# Patient Record
Sex: FEMALE | Race: WHITE | Employment: OTHER | URBAN - METROPOLITAN AREA
[De-identification: names, ages, dates, MRNs, and addresses within clinical notes are randomized per-mention and may not be internally consistent; named-entity substitution may affect disease eponyms.]

---

## 2017-03-22 ENCOUNTER — OFFICE VISIT (OUTPATIENT)
Dept: PULMONOLOGY | Age: 77
End: 2017-03-22

## 2017-03-22 VITALS
HEIGHT: 65 IN | SYSTOLIC BLOOD PRESSURE: 150 MMHG | OXYGEN SATURATION: 96 % | DIASTOLIC BLOOD PRESSURE: 80 MMHG | RESPIRATION RATE: 16 BRPM | HEART RATE: 69 BPM | WEIGHT: 193 LBS | TEMPERATURE: 98.2 F | BODY MASS INDEX: 32.15 KG/M2

## 2017-03-22 DIAGNOSIS — J31.0 CHRONIC RHINITIS: ICD-10-CM

## 2017-03-22 DIAGNOSIS — J45.909 UNCOMPLICATED ASTHMA, UNSPECIFIED ASTHMA SEVERITY: Primary | ICD-10-CM

## 2017-03-22 DIAGNOSIS — R05.9 COUGH: ICD-10-CM

## 2017-03-22 PROCEDURE — 1036F TOBACCO NON-USER: CPT | Performed by: INTERNAL MEDICINE

## 2017-03-22 PROCEDURE — G8484 FLU IMMUNIZE NO ADMIN: HCPCS | Performed by: INTERNAL MEDICINE

## 2017-03-22 PROCEDURE — 3017F COLORECTAL CA SCREEN DOC REV: CPT | Performed by: INTERNAL MEDICINE

## 2017-03-22 PROCEDURE — G8400 PT W/DXA NO RESULTS DOC: HCPCS | Performed by: INTERNAL MEDICINE

## 2017-03-22 PROCEDURE — G8417 CALC BMI ABV UP PARAM F/U: HCPCS | Performed by: INTERNAL MEDICINE

## 2017-03-22 PROCEDURE — 1090F PRES/ABSN URINE INCON ASSESS: CPT | Performed by: INTERNAL MEDICINE

## 2017-03-22 PROCEDURE — 4040F PNEUMOC VAC/ADMIN/RCVD: CPT | Performed by: INTERNAL MEDICINE

## 2017-03-22 PROCEDURE — 3014F SCREEN MAMMO DOC REV: CPT | Performed by: INTERNAL MEDICINE

## 2017-03-22 PROCEDURE — 1123F ACP DISCUSS/DSCN MKR DOCD: CPT | Performed by: INTERNAL MEDICINE

## 2017-03-22 PROCEDURE — 99213 OFFICE O/P EST LOW 20 MIN: CPT | Performed by: INTERNAL MEDICINE

## 2017-03-22 PROCEDURE — G8427 DOCREV CUR MEDS BY ELIG CLIN: HCPCS | Performed by: INTERNAL MEDICINE

## 2017-03-22 RX ORDER — MULTIVIT-MIN/IRON/FOLIC ACID/K 18-600-40
1 CAPSULE ORAL NIGHTLY
COMMUNITY

## 2017-03-22 RX ORDER — ALBUTEROL SULFATE 90 UG/1
2 AEROSOL, METERED RESPIRATORY (INHALATION) EVERY 6 HOURS PRN
Qty: 3 INHALER | Refills: 2 | Status: SHIPPED | OUTPATIENT
Start: 2017-03-22 | End: 2019-09-04 | Stop reason: SDUPTHER

## 2017-05-17 ENCOUNTER — HOSPITAL ENCOUNTER (OUTPATIENT)
Dept: OTHER | Age: 77
Discharge: HOME | End: 2017-05-17
Attending: INTERNAL MEDICINE | Admitting: INTERNAL MEDICINE

## 2017-05-17 DIAGNOSIS — M54.5 LOW BACK PAIN, UNSPECIFIED BACK PAIN LATERALITY, UNSPECIFIED CHRONICITY, WITH SCIATICA PRESENCE UNSPECIFIED: ICD-10-CM

## 2017-09-22 ENCOUNTER — OFFICE VISIT (OUTPATIENT)
Dept: PULMONOLOGY | Age: 77
End: 2017-09-22

## 2017-09-22 ENCOUNTER — HOSPITAL ENCOUNTER (OUTPATIENT)
Dept: PULMONOLOGY | Age: 77
Discharge: HOME | End: 2017-09-22
Attending: INTERNAL MEDICINE | Admitting: INTERNAL MEDICINE

## 2017-09-22 VITALS
SYSTOLIC BLOOD PRESSURE: 120 MMHG | WEIGHT: 186 LBS | DIASTOLIC BLOOD PRESSURE: 78 MMHG | HEIGHT: 65 IN | OXYGEN SATURATION: 94 % | HEART RATE: 78 BPM | BODY MASS INDEX: 30.99 KG/M2

## 2017-09-22 VITALS — OXYGEN SATURATION: 92 %

## 2017-09-22 DIAGNOSIS — J45.909 UNCOMPLICATED ASTHMA, UNSPECIFIED ASTHMA SEVERITY: Primary | ICD-10-CM

## 2017-09-22 DIAGNOSIS — R05.9 COUGH: ICD-10-CM

## 2017-09-22 DIAGNOSIS — J44.9 ASTHMA-COPD OVERLAP SYNDROME (HCC): ICD-10-CM

## 2017-09-22 DIAGNOSIS — J31.0 CHRONIC RHINITIS: ICD-10-CM

## 2017-09-22 DIAGNOSIS — J45.909 UNCOMPLICATED ASTHMA: ICD-10-CM

## 2017-09-22 PROBLEM — J44.89 ASTHMA-COPD OVERLAP SYNDROME: Status: ACTIVE | Noted: 2017-09-22

## 2017-09-22 PROCEDURE — G8400 PT W/DXA NO RESULTS DOC: HCPCS | Performed by: INTERNAL MEDICINE

## 2017-09-22 PROCEDURE — 3017F COLORECTAL CA SCREEN DOC REV: CPT | Performed by: INTERNAL MEDICINE

## 2017-09-22 PROCEDURE — 1036F TOBACCO NON-USER: CPT | Performed by: INTERNAL MEDICINE

## 2017-09-22 PROCEDURE — G8417 CALC BMI ABV UP PARAM F/U: HCPCS | Performed by: INTERNAL MEDICINE

## 2017-09-22 PROCEDURE — 1090F PRES/ABSN URINE INCON ASSESS: CPT | Performed by: INTERNAL MEDICINE

## 2017-09-22 PROCEDURE — 4040F PNEUMOC VAC/ADMIN/RCVD: CPT | Performed by: INTERNAL MEDICINE

## 2017-09-22 PROCEDURE — G8926 SPIRO NO PERF OR DOC: HCPCS | Performed by: INTERNAL MEDICINE

## 2017-09-22 PROCEDURE — 99214 OFFICE O/P EST MOD 30 MIN: CPT | Performed by: INTERNAL MEDICINE

## 2017-09-22 PROCEDURE — 3023F SPIROM DOC REV: CPT | Performed by: INTERNAL MEDICINE

## 2017-09-22 PROCEDURE — G8427 DOCREV CUR MEDS BY ELIG CLIN: HCPCS | Performed by: INTERNAL MEDICINE

## 2017-09-22 PROCEDURE — 1123F ACP DISCUSS/DSCN MKR DOCD: CPT | Performed by: INTERNAL MEDICINE

## 2017-09-22 RX ORDER — ALBUTEROL SULFATE 2.5 MG/3ML
2.5 SOLUTION RESPIRATORY (INHALATION) ONCE
Status: DISCONTINUED | OUTPATIENT
Start: 2017-09-22 | End: 2017-09-23 | Stop reason: HOSPADM

## 2017-09-22 RX ORDER — GABAPENTIN 300 MG/1
CAPSULE ORAL
Status: ON HOLD | COMMUNITY
Start: 2017-09-13 | End: 2018-09-29 | Stop reason: HOSPADM

## 2017-09-22 RX ORDER — ETODOLAC 400 MG/1
TABLET, FILM COATED ORAL
Status: ON HOLD | COMMUNITY
Start: 2017-09-06 | End: 2018-09-29 | Stop reason: HOSPADM

## 2018-03-22 ENCOUNTER — OFFICE VISIT (OUTPATIENT)
Dept: PULMONOLOGY | Age: 78
End: 2018-03-22

## 2018-03-22 VITALS
OXYGEN SATURATION: 93 % | RESPIRATION RATE: 18 BRPM | HEART RATE: 63 BPM | WEIGHT: 186 LBS | BODY MASS INDEX: 30.99 KG/M2 | HEIGHT: 65 IN | DIASTOLIC BLOOD PRESSURE: 74 MMHG | SYSTOLIC BLOOD PRESSURE: 124 MMHG | TEMPERATURE: 98.2 F

## 2018-03-22 DIAGNOSIS — J45.909 UNCOMPLICATED ASTHMA, UNSPECIFIED ASTHMA SEVERITY, UNSPECIFIED WHETHER PERSISTENT: Primary | ICD-10-CM

## 2018-03-22 DIAGNOSIS — Z23 NEED FOR PNEUMOCOCCAL VACCINE: ICD-10-CM

## 2018-03-22 DIAGNOSIS — J44.9 ASTHMA-COPD OVERLAP SYNDROME (HCC): ICD-10-CM

## 2018-03-22 DIAGNOSIS — J31.0 CHRONIC RHINITIS, UNSPECIFIED TYPE: ICD-10-CM

## 2018-03-22 PROCEDURE — 99213 OFFICE O/P EST LOW 20 MIN: CPT | Performed by: INTERNAL MEDICINE

## 2018-03-22 PROCEDURE — G0009 ADMIN PNEUMOCOCCAL VACCINE: HCPCS | Performed by: INTERNAL MEDICINE

## 2018-03-22 PROCEDURE — 4040F PNEUMOC VAC/ADMIN/RCVD: CPT | Performed by: INTERNAL MEDICINE

## 2018-03-22 PROCEDURE — 90670 PCV13 VACCINE IM: CPT | Performed by: INTERNAL MEDICINE

## 2018-03-22 PROCEDURE — 3017F COLORECTAL CA SCREEN DOC REV: CPT | Performed by: INTERNAL MEDICINE

## 2018-03-22 PROCEDURE — 1036F TOBACCO NON-USER: CPT | Performed by: INTERNAL MEDICINE

## 2018-03-22 PROCEDURE — G8482 FLU IMMUNIZE ORDER/ADMIN: HCPCS | Performed by: INTERNAL MEDICINE

## 2018-03-22 PROCEDURE — G8417 CALC BMI ABV UP PARAM F/U: HCPCS | Performed by: INTERNAL MEDICINE

## 2018-03-22 PROCEDURE — 1090F PRES/ABSN URINE INCON ASSESS: CPT | Performed by: INTERNAL MEDICINE

## 2018-03-22 PROCEDURE — 1123F ACP DISCUSS/DSCN MKR DOCD: CPT | Performed by: INTERNAL MEDICINE

## 2018-03-22 PROCEDURE — G8427 DOCREV CUR MEDS BY ELIG CLIN: HCPCS | Performed by: INTERNAL MEDICINE

## 2018-03-22 PROCEDURE — 3023F SPIROM DOC REV: CPT | Performed by: INTERNAL MEDICINE

## 2018-03-22 PROCEDURE — G8400 PT W/DXA NO RESULTS DOC: HCPCS | Performed by: INTERNAL MEDICINE

## 2018-03-22 PROCEDURE — G8926 SPIRO NO PERF OR DOC: HCPCS | Performed by: INTERNAL MEDICINE

## 2018-03-22 RX ORDER — ICOSAPENT ETHYL 1000 MG/1
CAPSULE ORAL
COMMUNITY
Start: 2018-02-05 | End: 2019-03-21 | Stop reason: CLARIF

## 2018-03-22 NOTE — PROGRESS NOTES
[facility] Pulmonary, Critical Care, and Sleep    Outpatient Follow Up Note    CC: Asthma  Consulting provider: Linda Hopkins MD    Interval History: 78 y.o. female She had acute bronchitiis with an exacerbation this December. Not using her rescue inhaler. Nighttime symptoms: 0/7  Daytime symptoms: 1/7    Initial HPI: Previously smoked x 30 years, 1/3 pack of cigarettes daily. Quit about 30 years ago. Told she had asthma; was in ICU x 3 days with asthma exacerbation in 1970's. Has an albuterol- uses it twice very 3-4 weeks. Has rheumatoid arthritis- sees Dr Masood Russell. Uses alleve for RA flares too. Uses leflunamide and sulfasalazine too. Had been on methotrexate previously- had side effects. On this current regimen x 3 years. Seems to keep symptoms under good control. May occasionally have ankle or wrist swelling. Current Medications:    Current Outpatient Prescriptions:     VASCEPA 1 g CAPS capsule, , Disp: , Rfl:     gabapentin (NEURONTIN) 300 MG capsule, , Disp: , Rfl:     etodolac (LODINE) 400 MG tablet, , Disp: , Rfl:     fluticasone-salmeterol (ADVAIR DISKUS) 100-50 MCG/DOSE diskus inhaler, Inhale 1 puff into the lungs every 12 hours, Disp: 3 Inhaler, Rfl: 1    Cholecalciferol (VITAMIN D) 2000 UNITS CAPS capsule, Take by mouth daily, Disp: , Rfl:     Lactobacillus Rhamnosus, GG, (PROBIOTIC COLIC PO), Take by mouth daily, Disp: , Rfl:     albuterol sulfate  (90 BASE) MCG/ACT inhaler, Inhale 2 puffs into the lungs every 6 hours as needed for Wheezing or Shortness of Breath, Disp: 3 Inhaler, Rfl: 2    atorvastatin (LIPITOR) 20 MG tablet, Take 20 mg by mouth daily. , Disp: , Rfl:     escitalopram (LEXAPRO) 10 MG tablet, Take 20 mg by mouth nightly., Disp: , Rfl:     leflunomide (ARAVA) 20 MG tablet, Take 20 mg by mouth daily. , Disp: , Rfl:     sulfaSALAzine (AZULFIDINE) 500 MG tablet, Take 500 mg by mouth 4 times daily. , Disp: , Rfl:     levothyroxine (SYNTHROID) 25 MCG tablet, Take 25 mcg by

## 2018-04-18 ENCOUNTER — OFFICE VISIT (OUTPATIENT)
Dept: PULMONOLOGY | Age: 78
End: 2018-04-18

## 2018-04-18 VITALS
WEIGHT: 185 LBS | OXYGEN SATURATION: 94 % | HEIGHT: 65 IN | TEMPERATURE: 98.1 F | RESPIRATION RATE: 20 BRPM | HEART RATE: 84 BPM | SYSTOLIC BLOOD PRESSURE: 150 MMHG | DIASTOLIC BLOOD PRESSURE: 82 MMHG | BODY MASS INDEX: 30.82 KG/M2

## 2018-04-18 DIAGNOSIS — R07.81 PLEURITIC PAIN: Primary | ICD-10-CM

## 2018-04-18 DIAGNOSIS — J44.9 ASTHMA-COPD OVERLAP SYNDROME (HCC): ICD-10-CM

## 2018-04-18 PROCEDURE — 1123F ACP DISCUSS/DSCN MKR DOCD: CPT | Performed by: INTERNAL MEDICINE

## 2018-04-18 PROCEDURE — G8400 PT W/DXA NO RESULTS DOC: HCPCS | Performed by: INTERNAL MEDICINE

## 2018-04-18 PROCEDURE — 3023F SPIROM DOC REV: CPT | Performed by: INTERNAL MEDICINE

## 2018-04-18 PROCEDURE — 4040F PNEUMOC VAC/ADMIN/RCVD: CPT | Performed by: INTERNAL MEDICINE

## 2018-04-18 PROCEDURE — G8926 SPIRO NO PERF OR DOC: HCPCS | Performed by: INTERNAL MEDICINE

## 2018-04-18 PROCEDURE — 1036F TOBACCO NON-USER: CPT | Performed by: INTERNAL MEDICINE

## 2018-04-18 PROCEDURE — 99213 OFFICE O/P EST LOW 20 MIN: CPT | Performed by: INTERNAL MEDICINE

## 2018-04-18 PROCEDURE — 1090F PRES/ABSN URINE INCON ASSESS: CPT | Performed by: INTERNAL MEDICINE

## 2018-04-18 PROCEDURE — G8427 DOCREV CUR MEDS BY ELIG CLIN: HCPCS | Performed by: INTERNAL MEDICINE

## 2018-04-18 PROCEDURE — G8417 CALC BMI ABV UP PARAM F/U: HCPCS | Performed by: INTERNAL MEDICINE

## 2018-07-19 ENCOUNTER — HOSPITAL ENCOUNTER (OUTPATIENT)
Dept: GENERAL RADIOLOGY | Age: 78
Discharge: HOME | End: 2018-07-19
Payer: MEDICARE

## 2018-07-19 ENCOUNTER — HOSPITAL ENCOUNTER (OUTPATIENT)
Age: 78
Discharge: HOME | End: 2018-07-19
Payer: MEDICARE

## 2018-07-19 DIAGNOSIS — R05.9 COUGH: ICD-10-CM

## 2018-07-19 PROCEDURE — 71046 X-RAY EXAM CHEST 2 VIEWS: CPT

## 2018-09-24 ENCOUNTER — TELEPHONE (OUTPATIENT)
Dept: PULMONOLOGY | Age: 78
End: 2018-09-24

## 2018-09-24 NOTE — TELEPHONE ENCOUNTER
Do you have the following symptoms? Patient scheduled for next available 10/4/18. Shortness of Breath  yes  Wheezing  yes  Cough  yes                  Cough Characteristics:                           Productive    yes                           Sputum Color   yellow                           Hemoptysis   no                           Consistency of sputum   Thick stringy     Fever:    no  Temp:no  Chills/Sweats:  no  What other symptoms are you having?:  no    How long have you had these symptoms? 3 months been to primary physician 3x and not getting any better was given antibiotic unsure of the medication given. [facility]          Review medications and allergies: Allergies? Allergies   Allergen Reactions    Effexor [Venlafaxine Hcl] Itching and Nausea Only    Fenofibrate Diarrhea    Prednisone Other (See Comments)     Causes body to feel on fire    Codeine Nausea And Vomiting                        Currently on Antibiotics? (Drug/Dose/Frequency and how long on?) no                   Systemic Steroids? (Drug/Dose/Frequency and how long on?) no   Taking cough medication-tussin    Assessment: 4/18/18   · Left rib pain and /or pleuritic pain worse with coughing -50% better today  · Asthma with COPD overlap  · Rheumatoid arthritis- on arava and sulfasalazine  · Chronic Rhinitis  · Cough - suspect post-nasal gtt      Plan:    · We'll monitor for continued improvement. Pt declined a prednisone rx for the pleuritic pain because it causes her whole bidy to feel like it is burning and she sweats. · She is already taking etodolac  · Advair to 125/50  · Continue proair prn  · She declined to try tylenol PRN  · She is taking PRN benadryl at night  · Prevnar 13 today.   · F/U in 12 mo

## 2018-09-28 ENCOUNTER — APPOINTMENT (OUTPATIENT)
Dept: GENERAL RADIOLOGY | Age: 78
DRG: 189 | End: 2018-09-28
Payer: MEDICARE

## 2018-09-28 ENCOUNTER — HOSPITAL ENCOUNTER (INPATIENT)
Age: 78
LOS: 1 days | Discharge: HOME | DRG: 189 | End: 2018-09-29
Attending: EMERGENCY MEDICINE | Admitting: INTERNAL MEDICINE
Payer: MEDICARE

## 2018-09-28 DIAGNOSIS — J40 BRONCHITIS: ICD-10-CM

## 2018-09-28 DIAGNOSIS — J44.1 COPD EXACERBATION (HCC): Primary | ICD-10-CM

## 2018-09-28 LAB
A/G RATIO: 1.7 (ref 1.1–2.2)
ALBUMIN SERPL-MCNC: 4.4 G/DL (ref 3.4–5)
ALP BLD-CCNC: 79 U/L (ref 40–129)
ALT SERPL-CCNC: 25 U/L (ref 10–40)
ANION GAP SERPL CALCULATED.3IONS-SCNC: 11 MMOL/L (ref 3–16)
AST SERPL-CCNC: 28 U/L (ref 15–37)
BASOPHILS ABSOLUTE: 0.1 K/UL (ref 0–0.2)
BASOPHILS RELATIVE PERCENT: 1 %
BILIRUB SERPL-MCNC: 0.6 MG/DL (ref 0–1)
BUN BLDV-MCNC: 10 MG/DL (ref 7–20)
CALCIUM SERPL-MCNC: 9.6 MG/DL (ref 8.3–10.6)
CHLORIDE BLD-SCNC: 102 MMOL/L (ref 99–110)
CO2: 29 MMOL/L (ref 21–32)
CREAT SERPL-MCNC: 0.8 MG/DL (ref 0.6–1.2)
EOSINOPHILS ABSOLUTE: 0.8 K/UL (ref 0–0.6)
EOSINOPHILS RELATIVE PERCENT: 13.7 %
GFR AFRICAN AMERICAN: >60
GFR NON-AFRICAN AMERICAN: >60
GLOBULIN: 2.6 G/DL
GLUCOSE BLD-MCNC: 97 MG/DL (ref 70–99)
HCT VFR BLD CALC: 41.2 % (ref 36–48)
HEMOGLOBIN: 13.8 G/DL (ref 12–16)
LYMPHOCYTES ABSOLUTE: 1.6 K/UL (ref 1–5.1)
LYMPHOCYTES RELATIVE PERCENT: 27 %
MCH RBC QN AUTO: 32.3 PG (ref 26–34)
MCHC RBC AUTO-ENTMCNC: 33.5 G/DL (ref 31–36)
MCV RBC AUTO: 96.2 FL (ref 80–100)
MONOCYTES ABSOLUTE: 0.7 K/UL (ref 0–1.3)
MONOCYTES RELATIVE PERCENT: 12.6 %
NEUTROPHILS ABSOLUTE: 2.7 K/UL (ref 1.7–7.7)
NEUTROPHILS RELATIVE PERCENT: 45.7 %
PDW BLD-RTO: 14.3 % (ref 12.4–15.4)
PLATELET # BLD: 157 K/UL (ref 135–450)
PMV BLD AUTO: 10.6 FL (ref 5–10.5)
POTASSIUM SERPL-SCNC: 4.2 MMOL/L (ref 3.5–5.1)
PRO-BNP: 131 PG/ML (ref 0–449)
PROCALCITONIN: 0.08 NG/ML (ref 0–0.15)
RBC # BLD: 4.29 M/UL (ref 4–5.2)
SODIUM BLD-SCNC: 142 MMOL/L (ref 136–145)
TOTAL PROTEIN: 7 G/DL (ref 6.4–8.2)
TROPONIN: <0.01 NG/ML
WBC # BLD: 5.9 K/UL (ref 4–11)

## 2018-09-28 PROCEDURE — 80053 COMPREHEN METABOLIC PANEL: CPT

## 2018-09-28 PROCEDURE — 84484 ASSAY OF TROPONIN QUANT: CPT

## 2018-09-28 PROCEDURE — 6370000000 HC RX 637 (ALT 250 FOR IP): Performed by: PHYSICIAN ASSISTANT

## 2018-09-28 PROCEDURE — 85025 COMPLETE CBC W/AUTO DIFF WBC: CPT

## 2018-09-28 PROCEDURE — 6360000002 HC RX W HCPCS: Performed by: PHYSICIAN ASSISTANT

## 2018-09-28 PROCEDURE — 83880 ASSAY OF NATRIURETIC PEPTIDE: CPT

## 2018-09-28 PROCEDURE — 93010 ELECTROCARDIOGRAM REPORT: CPT | Performed by: INTERNAL MEDICINE

## 2018-09-28 PROCEDURE — 2700000000 HC OXYGEN THERAPY PER DAY

## 2018-09-28 PROCEDURE — 94640 AIRWAY INHALATION TREATMENT: CPT

## 2018-09-28 PROCEDURE — 93005 ELECTROCARDIOGRAM TRACING: CPT | Performed by: EMERGENCY MEDICINE

## 2018-09-28 PROCEDURE — 71046 X-RAY EXAM CHEST 2 VIEWS: CPT

## 2018-09-28 PROCEDURE — 6370000000 HC RX 637 (ALT 250 FOR IP): Performed by: INTERNAL MEDICINE

## 2018-09-28 PROCEDURE — 96374 THER/PROPH/DIAG INJ IV PUSH: CPT

## 2018-09-28 PROCEDURE — 84145 PROCALCITONIN (PCT): CPT

## 2018-09-28 PROCEDURE — 2580000003 HC RX 258: Performed by: PHYSICIAN ASSISTANT

## 2018-09-28 PROCEDURE — 36415 COLL VENOUS BLD VENIPUNCTURE: CPT

## 2018-09-28 PROCEDURE — 99285 EMERGENCY DEPT VISIT HI MDM: CPT

## 2018-09-28 PROCEDURE — 99223 1ST HOSP IP/OBS HIGH 75: CPT | Performed by: INTERNAL MEDICINE

## 2018-09-28 PROCEDURE — 94761 N-INVAS EAR/PLS OXIMETRY MLT: CPT

## 2018-09-28 PROCEDURE — 94150 VITAL CAPACITY TEST: CPT

## 2018-09-28 PROCEDURE — 1200000000 HC SEMI PRIVATE

## 2018-09-28 RX ORDER — IPRATROPIUM BROMIDE AND ALBUTEROL SULFATE 2.5; .5 MG/3ML; MG/3ML
1 SOLUTION RESPIRATORY (INHALATION)
Status: DISCONTINUED | OUTPATIENT
Start: 2018-09-28 | End: 2018-09-28

## 2018-09-28 RX ORDER — IPRATROPIUM BROMIDE AND ALBUTEROL SULFATE 2.5; .5 MG/3ML; MG/3ML
1 SOLUTION RESPIRATORY (INHALATION) ONCE
Status: COMPLETED | OUTPATIENT
Start: 2018-09-28 | End: 2018-09-28

## 2018-09-28 RX ORDER — DEXAMETHASONE SODIUM PHOSPHATE 10 MG/ML
10 INJECTION INTRAMUSCULAR; INTRAVENOUS ONCE
Status: COMPLETED | OUTPATIENT
Start: 2018-09-28 | End: 2018-09-28

## 2018-09-28 RX ORDER — ALBUTEROL SULFATE 2.5 MG/3ML
2.5 SOLUTION RESPIRATORY (INHALATION) ONCE
Status: DISCONTINUED | OUTPATIENT
Start: 2018-09-28 | End: 2018-09-29 | Stop reason: HOSPADM

## 2018-09-28 RX ORDER — DOXYCYCLINE HYCLATE 100 MG
100 TABLET ORAL 2 TIMES DAILY
Status: DISCONTINUED | OUTPATIENT
Start: 2018-09-28 | End: 2018-09-29 | Stop reason: HOSPADM

## 2018-09-28 RX ORDER — LEVOTHYROXINE SODIUM 0.03 MG/1
25 TABLET ORAL DAILY
Status: DISCONTINUED | OUTPATIENT
Start: 2018-09-29 | End: 2018-09-29 | Stop reason: HOSPADM

## 2018-09-28 RX ORDER — METHYLPREDNISOLONE SODIUM SUCCINATE 40 MG/ML
40 INJECTION, POWDER, LYOPHILIZED, FOR SOLUTION INTRAMUSCULAR; INTRAVENOUS EVERY 12 HOURS
Status: DISCONTINUED | OUTPATIENT
Start: 2018-09-28 | End: 2018-09-29 | Stop reason: HOSPADM

## 2018-09-28 RX ORDER — SULFASALAZINE 500 MG/1
500 TABLET, DELAYED RELEASE ORAL 4 TIMES DAILY
Status: DISCONTINUED | OUTPATIENT
Start: 2018-09-28 | End: 2018-09-29 | Stop reason: HOSPADM

## 2018-09-28 RX ORDER — SODIUM CHLORIDE 0.9 % (FLUSH) 0.9 %
10 SYRINGE (ML) INJECTION PRN
Status: DISCONTINUED | OUTPATIENT
Start: 2018-09-28 | End: 2018-09-29 | Stop reason: HOSPADM

## 2018-09-28 RX ORDER — SODIUM CHLORIDE 0.9 % (FLUSH) 0.9 %
10 SYRINGE (ML) INJECTION EVERY 12 HOURS SCHEDULED
Status: DISCONTINUED | OUTPATIENT
Start: 2018-09-28 | End: 2018-09-29 | Stop reason: HOSPADM

## 2018-09-28 RX ORDER — IPRATROPIUM BROMIDE AND ALBUTEROL SULFATE 2.5; .5 MG/3ML; MG/3ML
1 SOLUTION RESPIRATORY (INHALATION) EVERY 4 HOURS
Status: DISCONTINUED | OUTPATIENT
Start: 2018-09-28 | End: 2018-09-29 | Stop reason: HOSPADM

## 2018-09-28 RX ORDER — OXYMETAZOLINE HYDROCHLORIDE 0.05 G/100ML
2 SPRAY NASAL 2 TIMES DAILY
Status: DISCONTINUED | OUTPATIENT
Start: 2018-09-29 | End: 2018-09-29 | Stop reason: HOSPADM

## 2018-09-28 RX ORDER — ATORVASTATIN CALCIUM 10 MG/1
20 TABLET, FILM COATED ORAL DAILY
Status: DISCONTINUED | OUTPATIENT
Start: 2018-09-28 | End: 2018-09-29 | Stop reason: HOSPADM

## 2018-09-28 RX ORDER — LEFLUNOMIDE 10 MG/1
20 TABLET ORAL DAILY
Status: DISCONTINUED | OUTPATIENT
Start: 2018-09-29 | End: 2018-09-29 | Stop reason: HOSPADM

## 2018-09-28 RX ORDER — ESCITALOPRAM OXALATE 10 MG/1
20 TABLET ORAL NIGHTLY
Status: DISCONTINUED | OUTPATIENT
Start: 2018-09-28 | End: 2018-09-29 | Stop reason: HOSPADM

## 2018-09-28 RX ORDER — ATENOLOL 25 MG/1
25 TABLET ORAL DAILY
Status: DISCONTINUED | OUTPATIENT
Start: 2018-09-29 | End: 2018-09-29 | Stop reason: HOSPADM

## 2018-09-28 RX ORDER — ONDANSETRON 2 MG/ML
4 INJECTION INTRAMUSCULAR; INTRAVENOUS EVERY 6 HOURS PRN
Status: DISCONTINUED | OUTPATIENT
Start: 2018-09-28 | End: 2018-09-29 | Stop reason: HOSPADM

## 2018-09-28 RX ADMIN — Medication 10 ML: at 21:34

## 2018-09-28 RX ADMIN — DOXYCYCLINE HYCLATE 100 MG: 100 TABLET, COATED ORAL at 18:30

## 2018-09-28 RX ADMIN — METHYLPREDNISOLONE SODIUM SUCCINATE 40 MG: 40 INJECTION, POWDER, FOR SOLUTION INTRAMUSCULAR; INTRAVENOUS at 18:46

## 2018-09-28 RX ADMIN — ESCITALOPRAM OXALATE 20 MG: 10 TABLET ORAL at 22:56

## 2018-09-28 RX ADMIN — IPRATROPIUM BROMIDE AND ALBUTEROL SULFATE 1 AMPULE: .5; 3 SOLUTION RESPIRATORY (INHALATION) at 23:53

## 2018-09-28 RX ADMIN — DEXAMETHASONE SODIUM PHOSPHATE 10 MG: 10 INJECTION, SOLUTION INTRAMUSCULAR; INTRAVENOUS at 15:19

## 2018-09-28 RX ADMIN — IPRATROPIUM BROMIDE AND ALBUTEROL SULFATE 1 AMPULE: .5; 3 SOLUTION RESPIRATORY (INHALATION) at 15:20

## 2018-09-28 RX ADMIN — IPRATROPIUM BROMIDE AND ALBUTEROL SULFATE 1 AMPULE: .5; 3 SOLUTION RESPIRATORY (INHALATION) at 19:50

## 2018-09-28 RX ADMIN — IPRATROPIUM BROMIDE AND ALBUTEROL SULFATE 1 AMPULE: .5; 3 SOLUTION RESPIRATORY (INHALATION) at 16:24

## 2018-09-28 RX ADMIN — ATORVASTATIN CALCIUM 20 MG: 10 TABLET, FILM COATED ORAL at 22:56

## 2018-09-28 ASSESSMENT — ENCOUNTER SYMPTOMS
COUGH: 1
VOMITING: 0
WHEEZING: 1
ABDOMINAL PAIN: 0
SHORTNESS OF BREATH: 1

## 2018-09-28 NOTE — CONSULTS
Greg Hughs, MUCUS, TRICHOMONAS, YEAST, BACTERIA, CLARITYU, SPECGRAV, LEUKOCYTESUR, UROBILINOGEN, BILIRUBINUR, BLOODU, GLUCOSEU, AMORPHOUS in the last 72 hours. Invalid input(s): KETONESU  No results for input(s): PHART, KTJ3IPY, PO2ART in the last 72 hours. Chest imaging was reviewed by me and showed CR clear lungs    ASSESSMENT:  · Asthma exacerbation  · Acute hypoxic respiratory failure  · RA on Arava and sulfasalazine  · Chronic rhinitis  · Chronic cough    PLAN:  Supplemental oxygen to maintain SaO2 >92%; wean as tolerated  Intensive inhaled bronchodilator therapy. IV solumedrol 40 mg IV Q12 hrs. Plan to switch to oral prednisone taper when improved. Check PCT  Doxycycline   Sputum GS&C.   Acapella QID to mobilize respiratory secretions      Thank you Ainsley Escudero MD for this consult

## 2018-09-28 NOTE — ED NOTES
1626- Consult placed to hospitalist for [place], [place].   [address] returned page and spoke to Dr. Carolin Montez  09/28/18 1538 Shanna Tran  09/28/18 3343

## 2018-09-29 VITALS
SYSTOLIC BLOOD PRESSURE: 112 MMHG | OXYGEN SATURATION: 91 % | RESPIRATION RATE: 16 BRPM | HEART RATE: 86 BPM | TEMPERATURE: 97.9 F | BODY MASS INDEX: 28.93 KG/M2 | HEIGHT: 66 IN | DIASTOLIC BLOOD PRESSURE: 68 MMHG | WEIGHT: 180 LBS

## 2018-09-29 PROBLEM — J44.1 COPD EXACERBATION (HCC): Status: RESOLVED | Noted: 2018-09-28 | Resolved: 2018-09-29

## 2018-09-29 LAB
ANION GAP SERPL CALCULATED.3IONS-SCNC: 12 MMOL/L (ref 3–16)
BASOPHILS ABSOLUTE: 0 K/UL (ref 0–0.2)
BASOPHILS RELATIVE PERCENT: 0.5 %
BUN BLDV-MCNC: 15 MG/DL (ref 7–20)
CALCIUM SERPL-MCNC: 9.5 MG/DL (ref 8.3–10.6)
CHLORIDE BLD-SCNC: 103 MMOL/L (ref 99–110)
CO2: 27 MMOL/L (ref 21–32)
CREAT SERPL-MCNC: 0.8 MG/DL (ref 0.6–1.2)
EOSINOPHILS ABSOLUTE: 0 K/UL (ref 0–0.6)
EOSINOPHILS RELATIVE PERCENT: 0 %
GFR AFRICAN AMERICAN: >60
GFR NON-AFRICAN AMERICAN: >60
GLUCOSE BLD-MCNC: 131 MG/DL (ref 70–99)
HCT VFR BLD CALC: 37.2 % (ref 36–48)
HEMOGLOBIN: 12.5 G/DL (ref 12–16)
LYMPHOCYTES ABSOLUTE: 0.7 K/UL (ref 1–5.1)
LYMPHOCYTES RELATIVE PERCENT: 13.6 %
MCH RBC QN AUTO: 32.6 PG (ref 26–34)
MCHC RBC AUTO-ENTMCNC: 33.6 G/DL (ref 31–36)
MCV RBC AUTO: 97 FL (ref 80–100)
MONOCYTES ABSOLUTE: 0.4 K/UL (ref 0–1.3)
MONOCYTES RELATIVE PERCENT: 8.7 %
NEUTROPHILS ABSOLUTE: 3.9 K/UL (ref 1.7–7.7)
NEUTROPHILS RELATIVE PERCENT: 77.2 %
PDW BLD-RTO: 14.5 % (ref 12.4–15.4)
PLATELET # BLD: 131 K/UL (ref 135–450)
PMV BLD AUTO: 10.6 FL (ref 5–10.5)
POTASSIUM REFLEX MAGNESIUM: 4 MMOL/L (ref 3.5–5.1)
RBC # BLD: 3.84 M/UL (ref 4–5.2)
SODIUM BLD-SCNC: 142 MMOL/L (ref 136–145)
WBC # BLD: 5.1 K/UL (ref 4–11)

## 2018-09-29 PROCEDURE — 80048 BASIC METABOLIC PNL TOTAL CA: CPT

## 2018-09-29 PROCEDURE — 85025 COMPLETE CBC W/AUTO DIFF WBC: CPT

## 2018-09-29 PROCEDURE — 94640 AIRWAY INHALATION TREATMENT: CPT

## 2018-09-29 PROCEDURE — 90686 IIV4 VACC NO PRSV 0.5 ML IM: CPT | Performed by: INTERNAL MEDICINE

## 2018-09-29 PROCEDURE — 36415 COLL VENOUS BLD VENIPUNCTURE: CPT

## 2018-09-29 PROCEDURE — 6370000000 HC RX 637 (ALT 250 FOR IP): Performed by: INTERNAL MEDICINE

## 2018-09-29 PROCEDURE — 6360000002 HC RX W HCPCS: Performed by: PHYSICIAN ASSISTANT

## 2018-09-29 PROCEDURE — 6360000002 HC RX W HCPCS: Performed by: INTERNAL MEDICINE

## 2018-09-29 PROCEDURE — 99238 HOSP IP/OBS DSCHRG MGMT 30/<: CPT | Performed by: INTERNAL MEDICINE

## 2018-09-29 PROCEDURE — 6370000000 HC RX 637 (ALT 250 FOR IP): Performed by: PHYSICIAN ASSISTANT

## 2018-09-29 PROCEDURE — 99232 SBSQ HOSP IP/OBS MODERATE 35: CPT | Performed by: INTERNAL MEDICINE

## 2018-09-29 PROCEDURE — G0008 ADMIN INFLUENZA VIRUS VAC: HCPCS | Performed by: INTERNAL MEDICINE

## 2018-09-29 RX ORDER — PREDNISONE 10 MG/1
TABLET ORAL
Qty: 12 TABLET | Refills: 0 | Status: SHIPPED | OUTPATIENT
Start: 2018-09-29 | End: 2018-09-29 | Stop reason: HOSPADM

## 2018-09-29 RX ORDER — METHYLPREDNISOLONE 4 MG/1
TABLET ORAL
Qty: 1 KIT | Refills: 0 | Status: SHIPPED | OUTPATIENT
Start: 2018-09-29 | End: 2018-10-04 | Stop reason: ALTCHOICE

## 2018-09-29 RX ORDER — DOXYCYCLINE HYCLATE 100 MG
100 TABLET ORAL 2 TIMES DAILY
Qty: 14 TABLET | Refills: 0 | Status: SHIPPED | OUTPATIENT
Start: 2018-09-29 | End: 2018-10-06

## 2018-09-29 RX ADMIN — IPRATROPIUM BROMIDE AND ALBUTEROL SULFATE 1 AMPULE: .5; 3 SOLUTION RESPIRATORY (INHALATION) at 07:09

## 2018-09-29 RX ADMIN — METHYLPREDNISOLONE SODIUM SUCCINATE 40 MG: 40 INJECTION, POWDER, FOR SOLUTION INTRAMUSCULAR; INTRAVENOUS at 06:44

## 2018-09-29 RX ADMIN — ATENOLOL 25 MG: 25 TABLET ORAL at 09:09

## 2018-09-29 RX ADMIN — LEFLUNOMIDE 20 MG: 10 TABLET, FILM COATED ORAL at 09:15

## 2018-09-29 RX ADMIN — DOXYCYCLINE HYCLATE 100 MG: 100 TABLET, COATED ORAL at 09:09

## 2018-09-29 RX ADMIN — SULFASALAZINE 500 MG: 500 TABLET, DELAYED RELEASE ORAL at 09:09

## 2018-09-29 RX ADMIN — LEVOTHYROXINE SODIUM 25 MCG: 25 TABLET ORAL at 06:45

## 2018-09-29 RX ADMIN — ENOXAPARIN SODIUM 40 MG: 40 INJECTION SUBCUTANEOUS at 09:10

## 2018-09-29 RX ADMIN — INFLUENZA A VIRUS A/MICHIGAN/45/2015 X-275 (H1N1) ANTIGEN (FORMALDEHYDE INACTIVATED), INFLUENZA A VIRUS A/SINGAPORE/INFIMH-16-0019/2016 IVR-186 (H3N2) ANTIGEN (FORMALDEHYDE INACTIVATED), INFLUENZA B VIRUS B/PHUKET/3073/2013 ANTIGEN (FORMALDEHYDE INACTIVATED), AND INFLUENZA B VIRUS B/MARYLAND/15/2016 BX-69A ANTIGEN (FORMALDEHYDE INACTIVATED) 0.5 ML: 15; 15; 15; 15 INJECTION, SUSPENSION INTRAMUSCULAR at 09:09

## 2018-09-29 RX ADMIN — IPRATROPIUM BROMIDE AND ALBUTEROL SULFATE 1 AMPULE: .5; 3 SOLUTION RESPIRATORY (INHALATION) at 10:58

## 2018-09-29 ASSESSMENT — PAIN SCALES - GENERAL: PAINLEVEL_OUTOF10: 6

## 2018-09-29 NOTE — H&P
180/95, saturating 91%. CNS:  Alert, awake, and oriented to time, place, and person. PSYCH:  Cooperative and pleasant, answering questions appropriately. EYES:  Pupils are reactive to light. ENT:  Extraocular muscle movements are intact. RESPIRATORY SYSTEM:  Expiratory wheezes which are scattered. CVS:  S1, S2 heard. No murmurs, gallops, or rubs. ABDOMEN:  Soft. MUSCULOSKELETAL:  No acute obvious deformities. SKIN:  Without rashes or lesions. DIAGNOSTIC DATA:  Procalcitonin 0.08, , troponin less than 0.01. EKG independently viewed by me, which shows a ventricular rate of 65 beats  per minute with PVCs. Otherwise, normal sinus rhythm. Comprehensive metabolic panel showed a BUN 10, creatinine 0.8. Chest x-ray  shows no significant findings. CBC shows a white count of 5.9, hemoglobin  13.8, hematocrit is 41.2. Review of previous records shows PFT from 01/29/2015 from [address] [place] [address] that  shows moderately severe obstructive lung defect with air trapping and mild  reduction in diffusion capacity, this has been interpreted by Dr. Jocelyn Levi as  consistent with asthma. ASSESSMENT:  1. Acute exacerbation of COPD secondary to chronic underlying asthma. 2.  Rheumatoid arthritis. 3.  Dyslipidemia. 4.  Hypertension. 5.  Hypothyroidism. PLAN OF CARE:  The patient admitted to Internal Medicine Service. Steroids, antibiotics, breathing treatments will be continued. Pulmonology  consultation requested. Supplemental oxygen will be continued. Home medications namely the leflunomide, Lipitor, Synthroid, and atenolol  will be continued. CODE STATUS:  Full. DVT prophylaxis with Lovenox. EXPECTED LENGTH OF STAY:  More than two midnights based on plan of care  above. DISPOSITION:  Admitted to Internal Medicine Service.         Jerrell Spurling, MD    D: 09/29/2018 5:16:48       T: 09/29/2018 6:22:53     [email or web address]  Job#: [id number]     Doc#: [id number]    CC:

## 2018-09-30 NOTE — DISCHARGE SUMMARY
Name:  Josephine Ravi  Room:  0209/0209-02  MRN:    [id number]    Discharge Summary      This discharge summary is in conjunction with a complete physical exam done on the day of discharge. Discharging Physician: Dr. Tori Amoshal: 9/28/2018  Discharge:  9/29/2018    HPI taken from admission H&P:    The patient is a 79-year-old  female,  presenting to the hospital with chief complaint of a three-month history of  subacute onset of gradually progressive, increasing on and off cough with  wheezing and chest congestion, which got especially worse in the past two  to three days with wheezing, chest tightness, and progressive lack of  relief with her inhalers without any nausea or vomiting, because of which,  the patient finally presented to the hospital.    Diagnoses this Admission and Hospital Course     COPD exacerbation. Acute bronchitis   - improved with IV steroids, inhaled bronchodilators, and doxy  - dx on PO doxy and medrol   -sats stable on RA    RA- cont home meds   HTN- cont home meds   Hypothyroidism- cont synthroid   Dyslipidemia- on statin       Procedures (Please Review Full Report for Details)  None     Consults    Pulmonology       Physical Exam at Discharge:    /68   Pulse 86   Temp 97.9 °F (36.6 °C) (Oral)   Resp 16   Ht 5' 6\" (1.676 m)   Wt 180 lb (81.6 kg)   SpO2 91%   Breastfeeding? No   BMI 29.05 kg/m²   Gen: No distress. Alert. Eyes: PERRL. No sclera icterus. No conjunctival injection. ENT: No discharge. Pharynx clear. Neck: No JVD. Trachea midline. Resp: No accessory muscle use. No crackles. No wheezes. No rhonchi. CV: Regular rate. Regular rhythm. No murmur. No rub. No edema. Capillary Refill: Brisk,< 3 seconds   Peripheral Pulses: +2 palpable, equal bilaterally   GI: Non-tender. Non-distended. Normal bowel sounds. Skin: Warm and dry. No nodule on exposed extremities. No rash on exposed extremities. M/S: No cyanosis. No joint deformity.  No

## 2018-10-01 ENCOUNTER — TELEPHONE (OUTPATIENT)
Dept: PULMONOLOGY | Age: 78
End: 2018-10-01

## 2018-10-03 LAB
EKG ATRIAL RATE: 65 BPM
EKG DIAGNOSIS: NORMAL
EKG P AXIS: 44 DEGREES
EKG P-R INTERVAL: 132 MS
EKG Q-T INTERVAL: 428 MS
EKG QRS DURATION: 60 MS
EKG QTC CALCULATION (BAZETT): 445 MS
EKG R AXIS: -6 DEGREES
EKG T AXIS: 48 DEGREES
EKG VENTRICULAR RATE: 65 BPM

## 2018-10-04 ENCOUNTER — OFFICE VISIT (OUTPATIENT)
Dept: PULMONOLOGY | Age: 78
End: 2018-10-04
Payer: MEDICARE

## 2018-10-04 VITALS
HEIGHT: 66 IN | WEIGHT: 175.8 LBS | SYSTOLIC BLOOD PRESSURE: 124 MMHG | OXYGEN SATURATION: 93 % | TEMPERATURE: 97.9 F | DIASTOLIC BLOOD PRESSURE: 86 MMHG | BODY MASS INDEX: 28.25 KG/M2 | RESPIRATION RATE: 20 BRPM | HEART RATE: 102 BPM

## 2018-10-04 DIAGNOSIS — M05.719 RHEUMATOID ARTHRITIS INVOLVING SHOULDER WITH POSITIVE RHEUMATOID FACTOR, UNSPECIFIED LATERALITY (HCC): ICD-10-CM

## 2018-10-04 DIAGNOSIS — J45.21 MILD INTERMITTENT ASTHMA WITH EXACERBATION: Primary | ICD-10-CM

## 2018-10-04 PROCEDURE — 1101F PT FALLS ASSESS-DOCD LE1/YR: CPT | Performed by: INTERNAL MEDICINE

## 2018-10-04 PROCEDURE — G8417 CALC BMI ABV UP PARAM F/U: HCPCS | Performed by: INTERNAL MEDICINE

## 2018-10-04 PROCEDURE — G8427 DOCREV CUR MEDS BY ELIG CLIN: HCPCS | Performed by: INTERNAL MEDICINE

## 2018-10-04 PROCEDURE — G8400 PT W/DXA NO RESULTS DOC: HCPCS | Performed by: INTERNAL MEDICINE

## 2018-10-04 PROCEDURE — 1090F PRES/ABSN URINE INCON ASSESS: CPT | Performed by: INTERNAL MEDICINE

## 2018-10-04 PROCEDURE — 1123F ACP DISCUSS/DSCN MKR DOCD: CPT | Performed by: INTERNAL MEDICINE

## 2018-10-04 PROCEDURE — 1036F TOBACCO NON-USER: CPT | Performed by: INTERNAL MEDICINE

## 2018-10-04 PROCEDURE — 1111F DSCHRG MED/CURRENT MED MERGE: CPT | Performed by: INTERNAL MEDICINE

## 2018-10-04 PROCEDURE — 99214 OFFICE O/P EST MOD 30 MIN: CPT | Performed by: INTERNAL MEDICINE

## 2018-10-04 PROCEDURE — G8482 FLU IMMUNIZE ORDER/ADMIN: HCPCS | Performed by: INTERNAL MEDICINE

## 2018-10-04 PROCEDURE — 4040F PNEUMOC VAC/ADMIN/RCVD: CPT | Performed by: INTERNAL MEDICINE

## 2018-10-04 RX ORDER — PREDNISONE 10 MG/1
10 TABLET ORAL DAILY
COMMUNITY
End: 2019-03-21 | Stop reason: CLARIF

## 2018-10-04 RX ORDER — BENZONATATE 200 MG/1
200 CAPSULE ORAL 3 TIMES DAILY PRN
Qty: 90 CAPSULE | Refills: 1 | Status: SHIPPED | OUTPATIENT
Start: 2018-10-04 | End: 2018-10-11

## 2018-11-27 ENCOUNTER — TELEPHONE (OUTPATIENT)
Dept: PULMONOLOGY | Age: 78
End: 2018-11-27

## 2018-12-04 ENCOUNTER — TELEPHONE (OUTPATIENT)
Dept: PULMONOLOGY | Age: 78
End: 2018-12-04

## 2019-03-21 ENCOUNTER — OFFICE VISIT (OUTPATIENT)
Dept: PULMONOLOGY | Age: 79
End: 2019-03-21
Payer: MEDICARE

## 2019-03-21 VITALS
HEART RATE: 72 BPM | OXYGEN SATURATION: 95 % | RESPIRATION RATE: 22 BRPM | BODY MASS INDEX: 29.51 KG/M2 | DIASTOLIC BLOOD PRESSURE: 68 MMHG | SYSTOLIC BLOOD PRESSURE: 120 MMHG | WEIGHT: 183.6 LBS | HEIGHT: 66 IN

## 2019-03-21 DIAGNOSIS — Z23 NEED FOR PNEUMOCOCCAL VACCINE: ICD-10-CM

## 2019-03-21 DIAGNOSIS — J44.9 ASTHMA-COPD OVERLAP SYNDROME (HCC): ICD-10-CM

## 2019-03-21 DIAGNOSIS — J45.909 UNCOMPLICATED ASTHMA, UNSPECIFIED ASTHMA SEVERITY, UNSPECIFIED WHETHER PERSISTENT: Primary | ICD-10-CM

## 2019-03-21 DIAGNOSIS — J44.9 CHRONIC OBSTRUCTIVE PULMONARY DISEASE, UNSPECIFIED COPD TYPE (HCC): ICD-10-CM

## 2019-03-21 DIAGNOSIS — J31.0 CHRONIC RHINITIS: ICD-10-CM

## 2019-03-21 DIAGNOSIS — M05.719 RHEUMATOID ARTHRITIS INVOLVING SHOULDER WITH POSITIVE RHEUMATOID FACTOR, UNSPECIFIED LATERALITY (HCC): ICD-10-CM

## 2019-03-21 PROCEDURE — 3023F SPIROM DOC REV: CPT | Performed by: INTERNAL MEDICINE

## 2019-03-21 PROCEDURE — G8427 DOCREV CUR MEDS BY ELIG CLIN: HCPCS | Performed by: INTERNAL MEDICINE

## 2019-03-21 PROCEDURE — 1090F PRES/ABSN URINE INCON ASSESS: CPT | Performed by: INTERNAL MEDICINE

## 2019-03-21 PROCEDURE — G8400 PT W/DXA NO RESULTS DOC: HCPCS | Performed by: INTERNAL MEDICINE

## 2019-03-21 PROCEDURE — G8926 SPIRO NO PERF OR DOC: HCPCS | Performed by: INTERNAL MEDICINE

## 2019-03-21 PROCEDURE — 1101F PT FALLS ASSESS-DOCD LE1/YR: CPT | Performed by: INTERNAL MEDICINE

## 2019-03-21 PROCEDURE — 99214 OFFICE O/P EST MOD 30 MIN: CPT | Performed by: INTERNAL MEDICINE

## 2019-03-21 PROCEDURE — G8482 FLU IMMUNIZE ORDER/ADMIN: HCPCS | Performed by: INTERNAL MEDICINE

## 2019-03-21 PROCEDURE — 1036F TOBACCO NON-USER: CPT | Performed by: INTERNAL MEDICINE

## 2019-03-21 PROCEDURE — G0009 ADMIN PNEUMOCOCCAL VACCINE: HCPCS | Performed by: INTERNAL MEDICINE

## 2019-03-21 PROCEDURE — 1123F ACP DISCUSS/DSCN MKR DOCD: CPT | Performed by: INTERNAL MEDICINE

## 2019-03-21 PROCEDURE — G8417 CALC BMI ABV UP PARAM F/U: HCPCS | Performed by: INTERNAL MEDICINE

## 2019-03-21 PROCEDURE — 4040F PNEUMOC VAC/ADMIN/RCVD: CPT | Performed by: INTERNAL MEDICINE

## 2019-03-21 PROCEDURE — 90732 PPSV23 VACC 2 YRS+ SUBQ/IM: CPT | Performed by: INTERNAL MEDICINE

## 2019-03-21 RX ORDER — TRIAMTERENE AND HYDROCHLOROTHIAZIDE 37.5; 25 MG/1; MG/1
1 TABLET ORAL DAILY
COMMUNITY
Start: 2018-10-19 | End: 2022-05-03 | Stop reason: SDUPTHER

## 2019-06-18 ENCOUNTER — APPOINTMENT (OUTPATIENT)
Dept: CT IMAGING | Age: 79
End: 2019-06-18
Payer: MEDICARE

## 2019-06-18 ENCOUNTER — APPOINTMENT (OUTPATIENT)
Dept: GENERAL RADIOLOGY | Age: 79
End: 2019-06-18
Payer: MEDICARE

## 2019-06-18 ENCOUNTER — HOSPITAL ENCOUNTER (OUTPATIENT)
Age: 79
Setting detail: OBSERVATION
Discharge: HOME | End: 2019-06-19
Attending: EMERGENCY MEDICINE | Admitting: INTERNAL MEDICINE
Payer: MEDICARE

## 2019-06-18 DIAGNOSIS — J44.1 COPD EXACERBATION (HCC): Primary | ICD-10-CM

## 2019-06-18 PROBLEM — J96.01 ACUTE RESPIRATORY FAILURE WITH HYPOXEMIA (HCC): Status: ACTIVE | Noted: 2019-06-18

## 2019-06-18 LAB
A/G RATIO: 1.3 (ref 1.1–2.2)
ALBUMIN SERPL-MCNC: 4.3 G/DL (ref 3.4–5)
ALP BLD-CCNC: 75 U/L (ref 40–129)
ALT SERPL-CCNC: 19 U/L (ref 10–40)
ANION GAP SERPL CALCULATED.3IONS-SCNC: 11 MMOL/L (ref 3–16)
APTT: 30.4 SEC (ref 26–36)
AST SERPL-CCNC: 24 U/L (ref 15–37)
BACTERIA: ABNORMAL /HPF
BASOPHILS ABSOLUTE: 0.1 K/UL (ref 0–0.2)
BASOPHILS RELATIVE PERCENT: 0.6 %
BILIRUB SERPL-MCNC: 0.3 MG/DL (ref 0–1)
BILIRUBIN URINE: NEGATIVE
BLOOD, URINE: NEGATIVE
BUN BLDV-MCNC: 15 MG/DL (ref 7–20)
CALCIUM SERPL-MCNC: 9.8 MG/DL (ref 8.3–10.6)
CASTS: ABNORMAL /LPF
CHLORIDE BLD-SCNC: 98 MMOL/L (ref 99–110)
CLARITY: CLEAR
CO2: 28 MMOL/L (ref 21–32)
COLOR: YELLOW
CREAT SERPL-MCNC: 1 MG/DL (ref 0.6–1.2)
D DIMER: 1445 NG/ML DDU (ref 0–229)
EKG ATRIAL RATE: 102 BPM
EKG DIAGNOSIS: NORMAL
EKG P AXIS: 48 DEGREES
EKG P-R INTERVAL: 148 MS
EKG Q-T INTERVAL: 346 MS
EKG QRS DURATION: 68 MS
EKG QTC CALCULATION (BAZETT): 450 MS
EKG R AXIS: 17 DEGREES
EKG T AXIS: 50 DEGREES
EKG VENTRICULAR RATE: 102 BPM
EOSINOPHILS ABSOLUTE: 0.2 K/UL (ref 0–0.6)
EOSINOPHILS RELATIVE PERCENT: 1.8 %
EPITHELIAL CELLS, UA: ABNORMAL /HPF
GFR AFRICAN AMERICAN: >60
GFR NON-AFRICAN AMERICAN: 54
GLOBULIN: 3.2 G/DL
GLUCOSE BLD-MCNC: 127 MG/DL (ref 70–99)
GLUCOSE URINE: NEGATIVE MG/DL
HCT VFR BLD CALC: 40.2 % (ref 36–48)
HEMOGLOBIN: 13.4 G/DL (ref 12–16)
INR BLD: 0.99 (ref 0.86–1.14)
KETONES, URINE: NEGATIVE MG/DL
LEUKOCYTE ESTERASE, URINE: NEGATIVE
LYMPHOCYTES ABSOLUTE: 0.8 K/UL (ref 1–5.1)
LYMPHOCYTES RELATIVE PERCENT: 7.5 %
MCH RBC QN AUTO: 32.9 PG (ref 26–34)
MCHC RBC AUTO-ENTMCNC: 33.4 G/DL (ref 31–36)
MCV RBC AUTO: 98.5 FL (ref 80–100)
MICROSCOPIC EXAMINATION: YES
MONOCYTES ABSOLUTE: 0.9 K/UL (ref 0–1.3)
MONOCYTES RELATIVE PERCENT: 7.8 %
MUCUS: ABNORMAL /LPF
NEUTROPHILS ABSOLUTE: 9 K/UL (ref 1.7–7.7)
NEUTROPHILS RELATIVE PERCENT: 82.3 %
NITRITE, URINE: NEGATIVE
PDW BLD-RTO: 14 % (ref 12.4–15.4)
PH UA: 5.5 (ref 5–8)
PLATELET # BLD: 161 K/UL (ref 135–450)
PMV BLD AUTO: 9.8 FL (ref 5–10.5)
POTASSIUM REFLEX MAGNESIUM: 3.7 MMOL/L (ref 3.5–5.1)
PRO-BNP: 218 PG/ML (ref 0–449)
PROCALCITONIN: 0.1 NG/ML (ref 0–0.15)
PROTEIN UA: ABNORMAL MG/DL
PROTHROMBIN TIME: 11.3 SEC (ref 9.8–13)
RBC # BLD: 4.08 M/UL (ref 4–5.2)
RBC UA: ABNORMAL /HPF (ref 0–2)
SODIUM BLD-SCNC: 137 MMOL/L (ref 136–145)
SPECIFIC GRAVITY UA: >=1.03 (ref 1–1.03)
TOTAL PROTEIN: 7.5 G/DL (ref 6.4–8.2)
TROPONIN: <0.01 NG/ML
URINE REFLEX TO CULTURE: YES
URINE TYPE: ABNORMAL
UROBILINOGEN, URINE: 0.2 E.U./DL
WBC # BLD: 10.9 K/UL (ref 4–11)
WBC UA: ABNORMAL /HPF (ref 0–5)

## 2019-06-18 PROCEDURE — 96374 THER/PROPH/DIAG INJ IV PUSH: CPT

## 2019-06-18 PROCEDURE — 96376 TX/PRO/DX INJ SAME DRUG ADON: CPT

## 2019-06-18 PROCEDURE — 85379 FIBRIN DEGRADATION QUANT: CPT

## 2019-06-18 PROCEDURE — 2700000000 HC OXYGEN THERAPY PER DAY

## 2019-06-18 PROCEDURE — 83880 ASSAY OF NATRIURETIC PEPTIDE: CPT

## 2019-06-18 PROCEDURE — G0378 HOSPITAL OBSERVATION PER HR: HCPCS

## 2019-06-18 PROCEDURE — 6370000000 HC RX 637 (ALT 250 FOR IP): Performed by: EMERGENCY MEDICINE

## 2019-06-18 PROCEDURE — 6370000000 HC RX 637 (ALT 250 FOR IP): Performed by: INTERNAL MEDICINE

## 2019-06-18 PROCEDURE — 80053 COMPREHEN METABOLIC PANEL: CPT

## 2019-06-18 PROCEDURE — 85610 PROTHROMBIN TIME: CPT

## 2019-06-18 PROCEDURE — 6360000004 HC RX CONTRAST MEDICATION: Performed by: INTERNAL MEDICINE

## 2019-06-18 PROCEDURE — 99223 1ST HOSP IP/OBS HIGH 75: CPT | Performed by: INTERNAL MEDICINE

## 2019-06-18 PROCEDURE — 87086 URINE CULTURE/COLONY COUNT: CPT

## 2019-06-18 PROCEDURE — 96372 THER/PROPH/DIAG INJ SC/IM: CPT

## 2019-06-18 PROCEDURE — 99285 EMERGENCY DEPT VISIT HI MDM: CPT

## 2019-06-18 PROCEDURE — 93005 ELECTROCARDIOGRAM TRACING: CPT | Performed by: EMERGENCY MEDICINE

## 2019-06-18 PROCEDURE — 6360000002 HC RX W HCPCS: Performed by: EMERGENCY MEDICINE

## 2019-06-18 PROCEDURE — 84484 ASSAY OF TROPONIN QUANT: CPT

## 2019-06-18 PROCEDURE — 71045 X-RAY EXAM CHEST 1 VIEW: CPT

## 2019-06-18 PROCEDURE — 84145 PROCALCITONIN (PCT): CPT

## 2019-06-18 PROCEDURE — 94150 VITAL CAPACITY TEST: CPT

## 2019-06-18 PROCEDURE — 94640 AIRWAY INHALATION TREATMENT: CPT

## 2019-06-18 PROCEDURE — 71260 CT THORAX DX C+: CPT

## 2019-06-18 PROCEDURE — 6360000002 HC RX W HCPCS: Performed by: INTERNAL MEDICINE

## 2019-06-18 PROCEDURE — 2580000003 HC RX 258: Performed by: INTERNAL MEDICINE

## 2019-06-18 PROCEDURE — 96375 TX/PRO/DX INJ NEW DRUG ADDON: CPT

## 2019-06-18 PROCEDURE — 36415 COLL VENOUS BLD VENIPUNCTURE: CPT

## 2019-06-18 PROCEDURE — 93010 ELECTROCARDIOGRAM REPORT: CPT | Performed by: INTERNAL MEDICINE

## 2019-06-18 PROCEDURE — 94761 N-INVAS EAR/PLS OXIMETRY MLT: CPT

## 2019-06-18 PROCEDURE — 81001 URINALYSIS AUTO W/SCOPE: CPT

## 2019-06-18 PROCEDURE — 85025 COMPLETE CBC W/AUTO DIFF WBC: CPT

## 2019-06-18 PROCEDURE — 85730 THROMBOPLASTIN TIME PARTIAL: CPT

## 2019-06-18 RX ORDER — SODIUM CHLORIDE 0.9 % (FLUSH) 0.9 %
10 SYRINGE (ML) INJECTION EVERY 12 HOURS SCHEDULED
Status: DISCONTINUED | OUTPATIENT
Start: 2019-06-18 | End: 2019-06-19 | Stop reason: HOSPADM

## 2019-06-18 RX ORDER — ATENOLOL 25 MG/1
25 TABLET ORAL DAILY
Status: DISCONTINUED | OUTPATIENT
Start: 2019-06-18 | End: 2019-06-19 | Stop reason: HOSPADM

## 2019-06-18 RX ORDER — SULFASALAZINE 500 MG/1
500 TABLET, DELAYED RELEASE ORAL
Status: DISCONTINUED | OUTPATIENT
Start: 2019-06-18 | End: 2019-06-19 | Stop reason: HOSPADM

## 2019-06-18 RX ORDER — DOXYCYCLINE HYCLATE 100 MG
100 TABLET ORAL EVERY 12 HOURS
Status: DISCONTINUED | OUTPATIENT
Start: 2019-06-18 | End: 2019-06-19 | Stop reason: HOSPADM

## 2019-06-18 RX ORDER — KETOROLAC TROMETHAMINE 30 MG/ML
15 INJECTION, SOLUTION INTRAMUSCULAR; INTRAVENOUS ONCE
Status: DISCONTINUED | OUTPATIENT
Start: 2019-06-18 | End: 2019-06-19 | Stop reason: HOSPADM

## 2019-06-18 RX ORDER — LEFLUNOMIDE 10 MG/1
20 TABLET ORAL DAILY
Status: DISCONTINUED | OUTPATIENT
Start: 2019-06-18 | End: 2019-06-19 | Stop reason: HOSPADM

## 2019-06-18 RX ORDER — TRIAMTERENE AND HYDROCHLOROTHIAZIDE 37.5; 25 MG/1; MG/1
1 TABLET ORAL DAILY
Status: DISCONTINUED | OUTPATIENT
Start: 2019-06-18 | End: 2019-06-19 | Stop reason: HOSPADM

## 2019-06-18 RX ORDER — METHYLPREDNISOLONE SODIUM SUCCINATE 40 MG/ML
40 INJECTION, POWDER, LYOPHILIZED, FOR SOLUTION INTRAMUSCULAR; INTRAVENOUS EVERY 6 HOURS SCHEDULED
Status: DISCONTINUED | OUTPATIENT
Start: 2019-06-18 | End: 2019-06-18

## 2019-06-18 RX ORDER — KETOROLAC TROMETHAMINE 30 MG/ML
30 INJECTION, SOLUTION INTRAMUSCULAR; INTRAVENOUS ONCE
Status: COMPLETED | OUTPATIENT
Start: 2019-06-18 | End: 2019-06-18

## 2019-06-18 RX ORDER — ACETAMINOPHEN 325 MG/1
650 TABLET ORAL EVERY 4 HOURS PRN
Status: DISCONTINUED | OUTPATIENT
Start: 2019-06-18 | End: 2019-06-19 | Stop reason: HOSPADM

## 2019-06-18 RX ORDER — IPRATROPIUM BROMIDE AND ALBUTEROL SULFATE 2.5; .5 MG/3ML; MG/3ML
1 SOLUTION RESPIRATORY (INHALATION)
Status: DISCONTINUED | OUTPATIENT
Start: 2019-06-18 | End: 2019-06-18

## 2019-06-18 RX ORDER — ESCITALOPRAM OXALATE 10 MG/1
20 TABLET ORAL NIGHTLY
Status: DISCONTINUED | OUTPATIENT
Start: 2019-06-18 | End: 2019-06-19 | Stop reason: HOSPADM

## 2019-06-18 RX ORDER — ASPIRIN 81 MG/1
324 TABLET, CHEWABLE ORAL ONCE
Status: COMPLETED | OUTPATIENT
Start: 2019-06-18 | End: 2019-06-18

## 2019-06-18 RX ORDER — PREDNISONE 10 MG/1
40 TABLET ORAL
Status: DISCONTINUED | OUTPATIENT
Start: 2019-06-20 | End: 2019-06-18

## 2019-06-18 RX ORDER — ALBUTEROL SULFATE 2.5 MG/3ML
2.5 SOLUTION RESPIRATORY (INHALATION)
Status: DISCONTINUED | OUTPATIENT
Start: 2019-06-18 | End: 2019-06-19 | Stop reason: HOSPADM

## 2019-06-18 RX ORDER — IPRATROPIUM BROMIDE AND ALBUTEROL SULFATE 2.5; .5 MG/3ML; MG/3ML
1 SOLUTION RESPIRATORY (INHALATION) EVERY 4 HOURS
Status: DISCONTINUED | OUTPATIENT
Start: 2019-06-18 | End: 2019-06-19 | Stop reason: HOSPADM

## 2019-06-18 RX ORDER — IPRATROPIUM BROMIDE AND ALBUTEROL SULFATE 2.5; .5 MG/3ML; MG/3ML
1 SOLUTION RESPIRATORY (INHALATION) ONCE
Status: COMPLETED | OUTPATIENT
Start: 2019-06-18 | End: 2019-06-18

## 2019-06-18 RX ORDER — LEVOTHYROXINE SODIUM 0.03 MG/1
25 TABLET ORAL DAILY
Status: DISCONTINUED | OUTPATIENT
Start: 2019-06-18 | End: 2019-06-19 | Stop reason: HOSPADM

## 2019-06-18 RX ORDER — METHYLPREDNISOLONE SODIUM SUCCINATE 40 MG/ML
40 INJECTION, POWDER, LYOPHILIZED, FOR SOLUTION INTRAMUSCULAR; INTRAVENOUS DAILY
Status: DISCONTINUED | OUTPATIENT
Start: 2019-06-18 | End: 2019-06-19 | Stop reason: HOSPADM

## 2019-06-18 RX ORDER — LANOLIN ALCOHOL/MO/W.PET/CERES
6 CREAM (GRAM) TOPICAL NIGHTLY PRN
Status: DISCONTINUED | OUTPATIENT
Start: 2019-06-18 | End: 2019-06-19 | Stop reason: HOSPADM

## 2019-06-18 RX ORDER — ONDANSETRON 2 MG/ML
4 INJECTION INTRAMUSCULAR; INTRAVENOUS EVERY 6 HOURS PRN
Status: DISCONTINUED | OUTPATIENT
Start: 2019-06-18 | End: 2019-06-19 | Stop reason: HOSPADM

## 2019-06-18 RX ORDER — SODIUM CHLORIDE 0.9 % (FLUSH) 0.9 %
10 SYRINGE (ML) INJECTION PRN
Status: DISCONTINUED | OUTPATIENT
Start: 2019-06-18 | End: 2019-06-19 | Stop reason: HOSPADM

## 2019-06-18 RX ADMIN — ATENOLOL 25 MG: 25 TABLET ORAL at 09:58

## 2019-06-18 RX ADMIN — ESCITALOPRAM OXALATE 20 MG: 10 TABLET ORAL at 21:03

## 2019-06-18 RX ADMIN — MELATONIN 3 MG ORAL TABLET 6 MG: 3 TABLET ORAL at 21:03

## 2019-06-18 RX ADMIN — IPRATROPIUM BROMIDE AND ALBUTEROL SULFATE 1 AMPULE: .5; 3 SOLUTION RESPIRATORY (INHALATION) at 04:50

## 2019-06-18 RX ADMIN — Medication 10 ML: at 10:00

## 2019-06-18 RX ADMIN — SULFASALAZINE 500 MG: 500 TABLET, DELAYED RELEASE ORAL at 09:58

## 2019-06-18 RX ADMIN — Medication 10 ML: at 13:59

## 2019-06-18 RX ADMIN — SULFASALAZINE 500 MG: 500 TABLET, DELAYED RELEASE ORAL at 21:03

## 2019-06-18 RX ADMIN — DOXYCYCLINE HYCLATE 100 MG: 100 TABLET, COATED ORAL at 09:58

## 2019-06-18 RX ADMIN — ACETAMINOPHEN 650 MG: 325 TABLET ORAL at 21:41

## 2019-06-18 RX ADMIN — ASPIRIN 81 MG 324 MG: 81 TABLET ORAL at 04:50

## 2019-06-18 RX ADMIN — SULFASALAZINE 500 MG: 500 TABLET, DELAYED RELEASE ORAL at 14:50

## 2019-06-18 RX ADMIN — METHYLPREDNISOLONE SODIUM SUCCINATE 40 MG: 40 INJECTION, POWDER, FOR SOLUTION INTRAMUSCULAR; INTRAVENOUS at 09:58

## 2019-06-18 RX ADMIN — Medication 10 ML: at 21:03

## 2019-06-18 RX ADMIN — LEVOTHYROXINE SODIUM 25 MCG: 25 TABLET ORAL at 09:59

## 2019-06-18 RX ADMIN — ENOXAPARIN SODIUM 40 MG: 40 INJECTION SUBCUTANEOUS at 09:59

## 2019-06-18 RX ADMIN — IPRATROPIUM BROMIDE AND ALBUTEROL SULFATE 1 AMPULE: .5; 3 SOLUTION RESPIRATORY (INHALATION) at 07:45

## 2019-06-18 RX ADMIN — IPRATROPIUM BROMIDE AND ALBUTEROL SULFATE 1 AMPULE: .5; 3 SOLUTION RESPIRATORY (INHALATION) at 15:45

## 2019-06-18 RX ADMIN — SULFASALAZINE 500 MG: 500 TABLET, DELAYED RELEASE ORAL at 17:17

## 2019-06-18 RX ADMIN — IPRATROPIUM BROMIDE AND ALBUTEROL SULFATE 1 AMPULE: .5; 3 SOLUTION RESPIRATORY (INHALATION) at 19:23

## 2019-06-18 RX ADMIN — LEFLUNOMIDE 20 MG: 10 TABLET, FILM COATED ORAL at 09:58

## 2019-06-18 RX ADMIN — IPRATROPIUM BROMIDE AND ALBUTEROL SULFATE 1 AMPULE: .5; 3 SOLUTION RESPIRATORY (INHALATION) at 11:40

## 2019-06-18 RX ADMIN — IOPAMIDOL 85 ML: 755 INJECTION, SOLUTION INTRAVENOUS at 10:52

## 2019-06-18 RX ADMIN — TRIAMTERENE AND HYDROCHLOROTHIAZIDE 1 TABLET: 37.5; 25 TABLET ORAL at 09:58

## 2019-06-18 RX ADMIN — KETOROLAC TROMETHAMINE 30 MG: 30 INJECTION, SOLUTION INTRAMUSCULAR at 05:13

## 2019-06-18 RX ADMIN — METHYLPREDNISOLONE SODIUM SUCCINATE 40 MG: 40 INJECTION, POWDER, FOR SOLUTION INTRAMUSCULAR; INTRAVENOUS at 13:59

## 2019-06-18 RX ADMIN — DOXYCYCLINE HYCLATE 100 MG: 100 TABLET, COATED ORAL at 21:03

## 2019-06-18 ASSESSMENT — PAIN DESCRIPTION - LOCATION
LOCATION: CHEST
LOCATION: CHEST

## 2019-06-18 ASSESSMENT — PAIN DESCRIPTION - DESCRIPTORS
DESCRIPTORS: DISCOMFORT
DESCRIPTORS: DISCOMFORT

## 2019-06-18 ASSESSMENT — PAIN SCALES - GENERAL
PAINLEVEL_OUTOF10: 9
PAINLEVEL_OUTOF10: 9
PAINLEVEL_OUTOF10: 5
PAINLEVEL_OUTOF10: 3

## 2019-06-18 ASSESSMENT — PAIN DESCRIPTION - FREQUENCY
FREQUENCY: CONTINUOUS
FREQUENCY: CONTINUOUS

## 2019-06-18 ASSESSMENT — PAIN DESCRIPTION - PAIN TYPE
TYPE: ACUTE PAIN
TYPE: ACUTE PAIN

## 2019-06-18 ASSESSMENT — PAIN DESCRIPTION - ORIENTATION: ORIENTATION: LEFT

## 2019-06-18 NOTE — PLAN OF CARE
Problem: Falls - Risk of:  Goal: Will remain free from falls  Description  Will remain free from falls  Outcome: Ongoing  Goal: Absence of physical injury  Description  Absence of physical injury  Outcome: Ongoing     Problem: Discharge Planning:  Goal: Discharged to appropriate level of care  Description  Discharged to appropriate level of care  Outcome: Ongoing     Problem:  Activity Intolerance:  Goal: Ability to tolerate increased activity will improve  Description  Ability to tolerate increased activity will improve  Outcome: Ongoing     Problem: Airway Clearance - Ineffective:  Goal: Ability to maintain a clear airway will improve  Description  Ability to maintain a clear airway will improve  Outcome: Ongoing     Problem: Breathing Pattern - Ineffective:  Goal: Ability to achieve and maintain a regular respiratory rate will improve  Description  Ability to achieve and maintain a regular respiratory rate will improve  Outcome: Ongoing     Problem: Gas Exchange - Impaired:  Goal: Levels of oxygenation will improve  Description  Levels of oxygenation will improve  Outcome: Ongoing

## 2019-06-18 NOTE — ED NOTES
Attempt x2 for IV access, unsuccessful.  Will ask another nurse to attempt IV     Cody Moraes RN  06/18/19 [id number]

## 2019-06-18 NOTE — H&P
Hospital Medicine History & Physical      PCP: Misbah Brower MD    Date of Admission: 6/18/2019    Date of Service: Pt seen/examined on 6/18/2019    Chief Complaint:    Chief Complaint   Patient presents with    Chest Pain     patient brought by ems for chest pain. history of pleurisy, patient feels this is similar in feeling. left chest pain since 2300. History Of Present Illness: The patient is a 70 y.o. female with asthma, HLD, HTN, RA, and hypothyroidism who presented to [facility] ED with complaint of CP. Patient states that it all started around 11 pm yesterday. She had a sharp pain on the left side of her chest with taking a deep breath. She states that she has had pleurisy and bronchitis before that felt very similar. She has a chronic cough and has not noticed any changes in this, It is not productive. She denies any fevers or chills at home. She was feeling a little SOB at home and in the ED. Past Medical History:        Diagnosis Date    Asthma     Hyperlipidemia     Hypertension     RA (rheumatoid arthritis) ([address]     Thyroid disease     has half of her thyroid       Past Surgical History:        Procedure Laterality Date    ABDOMINAL EXPLORATION SURGERY      APPENDECTOMY      CHOLECYSTECTOMY      COLONOSCOPY  4/18/2013    diverticulosis, polyps    COLONOSCOPY  11/10/2016    normal colon    EYE SURGERY Right 12/11/15    cataract removal    EYE SURGERY Left 1/8/16    cataract removed    HYSTERECTOMY      THYROIDECTOMY, PARTIAL Left     TUMOR REMOVAL      off of thyroid, was benign       Medications Prior to Admission:    Prior to Admission medications    Medication Sig Start Date End Date Taking?  Authorizing Provider   triamterene-hydrochlorothiazide (MAXZIDE-25) 37.5-25 MG per tablet Take 1 tablet by mouth daily 10/19/18  Yes Historical Provider, MD   ADVAIR DISKUS 250-50 MCG/DOSE AEPB USE 1 PUFF EVERY 12 HOURS 1/30/19  Yes Anirudh Leigh MD   Cholecalciferol (VITAMIN D) 2000 UNITS CAPS capsule Take by mouth daily   Yes Historical Provider, MD   Lactobacillus Rhamnosus, GG, (PROBIOTIC COLIC PO) Take by mouth daily   Yes Historical Provider, MD   albuterol sulfate  (90 BASE) MCG/ACT inhaler Inhale 2 puffs into the lungs every 6 hours as needed for Wheezing or Shortness of Breath 3/22/17  Yes Yuliya Griggs MD   escitalopram (LEXAPRO) 10 MG tablet Take 20 mg by mouth nightly. Yes Historical Provider, MD   leflunomide (ARAVA) 20 MG tablet Take 20 mg by mouth daily. Yes Historical Provider, MD   sulfaSALAzine (AZULFIDINE) 500 MG tablet Take 500 mg by mouth 4 times daily. Yes Historical Provider, MD   levothyroxine (SYNTHROID) 25 MCG tablet Take 25 mcg by mouth Daily. Yes Historical Provider, MD   ATENOLOL PO Take 25 mg by mouth daily. Yes Historical Provider, MD       Allergies:  Effexor [venlafaxine hcl]; Fenofibrate; Prednisone; Codeine; and Docosahexaenoic acid-epa    Social History:  The patient currently lives at home    TOBACCO:   reports that she quit smoking about 32 years ago. Her smoking use included cigarettes. She started smoking about 60 years ago. She has a 10.00 pack-year smoking history. She has never used smokeless tobacco.  ETOH:   reports that she does not drink alcohol.       Family History:   Positive as follows:        Problem Relation Age of Onset    High Blood Pressure Mother        REVIEW OF SYSTEMS:     Constitutional: Negative for fever   HENT: Negative for sore throat   Eyes: Negative for redness   Respiratory: + SOB, + cough  Cardiovascular: + pleuritic chest pain   Gastrointestinal: Negative for vomiting, diarrhea   Genitourinary: Negative for hematuria   Musculoskeletal: Negative for arthralgias   Skin: Negative for rash   Neurological: Negative for syncope   Hematological: Negative for adenopathy   Psychiatric/Behavorial: Negative for anxiety    PHYSICAL EXAM:    /78   Pulse 92   Temp 98.1 °F (36.7 °C) (Oral)   Resp 20   Ht 5' 6\" (1.676 m)   Wt 182 lb 14.4 oz (83 kg)   SpO2 99%   Breastfeeding? No   BMI 29.52 kg/m²   Gen: No distress. Alert. Eyes: No conjunctival injection. ENT: No discharge. Pharynx clear. Neck: Trachea midline. Resp: No accessory muscle use. + crackles in LLL. No wheezes. mildly decreased air movement throughout the bilateral bases   CV: Regular rate. Regular rhythm. No murmur. No rub. No edema. Capillary Refill: Brisk,< 3 seconds   Peripheral Pulses: +2 palpable, equal bilaterally   GI: Non-tender. Non-distended. No masses. No organomegaly. Normal bowel sounds. No hernia. Skin: Warm and dry. No nodule on exposed extremities. No rash on exposed extremities. M/S: No cyanosis. No joint deformity. No clubbing. Neuro: Awake. Grossly nonfocal    Psych: Oriented x 3. No anxiety or agitation.      CBC:   Recent Labs     06/18/19  0509   WBC 10.9   HGB 13.4   HCT 40.2   MCV 98.5        BMP:   Recent Labs     06/18/19  0509      K 3.7   CL 98*   CO2 28   BUN 15   CREATININE 1.0     LIVER PROFILE:   Recent Labs     06/18/19  0509   AST 24   ALT 19   BILITOT 0.3   ALKPHOS 75     PT/INR:   Recent Labs     06/18/19  0509   PROTIME 11.3   INR 0.99     APTT:   Recent Labs     06/18/19  0509   APTT 30.4     UA:  Recent Labs     06/18/19  0523   COLORU Yellow   PHUR 5.5   LABCAST 5-10 Hyaline*   WBCUA 6-10*   RBCUA 0-2   MUCUS 2+*   BACTERIA 1+*   CLARITYU Clear   SPECGRAV >=1.030   LEUKOCYTESUR Negative   UROBILINOGEN 0.2   BILIRUBINUR Negative   BLOODU Negative   GLUCOSEU Negative     Pro-      D-Dimer, Quant 1445High        CARDIAC ENZYMES  Recent Labs     06/18/19  0509 06/18/19  0723   TROPONINI <0.01 <0.01       U/A:    Lab Results   Component Value Date    COLORU Yellow 06/18/2019    WBCUA 6-10 06/18/2019    RBCUA 0-2 06/18/2019    MUCUS 2+ 06/18/2019    BACTERIA 1+ 06/18/2019    CLARITYU Clear 06/18/2019    SPECGRAV >=1.030 06/18/2019    LEUKOCYTESUR Negative 06/18/2019    BLOODU Negative 06/18/2019    GLUCOSEU Negative 06/18/2019       ABG    Lab Results   Component Value Date    QUU6TWR 25.8 02/21/2012    BEART -0.5 02/21/2012    P5DQDSAH 93.9 02/21/2012    PHART 7.341 02/21/2012    THGBART 14.5 02/21/2012    ZRU3EPN 48.9 02/21/2012    PO2ART 73.9 02/21/2012    EWT0LZH 27.4 02/21/2012       CULTURES  Urine Cx: ordered     EKG:  I have reviewed the EKG with the following interpretation:   Sinus tachycardia (102), Baseline artifact, Q waves in lead III, no other acute ST segment changes     RADIOLOGY  CT CHEST PULMONARY EMBOLISM W CONTRAST   Final Result   No pulmonary embolus. Small left pleural effusion. 4 mm nodule left upper lobe. 6 mm nodule right lower lobe inferiorly. Recommend follow-up CT in 3-6 months. Right lower lobe atelectasis. Patchy consolidation the left lower lobe   posteriorly, atelectasis versus infection. XR CHEST PORTABLE   Final Result   Low lung volumes with probable bibasilar atelectasis. Haziness in the left   costophrenic angle with small left effusion not excluded. Stable cardiac   size. Pertinent previous results reviewed      I Beny Winters have reviewed the chart on [facility] and personally interviewed and examined patient, reviewed the data (labs and imaging) and after discussion with my PA formulated the plan. Agree with note with the following edits. HPI:     70year old white male who came to the ER as she was having pain with taking a deep breath on the left side of her chest. Symptoms started last night. She has COPD. Not on home oxygen. She denies any fever. She has a chronic cough. Used to smoke but quit thirty years ago. No hemoptysis. No history of PE. I reviewed the patient's Past Medical History, Past Surgical History, Medications, and Allergies.      Physical exam:    /78   Pulse 92   Temp 98.1 °F (36.7 °C) (Oral)   Resp 20   Ht 5' 6\" (1.676 m)   Wt 182 lb 14.4 oz (83 kg)   SpO2 99% Breastfeeding? No   BMI 29.52 kg/m²     Gen: No distress. Alert. Eyes: PERRL. No sclera icterus. No conjunctival injection. ENT: No discharge. Pharynx clear. Neck: Trachea midline. Normal thyroid. Resp: No accessory muscle use. No crackles. No wheezes. No rhonchi. No dullness on percussion. CV: Regular rate. Regular rhythm. No murmur or rub. No edema. ASSESSMENT/PLAN:  Acute hypoxic respiratory failure   - was requiring 2L NC O2   - Does not use supplemental O2 at home   - Is now weaned off of supplemental O2     Pleuritic Chest Pain no evidence of PE. Will treat with IV Toradol to see if it helps x 1.  - started acutely last night  - No prior VTE with no recent surgery, fall, long travel, non-smoker   - D-dimer elevated   - CTPA ordered - no PE. Lung nodule noted.      COPD  - Does not appear to be AE   - No wheezing, mildly decreased breath sounds with crackles in LLL   - Can continue breathing treatments   - is currently on (patient reports adverse reaction/allergy so steroids d/c'd) and doxycycline, will check PCT and CTPA    HTN  - Continue BB, maxzide   - BP is well controlled     RA  - Continue Arava and sulfasalazine     Hypothyroidism   - Continue synthroid     DVT Prophylaxis: Loevnox  Diet: DIET GENERAL;  Code Status: Full Code        Augustus House 6/18/2019 2:31 PM

## 2019-06-18 NOTE — FLOWSHEET NOTE
06/18/19 0645   Vital Signs   Temp 98.1 °F (36.7 °C)   Temp Source Oral   Pulse 92   Heart Rate Source Monitor   Resp 22   /78   BP Location Left upper arm   Patient Position Semi fowlers   Level of Consciousness 0   MEWS Score 2   Height and Weight   Height 5' 6\" (1.676 m)   Weight 182 lb 14.4 oz (83 kg)   Weight Method Actual;Bed scale   BSA (Calculated - sq m) 1.97 sq meters   BMI (Calculated) 29.6   Oxygen Therapy   SpO2 92 %   O2 Device Nasal cannula   O2 Flow Rate (L/min) 2 L/min   Admission complete. Assessment complete, see flowsheets. Pt resting in bed at this time, bed alarm on, call light within reach. Pt denies needs at this time. Will continue to monitor.   Leroy Brewer RN

## 2019-06-18 NOTE — FLOWSHEET NOTE
06/18/19 1501   Vitals   Temp 97.8 °F (36.6 °C)   Temp Source Oral   Pulse 80   Resp 18   /75   BP Location Left upper arm   BP Method Automatic   Patient Position Semi fowlers   Level of Consciousness 0   MEWS Score 1   Patient Currently in Pain Denies   Oxygen Therapy   SpO2 90 %   O2 Device None (Room air)

## 2019-06-18 NOTE — PROGRESS NOTES
Pt to 2W in stable condition, PCU tele 10 connected to pt. Will continue to monitor.   Jayna Barrientos RN

## 2019-06-18 NOTE — PROGRESS NOTES
4 Eyes Skin Assessment     The patient is being assess for   Admission    I agree that 2 RN's have performed a thorough Head to Toe Skin Assessment on the patient. ALL assessment sites listed below have been assessed. Areas assessed by both nurses:   [x]   Head, Face, and Ears   [x]   Shoulders, Back, and Chest, Abdomen  [x]   Arms, Elbows, and Hands   [x]   Coccyx, Sacrum, and Ischium  [x]   Legs, Feet, and Heels        Pt has scattered bruises and scabs; no wounds noted. **SHARE this note so that the co-signing nurse is able to place an eSignature**    Co-signer eSignature: Electronically signed by Aaliyah Haley RN on 6/18/19 at 7:31 AM    Does the Patient have Skin Breakdown?   No          Milad Prevention initiated:  No   Wound Care Orders initiated:  No      [facility] nurse consulted for Pressure Injury (Stage 3,4, Unstageable, DTI, NWPT, Complex wounds)and New or Established Ostomies:  No      Primary Nurse eSignature: Electronically signed by Antonio Haddad RN on 6/18/19 at 7:04 AM

## 2019-06-18 NOTE — FLOWSHEET NOTE
06/18/19 1859   Vital Signs   Temp 98.7 °F (37.1 °C)   Temp Source Oral   Pulse 102   Heart Rate Source Monitor   Resp 16   /78   BP Location Left upper arm   BP Upper/Lower Upper   Patient Position Semi fowlers   Level of Consciousness 0   MEWS Score 2   Patient Currently in Pain No   Oxygen Therapy   SpO2 91 %   O2 Device None (Room air)   Assessment complete, see flowsheets. Pt resting in bed at this time, requesting something for headache and to help her sleep, MD notified. Pt denies further needs at this time, call light within reach, bed alarm on. Will continue to monitor.   Mis Swan RN

## 2019-06-18 NOTE — CARE COORDINATION
Case Management Assessment  Initial Evaluation    Date/Time of Evaluation: 6/18/2019 2:10 PM  Assessment Completed by: Chandler Bautista    Patient Name: Bhaskar Seals  YOB: 1940  Diagnosis: COPD exacerbation ([address] [J44.1]  Date / Time: 6/18/2019  4:20 AM  Admission status/Date:6/18/2019 obs  Chart Reviewed: Yes      Patient Interviewed: Yes   Family Interviewed:  No      Hospitalization in the last 30 days:  No    Contacts  :     Relationship to Patient:   Phone Number:    Alternate Contact:     Relationship to Patient:     Phone Number:    Met with:    Current PCP  [address]    Financial  Medicare  Precert required for SNF : N        3 night stay required: Y    ADLS  Support Systems: Children, Friends/Neighbors  Transportation: self    Meal Preparation: self    Housing  Home Environment: lives alone in senior apartment  Steps: 0  Plans to Return to Present Housing: Yes  Other Identified Issues:     Amanda Mackey 80  Currently active with [address] : No  Type of Home Care Services: None  Passport/Waiver : No  :                      Phone Number:    Passport/Waiver Services:           Durable Medical Equipment   DME Provider: none  Equipment: Walker__Cane__RTS__ BSC__Shower Chair__  02__ HHN__ CPAP__  BiPap__  Hospital Bed__ W/C___ Other__________      Has Home O2 in place on admit:  No  Informed of need to bring portable home O2 tank on day of discharge for nursing to connect prior to leaving:   Not Indicated  Verbalized agreement/Understanding:   Not Indicated    Community Service Affiliation  Dialysis:  No    · Name:  · Location  · Dialysis Schedule:  · Phone:   · Fax: Outpatient PT/OT: No    Cancer Center: No     CHF Clinic: No     Pulmonary Rehab: No  Pain Clinic: No  Community Mental Health: No    Wound Clinic: No     Other:     DISCHARGE PLAN: Chart reviewed and role of dcp explained. Pt is from senior apartment alone and plans to return. Pt is IPTA +drives. Following for possible HHN. Explained Case Management role/services.

## 2019-06-18 NOTE — PROGRESS NOTES
APPENDECTOMY      CHOLECYSTECTOMY      COLONOSCOPY  4/18/2013    diverticulosis, polyps    COLONOSCOPY  11/10/2016    normal colon    EYE SURGERY Right 12/11/15    cataract removal    EYE SURGERY Left 1/8/16    cataract removed    HYSTERECTOMY      THYROIDECTOMY, PARTIAL Left     TUMOR REMOVAL      off of thyroid, was benign       Level of Consciousness: Alert, Oriented, and Cooperative = 0    Level of Activity: Walking with assistance = 1    Respiratory Pattern: Dyspnea with exertion;Irregular pattern;or RR less than 6 = 2    Breath Sounds: Diminshed bilaterally and/or crackles = 2    Sputum   ,  , Sputum How Obtained: Spontaneous cough  Cough: Strong, spontaneous, non-productive = 0    Vital Signs   /67   Pulse 90   Temp 97.8 °F (36.6 °C) (Oral)   Resp 18   Ht 5' 6\" (1.676 m)   Wt 182 lb 14.4 oz (83 kg)   SpO2 92%   Breastfeeding? No   BMI 29.52 kg/m²   SPO2 (COPD values may differ): Greater than or equal to 92% on room air = 0    Peak Flow (asthma only): not applicable = 0    RSI: 7-8 = BID and Q4HPRN (every four hours as needed) for dyspnea        Plan       Goals: medication delivery, mobilize retained secretions, volume expansion and improve oxygenation    Patient/caregiver was educated on the proper method of use for Respiratory Care Devices:  Yes      Level of patient/caregiver understanding able to:   ? Verbalize understanding   ? Demonstrate understanding       ? Teach back        ? Needs reinforcement       ? No available caregiver               ? Other:     Response to education:  Very Good     Is patient being placed on Home Treatment Regimen? No     Does the patient have everything they need prior to discharge? Yes     Comments: patient assessed/interviewed. Chart reviewed    Plan of Care: change to q4 x 24 hrs    Electronically signed by Anne Barnhart RCP on 6/18/2019 at 8:36 AM    Respiratory Protocol Guidelines     1.  Assessment and treatment by Respiratory Therapy will be initiated for medication and therapeutic interventions upon initiation of aerosolized medication. 2. Physician will be contacted for respiratory rate (RR) greater than 35 breaths per minute. Therapy will be held for heart rate (HR) greater than 140 beats per minute, pending direction from physician. 3. Bronchodilators will be administered via Metered Dose Inhaler (MDI) with spacer when the following criteria are met:  a. Alert and cooperative     b. HR < 140 bpm  c. RR < 30 bpm                d. Can demonstrate a 2-3 second inspiratory hold  4. Bronchodilators will be administered via Hand Held Nebulizer [facility]) to patients when ANY of the following criteria are met  a. Incognizant or uncooperative          b. Patients treated with HHN at Home        c. Unable to demonstrate proper use of MDI with spacer     d. RR > 30 bpm   5. Bronchodilators will be delivered via Metered Dose Inhaler (MDI), HHN, Aerogen to intubated patients on mechanical ventilation. 6. Inhalation medication orders will be delivered and/or substituted as outlined below. Aerosolized Medications Ordering and Administration Guidelines:    1. All Medications will be ordered by a physician, and their frequency and/or modality will be adjusted as defined by the patients Respiratory Severity Index (RSI) score. 2. If the patient does not have documented COPD, consider discontinuing anticholinergics when RSI is less than 9.  3. If the bronchospasm worsens (increased RSI), then the bronchodilator frequency can be increased to a maximum of every 4 hours. If greater than every 4 hours is required, the physician will be contacted. 4. If the bronchospasm improves, the frequency of the bronchodilator can be decreased, based on the patient's RSI, but not less than home treatment regimen frequency. 5. Bronchodilator(s) will be discontinued if patient has a RSI less than 9 and has received no scheduled or as needed treatment for 72  Hrs.     Patients

## 2019-06-18 NOTE — ED PROVIDER NOTES
[address] ED  eMERGENCY dEPARTMENT eNCOUnter      Pt Name: Marina Kay  MRN: [id number]  [place] 1940  Date of evaluation: 6/18/2019  Provider: Erika Ashby MD  PCP: Estrella Purcell MD      [address] [place] [address]       Chief Complaint   Patient presents with    Chest Pain     patient brought by ems for chest pain. history of pleurisy, patient feels this is similar in feeling. left chest pain since 2300. HISTORY OFPRESENT ILLNESS   (Location/Symptom, Timing/Onset, Context/Setting, Quality, Duration, Modifying Factors,Severity)  Note limiting factors. Marina Kay is a 70 y.o. female presents with chest pain and shortness of breath she says she has a history of pleurisy and COPD this is similar to COPD attacks that she has had in the past she rates her pain at a 9 out of 10 she denies any fevers or chills nausea or vomiting she denies any history of coronary artery disease    Nursing Notes were all reviewed and agreed with or any disagreements were addressed  in the HPI. REVIEW OF SYSTEMS    (2-9 systems for level 4, 10 or more for level 5)     Review of Systems    Positives and Pertinent negatives as per HPI. Except as noted above in the ROS, all other systems were reviewed andnegative.        PASTMEDICAL HISTORY     Past Medical History:   Diagnosis Date    Asthma     Hyperlipidemia     Hypertension     RA [facility]rheumatoid arthritis) ([facility] [address]     Thyroid disease     has half of her thyroid         SURGICAL HISTORY       Past Surgical History:   Procedure Laterality Date    ABDOMINAL EXPLORATION SURGERY      APPENDECTOMY      CHOLECYSTECTOMY      COLONOSCOPY  4/18/2013    diverticulosis, polyps    COLONOSCOPY  11/10/2016    normal colon    EYE SURGERY Right 12/11/15    cataract removal    EYE SURGERY Left 1/8/16    cataract removed    HYSTERECTOMY      THYROIDECTOMY, PARTIAL Left     TUMOR REMOVAL      off of thyroid, was benign         CURRENT MEDICATIONS Stress: None   Relationships    Social connections:     Talks on phone: None     Gets together: None     Attends Yazdanism service: None     Active member of club or organization: None     Attends meetings of clubs or organizations: None     Relationship status: None    Intimate partner violence:     Fear of current or ex partner: None     Emotionally abused: None     Physically abused: None     Forced sexual activity: None   Other Topics Concern    None   Social History Narrative    None       SCREENINGS      @FLOW([id number])@      PHYSICAL EXAM    (up to 7 for level 4, 8 or more for level 5)     ED Triage Vitals [06/18/19 0426]   BP Temp Temp Source Pulse Resp SpO2 Height Weight   (!) 142/74 98.6 °F (37 °C) Oral 100 16 97 % 5' 6\" (1.676 m) 184 lb (83.5 kg)       Physical Exam      General Appearance:  Alert, cooperative, no distress, appears stated age. Head:  Normocephalic, without obviousabnormality, atraumatic. Eyes:  conjunctiva/corneas clear, EOM's intact. Sclera anicteric. ENT: Mucous membranes moist.   Neck: Supple, symmetrical, trachea midline, no adenopathy. No jugular venous distention. Lungs:    Decreased breath sounds with end expiratory wheezing   Chest Wall:  No tenderness. Heart:  Regular rate and rhythm, S1 and S2 normal, no murmur, rub or gallop. Abdomen:   Soft, non-tender, bowel sounds active,   no masses, no organomegaly. Extremities: No edema, cords or calf tenderness. Full range of motion. Pulses: 2+ and symmetric   Skin: Turgor is normal, no rashes or lesions. Neurologic: Alert and oriented X 3. No focal findings.   Motor grossly normal.  Speech clear, no drift, CN III-XII grossly intact,        DIAGNOSTIC RESULTS   LABS:    Labs Reviewed   CBC WITH AUTO DIFFERENTIAL - Abnormal; Notable for the following components:       Result Value    Neutrophils # 9.0 (*)     Lymphocytes # 0.8 (*)     All other components within normal limits    Narrative:     Performed at:  [facility],  [place], [place]   Phone [phone]   COMPREHENSIVE METABOLIC PANEL W/ REFLEX TO MG FOR LOW K - Abnormal; Notable for the following components:    Chloride 98 (*)     Glucose 127 (*)     GFR Non- 54 (*)     All other components within normal limits    Narrative:     Performed at:  [facility],  [place], [place]   Phone [phone]   URINE RT REFLEX TO CULTURE - Abnormal; Notable for the following components:    Protein, UA TRACE (*)     All other components within normal limits    Narrative:     Performed at:  [facility] [address],  [place], [place]   Phone [phone]   MICROSCOPIC URINALYSIS - Abnormal; Notable for the following components:    Casts 5-10 Hyaline (*)     Mucus, UA 2+ (*)     WBC, UA 6-10 (*)     Bacteria, UA 1+ (*)     All other components within normal limits    Narrative:     Performed at:  [place] [address],  [place], [place]   Phone [phone]   URINE CULTURE   TROPONIN    Narrative:     Performed at:  [place] [address],  [place], [place]   Phone [phone]   BRAIN NATRIURETIC PEPTIDE    Narrative:     Performed at:  [facility],  [place], [place]   Phone [phone]   PROTIME-INR    Narrative:     Performed at:  [place] [address],  [place], [place]   Phone [phone]   APTT    Narrative:     Performed at:  [facility],  [place], [place]   Phone [phone]       All other labs were within normal range or not returned as of this dictation. EKG:  All EKG's are interpreted by the Emergency Department Physician who eithersigns or Co-signs this chart in the absence of a cardiologist.    The Ekg interpreted by me in the absence of a cardiologist shows. Normal Sinus rhythm   Rate of   102  Axis is   Normal  QTc is  normal  Intervals and Durations are unremarkable. Nonspecific ST-T wave changes appreciated. No evidence of acute ischemia. RADIOLOGY:   Non-plain film images such as CT, Ultrasound and MRI are read by the radiologist. Plain radiographic images are visualized by myself. *    Interpretation per the Radiologist below, if available at the time of this note:    XR CHEST PORTABLE   Final Result   Low lung volumes with probable bibasilar atelectasis. Haziness in the left   costophrenic angle with small left effusion not excluded. Stable cardiac   size. PROCEDURES   Unless otherwise noted below, none     Procedures    *    CRITICAL CARE TIME   N/A      EMERGENCY DEPARTMENT COURSE and DIFFERENTIALDIAGNOSIS/MDM:   Vitals:    Vitals:    06/18/19 0426   BP: (!) 142/74   Pulse: 100   Resp: 16   Temp: 98.6 °F (37 °C)   TempSrc: Oral   SpO2: 97%   Weight: 184 lb (83.5 kg)   Height: 5' 6\" (1.676 m)       Patient was given thefollowing medications:  Medications   aspirin chewable tablet 324 mg (324 mg Oral Given 6/18/19 0450)   ipratropium-albuterol (DUONEB) nebulizer solution 1 ampule (1 ampule Inhalation Given 6/18/19 0450)   ketorolac (TORADOL) injection 30 mg (30 mg Intravenous Given 6/18/19 0513)           The patient tolerated their visit well. The patient and / or the familywere informed of the results of any tests, a time was given to answer questions.     FINAL IMPRESSION      1. COPD exacerbation ([address]          DISPOSITION/PLAN   DISPOSITION Admitted 06/18/2019 06:06:44 AM      PATIENT REFERRED TO:  Bette Iraheta MD  [facility] [address]  [phone]            DISCHARGE MEDICATIONS:  New Prescriptions    No medications on file       DISCONTINUED MEDICATIONS:  Discontinued Medications    No medications on file              (Please note that portions of this note were completed with a voice recognition program.  Efforts were made to edit the dictations but occasionally words are mis-transcribed.)    Toy Lan MD (electronically signed)      Toy Lan MD  06/18/19 12311 Romero Loyola MD  06/18/19 Hayley Wolf 100 Roni Gibson MD  06/18/19 8864

## 2019-06-18 NOTE — CONSULTS
Patient is being seen at the request of Dr. Beltran Peterson for a consultation for     HISTORY OF PRESENT ILLNESS: The patient is a 72-year-old woman with a past medical history of asthma/COPD overlap, former tobacco abuse (30 pack years quit 30 years ago), rheumatoid arthritis on [address] who presented to [facility] with left-sided chest discomfort with inspiration and associated shortness of breath over the last several days. The patient states she was in her usual state of health until approximately 3 to 4 days ago when she began to have inspiratory pain. She describes the pain as intermittent in nature and not associated with any palpitations or diaphoresis. She is short of breath with any activity. She notes some associated wheezing. She has been taking her Advair twice daily. She denies any hemoptysis or productive cough. PAST MEDICAL HISTORY:  Past Medical History:   Diagnosis Date    Asthma     Hyperlipidemia     Hypertension     RA (rheumatoid arthritis) ([address]     Thyroid disease     has half of her thyroid     PAST SURGICAL HISTORY:  Past Surgical History:   Procedure Laterality Date    ABDOMINAL EXPLORATION SURGERY      APPENDECTOMY      CHOLECYSTECTOMY      COLONOSCOPY  4/18/2013    diverticulosis, polyps    COLONOSCOPY  11/10/2016    normal colon    EYE SURGERY Right 12/11/15    cataract removal    EYE SURGERY Left 1/8/16    cataract removed    HYSTERECTOMY      THYROIDECTOMY, PARTIAL Left     TUMOR REMOVAL      off of thyroid, was benign       FAMILY HISTORY:  family history includes High Blood Pressure in her mother. SOCIAL HISTORY:   reports that she quit smoking about 32 years ago. Her smoking use included cigarettes. She started smoking about 60 years ago. She has a 10.00 pack-year smoking history.  She has never used smokeless tobacco.    Scheduled Meds:   atenolol  25 mg Oral Daily    escitalopram  20 mg Oral Nightly    leflunomide  20 mg Oral Daily    levothyroxine 25 mcg Oral Daily    sulfaSALAzine  500 mg Oral 4x Daily PC & HS    triamterene-hydrochlorothiazide  1 tablet Oral Daily    sodium chloride flush  10 mL Intravenous 2 times per day    enoxaparin  40 mg Subcutaneous Daily    ipratropium-albuterol  1 ampule Inhalation Q4H WA    methylPREDNISolone  40 mg Intravenous 4 times per day    Followed by   Sera Silverman ON 6/20/2019] predniSONE  40 mg Oral Daily with breakfast    doxycycline hyclate  100 mg Oral Q12H     Continuous Infusions:    PRN Meds:  sodium chloride flush, magnesium hydroxide, ondansetron, albuterol, acetaminophen    ALLERGIES:  Patient is allergic to effexor [venlafaxine hcl]; fenofibrate; prednisone; codeine; and docosahexaenoic acid-epa. REVIEW OF SYSTEMS:  Constitutional: Negative for fever  HENT: Negative for sore throat  Eyes: Negative for redness   Respiratory: + for dyspnea, cough  Cardiovascular: + for chest pain  Gastrointestinal: Negative for vomiting, diarrhea   Genitourinary: Negative for hematuria   Musculoskeletal: Negative for arthralgias   Skin: Negative for rash  Neurological: Negative for syncope  Hematological: Negative for adenopathy  Psychiatric/Behavorial: Negative for anxiety    PHYSICAL EXAM:  Blood pressure 131/78, pulse 92, temperature 98.1 °F (36.7 °C), temperature source Oral, resp. rate 22, height 5' 6\" (1.676 m), weight 182 lb 14.4 oz (83 kg), SpO2 92 %, not currently breastfeeding.' on 2l NC  Gen: No distress. Obese. Eyes: PERRL. No sclera icterus. No conjunctival injection. ENT: No discharge. Pharynx clear. Neck: Trachea midline. No obvious mass. Resp: No accessory muscle use. No crackles. few wheezes. No rhonchi. No dullness on percussion. CV: Regular rate. Regular rhythm. No murmur or rub. No edema. Peripheral pulses are 2+. Capillary refill is less than 3 seconds. GI: Non-tender. Non-distended. No hernia. Skin: Warm and dry. No nodule on exposed extremities. Lymph: No cervical LAD.  No supraclavicular LAD. M/S: No cyanosis. No joint deformity. No clubbing. Neuro: Awake. Alert. Moves all four extremities. Psych: Oriented x 3. No anxiety. LABS:  CBC:   Recent Labs     06/18/19  0509   WBC 10.9   HGB 13.4   HCT 40.2   MCV 98.5        BMP:   Recent Labs     06/18/19  0509      K 3.7   CL 98*   CO2 28   BUN 15   CREATININE 1.0     LIVER PROFILE:   Recent Labs     06/18/19  0509   AST 24   ALT 19   BILITOT 0.3   ALKPHOS 75     PT/INR:   Recent Labs     06/18/19  0509   PROTIME 11.3   INR 0.99     APTT:   Recent Labs     06/18/19  0509   APTT 30.4     UA:  Recent Labs     06/18/19  0523   COLORU Yellow   PHUR 5.5   LABCAST 5-10 Hyaline*   WBCUA 6-10*   RBCUA 0-2   MUCUS 2+*   BACTERIA 1+*   CLARITYU Clear   SPECGRAV >=1.030   LEUKOCYTESUR Negative   UROBILINOGEN 0.2   BILIRUBINUR Negative   BLOODU Negative   GLUCOSEU Negative     No results for input(s): PHART, MEQ1XYK, PO2ART in the last 72 hours. Chest imaging was reviewed by me and showed :  CXR:Low lung volumes with probable bibasilar atelectasis.  Haziness in the left  costophrenic angle with small left effusion         ASSESSMENT:  · Acute respiratory failure with hypoxemia   · COPD with acute exacerbation  · Hypoxemia  · L pleuritic chest pain- pleurisy vs PE      PLAN:   IV steroids today, with plan to switch to oral prednisone 40 mg daily tomorrow, then taper   Inhaled bronchodilators   Antibiotics (doxycycline)  Supplemental oxygen to maintain SaO2 >92%; wean as tolerated  Agree with CTPA  · D/w Dr. Fadi Shen    Thank you for the consult.

## 2019-06-19 VITALS
WEIGHT: 182.9 LBS | HEIGHT: 66 IN | BODY MASS INDEX: 29.39 KG/M2 | DIASTOLIC BLOOD PRESSURE: 71 MMHG | SYSTOLIC BLOOD PRESSURE: 139 MMHG | RESPIRATION RATE: 16 BRPM | TEMPERATURE: 97.6 F | OXYGEN SATURATION: 92 % | HEART RATE: 83 BPM

## 2019-06-19 LAB — URINE CULTURE, ROUTINE: NORMAL

## 2019-06-19 PROCEDURE — G0378 HOSPITAL OBSERVATION PER HR: HCPCS

## 2019-06-19 PROCEDURE — 6360000002 HC RX W HCPCS: Performed by: INTERNAL MEDICINE

## 2019-06-19 PROCEDURE — 6370000000 HC RX 637 (ALT 250 FOR IP): Performed by: INTERNAL MEDICINE

## 2019-06-19 PROCEDURE — 99238 HOSP IP/OBS DSCHRG MGMT 30/<: CPT | Performed by: INTERNAL MEDICINE

## 2019-06-19 PROCEDURE — 96376 TX/PRO/DX INJ SAME DRUG ADON: CPT

## 2019-06-19 PROCEDURE — 94640 AIRWAY INHALATION TREATMENT: CPT

## 2019-06-19 PROCEDURE — 96372 THER/PROPH/DIAG INJ SC/IM: CPT

## 2019-06-19 PROCEDURE — 94761 N-INVAS EAR/PLS OXIMETRY MLT: CPT

## 2019-06-19 PROCEDURE — 99232 SBSQ HOSP IP/OBS MODERATE 35: CPT | Performed by: INTERNAL MEDICINE

## 2019-06-19 PROCEDURE — 2700000000 HC OXYGEN THERAPY PER DAY

## 2019-06-19 PROCEDURE — 2580000003 HC RX 258: Performed by: INTERNAL MEDICINE

## 2019-06-19 RX ORDER — DOXYCYCLINE HYCLATE 100 MG
100 TABLET ORAL EVERY 12 HOURS
Qty: 10 TABLET | Refills: 0 | Status: SHIPPED | OUTPATIENT
Start: 2019-06-19 | End: 2019-06-24

## 2019-06-19 RX ORDER — METHYLPREDNISOLONE 4 MG/1
TABLET ORAL
Qty: 1 KIT | Refills: 0 | Status: SHIPPED | OUTPATIENT
Start: 2019-06-19 | End: 2019-06-25

## 2019-06-19 RX ADMIN — IPRATROPIUM BROMIDE AND ALBUTEROL SULFATE 1 AMPULE: .5; 3 SOLUTION RESPIRATORY (INHALATION) at 10:30

## 2019-06-19 RX ADMIN — SULFASALAZINE 500 MG: 500 TABLET, DELAYED RELEASE ORAL at 12:45

## 2019-06-19 RX ADMIN — LEFLUNOMIDE 20 MG: 10 TABLET, FILM COATED ORAL at 08:16

## 2019-06-19 RX ADMIN — SULFASALAZINE 500 MG: 500 TABLET, DELAYED RELEASE ORAL at 08:16

## 2019-06-19 RX ADMIN — TRIAMTERENE AND HYDROCHLOROTHIAZIDE 1 TABLET: 37.5; 25 TABLET ORAL at 08:15

## 2019-06-19 RX ADMIN — LEVOTHYROXINE SODIUM 25 MCG: 25 TABLET ORAL at 06:24

## 2019-06-19 RX ADMIN — ATENOLOL 25 MG: 25 TABLET ORAL at 08:17

## 2019-06-19 RX ADMIN — ENOXAPARIN SODIUM 40 MG: 40 INJECTION SUBCUTANEOUS at 08:15

## 2019-06-19 RX ADMIN — IPRATROPIUM BROMIDE AND ALBUTEROL SULFATE 1 AMPULE: .5; 3 SOLUTION RESPIRATORY (INHALATION) at 07:25

## 2019-06-19 RX ADMIN — DOXYCYCLINE HYCLATE 100 MG: 100 TABLET, COATED ORAL at 10:00

## 2019-06-19 RX ADMIN — METHYLPREDNISOLONE SODIUM SUCCINATE 40 MG: 40 INJECTION, POWDER, FOR SOLUTION INTRAMUSCULAR; INTRAVENOUS at 08:15

## 2019-06-19 RX ADMIN — Medication 10 ML: at 08:15

## 2019-06-19 ASSESSMENT — PAIN SCALES - GENERAL: PAINLEVEL_OUTOF10: 2

## 2019-06-19 NOTE — PROGRESS NOTES
Patient eating lunch at this time. No distress noted. Patient has no needs at this time. Will continue to monitor. Call light within reach.

## 2019-06-19 NOTE — PROGRESS NOTES
Shift assessment completed:    Alert and Oriented x4. Vitals are WNL. Respiratory status is regular, diminished, unlabored, and SpO2 is 91% on RA. Pain is 2/10, located in joints. Pt denies any other complications at this time. SR up 2/4. Bed in lowest position. Call light within reach. Bed alarm refused. Will monitor.

## 2019-06-19 NOTE — PROGRESS NOTES
Pulmonary Progress Note    CC: shortness of breath     Subjective: Patient feels a bit better. Intake/Output Summary (Last 24 hours) at 6/19/2019 1152  Last data filed at 6/19/2019 1008  Gross per 24 hour   Intake 970 ml   Output --   Net 970 ml       Exam:   /71   Pulse 83   Temp 97.6 °F (36.4 °C) (Oral)   Resp 16   Ht 5' 6\" (1.676 m)   Wt 182 lb 14.4 oz (83 kg)   SpO2 92%   Breastfeeding? No   BMI 29.52 kg/m²  on RA  Gen: No distress. Alert. Comfortable. Eyes: PERRL. No sclera icterus. No conjunctival injection. ENT: No discharge. Pharynx clear. Neck: Trachea midline. Normal thyroid. Resp: No accessory muscle use. No crackles. No wheezes. No rhonchi. No dullness on percussion. CV: Regular rate. Regular rhythm. No murmur or rub. No edema. GI: Non-tender. Non-distended. No masses. Skin: Warm and dry. No nodule on exposed extremities. No rash on exposed extremities. Lymph: No cervical LAD. No supraclavicular LAD. M/S: No cyanosis. No joint deformity. No clubbing. Neuro: Awake. Moves all extremities. CN grossly intact. Psych: Oriented x 3. No anxiety or agitation.      Scheduled Meds:   atenolol  25 mg Oral Daily    escitalopram  20 mg Oral Nightly    leflunomide  20 mg Oral Daily    levothyroxine  25 mcg Oral Daily    sulfaSALAzine  500 mg Oral 4x Daily PC & HS    triamterene-hydrochlorothiazide  1 tablet Oral Daily    sodium chloride flush  10 mL Intravenous 2 times per day    enoxaparin  40 mg Subcutaneous Daily    doxycycline hyclate  100 mg Oral Q12H    ipratropium-albuterol  1 ampule Inhalation Q4H    methylPREDNISolone  40 mg Intravenous Daily    ketorolac  15 mg Intravenous Once     Continuous Infusions:    PRN Meds:  sodium chloride flush, magnesium hydroxide, ondansetron, albuterol, acetaminophen, melatonin    Labs:  CBC:   Recent Labs     06/18/19  0509   WBC 10.9   HGB 13.4   HCT 40.2   MCV 98.5        BMP:   Recent Labs     06/18/19  0509      K

## 2019-06-19 NOTE — PROGRESS NOTES
Prescriptions and discharge instructions given. Verbal and written education provided on: new medications (Vibra tabs, Medrol dose pack), follow-up appointments, s/s to report to physician, diet, and activity. Written & verbal education provided on medications and COPD exacerbation. Pt verbalized understanding denies any questions/ needs at this time. PIV removed from right FA, no complications, catheter intact, pressure held for 2 mins, and dressing applied that is CDI. Immunizations are UTD. Pt is stable for discharge at this time. All belongings are with patient at discharge. Patient walked down with son to [facility], for discharge home.

## 2019-06-19 NOTE — DISCHARGE SUMMARY
Name:  Pat Waters  Room:  5627/6906-76  MRN:    [id number]    Discharge Summary      This discharge summary is in conjunction with a complete physical exam done on the day of discharge. Discharging Physician: Augustus House       Admit: 6/18/2019  Discharge:   6/19/2019     Diagnoses this Admission    Active Problems:    Pleuritic chest pain    Asthma-COPD overlap syndrome (HCC)    COPD exacerbation (HCC)    Acute respiratory failure with hypoxemia (HCC)    Elevated d-dimer  Resolved Problems:    * No resolved hospital problems. *      Procedures (Please Review Full Report for Details)  none    Consults    Pulmonary    HPI:     The patient is a 70 y.o. female with asthma, HLD, HTN, RA, and hypothyroidism who presented to [facility] ED with complaint of CP. Patient states that it all started around 11 pm yesterday. She had a sharp pain on the left side of her chest with taking a deep breath. She states that she has had pleurisy and bronchitis before that felt very similar. She has a chronic cough and has not noticed any changes in this, It is not productive. She denies any fevers or chills at home. She was feeling a little SOB at home and in the ED.            Physical Exam at Discharge:  /71   Pulse 83   Temp 97.6 °F (36.4 °C) (Oral)   Resp 16   Ht 5' 6\" (1.676 m)   Wt 182 lb 14.4 oz (83 kg)   SpO2 91%   Breastfeeding? No   BMI 29.52 kg/m²     Gen: No distress. Alert. Eyes: PERRL. No sclera icterus. No conjunctival injection. ENT: No discharge. Pharynx clear. Neck: Trachea midline. Normal thyroid. Resp: No accessory muscle use. No crackles. No wheezes. No rhonchi. No dullness on percussion. CV: Regular rate. Regular rhythm. No murmur or rub. No edema. Hospital Course    Acute hypoxic respiratory failure resolved.   - was requiring 2L NC O2   - Does not use supplemental O2 at home   - Is now weaned off of supplemental O2      Pleuritic Chest Pain no evidence of PE.   - started acutely last night  - No prior VTE with no recent surgery, fall, long travel, non-smoker   - D-dimer elevated   - CTPA ordered - no PE. Lung nodule noted. Will need OP follow up. -  She likely had pleurisy.      COPD with mild exacerbation. - send home on Medrol dose pack and Doxy.     HTN  - Continue BB, maxzide   - BP is well controlled      RA  - Continue Arava and sulfasalazine      Hypothyroidism   - Continue synthroid         CBC:   Recent Labs     06/18/19  0509   WBC 10.9   HGB 13.4   HCT 40.2   MCV 98.5        BMP:   Recent Labs     06/18/19  0509      K 3.7   CL 98*   CO2 28   BUN 15   CREATININE 1.0     LIVER PROFILE:   Recent Labs     06/18/19  0509   AST 24   ALT 19   BILITOT 0.3   ALKPHOS 75     PT/INR:   Recent Labs     06/18/19  0509   PROTIME 11.3   INR 0.99     APTT:   Recent Labs     06/18/19  0509   APTT 30.4     UA:  Recent Labs     06/18/19  0523   COLORU Yellow   PHUR 5.5   LABCAST 5-10 Hyaline*   WBCUA 6-10*   RBCUA 0-2   MUCUS 2+*   BACTERIA 1+*   CLARITYU Clear   SPECGRAV >=1.030   LEUKOCYTESUR Negative   UROBILINOGEN 0.2   BILIRUBINUR Negative   BLOODU Negative   GLUCOSEU Negative      CT CHEST PULMONARY EMBOLISM W CONTRAST   Final Result   No pulmonary embolus. Small left pleural effusion. 4 mm nodule left upper lobe. 6 mm nodule right lower lobe inferiorly. Recommend follow-up CT in 3-6 months. Right lower lobe atelectasis. Patchy consolidation the left lower lobe   posteriorly, atelectasis versus infection. XR CHEST PORTABLE   Final Result   Low lung volumes with probable bibasilar atelectasis. Haziness in the left   costophrenic angle with small left effusion not excluded. Stable cardiac   size.                 Discharge Medications     Medication List      START taking these medications    doxycycline hyclate 100 MG tablet  Commonly known as:  VIBRA-TABS  Take 1 tablet by mouth every 12 hours for 5 days     methylPREDNISolone 4 MG tablet  Commonly known as:  MEDROL DOSEPACK  By mouth. CONTINUE taking these medications    ADVAIR DISKUS 250-50 MCG/DOSE Aepb  Generic drug:  fluticasone-salmeterol  USE 1 PUFF EVERY 12 HOURS     albuterol sulfate  (90 Base) MCG/ACT inhaler  Inhale 2 puffs into the lungs every 6 hours as needed for Wheezing or Shortness of Breath     ATENOLOL PO     leflunomide 20 MG tablet  Commonly known as:  ARAVA     levothyroxine 25 MCG tablet  Commonly known as:  SYNTHROID     LEXAPRO 10 MG tablet  Generic drug:  escitalopram     PROBIOTIC COLIC PO     sulfaSALAzine 500 MG tablet  Commonly known as:  AZULFIDINE     triamterene-hydrochlorothiazide 37.5-25 MG per tablet  Commonly known as:  MAXZIDE-25     vitamin D 2000 units Caps capsule           Where to Get Your Medications      These medications were sent to [address] [place] [address],[facility], [address] [facility] [phone]  [address] [place] [address], [address] [address]    Phone:  [phone]   · doxycycline hyclate 100 MG tablet  · methylPREDNISolone 4 MG tablet           Discharge Condition/Location: Stable    Follow Up: Follow up with PCP.         Yolanda Yu 6/19/2019 9:19 AM

## 2019-06-26 ENCOUNTER — HOSPITAL ENCOUNTER (OUTPATIENT)
Dept: GENERAL RADIOLOGY | Age: 79
Discharge: HOME | End: 2019-06-26
Payer: MEDICARE

## 2019-06-26 ENCOUNTER — HOSPITAL ENCOUNTER (OUTPATIENT)
Age: 79
Discharge: HOME | End: 2019-06-26
Payer: MEDICARE

## 2019-06-26 DIAGNOSIS — J18.1 UNRESOLVED LOBAR PNEUMONIA (HCC): ICD-10-CM

## 2019-06-26 PROCEDURE — 71046 X-RAY EXAM CHEST 2 VIEWS: CPT

## 2019-09-04 ENCOUNTER — OFFICE VISIT (OUTPATIENT)
Dept: PULMONOLOGY | Age: 79
End: 2019-09-04
Payer: MEDICARE

## 2019-09-04 VITALS
TEMPERATURE: 98 F | BODY MASS INDEX: 29.82 KG/M2 | HEIGHT: 65 IN | HEART RATE: 70 BPM | OXYGEN SATURATION: 93 % | DIASTOLIC BLOOD PRESSURE: 64 MMHG | SYSTOLIC BLOOD PRESSURE: 104 MMHG | RESPIRATION RATE: 18 BRPM | WEIGHT: 179 LBS

## 2019-09-04 DIAGNOSIS — J31.0 CHRONIC RHINITIS: ICD-10-CM

## 2019-09-04 DIAGNOSIS — M05.719 RHEUMATOID ARTHRITIS INVOLVING SHOULDER WITH POSITIVE RHEUMATOID FACTOR, UNSPECIFIED LATERALITY (HCC): ICD-10-CM

## 2019-09-04 DIAGNOSIS — J44.9 ASTHMA-COPD OVERLAP SYNDROME (HCC): Primary | ICD-10-CM

## 2019-09-04 PROCEDURE — G8427 DOCREV CUR MEDS BY ELIG CLIN: HCPCS | Performed by: INTERNAL MEDICINE

## 2019-09-04 PROCEDURE — G8926 SPIRO NO PERF OR DOC: HCPCS | Performed by: INTERNAL MEDICINE

## 2019-09-04 PROCEDURE — 3023F SPIROM DOC REV: CPT | Performed by: INTERNAL MEDICINE

## 2019-09-04 PROCEDURE — 99214 OFFICE O/P EST MOD 30 MIN: CPT | Performed by: INTERNAL MEDICINE

## 2019-09-04 PROCEDURE — 1036F TOBACCO NON-USER: CPT | Performed by: INTERNAL MEDICINE

## 2019-09-04 PROCEDURE — 4040F PNEUMOC VAC/ADMIN/RCVD: CPT | Performed by: INTERNAL MEDICINE

## 2019-09-04 PROCEDURE — 1123F ACP DISCUSS/DSCN MKR DOCD: CPT | Performed by: INTERNAL MEDICINE

## 2019-09-04 PROCEDURE — G8400 PT W/DXA NO RESULTS DOC: HCPCS | Performed by: INTERNAL MEDICINE

## 2019-09-04 PROCEDURE — 1090F PRES/ABSN URINE INCON ASSESS: CPT | Performed by: INTERNAL MEDICINE

## 2019-09-04 PROCEDURE — G8417 CALC BMI ABV UP PARAM F/U: HCPCS | Performed by: INTERNAL MEDICINE

## 2019-09-04 RX ORDER — ATORVASTATIN CALCIUM 80 MG/1
80 TABLET, FILM COATED ORAL EVERY EVENING
COMMUNITY
Start: 2019-08-18

## 2019-09-04 RX ORDER — ALBUTEROL SULFATE 90 UG/1
2 AEROSOL, METERED RESPIRATORY (INHALATION) EVERY 6 HOURS PRN
Qty: 3 INHALER | Refills: 3 | Status: SHIPPED | OUTPATIENT
Start: 2019-09-04 | End: 2021-08-16

## 2019-09-04 RX ORDER — TRAMADOL HYDROCHLORIDE 50 MG/1
50 TABLET ORAL EVERY 6 HOURS PRN
Status: ON HOLD | COMMUNITY
End: 2021-03-01 | Stop reason: HOSPADM

## 2020-03-04 ENCOUNTER — OFFICE VISIT (OUTPATIENT)
Dept: PULMONOLOGY | Age: 80
End: 2020-03-04
Payer: MEDICARE

## 2020-03-04 VITALS
DIASTOLIC BLOOD PRESSURE: 64 MMHG | OXYGEN SATURATION: 93 % | BODY MASS INDEX: 30.87 KG/M2 | WEIGHT: 180.8 LBS | SYSTOLIC BLOOD PRESSURE: 118 MMHG | HEIGHT: 64 IN | RESPIRATION RATE: 18 BRPM | HEART RATE: 68 BPM

## 2020-03-04 PROBLEM — Z92.25 PERSONAL HISTORY OF IMMUNOSUPRESSION THERAPY: Status: ACTIVE | Noted: 2020-03-04

## 2020-03-04 PROCEDURE — 1123F ACP DISCUSS/DSCN MKR DOCD: CPT | Performed by: INTERNAL MEDICINE

## 2020-03-04 PROCEDURE — G8400 PT W/DXA NO RESULTS DOC: HCPCS | Performed by: INTERNAL MEDICINE

## 2020-03-04 PROCEDURE — G8417 CALC BMI ABV UP PARAM F/U: HCPCS | Performed by: INTERNAL MEDICINE

## 2020-03-04 PROCEDURE — G8484 FLU IMMUNIZE NO ADMIN: HCPCS | Performed by: INTERNAL MEDICINE

## 2020-03-04 PROCEDURE — 99214 OFFICE O/P EST MOD 30 MIN: CPT | Performed by: INTERNAL MEDICINE

## 2020-03-04 PROCEDURE — 1036F TOBACCO NON-USER: CPT | Performed by: INTERNAL MEDICINE

## 2020-03-04 PROCEDURE — G8926 SPIRO NO PERF OR DOC: HCPCS | Performed by: INTERNAL MEDICINE

## 2020-03-04 PROCEDURE — 4040F PNEUMOC VAC/ADMIN/RCVD: CPT | Performed by: INTERNAL MEDICINE

## 2020-03-04 PROCEDURE — G8427 DOCREV CUR MEDS BY ELIG CLIN: HCPCS | Performed by: INTERNAL MEDICINE

## 2020-03-04 PROCEDURE — 1090F PRES/ABSN URINE INCON ASSESS: CPT | Performed by: INTERNAL MEDICINE

## 2020-03-04 PROCEDURE — 3023F SPIROM DOC REV: CPT | Performed by: INTERNAL MEDICINE

## 2020-03-04 RX ORDER — CYCLOSPORINE 0.5 MG/ML
EMULSION OPHTHALMIC
COMMUNITY
Start: 2020-02-27

## 2020-03-04 NOTE — PROGRESS NOTES
daily., Disp: , Rfl:     levothyroxine (SYNTHROID) 25 MCG tablet, Take 25 mcg by mouth Daily. , Disp: , Rfl:     ATENOLOL PO, Take 25 mg by mouth daily. , Disp: , Rfl:     RESTASIS 0.05 % ophthalmic emulsion, , Disp: , Rfl:     Objective:   PHYSICAL EXAM:    /64   Pulse 68   Resp 18   Ht 5' 4\" (1.626 m)   Wt 180 lb 12.8 oz (82 kg)   SpO2 93% Comment: RA  BMI 31.03 kg/m²   Constitutional: In no acute distress. Appears stated age. Well developed and nourished  Eyes: PERRL. No sclera icterus. No conjunctival injection. ENT: Oropharynx clear  Neck: Trachea midline. No thyroid tenderness. Lymph: No cervical LAD. No supraclavicular LAD. Resp: No accessory muscle use. No crackles. No wheezes. No rhonchi. CV: Regular rate. Regular rhythm. No murmur or rub. No lower extremity edema. Skin: Warm and dry. No nodules on exposed extremities. No rash on exposed extremities. Psych: Oriented x 3. Mood and affect normal. Intact judgment and insight. LABS:  Reviewed any pertinent new labs that are available.     PFT    1/29/15             9/22/17       FEV/FVC: 63%          69%  FEV1: 1.27L 55%      1.53, 69%     FVC: 2.01L 66%           No response to bd     no    T.33L 103%  RV: 3.36L 157%         143%  DLCO: 16.40 72%      67%                                6MWT: 900ft, 93%    Assessment:    · Asthma with COPD overlap   · Rheumatoid arthritis- on arava and sulfasalazine  · Immunosupressed  · Chronic Rhinitis  · Cough - suspect post-nasal gtt      Plan:    · PRN Tessalon pearls  · Advair 250/50 BID   · Continue proair prn  · F/u in 6 mo

## 2020-03-10 ENCOUNTER — HOSPITAL ENCOUNTER (OUTPATIENT)
Age: 80
Discharge: HOME | End: 2020-03-10
Payer: MEDICARE

## 2020-03-10 ENCOUNTER — HOSPITAL ENCOUNTER (OUTPATIENT)
Dept: GENERAL RADIOLOGY | Age: 80
Discharge: HOME | End: 2020-03-10
Payer: MEDICARE

## 2020-03-10 PROCEDURE — 72100 X-RAY EXAM L-S SPINE 2/3 VWS: CPT

## 2020-08-27 ENCOUNTER — TELEPHONE (OUTPATIENT)
Dept: PULMONOLOGY | Age: 80
End: 2020-08-27

## 2020-08-27 NOTE — TELEPHONE ENCOUNTER
Within this Telehealth Consent, the terms you and yours refer to the person using the Telehealth Service (Service), or in the case of a use of the Service by or on behalf of a minor, you and yours refer to and include (i) the parent or legal guardian who provides consent to the use of the Service by such minor or uses the Service on behalf of such minor, and (ii) the minor for whom consent is being provided or on whose behalf the Service is being utilized. When using Service, you will be consulting with your health care providers via the use of Telehealth.   Telehealth involves the delivery of healthcare services using electronic communications, information technology or other means between a healthcare provider and a patient who are not in the same physical location. Telehealth may be used for diagnosis, treatment, follow-up and/or patient education, and may include, but is not limited to, one or more of the following:    Electronic transmission of medical records, photo images, personal health information or other data between a patient and a healthcare provider    Interactions between a patient and healthcare provider via audio, video and/or data communications    Use of output data from medical devices, sound and video files    Anticipated Benefits   The use of Telehealth by your Provider(s) through the Service may have the following possible benefits:    Making it easier and more efficient for you to access medical care and treatment for the conditions treated by such Provider(s) utilizing the Service    Allowing you to obtain medical care and treatment by Provider(s) at times that are convenient for you    Enabling you to interact with Provider(s) without the necessity of an in-office appointment     Possible Risks   While the use of Telehealth can provide potential benefits for you, there are also potential risks associated with the use of Telehealth.  These risks include, but may not be limited to the following:    Your Provider(s) may not able to provide medical treatment for your particular condition and you may be required to seek alternative healthcare or emergency care services.  The electronic systems or other security protocols or safeguards used in the Service could fail, causing a breach of privacy of your medical or other information.  Given regulatory requirements in certain jurisdictions, your Provider(s) diagnosis and/or treatment options, especially pertaining to certain prescriptions, may be limited. Acceptance   1. You understand that Services will be provided via Telehealth. This process involves the use of HIPAA compliant and secure, real-time audio-visual interfacing with a qualified and appropriately trained provider located at [facility]. 2. You understand that, under no circumstances, will this session be recorded. 3. You understand that the Provider(s) at [facility] and other clinical participants will be party to the information obtained during the Telehealth session in accordance with best medical practices. 4. You understand that the information obtained during the Telehealth session will be used to help determine the most appropriate treatment options. 5. You understand that You have the right to revoke this consent at any point in time. 6. You understand that Telehealth is voluntary, and that continued treatment is not dependent upon consent. 7. You understand that, in the event of non-consent to Telehealth services and/or technical difficulties, you will obtain services as typically provided in the absence of Telehealth technology. 8. You understand that this consent will be kept in Your medical record. 9. No potential benefits from the use of Telehealth or specific results can be guaranteed. Your condition may not be cured or improved and, in some cases, may get worse.    10. There are limitations in the provision of medical care and treatment via Telehealth and the Service and you may not be able to receive diagnosis and/or treatment through the Service for every condition for which you seek diagnosis and/or treatment. 11. There are potential risks to the use of Telehealth, including but not limited to the risks described in this Telehealth Consent. 12. Your Provider(s) have discussed the use of Telehealth and the Service with you, including the benefits and risks of such and you have provided oral consent to your Provider(s) for the use of Telehealth and the Service. 15. You understand that it is your duty to provide your Provider(s) truthful, accurate and complete information, including all relevant information regarding care that you may have received or may be receiving from other healthcare providers outside of the Service. 14. You understand that each of your Provider(s) may determine in his or sole discretion that your condition is not suitable for diagnosis and/or treatment using the Service, and that you may need to seek medical care and treatment a specialist or other healthcare provider, outside of the Service. 15. You understand that you are fully responsible for payment for all services provided by Provider(s) or through use of the Service and that you may not be able to use third-party insurance. 16. You represent that (a) you have read this Telehealth Consent carefully, (b) you understand the risks and benefits of the Service and the use of Telehealth in the medical care and treatment provided to you by Provider(s) using the Service, and (c) you have the legal capacity and authority to provide this consent for yourself and/or the minor for which you are consenting under applicable federal and state laws, including laws relating to the age of [de-identified] and/or parental/guardian consent.    17. You give your informed consent to the use of Telehealth by Provider(s) using the Service under

## 2020-09-03 ENCOUNTER — TELEPHONE (OUTPATIENT)
Dept: PULMONOLOGY | Age: 80
End: 2020-09-03

## 2020-09-03 NOTE — TELEPHONE ENCOUNTER
Patient cancelled appointment on 9/4/20 with  for 6 month follow-up. Reason: Patient cancelled appointment via [employer]    Called patient to reschedule and she declined,stating that she saw her PCP today and she is doing well. Patient stated that she would call the office if anything changes.     Last OV   Assessment: 3/4/20   · Asthma with COPD overlap   · Rheumatoid arthritis- on arava and sulfasalazine  · Immunosupressed  · Chronic Rhinitis  · Cough - suspect post-nasal gtt      Plan:    · PRN Tessalon pearls  · Advair 250/50 BID   · Continue proair prn  · F/u in 6 mo

## 2020-09-18 ENCOUNTER — HOSPITAL ENCOUNTER (INPATIENT)
Age: 80
LOS: 3 days | Discharge: HOME | DRG: 194 | End: 2020-09-21
Attending: EMERGENCY MEDICINE | Admitting: INTERNAL MEDICINE
Payer: MEDICARE

## 2020-09-18 ENCOUNTER — APPOINTMENT (OUTPATIENT)
Dept: GENERAL RADIOLOGY | Age: 80
DRG: 194 | End: 2020-09-18
Payer: MEDICARE

## 2020-09-18 ENCOUNTER — APPOINTMENT (OUTPATIENT)
Dept: CT IMAGING | Age: 80
DRG: 194 | End: 2020-09-18
Payer: MEDICARE

## 2020-09-18 LAB
ALBUMIN SERPL-MCNC: 3.8 G/DL (ref 3.4–5)
ALP BLD-CCNC: 76 U/L (ref 40–129)
ALT SERPL-CCNC: 21 U/L (ref 10–40)
ANION GAP SERPL CALCULATED.3IONS-SCNC: 12 MMOL/L (ref 3–16)
AST SERPL-CCNC: 23 U/L (ref 15–37)
BASOPHILS ABSOLUTE: 0.1 K/UL (ref 0–0.2)
BASOPHILS RELATIVE PERCENT: 0.7 %
BILIRUB SERPL-MCNC: 0.5 MG/DL (ref 0–1)
BILIRUBIN DIRECT: <0.2 MG/DL (ref 0–0.3)
BILIRUBIN, INDIRECT: NORMAL MG/DL (ref 0–1)
BUN BLDV-MCNC: 13 MG/DL (ref 7–20)
CALCIUM SERPL-MCNC: 9.3 MG/DL (ref 8.3–10.6)
CHLORIDE BLD-SCNC: 104 MMOL/L (ref 99–110)
CO2: 24 MMOL/L (ref 21–32)
CREAT SERPL-MCNC: 0.8 MG/DL (ref 0.6–1.2)
D DIMER: 878 NG/ML DDU (ref 0–229)
EOSINOPHILS ABSOLUTE: 0.8 K/UL (ref 0–0.6)
EOSINOPHILS RELATIVE PERCENT: 7.9 %
GFR AFRICAN AMERICAN: >60
GFR NON-AFRICAN AMERICAN: >60
GLUCOSE BLD-MCNC: 127 MG/DL (ref 70–99)
HCT VFR BLD CALC: 39.1 % (ref 36–48)
HEMOGLOBIN: 13 G/DL (ref 12–16)
LIPASE: 23 U/L (ref 13–60)
LYMPHOCYTES ABSOLUTE: 1.5 K/UL (ref 1–5.1)
LYMPHOCYTES RELATIVE PERCENT: 14.7 %
MCH RBC QN AUTO: 32.4 PG (ref 26–34)
MCHC RBC AUTO-ENTMCNC: 33.3 G/DL (ref 31–36)
MCV RBC AUTO: 97.5 FL (ref 80–100)
MONOCYTES ABSOLUTE: 1 K/UL (ref 0–1.3)
MONOCYTES RELATIVE PERCENT: 9.8 %
NEUTROPHILS ABSOLUTE: 7 K/UL (ref 1.7–7.7)
NEUTROPHILS RELATIVE PERCENT: 66.9 %
PDW BLD-RTO: 14.2 % (ref 12.4–15.4)
PLATELET # BLD: 154 K/UL (ref 135–450)
PMV BLD AUTO: 10.4 FL (ref 5–10.5)
POTASSIUM SERPL-SCNC: 4.1 MMOL/L (ref 3.5–5.1)
PRO-BNP: 198 PG/ML (ref 0–449)
RBC # BLD: 4.01 M/UL (ref 4–5.2)
REASON FOR REJECTION: NORMAL
REJECTED TEST: NORMAL
SODIUM BLD-SCNC: 140 MMOL/L (ref 136–145)
TOTAL PROTEIN: 6.8 G/DL (ref 6.4–8.2)
TROPONIN: <0.01 NG/ML
TROPONIN: <0.01 NG/ML
WBC # BLD: 10.5 K/UL (ref 4–11)

## 2020-09-18 PROCEDURE — 94761 N-INVAS EAR/PLS OXIMETRY MLT: CPT

## 2020-09-18 PROCEDURE — 99285 EMERGENCY DEPT VISIT HI MDM: CPT

## 2020-09-18 PROCEDURE — 2580000003 HC RX 258: Performed by: INTERNAL MEDICINE

## 2020-09-18 PROCEDURE — 94640 AIRWAY INHALATION TREATMENT: CPT

## 2020-09-18 PROCEDURE — 83880 ASSAY OF NATRIURETIC PEPTIDE: CPT

## 2020-09-18 PROCEDURE — 71046 X-RAY EXAM CHEST 2 VIEWS: CPT

## 2020-09-18 PROCEDURE — 93005 ELECTROCARDIOGRAM TRACING: CPT | Performed by: EMERGENCY MEDICINE

## 2020-09-18 PROCEDURE — 85025 COMPLETE CBC W/AUTO DIFF WBC: CPT

## 2020-09-18 PROCEDURE — 85379 FIBRIN DEGRADATION QUANT: CPT

## 2020-09-18 PROCEDURE — 6370000000 HC RX 637 (ALT 250 FOR IP): Performed by: EMERGENCY MEDICINE

## 2020-09-18 PROCEDURE — 80076 HEPATIC FUNCTION PANEL: CPT

## 2020-09-18 PROCEDURE — 6360000004 HC RX CONTRAST MEDICATION: Performed by: EMERGENCY MEDICINE

## 2020-09-18 PROCEDURE — 84484 ASSAY OF TROPONIN QUANT: CPT

## 2020-09-18 PROCEDURE — 83690 ASSAY OF LIPASE: CPT

## 2020-09-18 PROCEDURE — 6370000000 HC RX 637 (ALT 250 FOR IP): Performed by: INTERNAL MEDICINE

## 2020-09-18 PROCEDURE — 71260 CT THORAX DX C+: CPT

## 2020-09-18 PROCEDURE — 80048 BASIC METABOLIC PNL TOTAL CA: CPT

## 2020-09-18 PROCEDURE — 2700000000 HC OXYGEN THERAPY PER DAY

## 2020-09-18 PROCEDURE — 96374 THER/PROPH/DIAG INJ IV PUSH: CPT

## 2020-09-18 PROCEDURE — 6360000002 HC RX W HCPCS: Performed by: EMERGENCY MEDICINE

## 2020-09-18 PROCEDURE — 6370000000 HC RX 637 (ALT 250 FOR IP): Performed by: NURSE PRACTITIONER

## 2020-09-18 PROCEDURE — 1200000000 HC SEMI PRIVATE

## 2020-09-18 RX ORDER — ACETAMINOPHEN 650 MG/1
650 SUPPOSITORY RECTAL EVERY 6 HOURS PRN
Status: DISCONTINUED | OUTPATIENT
Start: 2020-09-18 | End: 2020-09-21 | Stop reason: HOSPADM

## 2020-09-18 RX ORDER — PROMETHAZINE HYDROCHLORIDE 25 MG/1
12.5 TABLET ORAL EVERY 6 HOURS PRN
Status: DISCONTINUED | OUTPATIENT
Start: 2020-09-18 | End: 2020-09-21 | Stop reason: HOSPADM

## 2020-09-18 RX ORDER — DULOXETIN HYDROCHLORIDE 60 MG/1
60 CAPSULE, DELAYED RELEASE ORAL DAILY
Status: DISCONTINUED | OUTPATIENT
Start: 2020-09-19 | End: 2020-09-18

## 2020-09-18 RX ORDER — ASPIRIN 81 MG/1
324 TABLET, CHEWABLE ORAL ONCE
Status: COMPLETED | OUTPATIENT
Start: 2020-09-18 | End: 2020-09-18

## 2020-09-18 RX ORDER — NITROGLYCERIN 0.4 MG/1
0.4 TABLET SUBLINGUAL EVERY 5 MIN PRN
Status: DISCONTINUED | OUTPATIENT
Start: 2020-09-18 | End: 2020-09-21 | Stop reason: HOSPADM

## 2020-09-18 RX ORDER — TRIAMTERENE AND HYDROCHLOROTHIAZIDE 37.5; 25 MG/1; MG/1
1 TABLET ORAL DAILY
Status: DISCONTINUED | OUTPATIENT
Start: 2020-09-18 | End: 2020-09-21 | Stop reason: HOSPADM

## 2020-09-18 RX ORDER — MORPHINE SULFATE 4 MG/ML
4 INJECTION, SOLUTION INTRAMUSCULAR; INTRAVENOUS ONCE
Status: COMPLETED | OUTPATIENT
Start: 2020-09-18 | End: 2020-09-18

## 2020-09-18 RX ORDER — LEVOTHYROXINE SODIUM 0.03 MG/1
25 TABLET ORAL DAILY
Status: DISCONTINUED | OUTPATIENT
Start: 2020-09-19 | End: 2020-09-21 | Stop reason: HOSPADM

## 2020-09-18 RX ORDER — SODIUM CHLORIDE 0.9 % (FLUSH) 0.9 %
10 SYRINGE (ML) INJECTION EVERY 12 HOURS SCHEDULED
Status: DISCONTINUED | OUTPATIENT
Start: 2020-09-18 | End: 2020-09-21 | Stop reason: HOSPADM

## 2020-09-18 RX ORDER — OXYCODONE HYDROCHLORIDE AND ACETAMINOPHEN 5; 325 MG/1; MG/1
1 TABLET ORAL EVERY 4 HOURS PRN
Status: DISCONTINUED | OUTPATIENT
Start: 2020-09-18 | End: 2020-09-21 | Stop reason: HOSPADM

## 2020-09-18 RX ORDER — FAMOTIDINE 20 MG/1
20 TABLET, FILM COATED ORAL 2 TIMES DAILY
Status: DISCONTINUED | OUTPATIENT
Start: 2020-09-18 | End: 2020-09-21 | Stop reason: HOSPADM

## 2020-09-18 RX ORDER — LEFLUNOMIDE 10 MG/1
20 TABLET ORAL DAILY
Status: DISCONTINUED | OUTPATIENT
Start: 2020-09-18 | End: 2020-09-21 | Stop reason: HOSPADM

## 2020-09-18 RX ORDER — ATORVASTATIN CALCIUM 80 MG/1
80 TABLET, FILM COATED ORAL NIGHTLY
Status: DISCONTINUED | OUTPATIENT
Start: 2020-09-18 | End: 2020-09-21 | Stop reason: HOSPADM

## 2020-09-18 RX ORDER — POLYETHYLENE GLYCOL 3350 17 G/17G
17 POWDER, FOR SOLUTION ORAL DAILY PRN
Status: DISCONTINUED | OUTPATIENT
Start: 2020-09-18 | End: 2020-09-21 | Stop reason: HOSPADM

## 2020-09-18 RX ORDER — BUDESONIDE AND FORMOTEROL FUMARATE DIHYDRATE 160; 4.5 UG/1; UG/1
2 AEROSOL RESPIRATORY (INHALATION) 2 TIMES DAILY
Status: DISCONTINUED | OUTPATIENT
Start: 2020-09-18 | End: 2020-09-21 | Stop reason: HOSPADM

## 2020-09-18 RX ORDER — ASPIRIN 325 MG
325 TABLET ORAL ONCE
Status: DISCONTINUED | OUTPATIENT
Start: 2020-09-18 | End: 2020-09-21 | Stop reason: HOSPADM

## 2020-09-18 RX ORDER — ESCITALOPRAM OXALATE 10 MG/1
20 TABLET ORAL NIGHTLY
Status: DISCONTINUED | OUTPATIENT
Start: 2020-09-18 | End: 2020-09-18

## 2020-09-18 RX ORDER — ATENOLOL 25 MG/1
25 TABLET ORAL DAILY
Status: DISCONTINUED | OUTPATIENT
Start: 2020-09-19 | End: 2020-09-21 | Stop reason: HOSPADM

## 2020-09-18 RX ORDER — ACETAMINOPHEN 325 MG/1
650 TABLET ORAL EVERY 6 HOURS PRN
Status: DISCONTINUED | OUTPATIENT
Start: 2020-09-18 | End: 2020-09-21 | Stop reason: HOSPADM

## 2020-09-18 RX ORDER — ONDANSETRON 2 MG/ML
4 INJECTION INTRAMUSCULAR; INTRAVENOUS EVERY 6 HOURS PRN
Status: DISCONTINUED | OUTPATIENT
Start: 2020-09-18 | End: 2020-09-21 | Stop reason: HOSPADM

## 2020-09-18 RX ORDER — POLYVINYL ALCOHOL 14 MG/ML
1 SOLUTION/ DROPS OPHTHALMIC EVERY 6 HOURS PRN
Status: DISCONTINUED | OUTPATIENT
Start: 2020-09-18 | End: 2020-09-21 | Stop reason: HOSPADM

## 2020-09-18 RX ORDER — TRAMADOL HYDROCHLORIDE 50 MG/1
50 TABLET ORAL EVERY 6 HOURS PRN
Status: DISCONTINUED | OUTPATIENT
Start: 2020-09-18 | End: 2020-09-21 | Stop reason: HOSPADM

## 2020-09-18 RX ORDER — ASPIRIN 81 MG/1
81 TABLET, CHEWABLE ORAL DAILY
Status: DISCONTINUED | OUTPATIENT
Start: 2020-09-19 | End: 2020-09-21 | Stop reason: HOSPADM

## 2020-09-18 RX ORDER — SULFASALAZINE 500 MG/1
500 TABLET ORAL 4 TIMES DAILY
Status: DISCONTINUED | OUTPATIENT
Start: 2020-09-18 | End: 2020-09-20

## 2020-09-18 RX ORDER — DULOXETIN HYDROCHLORIDE 60 MG/1
60 CAPSULE, DELAYED RELEASE ORAL NIGHTLY
Status: DISCONTINUED | OUTPATIENT
Start: 2020-09-18 | End: 2020-09-21 | Stop reason: HOSPADM

## 2020-09-18 RX ORDER — DULOXETIN HYDROCHLORIDE 60 MG/1
120 CAPSULE, DELAYED RELEASE ORAL NIGHTLY
COMMUNITY

## 2020-09-18 RX ORDER — SODIUM CHLORIDE 0.9 % (FLUSH) 0.9 %
10 SYRINGE (ML) INJECTION PRN
Status: DISCONTINUED | OUTPATIENT
Start: 2020-09-18 | End: 2020-09-21 | Stop reason: HOSPADM

## 2020-09-18 RX ADMIN — ASPIRIN 324 MG: 81 TABLET, CHEWABLE ORAL at 06:18

## 2020-09-18 RX ADMIN — TRAMADOL HYDROCHLORIDE 50 MG: 50 TABLET, FILM COATED ORAL at 21:16

## 2020-09-18 RX ADMIN — DULOXETINE HYDROCHLORIDE 60 MG: 60 CAPSULE, DELAYED RELEASE ORAL at 22:06

## 2020-09-18 RX ADMIN — SULFASALAZINE 500 MG: 500 TABLET ORAL at 23:07

## 2020-09-18 RX ADMIN — Medication 2 PUFF: at 19:40

## 2020-09-18 RX ADMIN — FAMOTIDINE 20 MG: 20 TABLET, FILM COATED ORAL at 21:16

## 2020-09-18 RX ADMIN — Medication 10 ML: at 21:17

## 2020-09-18 RX ADMIN — OXYCODONE HYDROCHLORIDE AND ACETAMINOPHEN 1 TABLET: 5; 325 TABLET ORAL at 18:07

## 2020-09-18 RX ADMIN — ATORVASTATIN CALCIUM 80 MG: 80 TABLET, FILM COATED ORAL at 21:16

## 2020-09-18 RX ADMIN — OXYCODONE HYDROCHLORIDE AND ACETAMINOPHEN 1 TABLET: 5; 325 TABLET ORAL at 22:06

## 2020-09-18 RX ADMIN — MORPHINE SULFATE 4 MG: 4 INJECTION, SOLUTION INTRAMUSCULAR; INTRAVENOUS at 07:24

## 2020-09-18 RX ADMIN — IOPAMIDOL 75 ML: 755 INJECTION, SOLUTION INTRAVENOUS at 08:50

## 2020-09-18 ASSESSMENT — PAIN SCALES - GENERAL
PAINLEVEL_OUTOF10: 8
PAINLEVEL_OUTOF10: 4
PAINLEVEL_OUTOF10: 8
PAINLEVEL_OUTOF10: 10
PAINLEVEL_OUTOF10: 8
PAINLEVEL_OUTOF10: 10
PAINLEVEL_OUTOF10: 5
PAINLEVEL_OUTOF10: 8

## 2020-09-18 ASSESSMENT — PAIN DESCRIPTION - PROGRESSION: CLINICAL_PROGRESSION: GRADUALLY WORSENING

## 2020-09-18 ASSESSMENT — PAIN DESCRIPTION - DESCRIPTORS: DESCRIPTORS: CONSTANT

## 2020-09-18 ASSESSMENT — PAIN DESCRIPTION - LOCATION
LOCATION: GENERALIZED
LOCATION: CHEST

## 2020-09-18 ASSESSMENT — PAIN DESCRIPTION - ORIENTATION: ORIENTATION: MID

## 2020-09-18 ASSESSMENT — PAIN DESCRIPTION - ONSET: ONSET: ON-GOING

## 2020-09-18 ASSESSMENT — PAIN DESCRIPTION - PAIN TYPE
TYPE: ACUTE PAIN
TYPE: ACUTE PAIN

## 2020-09-18 ASSESSMENT — PAIN DESCRIPTION - FREQUENCY: FREQUENCY: CONTINUOUS

## 2020-09-18 NOTE — ED NOTES
4054 - PS to hospitalists  Re:  chest pain  1005 - Dr Noman Sr called back to speak with Dr Edgardo Morris  09/18/20 1007

## 2020-09-18 NOTE — PLAN OF CARE
Problem: Falls - Risk of:  Goal: Will remain free from falls  Description: Will remain free from falls  Outcome: Ongoing  Note: Pt high fall risk. Instructed to use call light before getting out of bed and when needing assistance. Call light within reach. Bed in low position. Bed alarm on. 2/4 rails up. Will continue to monitor. Problem: Pain:  Goal: Pain level will decrease  Description: Pain level will decrease  Outcome: Ongoing  Note: Pt will be satisfied with pain control. Pt uses numeric pain rating scale with reassessments after pain med administration. Will continue to monitor progression throughout shift.

## 2020-09-18 NOTE — ED PROVIDER NOTES
Physician-In-Triage Note    I performed a medical screening examination and evaluated this patient briefly with the purpose of initiating their ED workup in an expeditious manner. Please see notes from other ED providers regarding comprehensive evaluation including full history, physical exam, interpretation of results, and medical decision making/disposition. In brief, Aminah Haque is a 68 y.o. female who presents to the ED complaining of SOB and chest pains and pleurisy. Started fairly acutely at 8pm or so while watching TV. She has COPD but no new or changed cough from baseline and she does not have a baseline oxygen requirement. No fevers. No abd pain vomiting or diarrhea. It is diffuse across the chest like a band that is causing her tightness. No history of stenting. Unsure when her last stress test was. The 12 lead EKG was interpreted by me as follows:  Rate: normal with a rate of 100  Rhythm: sinus  Axis: normal  Intervals: normal MS, narrow QRS, normal QTc  ST segments: no ST elevations or depressions  T waves: no abnormal inversions  Non-specific T wave changes: not present  Prior EKG comparison: EKG dated 6/18/19 is not significantly different    Focused physical examination notable for no acute distress, well-appearing, well-nourished, normal speech and mentation without obvious facial droop, no obvious rash. No obvious cranial nerve deficits on my initial exam.  RRR CTAB, chest wall ttp. Vitals reviewed per nursing documentation. Triage orders placed, including cardiac labs, ddimer. I did not personally review any of the results of these tests, which will be reviewed and interpreted later by ED providers at the time of the patient's more comprehensive evaluation.        Rosalina Ibarra MD  09/18/20 2199

## 2020-09-18 NOTE — H&P
Hospital Medicine History & Physical      PCP: Evelyn Gillette MD    Date of Admission: 9/18/2020    Date of Service: Pt seen/examined on 18 Sept and Admitted to Inpatient with expected LOS greater than two midnights due to medical therapy. Chief Complaint:  Chest Pain       History Of Present Illness:        68 y.o. female w/ hx of known CAD who presented to [facility] with acute onset moderately severe non-exertional substernal/L sided Chest Pain w/ associated typical cardiac features including SOB but no N/V/Diaphoresis. Pain was relieved w/ SL NTG. The patient denies any fever/chills or other signs/sxs of systemic illness or identifiable aggravating/alleviating factors. Past Medical History:          Diagnosis Date    Asthma     Hyperlipidemia     Hypertension     RA (rheumatoid arthritis) ([address]     Thyroid disease     has half of her thyroid       Past Surgical History:          Procedure Laterality Date    ABDOMINAL EXPLORATION SURGERY      APPENDECTOMY      CHOLECYSTECTOMY      COLONOSCOPY  4/18/2013    diverticulosis, polyps    COLONOSCOPY  11/10/2016    normal colon    EYE SURGERY Right 12/11/15    cataract removal    EYE SURGERY Left 1/8/16    cataract removed    HYSTERECTOMY      THYROIDECTOMY, PARTIAL Left     TUMOR REMOVAL      off of thyroid, was benign       Medications Prior to Admission:      Prior to Admission medications    Medication Sig Start Date End Date Taking? Authorizing Provider   Caprice Height 250-50 MCG/DOSE AEPB USE 1 INHALATION BY MOUTH  EVERY 12 HOURS 4/13/20  Yes William Nesbitt MD   RESTASIS 0.05 % ophthalmic emulsion  2/27/20  Yes Historical Provider, MD   Coenzyme Q10 (CO Q 10 PO) Take by mouth   Yes Historical Provider, MD   atorvastatin (LIPITOR) 80 MG tablet Take 80 mg by mouth 8/18/19  Yes Historical Provider, MD   traMADol (ULTRAM) 50 MG tablet Take 50 mg by mouth every 6 hours as needed for Pain.    Yes Historical Provider, MD albuterol sulfate  (90 Base) MCG/ACT inhaler Inhale 2 puffs into the lungs every 6 hours as needed for Wheezing or Shortness of Breath 9/4/19  Yes Kevin Haque MD   triamterene-hydrochlorothiazide (MAXZIDE-25) 37.5-25 MG per tablet Take 1 tablet by mouth daily 10/19/18  Yes Historical Provider, MD   Cholecalciferol (VITAMIN D) 2000 UNITS CAPS capsule Take by mouth daily   Yes Historical Provider, MD   Lactobacillus Rhamnosus, GG, (PROBIOTIC COLIC PO) Take by mouth daily   Yes Historical Provider, MD   leflunomide (ARAVA) 20 MG tablet Take 20 mg by mouth daily. Yes Historical Provider, MD   sulfaSALAzine (AZULFIDINE) 500 MG tablet Take 500 mg by mouth 4 times daily. Yes Historical Provider, MD   levothyroxine (SYNTHROID) 25 MCG tablet Take 25 mcg by mouth Daily. Yes Historical Provider, MD   ATENOLOL PO Take 25 mg by mouth daily. Yes Historical Provider, MD       Allergies:  Effexor [venlafaxine hcl]; Fenofibrate; Prednisone; Codeine; and Docosahexaenoic acid-epa    Social History:      The patient currently lives independently    TOBACCO:   reports that she quit smoking about 33 years ago. Her smoking use included cigarettes. She started smoking about 61 years ago. She has a 10.00 pack-year smoking history. She has never used smokeless tobacco.  ETOH:   reports no history of alcohol use. Family History:      Reviewed in detail and negative for DM, CAD, Cancer, CVA. Positive as follows:        Problem Relation Age of Onset    High Blood Pressure Mother        REVIEW OF SYSTEMS:   Pertinent positives as noted in the HPI. All other systems reviewed and negative. PHYSICAL EXAM:    /81   Pulse 96   Temp 98.3 °F (36.8 °C) (Oral)   Resp 18   Ht 5' 6\" (1.676 m)   Wt 178 lb (80.7 kg)   SpO2 97%   BMI 28.73 kg/m²     General appearance:  No apparent distress, appears stated age and cooperative. HEENT:  Normal cephalic, atraumatic without obvious deformity.  Pupils equal, round, and reactive to light. Extra ocular muscles intact. Conjunctivae/corneas clear. Neck: Supple, with full range of motion. No jugular venous distention. Trachea midline. Respiratory:  Normal respiratory effort. Clear to auscultation, bilaterally without Rales/Wheezes/Rhonchi. Cardiovascular:  Regular rate and rhythm with normal S1/S2 without murmurs, rubs or gallops. Abdomen: Soft, non-tender, non-distended with normal bowel sounds. Musculoskeletal:  No clubbing, cyanosis or edema bilaterally. Full range of motion without deformity. Skin: Skin color, texture, turgor normal.  No rashes or lesions. Neurologic:  Neurovascularly intact without any focal sensory/motor deficits. Cranial nerves: II-XII intact, grossly non-focal.  Psychiatric:  Alert and oriented, thought content appropriate, normal insight  Capillary Refill: Brisk,< 3 seconds   Peripheral Pulses: +2 palpable, equal bilaterally       CXR:  I have reviewed the CXR with the following interpretation: no acute ASD   EKG:  I have reviewed the EKG with the following interpretation: no acute ischemic changes. Labs:     Recent Labs     09/18/20  0506   WBC 10.5   HGB 13.0   HCT 39.1        Recent Labs     09/18/20  0506      K 4.1      CO2 24   BUN 13   CREATININE 0.8   CALCIUM 9.3     Recent Labs     09/18/20  0506   AST 23   ALT 21   BILIDIR <0.2   BILITOT 0.5   ALKPHOS 76     No results for input(s): INR in the last 72 hours.   Recent Labs     09/18/20  0506 09/18/20  1225   TROPONINI <0.01 <0.01       Urinalysis:      Lab Results   Component Value Date    NITRU Negative 06/18/2019    WBCUA 6-10 06/18/2019    BACTERIA 1+ 06/18/2019    RBCUA 0-2 06/18/2019    BLOODU Negative 06/18/2019    SPECGRAV >=1.030 06/18/2019    GLUCOSEU Negative 06/18/2019         ASSESSMENT:    Active Hospital Problems    Diagnosis Date Noted    Hypothyroidism [E03.9]     COPD (chronic obstructive pulmonary disease) ([facility] [address] [J44.9] 09/22/2017    Chest pain [R07.9] 12/15/2014    Hypertension [I10]     Hyperlipidemia [E78.5]        PLAN:      Chest pain - Concerning to ED for ACS, etiology clinically unable to determine. Initial enzymes/EKG negative. Follow serial enzymes, reviewed and documented as above and monitor on tele, w/out evidence of ischemia/arrythmia. Stress test ordered and pending. HTN - w/out known CAD and no evidence of active signs/sxs of ischemia/failure. Currently controlled on home meds w/ vitals reviewed and documented as above. HyperLipidemia - controlled on home Statin. Continue, w/ f/u and med adjustment w/ PCP    HypoThyroid - clinically euthyroid on oral replacement therapy. Continue, w/ outpt monitoring as previously arranged. COPD - w/out chronic respiratory failure on no baseline home O2. Controlled on home medication regimen - continued. DVT Prophylaxis: LMWH  Diet: Diet NPO, After Midnight  DIET GENERAL; No Caffeine  Code Status: Full Code      PT/OT Eval Status: Not yet ordered. Dispo - Anticipate discharge in 2 days pending clinical course/further w/up and stress results. Danilo Chacon MD    Thank you Yohana Redd MD for the opportunity to be involved in this patient's care. If you have any questions or concerns please feel free to contact me at [phone].

## 2020-09-18 NOTE — ED NOTES

## 2020-09-18 NOTE — ED PROVIDER NOTES
[facility]  ED  EMERGENCY DEPARTMENT ENCOUNTER      Pt Name: Julianne Rosales  MRN: [id number]  [place] 1940  Date of evaluation: 9/18/2020  Provider: Nirav Osuna MD    CHIEF COMPLAINT       Chief Complaint   Patient presents with    Chest Pain     patient reports that chest pain started at 2000 when watching TV, patient reports had chest pain before been admitted 2 times with pain          HISTORY OF PRESENT ILLNESS   (Location/Symptom, Timing/Onset, Context/Setting, Quality, Duration, Modifying Factors, Severity)  Note limiting factors. Julianne Rosales is a 68 y.o. female with past medical history of hypertension, hyperlipidemia, rheumatoid arthritis, COPD here today with chest pain    Patient states that 8:00 last night she began to have chest pain. Describes a tight vice-like pain throughout her chest worse on the left side associated with shortness of breath. States she has a chronic cough but denies wheezing. No fevers or chills. No lower extremity swelling, redness or pain. Denies any abdominal pain, nausea or vomiting. Pain is moderate without alleviating factors. Worse with taking a deep breath. Denies hemoptysis. No recent prolonged immobility. [facility]    Nursing Notes were reviewed. REVIEW OF SYSTEMS    (2-9 systems for level 4, 10 or more for level 5)     Review of Systems    Please see HPI for pertinent positive and negative review of system findings. A full 10 system ROS was performed and otherwise negative.         PAST MEDICAL HISTORY     Past Medical History:   Diagnosis Date    Asthma     Hyperlipidemia     Hypertension     RA (rheumatoid arthritis) ([facility] [place] [address]     Thyroid disease     has half of her thyroid         SURGICAL HISTORY       Past Surgical History:   Procedure Laterality Date    ABDOMINAL EXPLORATION SURGERY      APPENDECTOMY      CHOLECYSTECTOMY      COLONOSCOPY  4/18/2013    diverticulosis, polyps    COLONOSCOPY  11/10/2016    normal colon    EYE SURGERY Right 12/11/15    cataract removal    EYE SURGERY Left 1/8/16    cataract removed    HYSTERECTOMY      THYROIDECTOMY, PARTIAL Left     TUMOR REMOVAL      off of thyroid, was benign         CURRENT MEDICATIONS       Previous Medications    ALBUTEROL SULFATE  (90 BASE) MCG/ACT INHALER    Inhale 2 puffs into the lungs every 6 hours as needed for Wheezing or Shortness of Breath    ATENOLOL PO    Take 25 mg by mouth daily. ATORVASTATIN (LIPITOR) 80 MG TABLET    Take 80 mg by mouth    CHOLECALCIFEROL (VITAMIN D) 2000 UNITS CAPS CAPSULE    Take by mouth daily    COENZYME Q10 (CO Q 10 PO)    Take by mouth    ESCITALOPRAM (LEXAPRO) 10 MG TABLET    Take 20 mg by mouth nightly. LACTOBACILLUS RHAMNOSUS, GG, (PROBIOTIC COLIC PO)    Take by mouth daily    LEFLUNOMIDE (ARAVA) 20 MG TABLET    Take 20 mg by mouth daily. LEVOTHYROXINE (SYNTHROID) 25 MCG TABLET    Take 25 mcg by mouth Daily. RESTASIS 0.05 % OPHTHALMIC EMULSION        SULFASALAZINE (AZULFIDINE) 500 MG TABLET    Take 500 mg by mouth 4 times daily. TRAMADOL (ULTRAM) 50 MG TABLET    Take 50 mg by mouth every 6 hours as needed for Pain. TRIAMTERENE-HYDROCHLOROTHIAZIDE (MAXZIDE-25) 37.5-25 MG PER TABLET    Take 1 tablet by mouth daily    WIXELA INHUB 250-50 MCG/DOSE AEPB    USE 1 INHALATION BY MOUTH  EVERY 12 HOURS       ALLERGIES     Effexor [venlafaxine hcl]; Fenofibrate; Prednisone; Codeine; and Docosahexaenoic acid-epa    FAMILY HISTORY       Family History   Problem Relation Age of Onset    High Blood Pressure Mother           SOCIAL HISTORY       Social History     Socioeconomic History    Marital status:       Spouse name: Not on file    Number of children: Not on file    Years of education: Not on file    Highest education level: Not on file   Occupational History    Not on file   Social Needs    Financial resource strain: Not on file    Food insecurity     Worry: Not on file     Inability: Not on file   Mamadou Prabhakar Transportation needs     Medical: Not on file     Non-medical: Not on file   Tobacco Use    Smoking status: Former Smoker     Packs/day: 0.50     Years: 20.00     Pack years: 10.00     Types: Cigarettes     Start date: 1959     Last attempt to quit: 1987     Years since quittin.7    Smokeless tobacco: Never Used   Substance and Sexual Activity    Alcohol use: No     Alcohol/week: 0.0 standard drinks    Drug use: No    Sexual activity: Not Currently     Partners: Male   Lifestyle    Physical activity     Days per week: Not on file     Minutes per session: Not on file    Stress: Not on file   Relationships    Social connections     Talks on phone: Not on file     Gets together: Not on file     Attends Tenriism service: Not on file     Active member of club or organization: Not on file     Attends meetings of clubs or organizations: Not on file     Relationship status: Not on file    Intimate partner violence     Fear of current or ex partner: Not on file     Emotionally abused: Not on file     Physically abused: Not on file     Forced sexual activity: Not on file   Other Topics Concern    Not on file   Social History Narrative    Not on file       SCREENINGS    Hillary Coma Scale  Eye Opening: Spontaneous  Best Verbal Response: Oriented  Best Motor Response: Obeys commands  Hillary Coma Scale Score: 15          PHYSICAL EXAM    (up to 7 for level 4, 8 or more for level 5)     ED Triage Vitals   BP Temp Temp Source Pulse Resp SpO2 Height Weight   20 0509 20 0509 20 0509 20 0509 20 0502 20 0509 20 0502 20 0502   (!) 160/89 97.9 °F (36.6 °C) Oral 92 16 93 % 5' 6\" (1.676 m) 178 lb (80.7 kg)       Physical Exam    General appearance:  Cooperative. Uncomfortable appearing. Lying in bed with her eyes closed. Skin:  Warm. Dry. Eye:  Extraocular movements intact. Ears, nose, mouth and throat:  Oral mucosa moist,  Neck:  Trachea midline. Heart:  Regular rate and rhythm  Perfusion:  intact  Respiratory:  Lungs clear to auscultation bilaterally. Respirations nonlabored. Abdominal:   Non distended. Nontender  Neurological:  Alert and oriented x 3.   Moves all extremities spontaneously  Musculoskeletal:   Normal ROM, no deformities          Psychiatric:  Normal mood      DIAGNOSTIC RESULTS       Labs Reviewed   BASIC METABOLIC PANEL - Abnormal; Notable for the following components:       Result Value    Glucose 127 (*)     All other components within normal limits    Narrative:     Performed at:  [address], [facility] [place]   Phone [phone]   CBC WITH AUTO DIFFERENTIAL - Abnormal; Notable for the following components:    Eosinophils Absolute 0.8 (*)     All other components within normal limits    Narrative:     Performed at:  [address], [facility] [place]   Phone [phone]   D-DIMER, QUANTITATIVE - Abnormal; Notable for the following components:    D-Dimer, Quant 878 (*)     All other components within normal limits    Narrative:     Performed at:  [address], [facility] [place]   Phone [phone]   TROPONIN    Narrative:     Performed at:  [address], [facility] [place]   Phone [phone] PEPTIDE    Narrative:     Performed at:  [address], [facility] [place]   Phone [phone]   LIPASE    Narrative:     Performed at:  [address], [facility] [place]   Phone [phone]   HEPATIC FUNCTION PANEL    Narrative:     Performed at:  [address], [facility] [place]   Phone [phone]    Narrative:     Basilio Ching Tawanda Glory [phone],  Rejected Test Name/Called to:KINZA Taylor RN, 09/18/2020 06:00,  by [facility]  Performed at:  [address],  [place], [address] [place] [address]   Phone [phone]       Interpretation per the Radiologist below, if obtained/available at the time of this note:    CT CHEST PULMONARY EMBOLISM W CONTRAST   Final Result   No evidence of pulmonary embolism or acute pulmonary abnormality. Mild   chronic bibasilar atelectasis. Very small chronic left pleural effusion. Trace right pleural effusion. Several small or very small stable pulmonary   nodules. Small nodule in the remaining right lobe of the thyroid. RECOMMENDATIONS:   Follow-up CT chest in 1 year for further evaluation of the stability of the   pulmonary nodules. XR CHEST (2 VW)   Final Result   Low lung volumes. No gross evidence of acute process. All other labs/imaging were within normal range or not returned as of this dictation. EMERGENCY DEPARTMENT COURSE and DIFFERENTIAL DIAGNOSIS/MDM:   Vitals:    Vitals:    09/18/20 0622 09/18/20 0700 09/18/20 0711 09/18/20 0851   BP: (!) 157/89 (!) 159/84 (!) 166/80 (!) 144/85   Pulse: 94 88 88 92   Resp: 16 19 17 20   Temp:       TempSrc:       SpO2: 96% 98% 96% 97%   Weight:       Height:           EKG: Sinus rhythm at a rate of 100 bpm.  No ST elevation or arrhythmia    Patient presents the emergency department today complaining of chest pain. Somewhat pleuritic in nature. No oxygen requirement. Reported history of COPD and asthma but no significant wheezing little to no cough. No significant oxygen requirement. Quite uncomfortable. EKG nondiagnostic initial troponin negative. Elevated d-dimer prompted CT angiogram of the chest to rule out PE there is no evidence of this. On reevaluation the patient still is having left-sided chest pain.   No recent cardiovascular evaluation or work-up and she will be admitted to the hospital for further re-stratification. MDM    CONSULTS     IP CONSULT TO HOSPITALIST    Critical Care:   None    REASSESSMENT          PROCEDURE     Unless otherwise noted below, none     Procedures      FINAL IMPRESSION      1. Chest pain, unspecified type            DISPOSITION/PLAN   DISPOSITION Decision To Admit 09/18/2020 09:51:08 AM        PATIENT REFERRED TO:  No follow-up provider specified. DISCHARGE MEDICATIONS:  New Prescriptions    No medications on file     Controlled Substances Monitoring:     No flowsheet data found.     (Please note that portions of this note were completed with a voice recognition program.  Efforts were made to edit the dictations but occasionally words are mis-transcribed.)    Patsy Adorno MD (electronically signed)  Attending Emergency Physician            Leana Messina MD  09/18/20 [id number]

## 2020-09-18 NOTE — ED NOTES
Bed: 16  Expected date: 9/18/20  Expected time:   Means of arrival:   Comments:  enma Ledezma, ROZINA  09/18/20 8148

## 2020-09-18 NOTE — ED NOTES

## 2020-09-19 ENCOUNTER — APPOINTMENT (OUTPATIENT)
Dept: NUCLEAR MEDICINE | Age: 80
DRG: 194 | End: 2020-09-19
Payer: MEDICARE

## 2020-09-19 PROBLEM — J98.11 ATELECTASIS: Status: ACTIVE | Noted: 2020-09-19

## 2020-09-19 PROBLEM — T17.500A MUCUS PLUGGING OF BRONCHI: Status: ACTIVE | Noted: 2020-09-19

## 2020-09-19 PROBLEM — R09.89 CHEST CONGESTION: Status: ACTIVE | Noted: 2020-09-19

## 2020-09-19 PROBLEM — Z87.39 HISTORY OF RHEUMATOID ARTHRITIS: Status: ACTIVE | Noted: 2020-09-19

## 2020-09-19 PROBLEM — Z87.891 FORMER SMOKER: Status: ACTIVE | Noted: 2020-09-19

## 2020-09-19 LAB
ALBUMIN SERPL-MCNC: 3.4 G/DL (ref 3.4–5)
ANION GAP SERPL CALCULATED.3IONS-SCNC: 11 MMOL/L (ref 3–16)
BUN BLDV-MCNC: 13 MG/DL (ref 7–20)
CALCIUM SERPL-MCNC: 9.3 MG/DL (ref 8.3–10.6)
CHLORIDE BLD-SCNC: 99 MMOL/L (ref 99–110)
CHOLESTEROL, TOTAL: 139 MG/DL (ref 0–199)
CO2: 27 MMOL/L (ref 21–32)
CREAT SERPL-MCNC: 0.8 MG/DL (ref 0.6–1.2)
EKG ATRIAL RATE: 100 BPM
EKG ATRIAL RATE: 118 BPM
EKG DIAGNOSIS: NORMAL
EKG DIAGNOSIS: NORMAL
EKG P AXIS: 45 DEGREES
EKG P AXIS: 52 DEGREES
EKG P-R INTERVAL: 134 MS
EKG P-R INTERVAL: 164 MS
EKG Q-T INTERVAL: 324 MS
EKG Q-T INTERVAL: 366 MS
EKG QRS DURATION: 64 MS
EKG QRS DURATION: 68 MS
EKG QTC CALCULATION (BAZETT): 454 MS
EKG QTC CALCULATION (BAZETT): 472 MS
EKG R AXIS: 8 DEGREES
EKG R AXIS: 8 DEGREES
EKG T AXIS: 51 DEGREES
EKG T AXIS: 75 DEGREES
EKG VENTRICULAR RATE: 100 BPM
EKG VENTRICULAR RATE: 118 BPM
GFR AFRICAN AMERICAN: >60
GFR NON-AFRICAN AMERICAN: >60
GLUCOSE BLD-MCNC: 146 MG/DL (ref 70–99)
HCT VFR BLD CALC: 40.1 % (ref 36–48)
HDLC SERPL-MCNC: 46 MG/DL (ref 40–60)
HEMOGLOBIN: 12.9 G/DL (ref 12–16)
LDL CHOLESTEROL CALCULATED: 79 MG/DL
LV EF: 75 %
LVEF MODALITY: NORMAL
MCH RBC QN AUTO: 31.8 PG (ref 26–34)
MCHC RBC AUTO-ENTMCNC: 32.3 G/DL (ref 31–36)
MCV RBC AUTO: 98.4 FL (ref 80–100)
PDW BLD-RTO: 14.7 % (ref 12.4–15.4)
PHOSPHORUS: 3 MG/DL (ref 2.5–4.9)
PLATELET # BLD: 174 K/UL (ref 135–450)
PMV BLD AUTO: 10.6 FL (ref 5–10.5)
POTASSIUM SERPL-SCNC: 3.5 MMOL/L (ref 3.5–5.1)
RBC # BLD: 4.07 M/UL (ref 4–5.2)
SODIUM BLD-SCNC: 137 MMOL/L (ref 136–145)
TRIGL SERPL-MCNC: 70 MG/DL (ref 0–150)
VLDLC SERPL CALC-MCNC: 14 MG/DL
WBC # BLD: 12 K/UL (ref 4–11)

## 2020-09-19 PROCEDURE — 93017 CV STRESS TEST TRACING ONLY: CPT

## 2020-09-19 PROCEDURE — 80061 LIPID PANEL: CPT

## 2020-09-19 PROCEDURE — 85027 COMPLETE CBC AUTOMATED: CPT

## 2020-09-19 PROCEDURE — 93005 ELECTROCARDIOGRAM TRACING: CPT | Performed by: INTERNAL MEDICINE

## 2020-09-19 PROCEDURE — 94150 VITAL CAPACITY TEST: CPT

## 2020-09-19 PROCEDURE — 94761 N-INVAS EAR/PLS OXIMETRY MLT: CPT

## 2020-09-19 PROCEDURE — 6360000002 HC RX W HCPCS: Performed by: INTERNAL MEDICINE

## 2020-09-19 PROCEDURE — 93010 ELECTROCARDIOGRAM REPORT: CPT | Performed by: INTERNAL MEDICINE

## 2020-09-19 PROCEDURE — A9502 TC99M TETROFOSMIN: HCPCS | Performed by: INTERNAL MEDICINE

## 2020-09-19 PROCEDURE — 3430000000 HC RX DIAGNOSTIC RADIOPHARMACEUTICAL: Performed by: INTERNAL MEDICINE

## 2020-09-19 PROCEDURE — 6370000000 HC RX 637 (ALT 250 FOR IP): Performed by: INTERNAL MEDICINE

## 2020-09-19 PROCEDURE — 80069 RENAL FUNCTION PANEL: CPT

## 2020-09-19 PROCEDURE — 36415 COLL VENOUS BLD VENIPUNCTURE: CPT

## 2020-09-19 PROCEDURE — 78452 HT MUSCLE IMAGE SPECT MULT: CPT

## 2020-09-19 PROCEDURE — 2580000003 HC RX 258: Performed by: INTERNAL MEDICINE

## 2020-09-19 PROCEDURE — 1200000000 HC SEMI PRIVATE

## 2020-09-19 PROCEDURE — 94640 AIRWAY INHALATION TREATMENT: CPT

## 2020-09-19 PROCEDURE — 99223 1ST HOSP IP/OBS HIGH 75: CPT | Performed by: INTERNAL MEDICINE

## 2020-09-19 PROCEDURE — 6370000000 HC RX 637 (ALT 250 FOR IP): Performed by: NURSE PRACTITIONER

## 2020-09-19 PROCEDURE — 2700000000 HC OXYGEN THERAPY PER DAY

## 2020-09-19 RX ORDER — IPRATROPIUM BROMIDE AND ALBUTEROL SULFATE 2.5; .5 MG/3ML; MG/3ML
1 SOLUTION RESPIRATORY (INHALATION)
Status: DISCONTINUED | OUTPATIENT
Start: 2020-09-19 | End: 2020-09-20

## 2020-09-19 RX ORDER — DEXAMETHASONE SODIUM PHOSPHATE 4 MG/ML
4 INJECTION, SOLUTION INTRA-ARTICULAR; INTRALESIONAL; INTRAMUSCULAR; INTRAVENOUS; SOFT TISSUE EVERY 6 HOURS
Status: DISCONTINUED | OUTPATIENT
Start: 2020-09-19 | End: 2020-09-20

## 2020-09-19 RX ORDER — BENZONATATE 100 MG/1
100 CAPSULE ORAL 3 TIMES DAILY PRN
Status: DISCONTINUED | OUTPATIENT
Start: 2020-09-19 | End: 2020-09-21 | Stop reason: HOSPADM

## 2020-09-19 RX ADMIN — Medication 2 PUFF: at 11:33

## 2020-09-19 RX ADMIN — SULFASALAZINE 500 MG: 500 TABLET ORAL at 20:34

## 2020-09-19 RX ADMIN — BENZONATATE 100 MG: 100 CAPSULE ORAL at 20:34

## 2020-09-19 RX ADMIN — OXYCODONE HYDROCHLORIDE AND ACETAMINOPHEN 1 TABLET: 5; 325 TABLET ORAL at 20:34

## 2020-09-19 RX ADMIN — TETROFOSMIN 10.7 MILLICURIE: 1.38 INJECTION, POWDER, LYOPHILIZED, FOR SOLUTION INTRAVENOUS at 06:55

## 2020-09-19 RX ADMIN — DULOXETINE HYDROCHLORIDE 60 MG: 60 CAPSULE, DELAYED RELEASE ORAL at 20:34

## 2020-09-19 RX ADMIN — TETROFOSMIN 32 MILLICURIE: 1.38 INJECTION, POWDER, LYOPHILIZED, FOR SOLUTION INTRAVENOUS at 08:00

## 2020-09-19 RX ADMIN — ATORVASTATIN CALCIUM 80 MG: 80 TABLET, FILM COATED ORAL at 20:34

## 2020-09-19 RX ADMIN — DEXAMETHASONE SODIUM PHOSPHATE 4 MG: 4 INJECTION, SOLUTION INTRAMUSCULAR; INTRAVENOUS at 20:35

## 2020-09-19 RX ADMIN — FAMOTIDINE 20 MG: 20 TABLET, FILM COATED ORAL at 20:34

## 2020-09-19 RX ADMIN — Medication 2 PUFF: at 20:19

## 2020-09-19 RX ADMIN — OXYCODONE HYDROCHLORIDE AND ACETAMINOPHEN 1 TABLET: 5; 325 TABLET ORAL at 12:17

## 2020-09-19 RX ADMIN — REGADENOSON 0.4 MG: 0.08 INJECTION, SOLUTION INTRAVENOUS at 08:00

## 2020-09-19 RX ADMIN — Medication 10 ML: at 20:35

## 2020-09-19 ASSESSMENT — PAIN SCALES - GENERAL
PAINLEVEL_OUTOF10: 9
PAINLEVEL_OUTOF10: 8

## 2020-09-19 NOTE — PROGRESS NOTES
Physician Progress Note      Silviano GLORIA #:                  [id number]  :                       1940  ADMIT DATE:       2020 5:07 AM  [address] [place] DATE:  RESPONDING  PROVIDER #:        Masood Sarah MD          QUERY TEXT:    Dear Attending Provider,  Pt admitted with chest pain. Pt noted to have negative workup with ekg,   enzymes, and stress test with no ischemia and low risk . If possible, please   document in progress notes and discharge summary if you are evaluating and/or   treating any of the following: The medical record reflects the following:  Risk Factors: advanced age, copd  Clinical Indicators: cough, sob, Worse with taking a deep breath  Treatment: ekg, troponins, stress test, asa, morphine, pepcid, Percocet    Thank you for your assistance,  Cliff Adorno RN,BSN,CCDS,CRCR  Options provided:  -- Chest pain due to pleurisy  -- Chest pain due to GERD  -- Chest pain due to, please specify. -- Other - I will add my own diagnosis  -- Disagree - Not applicable / Not valid  -- Disagree - Clinically unable to determine / Unknown  -- Refer to Clinical Documentation Reviewer    PROVIDER RESPONSE TEXT:    This patient had chest pain due to pleurisy.     Query created by: Viji Nelson on 2020 11:42 AM      Electronically signed by:  Masood Sarah MD 2020 12:46 PM

## 2020-09-19 NOTE — PROGRESS NOTES
09/19/20 1136   Treatment   Treatment Type MDI   $Treatment Type $Inhaled Therapy/Meds   Medications Budesonide/Formoterol   Pre-Tx Pulse 112   Pre-Tx Resps 18   Breath Sounds Pre-Tx WILMAR Diminished   Breath Sounds Pre-Tx LLL Diminished   Breath Sounds Pre-Tx RUL Diminished   Breath Sounds Pre-Tx RML Diminished   Breath Sounds Pre-Tx RLL Diminished   Breath Sounds Post-Tx WILMAR Diminished   Breath Sounds Post-Tx LLL Diminished   Breath Sounds Post-Tx RUL Diminished   Breath Sounds Post-Tx RML Diminished   Breath Sounds Post-Tx RLL Diminished   Post-Tx Pulse 119   Post-Tx Resps 20   Delivery Source   (CHAMBER MOUTH RINSED POST TX)   Position Semi-Osoiro's   Tx Tolerance Well   Is patient on O2?  N   Oxygen Therapy/Pulse Ox   O2 Therapy Room air   SpO2 90 %   Cough/Sputum   Sputum How Obtained Cough on request   Cough Non-productive

## 2020-09-19 NOTE — CONSULTS
INPATIENT PULMONARY CRITICAL CARE CONSULT NOTE      Chief Complaint/Referring Provider:  Patient is being seen at the request of Dr. Kaley Dillon for a consultation for COPD exacerbation     Presenting HPI: Patient came to the hospital because of increasing chest pain    As per the ER provider-Rosalie Sellers is a 68 y.o. female with past medical history of hypertension, hyperlipidemia, rheumatoid arthritis, COPD here today with chest pain     Patient states that 8:00 last night she began to have chest pain. Describes a tight vice-like pain throughout her chest worse on the left side associated with shortness of breath. States she has a chronic cough but denies wheezing. No fevers or chills. No lower extremity swelling, redness or pain. Denies any abdominal pain, nausea or vomiting. Pain is moderate without alleviating factors. Worse with taking a deep breath. Denies hemoptysis.   No recent prolonged immobility.     Patient when seen states that she has a history of asthma/COPD and sees Dr. Yeny Mondragon for the same, patient states that she started having chest pain which was in the center of the chest along with that patient had increased chest tightness as if the chest is in the vice, patient states that she has sinus congestion, patient does not have any significant sore throat, patient did not have any significant difficulty in swallowing, no coughing or choking while eating, no odynophagia or dysphagia, patient does not have any epistaxis or hemoptysis, patient has been having some increased chest congestion, patient states that she did not have any fever or chills, patient was not having any significant blurring of vision or any sensation as if there is a curtain in front of the eyes, patient did not have any history of chest wall trauma or anything different in terms of activities, patient did not have any increasing heartburns, patient does not have any altered bowel habits, patient was not having any significant THYROIDECTOMY, PARTIAL Left     TUMOR REMOVAL      off of thyroid, was benign        Family History   Problem Relation Age of Onset    High Blood Pressure Mother         Social History     Tobacco Use    Smoking status: Former Smoker     Packs/day: 0.50     Years: 20.00     Pack years: 10.00     Types: Cigarettes     Start date: 1959     Last attempt to quit: 1987     Years since quittin.7    Smokeless tobacco: Never Used   Substance Use Topics    Alcohol use: No     Alcohol/week: 0.0 standard drinks        Allergies   Allergen Reactions    Effexor [Venlafaxine Hcl] Itching and Nausea Only    Fenofibrate Diarrhea    Prednisone Other (See Comments)     Causes body to feel on fire    Codeine Nausea And Vomiting    Docosahexaenoic Acid-Epa Nausea And Vomiting     Cannot take Vascepa               Physical Exam:  Blood pressure (!) 142/84, pulse 94, temperature 98.1 °F (36.7 °C), temperature source Oral, resp. rate 15, height 5' 6\" (1.676 m), weight 177 lb 12.8 oz (80.6 kg), SpO2 94 %, not currently breastfeeding.'   Constitutional:  No acute distress. Mild proximal coughing with audible chest congestion when seen  HENT:  Oropharynx is clear and moist. No thyromegaly. Eyes:  Conjunctivae are normal. Pupils equal, round, and reactive to light. No scleral icterus. Neck: . No tracheal deviation present. No obvious thyroid mass. Cardiovascular: Normal rate, regular rhythm, normal heart sounds. No right ventricular heave. No lower extremity edema. Pulmonary/Chest: Bilateral basilar wheezes. Minimal scattered rales. Increased chest congestion chest wall is not dull to percussion. No accessory muscle usage or stridor. Prolonged expiration with decreased breath sound density  Abdominal: Soft. Bowel sounds present. No distension or hernia. No tenderness. Musculoskeletal: No cyanosis. No clubbing. No obvious joint deformity. Lymphadenopathy: No cervical or supraclavicular adenopathy.    Skin: Skin is warm and dry. No rash or nodules on the exposed extremities. Psychiatric: Normal mood and affect. Behavior is normal.  No anxiety. Neurologic: Alert, awake and oriented. PERRL. Speech fluent        Results:  CBC:   Recent Labs     09/18/20  0506 09/19/20  1023   WBC 10.5 12.0*   HGB 13.0 12.9   HCT 39.1 40.1   MCV 97.5 98.4    174     BMP:   Recent Labs     09/18/20  0506 09/19/20  1023    137   K 4.1 3.5    99   CO2 24 27   PHOS  --  3.0   BUN 13 13   CREATININE 0.8 0.8     LIVER PROFILE:   Recent Labs     09/18/20  0506   AST 23   ALT 21   LIPASE 23.0   BILIDIR <0.2   BILITOT 0.5   ALKPHOS 76       Imaging:  I have reviewed radiology images personally. NM Cardiac Stress Test Nuclear Imaging   Final Result      CT CHEST PULMONARY EMBOLISM W CONTRAST   Final Result   No evidence of pulmonary embolism or acute pulmonary abnormality. Mild   chronic bibasilar atelectasis. Very small chronic left pleural effusion. Trace right pleural effusion. Several small or very small stable pulmonary   nodules. Small nodule in the remaining right lobe of the thyroid. RECOMMENDATIONS:   Follow-up CT chest in 1 year for further evaluation of the stability of the   pulmonary nodules. XR CHEST (2 VW)   Final Result   Low lung volumes. No gross evidence of acute process. Xr Chest (2 Vw)    Result Date: 9/18/2020  EXAMINATION: TWO XRAY VIEWS OF THE CHEST 9/18/2020 5:12 am COMPARISON: 06/26/2019 HISTORY: ORDERING SYSTEM PROVIDED HISTORY: chest pain TECHNOLOGIST PROVIDED HISTORY: Reason for exam:->chest pain FINDINGS: Poor inspiration. Mild cardiomegaly. Normal pulmonary vasculature. No focal consolidation, pleural effusion, or pneumothorax. Low lung volumes. No gross evidence of acute process.      Ct Chest Pulmonary Embolism W Contrast    Result Date: 9/18/2020  EXAMINATION: CTA OF THE CHEST 9/18/2020 8:51 am TECHNIQUE: CTA of the chest was performed after the lobe. Upper Abdomen: Limited images of the upper abdomen are unremarkable. Soft Tissues/Bones: No acute bone or soft tissue abnormality. No evidence of pulmonary embolism or acute pulmonary abnormality. Mild chronic bibasilar atelectasis. Very small chronic left pleural effusion. Trace right pleural effusion. Several small or very small stable pulmonary nodules. Small nodule in the remaining right lobe of the thyroid. RECOMMENDATIONS: Follow-up CT chest in 1 year for further evaluation of the stability of the pulmonary nodules. Nm Cardiac Stress Test Nuclear Imaging    Result Date: 9/19/2020  Cardiac Perfusion Imaging  Demographics   Patient Name      [facility]   Date of Study     09/19/2020         Gender             Female   Patient Number    [id number]         Date of Birth      1940   Visit Number      [id number]          Age                68 year(s)   Accession Number  [id number]         Room Number        6975   Corporate ID      [id number]           NM Technician      Teri Fuel, [address] [facility]   Nurse             Kasie Aguilera, [address] [place] [address].                    RN                 Physician          Esperanza Lora MD   The procedure was explained in detail to the patient. Risks,  complications and alternative treatments were reviewed. Written consent  was obtained. Procedure Procedure Type:   Nuclear Stress Test:Pharmacological, NM MYOCARDIAL SPECT REST EXERCISE OR  RX   Study location: [facility] - Nuclear Medicine   Indications: Chest pain. Hospital Status: Inpatient.   Height: 66 inches Weight: 178 pounds  Risk Factors   The patient risk factors include:former tobacco use, treated  hypercholesterolemia, treated hypertension, family history of premature CAD,  chronic lung disease and (pack years: 10 years not smoking: 1987). Conclusions   Summary  There is uniform isotope uptake at stress and rest. There is no evidence of  myocardial ischemia or scar. Normal LVEF of >75%. EKG changes are of  uncertain significance in setting of normal perfusion. Overall findings  represent a low risk scan. Stress Protocols   Resting ECG  Sinus tachycardia, normal axis. Non-specific ST/T wave abnormality high  lateral leads. Isolate PVC. Resting HR:114 bpm  Resting BP:146/83 mmHg  Stress Protocol:Pharmacologic - Lexiscan's  Peak HR:136 bpm                  HR/BP product:61355  Peak BP:160/75 mmHg  Predicted HR: 142 bpm  % of predicted HR: 96  Test duration: 4 min  Reason for termination:Completed   ECG Findings  Sinus tachycardia with no ischemic changes. Arrhythmias  Frequent PVC's and bigeminy with infusion. Symptoms  Patient developed shortness of breath and  diaphoresis, likely related to lexiscan. Symptoms resolved with rest.   Complications  Procedure complication was none. Stress Interpretation  Uniform isotope uptake with no inducible ischemia  or scar. Normal myocardial thickening and wall  motion. Normal post-stress LVEF of >75%.   Procedure Medications   - Lexiscan 0.4 mg.  Imaging Protocols   - One Day   Rest                          Stress   Isotope:Myoview/Tetrofosmin   Isotope: Myoview/Tetrofosmin  Isotope dose:10.7 mCi         Isotope dose:32 mCi  Date:09/19/2020 06:55         Date:09/19/2020 08:00                                 Technique:      Gated  Imaging Results    Stress ejection    Ejection fraction:79 %    EDV :34 ml    ESV :7 ml    Stroke volume :27 ml    LV mass :73 gr  Medical History  Signatures   ------------------------------------------------------------------  Electronically signed by Esperanza FeldmanInterpreting physician)  on 09/19/2020 at 10:55  ------------------------------------------------------------------            PFT-2017---Mild OAD with slight decrease in diffusion capacity when adjusted for volume     Stress test -  Stress Interpretation     Uniform isotope uptake with no inducible ischemia     or scar. Normal myocardial thickening and wall     motion. Normal post-stress LVEF of >75%. Assessment:  Principal Problem:    Chest pain on breathing  Active Problems:    Hypertension    Hyperlipidemia    Pulmonary nodules    Chronic rhinitis    COPD (chronic obstructive pulmonary disease) (HCC)    Hypothyroidism    COPD exacerbation (HCC)    Atelectasis    Chest congestion    Mucus plugging of bronchi    History of rheumatoid arthritis    Former smoker  Resolved Problems:    * No resolved hospital problems.  *          Plan:   · Oxygen supplementation to keep saturation being 90 to 94% only  · Pulmonary toilet  · Patient was offered her to show her the CT of the chest but patient was not interested  · Patient appears to have COPD exacerbation along with that patient appears to have some pleuritic chest pain patient also has some atelectasis/mucous plugging and chest congestion along with that patient also has history of rheumatoid arthritis  · Bronchodilators-patient is on Symbicort at this time and may not require DuoNeb on regular basis will reassess  · Patient continues to have some bronchospasm along with chest congestion  · Patient was started on IV Decadron by internal medicine team which can be continued  · Patient does have history of rheumatoid arthritis and has been on sulfasalazine and Arava along with that patient has been infiltrates which are more in favor of atelectasis  · Patient is symptomatic and was offered a bronchoscopy for diagnostic and therapeutic purposes but patient does not want that  · Avoid narcotics and sedatives  · Antihypertensives as per IM  · PUD and DVT prophylaxis as per IM    Case discussed with patient, nursing and internal medicine team        Electronically signed by:  Tito Cruz MD    9/19/2020    6:43 PM.

## 2020-09-19 NOTE — CARE COORDINATION
CASE MANAGEMENT INITIAL ASSESSMENT      Reviewed chart and completed assessment via telephone with: patient   Explained Case Management role/services. yes    Primary contact information: [address] [facility] Decision Maker:   Who do you trust or have selected to make healthcare decisions for you? Name:   ismael Pruitt  Phone  Number: [phone]  Can this person be reached and be able to respond quickly, such as within a few minutes or hours? yes  Who would be your back-up decision maker? No back up  Name   Phone Number:    [address] [facility] date/status: 9/18/2020  Diagnosis: chest pain     Insurance: medicare/ medicaid  Precert required for SNF - N        3 night stay required - Y    Living arrangements, Adls, care needs, prior to admission: lives alone in senior apartments with no steps. IPTA. Still drives. Transportation: son    [address] [facility] at home: Walker__Cane__RTS__ BSC__Shower Chair__  02__ HHN__ CPAP__  BiPap__  Hospital Bed__ W/C___ Other__________    Services in the home and/or outpatient, prior to admission: none      PT/OT recs: not ordered     Hospital Exemption Notification (HEN): needed for snf    Barriers to discharge: none    Plan/comments:  Plans to return to home and denies needs. Watch for o2 needs.      ECOC on chart for MD signature

## 2020-09-19 NOTE — PROGRESS NOTES
Anne stress test was completed on this patient as ordered. The patient tolerated the procedure well. Awaiting stress imaging at this time.

## 2020-09-19 NOTE — FLOWSHEET NOTE
Cross cover paged:  Pt takes Cymbalta 60mg nightly instead of Lexapro that was confirmed through care everywhere. Can this by ordered for her tonight? Thanks    Addendum:  60mg of Cymbalta ordered and administered.

## 2020-09-19 NOTE — PROGRESS NOTES
2nd attempt: Patient's chart was reviewed. Unable to reach patient by phone.  Left a message for patient to call back. (2/6/19) Final Result   No evidence of pulmonary embolism or acute pulmonary abnormality. Mild   chronic bibasilar atelectasis. Very small chronic left pleural effusion. Trace right pleural effusion. Several small or very small stable pulmonary   nodules. Small nodule in the remaining right lobe of the thyroid. RECOMMENDATIONS:   Follow-up CT chest in 1 year for further evaluation of the stability of the   pulmonary nodules. XR CHEST (2 VW)   Final Result   Low lung volumes. No gross evidence of acute process. Assessment/Plan:    Active Hospital Problems    Diagnosis    Hypothyroidism [E03.9]    COPD (chronic obstructive pulmonary disease) ([facility] [address] [J44.9]    Chest pain [R07.9]    Hypertension [I10]    Hyperlipidemia [E78.5]         Chest pain - Concerning to ED for ACS. Cardiac enzymes/EKG negative for ischemia/arrythmia. Stress test ordered and results as noted with low risk for ischemia. Still having chest pain with deep breaths. Suspect possible component of pleurisy. Continue analgesics, supportive care will add steroids. She does give history of intolerance to prednisone will use Decadron at this time and follow clinical response. We will also ask pulmonary to see in consultation.     HTN - w/out known CAD and no evidence of active signs/sxs of ischemia/failure. Currently controlled on home meds w/ vitals reviewed and documented as above.     HyperLipidemia - controlled on home Statin. Continue, w/ f/u and med adjustment w/ PCP     HypoThyroid - clinically euthyroid on oral replacement therapy. Continue, w/ outpt monitoring as previously arranged.      COPD - w/out chronic respiratory failure on no baseline home O2. She was hypoxic earlier today and may be placed on supplemental oxygen at this time. Discussed with patient and her son at the bedside today.   They would like pulmonary to see her therefore we will ask pulmonary to see in consultation during this admission.       DVT Prophylaxis: Lovenox  Diet: DIET CARDIAC; No Caffeine  Code Status: Full Code    PT/OT Eval Status: Not ordered    Dispo -1 to 2 days    Dominick Mcdonald MD

## 2020-09-20 PROBLEM — R82.81 PYURIA: Status: ACTIVE | Noted: 2020-09-20

## 2020-09-20 LAB
AMORPHOUS: ABNORMAL /HPF
BACTERIA: ABNORMAL /HPF
BILIRUBIN URINE: NEGATIVE
BLOOD, URINE: NEGATIVE
CLARITY: CLEAR
COLOR: YELLOW
EPITHELIAL CELLS, UA: ABNORMAL /HPF (ref 0–5)
GLUCOSE URINE: NEGATIVE MG/DL
HYALINE CASTS: ABNORMAL /LPF (ref 0–2)
KETONES, URINE: NEGATIVE MG/DL
LEUKOCYTE ESTERASE, URINE: NEGATIVE
MICROSCOPIC EXAMINATION: YES
NITRITE, URINE: NEGATIVE
PH UA: 7 (ref 5–8)
PROTEIN UA: 100 MG/DL
RBC UA: ABNORMAL /HPF (ref 0–4)
SPECIFIC GRAVITY UA: 1.02 (ref 1–1.03)
URINE TYPE: ABNORMAL
UROBILINOGEN, URINE: 0.2 E.U./DL
WBC UA: ABNORMAL /HPF (ref 0–5)

## 2020-09-20 PROCEDURE — 81001 URINALYSIS AUTO W/SCOPE: CPT

## 2020-09-20 PROCEDURE — 94640 AIRWAY INHALATION TREATMENT: CPT

## 2020-09-20 PROCEDURE — 6370000000 HC RX 637 (ALT 250 FOR IP): Performed by: INTERNAL MEDICINE

## 2020-09-20 PROCEDURE — 6370000000 HC RX 637 (ALT 250 FOR IP): Performed by: NURSE PRACTITIONER

## 2020-09-20 PROCEDURE — 1200000000 HC SEMI PRIVATE

## 2020-09-20 PROCEDURE — 6360000002 HC RX W HCPCS: Performed by: INTERNAL MEDICINE

## 2020-09-20 PROCEDURE — 99233 SBSQ HOSP IP/OBS HIGH 50: CPT | Performed by: INTERNAL MEDICINE

## 2020-09-20 PROCEDURE — 87086 URINE CULTURE/COLONY COUNT: CPT

## 2020-09-20 PROCEDURE — 2580000003 HC RX 258: Performed by: INTERNAL MEDICINE

## 2020-09-20 RX ORDER — IPRATROPIUM BROMIDE AND ALBUTEROL SULFATE 2.5; .5 MG/3ML; MG/3ML
1 SOLUTION RESPIRATORY (INHALATION) EVERY 4 HOURS PRN
Status: DISCONTINUED | OUTPATIENT
Start: 2020-09-20 | End: 2020-09-21 | Stop reason: HOSPADM

## 2020-09-20 RX ORDER — DEXAMETHASONE SODIUM PHOSPHATE 4 MG/ML
4 INJECTION, SOLUTION INTRA-ARTICULAR; INTRALESIONAL; INTRAMUSCULAR; INTRAVENOUS; SOFT TISSUE EVERY 8 HOURS
Status: DISCONTINUED | OUTPATIENT
Start: 2020-09-20 | End: 2020-09-21

## 2020-09-20 RX ORDER — SULFASALAZINE 500 MG/1
500 TABLET ORAL 4 TIMES DAILY
Status: DISCONTINUED | OUTPATIENT
Start: 2020-09-21 | End: 2020-09-21 | Stop reason: HOSPADM

## 2020-09-20 RX ADMIN — LEFLUNOMIDE 20 MG: 10 TABLET ORAL at 09:32

## 2020-09-20 RX ADMIN — SULFASALAZINE 500 MG: 500 TABLET ORAL at 20:40

## 2020-09-20 RX ADMIN — ASPIRIN 81 MG: 81 TABLET, CHEWABLE ORAL at 09:32

## 2020-09-20 RX ADMIN — BENZONATATE 100 MG: 100 CAPSULE ORAL at 20:41

## 2020-09-20 RX ADMIN — FAMOTIDINE 20 MG: 20 TABLET, FILM COATED ORAL at 09:32

## 2020-09-20 RX ADMIN — DULOXETINE HYDROCHLORIDE 60 MG: 60 CAPSULE, DELAYED RELEASE ORAL at 20:41

## 2020-09-20 RX ADMIN — DEXAMETHASONE SODIUM PHOSPHATE 4 MG: 4 INJECTION, SOLUTION INTRAMUSCULAR; INTRAVENOUS at 06:24

## 2020-09-20 RX ADMIN — SULFASALAZINE 500 MG: 500 TABLET ORAL at 15:17

## 2020-09-20 RX ADMIN — Medication 2 PUFF: at 19:45

## 2020-09-20 RX ADMIN — Medication 10 ML: at 06:24

## 2020-09-20 RX ADMIN — DEXAMETHASONE SODIUM PHOSPHATE 4 MG: 4 INJECTION, SOLUTION INTRAMUSCULAR; INTRAVENOUS at 20:41

## 2020-09-20 RX ADMIN — Medication 10 ML: at 02:56

## 2020-09-20 RX ADMIN — LEVOTHYROXINE SODIUM 25 MCG: 25 TABLET ORAL at 06:24

## 2020-09-20 RX ADMIN — FAMOTIDINE 20 MG: 20 TABLET, FILM COATED ORAL at 20:40

## 2020-09-20 RX ADMIN — Medication 2 PUFF: at 08:53

## 2020-09-20 RX ADMIN — TRAMADOL HYDROCHLORIDE 50 MG: 50 TABLET, FILM COATED ORAL at 20:41

## 2020-09-20 RX ADMIN — TRIAMTERENE AND HYDROCHLOROTHIAZIDE 1 TABLET: 37.5; 25 TABLET ORAL at 09:32

## 2020-09-20 RX ADMIN — ATORVASTATIN CALCIUM 80 MG: 80 TABLET, FILM COATED ORAL at 20:40

## 2020-09-20 RX ADMIN — DEXAMETHASONE SODIUM PHOSPHATE 4 MG: 4 INJECTION, SOLUTION INTRAMUSCULAR; INTRAVENOUS at 02:56

## 2020-09-20 RX ADMIN — ATENOLOL 25 MG: 25 TABLET ORAL at 09:32

## 2020-09-20 RX ADMIN — ENOXAPARIN SODIUM 40 MG: 40 INJECTION SUBCUTANEOUS at 09:32

## 2020-09-20 RX ADMIN — Medication 10 ML: at 20:42

## 2020-09-20 ASSESSMENT — PAIN SCALES - GENERAL
PAINLEVEL_OUTOF10: 6
PAINLEVEL_OUTOF10: 6

## 2020-09-20 NOTE — PROGRESS NOTES
INPATIENT PULMONARY CRITICAL CARE PROGRESS NOTE      Reason for visit    COPD exacerbation    SUBJECTIVE: Patient when seen this morning was feeling better as compared to yesterday, patient had less coughing and no significant chest congestion now, patient states that she does not have any chest pain now but she still has some soreness on the left side of the chest which is not tender to touch but feels that when she takes a deep breath it still is sore, patient was not having any fever or chills, patient was afebrile and hemodynamically maintained, patient when seen was on 2 L of nasal can oxygen with saturation of 95%, patient urine output is not documented in the [facility] for review, patient's blood sugar was slightly on the higher side yesterday but was acceptable, patient was alert and oriented, no other pertinent review of system of concern        Physical Exam:  Blood pressure 131/72, pulse 98, temperature 97.6 °F (36.4 °C), temperature source Oral, resp. rate 16, height 5' 6\" (1.676 m), weight 176 lb 9.6 oz (80.1 kg), SpO2 92 %, not currently breastfeeding.'     Constitutional:  No acute distress. Mild proximal coughing with audible chest congestion when seen  HENT:  Oropharynx is clear and moist. No thyromegaly. Eyes:  Conjunctivae are normal. Pupils equal, round, and reactive to light. No scleral icterus. Neck: . No tracheal deviation present. No obvious thyroid mass. Cardiovascular: Normal rate, regular rhythm, normal heart sounds. No right ventricular heave. No lower extremity edema. Pulmonary/Chest: Bilateral minimal wheezes. Minimal scattered rales. I decreased chest congestion chest wall is not dull to percussion. No accessory muscle usage or stridor. Prolonged expiration with decreased breath sound density  Abdominal: Soft. Bowel sounds present. No distension or hernia. No tenderness. Musculoskeletal: No cyanosis. No clubbing. No obvious joint deformity.    Lymphadenopathy: No cervical or supraclavicular adenopathy. Skin: Skin is warm and dry. No rash or nodules on the exposed extremities. Psychiatric: Normal mood and affect. Behavior is normal.  No anxiety. Neurologic: Alert, awake and oriented. PERRL. Speech fluent        Results:  CBC:   Recent Labs     09/18/20  0506 09/19/20  1023   WBC 10.5 12.0*   HGB 13.0 12.9   HCT 39.1 40.1   MCV 97.5 98.4    174     BMP:   Recent Labs     09/18/20  0506 09/19/20  1023    137   K 4.1 3.5    99   CO2 24 27   PHOS  --  3.0   BUN 13 13   CREATININE 0.8 0.8     LIVER PROFILE:   Recent Labs     09/18/20  0506   AST 23   ALT 21   LIPASE 23.0   BILIDIR <0.2   BILITOT 0.5   ALKPHOS 76       Imaging:  I have reviewed radiology images personally. NM Cardiac Stress Test Nuclear Imaging   Final Result      CT CHEST PULMONARY EMBOLISM W CONTRAST   Final Result   No evidence of pulmonary embolism or acute pulmonary abnormality. Mild   chronic bibasilar atelectasis. Very small chronic left pleural effusion. Trace right pleural effusion. Several small or very small stable pulmonary   nodules. Small nodule in the remaining right lobe of the thyroid. RECOMMENDATIONS:   Follow-up CT chest in 1 year for further evaluation of the stability of the   pulmonary nodules. XR CHEST (2 VW)   Final Result   Low lung volumes. No gross evidence of acute process. Xr Chest (2 Vw)    Result Date: 9/18/2020  EXAMINATION: TWO XRAY VIEWS OF THE CHEST 9/18/2020 5:12 am COMPARISON: 06/26/2019 HISTORY: ORDERING SYSTEM PROVIDED HISTORY: chest pain TECHNOLOGIST PROVIDED HISTORY: Reason for exam:->chest pain FINDINGS: Poor inspiration. Mild cardiomegaly. Normal pulmonary vasculature. No focal consolidation, pleural effusion, or pneumothorax. Low lung volumes. No gross evidence of acute process.      Ct Chest Pulmonary Embolism W Contrast    Result Date: 9/18/2020  EXAMINATION: CTA OF THE CHEST 9/18/2020 8:51 am TECHNIQUE: CTA of the chest was performed after the administration of intravenous contrast.  Multiplanar reformatted images are provided for review. MIP images are provided for review. Dose modulation, iterative reconstruction, and/or weight based adjustment of the mA/kV was utilized to reduce the radiation dose to as low as reasonably achievable. COMPARISON: PA and lateral chest 09/18/2020. Prior CTA chest 06/18/2019. HISTORY: ORDERING SYSTEM PROVIDED HISTORY: pleuritic chest pain, +ddimer TECHNOLOGIST PROVIDED HISTORY: Reason for exam:->pleuritic chest pain, +ddimer Reason for Exam: pleuritic chest pain, +ddimer Acuity: Acute Type of Exam: Initial Former smoker with 10 pack-year history. FINDINGS: Pulmonary Arteries: Pulmonary arteries are adequately opacified for evaluation. No evidence of intraluminal filling defect to suggest pulmonary embolism. Main pulmonary artery is normal in caliber. Mediastinum: No evidence of mediastinal lymphadenopathy. The heart and pericardium demonstrate no acute abnormality. There is no acute abnormality of the thoracic aorta. Prior left lobectomy of the thyroid. Mild enlargement of the remaining right lobe with small 5 mm nodule superiorly and posteriorly. Trace pericardial effusion. Lungs/pleura: Very small chronic left pleural effusion with mild atelectasis. Trace amount of right pleural fluid with slight right basilar atelectasis. Small stable subpleural nodule posteriorly in the lower right lower lobe on axial image 67. Nodule is measured at about 7.4 mm AP x 6.8 mm across. Stable 4 mm nodule anteriorly and laterally in the upper right lower lobe on axial image 60. Interval development of 9 mm partially calcified left apical nodule. Additional stable 5 mm subpleural right lower lobe nodule in the posterior costophrenic angle on axial image 77. 3.5 mm and 4.0 mm stable right middle lobe pulmonary nodules on axial image 63 and 64. No suspicious pulmonary nodule identified.   No nodule appreciated in the left lower lobe. Upper Abdomen: Limited images of the upper abdomen are unremarkable. Soft Tissues/Bones: No acute bone or soft tissue abnormality. No evidence of pulmonary embolism or acute pulmonary abnormality. Mild chronic bibasilar atelectasis. Very small chronic left pleural effusion. Trace right pleural effusion. Several small or very small stable pulmonary nodules. Small nodule in the remaining right lobe of the thyroid. RECOMMENDATIONS: Follow-up CT chest in 1 year for further evaluation of the stability of the pulmonary nodules. Nm Cardiac Stress Test Nuclear Imaging    Result Date: 9/19/2020  Cardiac Perfusion Imaging  Demographics   Patient Name      Mango Kenna   Date of Study     09/19/2020         Gender             Female   Patient Number    [id number]         Date of Birth      1940   Visit Number      [id number]          Age                68 year(s)   Accession Number  [id number]         Room Number        9828   Corporate ID      [id number]           NM Technician      [place] [facility]   Nurse             Kasie Aguilera, [address] [place] [address].                    RN                 Physician          Esperanza Lora MD   The procedure was explained in detail to the patient. Risks,  complications and alternative treatments were reviewed. Written consent  was obtained. Procedure Procedure Type:   Nuclear Stress Test:Pharmacological, NM MYOCARDIAL SPECT REST EXERCISE OR  RX   Study location: [facility] - Nuclear Medicine   Indications: Chest pain. Hospital Status: Inpatient.   Height: 66 inches Weight: 178 pounds  Risk Factors   The patient risk factors include:former tobacco use, treated  hypercholesterolemia, treated hypertension, family history of premature CAD,  chronic lung disease and (pack years: 10 years not smokin). Conclusions   Summary  There is uniform isotope uptake at stress and rest. There is no evidence of  myocardial ischemia or scar. Normal LVEF of >75%. EKG changes are of  uncertain significance in setting of normal perfusion. Overall findings  represent a low risk scan. Stress Protocols   Resting ECG  Sinus tachycardia, normal axis. Non-specific ST/T wave abnormality high  lateral leads. Isolate PVC. Resting HR:114 bpm  Resting BP:146/83 mmHg  Stress Protocol:Pharmacologic - Lexiscan's  Peak HR:136 bpm                  HR/BP product:32867  Peak BP:160/75 mmHg  Predicted HR: 142 bpm  % of predicted HR: 96  Test duration: 4 min  Reason for termination:Completed   ECG Findings  Sinus tachycardia with no ischemic changes. Arrhythmias  Frequent PVC's and bigeminy with infusion. Symptoms  Patient developed shortness of breath and  diaphoresis, likely related to lexiscan. Symptoms resolved with rest.   Complications  Procedure complication was none. Stress Interpretation  Uniform isotope uptake with no inducible ischemia  or scar. Normal myocardial thickening and wall  motion. Normal post-stress LVEF of >75%.   Procedure Medications   - Lexiscan 0.4 mg.  Imaging Protocols   - One Day   Rest                          Stress   Isotope:Myoview/Tetrofosmin   Isotope: Myoview/Tetrofosmin  Isotope dose:10.7 mCi         Isotope dose:32 mCi  Date:2020 06:55         Date:2020 08:00                                 Technique:      Gated  Imaging Results    Stress ejection    Ejection fraction:79 %    EDV :34 ml    ESV :7 ml    Stroke volume :27 ml    LV mass :73 gr  Medical History  Signatures   ------------------------------------------------------------------  Electronically signed by Keshia Almanza (Interpreting physician)  on 2020 at 10:55  ------------------------------------------------------------------        Results for Yvrose Hawley (MRN [id number]) as of 9/20/2020 11:11   Ref. Range 6/18/2019 05:23   Color, UA Latest Ref Range: Straw/Yellow  Yellow   Clarity, UA Latest Ref Range: Clear  Clear   Glucose, UA Latest Ref Range: Negative mg/dL Negative   Bilirubin, Urine Latest Ref Range: Negative  Negative   Ketones, Urine Latest Ref Range: Negative mg/dL Negative   Specific Gravity, UA Latest Ref Range: 1.005 - 1.030  >=1.030   Blood, Urine Latest Ref Range: Negative  Negative   pH, UA Latest Ref Range: 5.0 - 8.0  5.5   Protein, UA Latest Ref Range: Negative mg/dL TRACE (A)   Urobilinogen, Urine Latest Ref Range: <2.0 E.U./dL 0.2   Nitrite, Urine Latest Ref Range: Negative  Negative   Leukocyte Esterase, Urine Latest Ref Range: Negative  Negative   Urine Type Unknown Not Specified   Urine Reflex to Culture Unknown Yes   Casts Latest Units: /LPF 5-10 Hyaline (A)   Mucus, UA Latest Units: /LPF 2+ (A)   WBC, UA Latest Ref Range: 0 - 5 /HPF 6-10 (A)   RBC, UA Latest Ref Range: 0 - 2 /HPF 0-2   Epithelial Cells, UA Latest Units: /HPF 5-10   Bacteria, UA Latest Units: /HPF 1+ (A)   Microscopic Examination Unknown YES   Urine Culture, Routine Unknown No growth at 18-36 hours   URINE RT REFLEX TO CULTURE Unknown Rpt (A)         Assessment:  Principal Problem:    Chest pain on breathing  Active Problems:    Hypertension    Hyperlipidemia    Pulmonary nodules    Chronic rhinitis    COPD (chronic obstructive pulmonary disease) (HCC)    Hypothyroidism    COPD exacerbation (HCC)    Atelectasis    Chest congestion    Mucus plugging of bronchi    History of rheumatoid arthritis    Former smoker    Pyuria  Resolved Problems:    * No resolved hospital problems.  *          Plan:   · Oxygen supplementation to keep saturation being 90 to 94% only-please titrate down oxygen as per these parameters   · Pulmonary toilet  · Patient appears to have COPD exacerbation along with that patient appears to have some pleuritic chest pain patient also has some atelectasis/mucous plugging and chest

## 2020-09-20 NOTE — PROGRESS NOTES
Hospitalist Progress Note      PCP: Higinio Vernon MD    Date of Admission: 9/18/2020    Chief Complaint: chest pain    Hospital Course:     68 y. o. female w/ hx of CAD, COPD/follow-up with pulmonary/Dr. Gaytan who presented to [facility] with acute onset moderately severe non-exertional substernal/L sided Chest Pain w/ associated typical cardiac features including SOB but no N/V/Diaphoresis. She was admitted for further evaluation, she did undergo a nuclear stress test on 9/19/2020 which was low risk for any ischemia. Troponins have  been negative.   She did have a CT scan chest done this admission which was negative for pulmonary embolism. She was still complaining of chest discomfort however she was started on Decadron (she endorses allergy to prednisone) and this seems to be helping her chest discomfort. She has been seen by pulmonary in consultation    Subjective: She is sitting up in bed, eating and drinking better today. She informs me that she is feeling better today, has less chest discomfort when taking deep breaths.         Medications:  Reviewed    Infusion Medications   Scheduled Medications    dexamethasone  4 mg Intravenous Q8H    aspirin  325 mg Oral Once    atenolol  25 mg Oral Daily    atorvastatin  80 mg Oral Nightly    leflunomide  20 mg Oral Daily    levothyroxine  25 mcg Oral Daily    sulfaSALAzine  500 mg Oral 4x Daily    triamterene-hydroCHLOROthiazide  1 tablet Oral Daily    budesonide-formoterol  2 puff Inhalation BID    sodium chloride flush  10 mL Intravenous 2 times per day    famotidine  20 mg Oral BID    aspirin  81 mg Oral Daily    enoxaparin  40 mg Subcutaneous Daily    DULoxetine  60 mg Oral Nightly     PRN Meds: ipratropium-albuterol, benzonatate, polyvinyl alcohol, traMADol, sodium chloride flush, acetaminophen **OR** acetaminophen, polyethylene glycol, promethazine **OR** ondansetron, nitroGLYCERIN, oxyCODONE-acetaminophen      Intake/Output Summary (Last 24 hours) at 9/20/2020 1500  Last data filed at 9/19/2020 2019  Gross per 24 hour   Intake 480 ml   Output --   Net 480 ml       Physical Exam Performed:    BP (!) 145/96   Pulse 88   Temp 97.6 °F (36.4 °C) (Oral)   Resp 16   Ht 5' 6\" (1.676 m)   Wt 176 lb 9.6 oz (80.1 kg)   SpO2 91%   BMI 28.50 kg/m²       General appearance: She is sitting up in bed, she does have some discomfort  when she takes deep breaths due to pain, however this is significantly improved from yesterday. she is alert and cooperative. HEENT: Pupils equal, round, and reactive to light. Conjunctivae/corneas clear. Neck: Supple, with full range of motion. No jugular venous distention. Trachea midline. Respiratory:  Normal respiratory effort. Bilateral breath sounds, decreased both bases, occasional rhonchi are noted in both lung fields, no rales noted. There is no use of accessory muscles at this time. Cardiovascular: Regular rate and rhythm with normal S1/S2  Abdomen: Soft, non-tender, non-distended with normal bowel sounds. Musculoskeletal: No clubbing, cyanosis or edema bilaterally. Full range of motion without deformity. .  Neurologic:  Neurovascularly intact without any focal sensory/motor deficits. Cranial nerves: II-XII intact, grossly non-focal.  Psychiatric: Alert and oriented, thought content appropriate, normal insight  Capillary Refill: Brisk,< 3 seconds   Peripheral Pulses: +2 palpable, equal bilaterally     Labs:   Recent Labs     09/18/20  0506 09/19/20  1023   WBC 10.5 12.0*   HGB 13.0 12.9   HCT 39.1 40.1    174     Recent Labs     09/18/20  0506 09/19/20  1023    137   K 4.1 3.5    99   CO2 24 27   BUN 13 13   CREATININE 0.8 0.8   CALCIUM 9.3 9.3   PHOS  --  3.0     Recent Labs     09/18/20  0506   AST 23   ALT 21   BILIDIR <0.2   BILITOT 0.5   ALKPHOS 76     No results for input(s): INR in the last 72 hours.   Recent Labs     09/18/20  0506 09/18/20  1225   TROPONINI <0.01 <0.01 Urinalysis:      Lab Results   Component Value Date    NITRU Negative 06/18/2019    WBCUA 6-10 06/18/2019    BACTERIA 1+ 06/18/2019    RBCUA 0-2 06/18/2019    BLOODU Negative 06/18/2019    SPECGRAV >=1.030 06/18/2019    GLUCOSEU Negative 06/18/2019       Radiology:  NM Cardiac Stress Test Nuclear Imaging   Final Result      CT CHEST PULMONARY EMBOLISM W CONTRAST   Final Result   No evidence of pulmonary embolism or acute pulmonary abnormality. Mild   chronic bibasilar atelectasis. Very small chronic left pleural effusion. Trace right pleural effusion. Several small or very small stable pulmonary   nodules. Small nodule in the remaining right lobe of the thyroid. RECOMMENDATIONS:   Follow-up CT chest in 1 year for further evaluation of the stability of the   pulmonary nodules. XR CHEST (2 VW)   Final Result   Low lung volumes. No gross evidence of acute process. Assessment/Plan:    Active Hospital Problems    Diagnosis    Pyuria [R82.81]    Atelectasis [J98.11]    Chest congestion [R09.89]    Mucus plugging of bronchi [J98.09]    History of rheumatoid arthritis [Z87.39]    Former smoker [Z87.891]    COPD exacerbation ([address] [J44.1]    Hypothyroidism [E03.9]    COPD (chronic obstructive pulmonary disease) ([address] [J44.9]    Chronic rhinitis [J31.0]    Pulmonary nodules [R91.8]    Chest pain on breathing [R07.1]    Hypertension [I10]    Hyperlipidemia [E78.5]     Chest pain -there was concern for ACS. Cardiac enzymes/EKG negative for ischemia/arrythmia.  Stress test ordered and results as noted with low risk for ischemia. Still was still having chest pain with deep breaths. Suspect possible component of pleurisy. Continue analgesics, supportive care did add steroids/Decadron on 9/19/2020  She ported intolerance to prednisone therefore we used Decadron. Her chest pain has significantly improved although still present.   If this continues improved possibly change to oral Decadron tomorrow and possible discharge home     HTN - w/out known CAD and no evidence of active signs/sxs of ischemia/failure. Currently controlled on home meds w/ vitals reviewed and documented as above.     HyperLipidemia - controlled on home Statin. Continue, w/ f/u and med adjustment w/ PCP     HypoThyroid - clinically euthyroid on oral replacement therapy. Continue, w/ outpt monitoring as previously arranged.      COPD - w/out chronic respiratory failure on no baseline home O2. Patient and her son did want pulmonary consulted during this admission and they have been consulted and their input is noted and appreciated. She was offered bronchoscopy by Dr. Mary Prescott but she declined this procedure.   She will follow-up outpatient with her pulmonologist/Dr. Itzel Jeffries       DVT Prophylaxis: Lovenox  Diet: DIET GENERAL; No Caffeine  Code Status: Full Code    PT/OT Eval Status: Not ordered    Dispo -1 to 2 days    Gillie Olszewski, MD

## 2020-09-20 NOTE — PROGRESS NOTES
Mehnaz Cardozo MD: Ash Thapa daughter of the patient is wanting to talk with you about plan of care when you have a minute please. She asked about potentially taking her home if she cannot tolerate therapy. Thanks.  \"

## 2020-09-20 NOTE — PROGRESS NOTES
RESPIRATORY THERAPY ASSESSMENT    Name:  [facility] Record Number:  [id number]  Age: 68 y.o. Gender: female  : 1940  Today's Date:  2020  Room:  0213/0213-02    Assessment     Is the patient being admitted for a COPD or Asthma exacerbation? No   (If yes the patient will be seen every 4 hours for the first 24 hours and then reassessed)    Patient Admission Diagnosis      Allergies  Allergies   Allergen Reactions    Effexor [Venlafaxine Hcl] Itching and Nausea Only    Fenofibrate Diarrhea    Prednisone Other (See Comments)     Causes body to feel on fire    Codeine Nausea And Vomiting    Docosahexaenoic Acid-Epa Nausea And Vomiting     Cannot take Vascepa       Minimum Predicted Vital Capacity:     890        Actual Vital Capacity:      250, pt unable d/t pain              Pulmonary History:COPD  Home Oxygen Therapy:  room air  Home Respiratory Therapy: Advair   Current Respiratory Therapy:  Symbicort, Duoneb Q4hwa  Treatment Type: MDI  Medications: Budesonide/Formoterol    Respiratory Severity Index(RSI)   Patients with orders for inhalation medications, oxygen, or any therapeutic treatment modality will be placed on Respiratory Protocol. They will be assessed with the first treatment and at least every 72 hours thereafter. The following severity scale will be used to determine frequency of treatment intervention.     Smoking History: Pulmonary Disease or Smoking History, Greater than 15 pack year = 2    Social History  Social History     Tobacco Use    Smoking status: Former Smoker     Packs/day: 0.50     Years: 20.00     Pack years: 10.00     Types: Cigarettes     Start date: 1959     Last attempt to quit: 1987     Years since quittin.7    Smokeless tobacco: Never Used   Substance Use Topics    Alcohol use: No     Alcohol/week: 0.0 standard drinks    Drug use: No       Recent Surgical History: None = 0  Past Surgical History  Past Surgical History:   Procedure Laterality Date    ABDOMINAL EXPLORATION SURGERY      APPENDECTOMY      CHOLECYSTECTOMY      COLONOSCOPY  4/18/2013    diverticulosis, polyps    COLONOSCOPY  11/10/2016    normal colon    EYE SURGERY Right 12/11/15    cataract removal    EYE SURGERY Left 1/8/16    cataract removed    HYSTERECTOMY      THYROIDECTOMY, PARTIAL Left     TUMOR REMOVAL      off of thyroid, was benign       Level of Consciousness: Alert, Oriented, and Cooperative = 0    Level of Activity: Walking unassisted = 0    Respiratory Pattern: Dyspnea with exertion;Irregular pattern;or RR less than 6 = 2    Breath Sounds: diminished = 2    Sputum   ,  , Sputum How Obtained: Cough on request  Cough: Strong, spontaneous, non-productive = 0    Vital Signs   BP (!) 142/84   Pulse 94   Temp 98.1 °F (36.7 °C) (Oral)   Resp 15   Ht 5' 6\" (1.676 m)   Wt 177 lb 12.8 oz (80.6 kg)   SpO2 93%   BMI 28.70 kg/m²   SPO2 (COPD values may differ): 90-91% on room air or greater than 92% on FiO2 24- 28% = 1    Peak Flow (asthma only): not applicable = 0    RSI: 7-8 = BID and Q4HPRN (every four hours as needed) for dyspnea        Plan       Goals: medication delivery, mobilize retained secretions, volume expansion and improve oxygenation    Patient/caregiver was educated on the proper method of use for Respiratory Care Devices:  Yes      Level of patient/caregiver understanding able to:   ? Verbalize understanding   ? Demonstrate understanding       ? Teach back        ? Needs reinforcement       ? No available caregiver               ? Other:     Response to education:  Mariocecile Cantu     Is patient being placed on Home Treatment Regimen? No     Does the patient have everything they need prior to discharge? NA     Comments: Pt assessed and meds reviewed. Plan of Care: Continue as ordered. Electronically signed by Thiago Milton RCP on 9/19/2020 at 9:40 PM    Respiratory Protocol Guidelines     1.  Assessment and treatment by Respiratory Therapy will

## 2020-09-21 VITALS
WEIGHT: 179.1 LBS | HEIGHT: 66 IN | RESPIRATION RATE: 18 BRPM | DIASTOLIC BLOOD PRESSURE: 55 MMHG | TEMPERATURE: 97.7 F | SYSTOLIC BLOOD PRESSURE: 112 MMHG | HEART RATE: 68 BPM | OXYGEN SATURATION: 92 % | BODY MASS INDEX: 28.78 KG/M2

## 2020-09-21 LAB — URINE CULTURE, ROUTINE: NORMAL

## 2020-09-21 PROCEDURE — 94640 AIRWAY INHALATION TREATMENT: CPT

## 2020-09-21 PROCEDURE — 2580000003 HC RX 258: Performed by: INTERNAL MEDICINE

## 2020-09-21 PROCEDURE — 6360000002 HC RX W HCPCS: Performed by: INTERNAL MEDICINE

## 2020-09-21 PROCEDURE — 99232 SBSQ HOSP IP/OBS MODERATE 35: CPT | Performed by: INTERNAL MEDICINE

## 2020-09-21 PROCEDURE — 6370000000 HC RX 637 (ALT 250 FOR IP): Performed by: INTERNAL MEDICINE

## 2020-09-21 RX ORDER — PREDNISONE 20 MG/1
40 TABLET ORAL DAILY
Qty: 10 TABLET | Refills: 0 | Status: SHIPPED | OUTPATIENT
Start: 2020-09-21 | End: 2020-09-21 | Stop reason: HOSPADM

## 2020-09-21 RX ORDER — PREDNISONE 20 MG/1
40 TABLET ORAL DAILY
Status: DISCONTINUED | OUTPATIENT
Start: 2020-09-21 | End: 2020-09-21 | Stop reason: HOSPADM

## 2020-09-21 RX ORDER — METHYLPREDNISOLONE 4 MG/1
TABLET ORAL
Qty: 1 KIT | Refills: 0 | Status: SHIPPED | OUTPATIENT
Start: 2020-09-21 | End: 2020-09-27

## 2020-09-21 RX ORDER — BENZONATATE 100 MG/1
100 CAPSULE ORAL 3 TIMES DAILY PRN
Qty: 15 CAPSULE | Refills: 0 | Status: SHIPPED | OUTPATIENT
Start: 2020-09-21 | End: 2020-09-26

## 2020-09-21 RX ADMIN — ASPIRIN 81 MG: 81 TABLET, CHEWABLE ORAL at 09:21

## 2020-09-21 RX ADMIN — Medication 10 ML: at 09:21

## 2020-09-21 RX ADMIN — LEFLUNOMIDE 20 MG: 10 TABLET ORAL at 09:21

## 2020-09-21 RX ADMIN — SULFASALAZINE 500 MG: 500 TABLET ORAL at 13:42

## 2020-09-21 RX ADMIN — TRIAMTERENE AND HYDROCHLOROTHIAZIDE 1 TABLET: 37.5; 25 TABLET ORAL at 09:22

## 2020-09-21 RX ADMIN — Medication 2 PUFF: at 09:23

## 2020-09-21 RX ADMIN — DEXAMETHASONE SODIUM PHOSPHATE 4 MG: 4 INJECTION, SOLUTION INTRAMUSCULAR; INTRAVENOUS at 06:13

## 2020-09-21 RX ADMIN — LEVOTHYROXINE SODIUM 25 MCG: 25 TABLET ORAL at 06:14

## 2020-09-21 RX ADMIN — ATENOLOL 25 MG: 25 TABLET ORAL at 09:22

## 2020-09-21 RX ADMIN — DEXAMETHASONE SODIUM PHOSPHATE 4 MG: 4 INJECTION, SOLUTION INTRAMUSCULAR; INTRAVENOUS at 13:42

## 2020-09-21 RX ADMIN — ENOXAPARIN SODIUM 40 MG: 40 INJECTION SUBCUTANEOUS at 09:21

## 2020-09-21 RX ADMIN — Medication 10 ML: at 06:15

## 2020-09-21 RX ADMIN — FAMOTIDINE 20 MG: 20 TABLET, FILM COATED ORAL at 09:22

## 2020-09-21 ASSESSMENT — PAIN SCALES - GENERAL
PAINLEVEL_OUTOF10: 0

## 2020-09-21 NOTE — PROGRESS NOTES
INPATIENT PULMONARY CRITICAL CARE PROGRESS NOTE      Reason for visit    COPD exacerbation    SUBJECTIVE: Patient when seen this morning was feeling better as compared to yesterday, patient had less coughing and no significant chest congestion now, patient states that she does not have any chest pain now ;no increased chest soreness, patient was not having any fever or chills, patient was afebrile and hemodynamically maintained, patient when seen was on RA oxygen with saturation of 93%, patient urine output is not documented in the epic for review, patient's blood sugar was slightly on the higher side yesterday but was acceptable, patient was alert and oriented, no other pertinent review of system of concern        Physical Exam:  Blood pressure 112/76, pulse 55, temperature 97.8 °F (36.6 °C), temperature source Oral, resp. rate 16, height 5' 6\" (1.676 m), weight 179 lb 1.6 oz (81.2 kg), SpO2 93 %, not currently breastfeeding.'     Constitutional:  No acute distress. Mild proximal coughing with audible chest congestion when seen  HENT:  Oropharynx is clear and moist. No thyromegaly. Eyes:  Conjunctivae are normal. Pupils equal, round, and reactive to light. No scleral icterus. Neck: . No tracheal deviation present. No obvious thyroid mass. Cardiovascular: Normal rate, regular rhythm, normal heart sounds. No right ventricular heave. No lower extremity edema. Pulmonary/Chest: No wheezes. No rales. chest wall is not dull to percussion. No accessory muscle usage or stridor. decreased breath sound density  Abdominal: Soft. Bowel sounds present. No distension or hernia. No tenderness. Musculoskeletal: No cyanosis. No clubbing. No obvious joint deformity. Lymphadenopathy: No cervical or supraclavicular adenopathy. Skin: Skin is warm and dry. No rash or nodules on the exposed extremities. Psychiatric: Normal mood and affect. Behavior is normal.  No anxiety. Neurologic: Alert, awake and oriented. PERRL. Speech fluent        Results:  CBC:   Recent Labs     09/19/20  1023   WBC 12.0*   HGB 12.9   HCT 40.1   MCV 98.4        BMP:   Recent Labs     09/19/20  1023      K 3.5   CL 99   CO2 27   PHOS 3.0   BUN 13   CREATININE 0.8       Imaging:  I have reviewed radiology images personally. NM Cardiac Stress Test Nuclear Imaging   Final Result      CT CHEST PULMONARY EMBOLISM W CONTRAST   Final Result   No evidence of pulmonary embolism or acute pulmonary abnormality. Mild   chronic bibasilar atelectasis. Very small chronic left pleural effusion. Trace right pleural effusion. Several small or very small stable pulmonary   nodules. Small nodule in the remaining right lobe of the thyroid. RECOMMENDATIONS:   Follow-up CT chest in 1 year for further evaluation of the stability of the   pulmonary nodules. XR CHEST (2 VW)   Final Result   Low lung volumes. No gross evidence of acute process. Xr Chest (2 Vw)    Result Date: 9/18/2020  EXAMINATION: TWO XRAY VIEWS OF THE CHEST 9/18/2020 5:12 am COMPARISON: 06/26/2019 HISTORY: ORDERING SYSTEM PROVIDED HISTORY: chest pain TECHNOLOGIST PROVIDED HISTORY: Reason for exam:->chest pain FINDINGS: Poor inspiration. Mild cardiomegaly. Normal pulmonary vasculature. No focal consolidation, pleural effusion, or pneumothorax. Low lung volumes. No gross evidence of acute process. Ct Chest Pulmonary Embolism W Contrast    Result Date: 9/18/2020  EXAMINATION: CTA OF THE CHEST 9/18/2020 8:51 am TECHNIQUE: CTA of the chest was performed after the administration of intravenous contrast.  Multiplanar reformatted images are provided for review. MIP images are provided for review. Dose modulation, iterative reconstruction, and/or weight based adjustment of the mA/kV was utilized to reduce the radiation dose to as low as reasonably achievable. COMPARISON: PA and lateral chest 09/18/2020. Prior CTA chest 06/18/2019.  HISTORY: ORDERING SYSTEM PROVIDED HISTORY: pleuritic chest pain, +ddimer TECHNOLOGIST PROVIDED HISTORY: Reason for exam:->pleuritic chest pain, +ddimer Reason for Exam: pleuritic chest pain, +ddimer Acuity: Acute Type of Exam: Initial Former smoker with 10 pack-year history. FINDINGS: Pulmonary Arteries: Pulmonary arteries are adequately opacified for evaluation. No evidence of intraluminal filling defect to suggest pulmonary embolism. Main pulmonary artery is normal in caliber. Mediastinum: No evidence of mediastinal lymphadenopathy. The heart and pericardium demonstrate no acute abnormality. There is no acute abnormality of the thoracic aorta. Prior left lobectomy of the thyroid. Mild enlargement of the remaining right lobe with small 5 mm nodule superiorly and posteriorly. Trace pericardial effusion. Lungs/pleura: Very small chronic left pleural effusion with mild atelectasis. Trace amount of right pleural fluid with slight right basilar atelectasis. Small stable subpleural nodule posteriorly in the lower right lower lobe on axial image 67. Nodule is measured at about 7.4 mm AP x 6.8 mm across. Stable 4 mm nodule anteriorly and laterally in the upper right lower lobe on axial image 60. Interval development of 9 mm partially calcified left apical nodule. Additional stable 5 mm subpleural right lower lobe nodule in the posterior costophrenic angle on axial image 77. 3.5 mm and 4.0 mm stable right middle lobe pulmonary nodules on axial image 63 and 64. No suspicious pulmonary nodule identified. No nodule appreciated in the left lower lobe. Upper Abdomen: Limited images of the upper abdomen are unremarkable. Soft Tissues/Bones: No acute bone or soft tissue abnormality. No evidence of pulmonary embolism or acute pulmonary abnormality. Mild chronic bibasilar atelectasis. Very small chronic left pleural effusion. Trace right pleural effusion. Several small or very small stable pulmonary nodules.   Small nodule in the leads. Isolate PVC. Resting HR:114 bpm  Resting BP:146/83 mmHg  Stress Protocol:Pharmacologic - Lexiscan's  Peak HR:136 bpm                  HR/BP product:[id number]  Peak BP:160/75 mmHg  Predicted HR: 142 bpm  % of predicted HR: 96  Test duration: 4 min  Reason for termination:Completed   ECG Findings  Sinus tachycardia with no ischemic changes. Arrhythmias  Frequent PVC's and bigeminy with infusion. Symptoms  Patient developed shortness of breath and  diaphoresis, likely related to lexiscan. Symptoms resolved with rest.   Complications  Procedure complication was none. Stress Interpretation  Uniform isotope uptake with no inducible ischemia  or scar. Normal myocardial thickening and wall  motion. Normal post-stress LVEF of >75%. Procedure Medications   - Lexiscan 0.4 mg.  Imaging Protocols   - One Day   Rest                          Stress   Isotope:Myoview/Tetrofosmin   Isotope: Myoview/Tetrofosmin  Isotope dose:10.7 mCi         Isotope dose:32 mCi  Date:09/19/2020 06:55         Date:09/19/2020 08:00                                 Technique:      Gated  Imaging Results    Stress ejection    Ejection fraction:79 %    EDV :34 ml    ESV :7 ml    Stroke volume :27 ml    LV mass :73 gr  Medical History  Signatures   ------------------------------------------------------------------  Electronically signed by Ping Holt (Interpreting physician)  on 09/19/2020 at 10:55  ------------------------------------------------------------------                Assessment:  Principal Problem:    Chest pain on breathing  Active Problems:    Hypertension    Hyperlipidemia    Pulmonary nodules    COPD (chronic obstructive pulmonary disease) (HCC)    Hypothyroidism    Atelectasis    Chest congestion    Former smoker  Resolved Problems:    * No resolved hospital problems.  *          Plan:   · Oxygen supplementation to keep saturation being 90 to 94% only-patient is on RA when seen   · Pulmonary toilet  · Bronchodilators  · Patient was started on IV Decadron by internal medicine team which can be changed to PO   · Patient does have history of rheumatoid arthritis and has been on sulfasalazine and Arava along with that patient has been infiltrates which are more in favor of atelectasis  · Patient has pyuria and patient's urine culture needs to be followed and patient may need antibiotics-to be evaluated in managed by internal medicine team  · Avoid narcotics and sedatives  · Antihypertensives as per IM  · PUD and DVT prophylaxis as per IM     Case discussed with patient, nursing    Can be discharged from Pulm/CCM stand point -patient to follow up with Dr Lucas Encarnacion at [facility] in 2-3 weeks            Electronically signed by:  Shakir Francisco MD    9/21/2020    10:52 AM.

## 2020-09-21 NOTE — PROGRESS NOTES
INPATIENT PULMONARY CRITICAL CARE PROGRESS NOTE      Reason for visit    COPD exacerbation    SUBJECTIVE: Patient when seen this morning was feeling better as compared to yesterday, patient had less coughing and no significant chest congestion now, patient states that she does not have any chest pain now ;no increased chest soreness, patient was not having any fever or chills, patient was afebrile and hemodynamically maintained, patient when seen was on RA oxygen with saturation of 93%, patient urine output is not documented in the epic for review, patient's blood sugar was slightly on the higher side yesterday but was acceptable, patient was alert and oriented, no other pertinent review of system of concern        Physical Exam:  Blood pressure 112/76, pulse 55, temperature 97.8 °F (36.6 °C), temperature source Oral, resp. rate 16, height 5' 6\" (1.676 m), weight 179 lb 1.6 oz (81.2 kg), SpO2 93 %, not currently breastfeeding.'     Constitutional:  No acute distress. Mild proximal coughing with audible chest congestion when seen  HENT:  Oropharynx is clear and moist. No thyromegaly. Eyes:  Conjunctivae are normal. Pupils equal, round, and reactive to light. No scleral icterus. Neck: . No tracheal deviation present. No obvious thyroid mass. Cardiovascular: Normal rate, regular rhythm, normal heart sounds. No right ventricular heave. No lower extremity edema. Pulmonary/Chest: Nol wheezes. No rales. I decreased chest congestion chest wall is not dull to percussion. No accessory muscle usage or stridor. Prolonged expiration with decreased breath sound density  Abdominal: Soft. Bowel sounds present. No distension or hernia. No tenderness. Musculoskeletal: No cyanosis. No clubbing. No obvious joint deformity. Lymphadenopathy: No cervical or supraclavicular adenopathy. Skin: Skin is warm and dry. No rash or nodules on the exposed extremities. Psychiatric: Normal mood and affect.  Behavior is normal.  No anxiety. Neurologic: Alert, awake and oriented. PERRL. Speech fluent        Results:  CBC:   Recent Labs     09/19/20  1023   WBC 12.0*   HGB 12.9   HCT 40.1   MCV 98.4        BMP:   Recent Labs     09/19/20  1023      K 3.5   CL 99   CO2 27   PHOS 3.0   BUN 13   CREATININE 0.8       Imaging:  I have reviewed radiology images personally. NM Cardiac Stress Test Nuclear Imaging   Final Result      CT CHEST PULMONARY EMBOLISM W CONTRAST   Final Result   No evidence of pulmonary embolism or acute pulmonary abnormality. Mild   chronic bibasilar atelectasis. Very small chronic left pleural effusion. Trace right pleural effusion. Several small or very small stable pulmonary   nodules. Small nodule in the remaining right lobe of the thyroid. RECOMMENDATIONS:   Follow-up CT chest in 1 year for further evaluation of the stability of the   pulmonary nodules. XR CHEST (2 VW)   Final Result   Low lung volumes. No gross evidence of acute process. Xr Chest (2 Vw)    Result Date: 9/18/2020  EXAMINATION: TWO XRAY VIEWS OF THE CHEST 9/18/2020 5:12 am COMPARISON: 06/26/2019 HISTORY: ORDERING SYSTEM PROVIDED HISTORY: chest pain TECHNOLOGIST PROVIDED HISTORY: Reason for exam:->chest pain FINDINGS: Poor inspiration. Mild cardiomegaly. Normal pulmonary vasculature. No focal consolidation, pleural effusion, or pneumothorax. Low lung volumes. No gross evidence of acute process. Ct Chest Pulmonary Embolism W Contrast    Result Date: 9/18/2020  EXAMINATION: CTA OF THE CHEST 9/18/2020 8:51 am TECHNIQUE: CTA of the chest was performed after the administration of intravenous contrast.  Multiplanar reformatted images are provided for review. MIP images are provided for review. Dose modulation, iterative reconstruction, and/or weight based adjustment of the mA/kV was utilized to reduce the radiation dose to as low as reasonably achievable. COMPARISON: PA and lateral chest 09/18/2020. Prior CTA chest 06/18/2019. HISTORY: ORDERING SYSTEM PROVIDED HISTORY: pleuritic chest pain, +ddimer TECHNOLOGIST PROVIDED HISTORY: Reason for exam:->pleuritic chest pain, +ddimer Reason for Exam: pleuritic chest pain, +ddimer Acuity: Acute Type of Exam: Initial Former smoker with 10 pack-year history. FINDINGS: Pulmonary Arteries: Pulmonary arteries are adequately opacified for evaluation. No evidence of intraluminal filling defect to suggest pulmonary embolism. Main pulmonary artery is normal in caliber. Mediastinum: No evidence of mediastinal lymphadenopathy. The heart and pericardium demonstrate no acute abnormality. There is no acute abnormality of the thoracic aorta. Prior left lobectomy of the thyroid. Mild enlargement of the remaining right lobe with small 5 mm nodule superiorly and posteriorly. Trace pericardial effusion. Lungs/pleura: Very small chronic left pleural effusion with mild atelectasis. Trace amount of right pleural fluid with slight right basilar atelectasis. Small stable subpleural nodule posteriorly in the lower right lower lobe on axial image 67. Nodule is measured at about 7.4 mm AP x 6.8 mm across. Stable 4 mm nodule anteriorly and laterally in the upper right lower lobe on axial image 60. Interval development of 9 mm partially calcified left apical nodule. Additional stable 5 mm subpleural right lower lobe nodule in the posterior costophrenic angle on axial image 77. 3.5 mm and 4.0 mm stable right middle lobe pulmonary nodules on axial image 63 and 64. No suspicious pulmonary nodule identified. No nodule appreciated in the left lower lobe. Upper Abdomen: Limited images of the upper abdomen are unremarkable. Soft Tissues/Bones: No acute bone or soft tissue abnormality. No evidence of pulmonary embolism or acute pulmonary abnormality. Mild chronic bibasilar atelectasis. Very small chronic left pleural effusion. Trace right pleural effusion.   Several small or very small stable pulmonary nodules. Small nodule in the remaining right lobe of the thyroid. RECOMMENDATIONS: Follow-up CT chest in 1 year for further evaluation of the stability of the pulmonary nodules. Nm Cardiac Stress Test Nuclear Imaging    Result Date: 2020  Cardiac Perfusion Imaging  Demographics   Patient Name      Mango Keith   Date of Study     2020         Gender             Female   Patient Number    [id number]         Date of Birth      1940   Visit Number      [id number]          Age                68 year(s)   Accession Number  [id number]         Room Number        4183   Corporate ID      [id number]           NM Technician      Alen Ortega, [address] [facility]   Nurse             Bernice Pink, [address] [place] [address].                    RN                 Physician          Smitha Osorio   Ordering          Marija Villanueva MD   The procedure was explained in detail to the patient. Risks,  complications and alternative treatments were reviewed. Written consent  was obtained. Procedure Procedure Type:   Nuclear Stress Test:Pharmacological, NM MYOCARDIAL SPECT REST EXERCISE OR  RX   Study location: [facility] - Nuclear Medicine   Indications: Chest pain. Hospital Status: Inpatient. Height: 66 inches Weight: 178 pounds  Risk Factors   The patient risk factors include:former tobacco use, treated  hypercholesterolemia, treated hypertension, family history of premature CAD,  chronic lung disease and (pack years: 10 years not smokin). Conclusions   Summary  There is uniform isotope uptake at stress and rest. There is no evidence of  myocardial ischemia or scar. Normal LVEF of >75%. EKG changes are of  uncertain significance in setting of normal perfusion. Overall findings  represent a low risk scan. Stress Protocols   Resting ECG  Sinus tachycardia, normal axis. Non-specific ST/T wave abnormality high  lateral leads. Isolate PVC. Resting HR:114 bpm  Resting BP:146/83 mmHg  Stress Protocol:Pharmacologic - Lexiscan's  Peak HR:136 bpm                  HR/BP product:[id number]  Peak BP:160/75 mmHg  Predicted HR: 142 bpm  % of predicted HR: 96  Test duration: 4 min  Reason for termination:Completed   ECG Findings  Sinus tachycardia with no ischemic changes. Arrhythmias  Frequent PVC's and bigeminy with infusion. Symptoms  Patient developed shortness of breath and  diaphoresis, likely related to lexiscan. Symptoms resolved with rest.   Complications  Procedure complication was none. Stress Interpretation  Uniform isotope uptake with no inducible ischemia  or scar. Normal myocardial thickening and wall  motion. Normal post-stress LVEF of >75%. Procedure Medications   - Lexiscan 0.4 mg.  Imaging Protocols   - One Day   Rest                          Stress   Isotope:Myoview/Tetrofosmin   Isotope: Myoview/Tetrofosmin  Isotope dose:10.7 mCi         Isotope dose:32 mCi  Date:09/19/2020 06:55         Date:09/19/2020 08:00                                 Technique:      Gated  Imaging Results    Stress ejection    Ejection fraction:79 %    EDV :34 ml    ESV :7 ml    Stroke volume :27 ml    LV mass :73 gr  Medical History  Signatures   ------------------------------------------------------------------  Electronically signed by Jose FeldmanInterpreting physician)  on 09/19/2020 at 10:55  ------------------------------------------------------------------        Results for Tien Odom (MRN [id number]) as of 9/20/2020 11:11   Ref.  Range 6/18/2019 05:23   Color, UA Latest Ref Range: Straw/Yellow  Yellow   Clarity, UA Latest Ref Range: Clear  Clear   Glucose, UA Latest Ref Range: Negative mg/dL Negative   Bilirubin, Urine Latest Ref Range: Negative  Negative   Ketones, Urine Latest Ref Range: Negative mg/dL Negative   Specific Gravity, UA Latest Ref Range: 1.005 - 1.030  >=1.030 Pulmonology            Electronically signed by:  Allison Nichols MD    9/21/2020    10:46 AM.
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
## 2020-09-21 NOTE — PROGRESS NOTES
Hospitalist Progress Note      PCP: Lucina Sinclair MD    Date of Admission: 9/18/2020    Chief Complaint: Chest Pain    Subjective: no new c/o. Medications:  Reviewed    Infusion Medications   Scheduled Medications    dexamethasone  4 mg Intravenous Q8H    sulfaSALAzine  500 mg Oral 4x Daily    aspirin  325 mg Oral Once    atenolol  25 mg Oral Daily    atorvastatin  80 mg Oral Nightly    leflunomide  20 mg Oral Daily    levothyroxine  25 mcg Oral Daily    triamterene-hydroCHLOROthiazide  1 tablet Oral Daily    budesonide-formoterol  2 puff Inhalation BID    sodium chloride flush  10 mL Intravenous 2 times per day    famotidine  20 mg Oral BID    aspirin  81 mg Oral Daily    enoxaparin  40 mg Subcutaneous Daily    DULoxetine  60 mg Oral Nightly     PRN Meds: ipratropium-albuterol, benzonatate, polyvinyl alcohol, traMADol, sodium chloride flush, acetaminophen **OR** acetaminophen, polyethylene glycol, promethazine **OR** ondansetron, nitroGLYCERIN, oxyCODONE-acetaminophen      Intake/Output Summary (Last 24 hours) at 9/21/2020 1006  Last data filed at 9/21/2020 0859  Gross per 24 hour   Intake 120 ml   Output --   Net 120 ml       Physical Exam Performed:    /76   Pulse 55   Temp 97.8 °F (36.6 °C) (Oral)   Resp 16   Ht 5' 6\" (1.676 m)   Wt 179 lb 1.6 oz (81.2 kg)   SpO2 93%   BMI 28.91 kg/m²     General appearance: No apparent distress, appears stated age and cooperative. HEENT: Pupils equal, round, and reactive to light. Conjunctivae/corneas clear. Neck: Supple, with full range of motion. No jugular venous distention. Trachea midline. Respiratory:  Normal respiratory effort. Clear to auscultation, bilaterally without Rales/Wheezes/Rhonchi. Cardiovascular: Regular rate and rhythm with normal S1/S2 without murmurs, rubs or gallops. Abdomen: Soft, non-tender, non-distended with normal bowel sounds. Musculoskeletal: No clubbing, cyanosis or edema bilaterally.   Full range of motion without deformity. Skin: Skin color, texture, turgor normal.  No rashes or lesions. Neurologic:  Neurovascularly intact without any focal sensory/motor deficits. Cranial nerves: II-XII intact, grossly non-focal.  Psychiatric: Alert and oriented, thought content appropriate, normal insight  Capillary Refill: Brisk,< 3 seconds   Peripheral Pulses: +2 palpable, equal bilaterally       Labs:   Recent Labs     09/19/20  1023   WBC 12.0*   HGB 12.9   HCT 40.1        Recent Labs     09/19/20  1023      K 3.5   CL 99   CO2 27   BUN 13   CREATININE 0.8   CALCIUM 9.3   PHOS 3.0     No results for input(s): AST, ALT, BILIDIR, BILITOT, ALKPHOS in the last 72 hours. No results for input(s): INR in the last 72 hours. Recent Labs     09/18/20  1225   TROPONINI <0.01       Urinalysis:      Lab Results   Component Value Date    NITRU Negative 09/20/2020    WBCUA 0-2 09/20/2020    BACTERIA 3+ 09/20/2020    RBCUA 0-2 09/20/2020    BLOODU Negative 09/20/2020    SPECGRAV 1.020 09/20/2020    GLUCOSEU Negative 09/20/2020       Consults:    IP CONSULT TO HOSPITALIST  IP CONSULT TO PULMONOLOGY      Assessment/Plan:    Active Hospital Problems    Diagnosis    Atelectasis [J98.11]    Chest congestion [R09.89]    Former smoker [Z87.891]    Hypothyroidism [E03.9]    COPD (chronic obstructive pulmonary disease) ([facility] [address] [J44.9]    Pulmonary nodules [R91.8]    Chest pain on breathing [R07.1]    Hypertension [I10]    Hyperlipidemia [E78.5]       Chest pain - Concerning to ED for ACS, etiology clinically unable to determine but pleuritic in nature possibly 2nd to COPD. Initial enzymes/EKG negative. Follow serial enzymes, reviewed and documented as above and monitor on tele, w/out evidence of ischemia/arrythmia. Stress test negative for reversible ischemia.     HTN - w/out known CAD and no evidence of active signs/sxs of ischemia/failure.  Currently controlled on home meds w/ vitals reviewed and documented as above.     HyperLipidemia - controlled on home Statin. Continue, w/ f/u and med adjustment w/ PCP     HypoThyroid - clinically euthyroid on oral replacement therapy. Continue, w/ outpt monitoring as previously arranged.      COPD - w/out chronic respiratory failure on no baseline home O2. Controlled on home medication regimen - continued. In mild exacerbation. Pulmonology consulted and appreciated. Started on HHN/IV steroids.  Followed outpt by Dr. Dylan Johnson.          DVT Prophylaxis: LMWH  Diet: DIET GENERAL;  Code Status: Full Code      PT/OT Eval Status: Not yet ordered.      Dispo - Anticipate discharge Monday/Tues 21/22 Sept pending clinical course.    Giuliana Victoria MD

## 2020-09-21 NOTE — PLAN OF CARE
Problem: Pain:  Goal: Control of acute pain  Description: Control of acute pain  Outcome: Ongoing   Pt to maintain control of acute pain. Pain scale used for assessments.   Pt currently resting comfortably

## 2020-09-22 NOTE — DISCHARGE SUMMARY
Hospital Medicine Discharge Summary    Patient ID: Min Rader      Patient's PCP: Leroy Estrada MD    Admit Date: 9/18/2020     Discharge Date: 9/21/2020      Admitting Physician: Debi Mcnulty MD     Discharge Physician: Debi Mcunlty MD     Discharge Diagnoses: Active Hospital Problems    Diagnosis    Atelectasis [J98.11]    Chest congestion [R09.89]    Former smoker [H19.800]    Hypothyroidism [E03.9]    COPD (chronic obstructive pulmonary disease) ([address] [J44.9]    Pulmonary nodules [R91.8]    Chest pain on breathing [R07.1]    Hypertension [I10]    Hyperlipidemia [E78.5]       The patient was seen and examined on day of discharge and this discharge summary is in conjunction with any daily progress note from day of discharge. Hospital Course:       Chest pain - Concerning to ED for ACS, etiology clinically unable to determine but pleuritic in nature possibly 2nd to COPD.  Initial enzymes/EKG negative.  Followed serial enzymes and monitored on tele, w/out evidence of ischemia/arrythmia.  Stress test negative for reversible ischemia.     HTN - w/out known CAD and no evidence of active signs/sxs of ischemia/failure. Currently controlled on home meds      HyperLipidemia - controlled on home Statin. Continue, w/ f/u and med adjustment w/ PCP     HypoThyroid - clinically euthyroid on oral replacement therapy. Continue, w/ outpt monitoring as previously arranged.      COPD - w/out chronic respiratory failure on no baseline home O2.  Controlled on home medication regimen - continued. In mild exacerbation. Pulmonology consulted and appreciated. Started on HHN/IV steroids - transitioned to PO at discharge. Followed outpt by Dr. Escoto Adrian:  For convenience and continuity at follow-up the following most recent labs are provided:      CBC:    Lab Results   Component Value Date    WBC 12.0 09/19/2020    HGB 12.9 09/19/2020    HCT 40.1 09/19/2020     09/19/2020       Renal: Lab Results   Component Value Date     09/19/2020    K 3.5 09/19/2020    K 3.7 06/18/2019    CL 99 09/19/2020    CO2 27 09/19/2020    BUN 13 09/19/2020    CREATININE 0.8 09/19/2020    CALCIUM 9.3 09/19/2020    PHOS 3.0 09/19/2020         Significant Diagnostic Studies    Radiology:   NM Cardiac Stress Test Nuclear Imaging   Final Result      CT CHEST PULMONARY EMBOLISM W CONTRAST   Final Result   No evidence of pulmonary embolism or acute pulmonary abnormality. Mild   chronic bibasilar atelectasis. Very small chronic left pleural effusion. Trace right pleural effusion. Several small or very small stable pulmonary   nodules. Small nodule in the remaining right lobe of the thyroid. RECOMMENDATIONS:   Follow-up CT chest in 1 year for further evaluation of the stability of the   pulmonary nodules. XR CHEST (2 VW)   Final Result   Low lung volumes. No gross evidence of acute process. Consults:     IP CONSULT TO HOSPITALIST  IP CONSULT TO PULMONOLOGY    Disposition: home     Condition at Discharge: Stable    Discharge Instructions/Follow-up:  w/ PCP 1-2 weeks and subspecialists as arranged.      Code Status:  Full Code    Activity: activity as tolerated    Diet: regular diet      Discharge Medications:     Discharge Medication List as of 9/21/2020  3:57 PM           Details   benzonatate (TESSALON) 100 MG capsule Take 1 capsule by mouth 3 times daily as needed for Cough, Disp-15 capsule,R-0Print      methylPREDNISolone (MEDROL DOSEPACK) 4 MG tablet Take by mouth., Disp-1 kit,R-0Print              Details   DULoxetine (CYMBALTA) 60 MG extended release capsule Take 60 mg by mouth daily Before bedHistorical Med      WIXELA INHUB 250-50 MCG/DOSE AEPB USE 1 INHALATION BY MOUTH  EVERY 12 HOURS, Disp-180 each,R-1Normal      RESTASIS 0.05 % ophthalmic emulsion DAWHistorical Med      Coenzyme Q10 (CO Q 10 PO) Take by mouthHistorical Med      atorvastatin (LIPITOR) 80 MG tablet Take 80 mg by mouthHistorical Med      traMADol (ULTRAM) 50 MG tablet Take 50 mg by mouth every 6 hours as needed for Pain. Historical Med      albuterol sulfate  (90 Base) MCG/ACT inhaler Inhale 2 puffs into the lungs every 6 hours as needed for Wheezing or Shortness of Breath, Disp-3 Inhaler, R-3Normal      triamterene-hydrochlorothiazide (MAXZIDE-25) 37.5-25 MG per tablet Take 1 tablet by mouth dailyHistorical Med      Cholecalciferol (VITAMIN D) 2000 UNITS CAPS capsule Take by mouth dailyHistorical Med      Lactobacillus Rhamnosus, GG, (PROBIOTIC COLIC PO) Take by mouth dailyHistorical Med      leflunomide (ARAVA) 20 MG tablet Take 20 mg by mouth daily. sulfaSALAzine (AZULFIDINE) 500 MG tablet Take 500 mg by mouth 4 times daily. levothyroxine (SYNTHROID) 25 MCG tablet Take 25 mcg by mouth Daily. ATENOLOL PO Take 25 mg by mouth daily. Time Spent on discharge is more than 30 minutes in the examination, evaluation, counseling and review of medications and discharge plan. Signed:    Mercy Lang MD   9/22/2020      Thank you Mally Varghese MD for the opportunity to be involved in this patient's care. If you have any questions or concerns please feel free to contact me at [phone].

## 2020-10-06 ENCOUNTER — HOSPITAL ENCOUNTER (OUTPATIENT)
Dept: GENERAL RADIOLOGY | Age: 80
Discharge: HOME | End: 2020-10-06
Payer: MEDICARE

## 2020-10-06 ENCOUNTER — HOSPITAL ENCOUNTER (OUTPATIENT)
Age: 80
Discharge: HOME | End: 2020-10-06
Payer: MEDICARE

## 2020-10-06 PROCEDURE — 71046 X-RAY EXAM CHEST 2 VIEWS: CPT

## 2021-02-26 ENCOUNTER — APPOINTMENT (OUTPATIENT)
Dept: GENERAL RADIOLOGY | Age: 81
End: 2021-02-26
Payer: MEDICARE

## 2021-02-26 ENCOUNTER — APPOINTMENT (OUTPATIENT)
Dept: CT IMAGING | Age: 81
End: 2021-02-26
Payer: MEDICARE

## 2021-02-26 ENCOUNTER — HOSPITAL ENCOUNTER (EMERGENCY)
Age: 81
Discharge: HOME | End: 2021-02-26
Payer: MEDICARE

## 2021-02-26 VITALS
OXYGEN SATURATION: 95 % | BODY MASS INDEX: 29.73 KG/M2 | RESPIRATION RATE: 16 BRPM | HEIGHT: 66 IN | WEIGHT: 185 LBS | SYSTOLIC BLOOD PRESSURE: 107 MMHG | DIASTOLIC BLOOD PRESSURE: 53 MMHG | HEART RATE: 71 BPM | TEMPERATURE: 97 F

## 2021-02-26 DIAGNOSIS — S09.90XA CLOSED HEAD INJURY, INITIAL ENCOUNTER: ICD-10-CM
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
## 2021-02-26 DIAGNOSIS — S42.401A CLOSED FRACTURE OF DISTAL END OF RIGHT HUMERUS, UNSPECIFIED FRACTURE MORPHOLOGY, INITIAL ENCOUNTER: Primary | ICD-10-CM

## 2021-02-26 PROCEDURE — 73060 X-RAY EXAM OF HUMERUS: CPT

## 2021-02-26 PROCEDURE — 96375 TX/PRO/DX INJ NEW DRUG ADDON: CPT

## 2021-02-26 PROCEDURE — 99284 EMERGENCY DEPT VISIT MOD MDM: CPT

## 2021-02-26 PROCEDURE — 70450 CT HEAD/BRAIN W/O DYE: CPT

## 2021-02-26 PROCEDURE — 73090 X-RAY EXAM OF FOREARM: CPT

## 2021-02-26 PROCEDURE — 6370000000 HC RX 637 (ALT 250 FOR IP): Performed by: NURSE PRACTITIONER

## 2021-02-26 PROCEDURE — 96374 THER/PROPH/DIAG INJ IV PUSH: CPT

## 2021-02-26 PROCEDURE — 72125 CT NECK SPINE W/O DYE: CPT

## 2021-02-26 PROCEDURE — 6360000002 HC RX W HCPCS: Performed by: NURSE PRACTITIONER

## 2021-02-26 PROCEDURE — 73070 X-RAY EXAM OF ELBOW: CPT

## 2021-02-26 RX ORDER — MORPHINE SULFATE 2 MG/ML
2 INJECTION, SOLUTION INTRAMUSCULAR; INTRAVENOUS ONCE
Status: COMPLETED | OUTPATIENT
Start: 2021-02-26 | End: 2021-02-26

## 2021-02-26 RX ORDER — OXYCODONE HYDROCHLORIDE AND ACETAMINOPHEN 5; 325 MG/1; MG/1
1 TABLET ORAL ONCE
Status: COMPLETED | OUTPATIENT
Start: 2021-02-26 | End: 2021-02-26

## 2021-02-26 RX ORDER — ONDANSETRON 2 MG/ML
4 INJECTION INTRAMUSCULAR; INTRAVENOUS ONCE
Status: COMPLETED | OUTPATIENT
Start: 2021-02-26 | End: 2021-02-26

## 2021-02-26 RX ORDER — OXYCODONE HYDROCHLORIDE AND ACETAMINOPHEN 5; 325 MG/1; MG/1
1-2 TABLET ORAL EVERY 6 HOURS PRN
Qty: 10 TABLET | Refills: 0 | Status: ON HOLD | OUTPATIENT
Start: 2021-02-26 | End: 2021-03-01 | Stop reason: HOSPADM

## 2021-02-26 RX ORDER — MORPHINE SULFATE 4 MG/ML
4 INJECTION, SOLUTION INTRAMUSCULAR; INTRAVENOUS ONCE
Status: COMPLETED | OUTPATIENT
Start: 2021-02-26 | End: 2021-02-26

## 2021-02-26 RX ADMIN — ONDANSETRON HYDROCHLORIDE 4 MG: 2 INJECTION, SOLUTION INTRAMUSCULAR; INTRAVENOUS at 16:12

## 2021-02-26 RX ADMIN — MORPHINE SULFATE 2 MG: 2 INJECTION, SOLUTION INTRAMUSCULAR; INTRAVENOUS at 17:06

## 2021-02-26 RX ADMIN — OXYCODONE HYDROCHLORIDE AND ACETAMINOPHEN 1 TABLET: 5; 325 TABLET ORAL at 17:39

## 2021-02-26 RX ADMIN — MORPHINE SULFATE 4 MG: 4 INJECTION, SOLUTION INTRAMUSCULAR; INTRAVENOUS at 16:12

## 2021-02-26 ASSESSMENT — PAIN DESCRIPTION - PAIN TYPE: TYPE: ACUTE PAIN

## 2021-02-26 ASSESSMENT — ENCOUNTER SYMPTOMS
SHORTNESS OF BREATH: 0
SORE THROAT: 0
COLOR CHANGE: 0
RHINORRHEA: 0
ABDOMINAL PAIN: 0

## 2021-02-26 ASSESSMENT — PAIN SCALES - GENERAL: PAINLEVEL_OUTOF10: 6

## 2021-02-26 NOTE — ED NOTES
Attempted IV x 2, unsuccessful. Gavin Limon RN to attempt with US.       Kyle Feliz, ROZINA  02/26/21 8001

## 2021-02-26 NOTE — ED PROVIDER NOTES
Evaluated by Advanced Practice Provider          [facility]  [place]        Pt Name: Edgar Polo  MRN: [id number]  [place] 1940  Dateof evaluation: 2/26/2021  Provider: ANDREW Vaughan - CNP  PCP: Brynn Nur MD  ED Attending: No att. providers found    [address] [place] [address]       Chief Complaint   Patient presents with    Fall     Pt fell outside today, states she fell into fence. c/o right arm/ elbow pain and has knot on right forehead. Denies blood thinning medication. HISTORY OF PRESENTILLNESS   (Location/Symptom, Timing/Onset, Context/Setting, Quality, Duration, Modifying Factors, Severity)  Note limiting factors. Edgar Polo is a 68 y.o. female for right arm pain. Onset was today. Context includes patient states that she was feeling her bird feeder when she leaned over and lost her balance. Patient states that she fell into a fence hitting her head and right arm. Patient denies any loss of consciousness or use of blood thinners. She is only complaining of right elbow pain. Alleviating factors include nothing. Aggravating factors include nothing. Pain is 10/10. Nothing has been used for pain today. Nursing Notes were all reviewed and agreed with or any disagreements were addressed  in the HPI. REVIEW OF SYSTEMS    (2-9 systems for level 4, 10 or more for level 5)     Review of Systems   Constitutional: Negative for fever. Mechanical fall   HENT: Negative for congestion, rhinorrhea and sore throat. Respiratory: Negative for shortness of breath. Cardiovascular: Negative for chest pain. Gastrointestinal: Negative for abdominal pain. Genitourinary: Negative for decreased urine volume and difficulty urinating. Musculoskeletal: Negative for arthralgias and myalgias. Right elbow pain   Skin: Negative for color change and rash. Neurological: Negative for dizziness and light-headedness. Psychiatric/Behavioral: Negative for agitation. All other systems reviewed and are negative. Positives and Pertinent negatives as per HPI. Except as noted above in the ROS, all other systems were reviewed and negative. PAST MEDICAL HISTORY     Past Medical History:   Diagnosis Date    Asthma     Hyperlipidemia     Hypertension     RA ([address]heumatoid arthritis) [address] [address]     Thyroid disease     has half of her thyroid         SURGICAL HISTORY       Past Surgical History:   Procedure Laterality Date    ABDOMINAL EXPLORATION SURGERY      APPENDECTOMY      CHOLECYSTECTOMY      COLONOSCOPY  4/18/2013    diverticulosis, polyps    COLONOSCOPY  11/10/2016    normal colon    EYE SURGERY Right 12/11/15    cataract removal    EYE SURGERY Left 1/8/16    cataract removed    HYSTERECTOMY      THYROIDECTOMY, PARTIAL Left     TUMOR REMOVAL      off of thyroid, was benign         CURRENT MEDICATIONS       Discharge Medication List as of 2/26/2021  6:17 PM      CONTINUE these medications which have NOT CHANGED    Details   Estefani Jeans 250-50 MCG/DOSE AEPB USE 1 INHALATION BY MOUTH  EVERY 12 HOURS, Disp-180 each, R-0Requesting 1 year supply/patient needs to merline appt before yr supply can be filledNormal      DULoxetine (CYMBALTA) 60 MG extended release capsule Take 60 mg by mouth daily Before bedHistorical Med      RESTASIS 0.05 % ophthalmic emulsion DAWHistorical Med      Coenzyme Q10 (CO Q 10 PO) Take by mouthHistorical Med      atorvastatin (LIPITOR) 80 MG tablet Take 80 mg by mouthHistorical Med      traMADol (ULTRAM) 50 MG tablet Take 50 mg by mouth every 6 hours as needed for Pain. Historical Med      albuterol sulfate  (90 Base) MCG/ACT inhaler Inhale 2 puffs into the lungs every 6 hours as needed for Wheezing or Shortness of Breath, Disp-3 Inhaler, R-3Normal      triamterene-hydrochlorothiazide (MAXZIDE-25) 37.5-25 MG per tablet Take 1 tablet by mouth dailyHistorical Med      Cholecalciferol (VITAMIN D) 2000 UNITS CAPS capsule Take by mouth dailyHistorical Med      Lactobacillus Rhamnosus, GG, (PROBIOTIC COLIC PO) Take by mouth dailyHistorical Med      leflunomide (ARAVA) 20 MG tablet Take 20 mg by mouth daily. sulfaSALAzine (AZULFIDINE) 500 MG tablet Take 500 mg by mouth 4 times daily. levothyroxine (SYNTHROID) 25 MCG tablet Take 25 mcg by mouth Daily. ATENOLOL PO Take 25 mg by mouth daily. ALLERGIES     Effexor [venlafaxine hcl], Fenofibrate, Prednisone, Codeine, and Docosahexaenoic acid-epa    FAMILY HISTORY       Family History   Problem Relation Age of Onset    High Blood Pressure Mother           SOCIAL HISTORY       Social History     Socioeconomic History    Marital status:       Spouse name: None    Number of children: None    Years of education: None    Highest education level: None   Occupational History    None   Social Needs    Financial resource strain: None    Food insecurity     Worry: None     Inability: None    Transportation needs     Medical: None     Non-medical: None   Tobacco Use    Smoking status: Former Smoker     Packs/day: 0.50     Years: 20.00     Pack years: 10.00     Types: Cigarettes     Start date: 1959     Quit date: 1987     Years since quittin.1    Smokeless tobacco: Never Used   Substance and Sexual Activity    Alcohol use: No     Alcohol/week: 0.0 standard drinks    Drug use: No    Sexual activity: Not Currently     Partners: Male   Lifestyle    Physical activity     Days per week: None     Minutes per session: None    Stress: None   Relationships    Social connections     Talks on phone: None     Gets together: None     Attends Orthodox service: None     Active member of club or organization: None     Attends meetings of clubs or organizations: None     Relationship status: None    Intimate partner violence     Fear of current or ex partner: None     Emotionally abused: None     Physically abused: None     Forced sexual activity: None   Other Topics Concern    None   Social History Narrative    None       SCREENINGS             PHYSICAL EXAM  (up to 7 for level 4, 8 or more for level 5)     ED Triage Vitals [02/26/21 1433]   BP Temp Temp Source Pulse Resp SpO2 Height Weight   (!) 172/80 97 °F (36.1 °C) Temporal 52 16 95 % 5' 6\" (1.676 m) 185 lb (83.9 kg)       Physical Exam  Constitutional:       Appearance: She is well-developed. HENT:      Head: Normocephalic and atraumatic. Neck:      Musculoskeletal: Normal range of motion. Cardiovascular:      Rate and Rhythm: Normal rate. Pulmonary:      Effort: Pulmonary effort is normal. No respiratory distress. Abdominal:      General: There is no distension. Palpations: Abdomen is soft. Tenderness: There is no abdominal tenderness. Musculoskeletal:         General: Swelling, tenderness, deformity and signs of injury present. Comments: Decreased range of motion to right elbow due to pain elicited with movement. Sensation and pulse intact right hand. Edema tenderness noted to the right elbow region   Skin:     General: Skin is warm and dry. Neurological:      Mental Status: She is alert and oriented to person, place, and time. DIAGNOSTIC RESULTS   LABS:    Labs Reviewed - No data to display    All other labs werewithin normal range or not returned as of this dictation. EKG: All EKG's are interpreted by the Emergency Department Physician who either signs or Co-signs this chart in the absence of a cardiologist.  Please see their note for interpretation of EKG. RADIOLOGY:   Right radius ulna x-ray interpreted by radiologist for  Impression:    Osteopenia.  No acute abnormality in the right elbow or right forearm. Acute traumatic closed angulated and mildly displaced distal humeral fracture.          Right humerus x-ray interpreted by radiologist for  FINDINGS:   Acute comminuted posterolaterally angulated fracture of the distal 3rd   humeral diaphysis with up to 2.5 cm overlapping of fracture fragments. Suboptimal evaluation of the proximal humerus on these views.  No dislocation   appreciated given limitation.  Dedicated views of the right shoulder would   better evaluate. Right elbow x-ray interpreted by radiologist for  Impression:    Osteopenia.  No acute abnormality in the right elbow or right forearm. Acute traumatic closed angulated and mildly displaced distal humeral fracture. Cervical spine CT without contrast interpreted by radiologist for  FINDINGS:   BONES/ALIGNMENT: There is no acute fracture or traumatic malalignment. DEGENERATIVE CHANGES: Moderate multilevel degenerative disc disease   throughout the cervical spine with disc space narrowing and endplate   osteophyte formation. SOFT TISSUES: There is no prevertebral soft tissue swelling.  Carotid bulb   atherosclerotic calcifications. Head CT interpreted by radiologist for  FINDINGS:   BRAIN/VENTRICLES: Ventricles are midline in position.  No intracerebral   masses are identified.  No mass effect.  No midline shift.  No acute   intracranial hemorrhage is seen. Margaretann Anthony is intracranial atherosclerosis. There is prominence of the sulci, compatible with atrophy.  There is mild   periventricular hypodensity seen.  Scattered additional areas of hypodensity   are seen throughout the frontal and parietal white matter. ORBITS: The visualized portion of the orbits demonstrate no acute abnormality. SINUSES: Trace mucosal thickening seen in the maxillary sinuses.  No   air-fluid levels noted.  No significant mastoid opacification noted. SOFT TISSUES/SKULL:  Scalp soft tissue swelling is seen on the right           Interpretation per the Radiologist below, if available at the time of this note:    XR RADIUS ULNA RIGHT (2 VIEWS)   Final Result   Osteopenia. No acute abnormality in the right elbow or right forearm.       Acute traumatic closed angulated and mildly displaced distal humeral fracture. XR HUMERUS RIGHT (MIN 2 VIEWS)   Final Result   Acute humeral fracture as described. Dedicated views of the right shoulder   would better evaluate the proximal humerus. XR ELBOW RIGHT (2 VIEWS)   Final Result   Osteopenia. No acute abnormality in the right elbow or right forearm. Acute traumatic closed angulated and mildly displaced distal humeral fracture. CT CERVICAL SPINE WO CONTRAST   Final Result   No acute abnormality of the cervical spine. CT HEAD WO CONTRAST   Final Result   Scalp soft tissue swelling. No acute traumatic intracranial abnormality      Atrophy and small-vessel ischemic change           Ct Head Wo Contrast    Result Date: 2/26/2021  EXAMINATION: CT OF THE HEAD WITHOUT CONTRAST  2/26/2021 3:39 pm TECHNIQUE: CT of the head was performed without the administration of intravenous contrast. Dose modulation, iterative reconstruction, and/or weight based adjustment of the mA/kV was utilized to reduce the radiation dose to as low as reasonably achievable. COMPARISON: None. HISTORY: ORDERING SYSTEM PROVIDED HISTORY: fall hit head TECHNOLOGIST PROVIDED HISTORY: If patient is on cardiac monitor and/or pulse ox, they may be taken off cardiac monitor and pulse ox, left on O2 if currently on. All monitors reattached when patient returns to room. Has a \"code stroke\" or \"stroke alert\" been called? ->No Reason for exam:->fall hit head Decision Support Exception->Emergency Medical Condition (MA) Reason for Exam: Pt fell and hit her head Acuity: Acute Type of Exam: Initial FINDINGS: BRAIN/VENTRICLES: Ventricles are midline in position. No intracerebral masses are identified. No mass effect. No midline shift. No acute intracranial hemorrhage is seen. There is intracranial atherosclerosis. There is prominence of the sulci, compatible with atrophy. There is mild periventricular hypodensity seen. Scattered additional areas of hypodensity are seen throughout the frontal and parietal white matter. ORBITS: The visualized portion of the orbits demonstrate no acute abnormality. SINUSES: Trace mucosal thickening seen in the maxillary sinuses. No air-fluid levels noted. No significant mastoid opacification noted. SOFT TISSUES/SKULL:  Scalp soft tissue swelling is seen on the right     Scalp soft tissue swelling. No acute traumatic intracranial abnormality Atrophy and small-vessel ischemic change     Ct Cervical Spine Wo Contrast    Result Date: 2/26/2021  EXAMINATION: CT OF THE CERVICAL SPINE WITHOUT CONTRAST 2/26/2021 3:42 pm TECHNIQUE: CT of the cervical spine was performed without the administration of intravenous contrast. Multiplanar reformatted images are provided for review. Dose modulation, iterative reconstruction, and/or weight based adjustment of the mA/kV was utilized to reduce the radiation dose to as low as reasonably achievable. COMPARISON: None. HISTORY: ORDERING SYSTEM PROVIDED HISTORY: fall hit head TECHNOLOGIST PROVIDED HISTORY: Reason for exam:->fall hit head Decision Support Exception->Emergency Medical Condition (MA) Reason for Exam: Pt fell and hit head Acuity: Acute Type of Exam: Initial FINDINGS: BONES/ALIGNMENT: There is no acute fracture or traumatic malalignment. DEGENERATIVE CHANGES: Moderate multilevel degenerative disc disease throughout the cervical spine with disc space narrowing and endplate osteophyte formation. SOFT TISSUES: There is no prevertebral soft tissue swelling. Carotid bulb atherosclerotic calcifications. No acute abnormality of the cervical spine.          PROCEDURES   Unless otherwise noted below, none     Procedures     CRITICAL CARE TIME   N/A    CONSULTS:  IP CONSULT TO ORTHOPEDIC SURGERY      EMERGENCYDEPARTMENT COURSE and DIFFERENTIAL DIAGNOSIS/MDM:   Vitals:    Vitals:    02/26/21 1615 02/26/21 1707 02/26/21 1807 02/26/21 1856   BP: 134/89 (!) 152/68 132/71 (!) 107/53   Pulse: 62 68 76 71   Resp: 16 16 16 16   Temp:       TempSrc:       SpO2: 95% 96% 95% 95%   Weight:       Height:           Patient was given the following medications:  Medications   morphine sulfate (PF) injection 4 mg (4 mg Intravenous Given 2/26/21 1612)   ondansetron (ZOFRAN) injection 4 mg (4 mg Intravenous Given 2/26/21 1612)   oxyCODONE-acetaminophen (PERCOCET) 5-325 MG per tablet 1 tablet (1 tablet Oral Given 2/26/21 1739)   morphine (PF) injection 2 mg (2 mg Intravenous Given 2/26/21 1706)     Patient was seen and evaluated by myself. Patient here for concerns for mechanical fall that occurred today. Patient states that she was feeding her birds when she leaned over and lost her balance. She reports that she fell into a fence injuring her right elbow. On exam she is awake and alert hemodynamically stable nontoxic in appearance. Patient has swelling and tenderness to the right elbow. Patient is neurovascular intact her right hand. She is right-hand dominant. Patient states that she did hit her head but did not have any loss of consciousness or any use of blood thinners. Head CT and cervical spine CT were negative for any concerns for fractures. Right arm x-ray was concerning for a acute traumatic closed angulated mildly displaced distal humeral fracture. Patient was provided with multiple doses of pain medications. On reevaluation she states her pain is improved. Patient was provided with a sling and swath. Consult was placed to the orthopedic PA who recommended follow-up next week. Patient will be discharged home with pain occasions and instructions to follow-up with the orthopedist.  She was given orthopedic referral.  She was also encouraged to follow-up with her primary care doctor the next few days return to the ED for any worsening symptoms. Patient was ultimately discharged home with all questions answered. The patient tolerated their visit well.  I have evaluated this patient. My supervising physician was available for consultation. The patient and / or the family were informed of the results of any tests, a time was given to answer questions, a plan was proposed and they agreed with plan. FINAL IMPRESSION      1. Closed fracture of distal end of right humerus, unspecified fracture morphology, initial encounter    2. Fall, initial encounter    3. Closed head injury, initial encounter          DISPOSITION/PLAN   DISPOSITION Decision To Discharge 02/26/2021 06:57:24 PM      PATIENT REFERRED TO:  Andria Mason MD  [address]  [phone]    Schedule an appointment as soon as possible for a visit in 2 days  for re-evaluation    [facility] ED  [address] [facility]  [phone]    If symptoms worsen    [address]  [phone]  Schedule an appointment as soon as possible for a visit in 3 days  for re-evaluation      DISCHARGE MEDICATIONS:  Discharge Medication List as of 2/26/2021  6:17 PM      START taking these medications    Details   oxyCODONE-acetaminophen (PERCOCET) 5-325 MG per tablet Take 1-2 tablets by mouth every 6 hours as needed for Pain for up to 3 days. WARNING:  May cause drowsiness. May impair ability to operate vehicles or machinery.   Do not use in combination with alcohol., Disp-10 tablet, R-0Print             DISCONTINUED MEDICATIONS:  Discharge Medication List as of 2/26/2021  6:17 PM                 (Please note that portions of this note were completed with a voice recognition program.  Efforts were made to edit the dictations but occasionally words are mis-transcribed.)    ANDREW Fierro CNP (electronically signed)         ANDREW Fierro CNP  02/26/21 2004

## 2021-02-27 ENCOUNTER — ANESTHESIA (OUTPATIENT)
Dept: OPERATING ROOM | Age: 81
DRG: 494 | End: 2021-02-27
Payer: MEDICARE

## 2021-02-27 ENCOUNTER — ANESTHESIA EVENT (OUTPATIENT)
Dept: OPERATING ROOM | Age: 81
DRG: 494 | End: 2021-02-27
Payer: MEDICARE

## 2021-02-27 ENCOUNTER — HOSPITAL ENCOUNTER (INPATIENT)
Age: 81
LOS: 2 days | Discharge: HOME | DRG: 494 | End: 2021-03-01
Attending: ORTHOPAEDIC SURGERY | Admitting: ORTHOPAEDIC SURGERY
Payer: MEDICARE

## 2021-02-27 ENCOUNTER — APPOINTMENT (OUTPATIENT)
Dept: GENERAL RADIOLOGY | Age: 81
DRG: 494 | End: 2021-02-27
Attending: ORTHOPAEDIC SURGERY
Payer: MEDICARE

## 2021-02-27 DIAGNOSIS — Z87.81 HISTORY OF HUMERUS FRACTURE: Primary | ICD-10-CM

## 2021-02-27 PROBLEM — E66.9 OBESITY: Status: ACTIVE | Noted: 2021-02-27

## 2021-02-27 PROBLEM — S42.411A RIGHT SUPRACONDYLAR HUMERUS FRACTURE: Status: ACTIVE | Noted: 2021-02-27

## 2021-02-27 PROCEDURE — 71045 X-RAY EXAM CHEST 1 VIEW: CPT

## 2021-02-27 PROCEDURE — 6370000000 HC RX 637 (ALT 250 FOR IP): Performed by: PHYSICIAN ASSISTANT

## 2021-02-27 PROCEDURE — 1200000000 HC SEMI PRIVATE

## 2021-02-27 RX ORDER — ATORVASTATIN CALCIUM 80 MG/1
80 TABLET, FILM COATED ORAL NIGHTLY
Status: DISCONTINUED | OUTPATIENT
Start: 2021-02-27 | End: 2021-03-01 | Stop reason: HOSPADM

## 2021-02-27 RX ORDER — OXYCODONE HYDROCHLORIDE 5 MG/1
10 TABLET ORAL EVERY 4 HOURS PRN
Status: DISCONTINUED | OUTPATIENT
Start: 2021-02-27 | End: 2021-03-01 | Stop reason: HOSPADM

## 2021-02-27 RX ORDER — SODIUM CHLORIDE 0.9 % (FLUSH) 0.9 %
10 SYRINGE (ML) INJECTION EVERY 12 HOURS SCHEDULED
Status: DISCONTINUED | OUTPATIENT
Start: 2021-02-27 | End: 2021-03-01 | Stop reason: HOSPADM

## 2021-02-27 RX ORDER — ATENOLOL 25 MG/1
25 TABLET ORAL DAILY
Status: DISCONTINUED | OUTPATIENT
Start: 2021-02-28 | End: 2021-03-01 | Stop reason: HOSPADM

## 2021-02-27 RX ORDER — FAMOTIDINE 20 MG/1
20 TABLET, FILM COATED ORAL 2 TIMES DAILY
Status: DISCONTINUED | OUTPATIENT
Start: 2021-02-27 | End: 2021-03-01 | Stop reason: HOSPADM

## 2021-02-27 RX ORDER — ONDANSETRON 2 MG/ML
4 INJECTION INTRAMUSCULAR; INTRAVENOUS EVERY 6 HOURS PRN
Status: DISCONTINUED | OUTPATIENT
Start: 2021-02-27 | End: 2021-03-01 | Stop reason: HOSPADM

## 2021-02-27 RX ORDER — SODIUM CHLORIDE 0.9 % (FLUSH) 0.9 %
10 SYRINGE (ML) INJECTION PRN
Status: DISCONTINUED | OUTPATIENT
Start: 2021-02-27 | End: 2021-03-01 | Stop reason: HOSPADM

## 2021-02-27 RX ORDER — OXYCODONE HYDROCHLORIDE 5 MG/1
5 TABLET ORAL EVERY 4 HOURS PRN
Status: DISCONTINUED | OUTPATIENT
Start: 2021-02-27 | End: 2021-03-01 | Stop reason: HOSPADM

## 2021-02-27 RX ORDER — LEFLUNOMIDE 10 MG/1
20 TABLET ORAL DAILY
Status: DISCONTINUED | OUTPATIENT
Start: 2021-02-28 | End: 2021-03-01 | Stop reason: HOSPADM

## 2021-02-27 RX ORDER — SULFASALAZINE 500 MG/1
500 TABLET ORAL 4 TIMES DAILY
Status: DISCONTINUED | OUTPATIENT
Start: 2021-02-27 | End: 2021-03-01 | Stop reason: HOSPADM

## 2021-02-27 RX ORDER — LEVOTHYROXINE SODIUM 0.03 MG/1
25 TABLET ORAL DAILY
Status: DISCONTINUED | OUTPATIENT
Start: 2021-02-28 | End: 2021-03-01 | Stop reason: HOSPADM

## 2021-02-27 RX ORDER — PROMETHAZINE HYDROCHLORIDE 25 MG/1
12.5 TABLET ORAL EVERY 6 HOURS PRN
Status: DISCONTINUED | OUTPATIENT
Start: 2021-02-27 | End: 2021-03-01 | Stop reason: HOSPADM

## 2021-02-27 RX ORDER — ALBUTEROL SULFATE 2.5 MG/3ML
2.5 SOLUTION RESPIRATORY (INHALATION) EVERY 4 HOURS PRN
Status: DISCONTINUED | OUTPATIENT
Start: 2021-02-27 | End: 2021-02-28

## 2021-02-27 RX ORDER — POLYETHYLENE GLYCOL 3350 17 G/17G
17 POWDER, FOR SOLUTION ORAL DAILY PRN
Status: DISCONTINUED | OUTPATIENT
Start: 2021-02-27 | End: 2021-03-01 | Stop reason: HOSPADM

## 2021-02-27 RX ORDER — TRIAMTERENE AND HYDROCHLOROTHIAZIDE 37.5; 25 MG/1; MG/1
1 TABLET ORAL DAILY
Status: DISCONTINUED | OUTPATIENT
Start: 2021-02-28 | End: 2021-03-01 | Stop reason: HOSPADM

## 2021-02-27 RX ORDER — DULOXETIN HYDROCHLORIDE 60 MG/1
60 CAPSULE, DELAYED RELEASE ORAL DAILY
Status: DISCONTINUED | OUTPATIENT
Start: 2021-02-28 | End: 2021-03-01 | Stop reason: HOSPADM

## 2021-02-27 RX ORDER — BUDESONIDE AND FORMOTEROL FUMARATE DIHYDRATE 80; 4.5 UG/1; UG/1
2 AEROSOL RESPIRATORY (INHALATION) 2 TIMES DAILY
Refills: 0 | Status: DISCONTINUED | OUTPATIENT
Start: 2021-02-27 | End: 2021-02-28

## 2021-02-27 ASSESSMENT — PAIN DESCRIPTION - LOCATION
LOCATION: ARM
LOCATION: ARM

## 2021-02-27 ASSESSMENT — PAIN DESCRIPTION - ORIENTATION
ORIENTATION: RIGHT
ORIENTATION: RIGHT

## 2021-02-27 ASSESSMENT — PAIN SCALES - GENERAL: PAINLEVEL_OUTOF10: 0

## 2021-02-28 ENCOUNTER — APPOINTMENT (OUTPATIENT)
Dept: GENERAL RADIOLOGY | Age: 81
DRG: 494 | End: 2021-02-28
Attending: ORTHOPAEDIC SURGERY
Payer: MEDICARE

## 2021-02-28 VITALS
SYSTOLIC BLOOD PRESSURE: 122 MMHG | RESPIRATION RATE: 10 BRPM | DIASTOLIC BLOOD PRESSURE: 60 MMHG | OXYGEN SATURATION: 87 %

## 2021-02-28 LAB
ANION GAP SERPL CALCULATED.3IONS-SCNC: 9 MMOL/L (ref 3–16)
BASOPHILS ABSOLUTE: 0.1 K/UL (ref 0–0.2)
BASOPHILS RELATIVE PERCENT: 1.2 %
BUN BLDV-MCNC: 33 MG/DL (ref 7–20)
CALCIUM SERPL-MCNC: 9.5 MG/DL (ref 8.3–10.6)
CHLORIDE BLD-SCNC: 105 MMOL/L (ref 99–110)
CO2: 27 MMOL/L (ref 21–32)
CREAT SERPL-MCNC: 0.9 MG/DL (ref 0.6–1.2)
EKG ATRIAL RATE: 87 BPM
EKG DIAGNOSIS: NORMAL
EKG P AXIS: 58 DEGREES
EKG P-R INTERVAL: 148 MS
EKG Q-T INTERVAL: 358 MS
EKG QRS DURATION: 60 MS
EKG QTC CALCULATION (BAZETT): 430 MS
EKG R AXIS: 26 DEGREES
EKG T AXIS: 68 DEGREES
EKG VENTRICULAR RATE: 87 BPM
EOSINOPHILS ABSOLUTE: 0.1 K/UL (ref 0–0.6)
EOSINOPHILS RELATIVE PERCENT: 1.7 %
GFR AFRICAN AMERICAN: >60
GFR NON-AFRICAN AMERICAN: >60
GLUCOSE BLD-MCNC: 100 MG/DL (ref 70–99)
HCT VFR BLD CALC: 38.2 % (ref 36–48)
HEMOGLOBIN: 12.5 G/DL (ref 12–16)
INR BLD: 1.02 (ref 0.86–1.14)
LYMPHOCYTES ABSOLUTE: 1.6 K/UL (ref 1–5.1)
LYMPHOCYTES RELATIVE PERCENT: 18.8 %
MCH RBC QN AUTO: 34.8 PG (ref 26–34)
MCHC RBC AUTO-ENTMCNC: 32.6 G/DL (ref 31–36)
MCV RBC AUTO: 107 FL (ref 80–100)
MONOCYTES ABSOLUTE: 1 K/UL (ref 0–1.3)
MONOCYTES RELATIVE PERCENT: 12.1 %
NEUTROPHILS ABSOLUTE: 5.7 K/UL (ref 1.7–7.7)
NEUTROPHILS RELATIVE PERCENT: 66.2 %
PDW BLD-RTO: 15.1 % (ref 12.4–15.4)
PLATELET # BLD: 156 K/UL (ref 135–450)
PMV BLD AUTO: 10.5 FL (ref 5–10.5)
POTASSIUM REFLEX MAGNESIUM: 3.7 MMOL/L (ref 3.5–5.1)
PROTHROMBIN TIME: 11.8 SEC (ref 10–13.2)
RBC # BLD: 3.58 M/UL (ref 4–5.2)
SODIUM BLD-SCNC: 141 MMOL/L (ref 136–145)
WBC # BLD: 8.5 K/UL (ref 4–11)

## 2021-02-28 PROCEDURE — 2580000003 HC RX 258: Performed by: ORTHOPAEDIC SURGERY

## 2021-02-28 PROCEDURE — 94150 VITAL CAPACITY TEST: CPT

## 2021-02-28 PROCEDURE — 6370000000 HC RX 637 (ALT 250 FOR IP): Performed by: PHYSICIAN ASSISTANT

## 2021-02-28 PROCEDURE — 94761 N-INVAS EAR/PLS OXIMETRY MLT: CPT

## 2021-02-28 PROCEDURE — 3700000001 HC ADD 15 MINUTES (ANESTHESIA): Performed by: ORTHOPAEDIC SURGERY

## 2021-02-28 PROCEDURE — 7100000001 HC PACU RECOVERY - ADDTL 15 MIN: Performed by: ORTHOPAEDIC SURGERY

## 2021-02-28 PROCEDURE — 93005 ELECTROCARDIOGRAM TRACING: CPT | Performed by: NURSE PRACTITIONER

## 2021-02-28 PROCEDURE — 2580000003 HC RX 258: Performed by: PHYSICIAN ASSISTANT

## 2021-02-28 PROCEDURE — 0PSF04Z REPOSITION RIGHT HUMERAL SHAFT WITH INTERNAL FIXATION DEVICE, OPEN APPROACH: ICD-10-PCS | Performed by: ORTHOPAEDIC SURGERY

## 2021-02-28 PROCEDURE — 3600000005 HC SURGERY LEVEL 5 BASE: Performed by: ORTHOPAEDIC SURGERY

## 2021-02-28 PROCEDURE — 93010 ELECTROCARDIOGRAM REPORT: CPT | Performed by: INTERNAL MEDICINE

## 2021-02-28 PROCEDURE — C1713 ANCHOR/SCREW BN/BN,TIS/BN: HCPCS | Performed by: ORTHOPAEDIC SURGERY

## 2021-02-28 PROCEDURE — 1200000000 HC SEMI PRIVATE

## 2021-02-28 PROCEDURE — 3600000015 HC SURGERY LEVEL 5 ADDTL 15MIN: Performed by: ORTHOPAEDIC SURGERY

## 2021-02-28 PROCEDURE — 2500000003 HC RX 250 WO HCPCS: Performed by: ANESTHESIOLOGY

## 2021-02-28 PROCEDURE — 99232 SBSQ HOSP IP/OBS MODERATE 35: CPT | Performed by: PHYSICIAN ASSISTANT

## 2021-02-28 PROCEDURE — 80048 BASIC METABOLIC PNL TOTAL CA: CPT

## 2021-02-28 PROCEDURE — 3700000000 HC ANESTHESIA ATTENDED CARE: Performed by: ORTHOPAEDIC SURGERY

## 2021-02-28 PROCEDURE — 7100000000 HC PACU RECOVERY - FIRST 15 MIN: Performed by: ORTHOPAEDIC SURGERY

## 2021-02-28 PROCEDURE — 6370000000 HC RX 637 (ALT 250 FOR IP): Performed by: NURSE PRACTITIONER

## 2021-02-28 PROCEDURE — 6360000002 HC RX W HCPCS: Performed by: ANESTHESIOLOGY

## 2021-02-28 PROCEDURE — 6370000000 HC RX 637 (ALT 250 FOR IP): Performed by: ORTHOPAEDIC SURGERY

## 2021-02-28 PROCEDURE — 72040 X-RAY EXAM NECK SPINE 2-3 VW: CPT

## 2021-02-28 PROCEDURE — 2709999900 HC NON-CHARGEABLE SUPPLY: Performed by: ORTHOPAEDIC SURGERY

## 2021-02-28 PROCEDURE — 85025 COMPLETE CBC W/AUTO DIFF WBC: CPT

## 2021-02-28 PROCEDURE — 2700000000 HC OXYGEN THERAPY PER DAY

## 2021-02-28 PROCEDURE — 85610 PROTHROMBIN TIME: CPT

## 2021-02-28 PROCEDURE — 73060 X-RAY EXAM OF HUMERUS: CPT

## 2021-02-28 PROCEDURE — 6360000002 HC RX W HCPCS: Performed by: ORTHOPAEDIC SURGERY

## 2021-02-28 PROCEDURE — 2720000010 HC SURG SUPPLY STERILE: Performed by: ORTHOPAEDIC SURGERY

## 2021-02-28 DEVICE — SCREW BNE L22MM DIA3.5MM CORT S STL ST LOK FULL THRD: Type: IMPLANTABLE DEVICE | Site: HUMERUS | Status: FUNCTIONAL

## 2021-02-28 DEVICE — SCREW BNE L20MM DIA2.7MM CORT S STL ST FULL THRD FOR SM: Type: IMPLANTABLE DEVICE | Site: HUMERUS | Status: FUNCTIONAL

## 2021-02-28 DEVICE — SCREW BNE L24MM DIA3.5MM CORT S STL ST NONCANNULATED LOK: Type: IMPLANTABLE DEVICE | Site: HUMERUS | Status: FUNCTIONAL

## 2021-02-28 DEVICE — SCREW BNE L20MM DIA2.7MM ANK S STL ST VAR ANG LOK FULL THRD: Type: IMPLANTABLE DEVICE | Site: HUMERUS | Status: FUNCTIONAL

## 2021-02-28 DEVICE — SCREW BNE L24MM DIA2.7MM CORT S STL ST FULL THRD FOR SM: Type: IMPLANTABLE DEVICE | Site: HUMERUS | Status: FUNCTIONAL

## 2021-02-28 DEVICE — IMPLANTABLE DEVICE: Type: IMPLANTABLE DEVICE | Site: HUMERUS | Status: FUNCTIONAL

## 2021-02-28 DEVICE — PLATE BNE L158MM 6 H R DST EXTRAARTICULAR HUM S STL LOK: Type: IMPLANTABLE DEVICE | Site: HUMERUS | Status: FUNCTIONAL

## 2021-02-28 DEVICE — SCREW BNE L22MM DIA2.7MM CORT S STL ST FULL THRD FOR SM: Type: IMPLANTABLE DEVICE | Site: HUMERUS | Status: FUNCTIONAL

## 2021-02-28 DEVICE — SCREW BNE L16MM DIA2.7MM ANK S STL ST VAR ANG LOK FULL THRD: Type: IMPLANTABLE DEVICE | Site: HUMERUS | Status: FUNCTIONAL

## 2021-02-28 DEVICE — SCREW BNE L18MM DIA3.5MM CORT S STL ST LOK FULL THRD: Type: IMPLANTABLE DEVICE | Site: HUMERUS | Status: FUNCTIONAL

## 2021-02-28 DEVICE — SCREW BNE L18MM DIA2.7MM ANK S STL ST VAR ANG LOK FULL THRD: Type: IMPLANTABLE DEVICE | Site: HUMERUS | Status: FUNCTIONAL

## 2021-02-28 DEVICE — SCREW BNE L24MM DIA3.5MM CORT S STL ST LOK FULL THRD: Type: IMPLANTABLE DEVICE | Site: HUMERUS | Status: FUNCTIONAL

## 2021-02-28 RX ORDER — CEFAZOLIN SODIUM 1 G/3ML
INJECTION, POWDER, FOR SOLUTION INTRAMUSCULAR; INTRAVENOUS PRN
Status: DISCONTINUED | OUTPATIENT
Start: 2021-02-28 | End: 2021-02-28 | Stop reason: SDUPTHER

## 2021-02-28 RX ORDER — ASPIRIN 81 MG/1
81 TABLET ORAL DAILY
Status: DISCONTINUED | OUTPATIENT
Start: 2021-03-01 | End: 2021-03-01 | Stop reason: HOSPADM

## 2021-02-28 RX ORDER — PROPOFOL 10 MG/ML
INJECTION, EMULSION INTRAVENOUS PRN
Status: DISCONTINUED | OUTPATIENT
Start: 2021-02-28 | End: 2021-02-28 | Stop reason: SDUPTHER

## 2021-02-28 RX ORDER — ONDANSETRON 2 MG/ML
INJECTION INTRAMUSCULAR; INTRAVENOUS PRN
Status: DISCONTINUED | OUTPATIENT
Start: 2021-02-28 | End: 2021-02-28 | Stop reason: SDUPTHER

## 2021-02-28 RX ORDER — ROCURONIUM BROMIDE 10 MG/ML
INJECTION, SOLUTION INTRAVENOUS PRN
Status: DISCONTINUED | OUTPATIENT
Start: 2021-02-28 | End: 2021-02-28 | Stop reason: SDUPTHER

## 2021-02-28 RX ORDER — OXYCODONE HYDROCHLORIDE AND ACETAMINOPHEN 5; 325 MG/1; MG/1
2 TABLET ORAL PRN
Status: DISCONTINUED | OUTPATIENT
Start: 2021-02-28 | End: 2021-02-28 | Stop reason: HOSPADM

## 2021-02-28 RX ORDER — GLYCOPYRROLATE 1 MG/5 ML
SYRINGE (ML) INTRAVENOUS PRN
Status: DISCONTINUED | OUTPATIENT
Start: 2021-02-28 | End: 2021-02-28 | Stop reason: SDUPTHER

## 2021-02-28 RX ORDER — LIDOCAINE HYDROCHLORIDE 20 MG/ML
INJECTION, SOLUTION EPIDURAL; INFILTRATION; INTRACAUDAL; PERINEURAL PRN
Status: DISCONTINUED | OUTPATIENT
Start: 2021-02-28 | End: 2021-02-28 | Stop reason: SDUPTHER

## 2021-02-28 RX ORDER — HYDRALAZINE HYDROCHLORIDE 20 MG/ML
5 INJECTION INTRAMUSCULAR; INTRAVENOUS EVERY 10 MIN PRN
Status: DISCONTINUED | OUTPATIENT
Start: 2021-02-28 | End: 2021-02-28 | Stop reason: HOSPADM

## 2021-02-28 RX ORDER — BUDESONIDE AND FORMOTEROL FUMARATE DIHYDRATE 80; 4.5 UG/1; UG/1
2 AEROSOL RESPIRATORY (INHALATION) 2 TIMES DAILY PRN
Status: DISCONTINUED | OUTPATIENT
Start: 2021-02-28 | End: 2021-03-01 | Stop reason: HOSPADM

## 2021-02-28 RX ORDER — ALBUTEROL SULFATE 2.5 MG/3ML
2.5 SOLUTION RESPIRATORY (INHALATION) EVERY 4 HOURS PRN
Status: DISCONTINUED | OUTPATIENT
Start: 2021-02-28 | End: 2021-03-01 | Stop reason: HOSPADM

## 2021-02-28 RX ORDER — HYDROMORPHONE HCL 110MG/55ML
PATIENT CONTROLLED ANALGESIA SYRINGE INTRAVENOUS PRN
Status: DISCONTINUED | OUTPATIENT
Start: 2021-02-28 | End: 2021-02-28 | Stop reason: SDUPTHER

## 2021-02-28 RX ORDER — MEPERIDINE HYDROCHLORIDE 50 MG/ML
12.5 INJECTION INTRAMUSCULAR; INTRAVENOUS; SUBCUTANEOUS EVERY 5 MIN PRN
Status: DISCONTINUED | OUTPATIENT
Start: 2021-02-28 | End: 2021-02-28 | Stop reason: HOSPADM

## 2021-02-28 RX ORDER — FENTANYL CITRATE 50 UG/ML
25 INJECTION, SOLUTION INTRAMUSCULAR; INTRAVENOUS EVERY 5 MIN PRN
Status: DISCONTINUED | OUTPATIENT
Start: 2021-02-28 | End: 2021-02-28 | Stop reason: HOSPADM

## 2021-02-28 RX ORDER — ONDANSETRON 2 MG/ML
4 INJECTION INTRAMUSCULAR; INTRAVENOUS
Status: DISCONTINUED | OUTPATIENT
Start: 2021-02-28 | End: 2021-02-28 | Stop reason: HOSPADM

## 2021-02-28 RX ORDER — OXYCODONE HYDROCHLORIDE AND ACETAMINOPHEN 5; 325 MG/1; MG/1
1 TABLET ORAL PRN
Status: DISCONTINUED | OUTPATIENT
Start: 2021-02-28 | End: 2021-02-28 | Stop reason: HOSPADM

## 2021-02-28 RX ORDER — PHENYLEPHRINE HCL IN 0.9% NACL 1 MG/10 ML
SYRINGE (ML) INTRAVENOUS PRN
Status: DISCONTINUED | OUTPATIENT
Start: 2021-02-28 | End: 2021-02-28 | Stop reason: SDUPTHER

## 2021-02-28 RX ORDER — PROMETHAZINE HYDROCHLORIDE 25 MG/ML
6.25 INJECTION, SOLUTION INTRAMUSCULAR; INTRAVENOUS
Status: DISCONTINUED | OUTPATIENT
Start: 2021-02-28 | End: 2021-02-28 | Stop reason: HOSPADM

## 2021-02-28 RX ORDER — DEXAMETHASONE SODIUM PHOSPHATE 4 MG/ML
INJECTION, SOLUTION INTRA-ARTICULAR; INTRALESIONAL; INTRAMUSCULAR; INTRAVENOUS; SOFT TISSUE PRN
Status: DISCONTINUED | OUTPATIENT
Start: 2021-02-28 | End: 2021-02-28 | Stop reason: SDUPTHER

## 2021-02-28 RX ORDER — DEXAMETHASONE 1 MG
1 TABLET ORAL DAILY
COMMUNITY
Start: 2021-01-04 | End: 2021-07-20

## 2021-02-28 RX ORDER — FENTANYL CITRATE 50 UG/ML
INJECTION, SOLUTION INTRAMUSCULAR; INTRAVENOUS PRN
Status: DISCONTINUED | OUTPATIENT
Start: 2021-02-28 | End: 2021-02-28 | Stop reason: SDUPTHER

## 2021-02-28 RX ADMIN — Medication 120 MCG: at 09:38

## 2021-02-28 RX ADMIN — Medication 0.2 MG: at 10:27

## 2021-02-28 RX ADMIN — ATORVASTATIN CALCIUM 80 MG: 80 TABLET, FILM COATED ORAL at 22:08

## 2021-02-28 RX ADMIN — LIDOCAINE HYDROCHLORIDE 100 MG: 20 INJECTION, SOLUTION EPIDURAL; INFILTRATION; INTRACAUDAL; PERINEURAL at 09:27

## 2021-02-28 RX ADMIN — ATORVASTATIN CALCIUM 80 MG: 80 TABLET, FILM COATED ORAL at 00:41

## 2021-02-28 RX ADMIN — Medication 0.2 MG: at 11:49

## 2021-02-28 RX ADMIN — CEFAZOLIN SODIUM 2000 MG: 10 INJECTION, POWDER, FOR SOLUTION INTRAVENOUS at 22:12

## 2021-02-28 RX ADMIN — ONDANSETRON 4 MG: 2 INJECTION INTRAMUSCULAR; INTRAVENOUS at 17:13

## 2021-02-28 RX ADMIN — FENTANYL CITRATE 50 MCG: 50 INJECTION INTRAMUSCULAR; INTRAVENOUS at 09:02

## 2021-02-28 RX ADMIN — DEXAMETHASONE SODIUM PHOSPHATE 10 MG: 4 INJECTION, SOLUTION INTRAMUSCULAR; INTRAVENOUS at 13:30

## 2021-02-28 RX ADMIN — FENTANYL CITRATE 100 MCG: 50 INJECTION INTRAMUSCULAR; INTRAVENOUS at 10:10

## 2021-02-28 RX ADMIN — HYDROMORPHONE HYDROCHLORIDE 2 MG: 2 INJECTION INTRAMUSCULAR; INTRAVENOUS; SUBCUTANEOUS at 11:59

## 2021-02-28 RX ADMIN — DULOXETINE HYDROCHLORIDE 60 MG: 60 CAPSULE, DELAYED RELEASE ORAL at 22:13

## 2021-02-28 RX ADMIN — Medication 0.2 MG: at 09:37

## 2021-02-28 RX ADMIN — HYDROMORPHONE HYDROCHLORIDE 1 MG: 2 INJECTION INTRAMUSCULAR; INTRAVENOUS; SUBCUTANEOUS at 10:16

## 2021-02-28 RX ADMIN — HYDROMORPHONE HYDROCHLORIDE 1 MG: 2 INJECTION INTRAMUSCULAR; INTRAVENOUS; SUBCUTANEOUS at 10:17

## 2021-02-28 RX ADMIN — ONDANSETRON 4 MG: 2 INJECTION INTRAMUSCULAR; INTRAVENOUS at 13:30

## 2021-02-28 RX ADMIN — PROPOFOL 120 MG: 10 INJECTION, EMULSION INTRAVENOUS at 09:27

## 2021-02-28 RX ADMIN — Medication 0.2 MG: at 13:34

## 2021-02-28 RX ADMIN — Medication 120 MCG: at 09:33

## 2021-02-28 RX ADMIN — CEFAZOLIN 2000 MG: 1 INJECTION, POWDER, FOR SOLUTION INTRAMUSCULAR; INTRAVENOUS at 09:47

## 2021-02-28 RX ADMIN — Medication 0.2 MG: at 11:35

## 2021-02-28 RX ADMIN — ROCURONIUM BROMIDE 50 MG: 10 SOLUTION INTRAVENOUS at 09:28

## 2021-02-28 RX ADMIN — Medication 10 ML: at 00:41

## 2021-02-28 RX ADMIN — ATENOLOL 25 MG: 25 TABLET ORAL at 07:35

## 2021-02-28 RX ADMIN — FENTANYL CITRATE 50 MCG: 50 INJECTION INTRAMUSCULAR; INTRAVENOUS at 09:27

## 2021-02-28 RX ADMIN — SUGAMMADEX 200 MG: 100 INJECTION, SOLUTION INTRAVENOUS at 13:54

## 2021-02-28 RX ADMIN — Medication 120 MCG: at 09:41

## 2021-02-28 RX ADMIN — CEFAZOLIN SODIUM 2000 MG: 10 INJECTION, POWDER, FOR SOLUTION INTRAVENOUS at 14:29

## 2021-02-28 RX ADMIN — Medication 10 ML: at 22:08

## 2021-02-28 ASSESSMENT — PULMONARY FUNCTION TESTS
PIF_VALUE: 18
PIF_VALUE: 18
PIF_VALUE: 21
PIF_VALUE: 0
PIF_VALUE: 2
PIF_VALUE: 18
PIF_VALUE: 18
PIF_VALUE: 19
PIF_VALUE: 18
PIF_VALUE: 2
PIF_VALUE: 17
PIF_VALUE: 18
PIF_VALUE: 26
PIF_VALUE: 20
PIF_VALUE: 21
PIF_VALUE: 18
PIF_VALUE: 19
PIF_VALUE: 18
PIF_VALUE: 5
PIF_VALUE: 21
PIF_VALUE: 21
PIF_VALUE: 10
PIF_VALUE: 21
PIF_VALUE: 18
PIF_VALUE: 18
PIF_VALUE: 20
PIF_VALUE: 19
PIF_VALUE: 18
PIF_VALUE: 1
PIF_VALUE: 18
PIF_VALUE: 17
PIF_VALUE: 29
PIF_VALUE: 18
PIF_VALUE: 21
PIF_VALUE: 18
PIF_VALUE: 19
PIF_VALUE: 21
PIF_VALUE: 18
PIF_VALUE: 18
PIF_VALUE: 21
PIF_VALUE: 18
PIF_VALUE: 21
PIF_VALUE: 21
PIF_VALUE: 22
PIF_VALUE: 21
PIF_VALUE: 18
PIF_VALUE: 18
PIF_VALUE: 21
PIF_VALUE: 21
PIF_VALUE: 18
PIF_VALUE: 17
PIF_VALUE: 21
PIF_VALUE: 18
PIF_VALUE: 21
PIF_VALUE: 21
PIF_VALUE: 18
PIF_VALUE: 19
PIF_VALUE: 18
PIF_VALUE: 10
PIF_VALUE: 19
PIF_VALUE: 19
PIF_VALUE: 18
PIF_VALUE: 18
PIF_VALUE: 21
PIF_VALUE: 16
PIF_VALUE: 22
PIF_VALUE: 58
PIF_VALUE: 18
PIF_VALUE: 21
PIF_VALUE: 1
PIF_VALUE: 19
PIF_VALUE: 21
PIF_VALUE: 19
PIF_VALUE: 25
PIF_VALUE: 19
PIF_VALUE: 5
PIF_VALUE: 18
PIF_VALUE: 21
PIF_VALUE: 22
PIF_VALUE: 3
PIF_VALUE: 18
PIF_VALUE: 18
PIF_VALUE: 19
PIF_VALUE: 18
PIF_VALUE: 21
PIF_VALUE: 19
PIF_VALUE: 18
PIF_VALUE: 19
PIF_VALUE: 18
PIF_VALUE: 20
PIF_VALUE: 18
PIF_VALUE: 19
PIF_VALUE: 60
PIF_VALUE: 18
PIF_VALUE: 19
PIF_VALUE: 18
PIF_VALUE: 17
PIF_VALUE: 21
PIF_VALUE: 21
PIF_VALUE: 5
PIF_VALUE: 21
PIF_VALUE: 21
PIF_VALUE: 19
PIF_VALUE: 18
PIF_VALUE: 34
PIF_VALUE: 17
PIF_VALUE: 14
PIF_VALUE: 20
PIF_VALUE: 19
PIF_VALUE: 19
PIF_VALUE: 21
PIF_VALUE: 20
PIF_VALUE: 21
PIF_VALUE: 20
PIF_VALUE: 18
PIF_VALUE: 21
PIF_VALUE: 19
PIF_VALUE: 18
PIF_VALUE: 18
PIF_VALUE: 19
PIF_VALUE: 18
PIF_VALUE: 21
PIF_VALUE: 19
PIF_VALUE: 18
PIF_VALUE: 21
PIF_VALUE: 6
PIF_VALUE: 18
PIF_VALUE: 17
PIF_VALUE: 21
PIF_VALUE: 21
PIF_VALUE: 18
PIF_VALUE: 19
PIF_VALUE: 18

## 2021-02-28 ASSESSMENT — PAIN DESCRIPTION - ONSET: ONSET: ON-GOING

## 2021-02-28 ASSESSMENT — PAIN DESCRIPTION - LOCATION: LOCATION: ARM

## 2021-02-28 ASSESSMENT — PAIN DESCRIPTION - DESCRIPTORS
DESCRIPTORS: STABBING
DESCRIPTORS: STABBING

## 2021-02-28 ASSESSMENT — PAIN DESCRIPTION - PAIN TYPE
TYPE: ACUTE PAIN
TYPE: SURGICAL PAIN
TYPE: ACUTE PAIN

## 2021-02-28 ASSESSMENT — PAIN DESCRIPTION - ORIENTATION
ORIENTATION: RIGHT
ORIENTATION: RIGHT

## 2021-02-28 ASSESSMENT — PAIN SCALES - GENERAL
PAINLEVEL_OUTOF10: 0
PAINLEVEL_OUTOF10: 0

## 2021-02-28 ASSESSMENT — PAIN DESCRIPTION - DIRECTION: RADIATING_TOWARDS: DOWN

## 2021-02-28 NOTE — BRIEF OP NOTE
Brief Postoperative Note      Patient: Edgar Polo  YOB: 1940  MRN: [id number]    Date of Procedure: 2/28/2021    Pre-Op Diagnosis: RIGHT HUMERAL FRACTURE    Post-Op Diagnosis: Same       Procedure(s):  HUMERUS OPEN REDUCTION INTERNAL FIXATION    Surgeon(s):  Briana Jorge MD    Assistant:  Surgical Assistant: Ortiz Jackson    Anesthesia: General    Estimated Blood Loss (mL): 052     Complications: Other:  No nerve lacerations. 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 Specimens:   * No specimens in log *    Implants:  Implant Name Type Inv.  Item Serial No.  Lot No. LRB No. Used Action   SCREW BNE L20MM DIA2.7MM HIRAL S STL ST FULL THRD FOR SM  SCREW BNE L20MM DIA2.7MM HIRAL S STL ST FULL THRD FOR SM  DEPUY [employer]  Right 1 Implanted   SCREW BNE L22MM DIA2.7MM HIRAL S STL ST FULL THRD FOR SM  SCREW BNE L22MM DIA2.7MM HIRAL S STL ST FULL THRD FOR SM  DEPUY [employer]  Right 1 Implanted   SCREW BNE L24MM DIA2.7MM HIRAL S STL ST FULL THRD FOR SM  SCREW BNE L24MM DIA2.7MM HIRAL S STL ST FULL THRD FOR SM  DEPUY [employer]  Right 1 Implanted   SCREW BNE L18MM DIA3.5MM HIRAL S STL ST [place] FULL THRD  SCREW BNE L18MM DIA3.5MM HIRAL S STL ST [place] FULL THRD  DEPUY [employer]  Right 1 Implanted   SCREW BNE L22MM DIA3.5MM HIRAL S STL ST [place] FULL THRD  SCREW BNE L22MM DIA3.5MM HIRAL S STL ST SHAY[place] FULL THRD  DEPUY [employer]  Right 1 Implanted   SCREW BNE L24MM DIA3.5MM HIRAL S STL ST [place] FULL THRD  SCREW BNE L24MM DIA3.5MM HIRAL S STL ST SHAYNE FULL THRD  [employer]PU[employer]  Right 3 Implanted   SCREW BNE L24MM DIA3.5MM HIRAL S STL ST NONCANNULATED SHAYNE  SCREW BNE L24MM DIA3.5MM HIRAL S STL ST NONCANNULATED SHAYNE  DEPUY [employer]  Right 6 Implanted   PLATE BNE [id number] 6 H R DST EXTRAARTICULAR HUM S STL SHAYNE  PLATE BNE [id number] 6 H R DST EXTRAARTICULAR HUM S STL SHAYNE  DEPU[employer]  Right 1 Implanted   PLATE BNE [id number] XLN 6 H NONSTERILE R MED DST HUM S STL FRED  PLATE BNE [id number] XLN 6 H NONSTERILE R MED DST HUM S STL FRED  DEPU[employer]  Right 1 Implanted   SCREW BNE L18MM DIA2.7MM ANK S STL ST FRED ANG SHAYNE FULL THRD  SCREW BNE L18MM DIA2.7MM ANK S STL ST FRED ANG SHAYNE FULL THRD  DEPU[employer]  Right 18 Implanted   SCREW BNE L20MM DIA2.7MM ANK S STL ST FRED ANG SHAYNE FULL THRD  SCREW BNE L20MM DIA2.7MM ANK S STL ST FRED ANG SHAYNE FULL THRD  DEPU[employer]  Right 20 Implanted   SCREW BNE L16MM DIA2.7MM ANK S STL ST FRED ANG SHAYNE FULL THRD  SCREW BNE L16MM DIA2.7MM ANK S STL ST FRED ANG SHAYNE FULL THRD  DEPUY [employer]  Right 11 Implanted         Drains: * No LDAs found *    Findings: Spiral midshaft humerus fx with butterfly.     Electronically signed by Pauline Poe MD on 2/28/2021 at 3:25 PM

## 2021-02-28 NOTE — ANESTHESIA PROCEDURE NOTES
Peripheral Block    Patient location during procedure: pre-op  Staffing  Performed: anesthesiologist   Anesthesiologist: Jesus Yip MD  Preanesthetic Checklist  Completed: patient identified, IV checked, site marked, risks and benefits discussed, surgical consent, monitors and equipment checked, pre-op evaluation, timeout performed, anesthesia consent given, oxygen available and patient being monitored  Peripheral Block  Patient position: sitting  Prep: ChloraPrep  Patient monitoring: continuous pulse ox and IV access  Block type: Brachial plexus  Laterality: right  Injection technique: single-shot  Guidance: ultrasound guided  Supraclavicular  Provider prep: sterile gloves and mask  Needle  Needle gauge: 22 G  Needle length: 5 cm  Needle localization: ultrasound guidance  Test dose: negative  Assessment  Injection assessment: negative aspiration for heme, no paresthesia on injection and local visualized surrounding nerve on ultrasound  Paresthesia pain: none  Slow fractionated injection: yes  Hemodynamics: stable  Additional Notes  Pt. agrees to risks, benefits and alternatives to block  Block performed at the request of the surgeon for post-operative pain management    Immediately prior to procedure a \"time out\" was called to verify the correct patient, procedure, equipment, support staff. Site/side marked as required  Side: right  Site/Approach: Lateral neck in the SC sulcus  Position: Semi-Fowlers/Sitting  Sedation: Midazolam 2 mg  +  Fentanyl  100  mcg  IV  Local Anesthetic Dose:  2% Lido  3 ml  Aseptic technique: prepped with chlorhexidine  Ultrasound:   yes, see attached image  Local Anesthetic: 0.5% Bupivacaine with Epi 1:200K  Amount: 30 ml  in 5 ml increments after negative aspiration each time  Easy injection w/o resistance and w/o pain/paresthesias. Pt tolerated procedure well. No complications.   Reason for block: post-op pain management and at surgeon's request

## 2021-02-28 NOTE — PLAN OF CARE
Patient currently in OR for ORIF. Continue current management. Postop pain control, DVT prophylaxis, PT OT per Ortho.   Will follow tomorrow    Kim Hernandez MD  Hospitalist Physician

## 2021-02-28 NOTE — PLAN OF CARE
Patient seen in the ER yesterday after fall at home. Was found to have distal humerus fracture. Plan for patient is to take to the OR tomorrow morning for ORIF of the distal humerus. Will have her admitted to our service. Appreciate IM evaluation and medical clearance for surgery in advance. Continue with pain control. Keep NPO after MN. Consent to be obtained. Have discussed the plan with patient and her son who are both in agreement at this time. Full consult and evaluation to follow.     Maksim Goyal PA-C

## 2021-02-28 NOTE — ANESTHESIA POSTPROCEDURE EVALUATION
Department of Anesthesiology  Postprocedure Note    Patient: Arti Julien  MRN: [id number]  YOB: 1940  Date of evaluation: 2/28/2021    Procedure Summary     Date: 02/28/21 Room / Location: [address] [place] [address]    Anesthesia Start: 9313 Anesthesia Stop: 4516    Procedures:       HUMERUS OPEN REDUCTION INTERNAL FIXATION (Right )      Procedure Not Yet Scheduled Diagnosis: (RIGHT HUMERAL FRACTURE)    Surgeons: Betsey Sandifer, MD Responsible Provider: Pearl Mace MD    Anesthesia Type: general ASA Status: 3 - Emergent        Anesthesia Type: No value filed. Jacobo Phase I: Jacobo Score: 8    Jacobo Phase II:      Last vitals: Reviewed and per EMR flowsheets.      Anesthesia Post Evaluation   Anesthetic Problems: no   Cardiovascular System Stable: yes  Respiratory Function: Airway Patent yes  ETT no  Ventilator no  Level of consciousness: awake, alert and oriented  Post-op pain: adequate analgesia  Hydration Adequate: yes  Nausea/Vomiting:no  Other Issues: Patient rated arm pain low in post-op, hence no IS Br Pl Block was performed    Mary Dyer MD

## 2021-02-28 NOTE — PROGRESS NOTES
Rafaelaing and edu applied. Areli CARRILLO Notified of bladder scanner results. Transferred to room.

## 2021-02-28 NOTE — PROGRESS NOTES
RESPIRATORY THERAPY ASSESSMENT    Name:  [facility] Record Number:  [id number]  Age: 68 y.o. Gender: female  : 1940  Today's Date:  2021  Room:  [address] [place] [address]    Assessment     Is the patient being admitted for a COPD or Asthma exacerbation? No   (If yes the patient will be seen every 4 hours for the first 24 hours and then reassessed)    Patient Admission Diagnosis      Allergies  Allergies   Allergen Reactions    Effexor [Venlafaxine Hcl] Itching and Nausea Only    Fenofibrate Diarrhea    Prednisone Other (See Comments)     Causes body to feel on fire    Codeine Nausea And Vomiting    Docosahexaenoic Acid-Epa Nausea And Vomiting     Cannot take Vascepa       Minimum Predicted Vital Capacity:     885          Actual Vital Capacity:      900              Pulmonary History:COPD and Asthma  Home Oxygen Therapy:  room air  Home Respiratory Therapy:Albuterol and Salmeterol/Fluticasone Propionate   Current Respiratory Therapy:  HHN Albuterol PRN, MDI Symbicort BID  Treatment Type: IS       Respiratory Severity Index(RSI)   Patients with orders for inhalation medications, oxygen, or any therapeutic treatment modality will be placed on Respiratory Protocol. They will be assessed with the first treatment and at least every 72 hours thereafter. The following severity scale will be used to determine frequency of treatment intervention.     Smoking History: Pulmonary Disease or Smoking History, Greater than 15 pack year = 2    Social History  Social History     Tobacco Use    Smoking status: Former Smoker     Packs/day: 0.50     Years: 20.00     Pack years: 10.00     Types: Cigarettes     Start date: 1959     Quit date: 1987     Years since quittin.1    Smokeless tobacco: Never Used   Substance Use Topics    Alcohol use: No     Alcohol/week: 0.0 standard drinks    Drug use: No       Recent Surgical History: None = 0  Past Surgical History  Past Surgical History:   Procedure Laterality Date    ABDOMINAL EXPLORATION SURGERY      APPENDECTOMY      CHOLECYSTECTOMY      COLONOSCOPY  4/18/2013    diverticulosis, polyps    COLONOSCOPY  11/10/2016    normal colon    EYE SURGERY Right 12/11/15    cataract removal    EYE SURGERY Left 1/8/16    cataract removed    HYSTERECTOMY      THYROIDECTOMY, PARTIAL Left     TUMOR REMOVAL      off of thyroid, was benign       Level of Consciousness: Alert, Oriented, and Cooperative = 0    Level of Activity: Walking unassisted = 0    Respiratory Pattern: Regular Pattern; RR 8-20 = 0    Breath Sounds: Diminshed bilaterally and/or crackles = 2    Sputum   ,  ,    Cough: Strong, spontaneous, non-productive = 0    Vital Signs   BP (!) 144/93   Pulse 75   Temp 98.7 °F (37.1 °C) (Oral)   Resp 16   Ht 5' 6\" (1.676 m)   SpO2 93%   BMI 29.86 kg/m²   SPO2 (COPD values may differ): Greater than or equal to 92% on room air = 0    Peak Flow (asthma only): not applicable = 0    RSI: 0-4 = See once and convert to home regimen or discontinue        Plan       Goals: medication delivery, mobilize retained secretions, volume expansion and improve oxygenation    Patient/caregiver was educated on the proper method of use for Respiratory Care Devices:  Yes      Level of patient/caregiver understanding able to:   ? Verbalize understanding   ? Demonstrate understanding       ? Teach back        ? Needs reinforcement       ? No available caregiver               ? Other:     Response to education:  Good     Is patient being placed on Home Treatment Regimen? Yes     Does the patient have everything they need prior to discharge? N/A     Comments: Patient admits with a right humerus fracture. Plan of Care: HHN Albuterol PRN, MDI Symbicort BID    Electronically signed by Gunner Bill RCP on 2/28/2021 at 1:13 AM    Respiratory Protocol Guidelines     1.  Assessment and treatment by Respiratory Therapy will be initiated for medication and therapeutic interventions upon initiation of aerosolized medication. 2. Physician will be contacted for respiratory rate (RR) greater than 35 breaths per minute. Therapy will be held for heart rate (HR) greater than 140 beats per minute, pending direction from physician. 3. Bronchodilators will be administered via Metered Dose Inhaler (MDI) with spacer when the following criteria are met:  a. Alert and cooperative     b. HR < 140 bpm  c. RR < 30 bpm                d. Can demonstrate a 2-3 second inspiratory hold  4. Bronchodilators will be administered via Hand Held Nebulizer [facility]) to patients when ANY of the following criteria are met  a. Incognizant or uncooperative          b. Patients treated with HHN at Home        c. Unable to demonstrate proper use of MDI with spacer     d. RR > 30 bpm   5. Bronchodilators will be delivered via Metered Dose Inhaler (MDI), HHN, Aerogen to intubated patients on mechanical ventilation. 6. Inhalation medication orders will be delivered and/or substituted as outlined below. Aerosolized Medications Ordering and Administration Guidelines:    1. All Medications will be ordered by a physician, and their frequency and/or modality will be adjusted as defined by the patients Respiratory Severity Index (RSI) score. 2. If the patient does not have documented COPD, consider discontinuing anticholinergics when RSI is less than 9.  3. If the bronchospasm worsens (increased RSI), then the bronchodilator frequency can be increased to a maximum of every 4 hours. If greater than every 4 hours is required, the physician will be contacted. 4. If the bronchospasm improves, the frequency of the bronchodilator can be decreased, based on the patient's RSI, but not less than home treatment regimen frequency. 5. Bronchodilator(s) will be discontinued if patient has a RSI less than 9 and has received no scheduled or as needed treatment for 72  Hrs. Patients Ordered on a Mucolytic Agent:    1.  Must always be administered with a bronchodilator. 2. Discontinue if patient experiences worsened bronchospasm, or secretions have lessened to the point that the patient is able to clear them with a cough. Anti-inflammatory and Combination Medications:    1. If the patient lacks prior history of lung disease, is not using inhaled anti-inflammatory medication at home, and lacks wheezing by examination or by history for at least 24 hours, contact physician for possible discontinuation.

## 2021-02-28 NOTE — PROGRESS NOTES
Admit to PACU. Report received from anesthesia and OR nurse. drowsy but arouses to name. Some difficulty following commands -decreased right hand movement- slight wiggling of fingers but unable to raise thumb when asked. Fingers are cool. Radial pulse +1 under ace wrap. Patient denies pain. Right arm elevated on pillow Ice applied to posterior upper arm incision.

## 2021-02-28 NOTE — ANESTHESIA PRE PROCEDURE
Department of Anesthesiology  Preprocedure Note       Name:  Rochelle Kc   Age:  68 y.o.  :  1940                                          MRN:  [id number]         Date:  2021      Surgeon: Froy Levi    Procedure: ORIF Right Humerus OPEN REDUCTION INTERNAL FIXATION     HPI:  68 y.o. female who states that she was feeling her bird feeder when she leaned over and lost her balance on . Patient states that she fell into a fence hitting her head and right arm. Patient denies any loss of consciousness or use of blood thinners. She is only complaining of right elbow pain. X-Rays: Right elbow: Suboptimal positioning of the elbow. Elbow joint alignment is grossly intact. No gross acute fracture in the right elbow. Acute traumatic closed angulated and mildly displaced distal humeral fracture. EK-SEP-2020  Sinus tachycardia 114; Nl axis; No acute ischemic changes; Otherwise normal ECG  When compared with ECG of 18-SEP-2020: No significant change was found    Stress Test:    Peak HR:136 bpm    Predicted HR: 142 bpm  % of predicted HR: 96   ECG Findings:  Sinus tachycardia with no ischemic changes. There is uniform isotope uptake at stress and rest. There is no evidence of  myocardial ischemia or scar. Normal LVEF of >75%. EKG changes are of  uncertain significance in setting of normal perfusion. Overall findings  represent a low risk scan. Medications prior to admission:    oxyCODONE-acetaminophen (PERCOCET) 5-325 MG per tablet Take 1-2 tablets by mouth every 6 hours as needed for Pain    WIXELA INHUB 250-50 MCG/DOSE AEPB USE 1 INHALATION BY MOUTH  EVERY 12 HOURS   DULoxetine (CYMBALTA) 60 MG ER Take 60 mg by mouth daily Before bed   RESTASIS 0.05 % ophthalmic emulsion    Coenzyme Q10 (CO Q 10 PO) Take by mouth   atorvastatin (LIPITOR) 80 MG tablet Take 80 mg by mouth   traMADol (ULTRAM) 50 MG tablet Take 50 mg by mouth every 6 hours as needed for Pain.    albuterol sulfate  (90 Base) MCG/ACT inhaler 2 puffs every 6 hours as needed for Wheezing or Shortness of Breath   triamterene-hydrochlorothiazide (MAXZIDE-25) 37.5-25 MG Take 1 tablet by mouth daily   Cholecalciferol (VITAMIN D) 2000 UNITS CAPS capsule Take by mouth daily   Lactobacillus Rhamnosus, GG, (PROBIOTIC COLIC PO) Take by mouth daily   leflunomide (ARAVA) 20 MG tablet Take 20 mg by mouth daily. sulfaSALAzine (AZULFIDINE) 500 MG *&** Take 500 mg by mouth 4 times daily. levothyroxine (SYNTHROID) 25 MCG tablet Take 25 mcg by mouth Daily. ATENOLOL PO Take 25 mg by mouth daily.      Allergies:     Effexor [Venlafaxine Hcl] Itching and Nausea Only    Fenofibrate Diarrhea    Prednisone Other (See Comments)     Causes body to feel on fire    Codeine Nausea And Vomiting    Docosahexaenoic Acid-Epa Nausea And Vomiting     Cannot take Vascepa     Problem List:     RA (rheumatoid arthritis) (HCC)    Hypertension    Asthma    Hyperlipidemia    Asthma exacerbation    Pleuritic chest pain    Chest pain on breathing    Pulmonary nodules    Chronic rhinitis    COPD (chronic obstructive pulmonary disease) (HCC)    Bronchitis    Hypothyroidism    COPD exacerbation (HCC)    Acute respiratory failure with hypoxemia (HCC)    Elevated d-dimer    Personal history of immunosupression therapy    Atelectasis    Chest congestion    Mucus plugging of bronchi    History of rheumatoid arthritis    Former smoker    Pyuria    History of humerus fracture     Past Medical History:     Asthma     Hyperlipidemia     Hypertension     RA [facility]rheumatoid arthritis) ([facility] [address]     Thyroid disease     has half of her thyroid     Past Surgical History:     ABDOMINAL EXPLORATION SURGERY      APPENDECTOMY      CHOLECYSTECTOMY      COLONOSCOPY  4/18/2013    diverticulosis, polyps    COLONOSCOPY  11/10/2016    normal colon    EYE SURGERY Right 12/11/15    cataract removal    EYE SURGERY Left 1/8/16    cataract removed  HYSTERECTOMY      THYROIDECTOMY, PARTIAL Left     TUMOR REMOVAL      off of thyroid, was benign     Social History:     Smoking status: Former Smoker     Packs/day: 0.50     Years: 20.00     Pack years: 10.00     Types: Cigarettes     Start date: 1959     Quit date: 1987     Years since quittin.1    Smokeless tobacco: Never Used   Substance Use Topics    Alcohol use: No     Alcohol/week: 0.0 standard drinks     Vital Signs (Current):   BP: 142/77 Pulse: 79   Resp: 14 SpO2: 94   Temp: 98.6 °F (37 °C)   Height: 5' 6\" (1.676 m)  (21) Weight:    BMI:            BP Readings from Last 3 Encounters:   21 (!) 107/53   20 (!) 112/55   20 118/64     NPO Status: >8 hrs                        BMI:   Wt Readings from Last 3 Encounters:   21 185 lb (83.9 kg)   20 179 lb 1.6 oz (81.2 kg)   20 180 lb 12.8 oz (82 kg)       CBC:    WBC 12.0 2020    HGB 12.9 2020    HCT 40.1 2020     2020     CMP:     2020    K 3.5 2020    CL 99 2020    CO2 27 2020    BUN 13 2020    CREATININE 0.8 2020    GLUCOSE 146 2020    PROT 6.8 2020    CALCIUM 9.3 2020    BILITOT 0.5 2020    ALKPHOS 76 2020    AST 23 2020    ALT 21 2020     Coags:    PROTIME 11.3 2019    INR 0.99 2019    APTT 30.4 2019     COVID-19 Screening (If Applicable): No results found for: COVID19    Anesthesia Evaluation  Patient summary reviewed and Nursing notes reviewed  Airway: Mallampati: II  TM distance: >3 FB   Neck ROM: full  Mouth opening: > = 3 FB Dental:    (+) edentulous      Pulmonary: breath sounds clear to auscultation  (+) COPD:  asthma:                            Cardiovascular:  Exercise tolerance: good (>4 METS),   (+) hypertension:,     (-) murmur      Rhythm: regular  Rate: normal    Stress test reviewed                Neuro/Psych:               GI/Hepatic/Renal: Endo/Other:    (+) hypothyroidism: arthritis: OA and rheumatoid. , .                 Abdominal:           Vascular:                                      Anesthesia Plan      general     ASA 3 - emergent     (IS Br Pl Block(s) for post-op pain management per surgeon request if needed post-op (will do after surgery so as top be able to do a peripheral nerve exam- possible radial nerve paresis noted pre-op))  Induction: intravenous. MIPS: Prophylactic antiemetics administered. Anesthetic plan and risks discussed with patient. Plan discussed with CRNA.           Marily Ward MD

## 2021-02-28 NOTE — PROGRESS NOTES
Attempted to void again and unable except for a few drops. Bladder scanned for 722 ml. Will allow pt to try to get up to commode and will straight cath if still unable to void.

## 2021-02-28 NOTE — CONSULTS
Hospital Medicine  Consult History & Physical        Chief Complaint:  Right arm pain    Date of Service: Pt seen/examined in consultation on 2/27/2021    History Of Present Illness:      68 y.o. female, with right humerus fracture, who we are asked to see/evaluate by Herve Ta MD for medical management of hypertension. The patient stated yesterday she was outside filling up her bird feeder when she leaned over and lost her balance. She stated that she fell into a fence hitting her head and her right arm. The patient denied any loss of consciousness. She was seen at [facility] last evening due to right elbow pain. At that time an x-ray of her right humerus was obtained that revealed an acute humeral fracture. The patient was discharged home with a sling. Per bedside nurse, the patient was told to present to [facility] this evening for anticipation of an ORIF of the distal humerus in the a.m. The patient's right arm is in a sling. She stated her pain is well controlled at this time. Patient's blood pressure is well controlled at this time as well. All orders have been placed by orthopedic surgery. Past Medical History:        Diagnosis Date    Asthma     Hyperlipidemia     Hypertension     RA (rheumatoid arthritis) ([address]     Thyroid disease     has half of her thyroid     Past Surgical History:        Procedure Laterality Date    ABDOMINAL EXPLORATION SURGERY      APPENDECTOMY      CHOLECYSTECTOMY      COLONOSCOPY  4/18/2013    diverticulosis, polyps    COLONOSCOPY  11/10/2016    normal colon    EYE SURGERY Right 12/11/15    cataract removal    EYE SURGERY Left 1/8/16    cataract removed    HYSTERECTOMY      THYROIDECTOMY, PARTIAL Left     TUMOR REMOVAL      off of thyroid, was benign     Medications Prior to Admission:    Prior to Admission medications    Medication Sig Start Date End Date Taking?  Authorizing Provider   oxyCODONE-acetaminophen (PERCOCET) 5-325 MG per cigarettes. She started smoking about 62 years ago. She has a 10.00 pack-year smoking history. She has never used smokeless tobacco.  ETOH:   reports no history of alcohol use. Family History:     Reviewed in detail. Positive as follows:        Problem Relation Age of Onset    High Blood Pressure Mother      REVIEW OF SYSTEMS:   Pertinent positives as noted in the HPI. All other systems reviewed and negative. PHYSICAL EXAM PERFORMED:    There were no vitals taken for this visit. General appearance: Pleasant, obese female in no apparent distress, appears stated age and cooperative. HEENT: Pupils equal, round, and reactive to light. Extra ocular muscles intact. Conjunctivae/corneas clear. Neck: Supple, with full range of motion. No jugular venous distention. Trachea midline. Respiratory:  Normal respiratory effort. Clear to auscultation, bilaterally without Rales/Wheezes/Rhonchi. Cardiovascular: Regular rate and rhythm with normal S1/S2 without murmurs, rubs or gallops. Abdomen: Soft, obese, round non-tender, non-distended with normal bowel sounds. Musculoskeletal: No clubbing, cyanosis or edema bilaterally. Decreased ROM RUE d/t pain and fracture. Sling in place  Skin: Skin color, texture, turgor normal.  No significant rashes or lesions. Neurologic:  Neurovascularly intact.  Cranial nerves: II-XII intact, grossly non-focal.  Psychiatric: Alert and oriented, thought content appropriate, normal insight  Capillary Refill: Brisk,< 3 seconds   Peripheral Pulses: +2 palpable, equal bilaterally     Labs:     Urinalysis:    Lab Results   Component Value Date    NITRU Negative 09/20/2020    WBCUA 0-2 09/20/2020    BACTERIA 3+ 09/20/2020    RBCUA 0-2 09/20/2020    BLOODU Negative 09/20/2020    SPECGRAV 1.020 09/20/2020    GLUCOSEU Negative 09/20/2020     Radiology: I have reviewed the radiology reports with the following interpretation:     No orders to display     ASSESSMENT:    [address] [place] [address] Diagnosis Date Noted    History of humerus fracture [Z87.81] 02/27/2021    Obesity [E66.9] 02/27/2021    Right supracondylar humerus fracture [S42.411A] 02/27/2021    Hyperlipidemia [E78.5]     Hypertension [I10]      PLAN:    Right arm pain in setting of humerus fracture 2/2 mechanical fall  -Xray right humerus revealed acute humeral fracture  -X-ray right radius ulna revealed osteopenia. No acute abnormality in the right elbow or right forearm. Acute traumatic closed angulated and mildly displaced distal humeral fracture  -PRN po pain medication  -pre op EKG ordered  -pre op CXR ordered  -cbc, bmp, pt/inr in am  -NPO after MN  -Based on the evaluation on 02/27/2021 and diagnostic testing, there are no apparent cardiac contraindications to the proposed procedure. All questions/concerns that could be addressed were addressed. Encourage use of incentive spirometry although patient is at low risk for perioperative cardiopulmonary complication. Juan Pablo Ramsay estimated risk probability for perioperative MI or cardiac arrest is 0.2%. The patient appears to be an appropriate candidate for the planned procedure if ortho deemed necessary. Essential HTN  -continue atenolol and maxzide    HLD  -continue atorvastatin    Obesity  With Body mass index is 30 kg/m². Complicating assessment and treatment. Placing patient at risk for multiple co-morbidities as well as early death and contributing to the patient's presentation.  Counseled on weight loss    DVT Prophylaxis: SCD's    Diet: DIET GENERAL;  Diet NPO, After Midnight     Code Status: Full Code    PT/OT Eval Status: Active and ongoing    Dispo - per primary team    Thank you for the consultation, will follow up as needed    Electronically signed by ANDREW Thomas - CNP on 2/27/21 at 10:32 PM EST   ------------------------------Anticipated Dr. Minerva saravia----------------------------------

## 2021-02-28 NOTE — PROGRESS NOTES
Dr Raissa Gracia here to evaluate patient. Moves all fingers well-thumb slightly less-able to tuck thumb but not moving thumb upward. Denies numbness or tingling. Some wrist up and down movement. Fingers warm and radial pulse palpable 2+. Dr Jeffrey Griffiths aware of intermittant ventricular bigeminy. Stated also had bigeminy and trigeminy in OR. Not concerned since BP stable and tolerated by pt.  Attempted to void-no results except a few drops of urine

## 2021-02-28 NOTE — PROGRESS NOTES
Movement in fingers slightly better-still not mobilizing thumb or middle finger. Drowsy. Denies sandran.

## 2021-02-28 NOTE — PLAN OF CARE
Problem: Falls - Risk of:  Goal: Will remain free from falls  Outcome: Ongoing     Problem: Falls - Risk of:  Goal: Absence of physical injury  Outcome: Ongoing     Problem: Pain:  Goal: Pain level will decrease  Outcome: Ongoing     Problem: Pain:  Goal: Control of acute pain  Outcome: Ongoing     Problem: Pain:  Goal: Control of chronic pain  Outcome: Ongoing     Problem: Pain:  Intervention: Opioid analgesia side-effects  Note: Adama Gonzalez Bed in lowest position, wheels locked, 2/4 side rails up, nonskid footwear on. Bed/ chair check alarm in place, call light within reach. Pt instructed to call out when needing assistance. Pt stated understanding. Nurse will continue to monitor. Adama Gonzalez .Pt scoring pain on 0-10 scale. Pain medications given per MAR. Pt instructed to call out when pain level increasing. Call light within reach. Nurse will continue to reassess and monitor.

## 2021-03-01 ENCOUNTER — TELEPHONE (OUTPATIENT)
Dept: ORTHOPEDIC SURGERY | Age: 81
End: 2021-03-01

## 2021-03-01 VITALS
HEIGHT: 66 IN | RESPIRATION RATE: 16 BRPM | HEART RATE: 99 BPM | TEMPERATURE: 98.8 F | OXYGEN SATURATION: 95 % | SYSTOLIC BLOOD PRESSURE: 137 MMHG | DIASTOLIC BLOOD PRESSURE: 60 MMHG | BODY MASS INDEX: 29.86 KG/M2

## 2021-03-01 PROCEDURE — 97116 GAIT TRAINING THERAPY: CPT

## 2021-03-01 PROCEDURE — 97530 THERAPEUTIC ACTIVITIES: CPT

## 2021-03-01 PROCEDURE — 97161 PT EVAL LOW COMPLEX 20 MIN: CPT

## 2021-03-01 PROCEDURE — 6370000000 HC RX 637 (ALT 250 FOR IP): Performed by: ORTHOPAEDIC SURGERY

## 2021-03-01 PROCEDURE — 97535 SELF CARE MNGMENT TRAINING: CPT

## 2021-03-01 PROCEDURE — 2700000000 HC OXYGEN THERAPY PER DAY

## 2021-03-01 PROCEDURE — 97166 OT EVAL MOD COMPLEX 45 MIN: CPT

## 2021-03-01 PROCEDURE — 6360000002 HC RX W HCPCS: Performed by: ORTHOPAEDIC SURGERY

## 2021-03-01 PROCEDURE — 94761 N-INVAS EAR/PLS OXIMETRY MLT: CPT

## 2021-03-01 PROCEDURE — 2580000003 HC RX 258: Performed by: ORTHOPAEDIC SURGERY

## 2021-03-01 RX ORDER — OXYCODONE HYDROCHLORIDE AND ACETAMINOPHEN 5; 325 MG/1; MG/1
1-2 TABLET ORAL EVERY 4 HOURS PRN
Qty: 20 TABLET | Refills: 0 | Status: SHIPPED | OUTPATIENT
Start: 2021-03-01 | End: 2021-03-04

## 2021-03-01 RX ORDER — NAPROXEN 500 MG/1
500 TABLET ORAL 2 TIMES DAILY WITH MEALS
Qty: 10 TABLET | Refills: 0 | Status: SHIPPED | OUTPATIENT
Start: 2021-03-01 | End: 2021-03-11

## 2021-03-01 RX ORDER — ASPIRIN 81 MG/1
81 TABLET ORAL DAILY
Qty: 30 TABLET | Refills: 0 | Status: SHIPPED | OUTPATIENT
Start: 2021-03-01 | End: 2021-07-20

## 2021-03-01 RX ORDER — TRAMADOL HYDROCHLORIDE 50 MG/1
50 TABLET ORAL EVERY 6 HOURS PRN
Qty: 20 TABLET | Refills: 0 | Status: SHIPPED | OUTPATIENT
Start: 2021-03-01 | End: 2021-03-06

## 2021-03-01 RX ADMIN — LEVOTHYROXINE SODIUM 25 MCG: 0.03 TABLET ORAL at 06:48

## 2021-03-01 RX ADMIN — OXYCODONE 5 MG: 5 TABLET ORAL at 06:47

## 2021-03-01 RX ADMIN — TRIAMTERENE AND HYDROCHLOROTHIAZIDE 1 TABLET: 37.5; 25 TABLET ORAL at 08:59

## 2021-03-01 RX ADMIN — CEFAZOLIN SODIUM 2000 MG: 10 INJECTION, POWDER, FOR SOLUTION INTRAVENOUS at 06:47

## 2021-03-01 RX ADMIN — ASPIRIN 81 MG: 81 TABLET, COATED ORAL at 08:59

## 2021-03-01 RX ADMIN — FAMOTIDINE 20 MG: 20 TABLET ORAL at 09:09

## 2021-03-01 RX ADMIN — ATENOLOL 25 MG: 25 TABLET ORAL at 08:59

## 2021-03-01 RX ADMIN — Medication 10 ML: at 09:00

## 2021-03-01 ASSESSMENT — PAIN DESCRIPTION - ORIENTATION: ORIENTATION: RIGHT

## 2021-03-01 ASSESSMENT — PAIN SCALES - GENERAL: PAINLEVEL_OUTOF10: 5

## 2021-03-01 NOTE — PROGRESS NOTES
Restrictions  Restrictions/Precautions  Restrictions/Precautions: General Precautions, Weight Bearing  Required Braces or Orthoses?: Yes  Upper Extremity Weight Bearing Restrictions  Right Upper Extremity Weight Bearing: Non Weight Bearing  Required Braces or Orthoses  Right Upper Extremity Brace/Splint: Sling  Position Activity Restriction  Other position/activity restrictions: up with assistance, NO elbow ROM    Subjective   General  Chart Reviewed: Yes  Patient assessed for rehabilitation services?: Yes  Family / Caregiver Present: Yes(Pt's son, Randell Jones)  Referring Practitioner: FRANK Snyder  Diagnosis: RIGHT HUMERAL FRACTURE, s/p HUMERUS OPEN REDUCTION INTERNAL FIXATION 2/28/21  Subjective  Subjective: Pt in bed on arrival, pleasant and agreeable to eval and treat  General Comment  Comments: Per RN okay to treat  Patient Currently in Pain: Denies  Vital Signs  Pulse: 99  BP: 137/60  Patient Currently in Pain: Denies  Oxygen Therapy  SpO2: 95 %  O2 Device: None (Room air)  Social/Functional History  Social/Functional History  Lives With: Alone  Type of Home: House  Home Layout: One level  Home Access: Stairs to enter without rails  Entrance Stairs - Number of Steps: 1  Bathroom Shower/Tub: Tub/Shower unit, Walk-in shower  Bathroom Toilet: Standard  Home Equipment: (no equipment)  ADL Assistance: Independent  Homemaking Assistance: Independent  Homemaking Responsibilities: Yes  Ambulation Assistance: Independent(without device)  Transfer Assistance: Independent  Active : Yes  Occupation: Retired  Type of occupation: [employer], fast food  Additional Comments: Pt reports fell attempting to fill her birdfeeder, pt denies any other falls with injury in the past year. Pt reports typically lives alone, will be staying with son, Randell Jones, at d/c - above information reflects her son's home. Pt lives in a senior apartment.        Objective   Vision: Impaired  Vision Exceptions: Wears glasses for reading  Hearing: Exceptions to [facility]  Hearing Exceptions: Hard of hearing/hearing concerns    Orientation  Overall Orientation Status: Within Normal Limits     Balance  Sitting Balance: Supervision  Standing Balance: Stand by assistance  Standing Balance  Time: >3 minutes, 1-2 minutes, <1 minute over multiple trials  Activity: mobility, standing ADL  Functional Mobility  Functional - Mobility Device: No device  Activity: Other  Assist Level: Stand by assistance  Toilet Transfers  Toilet - Technique: Ambulating  Equipment Used: Standard toilet  Toilet Transfer: Supervision;Stand by assistance  ADL  Feeding: Dentures;Setup  Grooming: Stand by assistance(in stance at sink)  UE Dressing: Maximum assistance;Setup;Verbal cueing; Increased time to complete(to [facility] gown and don own gown, + assist for sling; Pt and son verbalized [facility])  LE Dressing: Minimal assistance;Setup;Verbal cueing; Increased time to complete(for socks, underwear, pants; Pt demonstrated understanding of compensatory cruz-techniques)  Toileting: Stand by assistance  Additional Comments: Per PA, pt cleared to sponge bathe only at this time - pt and son verbalized understanding. Sling to be worn AAT.   Tone LUE  LUE Tone: Normotonic  Coordination  Movements Are Fluid And Coordinated: No  Coordination and Movement description: Fine motor impairments;Gross motor impairments;Right UE     Bed mobility  Supine to Sit: Minimal assistance(HOB flat to L, no use of bedrail)  Sit to Supine: Minimal assistance  Transfers  Sit to stand: Stand by assistance;Supervision  Stand to sit: Stand by assistance;Supervision     Cognition  Overall Cognitive Status: WFL        Sensation  Overall Sensation Status: WFL        LUE AROM (degrees)  LUE AROM : WFL  Left Hand AROM (degrees)  Left Hand AROM: WFL  LUE Strength  Gross LUE Strength: WFL     Hand Dominance  Hand Dominance: Right       Goals  Short term goals  Time Frame for Short term goals: 1 time visit  Short term goal 1: Pt will complete functional transfers with SBA or better - GOAL MET  Short term goal 2: Pt will complete LE dressing with Min A or better - GOAL MET  Patient Goals   Patient goals : \"to go to the bathroom without physical assistance (SBA or better)\" - GOAL MET 3/1       Therapy Time   Individual Concurrent Group Co-treatment   Time In 1240(10 minutes for eval)         Time Out 1328         Minutes 48         Timed Code Treatment Minutes: 729 Sukumar Etienne, OTR/L

## 2021-03-01 NOTE — CARE COORDINATION
CASE MANAGEMENT INITIAL ASSESSMENT      Reviewed chart and completed assessment with:patient  Explained Case Management role/services. Primary contact information:see below  Health Care Decision Maker :   Primary Decision Maker: Teresa Beckman - [phone]      Can this person be reached and be able to respond quickly, such as within a few minutes or hours? Yes  Admit date/status:02/27/2021  Diagnosis:humerus fracture   Is this a Readmission?:  No      Insurance:Medicare   Precert required for SNF: No       3 night stay required: waived    Living arrangements, Adls, care needs, prior to admission:Pt lives alone in a senior living apartment, alone, IPTA, driving    [address] at home:  Walker__Cane__RTS_installed_ BSC__Shower Chair__  02__ HHN__ CPAP__  BiPap__  Hospital Bed__ W/C___ Other_grab bars_________    Services in the home and/or outpatient, prior to admission:Family plans to hire private duty aides      PT/OT recs:none at this time    Lorne Krishnamurthy 47 Notification (HEN):not initiated    Barriers to discharge:none    Plan/comments:Pt plans to d/c to son's house at d/c for 1 week and will go home w/private duty to assist at home after that. 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 IPTA, driving, does not use a walker to ambulate. CM does not anticipate DCP needs. Pt does not wear home O2. CM will continue to follow for possible needs.   Faisal House RN       ECOC on chart for MD martin

## 2021-03-01 NOTE — DISCHARGE SUMMARY
Department of Orthopedic Surgery  Physician Assistant   Discharge Summary    The Narayan Florez is a 68 y.o. female admitted for humerus fracture. Narayan Florez was admitted to the floor following Her recovery in the PACU.      Discharge Diagnosis  right humerus ORIF    Current Inpatient Medications    Current Facility-Administered Medications: albuterol (PROVENTIL) nebulizer solution 2.5 mg, 2.5 mg, Nebulization, Q4H PRN  budesonide-formoterol (SYMBICORT) 80-4.5 MCG/ACT inhaler 2 puff, 2 puff, Inhalation, BID PRN  HYDROmorphone (DILAUDID) injection 0.25 mg, 0.25 mg, Intravenous, Q3H PRN **OR** HYDROmorphone (DILAUDID) injection 0.5 mg, 0.5 mg, Intravenous, Q3H PRN  ceFAZolin (ANCEF) 2000 mg in dextrose 5 % 100 mL IVPB, 2,000 mg, Intravenous, Q8H  aspirin EC tablet 81 mg, 81 mg, Oral, Daily  atenolol (TENORMIN) tablet 25 mg, 25 mg, Oral, Daily  [Held by provider] leflunomide (ARAVA) tablet 20 mg, 20 mg, Oral, Daily  sulfaSALAzine (AZULFIDINE) tablet 500 mg, 500 mg, Oral, 4x Daily  levothyroxine (SYNTHROID) tablet 25 mcg, 25 mcg, Oral, Daily  triamterene-hydroCHLOROthiazide (MAXZIDE-25) 37.5-25 MG per tablet 1 tablet, 1 tablet, Oral, Daily  atorvastatin (LIPITOR) tablet 80 mg, 80 mg, Oral, Nightly  DULoxetine (CYMBALTA) extended release capsule 60 mg, 60 mg, Oral, Daily  sodium chloride flush 0.9 % injection 10 mL, 10 mL, Intravenous, 2 times per day  sodium chloride flush 0.9 % injection 10 mL, 10 mL, Intravenous, PRN  promethazine (PHENERGAN) tablet 12.5 mg, 12.5 mg, Oral, Q6H PRN **OR** ondansetron (ZOFRAN) injection 4 mg, 4 mg, Intravenous, Q6H PRN  polyethylene glycol (GLYCOLAX) packet 17 g, 17 g, Oral, Daily PRN  famotidine (PEPCID) tablet 20 mg, 20 mg, Oral, BID  oxyCODONE (ROXICODONE) immediate release tablet 5 mg, 5 mg, Oral, Q4H PRN **OR** oxyCODONE (ROXICODONE) immediate release tablet 10 mg, 10 mg, Oral, Q4H PRN    Post-operatively the patients diet was advanced as tolerated and their dressing was changed on POD #1. The incision is dressing in place, clean, dry and intact with no signs of infection. The patient remained neurovascularly intact in the right upper and had intact pulses distally. Patients calf remained soft and showed no evidence of DVT. The patient was able to move their right upper extremity without any problems post-operatively. Physical therapy and occupational therapy were consulted and began working with the patient post-operatively. The patient progressed with PT/OT as would be expected and continued to improve through their stay. The patients pain was initially controlled with IV medications but we were able to transition to oral pain medications soon after arrival to the floor and their pain remained under good control through their hospital stay. From a medical standpoint the patient remained stable and continued to have the medicine team follow throughout their stay. The patient will be discharged at this time to Home  with their current diet restrictions and will continue to follow the precautions outlined to them by us and PT/OT. 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 Will continue to monitor closely as outpatient. Condition on Discharge: Stable    Plan  Return visit in 2 weeks. .  Patient was instructed on the use of pain medications, the signs and symptoms of infection, and was given our number to call should they have any questions or concerns following discharge. For opioid prescriptions given at discharge the following statement is provided for compliance with [employer] rules. Patient being given increased dosage/quantity of opoid pain medication in excess of [employer] guidelines which noted a 30 MED daily of opioids due to the fact that he/she has undergone major orthopaedic surgery as outlined in rule 4731-11-13. Dosages and further duration of the pain medication will be re-evaluated at her post op visit in 2 weeks.   Patient was educated on dosing expectations and limits of prescribing as a result of the new [facility] rules enacted August 31, 2017. Please also note that this is not the initial opoid prescription issued to this patient but a continuation of medication utilized during the hospital admission as noted in the medical record. [place]ARRS report has also been utilized to screen for any abuse history or suspicious activity as outlined in [place]. All efforts have been taken to prevent abuse potential and misuse of opioid medications including education, screening, and close clinical follow up.

## 2021-03-01 NOTE — PROGRESS NOTES
Pt discharged home. All paperwork reviewed with pt and son at bedside. IV removed. Home clothes placed on pt and pt wheeled to outpt pharmacy to  meds and then wheeled to private transportation.

## 2021-03-01 NOTE — PROGRESS NOTES
Hospitalist Progress Note      PCP: Won Rapp MD    Date of Admission: 2/27/2021    Chief Complaint: R arm pain    Subjective: no new c/o. Medications:  Reviewed    Infusion Medications   Scheduled Medications    ceFAZolin  2,000 mg Intravenous Q8H    aspirin  81 mg Oral Daily    atenolol  25 mg Oral Daily    [Held by provider] leflunomide  20 mg Oral Daily    sulfaSALAzine  500 mg Oral 4x Daily    levothyroxine  25 mcg Oral Daily    triamterene-hydroCHLOROthiazide  1 tablet Oral Daily    atorvastatin  80 mg Oral Nightly    DULoxetine  60 mg Oral Daily    sodium chloride flush  10 mL Intravenous 2 times per day    famotidine  20 mg Oral BID     PRN Meds: albuterol, budesonide-formoterol, HYDROmorphone **OR** HYDROmorphone, sodium chloride flush, promethazine **OR** ondansetron, polyethylene glycol, oxyCODONE **OR** oxyCODONE      Intake/Output Summary (Last 24 hours) at 3/1/2021 0925  Last data filed at 3/1/2021 0540  Gross per 24 hour   Intake 600 ml   Output 525 ml   Net 75 ml       Physical Exam Performed:    BP (!) 145/55   Pulse 69   Temp 98.7 °F (37.1 °C) (Oral)   Resp 16   Ht 5' 6\" (1.676 m)   SpO2 94%   BMI 29.86 kg/m²     General appearance: No apparent distress, appears stated age and cooperative. HEENT: Pupils equal, round, and reactive to light. Conjunctivae/corneas clear. Neck: Supple, with full range of motion. No jugular venous distention. Trachea midline. Respiratory:  Normal respiratory effort. Clear to auscultation, bilaterally without Rales/Wheezes/Rhonchi. Cardiovascular: Regular rate and rhythm with normal S1/S2 without murmurs, rubs or gallops. Abdomen: Soft, non-tender, non-distended with normal bowel sounds. Musculoskeletal: No clubbing, cyanosis or edema bilaterally. Full range of motion without deformity. Skin: Skin color, texture, turgor normal.  No rashes or lesions. Neurologic:  Neurovascularly intact without any focal sensory/motor deficits. Cranial nerves: II-XII intact, grossly non-focal.  Psychiatric: Alert and oriented, thought content appropriate, normal insight  Capillary Refill: Brisk,< 3 seconds   Peripheral Pulses: +2 palpable, equal bilaterally       Labs:   Recent Labs     02/28/21  0603   WBC 8.5   HGB 12.5   HCT 38.2        Recent Labs     02/28/21  0603      K 3.7      CO2 27   BUN 33*   CREATININE 0.9   CALCIUM 9.5     No results for input(s): AST, ALT, BILIDIR, BILITOT, ALKPHOS in the last 72 hours. Recent Labs     02/28/21  0603   INR 1.02     No results for input(s): Kim Wenceslao in the last 72 hours. Urinalysis:      Lab Results   Component Value Date    NITRU Negative 09/20/2020    WBCUA 0-2 09/20/2020    BACTERIA 3+ 09/20/2020    RBCUA 0-2 09/20/2020    BLOODU Negative 09/20/2020    SPECGRAV 1.020 09/20/2020    GLUCOSEU Negative 09/20/2020       Consults:    IP CONSULT TO HOSPITALIST  IP CONSULT TO SOCIAL WORK  IP CONSULT TO SOCIAL WORK      Assessment/Plan:    Active Hospital Problems    Diagnosis    History of humerus fracture [Z87.81]    Obesity [E66.9]    Right supracondylar humerus fracture [S42.411A]    Hyperlipidemia [E78.5]    Hypertension [I10]       Right arm pain in setting of humerus fracture 2/2 mechanical fall  -Xray right humerus revealed acute humeral fracture  -X-ray right radius ulna revealed osteopenia. No acute abnormality in the right elbow or right forearm. Acute traumatic closed angulated and mildly displaced distal humeral fracture. S/P ortho repair 28 Feb w/out complications.     HTN - w/out known CAD and no evidence of active signs/sxs of ischemia/failure. Currently controlled on home meds w/ vitals reviewed and documented as above. HyperLipidemia - controlled on home Statin. Continue, w/ f/u and med adjustment w/ PCP    HypoThyroid - clinically euthyroid on oral replacement therapy. Continue, w/ outpt monitoring as previously arranged.      Overweight -  With Body mass index is 29.86 kg/m². Complicating assessment and treatment. Placing patient at risk for multiple co-morbidities as well as early death and contributing to the patient's presentation. Counseled on weight loss.       DVT Prophylaxis: IPC  Diet: DIET GENERAL;  Code Status: Full Code      PT/OT Eval Status: not yet ordered. Dispo - likely to home Monday/Tues 1/2 March pending clinical course and subspecialty recs.      Tanika Donaldson MD

## 2021-03-01 NOTE — TELEPHONE ENCOUNTER
Answering service message:     Taken 02:20:21 pm 02/27/21 Oper.  23  Dlvrd 02:22:03 pm 02/27/21 To FILEMON          ALPHA-NUMERIC PAGE          Terminal No. : 30        Pager Number : [phone]    Message : 80 BIANCASYEDA AGUILAR [id number]  9 PT KRISTINA BURGER P/O AREBI 4/15/  44 FELL BROKE ARM YESTERDAY FOR PO  SSIBLE SURGERY TONIGHT AT [facility]    **pt had sx yesterday with Dr. Liat Ta and is currently admitted**

## 2021-03-01 NOTE — OP NOTE
Operative Note      Patient: Joceline Ching  YOB: 1940  MRN: [id number]    Date of Procedure: 2/28/2021    Pre-Op Diagnosis: RIGHT HUMERAL FRACTURE    Post-Op Diagnosis: Same       Procedure(s):  HUMERUS OPEN REDUCTION INTERNAL FIXATION    Surgeon(s):  Aruna Huizar MD    Assistant:   Surgical Assistant: Ondina Castro    Anesthesia: General    Estimated Blood Loss (mL): 527     Complications: Other: Neuropraxic stretch to radial and ulnar nerves. No nerve laceration. Specimens:   * No specimens in log *    Implants:  Implant Name Type Inv.  Item Serial No.  Lot No. LRB No. Used Action   SCREW BNE L20MM DIA2.7MM HIRAL S STL ST FULL THRD FOR SM  SCREW BNE L20MM DIA2.7MM HIRAL S STL ST FULL THRD FOR SM  DEPUY [employer]  Right 1 Implanted   SCREW BNE L22MM DIA2.7MM HIRAL S STL ST FULL THRD FOR SM  SCREW BNE L22MM DIA2.7MM HIRAL S STL ST FULL THRD FOR SM  DEPUY [employer]  Right 1 Implanted   SCREW BNE L24MM DIA2.7MM HIRAL S STL ST FULL THRD FOR SM  SCREW BNE L24MM DIA2.7MM HIRAL S STL ST FULL THRD FOR SM  DEPUY [employer]  Right 1 Implanted   SCREW BNE L18MM DIA3.5MM HIRAL S STL ST SHAYNE FULL THRD  SCREW BNE L18MM DIA3.5MM HIRAL S STL ST SHAYNE FULL THRD  DEPUY [employer]  Right 1 Implanted   SCREW BNE L22MM DIA3.5MM HIRAL S STL ST SHAYNE FULL THRD  SCREW BNE L22MM DIA3.5MM HIRAL S STL ST SHAYNE FULL THRD  DEPUY [employer]  Right 1 Implanted   SCREW BNE L24MM DIA3.5MM HIRAL S STL ST SHAYNE FULL THRD  SCREW BNE L24MM DIA3.5MM HIRAL S STL ST SHAYNE FULL THRD  DEPUY [employer]  Right 3 Implanted   SCREW BNE L24MM DIA3.5MM HIRAL S STL ST NONCANNULATED SHAYNE  SCREW BNE L24MM DIA3.5MM HIRAL S STL ST NONCANNULATED SHAYNE  DEPUY [employer]WD  Right 6 Implanted   PLATE BNE [id number] 6 H R DST EXTRAARTICULAR HUM S STL SHAYNE  PLATE BNE [id number] 6 H R DST EXTRAARTICULAR HUM S STL SHAYNE  DEPU[employer]  Right 1 Implanted   PLATE BNE [id number] XLN 6 H NONSTERILE R MED DST HUM S STL FRED  PLATE BNE [id number] XLN 6 H NONSTERILE R MED DST HUM S STL FRED  DEPUY [employer]  Right 1 Implanted   SCREW BNE L18MM DIA2.7MM ANK S STL ST FRED ANG SHAYNE FULL THRD  SCREW BNE L18MM DIA2.7MM ANK S STL ST FRED ANG SHAYNE FULL THRD  DEPU[employer]  Right 18 Implanted   SCREW BNE L20MM DIA2.7MM ANK S STL ST FRED ANG SHAYNE FULL THRD  SCREW BNE L20MM DIA2.7MM ANK S STL ST FRED ANG SHAYNE FULL THRD  DEPUY [employer]  Right 20 Implanted   SCREW BNE L16MM DIA2.7MM ANK S STL ST FRED ANG SHAYNE FULL THRD  SCREW BNE L16MM DIA2.7MM ANK S STL ST FRED ANG SHAYNE FULL THRD  DEPUY [employer]  Right 11 Implanted         Drains: * No LDAs found *    Findings: as above    Operative Report: Indications: The patient is a 68 y.o. female with a displaced right humerus shaft fracture. The patient was had a partial PIN palsy from the injury, with an inability to extend her thumb MCP joint, but she was able to extend her wrist.  The patient was offered surgical reduction fixation and the risk, benefits, and alternatives and involved recovery were discussed. she has elected to undergo surgical fixation in order to allow anatomic alignment, early mobilization, better pain control, and allow the fracture to heal in a more anatomic alignment. she was prepared to proceed, consent was obtained and all questions were answered to her satisfaction. Description:   The patient was identified in the preoperative holding area and the correct site of the right humerus was marked. Patient was then brought to the operating room. A preoperative pause was performed where consent and the correct side were confirmed and the imaging was reviewed. Patient was then induced under general anesthesia and positioned lateral decubitus with a bean-bag godoy. All bony prominences were well padded. The operative arm was then held over an arm godoy to allow full access to the arm and elbow.  Surgical time out was called verifying necessary data and equipment, including the administration of epicondyle in a tension-free manner and gently handled with a vessel loop throughout the operation. The medial column was exposed, and medial column specific applied and secured distally with four 2.7 mm locking screws, and 2 separate 3.5 mm cortical screws in the shaft proximally. We verified full passive range of motion elbow with no crepitus. Final x-rays were taken all views long length the plane fraction confirmed a satisfactory reduction screw placement. The area was thoroughly irrigated again. Closure was carried out with #0 Vicryl for triceps fascia. The ulnar nerve was left in its original place and not transposed and was adequately covered with a subcutaneous closure followed using 2-0 Vicryl. Skin was closed with interrupted 3-0 nylon the arm was placed in a sling. A sterile bulky dressing was applied and the arm was placed in a\that was well-padded. All needle and sponge counts matched the initial count per circulating and scrub personnel x2 prior to terminating this case. Estimated blood loss was 250 cc. The patient was then removed from the operative table, awakened and taken to recovery room in stable condition having tolerated the procedure well. The patient's neurovascular exam in the recovery room revealed normal pulses, a well perfused hand, but a dense radial nerve plasty. AIN and Ulnar nerves were intact motor and sensory. The plan is non weight bearing for 6-8 weeks until clinical and radiographic union, with suture and backslab removal at 2 weeks, at which point she can commence elbow ROM. We discussed close monitoring of her radial nerve, and if no spontaneous return of function by 6 weeks, the need for an EMG study.      Mechelle 22 applies to this procedure given the complexity of the fracture and comminuted wedge, and careful nerve exploration of the radial and ulnar nerves required in order to gain full exposure of the fracture length to apply 180deg lateral and medial plating for complete fracture stability. The nerve exploration and added plating added a further 45 minutes to the procedure. Sincerely,      Megan Ren MD [facility]  Hip Preservation & Sports Medicine Surgeon   [address] [facility]. [address], [address]  Email: [email or web address]  Office: [phone]    02/28/21  7:14 PM        Electronically signed by Megan Ren MD on 2/28/2021 at 7:13 PM

## 2021-03-01 NOTE — PROGRESS NOTES
Physical Therapy    Facility/Department: [place] [address] - MED SURG/ORTHO  Initial Assessment, Treatment, and Discharge Summary    NAME: James Gramajo  : 1940  MRN: [id number]    Date of Service: 3/1/2021    Discharge Recommendations:  24 hour supervision or assist   PT Equipment Recommendations  Equipment Needed: No    Assessment   Assessment: Pt is 67 yo female who presents POD 1 s/p right humerus ORIF. Pt indep with mobility at baseline. Grossly SBA to supervision for mobility without device this date. Educated on Sling at all times, correct fit of sling, NWB RUE, no ROM elbow, pt verbalized understanding. Denies concerns about mobility at home. Plans to d/c to son's house. Will d/c from acute PT. Treatment Diagnosis: impaired mobility s/p right humerus ORIF  Prognosis: Good  Decision Making: Low Complexity  PT Education: Goals; General Safety;Gait Training;PT Role;Disease Specific Education;Plan of Care; Functional Mobility Training;Precautions;Transfer Training;Weight-bearing Education  Patient Education: Pt educated on NWB RUE, safe mobility technique with RUE NWB and compensatory strategies -- pt verbalized understanding  Barriers to Learning: none  REQUIRES PT FOLLOW UP: No  Activity Tolerance  Activity Tolerance: Patient Tolerated treatment well;Patient limited by fatigue       Patient Diagnosis(es): The encounter diagnosis was History of humerus fracture. has a past medical history of Asthma, Hyperlipidemia, Hypertension, RA (rheumatoid arthritis) ([facility] [address] and Thyroid disease. has a past surgical history that includes Appendectomy; Hysterectomy; Cholecystectomy; tumor removal; Thyroidectomy, partial (Left); eye surgery (Right, 12/11/15); eye surgery (Left, 16); Colonoscopy (2013); Colonoscopy (11/10/2016); and Abdominal exploration surgery.     Restrictions  Restrictions/Precautions  Restrictions/Precautions: General Precautions, Weight Bearing  Required Braces or Orthoses?: Yes  Upper Extremity Weight Bearing Restrictions  Right Upper Extremity Weight Bearing: Non Weight Bearing  Required Braces or Orthoses  Right Upper Extremity Brace/Splint: Sling  Position Activity Restriction  Other position/activity restrictions: up with assistance, NO elbow ROM  Vision/Hearing  Vision: Impaired  Vision Exceptions: Wears glasses for reading  Hearing: Exceptions to [facility]  Hearing Exceptions: Hard of hearing/hearing concerns     Subjective  General  Chart Reviewed: Yes  Patient assessed for rehabilitation services?: Yes  Response To Previous Treatment: Not applicable  Family / Caregiver Present: Yes(son intermittently)  Referring Practitioner: FRANK Mcrae  Referral Date : 03/01/21  Diagnosis: Right humerus fx; s/p HUMERUS OPEN REDUCTION INTERNAL FIXATION 2/28/2021  Follows Commands: Within Functional Limits  General Comment  Comments: Pt resting in bed on approach; RN cleared pt for therapy  Subjective  Subjective: pt agreeable to therapy  Pain Screening  Patient Currently in Pain: Denies  Vital Signs  Pulse: 99  BP: 137/60  Patient Currently in Pain: Denies  Oxygen Therapy  SpO2: 95 %  O2 Device: None (Room air)       Orientation  Orientation  Overall Orientation Status: Within Functional Limits  Social/Functional History  Social/Functional History  Lives With: Alone  Type of Home: House  Home Layout: One level  Home Access: Stairs to enter without rails  Entrance Stairs - Number of Steps: 1  Bathroom Shower/Tub: Tub/Shower unit, Walk-in shower  Bathroom Toilet: Standard  Home Equipment: (no equipment)  ADL Assistance: Independent  Homemaking Assistance: Independent  Homemaking Responsibilities: Yes  Ambulation Assistance: Independent(without device)  Transfer Assistance: Independent  Active : Yes  Occupation: Retired  Type of occupation: [employer], [employer] food  Additional Comments: Pt reports fell attempting to fill her birdfeeder, pt denies any other falls with injury in the past year.  Pt

## 2021-03-01 NOTE — PROGRESS NOTES
Department of Orthopedic Surgery  Physician Assistant   Progress Note    Subjective:       Systemic or Specific Complaints: Pt feels okay today. Sitting in bed waiting for PT. Wants to go home today. Objective:     Patient Vitals for the past 24 hrs:   BP Temp Temp src Pulse Resp SpO2   03/01/21 0848 (!) 145/55 98.7 °F (37.1 °C) Oral 69 16 94 %   03/01/21 0330 (!) 144/102 98.9 °F (37.2 °C) Oral 66 16 95 %   03/01/21 0000 -- -- -- 50 -- --   02/28/21 2219 (!) 101/57 98.8 °F (37.1 °C) Oral 50 16 95 %   02/28/21 1955 (!) 141/74 98.4 °F (36.9 °C) Oral 74 18 95 %   02/28/21 1802 133/79 98.4 °F (36.9 °C) Oral 75 18 95 %   02/28/21 1709 (!) 147/99 98.4 °F (36.9 °C) Oral 73 18 94 %   02/28/21 1600 (!) 142/63 97.1 °F (36.2 °C) Temporal 84 30 99 %   02/28/21 1545 (!) 128/115 -- -- 76 18 96 %   02/28/21 1530 125/69 -- -- 71 17 97 %   02/28/21 1515 116/74 -- -- 71 12 96 %   02/28/21 1500 128/86 -- -- 77 11 95 %   02/28/21 1445 137/78 -- -- 79 12 94 %   02/28/21 1440 (!) 151/89 -- -- 80 19 96 %   02/28/21 1435 (!) 150/110 -- -- 85 19 95 %   02/28/21 1430 (!) 145/68 -- -- 78 12 95 %   02/28/21 1425 (!) 150/93 -- -- 82 16 93 %   02/28/21 1424 -- -- -- 92 -- --   02/28/21 1420 (!) 148/72 -- -- 86 15 92 %   02/28/21 1415 135/89 97 °F (36.1 °C) Temporal 89 23 --       General: alert, appears stated age and cooperative   Wound: Wound clean and dry no evidence of infection. , Wound Intact and Positive for Edema   Motion: Sling intact in affected extremity. Still unable to extend thumb and having some trouble with wrist extension as well. Gross sensation intact to touch.    DVT Exam: No evidence of DVT seen on physical exam.     Additional exam:     Data Review  CBC:   Lab Results   Component Value Date    WBC 8.5 02/28/2021    RBC 3.58 02/28/2021    HGB 12.5 02/28/2021    HCT 38.2 02/28/2021     02/28/2021       Renal:   Lab Results   Component Value Date     02/28/2021    K 3.7 02/28/2021     02/28/2021 CO2 27 02/28/2021    BUN 33 02/28/2021    CREATININE 0.9 02/28/2021    GLUCOSE 100 02/28/2021    CALCIUM 9.5 02/28/2021            Assessment:      right humerus ORIF, radial nerve palsy. Plan:      1:  Will need to continue to monitor return of radial nerve over the next few weeks. She is secure in the current sling. Plan for her to d/c home today if does okay with PT. Will be staying with son this week at least.  Will plan to f/u with her in 2 weeks.   2:  Continue Deep venous thrombosis prophylaxis  3:  Continue Pain Control    Gonzalo First

## 2021-03-02 ENCOUNTER — TELEPHONE (OUTPATIENT)
Dept: ORTHOPEDIC SURGERY | Age: 81
End: 2021-03-02

## 2021-03-02 NOTE — TELEPHONE ENCOUNTER
Called and l/m on v/m for patient to call back to the office to schedule her post op with Dr. Valeriano Monsivais.  She can be scheduled on 3/11 at  or 3/15 at [facility]

## 2021-03-08 ENCOUNTER — TELEPHONE (OUTPATIENT)
Dept: ORTHOPEDIC SURGERY | Age: 81
End: 2021-03-08

## 2021-03-08 NOTE — TELEPHONE ENCOUNTER
Faxing Patient [facility] Name: INTERIM   Contact Name: [facility]  Contact Number: [phone]   Facility Fax Number: [phone]    NEEDING PATIENT DEMOGRAPHICS, MEDICATION LIST, AND HX & PHYSICAL

## 2021-03-11 ENCOUNTER — OFFICE VISIT (OUTPATIENT)
Dept: ORTHOPEDIC SURGERY | Age: 81
End: 2021-03-11
Payer: MEDICARE

## 2021-03-11 ENCOUNTER — TELEPHONE (OUTPATIENT)
Dept: ORTHOPEDIC SURGERY | Age: 81
End: 2021-03-11

## 2021-03-11 VITALS — BODY MASS INDEX: 28.72 KG/M2 | WEIGHT: 183 LBS | HEIGHT: 67 IN | RESPIRATION RATE: 12 BRPM

## 2021-03-11 DIAGNOSIS — M25.531 RIGHT WRIST PAIN: ICD-10-CM

## 2021-03-11 DIAGNOSIS — S42.411D CLOSED SUPRACONDYLAR FRACTURE OF RIGHT HUMERUS WITH ROUTINE HEALING, SUBSEQUENT ENCOUNTER: Primary | ICD-10-CM

## 2021-03-11 PROCEDURE — L3908 WHO COCK-UP NONMOLDE PRE OTS: HCPCS | Performed by: ORTHOPAEDIC SURGERY

## 2021-03-11 PROCEDURE — 99024 POSTOP FOLLOW-UP VISIT: CPT | Performed by: ORTHOPAEDIC SURGERY

## 2021-03-11 NOTE — TELEPHONE ENCOUNTER
GILES FROM [facility] IS NEEDING RACHEL TO CALL FOR SOME INFO NEEDED FOR A REFERRAL THAT WAS SENT TO HER. PLEASE CALL HER [phone].

## 2021-03-11 NOTE — TELEPHONE ENCOUNTER
Patient is being seen today in the office. [facility] will need to be called to verify information after patients appointment. Please advise.

## 2021-03-12 ENCOUNTER — TELEPHONE (OUTPATIENT)
Dept: ORTHOPEDIC SURGERY | Age: 81
End: 2021-03-12

## 2021-03-12 NOTE — TELEPHONE ENCOUNTER
General Question     Subject: MEDICATION  Patient and /or Facility Request: Lydia Cody  Contact Number: [phone]    PATIENT'S SON CALLING TO CONFIRM IF HIS MOTHER CAN START BACK TAKING HER RHEUMATOID ARTHRITIS MEDICATION.  PLEASE GIVE SON A CALL BACK AT THE ABOVE NUMBER

## 2021-03-12 NOTE — TELEPHONE ENCOUNTER
L/m on v/m regarding [facility] for patient. Per office visit 3/11/21 with Dr. Juan J Yu. She is okay to perform light elbow ROM at this time. Updated plan of care will be relayed as treatment course changes.

## 2021-03-14 NOTE — PROGRESS NOTES
History of Present Illness:  Jack Hannah is a pleasant 68 y.o. female who presents for a post operative visit. She is two weeks out following a right distal humerus fracture ORIF. Overall She is doing okay and feels that their pain is well controlled with current pain medications. She has been compliant with wearing the sling brace at all times. She finds her arm comfortable. She is noting the wrist drop and some inability to abduct her fingers. She can flex her fingers. She in fact has some back pain worse with sleeping flat on her back. She denies fevers, chills, numbness, tingling, and shortness of breath. Medical History:  Patient's medications, allergies, past medical, surgical, social and family histories were reviewed and updated as appropriate. No notes on file    Review of Systems  A 14 point review of systems was completed by the patient   and is available in the media section of the scanned medical record and was reviewed on 3/14/2021. Vital Signs:  Vitals:    03/11/21 1354   Resp: 12       General/Appearance: Alert and oriented and in no apparent distress. Skin:  There are no skin lesions, cellulitis, or extreme edema. The patient has warm and well-perfused Bilateral upper extremities with brisk capillary refill. Right Shoulder Exam:    Inspection: Arm incision  is clean, dry and intact and well approximated. The nylon suture are still in place. Mild ecchymosis and swelling are present as can be expected. There is no erythema, drainage or other signs of infection    Palpation:  No crepitus to gentle motion of the shoulder or elbow. Active Range of Motion: Wrist drop of the right wrist. Can activate her long finger flexors (median nerve), mildly activate her hand intrinsics (ulnar nerve), and fully fire her 4th and 5th finger FDP (ulnar nerve). Passive Range of Motion:  Full elbow motion. Full wrist motion.     Strength:  Deferred    Special Tests: Deferred. Neurovascular: Sensation to light touch is intact, no motor deficits, palpable radial pulses 2+    Radiology:     Plain radiographs of the right humerus comprising of two (AP, Lateral) views were obtained and reviewed in the office: Shows postsurgical changes from the ORIF humerus fracture. All the fixation is in good placement without any signs of loosening. Impression: Stable postop x-ray. Assessment :  Ms. Laura Martines is a pleasant 68 y.o. 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 No other acute concerns, no signs of infection, and pain is well controlled. Impression:  Encounter Diagnoses   Name Primary?  Closed supracondylar fracture of right humerus with routine healing, subsequent encounter Yes    Right wrist pain        Office Procedures:  Orders Placed This Encounter   Procedures    XR HUMERUS RIGHT (MIN 2 VIEWS)     Standing Status:   Future     Number of Occurrences:   1     Standing Expiration Date:   3/11/2022    Soo Bonds Titan Wrist Short Brace     Patient was prescribed a Soo Bonds Titan Wrist Orthosis. The right wrist will require stabilization / immobilization from this semi-rigid / rigid orthosis to improve their function. The orthosis will assist in protecting the affected area, provide functional support and facilitate healing. The patient was educated and fit by a healthcare professional with expert knowledge and specialization in brace application while under the direct supervision of the treating physician. Verbal and written instructions for the use of and application of this item were provided. They were instructed to contact the office immediately should the brace result in increased pain, decreased sensation, increased swelling or worsening of the condition. Treatment Plan:    Overall Laura Martines is doing well.  The pain is well-controlled. We recommend that She wear the shoulder sling/brace at all times with the exception of clothing, bathing and physical therapy. The patient was told that she is restricted from driving for at least 3 weeks postop. We recommended close observation of her radial and ulnar nerve neuropraxia, and to wear a wrist drop splint to avoid a wrist flexion contracture. I did recommend home healthcare and a self-driven home motion program for her elbow and wrist/fingers to avoid stiffness. I advised her that should there be no spontaneous improvement in her neuropraxias, that we would obtain an EMG/NCS of her right upper extremity in 4 weeks, (6 weeks post-operative). All of her questions were fully answered today. We would like to see Wong Garcia back in 2 weeks for follow-up visit. Sincerely,    Dejon Ugarte MD [facility]  Hip Preservation & Sports Medicine Surgeon   [address] [facility]. [address], [address]  Email: [email or web address]  Office: [phone]    03/14/21  9:33 AM

## 2021-03-15 NOTE — TELEPHONE ENCOUNTER
S/w patient's son regarding message. Dr. Richie Hammonds has stated it is okay for patient to resume taking rheumatoid arthritis medication. All other questions answered.

## 2021-04-08 ENCOUNTER — OFFICE VISIT (OUTPATIENT)
Dept: ORTHOPEDIC SURGERY | Age: 81
End: 2021-04-08

## 2021-04-08 VITALS — RESPIRATION RATE: 12 BRPM | HEIGHT: 66 IN | BODY MASS INDEX: 28.93 KG/M2 | WEIGHT: 180 LBS

## 2021-04-08 DIAGNOSIS — S42.411D CLOSED SUPRACONDYLAR FRACTURE OF RIGHT HUMERUS WITH ROUTINE HEALING, SUBSEQUENT ENCOUNTER: Primary | ICD-10-CM

## 2021-04-08 DIAGNOSIS — R20.2 PARESTHESIA OF RIGHT UPPER EXTREMITY: ICD-10-CM

## 2021-04-08 DIAGNOSIS — M25.531 RIGHT WRIST PAIN: ICD-10-CM

## 2021-04-08 PROCEDURE — 99024 POSTOP FOLLOW-UP VISIT: CPT | Performed by: ORTHOPAEDIC SURGERY

## 2021-04-08 PROCEDURE — L3908 WHO COCK-UP NONMOLDE PRE OTS: HCPCS | Performed by: ORTHOPAEDIC SURGERY

## 2021-04-08 NOTE — PROGRESS NOTES
and partial ulnar motor deficits (finger abduction), palpable radial pulses 2+        Radiology:     Plain radiographs of the RIGHT DISTAL HUMERUS comprising 2 views: ap/lat were obtained and reviewed in the office: healing fracture with no displacement or hardware loosening. Assessment :  Ms. Candyce Kehr is a pleasant, 68 y.o. patient who is healing her right distal humerus fracture. She still has a post operative radial/PIN nerve palsy, and partial ulnar nerve palsy. Impression:  Encounter Diagnoses   Name Primary?  Closed supracondylar fracture of right humerus with routine healing, subsequent encounter Yes    Right wrist pain        Office Procedures:  Orders Placed This Encounter   Procedures    XR HUMERUS RIGHT (MIN 2 VIEWS)     Standing Status:   Future     Number of Occurrences:   1     Standing Expiration Date:   4/8/2022       Treatment Plan: We recommend She undergo a right upper extremity EMG. We also suggest advanced occupational hand therapy at our [facility] office. . A new physical therapy letter was documented in EPIC today. We will see Jenni Houston back in 4 weeks and/or as needed to discuss the EMG/NCS, monitor for recovery,and refer to a nerve specialist as needed. All questions were answered to patient's satisfaction and She was encouraged to call with any further questions or concerns. Christel Conroy is in agreement with this plan. Sincerely,    Thony Gonzalez MD [facility]   [address], [address]  Email: [email or web address]  Office: [phone]      04/08/21  4:03 PM     The encounter with Candyce Kehr was carried out by myself, Dr Ishmael Barber, who personally examined the patient and reviewed the plan. This dictation was performed with a verbal recognition program (DRAGON) and it was checked for errors.   It is possible that there

## 2021-04-13 ENCOUNTER — HOSPITAL ENCOUNTER (OUTPATIENT)
Dept: OCCUPATIONAL THERAPY | Age: 81
Setting detail: THERAPIES SERIES
Discharge: HOME | End: 2021-04-13
Payer: MEDICARE

## 2021-04-13 PROCEDURE — 97022 WHIRLPOOL THERAPY: CPT | Performed by: OCCUPATIONAL THERAPIST

## 2021-04-13 PROCEDURE — 97112 NEUROMUSCULAR REEDUCATION: CPT | Performed by: OCCUPATIONAL THERAPIST

## 2021-04-13 PROCEDURE — 97165 OT EVAL LOW COMPLEX 30 MIN: CPT | Performed by: OCCUPATIONAL THERAPIST

## 2021-04-13 NOTE — PLAN OF CARE
her friend's home    Pain Scale: 3/10  [x]Constant      []Intermittent    []other:  Pain Location:  R thumb  Easing factors: rest  Provocative factors: none    OBJECTIVE:   Date:  Hand Dominance:     [x]  Right    [] Left 4/13/2021     Objective Measures:    PAIN 3/10   Quick DASH 80%   Digit  ROM         Index     MP:ext 75  PIP:WFL  DIP:WFL                              Long MP:ext 76  PIP:WFL  DIP:WFL                              Ring MP:ext 80  PIP:WFL  DIP: WFL                              Small MP: ext 80  PIP:WFL  DIP:WFL   Thumb ROM MP  IP   Thumb opposition  2,3,4,5 with difficulty:   Thumb Radial/Palmar abd ROM 0/45:   Wrist ROM Ext/Flex (-)15/80:   Rad/Uln dev ROM R:  L:   Forearm ROM  Sup/pron 66/90:   Elbow ROM Ext/flex 25/145:   Shoulder Flex  Shoulder Abd  Shoulder IR/ER WFL   Edema in cm circumf. MCPJs R:  L:   Edema in cm circumf.   Wrist R:  L:    strength in lbs NT   Pinch Strengthin lbs: lat  R:  L:   Pinch Strength in lbs:  3 point R:  L:     MMT:     Observations:  (including splints, bandages, incisions, scars):      Sensation:  [] No reported deficits  [] Intact to light touch    [] [place] test completed, findings as noted:  [x] Other: N and T in thumb    Palpation: unremarkable    Occupational Profile:  Home Enviroment: lives with  [] spouse,  [] family,  [x] alone,  [] significant other,   [] other:    Occupation/School:retired    Recreational Activities/Meaningful Interests:     Prior Level of Function: [x] Independent with ADLs/IADLs     [] Assistance needed (describe):    Patient-Identified Primary Performance Deficits (to be addressed in POC):   [x] bathing    [x] household tasks (cooking/cleaning)   [x] dressing    [] self feeding   [x] grooming    [] work/education   [] functional mobility   [] sleeping/rest   [] toileting/hygiene   [] recreational activities   [x] driving    [] community/social participation   [] other:     Comorbidities Affecting Functional Performance: [] Excellent [x] Good [] Fair  [] Poor    PLAN OF CARE:  Interventions:   [x] Therapeutic Exercise [x] Therapeutic Activity    [x] Activities of Daily Living [x] Neuromuscular Re-education      [x] Patient Education  [x] Manual Therapy      [x] Modalities as needed, and not otherwise contraindicated, including: ultrasound,paraffin,moist heat/cold pack, electrical stimulation, contrast bath, iontophoresis  [] Splinting    Frequency/Duration:  1-2 days per week for 12 weeks      GOALS:  Patient stated goal: \"I would like to be able to  Use my hand\"    [] Progressing: [] Met: [] Not Met: [] Adjusted    Therapist goals for Patient:   Short Term Goals: To be achieved in: 2 weeks  1. Independent in HEP and progression per patient tolerance, in order to prevent re-injury. [] Progressing: [] Met: [] Not Met: [] Adjusted   2. Patient will have a decrease in pain to facilitate improvement in movement, function, and ADLs as indicated by Functional Deficits. [] Progressing: [] Met: [] Not Met: [] Adjusted    Long Term Goals to be achieved in 12 weeks (through 7/13/21), including patient directed goals to address patient identified performance deficits:  1) Pt to be independent in graded HEP progression with a good level of effort and compliance. [] Progressing: [] Met: [] Not Met: [] Adjusted   2) Pt to report a score of </= 25 % on the Quick DASH disability questionnaire for increased performance with carrying, moving, and handling objects. [] Progressing: [] Met: [] Not Met: [] Adjusted   3) Pt will demonstrate increased ROM to [facility] for improved independence with self care, and home mgt tasks. [] Progressing: [] Met: [] Not Met: [] Adjusted   4) Pt will demonstrate increased  strength to at least 15# for improved independence with self care, and home mgt tasks. .  [] Progressing: [] Met: [] Not Met: [] Adjusted   5) Pt will have a decrease in pain to 3/10 to facilitate self care, and home mgt tasks. ..   [] Progressing: [] Met: [] Not Met: [] Adjusted        OCCUPATIONAL THERAPY EVALUATION COMPLEXITY JUSTIFICATION:    [x] An occupational profile and medical/therapy history, which includes:   [x] a brief history including medical and/or therapy records relating to the     presenting problem   [] an expanded review of medical and/or therapy records and additional review     of physical, cognitive or psychosocial history related to current functional    performance   [] an extensive additional review of review of medical and/or therapy records   and physical, cognitive, or psychosocial history related to current    functional performance    [x] An assessment that identifies performance deficits (relating to physical, cognitive, or psychosocial skills) that result in activity limitations and/or participation restrictions:   [x] 1-3 performance deficits   [] 3-5 performance deficits   [] 5 or more performance deficits    [x] Clinical decision making of:   [x] low complexity, including analysis of occupational profile, data analysis from problem focused assessment, and consideration of a limited number of treatment options. No comorbidities affect occupational performance. No task modifications or assistance needed to complete evaluation. [] moderate complexity, including analysis of occupational profile, data analysis from detailed assessment and consideration of several treatment options. Comorbidities that affect occupational performance may be present. Minimal to moderate task modifications or assistance needed to complete assessment. [] high complexity, including analysis of occupational profile, analysis of data from comprehensive assessment and consideration of multiple treatment options. Multiple comorbidities present that affect occupational performance. Significant task modifications or assistance needed to complete assessment.     Evaluation Code:  [x] Low Complexity EVAL 17363 (typically 30 minutes face to face)  [] Mod Complexity EVAL 89546 (typically 45 minutes face to face)  [] High Complexity EVAL 33372 (typically 60 minutes face to face)    Electronically signed by:  Samm Alfaro   OT/L [id number]

## 2021-04-13 NOTE — FLOWSHEET NOTE
[address] Home, [address] [place] [address] [address]  Phone: [phone] Fax: [phone]    Occupational Therapy Treatment Note/ Progress Report:     Is this a Progress Report:     []  Yes  [x]  No      If Yes:  Date Range for reporting period:  Beginning 21    Ending    Progress report will be due (10 Rx or 30 days whichever is less): 90    Recertification will be due (POC Duration  / 90 days whichever is less): 21   Patient: Stephanie Powell                                  : 1940                                  MRN: [id number]  Referring Physician: Referring Practitioner: Moshe Tai                                                 Evaluation Date: 2021                                         Medical Diagnosis Information:  Diagnosis: L69.294E (ICD-10-CM) - Closed supracondylar fracture of right humerus with routine healing, subsequent [id number] (ICD-10-CM) - Right wrist painR20.2 (ICD-10-CM) - Paresthesia of right upper extremity                 Date of Injury:21  Date of Surgery: 21                                  Insurance information:       Date of Patient follow up with Physician: not scheduled yet   Has the plan of care been signed (Y/N):        []  Yes  [x]  No       Visit # Insurance Allowable Auth Required   1 ? 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 No      []? Yes                    Pacemaker:  []? No       []?  Yes         Functional Scale: 80% (Quick DASH)                                 Date assessed:  2021     SUBJECTIVE:   Patient reported deficits/history of current problem: Pt tripped and fell at outside while filling bird feeder at her friend's home     Pain Scale: 3/10 [x]?Constant                []?Intermittent              []?other:  Pain Location:  R thumb  Easing factors: rest  Provocative factors: none     OBJECTIVE:   Date:  Hand Dominance:     [x]? Right    []? Left 4/13/2021      Objective Measures:     PAIN 3/10   Quick DASH 80%   Digit  ROM         Index       MP:ext 76  PIP:WFL  DIP:WFL                              Long MP:ext 76  PIP:WFL  DIP:WFL                              Ring MP:ext 80  PIP:WFL  DIP: WFL                              Small MP: ext 80  PIP:WFL  DIP:WFL   Thumb ROM MP  IP   Thumb opposition  2,3,4,5 with difficulty:   Thumb Radial/Palmar abd ROM 0/45:   Wrist ROM Ext/Flex (-)15/80:   Rad/Uln dev ROM R:  L:   Forearm ROM  Sup/pron 66/90:   Elbow ROM Ext/flex 25/145:   Shoulder Flex  Shoulder Abd  Shoulder IR/ER WFL   Edema in cm circumf. MCPJs R:  L:   Edema in cm circumf. Wrist R:  L:    strength in lbs NT   Pinch Strengthin lbs: lat  R:  L:   Pinch Strength in lbs:  3 point R:  L:      MMT:      Observations:  (including splints, bandages, incisions, scars):           MODALITIES: 4/13/21      Fluidotherapy (83315)      Estim (75645/17967)      Paraffin (47747)      US (60211)      Iontophoresis (17644)      Hot Pack      Cold Pack            INTERVENTIONS:      Pt educ Discussed recommendation to cont with previous HEP and add AROM in water                                                                             Manual Therapy (54160)      (IASTM, Dry Needling, manual mobilization)            Splinting      Lcode:      Orthotic Mgmt, Subsequent Enc (71968)      Orthotic Mgmt & Training (41908)            Other:              Therapeutic Exercise & NMR:  [] (89271) Provided verbal/tactile cueing for activities related to strengthening, flexibility, endurance, ROM  for improvements in scapular, scapulothoracic and UE control with self care, reaching, carrying, lifting, house/yardwork, driving/computer work.    [] (57051) Provided verbal/tactile EVAL (LOW) 60154 (typically 20 minutes face-to-face)    [] EVAL (MOD) 55311 (typically 30 minutes face-to-face)  [] EVAL (HIGH) 16969 (typically 45 minutes face-to-face)  [] OT Re-eval (45901)       [] Etelvina (83557) x      [] AORBD(61766)  [x] NMR (01194) x   1   [] Estim (attended) (53051)   [] Manual ([id number]) x      [] US (89449)  [] TA (20832) x      [] Paraffin (65762)  [] ADL  (89927) x     [] Splint/L code:    [] Estim (unattended) (36732)  [x] Fluidotherapy (37528)  [] Other:    Comorbidities Affecting Functional Performance:     []Anxiety (F41.9)/Depression (F32.9)   []Diabetes Type 1(E10.65) or 2 (E11.65)   []Rheumatoid Arthritis (M05.9)  []Fibromyalgia (M79.7)  []Neuropathy(G60.9)  []Osteoarthritis(M19.91)  []None   []Other:    ASSESSMENT:      GOALS:  Patient stated goal: \"I would like to be able to  Use my hand\"    []? Progressing: []? Met: []? Not Met: []? Adjusted     Therapist goals for Patient:   Short Term Goals: To be achieved in: 2 weeks  1. Independent in HEP and progression per patient tolerance, in order to prevent re-injury. []? Progressing: []? Met: []? Not Met: []? Adjusted   2. Patient will have a decrease in pain to facilitate improvement in movement, function, and ADLs as indicated by Functional Deficits. []? Progressing: []? Met: []? Not Met: []? Adjusted     Long Term Goals to be achieved in 12 weeks (through 7/13/21), including patient directed goals to address patient identified performance deficits:  1) Pt to be independent in graded HEP progression with a good level of effort and compliance. []? Progressing: []? Met: []? Not Met: []? Adjusted   2) Pt to report a score of </= 25 % on the Quick DASH disability questionnaire for increased performance with carrying, moving, and handling objects. []? Progressing: []? Met: []? Not Met: []? Adjusted   3) Pt will demonstrate increased ROM to [facility] for improved independence with self care, and home mgt tasks. []? Progressing: []?  Met: []? Not Met: []? Adjusted   4) Pt will demonstrate increased  strength to at least 15# for improved independence with self care, and home mgt tasks. .  []? Progressing: []? Met: []? Not Met: []? Adjusted   5) Pt will have a decrease in pain to 3/10 to facilitate self care, and home mgt tasks. .. []? Progressing: []? Met: []? Not Met: []? Adjusted      Overall Progression Towards Functional Goals/Treatment Progress Update:  [] Patient is progressing as expected towards functional goals listed. [] Progression is slowed due to complexities/impairments listed. [] Progression has been slowed due to co-morbidities. [x] Plan just implemented, too soon to assess goals progression <30 days  [] Goals require adjustment due to lack of progress  [] Patient is not progressing as expected and requires additional follow up with physician  [] All goals are met  [] Other:     Prognosis for POC: [x] Good [] Fair  [] Poor    Patient requires continued skilled intervention: [x] Yes  [] No    Treatment/Activity Tolerance:  [x] Patient able to complete treatment  [] Patient limited by fatigue  [] Patient limited by pain    [] Patient limited by other medical complications  [] Other:                  PLAN: See eval  [] Continue per plan of care [] Alter current plan (see comments above)  [x] Plan of care initiated [] Hold pending MD visit [] Discharge      Electronically signed by:  Haroldo Javed OT/JOSEPH [id number]    Note: If patient does not return for scheduled/ recommended follow up visits, this note will serve as a discharge from care along with most recent update on progress.   Phone: [phone]  Fax [phone]

## 2021-04-20 ENCOUNTER — APPOINTMENT (OUTPATIENT)
Dept: OCCUPATIONAL THERAPY | Age: 81
End: 2021-04-20
Payer: MEDICARE

## 2021-04-27 ENCOUNTER — HOSPITAL ENCOUNTER (OUTPATIENT)
Dept: OCCUPATIONAL THERAPY | Age: 81
Setting detail: THERAPIES SERIES
Discharge: HOME | End: 2021-04-27
Payer: MEDICARE

## 2021-04-27 PROCEDURE — 97110 THERAPEUTIC EXERCISES: CPT | Performed by: OCCUPATIONAL THERAPIST

## 2021-04-27 PROCEDURE — 97022 WHIRLPOOL THERAPY: CPT | Performed by: OCCUPATIONAL THERAPIST

## 2021-04-27 PROCEDURE — 97112 NEUROMUSCULAR REEDUCATION: CPT | Performed by: OCCUPATIONAL THERAPIST

## 2021-04-27 NOTE — FLOWSHEET NOTE
[address] [place], [address] [place] [address] [address]  Phone: [phone] Fax: [phone]    Occupational Therapy Treatment Note/ Progress Report:     Is this a Progress Report:     []  Yes  [x]  No      If Yes:  Date Range for reporting period:  Beginning 21    Ending    Progress report will be due (10 Rx or 30 days whichever is less):     Recertification will be due (POC Duration  / 90 days whichever is less): 21   Patient: Eloisa Rey                                  : 1940                                  MRN: [id number]  Referring Physician: Referring Practitioner: Shi Swanson                                                 Evaluation Date: 2021                                         Medical Diagnosis Information:  Diagnosis: Q50.143J (ICD-10-CM) - Closed supracondylar fracture of right humerus with routine healing, subsequent rdtrpjbfkE19.531 (ICD-10-CM) - Right wrist painR20.2 (ICD-10-CM) - Paresthesia of right upper extremity                 Date of Injury:21  Date of Surgery: 21                                  Insurance information:       Date of Patient follow up with Physician: not scheduled yet   Has the plan of care been signed (Y/N):        []  Yes  [x]  No       Visit # Insurance Allowable Auth Required   2 ? 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 No      []? Yes                    Pacemaker:  []? No       []?  Yes         Functional Scale: 80% (Quick DASH)                                 Date assessed:  2021     SUBJECTIVE: Pt reports soreness in shoulder after last session  Patient reported deficits/history of current problem: Pt tripped and fell at outside while filling bird feeder at her friend's home     Pain Scale: 3/10          [x]? Constant                []?Intermittent              []?other:  Pain Location:  R thumb  Easing factors: rest  Provocative factors: none     OBJECTIVE:   Date:  Hand Dominance:     [x]? Right    []? Left 4/13/2021      Objective Measures:     PAIN 3/10   Quick DASH 80%   Digit  ROM         Index       MP:ext 76  PIP:WFL  DIP:WFL                              Long MP:ext 76  PIP:WFL  DIP:WFL                              Ring MP:ext 80  PIP:WFL  DIP: WFL                              Small MP: ext 80  PIP:WFL  DIP:WFL   Thumb ROM MP  IP   Thumb opposition  2,3,4,5 with difficulty:   Thumb Radial/Palmar abd ROM 0/45:   Wrist ROM Ext/Flex (-)15/80:   Rad/Uln dev ROM R:  L:   Forearm ROM  Sup/pron 66/90:   Elbow ROM Ext/flex 25/145:   Shoulder Flex  Shoulder Abd  Shoulder IR/ER WFL   Edema in cm circumf. MCPJs R:  L:   Edema in cm circumf. Wrist R:  L:    strength in lbs NT   Pinch Strengthin lbs: lat  R:  L:   Pinch Strength in lbs:  3 point R:  L:      MMT:      Observations:  (including splints, bandages, incisions, scars): MODALITIES: 4/13/21 4/27/21    Fluidotherapy (37004) 15' 15'    Estim (54560/41529)  *Tried NMES to wrist extensors however no active or AAROM in ext observed.     Paraffin (80684)      US (20664)      Iontophoresis (79176)      Hot Pack      Cold Pack            INTERVENTIONS:       Discussed recommendation to cont with previous HEP and add AROM in water Recommended getting plastic ball for bilateral ball exercise activities    ROM  AAROM wrist forearm; fingers          Wrist cisor  3 cycles          Bilateral ball exercises   sup/pron-ball rotation    Elbow flex/ext          Gross motor coordination  Palm up/down  Then alternating            Verbal and tactile cues during exercises                      Manual Therapy (16700)      (IASTM, Dry Needling, manual mobilization)            Splinting      Lcode:      Orthotic Mgmt, Subsequent Enc (12564)      Orthotic Mgmt & Training (91935)            Other:              Therapeutic Exercise & NMR:  [] (18979) Provided verbal/tactile cueing for activities related to strengthening, flexibility, endurance, ROM  for improvements in scapular, scapulothoracic and UE control with self care, reaching, carrying, lifting, house/yardwork, driving/computer work.    [] (31583) Provided verbal/tactile cueing for activities related to improving balance, coordination, kinesthetic sense, posture, motor skill, proprioception  to assist with  scapular, scapulothoracic and UE control with self care, reaching, carrying, lifting, house/yardwork, driving/computer work.     Therapeutic Activities & NMR:    [] (05420 or 23231) Provided verbal/tactile cueing for activities related to improving balance, coordination, kinesthetic sense, posture, motor skill, proprioception and motor activation to allow for proper function of scapular, scapulothoracic and UE control with self care, carrying, lifting, driving/computer work    Home Exercise Program:    [] (52671) Reviewed/Progressed HEP activities related to strengthening, flexibility, endurance, ROM of scapular, scapulothoracic and UE control with self care, reaching, carrying, lifting, house/yardwork, driving/computer work  [] (97463) Reviewed/Progressed HEP activities related to improving balance, coordination, kinesthetic sense, posture, motor skill, proprioception of scapular, scapulothoracic and UE control with self care, reaching, carrying, lifting, house/yardwork, driving/computer work      Manual Treatments:  PROM / STM / Oscillations-Mobs:  G-I, II, III, IV (PA's, Inf., Post.)  [] (63502) Provided manual therapy to mobilize soft tissue/joints of cervical/CT, scapular GHJ and UE for the purpose of modulating pain, promoting relaxation,  increasing ROM, reducing/eliminating soft tissue swelling/inflammation/restriction, improving soft tissue extensibility and allowing for proper ROM for normal function with self care, reaching, carrying, lifting, house/yardwork, driving/computer work    ADL Training:  [] (48173) Provided self-care/home management training related to activities of daily living and compensatory training, and/or use of adaptive equipment      Charges:  Timed Code Treatment Minutes: 15'   Total Treatment Minutes: 54'   Worker's Comp: Time In/Time Out     [] EVAL (LOW) 54922 (typically 20 minutes face-to-face)    [] EVAL (MOD) 91311 (typically 30 minutes face-to-face)  [] EVAL (HIGH) 19884 (typically 45 minutes face-to-face)  [] OT Re-eval (18263)       [x] Etelvina [phone]) x   1   [] EBNRP(68178)  [x] NMR (21782) x   1   [] Estim (attended) (88453)   [] Manual ([address] [facility]) x      [] US (17144)  [] TA () x      [] Paraffin (20745)  [] ADL  ([phone]) x     [] Splint/L code:    [] Estim (unattended) ([phone])  [x] Fluidotherapy (34205)  [] Other:    Comorbidities Affecting Functional Performance:     []Anxiety (F41.9)/Depression (F32.9)   []Diabetes Type 1(E10.65) or 2 (E11.65)   []Rheumatoid Arthritis (M05.9)  []Fibromyalgia (M79.7)  []Neuropathy(G60.9)  []Osteoarthritis(M19.91)  []None   []Other:    ASSESSMENT:      GOALS:  Patient stated goal: \"I would like to be able to  Use my hand\"    []? Progressing: []? Met: []? Not Met: []? Adjusted     Therapist goals for Patient:   Short Term Goals: To be achieved in: 2 weeks  1. Independent in HEP and progression per patient tolerance, in order to prevent re-injury. []? Progressing: []? Met: []? Not Met: []? Adjusted   2. Patient will have a decrease in pain to facilitate improvement in movement, function, and ADLs as indicated by Functional Deficits. []? Progressing: []? Met: []? Not Met: []?  Adjusted     Long Term Goals to be achieved in 12 weeks (through 7/13/21), including patient directed goals to address patient identified performance deficits:  1) Pt to be independent in graded HEP progression with a good level of effort and compliance. []? Progressing: []? Met: []? Not Met: []? Adjusted   2) Pt to report a score of </= 25 % on the Quick DASH disability questionnaire for increased performance with carrying, moving, and handling objects. []? Progressing: []? Met: []? Not Met: []? Adjusted   3) Pt will demonstrate increased ROM to [facility] for improved independence with self care, and home mgt tasks. []? Progressing: []? Met: []? Not Met: []? Adjusted   4) Pt will demonstrate increased  strength to at least 15# for improved independence with self care, and home mgt tasks. .  []? Progressing: []? Met: []? Not Met: []? Adjusted   5) Pt will have a decrease in pain to 3/10 to facilitate self care, and home mgt tasks. .. []? Progressing: []? Met: []? Not Met: []? Adjusted      Overall Progression Towards Functional Goals/Treatment Progress Update:  [] Patient is progressing as expected towards functional goals listed. [] Progression is slowed due to complexities/impairments listed. [] Progression has been slowed due to co-morbidities.   [x] Plan just implemented, too soon to assess goals progression <30 days  [] Goals require adjustment due to lack of progress  [] Patient is not progressing as expected and requires additional follow up with physician  [] All goals are met  [] Other:     Prognosis for POC: [x] Good [] Fair  [] Poor    Patient requires continued skilled intervention: [x] Yes  [] No    Treatment/Activity Tolerance:  [x] Patient able to complete treatment  [] Patient limited by fatigue  [] Patient limited by pain    [] Patient limited by other medical complications  [] Other:                  PLAN: See eval  [] Continue per plan of care [] Alter current plan (see comments above)  [x] Plan of care initiated [] Hold pending MD visit [] Discharge      Electronically signed by:  Mauricio Naik OT/L [id number]    Note: If patient does not return for scheduled/ recommended follow up visits, this note will serve as a discharge from care along with most recent update on progress.   Phone: [phone]  Fax [phone]

## 2021-05-04 ENCOUNTER — APPOINTMENT (OUTPATIENT)
Dept: OCCUPATIONAL THERAPY | Age: 81
End: 2021-05-04
Payer: MEDICARE

## 2021-05-11 ENCOUNTER — HOSPITAL ENCOUNTER (OUTPATIENT)
Dept: OCCUPATIONAL THERAPY | Age: 81
Setting detail: THERAPIES SERIES
Discharge: HOME | End: 2021-05-11
Payer: MEDICARE

## 2021-05-18 ENCOUNTER — HOSPITAL ENCOUNTER (OUTPATIENT)
Dept: OCCUPATIONAL THERAPY | Age: 81
Setting detail: THERAPIES SERIES
Discharge: HOME | End: 2021-05-18
Payer: MEDICARE

## 2021-05-18 PROCEDURE — 97112 NEUROMUSCULAR REEDUCATION: CPT | Performed by: OCCUPATIONAL THERAPIST

## 2021-05-18 PROCEDURE — 97022 WHIRLPOOL THERAPY: CPT | Performed by: OCCUPATIONAL THERAPIST

## 2021-05-18 NOTE — FLOWSHEET NOTE
[address] [place], [address] [place] [address] [address]  Phone: [phone] Fax: [phone]    Occupational Therapy Treatment Note/ Progress Report:     Is this a Progress Report:     []  Yes  [x]  No      If Yes:  Date Range for reporting period:  Beginning 21    Ending    Progress report will be due (10 Rx or 30 days whichever is less):     Recertification will be due (POC Duration  / 90 days whichever is less): 21   Patient: Candyce Kehr                                  : 1940                                  MRN: [id number]  Referring Physician: Referring Practitioner: Ishmael Barber                                                 Evaluation Date: 2021                                         Medical Diagnosis Information:  Diagnosis: J41.912N (ICD-10-CM) - Closed supracondylar fracture of right humerus with routine healing, subsequent [id number] (ICD-10-CM) - Right wrist painR20.2 (ICD-10-CM) - Paresthesia of right upper extremity                 Date of Injury:21  Date of Surgery: 21                                  Insurance information:       Date of Patient follow up with Physician: not scheduled yet   Has the plan of care been signed (Y/N):        []  Yes  [x]  No       Visit # Insurance Allowable Auth Required   2 ? 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 No      []? Yes                    Pacemaker:  []? No       []? Yes         Functional Scale: 80% (Quick DASH)                                 Date assessed:  2021     SUBJECTIVE: Pt reports severe back pain has interfered with overall function. \"I barely notice the problem with my hand because my back hurts so much. \"  Pt reports that she has not been evaluated by MD for her back pain. OT contacted Dr Jacquie Guy office regarding back pain which resulted in a referral to Dr James Shore. Patient reported deficits/history of current problem: Pt tripped and fell at outside while filling bird feeder at her friend's home     Pain Scale: 3/10          [x]? Constant                []?Intermittent              []?other:  Pain Location:  R thumb  Easing factors: rest  Provocative factors: none     OBJECTIVE:   Date:  Hand Dominance:     [x]? Right    []? Left 4/13/2021 5/18/21   Objective Measures:      PAIN 3/10 0/10   Quick DASH 80%    Digit  ROM         Index       MP:ext 75  PIP:WFL  DIP:WFL 65                              Long MP:ext 75  PIP:WFL  DIP:WFL 70                              Ring MP:ext 80  PIP:WFL  DIP: WFL 75                              Small MP: ext 80  PIP:WFL  DIP:WFL 75   Thumb ROM MP  IP    Thumb opposition  2,3,4,5 with difficulty: 2,3,4,5 with difficulty:   Thumb Radial/Palmar abd ROM 0/45: 0/45   Wrist ROM Ext/Flex (-)15/80: 30/80   Rad/Uln dev ROM R:  L:    Forearm ROM  Sup/pron 66/90: 66/90   Elbow ROM Ext/flex 25/145: [facility]   Shoulder Flex  Shoulder Abd  Shoulder IR/ER WFL    Edema in cm circumf. MCPJs R:  L:    Edema in cm circumf. Wrist R:  L:     strength in lbs NT R 15#  L 39#   Pinch Strengthin lbs: lat  R:  L:    Pinch Strength in lbs:  3 point R:  L:       MMT:       Observations:  (including splints, bandages, incisions, scars): MODALITIES: 4/13/21 4/27/21 5/18/21   Fluidotherapy (81730) 15' 15' 15'   Estim (55118/62036)  *Tried NMES to wrist extensors however no active or AAROM in ext observed.     Paraffin (83759)      US (54018)      Iontophoresis (55639)      Hot Pack      Cold Pack            INTERVENTIONS:       Discussed recommendation to cont with previous HEP and add AROM in water Recommended getting plastic ball for bilateral ball exercise activities Review of HEP    ROM  AAROM wrist forearm; to complete treatment  [] Patient limited by fatigue  [] Patient limited by pain    [] Patient limited by other medical complications  [] Other:                  PLAN: See eval  [] Continue per plan of care [] Alter current plan (see comments above)  [x] Plan of care initiated [] Hold pending MD visit [] Discharge      Electronically signed by:  Silvia Li OT OT/L [id number]    Note: If patient does not return for scheduled/ recommended follow up visits, this note will serve as a discharge from care along with most recent update on progress.   Phone: [phone]  Fax [phone]

## 2021-05-27 ENCOUNTER — OFFICE VISIT (OUTPATIENT)
Dept: ORTHOPEDIC SURGERY | Age: 81
End: 2021-05-27

## 2021-05-27 VITALS — HEIGHT: 66 IN | BODY MASS INDEX: 28.93 KG/M2 | WEIGHT: 180 LBS

## 2021-05-27 DIAGNOSIS — G56.31 RADIAL NERVE PALSY, RIGHT: ICD-10-CM

## 2021-05-27 DIAGNOSIS — S42.411D CLOSED SUPRACONDYLAR FRACTURE OF RIGHT HUMERUS WITH ROUTINE HEALING, SUBSEQUENT ENCOUNTER: Primary | ICD-10-CM

## 2021-05-27 DIAGNOSIS — Z98.890 STATUS POST OPEN REDUCTION AND INTERNAL FIXATION (ORIF) OF FRACTURE: ICD-10-CM

## 2021-05-27 DIAGNOSIS — Z87.81 STATUS POST OPEN REDUCTION AND INTERNAL FIXATION (ORIF) OF FRACTURE: ICD-10-CM

## 2021-05-27 PROBLEM — S42.411A CLOSED SUPRACONDYLAR FRACTURE OF RIGHT HUMERUS: Status: ACTIVE | Noted: 2021-05-27

## 2021-05-27 PROCEDURE — 99024 POSTOP FOLLOW-UP VISIT: CPT | Performed by: ORTHOPAEDIC SURGERY

## 2021-05-27 NOTE — PROGRESS NOTES
Chief Complaint    Follow-up (right shoulder)      History of Present Illness:  Deng Lea is a pleasant, 80 y.o., female, here today for follow up of a right distal humerus open reduction trial fixation of bicondylar plating. She also had postop radial nerve palsy. She is now 12 weeks postop. She is doing great. Accompanied by her son. She continues use of the wrist splint. She underwent a nerve conduction study. It showed right radial nerve slowing into the spiral groove but with preserved function of the nerve below the fracture. She reports no new injuries or setbacks. She feels great and has no pain in the actual humerus bone she was wondering about whether she can start driving today. She has been doing hand therapy. Medical History:  Patient's medications, allergies, past medical, surgical, social and family histories were reviewed and updated as appropriate. Chief Complaint    Follow-up (right shoulder)      History of Present Illness:  Deng Lea is a pleasant, 80 y.o., female, here today for follow up of a right distal humerus open reduction trial fixation of bicondylar plating. She also had postop radial nerve palsy. She is now 12 weeks postop. She is doing great. Accompanied by her son. She continues use of the wrist splint. She underwent a nerve conduction study. It showed right radial nerve into the spiral groove preserved function of the nerve below the fracture. She reports no new injuries or setbacks. She feels great and has no pain in the actual humerus bone she was wondering about whether she can start driving today. She has been doing hand therapy to her. Medical History:  Patient's medications, allergies, past medical, surgical, social and family histories were reviewed and updated as appropriate.     No notes on file    Review of Systems  A 14 point review of systems was completed by the patient  and is available in the media section of the scanned medical record and was reviewed on 5/27/2021. The review is negative with the exception of those things mentioned in the HPI and Past Medical History    Vital Signs: There were no vitals filed for this visit. General/Appearance: Alert and oriented and in no apparent distress. Skin:  There are no skin lesions, cellulitis, or extreme edema. The patient has warm and well-perfused Bilateral upper extremities with brisk capillary refill. RIGHT Shoulder Exam:  Inspection:  No gross deformities, no signs of infection. Palpation: She is nontender  Active Range of Motion: Forward Elevation 170, Abduction 170, External Rotation 30, Internal Rotation back pocket    Passive Range of Motion: SAME    Strength:  External Rotation 4/5, Internal Rotation 4/5, Supraspinatus 4/5, Champagne Toast 4/5    Special Tests:   No Parviz muscle deformity. Neurovascular: Sensation to light touch is intact, no motor deficits, palpable radial pulses 2+      Radiology:     Plain radiographs of the right humerus comprising 2 views AP/LAT:  Were obtained and reviewed in the office:     Impression: Stable postop x-ray. Assessment :  Ms. Brenda Goodwin is a pleasant, 80 y.o. patient who is 12 weeks post right distal humerus extra-articular fracture open reduction trial fixation. She had a postoperative radial nerve palsy in the groove proven on her conduction study. She is actually recovering her nerve slowly and the pattern that is consistent with radial nerve recovery started with a brachial radialis. Impression:  No diagnosis found. Office Procedures:  No orders of the defined types were placed in this encounter. Treatment Plan: We recommend She continue in hand therapy     Also recommend continued use of the wrist drop splint. But daily motion exercises of her wrist.  She can start driving I would recommend she start doing that with some supervision and then in a parking lot.      We will see Moris Balderas deformity. Neurovascular: Sensation to light touch is intact, palpable radial pulses 2+. PIN is 3+/5, AIN/4+/5, ulnar is 4/5. Msc 5/5      Radiology:     Plain radiographs of the right humerus comprising 2 views AP/LAT:  Were obtained and reviewed in the office: healed fracture with no screw/plate loosening or malposition. Impression: Stable postop x-ray. Assessment :  Ms. Raciel Alvares is a pleasant, 80 y.o. patient who is 12 weeks post right distal humerus extra-articular fracture treated with open reduction and internal fixation. She had a postoperative radial nerve palsy in the groove proven on a nerve conduction conduction study. She is actually recovering her radial nerve slowly and the pattern that is consistent with the usual pattern for radial nerve recovery starting with the brachioradialis. Impression:  Encounter Diagnoses   Name Primary?  Closed supracondylar fracture of right humerus with routine healing, subsequent encounter Yes    Radial nerve palsy, right     Status post open reduction and internal fixation (ORIF) of fracture        Office Procedures:  Orders Placed This Encounter   Procedures    XR HUMERUS RIGHT (MIN 2 VIEWS)       Treatment Plan: We recommend She continue in hand therapy and be full weight bearing through the right arm/shoulder. I also recommended continued use of the wrist drop splint, but daily motion exercises of her wrist out of the splint. She can start driving but I would recommend she start doing that with some supervision and in an empty parking lot with her son. We will see Shirley Garcia back in 3 months with right humerus xrays and/or as needed. All questions were answered to patient's satisfaction and She was encouraged to call with any further questions or concerns. Alejandro Guillory is in agreement with this plan.       Sincerely,    MD Karri Gomes - [facility]   [address], [address]  Email: [email or web address]  Office: [phone]    05/27/21  4:54 PM      The encounter with Deng Lea was carried out by myself, Dr Sumit Ayala, who personally examined the patient and reviewed the plan. This dictation was performed with a verbal recognition program (DRAGON) and it was checked for errors. It is possible that there are still dictated errors within this office note. If so, please bring any errors to my attention for an addendum. All efforts were made to ensure that this office note is accurate.

## 2021-06-01 ENCOUNTER — HOSPITAL ENCOUNTER (OUTPATIENT)
Dept: OCCUPATIONAL THERAPY | Age: 81
Setting detail: THERAPIES SERIES
Discharge: HOME | End: 2021-06-01
Payer: MEDICARE

## 2021-06-01 NOTE — FLOWSHEET NOTE
[facility], [address]    Occupational Therapy  Cancellation/No-show Note  Patient Name:  Eloisa Rey   :  1940   Date:  2021  Cancelled visits to date: 2  No-shows to date: 0    For today's appointment patient:  [x]    Cancelled  []    Rescheduled appointment  []    No-show     Reason given by patient:  []    Patient ill  []    Conflicting appointment  []    No transportation    []    Conflict with work  [x]    No reason given  []    Other:     Comments:      Electronically signed by:  Maldonado Caruso OT/L [id number]

## 2021-06-03 ENCOUNTER — TELEPHONE (OUTPATIENT)
Dept: ORTHOPEDIC SURGERY | Age: 81
End: 2021-06-03

## 2021-06-03 NOTE — TELEPHONE ENCOUNTER
Other Renetta Chakraborty with [facility] is calling requesting a call back regarding a document she needs to send to Medicare.   [phone] ext [phone]

## 2021-06-14 ENCOUNTER — OFFICE VISIT (OUTPATIENT)
Dept: ORTHOPEDIC SURGERY | Age: 81
End: 2021-06-14
Payer: MEDICARE

## 2021-06-14 ENCOUNTER — TELEPHONE (OUTPATIENT)
Dept: ORTHOPEDIC SURGERY | Age: 81
End: 2021-06-14

## 2021-06-14 VITALS — HEIGHT: 66 IN | BODY MASS INDEX: 28.93 KG/M2 | WEIGHT: 180 LBS

## 2021-06-14 DIAGNOSIS — M54.50 LUMBAR PAIN: Primary | ICD-10-CM

## 2021-06-14 DIAGNOSIS — M41.26 OTHER IDIOPATHIC SCOLIOSIS, LUMBAR REGION: ICD-10-CM

## 2021-06-14 DIAGNOSIS — S32.000A COMPRESSION FRACTURE OF LUMBAR VERTEBRA, INITIAL ENCOUNTER, UNSPECIFIED LUMBAR VERTEBRAL LEVEL: ICD-10-CM

## 2021-06-14 PROCEDURE — G8417 CALC BMI ABV UP PARAM F/U: HCPCS | Performed by: PHYSICIAN ASSISTANT

## 2021-06-14 PROCEDURE — 4040F PNEUMOC VAC/ADMIN/RCVD: CPT | Performed by: PHYSICIAN ASSISTANT

## 2021-06-14 PROCEDURE — G8400 PT W/DXA NO RESULTS DOC: HCPCS | Performed by: PHYSICIAN ASSISTANT

## 2021-06-14 PROCEDURE — 1090F PRES/ABSN URINE INCON ASSESS: CPT | Performed by: PHYSICIAN ASSISTANT

## 2021-06-14 PROCEDURE — 1036F TOBACCO NON-USER: CPT | Performed by: PHYSICIAN ASSISTANT

## 2021-06-14 PROCEDURE — 1123F ACP DISCUSS/DSCN MKR DOCD: CPT | Performed by: PHYSICIAN ASSISTANT

## 2021-06-14 PROCEDURE — L0642 LO SAG RI AN/POS PNL PRE OTS: HCPCS | Performed by: PHYSICIAN ASSISTANT

## 2021-06-14 PROCEDURE — 99203 OFFICE O/P NEW LOW 30 MIN: CPT | Performed by: PHYSICIAN ASSISTANT

## 2021-06-14 PROCEDURE — G8427 DOCREV CUR MEDS BY ELIG CLIN: HCPCS | Performed by: PHYSICIAN ASSISTANT

## 2021-06-14 NOTE — PROGRESS NOTES
CHOLECYSTECTOMY      COLONOSCOPY  4/18/2013    diverticulosis, polyps    COLONOSCOPY  11/10/2016    normal colon    EYE SURGERY Right 12/11/15    cataract removal    EYE SURGERY Left 1/8/16    cataract removed    HUMERUS FRACTURE SURGERY Right 2/28/2021    HUMERUS OPEN REDUCTION INTERNAL FIXATION performed by Theresa Medina MD at [facility], PARTIAL Left     TUMOR REMOVAL      off of thyroid, was benign     Current Medications:     Current Outpatient Medications:     aspirin EC 81 MG EC tablet, Take 1 tablet by mouth daily, Disp: 30 tablet, Rfl: 0    methotrexate (RHEUMATREX) 2.5 MG chemo tablet, Take 10 mg by mouth once a week, Disp: , Rfl:     dexamethasone (DECADRON) 1 MG tablet, Take 1 mg by mouth daily, Disp: , Rfl:     WIXELA INHUB 250-50 MCG/DOSE AEPB, USE 1 INHALATION BY MOUTH  EVERY 12 HOURS, Disp: 180 each, Rfl: 0    DULoxetine (CYMBALTA) 60 MG extended release capsule, Take 60 mg by mouth nightly Before bed , Disp: , Rfl:     RESTASIS 0.05 % ophthalmic emulsion, , Disp: , Rfl:     atorvastatin (LIPITOR) 80 MG tablet, Take 80 mg by mouth, Disp: , Rfl:     albuterol sulfate  (90 Base) MCG/ACT inhaler, Inhale 2 puffs into the lungs every 6 hours as needed for Wheezing or Shortness of Breath, Disp: 3 Inhaler, Rfl: 3    triamterene-hydrochlorothiazide (MAXZIDE-25) 37.5-25 MG per tablet, Take 1 tablet by mouth daily, Disp: , Rfl:     Cholecalciferol (VITAMIN D) 2000 UNITS CAPS capsule, Take by mouth daily, Disp: , Rfl:     Lactobacillus Rhamnosus, GG, (PROBIOTIC COLIC PO), Take by mouth daily, Disp: , Rfl:     leflunomide (ARAVA) 20 MG tablet, Take 20 mg by mouth daily. , Disp: , Rfl:     levothyroxine (SYNTHROID) 25 MCG tablet, Take 25 mcg by mouth Daily. , Disp: , Rfl:     ATENOLOL PO, Take 25 mg by mouth daily. , Disp: , Rfl:   Allergies:  Effexor [venlafaxine hcl], Fenofibrate, Prednisone, Codeine, and Docosahexaenoic acid-epa  Social History: groups tested. · Special Tests:   Straight leg raise and crossed SLR negative. Leg length and pelvis level.  0 out of 5 Mone's signs. · Skin: There are no rashes, ulcerations or lesions. · Reflexes: Reflexes are symmetrically 2+ at the patellar and 1+ ankle tendons. Clonus absent bilaterally at the feet. · Gait & station: Wheelchair  · Additional Examinations:   · RIGHT LOWER EXTREMITY: Inspection/examination of the right lower extremity does not show any tenderness, deformity or injury. Range of motion is full. There is no gross instability. There are no rashes, ulcerations or lesions. Strength and tone are normal.  ·   · LEFT LOWER EXTREMITY:  Inspection/examination of the left lower extremity does not show any tenderness, deformity or injury. Range of motion is full. There is no gross instability. There are no rashes, ulcerations or lesions. Strength and tone are normal.    Diagnostic Testin views lumbar spine 2021 shows scoliosis, multilevel moderate to severe DDD/spondylosis, lateral view is skewed due to scoliotic curve but there is a questionable superior endplate compression fracture at L3 and wedging at L2. Multilevel facet arthropathy. Cervical CT scan 2021 shows no acute abnormality, multilevel moderate DDD        Impression:  1) Acute/chronic LBP  2) Scoliosis, multilevel moderate to severe DDD/spondylosis  3) H/o falls, subjective leg weakness  4) S/p right distal humerus ORIF, Dr. Janes Lyons  5) RA, COPD      Plan:   1) Lumbar xrays as above  2) Lumbar MRI WO r/out acute fracture  3) Quick draw brace, no heavy lifting bending twisting  4) F/u to review L MRI         Procedures    Breg Quick Draw Lumbar Brace     Patient was prescribed a Breg Quick Draw Lumbar Brace. The lumbar spine will require stabilization / immobilization from this semi-rigid / rigid orthosis to improve their function.   The orthosis will assist in protecting the affected area, provide functional support and facilitate healing. This orthosis is required for the following reasons:    Reduce pain by restricting mobility of the trunk  Facilitate healing following an injury to the spine or related soft tissues  Support weak spinal muscles    The patient was educated and fit by a healthcare professional with expert knowledge and specialization in brace application while under the direct supervision of the physician. Verbal and written instructions for the use of and application of this item were provided. They were instructed to contact the office immediately should the brace result in increased pain, decreased sensation, increased swelling or worsening of the condition.            Electronically signed by Fabrizio Thurman PA-C MPAS on 6/14/2021 at 2:42 PM     Fabrizio Thurman PA-C, [address] [place] [address] Certified by the [facility]

## 2021-06-21 ENCOUNTER — HOSPITAL ENCOUNTER (OUTPATIENT)
Dept: MRI IMAGING | Age: 81
Discharge: HOME | End: 2021-06-21
Payer: MEDICARE

## 2021-06-21 DIAGNOSIS — M54.50 LUMBAR PAIN: ICD-10-CM

## 2021-06-21 DIAGNOSIS — S32.000A COMPRESSION FRACTURE OF LUMBAR VERTEBRA, INITIAL ENCOUNTER, UNSPECIFIED LUMBAR VERTEBRAL LEVEL: ICD-10-CM

## 2021-06-21 DIAGNOSIS — M41.26 OTHER IDIOPATHIC SCOLIOSIS, LUMBAR REGION: ICD-10-CM

## 2021-06-21 PROCEDURE — 72148 MRI LUMBAR SPINE W/O DYE: CPT

## 2021-06-22 ENCOUNTER — TELEPHONE (OUTPATIENT)
Dept: ORTHOPEDIC SURGERY | Age: 81
End: 2021-06-22

## 2021-06-22 DIAGNOSIS — G95.9 MYELOPATHY (HCC): ICD-10-CM

## 2021-06-22 DIAGNOSIS — M48.04 THORACIC STENOSIS: Primary | ICD-10-CM

## 2021-06-22 NOTE — TELEPHONE ENCOUNTER
Discussed lumbar MRI results with the patient showing cord compression and high T2 signal at the T10-T11 level likely related to myelomalacia. I recommend referral to Dr. Meg Subramanian. We discussed this could be the cause of her falls and lower extremity weakness. At this time she denies any progressive numbness tingling or progressive weakness. No recent bowel or bladder dysfunction or saddle anesthesia.       Sparkle Calixto, [facility]

## 2021-07-20 ENCOUNTER — HOSPITAL ENCOUNTER (INPATIENT)
Age: 81
LOS: 5 days | Discharge: NURSING, REHAB OR LONG-TERM CARE | DRG: 193 | End: 2021-07-25
Attending: STUDENT IN AN ORGANIZED HEALTH CARE EDUCATION/TRAINING PROGRAM | Admitting: INTERNAL MEDICINE
Payer: MEDICARE

## 2021-07-20 ENCOUNTER — APPOINTMENT (OUTPATIENT)
Dept: GENERAL RADIOLOGY | Age: 81
DRG: 193 | End: 2021-07-20
Payer: MEDICARE

## 2021-07-20 DIAGNOSIS — J18.9 PNEUMONIA OF BOTH LUNGS DUE TO INFECTIOUS ORGANISM, UNSPECIFIED PART OF LUNG: Primary | ICD-10-CM

## 2021-07-20 DIAGNOSIS — R09.02 HYPOXIA: ICD-10-CM

## 2021-07-20 DIAGNOSIS — M05.719 RHEUMATOID ARTHRITIS INVOLVING SHOULDER WITH POSITIVE RHEUMATOID FACTOR, UNSPECIFIED LATERALITY (HCC): ICD-10-CM

## 2021-07-20 PROBLEM — J16.0 CAP (COMMUNITY ACQUIRED PNEUMONIA) DUE TO CHLAMYDIA SPECIES: Status: ACTIVE | Noted: 2021-07-20

## 2021-07-20 PROBLEM — E87.1 HYPONATREMIA: Status: ACTIVE | Noted: 2021-07-20

## 2021-07-20 LAB
A/G RATIO: 1 (ref 1.1–2.2)
ALBUMIN SERPL-MCNC: 2.8 G/DL (ref 3.4–5)
ALP BLD-CCNC: 96 U/L (ref 40–129)
ALT SERPL-CCNC: 29 U/L (ref 10–40)
ANION GAP SERPL CALCULATED.3IONS-SCNC: 8 MMOL/L (ref 3–16)
AST SERPL-CCNC: 40 U/L (ref 15–37)
BASE EXCESS VENOUS: -0.6 MMOL/L (ref -3–3)
BASOPHILS ABSOLUTE: 0.1 K/UL (ref 0–0.2)
BASOPHILS RELATIVE PERCENT: 1.3 %
BILIRUB SERPL-MCNC: 0.8 MG/DL (ref 0–1)
BUN BLDV-MCNC: 16 MG/DL (ref 7–20)
CALCIUM SERPL-MCNC: 9.2 MG/DL (ref 8.3–10.6)
CARBOXYHEMOGLOBIN: 2.1 % (ref 0–1.5)
CHLORIDE BLD-SCNC: 98 MMOL/L (ref 99–110)
CO2: 24 MMOL/L (ref 21–32)
CREAT SERPL-MCNC: 0.6 MG/DL (ref 0.6–1.2)
EKG ATRIAL RATE: 111 BPM
EKG DIAGNOSIS: NORMAL
EKG P AXIS: 30 DEGREES
EKG P-R INTERVAL: 132 MS
EKG Q-T INTERVAL: 326 MS
EKG QRS DURATION: 64 MS
EKG QTC CALCULATION (BAZETT): 443 MS
EKG R AXIS: 5 DEGREES
EKG T AXIS: 41 DEGREES
EKG VENTRICULAR RATE: 111 BPM
EOSINOPHILS ABSOLUTE: 0.2 K/UL (ref 0–0.6)
EOSINOPHILS RELATIVE PERCENT: 2.4 %
GFR AFRICAN AMERICAN: >60
GFR NON-AFRICAN AMERICAN: >60
GLOBULIN: 2.7 G/DL
GLUCOSE BLD-MCNC: 140 MG/DL (ref 70–99)
HCO3 VENOUS: 23.4 MMOL/L (ref 23–29)
HCT VFR BLD CALC: 30.7 % (ref 36–48)
HEMOGLOBIN: 10 G/DL (ref 12–16)
INFLUENZA A: NOT DETECTED
INFLUENZA B: NOT DETECTED
LACTIC ACID, SEPSIS: 1.3 MMOL/L (ref 0.4–1.9)
LYMPHOCYTES ABSOLUTE: 0.5 K/UL (ref 1–5.1)
LYMPHOCYTES RELATIVE PERCENT: 5.3 %
MCH RBC QN AUTO: 30.7 PG (ref 26–34)
MCHC RBC AUTO-ENTMCNC: 32.5 G/DL (ref 31–36)
MCV RBC AUTO: 94.4 FL (ref 80–100)
METHEMOGLOBIN VENOUS: 0.2 %
MONOCYTES ABSOLUTE: 1 K/UL (ref 0–1.3)
MONOCYTES RELATIVE PERCENT: 10.3 %
NEUTROPHILS ABSOLUTE: 8.2 K/UL (ref 1.7–7.7)
NEUTROPHILS RELATIVE PERCENT: 80.7 %
O2 CONTENT, VEN: 14 VOL %
O2 SAT, VEN: 98 %
O2 THERAPY: ABNORMAL
PCO2, VEN: 35.6 MMHG (ref 40–50)
PDW BLD-RTO: 17.9 % (ref 12.4–15.4)
PH VENOUS: 7.43 (ref 7.35–7.45)
PLATELET # BLD: 249 K/UL (ref 135–450)
PMV BLD AUTO: 8.6 FL (ref 5–10.5)
PO2, VEN: 97.1 MMHG (ref 25–40)
POTASSIUM REFLEX MAGNESIUM: 4 MMOL/L (ref 3.5–5.1)
PROCALCITONIN: 0.33 NG/ML (ref 0–0.15)
RBC # BLD: 3.25 M/UL (ref 4–5.2)
SARS-COV-2 RNA, RT PCR: NOT DETECTED
SODIUM BLD-SCNC: 130 MMOL/L (ref 136–145)
TCO2 CALC VENOUS: 25 MMOL/L
TOTAL PROTEIN: 5.5 G/DL (ref 6.4–8.2)
TROPONIN: <0.01 NG/ML
WBC # BLD: 10.1 K/UL (ref 4–11)

## 2021-07-20 PROCEDURE — 87636 SARSCOV2 & INF A&B AMP PRB: CPT

## 2021-07-20 PROCEDURE — 87040 BLOOD CULTURE FOR BACTERIA: CPT

## 2021-07-20 PROCEDURE — 93010 ELECTROCARDIOGRAM REPORT: CPT | Performed by: INTERNAL MEDICINE

## 2021-07-20 PROCEDURE — 96375 TX/PRO/DX INJ NEW DRUG ADDON: CPT

## 2021-07-20 PROCEDURE — 71045 X-RAY EXAM CHEST 1 VIEW: CPT

## 2021-07-20 PROCEDURE — 6370000000 HC RX 637 (ALT 250 FOR IP): Performed by: PHYSICIAN ASSISTANT

## 2021-07-20 PROCEDURE — 84145 PROCALCITONIN (PCT): CPT

## 2021-07-20 PROCEDURE — 80053 COMPREHEN METABOLIC PANEL: CPT

## 2021-07-20 PROCEDURE — 84484 ASSAY OF TROPONIN QUANT: CPT

## 2021-07-20 PROCEDURE — 6370000000 HC RX 637 (ALT 250 FOR IP): Performed by: INTERNAL MEDICINE

## 2021-07-20 PROCEDURE — 83605 ASSAY OF LACTIC ACID: CPT

## 2021-07-20 PROCEDURE — 83036 HEMOGLOBIN GLYCOSYLATED A1C: CPT

## 2021-07-20 PROCEDURE — 6360000002 HC RX W HCPCS: Performed by: STUDENT IN AN ORGANIZED HEALTH CARE EDUCATION/TRAINING PROGRAM

## 2021-07-20 PROCEDURE — 82803 BLOOD GASES ANY COMBINATION: CPT

## 2021-07-20 PROCEDURE — 36415 COLL VENOUS BLD VENIPUNCTURE: CPT

## 2021-07-20 PROCEDURE — 2700000000 HC OXYGEN THERAPY PER DAY

## 2021-07-20 PROCEDURE — 94761 N-INVAS EAR/PLS OXIMETRY MLT: CPT

## 2021-07-20 PROCEDURE — 85025 COMPLETE CBC W/AUTO DIFF WBC: CPT

## 2021-07-20 PROCEDURE — 99284 EMERGENCY DEPT VISIT MOD MDM: CPT

## 2021-07-20 PROCEDURE — 94640 AIRWAY INHALATION TREATMENT: CPT

## 2021-07-20 PROCEDURE — 2580000003 HC RX 258: Performed by: STUDENT IN AN ORGANIZED HEALTH CARE EDUCATION/TRAINING PROGRAM

## 2021-07-20 PROCEDURE — 96365 THER/PROPH/DIAG IV INF INIT: CPT

## 2021-07-20 PROCEDURE — 1200000000 HC SEMI PRIVATE

## 2021-07-20 PROCEDURE — 99223 1ST HOSP IP/OBS HIGH 75: CPT | Performed by: PHYSICIAN ASSISTANT

## 2021-07-20 PROCEDURE — 2580000003 HC RX 258: Performed by: PHYSICIAN ASSISTANT

## 2021-07-20 PROCEDURE — 93005 ELECTROCARDIOGRAM TRACING: CPT | Performed by: STUDENT IN AN ORGANIZED HEALTH CARE EDUCATION/TRAINING PROGRAM

## 2021-07-20 PROCEDURE — 6360000002 HC RX W HCPCS: Performed by: PHYSICIAN ASSISTANT

## 2021-07-20 RX ORDER — SODIUM CHLORIDE 0.9 % (FLUSH) 0.9 %
5-40 SYRINGE (ML) INJECTION EVERY 12 HOURS SCHEDULED
Status: DISCONTINUED | OUTPATIENT
Start: 2021-07-20 | End: 2021-07-25 | Stop reason: HOSPADM

## 2021-07-20 RX ORDER — LEVOTHYROXINE SODIUM 0.03 MG/1
25 TABLET ORAL DAILY
Status: DISCONTINUED | OUTPATIENT
Start: 2021-07-21 | End: 2021-07-25 | Stop reason: HOSPADM

## 2021-07-20 RX ORDER — VITAMIN B COMPLEX
2000 TABLET ORAL NIGHTLY
Status: DISCONTINUED | OUTPATIENT
Start: 2021-07-20 | End: 2021-07-25 | Stop reason: HOSPADM

## 2021-07-20 RX ORDER — BUDESONIDE AND FORMOTEROL FUMARATE DIHYDRATE 160; 4.5 UG/1; UG/1
2 AEROSOL RESPIRATORY (INHALATION) 2 TIMES DAILY
Status: DISCONTINUED | OUTPATIENT
Start: 2021-07-20 | End: 2021-07-25 | Stop reason: HOSPADM

## 2021-07-20 RX ORDER — SODIUM CHLORIDE 9 MG/ML
25 INJECTION, SOLUTION INTRAVENOUS PRN
Status: DISCONTINUED | OUTPATIENT
Start: 2021-07-20 | End: 2021-07-25 | Stop reason: HOSPADM

## 2021-07-20 RX ORDER — 0.9 % SODIUM CHLORIDE 0.9 %
500 INTRAVENOUS SOLUTION INTRAVENOUS ONCE
Status: COMPLETED | OUTPATIENT
Start: 2021-07-20 | End: 2021-07-20

## 2021-07-20 RX ORDER — ATORVASTATIN CALCIUM 40 MG/1
80 TABLET, FILM COATED ORAL EVERY EVENING
Status: DISCONTINUED | OUTPATIENT
Start: 2021-07-20 | End: 2021-07-25 | Stop reason: HOSPADM

## 2021-07-20 RX ORDER — SODIUM CHLORIDE 9 MG/ML
INJECTION, SOLUTION INTRAVENOUS CONTINUOUS
Status: DISCONTINUED | OUTPATIENT
Start: 2021-07-20 | End: 2021-07-24

## 2021-07-20 RX ORDER — POLYETHYLENE GLYCOL 3350 17 G/17G
17 POWDER, FOR SOLUTION ORAL DAILY
Status: DISCONTINUED | OUTPATIENT
Start: 2021-07-20 | End: 2021-07-25 | Stop reason: HOSPADM

## 2021-07-20 RX ORDER — DULOXETIN HYDROCHLORIDE 60 MG/1
60 CAPSULE, DELAYED RELEASE ORAL NIGHTLY
Status: DISCONTINUED | OUTPATIENT
Start: 2021-07-20 | End: 2021-07-25 | Stop reason: HOSPADM

## 2021-07-20 RX ORDER — AZITHROMYCIN 250 MG/1
500 TABLET, FILM COATED ORAL EVERY 24 HOURS
Status: COMPLETED | OUTPATIENT
Start: 2021-07-21 | End: 2021-07-23

## 2021-07-20 RX ORDER — ACETAMINOPHEN 325 MG/1
650 TABLET ORAL EVERY 6 HOURS PRN
Status: DISCONTINUED | OUTPATIENT
Start: 2021-07-20 | End: 2021-07-25 | Stop reason: HOSPADM

## 2021-07-20 RX ORDER — ONDANSETRON 2 MG/ML
4 INJECTION INTRAMUSCULAR; INTRAVENOUS EVERY 6 HOURS PRN
Status: DISCONTINUED | OUTPATIENT
Start: 2021-07-20 | End: 2021-07-25 | Stop reason: HOSPADM

## 2021-07-20 RX ORDER — ONDANSETRON 4 MG/1
4 TABLET, ORALLY DISINTEGRATING ORAL EVERY 8 HOURS PRN
Status: DISCONTINUED | OUTPATIENT
Start: 2021-07-20 | End: 2021-07-25 | Stop reason: HOSPADM

## 2021-07-20 RX ORDER — NAPROXEN 250 MG/1
500 TABLET ORAL DAILY
Status: ON HOLD | COMMUNITY
End: 2021-07-25 | Stop reason: HOSPADM

## 2021-07-20 RX ORDER — METHYLPREDNISOLONE SODIUM SUCCINATE 125 MG/2ML
125 INJECTION, POWDER, LYOPHILIZED, FOR SOLUTION INTRAMUSCULAR; INTRAVENOUS ONCE
Status: COMPLETED | OUTPATIENT
Start: 2021-07-20 | End: 2021-07-20

## 2021-07-20 RX ORDER — SODIUM CHLORIDE 0.9 % (FLUSH) 0.9 %
10 SYRINGE (ML) INJECTION PRN
Status: DISCONTINUED | OUTPATIENT
Start: 2021-07-20 | End: 2021-07-25 | Stop reason: HOSPADM

## 2021-07-20 RX ORDER — TRAMADOL HYDROCHLORIDE 50 MG/1
50 TABLET ORAL EVERY 4 HOURS PRN
Status: ON HOLD | COMMUNITY
End: 2021-07-25 | Stop reason: SDUPTHER

## 2021-07-20 RX ORDER — METHYLPREDNISOLONE SODIUM SUCCINATE 40 MG/ML
40 INJECTION, POWDER, LYOPHILIZED, FOR SOLUTION INTRAMUSCULAR; INTRAVENOUS DAILY
Status: DISCONTINUED | OUTPATIENT
Start: 2021-07-21 | End: 2021-07-25 | Stop reason: HOSPADM

## 2021-07-20 RX ORDER — ALBUTEROL SULFATE 2.5 MG/3ML
2.5 SOLUTION RESPIRATORY (INHALATION)
Status: DISCONTINUED | OUTPATIENT
Start: 2021-07-20 | End: 2021-07-25 | Stop reason: HOSPADM

## 2021-07-20 RX ORDER — TRIAMTERENE AND HYDROCHLOROTHIAZIDE 37.5; 25 MG/1; MG/1
1 TABLET ORAL DAILY
Status: DISCONTINUED | OUTPATIENT
Start: 2021-07-20 | End: 2021-07-25 | Stop reason: HOSPADM

## 2021-07-20 RX ORDER — IPRATROPIUM BROMIDE AND ALBUTEROL SULFATE 2.5; .5 MG/3ML; MG/3ML
1 SOLUTION RESPIRATORY (INHALATION)
Status: DISCONTINUED | OUTPATIENT
Start: 2021-07-20 | End: 2021-07-25 | Stop reason: HOSPADM

## 2021-07-20 RX ORDER — ACETAMINOPHEN 650 MG/1
650 SUPPOSITORY RECTAL EVERY 6 HOURS PRN
Status: DISCONTINUED | OUTPATIENT
Start: 2021-07-20 | End: 2021-07-25 | Stop reason: HOSPADM

## 2021-07-20 RX ORDER — ATENOLOL 25 MG/1
25 TABLET ORAL DAILY
Status: DISCONTINUED | OUTPATIENT
Start: 2021-07-21 | End: 2021-07-25 | Stop reason: HOSPADM

## 2021-07-20 RX ADMIN — ATORVASTATIN CALCIUM 80 MG: 40 TABLET, FILM COATED ORAL at 18:59

## 2021-07-20 RX ADMIN — DULOXETINE HYDROCHLORIDE 60 MG: 60 CAPSULE, DELAYED RELEASE ORAL at 22:25

## 2021-07-20 RX ADMIN — ENOXAPARIN SODIUM 40 MG: 40 INJECTION SUBCUTANEOUS at 18:59

## 2021-07-20 RX ADMIN — IPRATROPIUM BROMIDE AND ALBUTEROL SULFATE 1 AMPULE: .5; 3 SOLUTION RESPIRATORY (INHALATION) at 22:43

## 2021-07-20 RX ADMIN — IPRATROPIUM BROMIDE AND ALBUTEROL SULFATE 1 AMPULE: .5; 3 SOLUTION RESPIRATORY (INHALATION) at 19:07

## 2021-07-20 RX ADMIN — Medication 2000 UNITS: at 22:25

## 2021-07-20 RX ADMIN — METHYLPREDNISOLONE SODIUM SUCCINATE 125 MG: 125 INJECTION, POWDER, FOR SOLUTION INTRAMUSCULAR; INTRAVENOUS at 15:23

## 2021-07-20 RX ADMIN — AZITHROMYCIN 500 MG: 500 INJECTION, POWDER, LYOPHILIZED, FOR SOLUTION INTRAVENOUS at 17:09

## 2021-07-20 RX ADMIN — SODIUM CHLORIDE: 9 INJECTION, SOLUTION INTRAVENOUS at 18:17

## 2021-07-20 RX ADMIN — POLYETHYLENE GLYCOL (3350) 17 G: 17 POWDER, FOR SOLUTION ORAL at 18:59

## 2021-07-20 RX ADMIN — SODIUM CHLORIDE 500 ML: 9 INJECTION, SOLUTION INTRAVENOUS at 15:23

## 2021-07-20 RX ADMIN — CEFTRIAXONE SODIUM 1000 MG: 1 INJECTION, POWDER, FOR SOLUTION INTRAMUSCULAR; INTRAVENOUS at 15:58

## 2021-07-20 RX ADMIN — Medication 2 PUFF: at 19:07

## 2021-07-20 ASSESSMENT — PAIN DESCRIPTION - DESCRIPTORS: DESCRIPTORS: ACHING

## 2021-07-20 ASSESSMENT — PAIN SCALES - GENERAL: PAINLEVEL_OUTOF10: 2

## 2021-07-20 ASSESSMENT — PAIN DESCRIPTION - PAIN TYPE: TYPE: CHRONIC PAIN

## 2021-07-20 ASSESSMENT — PAIN DESCRIPTION - FREQUENCY: FREQUENCY: CONTINUOUS

## 2021-07-20 ASSESSMENT — PAIN DESCRIPTION - LOCATION: LOCATION: ARM;BACK

## 2021-07-20 ASSESSMENT — PAIN DESCRIPTION - ORIENTATION: ORIENTATION: RIGHT;MID

## 2021-07-20 NOTE — H&P
removal    EYE SURGERY Left 1/8/16    cataract removed    HUMERUS FRACTURE SURGERY Right 2/28/2021    HUMERUS OPEN REDUCTION INTERNAL FIXATION performed by Marvin Barton MD at [address], PARTIAL Left     TUMOR REMOVAL      off of thyroid, was benign       Medications Prior to Admission:    Prior to Admission medications    Medication Sig Start Date End Date Taking? Authorizing Provider   methotrexate (RHEUMATREX) 2.5 MG chemo tablet Take 10 mg by mouth once a week Monday nights 1/28/21  Yes Historical Provider, MD Hall Comings 250-50 MCG/DOSE AEPB USE 1 INHALATION BY MOUTH  EVERY 12 HOURS 2/2/21  Yes Jeffrey Menchaca MD   DULoxetine (CYMBALTA) 60 MG extended release capsule Take 60 mg by mouth nightly Before bed    Yes Historical Provider, MD   atorvastatin (LIPITOR) 80 MG tablet Take 80 mg by mouth every evening  8/18/19  Yes Historical Provider, MD   albuterol sulfate  (90 Base) MCG/ACT inhaler Inhale 2 puffs into the lungs every 6 hours as needed for Wheezing or Shortness of Breath 9/4/19  Yes Jeffrey Menchaca MD   triamterene-hydrochlorothiazide (MAXZIDE-25) 37.5-25 MG per tablet Take 1 tablet by mouth daily 10/19/18  Yes Historical Provider, MD   Cholecalciferol (VITAMIN D) 2000 UNITS CAPS capsule Take 1 capsule by mouth nightly    Yes Historical Provider, MD   Lactobacillus Rhamnosus, GG, (PROBIOTIC COLIC PO) Take by mouth daily   Yes Historical Provider, MD   levothyroxine (SYNTHROID) 25 MCG tablet Take 25 mcg by mouth Daily. Yes Historical Provider, MD   ATENOLOL PO Take 25 mg by mouth daily. Yes Historical Provider, MD   RESTASIS 0.05 % ophthalmic emulsion  2/27/20   Historical Provider, MD       Allergies:  Effexor [venlafaxine hcl], Fenofibrate, Prednisone, Codeine, and Docosahexaenoic acid-epa    Social History:  The patient currently lives  At home alone     TOBACCO:   reports that she quit smoking about 34 years ago. Her smoking use included cigarettes. hours.  APTT: No results for input(s): APTT in the last 72 hours. UA:No results for input(s): NITRITE, COLORU, PHUR, LABCAST, WBCUA, RBCUA, MUCUS, TRICHOMONAS, YEAST, BACTERIA, CLARITYU, SPECGRAV, LEUKOCYTESUR, UROBILINOGEN, BILIRUBINUR, BLOODU, GLUCOSEU, AMORPHOUS in the last 72 hours. Invalid input(s): [id number] Savannah Lui ENZYMES  Recent Labs     07/20/21  1326   TROPONINI <0.01       U/A:    Lab Results   Component Value Date    COLORU Yellow 09/20/2020    WBCUA 0-2 09/20/2020    RBCUA 0-2 09/20/2020    MUCUS 2+ 06/18/2019    BACTERIA 3+ 09/20/2020    CLARITYU Clear 09/20/2020    SPECGRAV 1.020 09/20/2020    LEUKOCYTESUR Negative 09/20/2020    BLOODU Negative 09/20/2020    GLUCOSEU Negative 09/20/2020    AMORPHOUS 2+ 09/20/2020       ABG    Lab Results   Component Value Date    SEI5OHP 25.8 02/21/2012    BEART -0.5 02/21/2012    C6TYCEQN 93.9 02/21/2012    PHART 7.341 02/21/2012    THGBART 14.5 02/21/2012    PKW6MPV 48.9 02/21/2012    PO2ART 73.9 02/21/2012    NZB4MUJ 27.4 02/21/2012       CULTURES  Blood: pending   COVID 19, PCR: Not detected  Influenza A/B: neg/neg  Strep pneumo ag: ordered  Legionella ag: ordered     EKG:  I have reviewed the EKG with the following interpretation:   Sinus tachycardia, ventricular rate 111 bpm,  with PVCs    RADIOLOGY  XR CHEST PORTABLE   Final Result   Findings most consistent with multifocal pneumonia. ASSESSMENT/PLAN:    #Pneumonia  - community acquired, suspect a gram positive organism    - Treat with antibiotics Rocephin and azithromycin D#1  - check cultures    #COPD  AE  - solumedrol for now  - inhaled bronchodilators   - pulmonary cs    #Hypoxia   - 2/2 PNA and COPD  - she was 87% on RA. She is now requiring 2L. She does not use home O2. Wean as able    #Hyponatremia  - Na 130 on admit  - hold HCTZ  - IV NS today  - repeat BMP tomorrow     #Rheumatoid arthritis  - she takes methotrexate weekly on Mondays.   Hold with acute infection    #HTN  - BP stable   - cont atenolol   - Hold maxzide while on IVF and with low Na    #HLD  - cont satin     #Hypothyroidism  - cont synthroid     DVT Prophylaxis: Lovenox   Diet: Regular  Code Status: Full code    Gina Cohen PA-C  7/20/2021 5:02 PM

## 2021-07-20 NOTE — PROGRESS NOTES
Patient admitted from ER with pneumonia on 2L of oxygen. Skin w/d. Scattered bruising and scabs. resp labored on exertion. Patient weak but aware of limitations. No s/s distress noted. Call light in easy reach. Home med list reviewed with patient. No voiced concerns at this time. Son at bedside.

## 2021-07-20 NOTE — ED PROVIDER NOTES
[address] ED      CHIEF COMPLAINT  Shortness of breath     HISTORY OF PRESENT ILLNESS  Lorenzo Edouard is a 80 y.o. female with a past medical history of COPD, rheumatoid arthritis, hypertension, hyperlipidemia, pulmonary nodules, who presents to the ED complaining of shortness of breath    Patient reports shortness of breath since last night. Her son says been going on for 2 days. She states this does not feel like her COPD. She denies any chest pain. She does report cough that is productive of white sputum. Denies fever. She does report some bilateral equivalent foot swelling. She does not wear oxygen at baseline, currently on 3 L. Patient denies previous blood clot or active malignancy, leg swelling, hemoptysis, recent travel or surgery/prolonged immobilization, or OCP or other hormone use. Patient does not smoke. Old records reviewed:  Stress Test 9/2020  Summary   Zion Hale is uniform isotope uptake at stress and rest. There is no evidence of    myocardial ischemia or scar. Normal LVEF of >75%. EKG changes are of    uncertain significance in setting of normal perfusion. Overall findings    represent a low risk scan. No other complaints, modifying factors or associated symptoms. I have reviewed the following from the nursing documentation.     Past Medical History:   Diagnosis Date    Asthma     Hyperlipidemia     Hypertension     RA [facility]rheumatoid arthritis) ([facility] [address]     Thyroid disease     has half of her thyroid     Past Surgical History:   Procedure Laterality Date    ABDOMINAL EXPLORATION SURGERY      APPENDECTOMY      CHOLECYSTECTOMY      COLONOSCOPY  4/18/2013    diverticulosis, polyps    COLONOSCOPY  11/10/2016    normal colon    EYE SURGERY Right 12/11/15    cataract removal    EYE SURGERY Left 1/8/16    cataract removed    HUMERUS FRACTURE SURGERY Right 2/28/2021    HUMERUS OPEN REDUCTION INTERNAL FIXATION performed by Alfredo Chamberlain MD at [facility] HYSTERECTOMY      THYROIDECTOMY, PARTIAL Left     TUMOR REMOVAL      off of thyroid, was benign     Family History   Problem Relation Age of Onset    High Blood Pressure Mother      Social History     Socioeconomic History    Marital status:      Spouse name: Not on file    Number of children: Not on file    Years of education: Not on file    Highest education level: Not on file   Occupational History    Not on file   Tobacco Use    Smoking status: Former Smoker     Packs/day: 0.50     Years: 20.00     Pack years: 10.00     Types: Cigarettes     Start date: 1959     Quit date: 1987     Years since quittin.5    Smokeless tobacco: Never Used   Vaping Use    Vaping Use: Never used   Substance and Sexual Activity    Alcohol use: No     Alcohol/week: 0.0 standard drinks    Drug use: No    Sexual activity: Not Currently     Partners: Male   Other Topics Concern    Not on file   Social History Narrative    Not on file     Social Determinants of Health     Financial Resource Strain:     Difficulty of Paying Living Expenses:    Food Insecurity:     Worried About Running Out of Food in the Last Year:     [address] in the Last Year:    Transportation Needs:     Lack of Transportation (Medical):      Lack of Transportation (Non-Medical):    Physical Activity:     Days of Exercise per Week:     Minutes of Exercise per Session:    Stress:     Feeling of Stress :    Social Connections:     Frequency of Communication with Friends and Family:     Frequency of Social Gatherings with Friends and Family:     Attends [employer] witness Services:     Active Member of Clubs or Organizations:     Attends Club or Organization Meetings:     Marital Status:    Intimate Partner Violence:     Fear of Current or Ex-Partner:     Emotionally Abused:     Physically Abused:     Sexually Abused:      Current Facility-Administered Medications   Medication Dose Route Frequency Provider Last Rate Last Admin    cefTRIAXone (ROCEPHIN) 1000 mg IVPB in 50 mL D5W minibag  1,000 mg Intravenous Once Kei Bradley  mL/hr at 07/20/21 1558 1,000 mg at 07/20/21 1558    And    azithromycin (ZITHROMAX) 500 mg in D5W 250ml addavial  500 mg Intravenous Once Kei Bradley MD        0.9 % sodium chloride bolus  500 mL Intravenous Once Kei Bradley  mL/hr at 07/20/21 1523 500 mL at 07/20/21 1523     Current Outpatient Medications   Medication Sig Dispense Refill    methotrexate (RHEUMATREX) 2.5 MG chemo tablet Take 10 mg by mouth once a week Monday nights      WIXELA INHUB 250-50 MCG/DOSE AEPB USE 1 INHALATION BY MOUTH  EVERY 12 HOURS 180 each 0    DULoxetine (CYMBALTA) 60 MG extended release capsule Take 60 mg by mouth nightly Before bed       atorvastatin (LIPITOR) 80 MG tablet Take 80 mg by mouth every evening       albuterol sulfate  (90 Base) MCG/ACT inhaler Inhale 2 puffs into the lungs every 6 hours as needed for Wheezing or Shortness of Breath 3 Inhaler 3    triamterene-hydrochlorothiazide (MAXZIDE-25) 37.5-25 MG per tablet Take 1 tablet by mouth daily      Cholecalciferol (VITAMIN D) 2000 UNITS CAPS capsule Take 1 capsule by mouth nightly       Lactobacillus Rhamnosus, GG, (PROBIOTIC COLIC PO) Take by mouth daily      levothyroxine (SYNTHROID) 25 MCG tablet Take 25 mcg by mouth Daily.  ATENOLOL PO Take 25 mg by mouth daily.  RESTASIS 0.05 % ophthalmic emulsion        Allergies   Allergen Reactions    Effexor [Venlafaxine Hcl] Itching and Nausea Only    Fenofibrate Diarrhea    Prednisone Other (See Comments)     Causes body to feel on fire    Codeine Nausea And Vomiting    Docosahexaenoic Acid-Epa Nausea And Vomiting     Cannot take Vascepa       REVIEW OF SYSTEMS  All systems reviewed, pertinent positives per HPI otherwise noted to be negative.     PHYSICAL EXAM  /86   Pulse 111   Temp 98.7 °F (37.1 °C) (Tympanic)   Resp 21   Ht 5' 6.5\" (1.689 m)   Wt 180 lb (81.6 kg)   SpO2 (!) 87%   BMI 28.62 kg/m²    GENERAL APPEARANCE: Awake and alert. Cooperative. no distress. HENT: Normocephalic. Atraumatic. Mucous membranes are moist  NECK: Supple. Full range of motion of the neck without stiffness or pain. EYES: PERRL. EOM's grossly intact. HEART/CHEST: RRR. No murmurs. Chest wall is not tender to palpation. LUNGS: Respirations unlabored. Diminished breath sounds bilaterally. Speaking comfortably in full sentences. ABDOMEN: No tenderness. Soft. Non-distended. No masses. No organomegaly. No guarding or rebound. MUSCULOSKELETAL: No extremity edema. Compartments soft. No deformity. No tenderness in the extremities. All extremities neurovascularly intact. SKIN: Warm and dry. No acute rashes. NEUROLOGICAL: Alert and oriented. No gross facial drooping. Strength 5/5, sensation intact. PSYCHIATRIC: Normal mood and affect. LABS  I have reviewed all labs for this visit.    Results for orders placed or performed during the hospital encounter of 07/20/21   COVID-19 & Influenza Combo    Specimen: Nasopharyngeal Swab   Result Value Ref Range    SARS-CoV-2 RNA, RT PCR NOT DETECTED NOT DETECTED    INFLUENZA A NOT DETECTED NOT DETECTED    INFLUENZA B NOT DETECTED NOT DETECTED   CBC Auto Differential   Result Value Ref Range    WBC 10.1 4.0 - 11.0 K/uL    RBC 3.25 (L) 4.00 - 5.20 M/uL    Hemoglobin 10.0 (L) 12.0 - 16.0 g/dL    Hematocrit 30.7 (L) 36.0 - 48.0 %    MCV 94.4 80.0 - 100.0 fL    MCH 30.7 26.0 - 34.0 pg    MCHC 32.5 31.0 - 36.0 g/dL    RDW 17.9 (H) 12.4 - 15.4 %    Platelets 838 068 - 577 K/uL    MPV 8.6 5.0 - 10.5 fL    Neutrophils % 80.7 %    Lymphocytes % 5.3 %    Monocytes % 10.3 %    Eosinophils % 2.4 %    Basophils % 1.3 %    Neutrophils Absolute 8.2 (H) 1.7 - 7.7 K/uL    Lymphocytes Absolute 0.5 (L) 1.0 - 5.1 K/uL    Monocytes Absolute 1.0 0.0 - 1.3 K/uL    Eosinophils Absolute 0.2 0.0 - 0.6 K/uL    Basophils Absolute 0.1 0.0 - 0.2 K/uL   Comprehensive prior EKG dated 2/28/21    RADIOLOGY    XR CHEST PORTABLE   Final Result   Findings most consistent with multifocal pneumonia. ED COURSE / MDM  Patient seen and evaluated. Old records reviewed and pertinent information included in HPI. Labs and imaging reviewed and results discussed with patient. Overall patient in no acute distress, presenting for shortness of breath for 1 to 2 days. Also reports cough productive of sputum. Physical exam remarkable for tachycardia, diminished breath sounds. .     Differential diagnosis includes but is not limited to: Pneumonia, pneumothorax, pleural effusion, ACS, CHF exacerbation, COPD exacerbation, asthma, pulmonary embolism, arrhythmia, anemia    EKG, laboratory studies, and imaging obtained. Workup showed:    ED Course as of Jul 20 1612   Tue Jul 20, 2021   1500 Patient does have a history of COPD with diminished breath sounds on exam.  Patient given DuoNeb and Solu-Medrol. Blood gas shows no acidemia or hypercarbia. Overall lower suspicion for COPD exacerbation at this time. [ER]   1544 Hyponatremia 130, hypochloremia 98, no other significant electrolyte abnormalities or evidence of kidney dysfunction.    [ER]   1544 Liver function testing shows evidence of malnourishment and mild AST elevation, no other significant abnormalities. [ER]   1545 New anemia to 10. Patient denies any bleeding. No leukocytosis or thrombocytopenia. [ER]   1545 Troponin within normal limits, EKG shows no evidence of acute ischemia. Low suspicion for ACS based off of reported symptoms. Heart score of 4 based off age and history. Patient had a stress test within the past year. Low suspicion for ACS at this time. [ER]   1545 Procalcitonin is mildly elevated to 0.33    [ER]   1545 Flu and Covid swab negative    [ER]   1546 CXR: FINDINGS:  There is multifocal bilateral airspace disease.   The mediastinum and cardiac  silhouette unremarkable.     IMPRESSION:  Findings most consistent with multifocal pneumonia. [ER]      ED Course User Index  [ER] Mat Montenegro MD        At this time I have low concern for pulmonary embolism. Patient has not had a previous blood clot. Patient denies other risk factors for pulmonary embolism. Patient does not have any evidence of DVT on exam.  Patient is low risk on Wells criteria. Patient has no new which would explain her symptoms. At this time, considering that risks associated with further work-up for pulmonary embolism outweigh the likelihood of this diagnosis. During the patient's ED course, the patient was given:  Medications   cefTRIAXone (ROCEPHIN) 1000 mg IVPB in 50 mL D5W minibag (1,000 mg Intravenous New Bag 7/20/21 1558)     And   azithromycin (ZITHROMAX) 500 mg in D5W 250ml addavial (has no administration in time range)   0.9 % sodium chloride bolus (500 mLs Intravenous New Bag 7/20/21 1523)   methylPREDNISolone sodium (SOLU-MEDROL) injection 125 mg (125 mg Intravenous Given 7/20/21 1523)      Based on results of work-up, I am concerned for pneumonia with hypoxia. At this time, do feel the patient requires admission for further work-up and management. Discussed the patient with hospital team, Natalya Nichols. CLINICAL IMPRESSION  1. Pneumonia of both lungs due to infectious organism, unspecified part of lung    2. Hypoxia        Blood pressure 114/89, pulse 88, temperature 98.7 °F (37.1 °C), temperature source Tympanic, resp. rate 22, height 5' 6.5\" (1.689 m), weight 180 lb (81.6 kg), SpO2 96 %, not currently breastfeeding. Yosvany Stauffer was admitted in stable condition. DISCLAIMER: This chart was created using Dragon dictation software. Efforts were made by me to ensure accuracy, however some errors may be present due to limitations of this technology and occasionally words are not transcribed correctly.               Mat Montenegro MD  07/20/21 5506

## 2021-07-20 NOTE — ED NOTES
Spoke with Ciara Marinelli in pharmacy, will send meds AO, omni is down.       Braeden Portillo RN  07/20/21 2098

## 2021-07-20 NOTE — ED TRIAGE NOTES
Stated Increased SOB since last night, worse today. Little to no relief with Albuterol inhaler. Pt is not on 02 at home, hypoxia per EMS and brought in on 02 via NC. 87% RA in ED. Placed on 2L/NC of 02 and is now 95%. Labored breathing with little activity of walking from EMS cot to the bed a few feet. Pt denied pain. EKG is ST with occasional PVC.

## 2021-07-20 NOTE — PROGRESS NOTES
4 Eyes Skin Assessment     The patient is being assess for   Admission    I agree that 2 RN's have performed a thorough Head to Toe Skin Assessment on the patient. ALL assessment sites listed below have been assessed. Areas assessed for pressure by both nurses:   [x]   Head, Face, and Ears   [x]   Shoulders, Back, and Chest, Abdomen  [x]   Arms, Elbows, and Hands   [x]   Coccyx, Sacrum, and Ischium  [x]   Legs, Feet, and Heels        Skin Assessed Under all Medical Devices by both nurses:  O2 device tubing              All Mepilex Borders were peeled back and area peeked at by both nurses:  No: n/s  Please list where Mepilex Borders are located:  n/a           **Patient with scattered bruising and scabs. **SHARE this note so that the co-signing nurse is able to place an eSignature**    Co-signer eSignature: Electronically signed by Christopher Knowles RN on 7/20/21 at 6:34 PM EDT    Does the Patient have Skin Breakdown related to pressure?   No     (Insert Photo here n/a)         Milad Prevention initiated:  No   Wound Care Orders initiated:  No      WOC nurse consulted for Pressure Injury (Stage 3,4, Unstageable, DTI, NWPT, Complex wounds)and New or Established Ostomies:  NA      Primary Nurse eSignature: Electronically signed by Kellie Parra RN on 7/20/21 at 6:32 PM EDT

## 2021-07-20 NOTE — Clinical Note
Patient Class: Inpatient [101]   REQUIRED: Diagnosis: CAP (community acquired pneumonia) due to Chlamydia species [[id number]]   Estimated Length of Stay: Estimated stay of more than 2 midnights   Telemetry/Cardiac Monitoring Required?: No

## 2021-07-21 LAB
ANION GAP SERPL CALCULATED.3IONS-SCNC: 8 MMOL/L (ref 3–16)
BANDED NEUTROPHILS RELATIVE PERCENT: 8 % (ref 0–7)
BASOPHILS ABSOLUTE: 0 K/UL (ref 0–0.2)
BASOPHILS RELATIVE PERCENT: 0 %
BUN BLDV-MCNC: 20 MG/DL (ref 7–20)
CALCIUM SERPL-MCNC: 9 MG/DL (ref 8.3–10.6)
CHLORIDE BLD-SCNC: 104 MMOL/L (ref 99–110)
CO2: 21 MMOL/L (ref 21–32)
CREAT SERPL-MCNC: 0.6 MG/DL (ref 0.6–1.2)
EOSINOPHILS ABSOLUTE: 0 K/UL (ref 0–0.6)
EOSINOPHILS RELATIVE PERCENT: 0 %
GFR AFRICAN AMERICAN: >60
GFR NON-AFRICAN AMERICAN: >60
GLUCOSE BLD-MCNC: 138 MG/DL (ref 70–99)
HCT VFR BLD CALC: 29.8 % (ref 36–48)
HEMOGLOBIN: 9.7 G/DL (ref 12–16)
LYMPHOCYTES ABSOLUTE: 0.8 K/UL (ref 1–5.1)
LYMPHOCYTES RELATIVE PERCENT: 13 %
MCH RBC QN AUTO: 30.6 PG (ref 26–34)
MCHC RBC AUTO-ENTMCNC: 32.4 G/DL (ref 31–36)
MCV RBC AUTO: 94.5 FL (ref 80–100)
MONOCYTES ABSOLUTE: 0.2 K/UL (ref 0–1.3)
MONOCYTES RELATIVE PERCENT: 3 %
NEUTROPHILS ABSOLUTE: 5.2 K/UL (ref 1.7–7.7)
NEUTROPHILS RELATIVE PERCENT: 76 %
PDW BLD-RTO: 17.9 % (ref 12.4–15.4)
PLATELET # BLD: 216 K/UL (ref 135–450)
PLATELET SLIDE REVIEW: ADEQUATE
PMV BLD AUTO: 8.3 FL (ref 5–10.5)
POTASSIUM REFLEX MAGNESIUM: 4.2 MMOL/L (ref 3.5–5.1)
RBC # BLD: 3.16 M/UL (ref 4–5.2)
SLIDE REVIEW: ABNORMAL
SODIUM BLD-SCNC: 133 MMOL/L (ref 136–145)
WBC # BLD: 6.2 K/UL (ref 4–11)

## 2021-07-21 PROCEDURE — 99223 1ST HOSP IP/OBS HIGH 75: CPT | Performed by: INTERNAL MEDICINE

## 2021-07-21 PROCEDURE — 99232 SBSQ HOSP IP/OBS MODERATE 35: CPT | Performed by: INTERNAL MEDICINE

## 2021-07-21 PROCEDURE — 80048 BASIC METABOLIC PNL TOTAL CA: CPT

## 2021-07-21 PROCEDURE — 87449 NOS EACH ORGANISM AG IA: CPT

## 2021-07-21 PROCEDURE — 94640 AIRWAY INHALATION TREATMENT: CPT

## 2021-07-21 PROCEDURE — 94669 MECHANICAL CHEST WALL OSCILL: CPT

## 2021-07-21 PROCEDURE — 94761 N-INVAS EAR/PLS OXIMETRY MLT: CPT

## 2021-07-21 PROCEDURE — 6370000000 HC RX 637 (ALT 250 FOR IP): Performed by: INTERNAL MEDICINE

## 2021-07-21 PROCEDURE — 87070 CULTURE OTHR SPECIMN AEROBIC: CPT

## 2021-07-21 PROCEDURE — 87205 SMEAR GRAM STAIN: CPT

## 2021-07-21 PROCEDURE — 2700000000 HC OXYGEN THERAPY PER DAY

## 2021-07-21 PROCEDURE — 6370000000 HC RX 637 (ALT 250 FOR IP): Performed by: PHYSICIAN ASSISTANT

## 2021-07-21 PROCEDURE — 6360000002 HC RX W HCPCS: Performed by: PHYSICIAN ASSISTANT

## 2021-07-21 PROCEDURE — 85025 COMPLETE CBC W/AUTO DIFF WBC: CPT

## 2021-07-21 PROCEDURE — 2580000003 HC RX 258: Performed by: PHYSICIAN ASSISTANT

## 2021-07-21 PROCEDURE — 1200000000 HC SEMI PRIVATE

## 2021-07-21 PROCEDURE — 36415 COLL VENOUS BLD VENIPUNCTURE: CPT

## 2021-07-21 RX ADMIN — CEFTRIAXONE SODIUM 1000 MG: 1 INJECTION, POWDER, FOR SOLUTION INTRAMUSCULAR; INTRAVENOUS at 14:52

## 2021-07-21 RX ADMIN — IPRATROPIUM BROMIDE AND ALBUTEROL SULFATE 1 AMPULE: .5; 3 SOLUTION RESPIRATORY (INHALATION) at 22:48

## 2021-07-21 RX ADMIN — ENOXAPARIN SODIUM 40 MG: 40 INJECTION SUBCUTANEOUS at 09:53

## 2021-07-21 RX ADMIN — IPRATROPIUM BROMIDE AND ALBUTEROL SULFATE 1 AMPULE: .5; 3 SOLUTION RESPIRATORY (INHALATION) at 11:08

## 2021-07-21 RX ADMIN — IPRATROPIUM BROMIDE AND ALBUTEROL SULFATE 1 AMPULE: .5; 3 SOLUTION RESPIRATORY (INHALATION) at 19:22

## 2021-07-21 RX ADMIN — ATENOLOL 25 MG: 25 TABLET ORAL at 09:54

## 2021-07-21 RX ADMIN — POLYETHYLENE GLYCOL (3350) 17 G: 17 POWDER, FOR SOLUTION ORAL at 09:53

## 2021-07-21 RX ADMIN — ATORVASTATIN CALCIUM 80 MG: 40 TABLET, FILM COATED ORAL at 17:50

## 2021-07-21 RX ADMIN — METHYLPREDNISOLONE SODIUM SUCCINATE 40 MG: 40 INJECTION, POWDER, FOR SOLUTION INTRAMUSCULAR; INTRAVENOUS at 09:54

## 2021-07-21 RX ADMIN — DULOXETINE HYDROCHLORIDE 60 MG: 60 CAPSULE, DELAYED RELEASE ORAL at 22:09

## 2021-07-21 RX ADMIN — SODIUM CHLORIDE: 9 INJECTION, SOLUTION INTRAVENOUS at 06:11

## 2021-07-21 RX ADMIN — Medication 2 PUFF: at 19:22

## 2021-07-21 RX ADMIN — AZITHROMYCIN 500 MG: 250 TABLET, FILM COATED ORAL at 09:54

## 2021-07-21 RX ADMIN — SODIUM CHLORIDE: 9 INJECTION, SOLUTION INTRAVENOUS at 19:32

## 2021-07-21 RX ADMIN — Medication 2 PUFF: at 06:45

## 2021-07-21 RX ADMIN — LEVOTHYROXINE SODIUM 25 MCG: 25 TABLET ORAL at 06:10

## 2021-07-21 RX ADMIN — IPRATROPIUM BROMIDE AND ALBUTEROL SULFATE 1 AMPULE: .5; 3 SOLUTION RESPIRATORY (INHALATION) at 06:45

## 2021-07-21 RX ADMIN — Medication 2000 UNITS: at 22:09

## 2021-07-21 RX ADMIN — IPRATROPIUM BROMIDE AND ALBUTEROL SULFATE 1 AMPULE: .5; 3 SOLUTION RESPIRATORY (INHALATION) at 15:14

## 2021-07-21 RX ADMIN — Medication 10 ML: at 22:10

## 2021-07-21 ASSESSMENT — PAIN SCALES - GENERAL
PAINLEVEL_OUTOF10: 0
PAINLEVEL_OUTOF10: 0

## 2021-07-21 NOTE — PROGRESS NOTES
Aspiration Screen    Name: Antonieta Mujica  : 1940  Medical Diagnosis: CAP (community acquired pneumonia) due to Chlamydia species [J16.0]  Multifocal pneumonia [J18.9]    Swallow screen completed. Patient demonstrates no significant high risk indicators for potential aspiration per swallow screen at this time. Discussed with pt and RN. Neither report concerns for swallowing. Pt denied globus sensation, pain with swallowing, choking with intake, or recurrent PNA. No further swallowing intervention is warranted at this time. Continue current diet as tolerated. Please consult speech therapy for bedside swallow evaluation if symptoms of swallowing dysfunction arise.     Jolene Fall M.A., [address] [facility] #[id number]  Speech-Language Pathologist

## 2021-07-21 NOTE — PROGRESS NOTES
Progress Note    Admit Date:  7/20/2021    80 y.o. female with COPD, RA on methotrexate, HTN, thyroid disease who presented to [facility] ED with complaint of shortness of breath. Admitted for multifocal pneumonia and COPD exacerbation. Pulmonology consulted. Subjective:  Ms. Gonzalez Calles is better today. Shortness of breath is improved. Afebrile. Oxygen saturation stable on 2 L. Objective:   Patient Vitals for the past 4 hrs:   BP Temp Temp src Pulse Resp SpO2   07/21/21 1419 (!) 141/83 97.3 °F (36.3 °C) Oral 84 18 94 %   07/21/21 1110 -- -- -- -- 18 95 %            Intake/Output Summary (Last 24 hours) at 7/21/2021 1427  Last data filed at 7/21/2021 0947  Gross per 24 hour   Intake 1670.87 ml   Output --   Net 1670.87 ml       Physical Exam:  Gen: No distress. Alert. Eyes: PERRL. No sclera icterus. No conjunctival injection. ENT: No discharge. Pharynx clear. Neck: No JVD. Trachea midline. Resp: No accessory muscle use. diminished breath sounds, no wheezes rales or rhonchi. CV: Regular rate. Regular rhythm. No murmur. No rub. No edema. GI: Non-tender. Non-distended. No masses. No organomegaly. Normal bowel sounds   Skin: Warm and dry. No nodule on exposed extremities. No rash on exposed extremities. M/S: No cyanosis. No joint deformity. No clubbing. Neuro: Awake. Grossly nonfocal    Psych: Oriented x 3. No anxiety or agitation. Data:  CBC:   Recent Labs     07/20/21  1326 07/21/21  0615   WBC 10.1 6.2   HGB 10.0* 9.7*   HCT 30.7* 29.8*   MCV 94.4 94.5    216     BMP:   Recent Labs     07/20/21  1326 07/21/21  0615   * 133*   K 4.0 4.2   CL 98* 104   CO2 24 21   BUN 16 20   CREATININE 0.6 0.6     LIVER PROFILE:   Recent Labs     07/20/21  1326   AST 40*   ALT 29   BILITOT 0.8   ALKPHOS 96     PT/INR: No results for input(s): PROTIME, INR in the last 72 hours.     CULTURES  Blood: pending   COVID 19, PCR: Not detected  Influenza A/B: neg/neg  Strep pneumo ag: ordered  Legionella ag: ordered     RADIOLOGY  XR CHEST PORTABLE   Final Result   Findings most consistent with multifocal pneumonia. Assessment/Plan:  #Pneumonia  - community acquired, suspect a gram positive organism    - Treat with antibiotics Rocephin and azithromycin D#2  - check cultures     #COPD  AE  - IV solumedrol for now  - inhaled bronchodilators   - pulmonary cs    #Hypoxia   - 2/2 PNA and COPD  - she was 87% on RA. She is now requiring 2L. She does not use home O2. Wean as able     #Hyponatremia  - Na 130 on admit  - hold HCTZ  - IV NS today  - repeat BMP improved to 133     #Rheumatoid arthritis  - she takes methotrexate weekly on Mondays. Hold with acute infection     #HTN  - BP stable   - cont atenolol   - Hold maxzide while on IVF and with low Na     #HLD  - cont satin      #Hypothyroidism  - cont synthroid      DVT Prophylaxis: Lovenox     Diet: ADULT DIET;  Regular  Code Status: Full Code      Nadine Mock MD  7/21/2021

## 2021-07-21 NOTE — ACP (ADVANCE CARE PLANNING)
Advance Care Planning   Healthcare Decision Maker:    Primary Decision Maker: Laura Emnauel - Child - [phone]      Today we documented Decision Maker(s) consistent with Legal Next of Kin hierarchy.

## 2021-07-21 NOTE — FLOWSHEET NOTE
07/21/21 0947   Vital Signs   Temp 97.1 °F (36.2 °C)   Temp Source Oral   Pulse 86   Heart Rate Source Monitor   Resp 18   /69   BP Location Left upper arm   Patient Position Semi fowlers   Level of Consciousness Alert (0)   MEWS Score 1   Patient Currently in Pain Denies   Pain Assessment   Pain Assessment 0-10   Pain Level 0   Oxygen Therapy   SpO2 97 %   O2 Device Nasal cannula   O2 Flow Rate (L/min) 2 L/min   Assessment complete and AM meds passed

## 2021-07-21 NOTE — CONSULTS
 enoxaparin  40 mg Subcutaneous Daily    ipratropium-albuterol  1 ampule Inhalation Q4H WA    [Held by provider] triamterene-hydroCHLOROthiazide  1 tablet Oral Daily    polyethylene glycol  17 g Oral Daily    cefTRIAXone (ROCEPHIN) IV  1,000 mg Intravenous Q24H    And    azithromycin  500 mg Oral Q24H    methylPREDNISolone  40 mg Intravenous Daily     Continuous Infusions:   sodium chloride      sodium chloride 75 mL/hr at 07/21/21 0611     PRN Meds:  sodium chloride flush, sodium chloride, ondansetron **OR** ondansetron, acetaminophen **OR** acetaminophen, bisacodyl, albuterol    ALLERGIES:  Patient is allergic to effexor [venlafaxine hcl], fenofibrate, prednisone, codeine, and docosahexaenoic acid-epa. REVIEW OF SYSTEMS:  Constitutional: Negative for fever  HENT: Negative for sore throat  Eyes: Negative for redness   Respiratory: + for dyspnea, cough  Cardiovascular: Negative for chest pain  Gastrointestinal: Negative for vomiting, diarrhea   Genitourinary: Negative for hematuria   Musculoskeletal: Myalgias  Skin: Negative for rash  Neurological: Negative for syncope  Hematological: Negative for adenopathy  Psychiatric/Behavorial: Negative for anxiety    PHYSICAL EXAM:  Blood pressure 120/86, pulse 88, temperature 98.7 °F (37.1 °C), temperature source Oral, resp. rate 18, height 5' 6.5\" (1.689 m), weight 180 lb (81.6 kg), SpO2 90 %, not currently breastfeeding.' on 2 L  Gen: No distress. Eyes: PERRL. No sclera icterus. No conjunctival injection. ENT: No discharge. Pharynx clear. Neck: Trachea midline. No obvious mass. Resp: No accessory muscle use. + crackles. + wheezes. No rhonchi. No dullness on percussion. CV: Regular rate. Regular rhythm. No murmur or rub. No edema. Peripheral pulses are 2+. Capillary refill is less than 3 seconds. GI: Non-tender. Non-distended. No hernia. Skin: Warm and dry. No nodule on exposed extremities. Lymph: No cervical LAD. No supraclavicular LAD.    M/S: No cyanosis. No joint deformity. No clubbing. Neuro: Awake. Alert. Moves all four extremities. Psych: Oriented x 3. No anxiety. LABS:  CBC:   Recent Labs     07/20/21  1326 07/21/21  0615   WBC 10.1 6.2   HGB 10.0* 9.7*   HCT 30.7* 29.8*   MCV 94.4 94.5    216     BMP:   Recent Labs     07/20/21  1326 07/21/21  0615   * 133*   K 4.0 4.2   CL 98* 104   CO2 24 21   BUN 16 20   CREATININE 0.6 0.6     LIVER PROFILE:   Recent Labs     07/20/21  1326   AST 40*   ALT 29   BILITOT 0.8   ALKPHOS 96     PT/INR: No results for input(s): PROTIME, INR in the last 72 hours. APTT: No results for input(s): APTT in the last 72 hours. UA:No results for input(s): NITRITE, COLORU, PHUR, LABCAST, WBCUA, RBCUA, MUCUS, TRICHOMONAS, YEAST, BACTERIA, CLARITYU, SPECGRAV, LEUKOCYTESUR, UROBILINOGEN, BILIRUBINUR, BLOODU, GLUCOSEU, AMORPHOUS in the last 72 hours. Invalid input(s): KETONESU  No results for input(s): PHART, [id number], PO2ART in the last 72 hours.   ECG was reviewed by me and shows sinus tachycardia at 111 bpm without acute ischemic ST segment elevation     Chest imaging was reviewed by me and showed   7/21/2021 CXR multifocal infiltrate    ASSESSMENT:  · Acute hypoxemic respiratory failure   · Community acquired pneumonia in immunosuppressed patient with chronic methotrexate use  · Moderate COPD with acute exacerbation   · Asthma COPD overlap syndrome, followed by Dr. Marge Marshall  · RA on MTX; chronic immunosuppression    PLAN:  Supplemental oxygen to maintain SaO2 >92%; wean as tolerated    IV solumedrol    Inhaled bronchodilators   Ceftriaxone and azithromycin D#2, follow clinically including procalcitonin levels, patient has some risk for atypical organisms given chronic immunosuppression  Tobacco cessation achieved more than 30 years prior

## 2021-07-21 NOTE — PROGRESS NOTES
RESPIRATORY THERAPY ASSESSMENT    Name:  [facility] Record Number:  [id number]  Age: 80 y.o. Gender: female  : 1940  Today's Date:  2021  Room:  [address] [place], [place] [id number]-    Assessment     Is the patient being admitted for a COPD or Asthma exacerbation? No   (If yes the patient will be seen every 4 hours for the first 24 hours and then reassessed)    Patient Admission Diagnosis      Allergies  Allergies   Allergen Reactions    Effexor [Venlafaxine Hcl] Itching and Nausea Only    Fenofibrate Diarrhea    Prednisone Other (See Comments)     Causes body to feel on fire    Codeine Nausea And Vomiting    Docosahexaenoic Acid-Epa Nausea And Vomiting     Cannot take Vascepa       Minimum Predicted Vital Capacity:               Actual Vital Capacity:                    Pulmonary History:Asthma  Home Oxygen Therapy:  room air  Home Respiratory Therapy:Albuterol   Current Respiratory Therapy:  duoneb q4wa, symbicort 160 bid  Treatment Type: MDI  Medications: Budesonide/Formoterol    Respiratory Severity Index(RSI)   Patients with orders for inhalation medications, oxygen, or any therapeutic treatment modality will be placed on Respiratory Protocol. They will be assessed with the first treatment and at least every 72 hours thereafter. The following severity scale will be used to determine frequency of treatment intervention.     Smoking History: Smoking History Less than 1ppd or less than 15 pack year = 1    Social History  Social History     Tobacco Use    Smoking status: Former Smoker     Packs/day: 0.50     Years: 20.00     Pack years: 10.00     Types: Cigarettes     Start date: 1959     Quit date: 1987     Years since quittin.5    Smokeless tobacco: Never Used   Vaping Use    Vaping Use: Never used   Substance Use Topics    Alcohol use: No     Alcohol/week: 0.0 standard drinks    Drug use: No       Recent Surgical History: None = 0  Past Surgical History  Past Surgical History: Procedure Laterality Date    ABDOMINAL EXPLORATION SURGERY      APPENDECTOMY      CHOLECYSTECTOMY      COLONOSCOPY  4/18/2013    diverticulosis, polyps    COLONOSCOPY  11/10/2016    normal colon    EYE SURGERY Right 12/11/15    cataract removal    EYE SURGERY Left 1/8/16    cataract removed    HUMERUS FRACTURE SURGERY Right 2/28/2021    HUMERUS OPEN REDUCTION INTERNAL FIXATION performed by Marvin Barton MD at [address], PARTIAL Left     TUMOR REMOVAL      off of thyroid, was benign       Level of Consciousness: Alert, Oriented, and Cooperative = 0    Level of Activity: Walking with assistance = 1    Respiratory Pattern: Dyspnea with exertion;Irregular pattern;or RR less than 6 = 2    Breath Sounds: Absent bilaterally and/or with wheezes = 3    Sputum  Sputum Color: None,  ,    Cough: Strong, spontaneous, non-productive = 0    Vital Signs   BP (!) 149/76   Pulse 88   Temp 97 °F (36.1 °C) (Oral)   Resp 18   Ht 5' 6.5\" (1.689 m)   Wt 180 lb (81.6 kg)   SpO2 94%   BMI 28.62 kg/m²   SPO2 (COPD values may differ): 88-89% on room air or greater than 92% on FiO2 28- 35% = 2    Peak Flow (asthma only): not applicable = 0    RSI: 1-09 = TID (three times daily) and Q4hr PRN for dyspnea        Plan       Goals: mobilize retained secretions, volume expansion and improve oxygenation    Patient/caregiver was educated on the proper method of use for Respiratory Care Devices:  Yes      Level of patient/caregiver understanding able to:   ? Verbalize understanding   ? Demonstrate understanding       ? Teach back        ? Needs reinforcement       ? No available caregiver               ? Other:     Response to education:  Good     Is patient being placed on Home Treatment Regimen? No     Does the patient have everything they need prior to discharge?   NA     Comments: pt assessed and chart reviewed    Plan of Care: duoneb q4wa for wheezes, symbicort 160 bid    Electronically signed by Jace Barrett RCP on 7/20/2021 at 8:16 PM    Respiratory Protocol Guidelines     1. Assessment and treatment by Respiratory Therapy will be initiated for medication and therapeutic interventions upon initiation of aerosolized medication. 2. Physician will be contacted for respiratory rate (RR) greater than 35 breaths per minute. Therapy will be held for heart rate (HR) greater than 140 beats per minute, pending direction from physician. 3. Bronchodilators will be administered via Metered Dose Inhaler (MDI) with spacer when the following criteria are met:  a. Alert and cooperative     b. HR < 140 bpm  c. RR < 30 bpm                d. Can demonstrate a 2-3 second inspiratory hold  4. Bronchodilators will be administered via Hand Held Nebulizer [facility] to patients when ANY of the following criteria are met  a. Incognizant or uncooperative          b. Patients treated with HHN at Home        c. Unable to demonstrate proper use of MDI with spacer     d. RR > 30 bpm   5. Bronchodilators will be delivered via Metered Dose Inhaler (MDI), HHN, Aerogen to intubated patients on mechanical ventilation. 6. Inhalation medication orders will be delivered and/or substituted as outlined below. Aerosolized Medications Ordering and Administration Guidelines:    1. All Medications will be ordered by a physician, and their frequency and/or modality will be adjusted as defined by the patients Respiratory Severity Index (RSI) score. 2. If the patient does not have documented COPD, consider discontinuing anticholinergics when RSI is less than 9.  3. If the bronchospasm worsens (increased RSI), then the bronchodilator frequency can be increased to a maximum of every 4 hours. If greater than every 4 hours is required, the physician will be contacted.   4. If the bronchospasm improves, the frequency of the bronchodilator can be decreased, based on the patient's RSI, but not less than home treatment regimen

## 2021-07-21 NOTE — PROGRESS NOTES
See PM shift assessment. Spo2 93% on 2 L O2. States she is feeling better. Up to [facility] w/ assist.  Respirs e/e; slightly sob w/exertion. Call light in reach. IV infusing w/o diff.

## 2021-07-21 NOTE — CARE COORDINATION
Case Management Assessment  Initial Evaluation      Patient Name: Nick Vann  YOB: 1940  Diagnosis: CAP (community acquired pneumonia) due to Chlamydia species [J16.0]  Multifocal pneumonia [J18.9]  Date / Time: 7/20/2021 12:58 PM    Admission status/Date:  07/2021  Chart Reviewed: Yes      Patient Interviewed: Yes   Family Interviewed:  No      Hospitalization in the last 30 days:  No      Health Care Decision Maker :   Primary Decision MakerDori Billet - Child - [phone]     Met with: patient  Mark Anthony Friend conducted  (bedside/phone): bedside    Current PCP: Pee Javed MD    Financial  Medicare/Medicaid  Precert required for SNF : NA        3 night stay required - YES    ADLS  Support Systems/Care Needs: Children  Transportation: family    Meal Preparation: self    Housing  Living Arrangements: Lives in [facility] alone (single story)  Steps: Threshold only  Intent for return to present living arrangements: Yes  Identified Issues: Having difficulty applying her back brace by herself as she has a right wrist brace as well and cannot maneuver the brace to apply it correctly. She will likely continue to need the wrist brace for up to another 12 months. Home Care Information  Active with Home Health Care : Not currently - [facility] in the past  Type of Home Care Services: None  Passport/Waiver : No  :                      Phone Number:    Passport/Waiver Services: NA          Durable Medical Equiptment   DME Provider: [facility]  Equipment:   Walker_X__Cane_X__RTS___ BSC___Shower Chair___Hospital Bed___W/C____Other__Right wrist and back braces______  02 at ____Liter(s)---wears(frequency)_______ [facility] ___ CPAP___ BiPap___   N/A____      Home O2 Use :  No        Community Service Affiliation  Dialysis:  No    · Agency:  · Location:  · Dialysis Schedule:  · Phone:   · Fax: Other Community Services: OP PT/OT at [facility] in the past- on hold for now.  Pt has been unable to apply her back brace and has had difficulty with getting to OP PT.     DISCHARGE PLAN: Explained Case Management role/services. Chart reviewed. Met with pt at bedside and explained the role of the CM. Plan: Wants to return home. She has had increasing difficulty applying her back brace as she also must wear a right wist brace. Home PT/OT and OP PT/OT were attempted but are now on hold. Pt will have family bring in the back brace so that she can be evaluated by PT/OT during this hospitalization.  +CM following

## 2021-07-22 LAB
ANION GAP SERPL CALCULATED.3IONS-SCNC: 10 MMOL/L (ref 3–16)
BASOPHILS ABSOLUTE: 0.1 K/UL (ref 0–0.2)
BASOPHILS RELATIVE PERCENT: 0.7 %
BUN BLDV-MCNC: 22 MG/DL (ref 7–20)
CALCIUM SERPL-MCNC: 8.6 MG/DL (ref 8.3–10.6)
CHLORIDE BLD-SCNC: 108 MMOL/L (ref 99–110)
CO2: 22 MMOL/L (ref 21–32)
CREAT SERPL-MCNC: 0.6 MG/DL (ref 0.6–1.2)
EOSINOPHILS ABSOLUTE: 0 K/UL (ref 0–0.6)
EOSINOPHILS RELATIVE PERCENT: 0.2 %
ESTIMATED AVERAGE GLUCOSE: 114 MG/DL
GFR AFRICAN AMERICAN: >60
GFR NON-AFRICAN AMERICAN: >60
GLUCOSE BLD-MCNC: 104 MG/DL (ref 70–99)
HBA1C MFR BLD: 5.6 %
HCT VFR BLD CALC: 26.9 % (ref 36–48)
HEMOGLOBIN: 8.8 G/DL (ref 12–16)
L. PNEUMOPHILA SEROGP 1 UR AG: NORMAL
LYMPHOCYTES ABSOLUTE: 0.7 K/UL (ref 1–5.1)
LYMPHOCYTES RELATIVE PERCENT: 8.4 %
MCH RBC QN AUTO: 30.5 PG (ref 26–34)
MCHC RBC AUTO-ENTMCNC: 32.6 G/DL (ref 31–36)
MCV RBC AUTO: 93.6 FL (ref 80–100)
MONOCYTES ABSOLUTE: 0.2 K/UL (ref 0–1.3)
MONOCYTES RELATIVE PERCENT: 2.1 %
NEUTROPHILS ABSOLUTE: 7.1 K/UL (ref 1.7–7.7)
NEUTROPHILS RELATIVE PERCENT: 88.6 %
PDW BLD-RTO: 17.8 % (ref 12.4–15.4)
PLATELET # BLD: 290 K/UL (ref 135–450)
PMV BLD AUTO: 8.8 FL (ref 5–10.5)
POTASSIUM REFLEX MAGNESIUM: 4.1 MMOL/L (ref 3.5–5.1)
PROCALCITONIN: 0.31 NG/ML (ref 0–0.15)
RBC # BLD: 2.88 M/UL (ref 4–5.2)
SODIUM BLD-SCNC: 140 MMOL/L (ref 136–145)
STREP PNEUMONIAE ANTIGEN, URINE: NORMAL
WBC # BLD: 8 K/UL (ref 4–11)

## 2021-07-22 PROCEDURE — 80048 BASIC METABOLIC PNL TOTAL CA: CPT

## 2021-07-22 PROCEDURE — 94640 AIRWAY INHALATION TREATMENT: CPT

## 2021-07-22 PROCEDURE — 6370000000 HC RX 637 (ALT 250 FOR IP): Performed by: PHYSICIAN ASSISTANT

## 2021-07-22 PROCEDURE — 94669 MECHANICAL CHEST WALL OSCILL: CPT

## 2021-07-22 PROCEDURE — 2700000000 HC OXYGEN THERAPY PER DAY

## 2021-07-22 PROCEDURE — 94761 N-INVAS EAR/PLS OXIMETRY MLT: CPT

## 2021-07-22 PROCEDURE — 36415 COLL VENOUS BLD VENIPUNCTURE: CPT

## 2021-07-22 PROCEDURE — 6370000000 HC RX 637 (ALT 250 FOR IP): Performed by: INTERNAL MEDICINE

## 2021-07-22 PROCEDURE — 6360000002 HC RX W HCPCS: Performed by: PHYSICIAN ASSISTANT

## 2021-07-22 PROCEDURE — 99232 SBSQ HOSP IP/OBS MODERATE 35: CPT | Performed by: INTERNAL MEDICINE

## 2021-07-22 PROCEDURE — 84145 PROCALCITONIN (PCT): CPT

## 2021-07-22 PROCEDURE — 2580000003 HC RX 258: Performed by: PHYSICIAN ASSISTANT

## 2021-07-22 PROCEDURE — 1200000000 HC SEMI PRIVATE

## 2021-07-22 PROCEDURE — 85025 COMPLETE CBC W/AUTO DIFF WBC: CPT

## 2021-07-22 RX ADMIN — ATENOLOL 25 MG: 25 TABLET ORAL at 08:52

## 2021-07-22 RX ADMIN — SODIUM CHLORIDE: 9 INJECTION, SOLUTION INTRAVENOUS at 19:14

## 2021-07-22 RX ADMIN — AZITHROMYCIN 500 MG: 250 TABLET, FILM COATED ORAL at 08:51

## 2021-07-22 RX ADMIN — ENOXAPARIN SODIUM 40 MG: 40 INJECTION SUBCUTANEOUS at 08:52

## 2021-07-22 RX ADMIN — METHYLPREDNISOLONE SODIUM SUCCINATE 40 MG: 40 INJECTION, POWDER, FOR SOLUTION INTRAMUSCULAR; INTRAVENOUS at 08:52

## 2021-07-22 RX ADMIN — CEFTRIAXONE SODIUM 1000 MG: 1 INJECTION, POWDER, FOR SOLUTION INTRAMUSCULAR; INTRAVENOUS at 14:33

## 2021-07-22 RX ADMIN — ATORVASTATIN CALCIUM 80 MG: 40 TABLET, FILM COATED ORAL at 17:46

## 2021-07-22 RX ADMIN — Medication 2 PUFF: at 07:01

## 2021-07-22 RX ADMIN — Medication 2 PUFF: at 19:31

## 2021-07-22 RX ADMIN — IPRATROPIUM BROMIDE AND ALBUTEROL SULFATE 1 AMPULE: .5; 3 SOLUTION RESPIRATORY (INHALATION) at 19:31

## 2021-07-22 RX ADMIN — DULOXETINE HYDROCHLORIDE 60 MG: 60 CAPSULE, DELAYED RELEASE ORAL at 20:47

## 2021-07-22 RX ADMIN — Medication 2000 UNITS: at 20:47

## 2021-07-22 RX ADMIN — POLYETHYLENE GLYCOL (3350) 17 G: 17 POWDER, FOR SOLUTION ORAL at 08:52

## 2021-07-22 RX ADMIN — IPRATROPIUM BROMIDE AND ALBUTEROL SULFATE 1 AMPULE: .5; 3 SOLUTION RESPIRATORY (INHALATION) at 11:05

## 2021-07-22 RX ADMIN — IPRATROPIUM BROMIDE AND ALBUTEROL SULFATE 1 AMPULE: .5; 3 SOLUTION RESPIRATORY (INHALATION) at 07:01

## 2021-07-22 RX ADMIN — IPRATROPIUM BROMIDE AND ALBUTEROL SULFATE 1 AMPULE: .5; 3 SOLUTION RESPIRATORY (INHALATION) at 15:49

## 2021-07-22 RX ADMIN — Medication 10 ML: at 20:47

## 2021-07-22 RX ADMIN — SODIUM CHLORIDE: 9 INJECTION, SOLUTION INTRAVENOUS at 07:01

## 2021-07-22 RX ADMIN — LEVOTHYROXINE SODIUM 25 MCG: 25 TABLET ORAL at 06:39

## 2021-07-22 ASSESSMENT — PAIN SCALES - GENERAL: PAINLEVEL_OUTOF10: 0

## 2021-07-22 NOTE — FLOWSHEET NOTE
07/22/21 0845   Vital Signs   Temp 97.2 °F (36.2 °C)   Temp Source Oral   Pulse 84   Heart Rate Source Monitor   Resp 16   /72   BP Location Left upper arm   Patient Position Semi fowlers   Level of Consciousness Alert (0)   MEWS Score 1   Patient Currently in Pain Denies   Pain Assessment   Pain Assessment 0-10   Pain Level 0   Assessment complete and morning meds passed

## 2021-07-22 NOTE — PROGRESS NOTES

## 2021-07-22 NOTE — PROGRESS NOTES
Patient is in bed resting with eyes closed. Patient denied pain and discomfort. Snacks, drink and restroom assistance offered at this rounding, patient refused. Call light within reach, bed in low position, will continue to monitor.

## 2021-07-22 NOTE — PROGRESS NOTES
Pulmonary Progress Note  CC: Shortness of breath, pneumonia    Subjective:  Feels pretty bad but markedly improved     IV line peripheral    EXAM:   /72   Pulse 84   Temp 97.2 °F (36.2 °C) (Oral)   Resp 18   Ht 5' 6.5\" (1.689 m)   Wt 180 lb (81.6 kg)   SpO2 92%   BMI 28.62 kg/m²  on 2 L   Constitutional:  No acute distress   HEENT: no scleral icterus  Neck: No tracheal deviation present. Cardiovascular: Normal heart sounds. Pulmonary/Chest: No wheezes. No rhonchi. No rales. No decreased breath sounds. No accessory muscle usage or stridor. Abdominal: Soft. Musculoskeletal: No cyanosis. No clubbing. Skin: Skin is warm and dry.      Scheduled Meds:   atenolol  25 mg Oral Daily    atorvastatin  80 mg Oral QPM    Vitamin D  2,000 Units Oral Nightly    DULoxetine  60 mg Oral Nightly    levothyroxine  25 mcg Oral Daily    budesonide-formoterol  2 puff Inhalation BID    sodium chloride flush  5-40 mL Intravenous 2 times per day    enoxaparin  40 mg Subcutaneous Daily    ipratropium-albuterol  1 ampule Inhalation Q4H WA    [Held by provider] triamterene-hydroCHLOROthiazide  1 tablet Oral Daily    polyethylene glycol  17 g Oral Daily    cefTRIAXone (ROCEPHIN) IV  1,000 mg Intravenous Q24H    And    azithromycin  500 mg Oral Q24H    methylPREDNISolone  40 mg Intravenous Daily     Continuous Infusions:   sodium chloride      sodium chloride 75 mL/hr at 07/22/21 0701     PRN Meds:  sodium chloride flush, sodium chloride, ondansetron **OR** ondansetron, acetaminophen **OR** acetaminophen, bisacodyl, albuterol    Labs:  CBC:   Recent Labs     07/20/21  1326 07/21/21  0615 07/22/21  0458   WBC 10.1 6.2 8.0   HGB 10.0* 9.7* 8.8*   HCT 30.7* 29.8* 26.9*   MCV 94.4 94.5 93.6    216 290     BMP:   Recent Labs     07/20/21  1326 07/21/21  0615 07/22/21  0458   * 133* 140   K 4.0 4.2 4.1   CL 98* 104 108   CO2 24 21 22   BUN 16 20 22*   CREATININE 0.6 0.6 0.6       Cultures:  7/21/2021 sputum is pending  7/21/2021 blood NGTD  7/20/2021 SARS-CoV-2 and influenza are negative    Films:  7/21/2021 CXR multifocal infiltrate    ASSESSMENT:  · Acute hypoxemic respiratory failure   · Community acquired pneumonia in immunosuppressed patient with chronic methotrexate use  · Moderate COPD with acute exacerbation   · Asthma COPD overlap syndrome, followed by Dr. Brandon Reyes  · RA on MTX; chronic immunosuppression    PLAN:  Supplemental oxygen to maintain SaO2 >92%; wean as tolerated    IV solumedrol    Inhaled bronchodilators   Ceftriaxone and azithromycin D#3   Tobacco cessation achieved more than 30 years prior  Neb at discharge

## 2021-07-22 NOTE — PLAN OF CARE
Problem: Pain:  Goal: Control of acute pain  Description: Control of acute pain  7/22/2021 0938 by Varsha French RN  Outcome: Ongoing  7/21/2021 2327 by Woodrow Lujan RN  Outcome: Met This Shift     Problem: Safety:  Goal: Free from accidental physical injury  Description: Free from accidental physical injury  7/22/2021 0938 by Varsha French RN  Outcome: Ongoing  7/21/2021 2327 by Woodrow Lujan RN  Outcome: Met This Shift     Problem: Daily Care:  Goal: Daily care needs are met  Description: Daily care needs are met  7/22/2021 0938 by Varsha French RN  Outcome: Ongoing  7/21/2021 2327 by Woodrow Lujan RN  Outcome: Met This Shift     Problem: Skin Integrity:  Goal: Skin integrity will stabilize  Description: Skin integrity will stabilize  7/22/2021 0938 by Varsha French RN  Outcome: Ongoing  7/21/2021 2327 by Woodrow Lujan RN  Outcome: Met This Shift

## 2021-07-22 NOTE — PROGRESS NOTES
Progress Note    Admit Date:  7/20/2021    80 y.o. female with COPD, RA on methotrexate, HTN, thyroid disease who presented to [facility] ED with complaint of shortness of breath. Admitted for multifocal pneumonia and COPD exacerbation. Pulmonology consulted. Subjective:  Ms. Kenneth Figueroa is better today. Improving. Still very fatigued. Cough and shortness of breath is better. Afebrile. Oxygen saturation stable on 2 L. Objective:   Patient Vitals for the past 4 hrs:   BP Temp Temp src Pulse Resp SpO2   07/22/21 1336 115/68 98.5 °F (36.9 °C) Oral 79 18 95 %            Intake/Output Summary (Last 24 hours) at 7/22/2021 1546  Last data filed at 7/21/2021 1933  Gross per 24 hour   Intake 1014.57 ml   Output --   Net 1014.57 ml       Physical Exam:  Gen: No distress. Awake and well-oriented. Looks fatigued  Eyes: PERRL. No sclera icterus. No conjunctival injection. ENT: No discharge. Pharynx clear. Neck: No JVD. Trachea midline. Resp: No accessory muscle use. diminished breath sounds. Bilateral mild rhonchi present  CV: Regular rate. Regular rhythm. No murmur. No rub. No edema. GI: Non-tender. Non-distended. No masses. No organomegaly. Normal bowel sounds   Skin: Warm and dry. No nodule on exposed extremities. No rash on exposed extremities. M/S: No cyanosis. No joint deformity. No clubbing. Neuro: Awake. Grossly nonfocal    Psych: Oriented x 3. No anxiety or agitation.      Data:  CBC:   Recent Labs     07/20/21  1326 07/21/21  0615 07/22/21  0458   WBC 10.1 6.2 8.0   HGB 10.0* 9.7* 8.8*   HCT 30.7* 29.8* 26.9*   MCV 94.4 94.5 93.6    216 290     BMP:   Recent Labs     07/20/21  1326 07/21/21  0615 07/22/21  0458   * 133* 140   K 4.0 4.2 4.1   CL 98* 104 108   CO2 24 21 22   BUN 16 20 22*   CREATININE 0.6 0.6 0.6     LIVER PROFILE:   Recent Labs     07/20/21  1326   AST 40*   ALT 29   BILITOT 0.8   ALKPHOS 96     PT/INR: No results for input(s): PROTIME, INR in the last 72 hours. CULTURES  Blood: pending   COVID 19, PCR: Not detected  Influenza A/B: neg/neg  Strep pneumo ag: ordered  Legionella ag: ordered     RADIOLOGY  XR CHEST PORTABLE   Final Result   Findings most consistent with multifocal pneumonia. Assessment/Plan:  #Pneumonia  - community acquired, suspect a gram positive organism    -Continue to treat with IV antibiotics Rocephin and azithromycin D#3  - check cultures-negative so far  -Procalcitonin 0.33- > repeat 0.31     #COPD  AE  -Continue IV solumedrol, inhaled bronchodilators   - pulmonary cs    #Hypoxia   - 2/2 PNA and COPD  - she was 87% on RA. She is now requiring 2L. She does not use home O2. Wean as able     #Hyponatremia  - Na 130 on admit  - hold HCTZ  - IV NS today  - repeat BMP improved to 133 > 140     #Rheumatoid arthritis  - she takes methotrexate weekly on Mondays. Hold with acute infection     #HTN  - BP stable   - cont atenolol   - Hold maxzide while on IVF and with low Na     #HLD  - cont satin      #Hypothyroidism  - cont synthroid     #Weakness  -PT OT     DVT Prophylaxis: Lovenox   Diet: ADULT DIET; Regular  Code Status: Full Code      Check room air sats with ambulation in a.m. Mago Edge Continue PT OT.   Continue current antibiotics    Deepa Cantor MD  7/22/2021

## 2021-07-23 LAB
ANION GAP SERPL CALCULATED.3IONS-SCNC: 9 MMOL/L (ref 3–16)
BASOPHILS ABSOLUTE: 0.1 K/UL (ref 0–0.2)
BASOPHILS RELATIVE PERCENT: 2.1 %
BUN BLDV-MCNC: 17 MG/DL (ref 7–20)
CALCIUM SERPL-MCNC: 8.8 MG/DL (ref 8.3–10.6)
CHLORIDE BLD-SCNC: 112 MMOL/L (ref 99–110)
CO2: 19 MMOL/L (ref 21–32)
CREAT SERPL-MCNC: 0.6 MG/DL (ref 0.6–1.2)
CULTURE, RESPIRATORY: NORMAL
EKG ATRIAL RATE: 89 BPM
EKG DIAGNOSIS: NORMAL
EKG P AXIS: 55 DEGREES
EKG P-R INTERVAL: 126 MS
EKG Q-T INTERVAL: 376 MS
EKG QRS DURATION: 70 MS
EKG QTC CALCULATION (BAZETT): 457 MS
EKG R AXIS: 33 DEGREES
EKG T AXIS: 69 DEGREES
EKG VENTRICULAR RATE: 89 BPM
EOSINOPHILS ABSOLUTE: 0.1 K/UL (ref 0–0.6)
EOSINOPHILS RELATIVE PERCENT: 1 %
GFR AFRICAN AMERICAN: >60
GFR NON-AFRICAN AMERICAN: >60
GLUCOSE BLD-MCNC: 86 MG/DL (ref 70–99)
GRAM STAIN RESULT: NORMAL
HCT VFR BLD CALC: 30.6 % (ref 36–48)
HEMOGLOBIN: 9.8 G/DL (ref 12–16)
LYMPHOCYTES ABSOLUTE: 0.8 K/UL (ref 1–5.1)
LYMPHOCYTES RELATIVE PERCENT: 14.2 %
MCH RBC QN AUTO: 30.8 PG (ref 26–34)
MCHC RBC AUTO-ENTMCNC: 32.1 G/DL (ref 31–36)
MCV RBC AUTO: 95.9 FL (ref 80–100)
MONOCYTES ABSOLUTE: 0.1 K/UL (ref 0–1.3)
MONOCYTES RELATIVE PERCENT: 1.3 %
NEUTROPHILS ABSOLUTE: 4.8 K/UL (ref 1.7–7.7)
NEUTROPHILS RELATIVE PERCENT: 81.4 %
PDW BLD-RTO: 18.1 % (ref 12.4–15.4)
PLATELET # BLD: 249 K/UL (ref 135–450)
PMV BLD AUTO: 8.5 FL (ref 5–10.5)
POTASSIUM REFLEX MAGNESIUM: 4.5 MMOL/L (ref 3.5–5.1)
RBC # BLD: 3.19 M/UL (ref 4–5.2)
SODIUM BLD-SCNC: 140 MMOL/L (ref 136–145)
WBC # BLD: 5.9 K/UL (ref 4–11)

## 2021-07-23 PROCEDURE — 2580000003 HC RX 258: Performed by: PHYSICIAN ASSISTANT

## 2021-07-23 PROCEDURE — 85025 COMPLETE CBC W/AUTO DIFF WBC: CPT

## 2021-07-23 PROCEDURE — 6370000000 HC RX 637 (ALT 250 FOR IP): Performed by: INTERNAL MEDICINE

## 2021-07-23 PROCEDURE — 1200000000 HC SEMI PRIVATE

## 2021-07-23 PROCEDURE — 94669 MECHANICAL CHEST WALL OSCILL: CPT

## 2021-07-23 PROCEDURE — 6370000000 HC RX 637 (ALT 250 FOR IP): Performed by: PHYSICIAN ASSISTANT

## 2021-07-23 PROCEDURE — 93010 ELECTROCARDIOGRAM REPORT: CPT | Performed by: INTERNAL MEDICINE

## 2021-07-23 PROCEDURE — 97530 THERAPEUTIC ACTIVITIES: CPT

## 2021-07-23 PROCEDURE — 94761 N-INVAS EAR/PLS OXIMETRY MLT: CPT

## 2021-07-23 PROCEDURE — 80048 BASIC METABOLIC PNL TOTAL CA: CPT

## 2021-07-23 PROCEDURE — 97162 PT EVAL MOD COMPLEX 30 MIN: CPT

## 2021-07-23 PROCEDURE — 97535 SELF CARE MNGMENT TRAINING: CPT

## 2021-07-23 PROCEDURE — 36415 COLL VENOUS BLD VENIPUNCTURE: CPT

## 2021-07-23 PROCEDURE — 93005 ELECTROCARDIOGRAM TRACING: CPT | Performed by: INTERNAL MEDICINE

## 2021-07-23 PROCEDURE — 99232 SBSQ HOSP IP/OBS MODERATE 35: CPT | Performed by: INTERNAL MEDICINE

## 2021-07-23 PROCEDURE — 2700000000 HC OXYGEN THERAPY PER DAY

## 2021-07-23 PROCEDURE — 97166 OT EVAL MOD COMPLEX 45 MIN: CPT

## 2021-07-23 PROCEDURE — 6360000002 HC RX W HCPCS: Performed by: PHYSICIAN ASSISTANT

## 2021-07-23 PROCEDURE — 94640 AIRWAY INHALATION TREATMENT: CPT

## 2021-07-23 RX ORDER — BENZONATATE 100 MG/1
100 CAPSULE ORAL 3 TIMES DAILY
Status: DISCONTINUED | OUTPATIENT
Start: 2021-07-23 | End: 2021-07-25 | Stop reason: HOSPADM

## 2021-07-23 RX ORDER — GUAIFENESIN/DEXTROMETHORPHAN 100-10MG/5
5 SYRUP ORAL EVERY 4 HOURS PRN
Status: DISCONTINUED | OUTPATIENT
Start: 2021-07-23 | End: 2021-07-25 | Stop reason: HOSPADM

## 2021-07-23 RX ADMIN — IPRATROPIUM BROMIDE AND ALBUTEROL SULFATE 1 AMPULE: .5; 3 SOLUTION RESPIRATORY (INHALATION) at 19:07

## 2021-07-23 RX ADMIN — AZITHROMYCIN 500 MG: 250 TABLET, FILM COATED ORAL at 08:43

## 2021-07-23 RX ADMIN — DULOXETINE HYDROCHLORIDE 60 MG: 60 CAPSULE, DELAYED RELEASE ORAL at 20:12

## 2021-07-23 RX ADMIN — IPRATROPIUM BROMIDE AND ALBUTEROL SULFATE 1 AMPULE: .5; 3 SOLUTION RESPIRATORY (INHALATION) at 14:35

## 2021-07-23 RX ADMIN — Medication 2 PUFF: at 19:07

## 2021-07-23 RX ADMIN — IPRATROPIUM BROMIDE AND ALBUTEROL SULFATE 1 AMPULE: .5; 3 SOLUTION RESPIRATORY (INHALATION) at 07:07

## 2021-07-23 RX ADMIN — METHYLPREDNISOLONE SODIUM SUCCINATE 40 MG: 40 INJECTION, POWDER, FOR SOLUTION INTRAMUSCULAR; INTRAVENOUS at 08:43

## 2021-07-23 RX ADMIN — ENOXAPARIN SODIUM 40 MG: 40 INJECTION SUBCUTANEOUS at 08:43

## 2021-07-23 RX ADMIN — LEVOTHYROXINE SODIUM 25 MCG: 25 TABLET ORAL at 05:58

## 2021-07-23 RX ADMIN — GUAIFENESIN AND DEXTROMETHORPHAN 5 ML: 100; 10 SYRUP ORAL at 18:46

## 2021-07-23 RX ADMIN — ATORVASTATIN CALCIUM 80 MG: 40 TABLET, FILM COATED ORAL at 17:44

## 2021-07-23 RX ADMIN — Medication 2000 UNITS: at 20:12

## 2021-07-23 RX ADMIN — IPRATROPIUM BROMIDE AND ALBUTEROL SULFATE 1 AMPULE: .5; 3 SOLUTION RESPIRATORY (INHALATION) at 11:42

## 2021-07-23 RX ADMIN — CEFTRIAXONE SODIUM 1000 MG: 1 INJECTION, POWDER, FOR SOLUTION INTRAMUSCULAR; INTRAVENOUS at 14:44

## 2021-07-23 RX ADMIN — SODIUM CHLORIDE: 9 INJECTION, SOLUTION INTRAVENOUS at 05:57

## 2021-07-23 RX ADMIN — Medication 2 PUFF: at 07:07

## 2021-07-23 RX ADMIN — ATENOLOL 25 MG: 25 TABLET ORAL at 08:43

## 2021-07-23 RX ADMIN — BENZONATATE 100 MG: 100 CAPSULE ORAL at 20:12

## 2021-07-23 RX ADMIN — SODIUM CHLORIDE: 9 INJECTION, SOLUTION INTRAVENOUS at 17:51

## 2021-07-23 NOTE — PROGRESS NOTES
Reported to writer from charge nurse that pt sustained 5 minutes of bradycardia with rates in the 40s. Pt was asymptomatic and remained a+o. When writer was able to go to room, pt was sitting in chair and needed to go to bathroom. Assisted pt up to bathroom for BM and back to bed. Pt now resting in bed. All needs and call light within reach. Charge had made [facility] NP aware and stat ekg was ordered.

## 2021-07-23 NOTE — CARE COORDINATION
INTERDISCIPLINARY PLAN OF CARE CONFERENCE    Date/Time: 7/23/2021 10:14 AM  Completed by: Nahun Warren RN, Case Management      Patient Name:  Eliceo Keith  YOB: 1940  Admitting Diagnosis: CAP (community acquired pneumonia) due to Chlamydia species [J16.0]  Multifocal pneumonia [J18.9]     Admit Date/Time:  7/20/2021 12:58 PM    Chart reviewed. Interdisciplinary team contacted or reviewed plan related to patient progress and discharge plans. Disciplines included Case Management, Nursing, and Dietitian. Current Status: IP 07/20/2021  PT/OT recommendation for discharge plan of care: in process    Expected D/C Disposition:  TBD  Confirmed plan with patient  Discharge Plan Comments: Lives alone. Has been having difficulty at home managing with right wrist brace and back brace. PT/OT at bedside to eval with braces this am. She has no additional support or help at home with donning braces. Pt currently also requiring supplemental O2 (1 liter) D/W Racheal HANDY. +CM following. Home O2 in place on admit: No  Pt informed of need to bring portable home O2 tank on day of discharge for nursing to connect prior to leaving:  Not Indicated  Verbalized agreement/Understanding:  Not Indicated    ADDENDUM 14:30 STR recommended. Pt is agreeable and wants to be close to her home and family in [facility]. Pt is requesting [facility] referral to [facility]. Referral called to Tiara Flores @ [facility]  Faxed face sheet, H/P, MAR and therapy notes for review: YES  Bed available?: TBD  Insurance precertification required?   No

## 2021-07-23 NOTE — PROGRESS NOTES
Inpatient Physical Therapy Evaluation and Treatment    Unit: [facility]  Date:  7/23/2021  Patient Name:    Royal Fields  Admitting diagnosis:  CAP (community acquired pneumonia) due to Chlamydia species [J16.0]  Multifocal pneumonia [J18.9]  Admit Date:  7/20/2021  Precautions/Restrictions/WB Status/ Lines/ Wounds/ Oxygen: Fall risk, Bed/chair alarm and Lines -IV and Supplemental O2 (1LO2)    Treatment Time:  932-1025  Treatment Number:  1   Timed Code Treatment Minutes: 43 minutes  Total Treatment Minutes:  53  minutes    Patient Goals for Therapy: \" to return home and go out for therapies (referencing outpatient therapy services) \"          Discharge Recommendations: SNF (pending pt progress)  DME needs for discharge: defer to facility       Therapy recommendation for EMS Transport: can transport by wheelchair    Therapy recommendations for staff:   Assist of 1 with use of rolling walker (RW) for all transfers and ambulation to/from [facility]    History of Present Illness: Per Dr. Meng Coleman Internal Medicine MD: \"5 y.o. female with COPD, RA on methotrexate, HTN, thyroid disease who presented to [facility] with complaint of shortness of breath. Admitted for multifocal pneumonia and COPD exacerbation. \"      Home Health S4 Level Recommendation:  NA  AM-PAC Mobility Score       AM-PAC Inpatient Mobility without Stair Climbing Raw Score : 15    Preadmission Environment    Pt. Lives Alone  Home environment:  apartment (one-story apartment, [facility] senior living)  Steps to enter first floor: No steps  Steps to second floor: N/A  Bathroom: walk in shower, comfort height toilet, grab bars and hand held shower head  Equipment owned: RW, shower chair/bench, SPC and hurry-cane    Preadmission Status:  Pt. Able to drive: Yes  Pt Fully independent with ADLs: Yes  Pt. Required assistance from family for: Independent PTA - except help from family/neighbor for laundry and grocery shopping (granddaughter drives to/from store)  Pt. independent for transfers and gait and walked with [employer] and Janetnader Foster in last couple weeks PTA. Used SPC at grocery store, until last 3 weeks, using scooter at grocery store. History of falls Yes  Fall in February 2021 resulting in wrist fracture and reports couple more falls since then. Pain   No  Location: NA  Rating: NA /10  Pain Medicine Status: No request made    Cognition    A&O x4   Able to follow 1 step commands with repetition and increased time    Subjective  Patient lying supine in bed with no family present. Pt agreeable to this PT eval & tx. Upper Extremity ROM/Strength  Please see OT evaluation. Lower Extremity ROM / Strength   AROM WFL: Yes  ROM limitations: none observed in AROM of B LEs against gravity    Strength Assessment (measured on a 0-5 scale):  R LE   Quad   5   Ant Tib  4   Hamstring Not tested   Iliopsoas Deferred 2/2 back pain  L LE  Quad   5   Ant Tib  4   Hamstring Not tested   Iliopsoas Deferred 2/2 back pain    Lower Extremity Sensation    NT; no deficits reported. Lower Extremity Proprioception:   NT    Coordination and Tone  WFL    Balance  Sitting:  Good ; Supervision  Comments: at EOB    Standing: Fair +; CGA and Min A   Comments: at RW    Bed Mobility   Supine to Sit:    Supervision to R using R rail and HOB 35-deg  Sit to Supine:   N/A;   Rolling:   Not Tested  Scooting in sitting: Supervision  Scooting in supine:  Not Tested    Transfer Training     Sit to stand:   CGA with cues for hand placement  Stand to sit:   CGA  Bed to Chair:   Min A  with use of rolling walker (RW) via SPT    Gait gait deferred due to decreased endurance; pt ambulated 0 ft. Pt declining ambulation in room.   Distance:      NA  Deviations (firm surface/linoleum):  N/A  Assistive Device Used:    N/A  Level of Assist:    Not Tested  Comment: NA    Stair Training deferred, pt does not have stairs in home environment  # of Steps:   N/A  Level of Assist:  Not Tested  UE Support:  NA  Assistive Device:  N/A  Pattern:   N/A  Comments: NA    Activity Tolerance   At start of session: RN checked O2 reading (89-90% on RA) and placed pt on 1LO2. Pt completed therapy session with SOB noted with activity. Note 90-91% on 1LO2. Following SPT and resting in recliner: 95% at 1LO2, 80 bpm.  RN notified. Positioning Needs   Pt reclined in chair, alarm set, positioned in proper neutral alignment and pressure relief provided. Exercises Initiated  Claus deferred secondary to pt fatigued and treatment focused on functional mobility this date. NA    Other  None. Patient/Family Education   Pt educated on role of inpatient PT, POC, importance of continued activity, DC recommendations, safety awareness, transfer techniques and calling for assist with mobility. Also educated pt on proper positioning of back brace (pt was wearing it backwards). OT provided further visual cues on back brace to assist with pt's donning/doffing of brace. Assessment  Pt seen for Physical Therapy evaluation in acute care setting. Pt demonstrated decreased Activity tolerance, Balance, Safety and Strength as well as decreased independence with Ambulation and Transfers. Recommending SNF upon discharge as patient functioning well below baseline, demonstrates good rehab potential and unable to return home due to limited or no family support, limited safety awareness and decreased endurance/activity tolerance. Goals : To be met in 3 visits:  1). Independent with LE Ex x 10 reps    To be met in 6 visits:  1). Supine to/from sit: Modified Independent  2). Sit to/from stand: Modified Independent  3). Bed to chair: Modified Independent  4). Gait: Ambulate 50 ft.  with  Modified Independent and use of rolling walker (RW)  5).   Tolerate B LE exercises 3 sets of 10-15 reps    Rehabilitation Potential: Good  Strengths for achieving goals include:   Pt motivated, PLOF and Pt cooperative   Barriers to achieving goals include: Complexity of condition, Pain and O2 sats, poor endurance    Plan    To be seen 3-5 x / week  while in acute care setting for therapeutic exercises, bed mobility, transfers, progressive gait training, balance training, and family/patient education. Signature: Jody Samano PT, DPT   #[id number]     If patient discharges from this facility prior to next visit, this note will serve as the Discharge Summary.

## 2021-07-23 NOTE — PROGRESS NOTES
Pulmonary Progress Note  CC: Shortness of breath, pneumonia    Subjective:  C/O severe coughing    IV line peripheral    EXAM:   BP (!) 166/82   Pulse 84   Temp 98.8 °F (37.1 °C) (Oral)   Resp 20   Ht 5' 6.5\" (1.689 m)   Wt 180 lb (81.6 kg)   SpO2 95%   BMI 28.62 kg/m²  on 2 L   Constitutional:  No acute distress   HEENT: no scleral icterus  Neck: No tracheal deviation present. Cardiovascular: Normal heart sounds. Pulmonary/Chest: + wheezes. No rhonchi. No rales. No decreased breath sounds. No accessory muscle usage or stridor. Abdominal: Soft. Musculoskeletal: No cyanosis. No clubbing. Skin: Skin is warm and dry.      Scheduled Meds:   atenolol  25 mg Oral Daily    atorvastatin  80 mg Oral QPM    Vitamin D  2,000 Units Oral Nightly    DULoxetine  60 mg Oral Nightly    levothyroxine  25 mcg Oral Daily    budesonide-formoterol  2 puff Inhalation BID    sodium chloride flush  5-40 mL Intravenous 2 times per day    enoxaparin  40 mg Subcutaneous Daily    ipratropium-albuterol  1 ampule Inhalation Q4H WA    [Held by provider] triamterene-hydroCHLOROthiazide  1 tablet Oral Daily    polyethylene glycol  17 g Oral Daily    cefTRIAXone (ROCEPHIN) IV  1,000 mg Intravenous Q24H    methylPREDNISolone  40 mg Intravenous Daily     Continuous Infusions:   sodium chloride      sodium chloride 75 mL/hr at 07/23/21 1751     PRN Meds:  guaiFENesin-dextromethorphan, sodium chloride flush, sodium chloride, ondansetron **OR** ondansetron, acetaminophen **OR** acetaminophen, bisacodyl, albuterol    Labs:  CBC:   Recent Labs     07/21/21  0615 07/22/21  0458 07/23/21  0514   WBC 6.2 8.0 5.9   HGB 9.7* 8.8* 9.8*   HCT 29.8* 26.9* 30.6*   MCV 94.5 93.6 95.9    290 249     BMP:   Recent Labs     07/21/21  0615 07/22/21  0458 07/23/21  0514   * 140 140   K 4.2 4.1 4.5    108 112*   CO2 21 22 19*   BUN 20 22* 17   CREATININE 0.6 0.6 0.6       Cultures:  7/21/2021 sputum is pending  7/21/2021

## 2021-07-23 NOTE — PROGRESS NOTES
Inpatient Occupational Therapy  Evaluation and Treatment    Unit: [address] [place]  Date:  7/23/2021  Patient Name:    Marsha Romo  Admitting diagnosis:  CAP (community acquired pneumonia) due to Chlamydia species [J16.0]  Multifocal pneumonia [J18.9]  Admit Date:  7/20/2021  Precautions/Restrictions/WB Status/ Lines/ Wounds/ Oxygen: fall risk, IV, bed/chair alarm and supplemental O2 (1L)   Pt wears a back brace when up , and a right wrist brace     Treatment Time:  0932-1025  Treatment Number: 1     Billable Treatment Time: 43 minutes   Total Treatment Time:   53   minutes    Patient Goals for Therapy:  \" return home and go out for therapies (referring to outpatient therapy) \"      Discharge Recommendations: SNF (pending pt progress, Pt would like to return home)  DME needs for discharge: RW and defer to facility       Therapy recommendations for staff:   Assist of 1 with use of rolling walker (RW) for all transfers to/from BSC/chair    History of Present Illness: Per Dr. Linda Bell Internal Medicine MD: \"13 y.o. female with COPD, RA on methotrexate, HTN, thyroid disease who presented to [facility] with complaint of shortness of breath.  Admitted for multifocal pneumonia and COPD exacerbation. \"        Home Health S4 Level Recommendation:  Level 1 Standard  AM-PAC Score: AM-PAC Inpatient Daily Activity Raw Score: 16    Preadmission Environment    Pt. Lives Alone  Home environment:    apartment (one-story apartment, [facility] senior living)  Steps to enter first floor: No steps  Steps to second floor: N/A  Bathroom: walk in shower, comfort height toilet, grab bars and hand held shower head  Equipment owned: RW, shower chair/bench, SPC and hurry-cane     Preadmission Status:  Pt. Able to drive: Yes  Pt Fully independent with ADLs: Yes  Pt. Required assistance from family for:  Independent PTA - except help from family/neighbor for laundry and grocery shopping (granddaughter drives to/from store)  Pt. independent for transfers and gait and walked with Kalvin Leventhal and Gaby Salamanca in last couple weeks PTA. Used SPC at grocery store, until last 3 weeks, using scooter at grocery store. History of falls Yes  Fall in February 2021 resulting in wrist fracture and reports couple more falls since then.     Pain   No  Location: NA  Rating: NA /10  Pain Medicine Status: No request made     Cognition    A&O x4   Able to follow 1 step commands with repetition and increased time     Subjective  Patient lying supine in bed with no family present. Pt agreeable to this OT eval & tx. Upper Extremity ROM:    WFL,  pt able to perform all bed mobility, transfers, and gait without ROM limitation. Hx of right UE wrist drop wears a brace to stabilize hand and thumb. Hx of humerus ORIF 2/21    Upper Extremity Strength:    BUE strength impaired but not formally assessed w/ MMT    Upper Extremity Sensation    Impaired    Upper Extremity Proprioception:  WFL    Coordination and Tone  WFL    Balance  Functional Sitting Balance:  Impaired SBA  Functional Standing Balance:Impaired CGA with RW    Bed mobility:    Supine to sit:   Supervision  Sit to supine:   Not Tested  Rolling:    Not Tested  Scooting in sitting:  Supervision  Scooting to head of bed:   Not Tested    Bridging:   Not Tested    Transfers:    Sit to stand:  CGA  Stand to sit:  CGA  Bed to chair:   Min A  Standard toilet: Not Tested  Bed to [facility]:  Not Tested    Dressing:      UE:   Max A don brace initially, modifications made to brace with white tape to reduce friction of velcro for patient to  brace easier to don/doff and visual contrast to see straps easier, after modifications and education of orientation of brace Pt able to don/doff with supervision  LE:    Max A socks    Bathing:    UE:  Not Tested  LE:  Not Tested    Eating:   Independent    Toileting:  Not Tested    Activity Tolerance   At start of session: RN checked O2 reading (89-90% on RA) and placed pt on 1LO2.     Pt completed therapy session with SOB noted with activity. Note 90-91% on 1LO2. Following SPT and resting in recliner: 95% at 1LO2, 80 bpm.  RN notified. Positioning Needs:   Up in chair, call light and needs in reach. Alarm Set    Exercise / Activities Initiated:   N/A    Patient/Family Education:   Role of OT  Recommendations for DC  Use of AE  Safe RW use/hand placement    Assessment of Deficits: Pt seen for Occupational therapy evaluation in acute care setting. Pt demonstrated decreased Activity tolerance, ADLs, IADLs, Balance , Bed mobility, Safety Awareness, Strength and Transfers. Pt functioning below baseline and will likely benefit from skilled occupational therapy services to maximize safety and independence. Goal(s) : To be met in 3 Visits:  1). Bed to toilet/BSC: SBA    To be met in 5 Visits:  1). Supine to/from Sit:  Independent  2). Upper Body Bathing:   SBA  3). Lower Body Bathing:   SBA  4). Upper Body Dressing:  SBA  5). Lower Body Dressing:  SBA  6). Pt to demonstrate UE exs x 15 reps with minimal cues    Rehabilitation Potential:  Good for goals listed above. Strengths for achieving goals include: Pt motivated and PLOF  Barriers to achieving goals include:  Pain and Weakness     Plan: To be seen 3-5 x/wk while in acute care setting for therapeutic exercises, bed mobility, transfers, dressing, bathing, family/patient education, ADL/IADL retraining, energy conservation training.      Collette Prescott OTR/L [id number]      If patient discharges from this facility prior to next visit, this note will serve as the Discharge Summary

## 2021-07-23 NOTE — FLOWSHEET NOTE
07/23/21 0659   Vital Signs   Temp 98.2 °F (36.8 °C)   Temp Source Oral   Pulse 85   Heart Rate Source Monitor   Resp 16   BP (!) 166/87   BP Location Left upper arm   Patient Position Semi fowlers   Level of Consciousness Alert (0)   MEWS Score 1   Oxygen Therapy   SpO2 94 %   O2 Device Nasal cannula   O2 Flow Rate (L/min) 1 L/min   AM assessment completed, see flow sheet. Pt is alert and oriented. Vital signs are WNL. Respirations are even & easy at rest.  Left lung with small amount of rhonchi noted in lower lobe. Pt 93-94% on 1L. Placed on RA. Pt states she is coughing intermittently and is bringing up clear thick sputum. No complaints voiced, besides a little bit of pain to back, which is chronic. Pt denies needs at this time. SR up x 2, and bed in low position. Call light is within reach.

## 2021-07-23 NOTE — PROGRESS NOTES
Pt resting. Resp e/e. Shift assessment completed and charted. No needs. Will monitor.  Fannie Jain RN

## 2021-07-23 NOTE — PLAN OF CARE
Problem: Pain:  Description: Pain management should include both nonpharmacologic and pharmacologic interventions.   Goal: Pain level will decrease  Description: Pain level will decrease  Outcome: Ongoing     Problem: Infection:  Goal: Will remain free from infection  Description: Will remain free from infection  Outcome: Ongoing     Problem: Daily Care:  Goal: Daily care needs are met  Description: Daily care needs are met  Outcome: Ongoing     Problem: Skin Integrity:  Goal: Skin integrity will stabilize  Description: Skin integrity will stabilize  Outcome: Ongoing     Problem: Discharge Planning:  Goal: Patients continuum of care needs are met  Description: Patients continuum of care needs are met  Outcome: Ongoing

## 2021-07-23 NOTE — PROGRESS NOTES
Pt did not tolerate RA. Sp02 dropped to 88-90% on RA at rest. No dyspnea with desaturation. Pt placed back on 1L and Sp02 increased up to 91%.

## 2021-07-24 ENCOUNTER — APPOINTMENT (OUTPATIENT)
Dept: GENERAL RADIOLOGY | Age: 81
DRG: 193 | End: 2021-07-24
Payer: MEDICARE

## 2021-07-24 LAB
ANION GAP SERPL CALCULATED.3IONS-SCNC: 7 MMOL/L (ref 3–16)
BASOPHILS ABSOLUTE: 0.1 K/UL (ref 0–0.2)
BASOPHILS RELATIVE PERCENT: 1.7 %
BLOOD CULTURE, ROUTINE: NORMAL
BUN BLDV-MCNC: 11 MG/DL (ref 7–20)
CALCIUM SERPL-MCNC: 8.7 MG/DL (ref 8.3–10.6)
CHLORIDE BLD-SCNC: 112 MMOL/L (ref 99–110)
CO2: 23 MMOL/L (ref 21–32)
CREAT SERPL-MCNC: 0.6 MG/DL (ref 0.6–1.2)
CULTURE, BLOOD 2: NORMAL
EOSINOPHILS ABSOLUTE: 0.1 K/UL (ref 0–0.6)
EOSINOPHILS RELATIVE PERCENT: 2.8 %
GFR AFRICAN AMERICAN: >60
GFR NON-AFRICAN AMERICAN: >60
GLUCOSE BLD-MCNC: 82 MG/DL (ref 70–99)
HCT VFR BLD CALC: 27.8 % (ref 36–48)
HEMOGLOBIN: 9.1 G/DL (ref 12–16)
LYMPHOCYTES ABSOLUTE: 0.9 K/UL (ref 1–5.1)
LYMPHOCYTES RELATIVE PERCENT: 16.5 %
MCH RBC QN AUTO: 30.8 PG (ref 26–34)
MCHC RBC AUTO-ENTMCNC: 32.9 G/DL (ref 31–36)
MCV RBC AUTO: 93.6 FL (ref 80–100)
MONOCYTES ABSOLUTE: 0.4 K/UL (ref 0–1.3)
MONOCYTES RELATIVE PERCENT: 7.5 %
NEUTROPHILS ABSOLUTE: 3.8 K/UL (ref 1.7–7.7)
NEUTROPHILS RELATIVE PERCENT: 71.5 %
PDW BLD-RTO: 18.5 % (ref 12.4–15.4)
PLATELET # BLD: 261 K/UL (ref 135–450)
PMV BLD AUTO: 7.8 FL (ref 5–10.5)
POTASSIUM REFLEX MAGNESIUM: 4 MMOL/L (ref 3.5–5.1)
RBC # BLD: 2.97 M/UL (ref 4–5.2)
SODIUM BLD-SCNC: 142 MMOL/L (ref 136–145)
WBC # BLD: 5.3 K/UL (ref 4–11)

## 2021-07-24 PROCEDURE — 6360000002 HC RX W HCPCS: Performed by: PHYSICIAN ASSISTANT

## 2021-07-24 PROCEDURE — 71045 X-RAY EXAM CHEST 1 VIEW: CPT

## 2021-07-24 PROCEDURE — 6370000000 HC RX 637 (ALT 250 FOR IP): Performed by: INTERNAL MEDICINE

## 2021-07-24 PROCEDURE — 6360000002 HC RX W HCPCS: Performed by: INTERNAL MEDICINE

## 2021-07-24 PROCEDURE — 2580000003 HC RX 258: Performed by: PHYSICIAN ASSISTANT

## 2021-07-24 PROCEDURE — 36415 COLL VENOUS BLD VENIPUNCTURE: CPT

## 2021-07-24 PROCEDURE — 1200000000 HC SEMI PRIVATE

## 2021-07-24 PROCEDURE — 2700000000 HC OXYGEN THERAPY PER DAY

## 2021-07-24 PROCEDURE — 99232 SBSQ HOSP IP/OBS MODERATE 35: CPT | Performed by: INTERNAL MEDICINE

## 2021-07-24 PROCEDURE — 94761 N-INVAS EAR/PLS OXIMETRY MLT: CPT

## 2021-07-24 PROCEDURE — 94640 AIRWAY INHALATION TREATMENT: CPT

## 2021-07-24 PROCEDURE — 94669 MECHANICAL CHEST WALL OSCILL: CPT

## 2021-07-24 PROCEDURE — 6370000000 HC RX 637 (ALT 250 FOR IP): Performed by: PHYSICIAN ASSISTANT

## 2021-07-24 PROCEDURE — 80048 BASIC METABOLIC PNL TOTAL CA: CPT

## 2021-07-24 PROCEDURE — 85025 COMPLETE CBC W/AUTO DIFF WBC: CPT

## 2021-07-24 RX ORDER — FUROSEMIDE 10 MG/ML
40 INJECTION INTRAMUSCULAR; INTRAVENOUS ONCE
Status: COMPLETED | OUTPATIENT
Start: 2021-07-24 | End: 2021-07-24

## 2021-07-24 RX ADMIN — BENZONATATE 100 MG: 100 CAPSULE ORAL at 08:51

## 2021-07-24 RX ADMIN — IPRATROPIUM BROMIDE AND ALBUTEROL SULFATE 1 AMPULE: .5; 3 SOLUTION RESPIRATORY (INHALATION) at 11:22

## 2021-07-24 RX ADMIN — POLYETHYLENE GLYCOL (3350) 17 G: 17 POWDER, FOR SOLUTION ORAL at 08:51

## 2021-07-24 RX ADMIN — SODIUM CHLORIDE: 9 INJECTION, SOLUTION INTRAVENOUS at 06:29

## 2021-07-24 RX ADMIN — LEVOTHYROXINE SODIUM 25 MCG: 25 TABLET ORAL at 06:28

## 2021-07-24 RX ADMIN — BENZONATATE 100 MG: 100 CAPSULE ORAL at 20:37

## 2021-07-24 RX ADMIN — ATENOLOL 25 MG: 25 TABLET ORAL at 08:50

## 2021-07-24 RX ADMIN — Medication 10 ML: at 08:53

## 2021-07-24 RX ADMIN — IPRATROPIUM BROMIDE AND ALBUTEROL SULFATE 1 AMPULE: .5; 3 SOLUTION RESPIRATORY (INHALATION) at 07:22

## 2021-07-24 RX ADMIN — GUAIFENESIN AND DEXTROMETHORPHAN 5 ML: 100; 10 SYRUP ORAL at 14:28

## 2021-07-24 RX ADMIN — BENZONATATE 100 MG: 100 CAPSULE ORAL at 13:31

## 2021-07-24 RX ADMIN — ATORVASTATIN CALCIUM 80 MG: 40 TABLET, FILM COATED ORAL at 18:28

## 2021-07-24 RX ADMIN — GUAIFENESIN AND DEXTROMETHORPHAN 5 ML: 100; 10 SYRUP ORAL at 08:51

## 2021-07-24 RX ADMIN — ENOXAPARIN SODIUM 40 MG: 40 INJECTION SUBCUTANEOUS at 08:51

## 2021-07-24 RX ADMIN — DULOXETINE HYDROCHLORIDE 60 MG: 60 CAPSULE, DELAYED RELEASE ORAL at 20:37

## 2021-07-24 RX ADMIN — FUROSEMIDE 40 MG: 10 INJECTION, SOLUTION INTRAMUSCULAR; INTRAVENOUS at 11:04

## 2021-07-24 RX ADMIN — METHYLPREDNISOLONE SODIUM SUCCINATE 40 MG: 40 INJECTION, POWDER, FOR SOLUTION INTRAMUSCULAR; INTRAVENOUS at 08:52

## 2021-07-24 RX ADMIN — Medication 2 PUFF: at 07:23

## 2021-07-24 RX ADMIN — Medication 2 PUFF: at 19:35

## 2021-07-24 RX ADMIN — IPRATROPIUM BROMIDE AND ALBUTEROL SULFATE 1 AMPULE: .5; 3 SOLUTION RESPIRATORY (INHALATION) at 19:35

## 2021-07-24 RX ADMIN — Medication 2000 UNITS: at 20:37

## 2021-07-24 RX ADMIN — Medication 10 ML: at 20:38

## 2021-07-24 RX ADMIN — CEFTRIAXONE SODIUM 1000 MG: 1 INJECTION, POWDER, FOR SOLUTION INTRAMUSCULAR; INTRAVENOUS at 14:21

## 2021-07-24 ASSESSMENT — PAIN SCALES - GENERAL
PAINLEVEL_OUTOF10: 0
PAINLEVEL_OUTOF10: 0

## 2021-07-24 NOTE — PLAN OF CARE
Problem: Pain:  Goal: Pain level will decrease  Description: Pain level will decrease  7/23/2021 1540 by Paul Manriquez RN  Outcome: Ongoing     Problem: Infection:  Goal: Will remain free from infection  Description: Will remain free from infection  7/23/2021 2309 by Surya Isaac RN  Outcome: Ongoing  7/23/2021 1540 by Paul Manriquez RN  Outcome: Ongoing     Problem: Daily Care:  Goal: Daily care needs are met  Description: Daily care needs are met  7/23/2021 2309 by Surya Isaac RN  Outcome: Ongoing  7/23/2021 1540 by Paul Manriquez RN  Outcome: Ongoing     Problem: Skin Integrity:  Goal: Skin integrity will stabilize  Description: Skin integrity will stabilize  7/23/2021 2309 by Surya Isaac RN  Outcome: Ongoing  7/23/2021 1540 by Paul Manriquez RN  Outcome: Ongoing     Problem: Discharge Planning:  Goal: Patients continuum of care needs are met  Description: Patients continuum of care needs are met  7/23/2021 2309 by Surya Isaac RN  Outcome: Ongoing  7/23/2021 1540 by Paul Manriquez RN  Outcome: Ongoing     Problem: Discharge Planning:  Goal: Discharged to appropriate level of care  Description: Discharged to appropriate level of care  Outcome: Ongoing  Goal: Participates in care planning  Description: Participates in care planning  Outcome: Ongoing     Problem: Airway Clearance - Ineffective:  Goal: Clear lung sounds  Description: Clear lung sounds  Outcome: Ongoing  Goal: Ability to maintain a clear airway will improve  Description: Ability to maintain a clear airway will improve  Outcome: Ongoing

## 2021-07-24 NOTE — PROGRESS NOTES
Bedside report given to [facility] RN  pt in stable condition no needs at this time.  Call light within reach

## 2021-07-24 NOTE — PLAN OF CARE
Problem: Pain:  Goal: Control of acute pain  Description: Control of acute pain  Outcome: Ongoing     Problem: Infection:  Goal: Will remain free from infection  Description: Will remain free from infection  7/23/2021 2309 by Candie Betts RN  Outcome: Ongoing     Problem: Daily Care:  Goal: Daily care needs are met  Description: Daily care needs are met  7/24/2021 1012 by Cyndy Castleman, RN  Outcome: Ongoing  7/23/2021 2309 by Candie Betts RN  Outcome: Ongoing     Problem: Skin Integrity:  Goal: Skin integrity will stabilize  Description: Skin integrity will stabilize  7/24/2021 1012 by Cyndy Castleman, RN  Outcome: Ongoing  7/23/2021 2309 by Candie Betts RN  Outcome: Ongoing     Problem: Discharge Planning:  Goal: Patients continuum of care needs are met  Description: Patients continuum of care needs are met  7/23/2021 2309 by Candie Betts RN  Outcome: Ongoing     Problem: Discharge Planning:  Goal: Discharged to appropriate level of care  Description: Discharged to appropriate level of care  7/23/2021 2309 by Candie Betts RN  Outcome: Ongoing  Goal: Participates in care planning  Description: Participates in care planning  7/23/2021 2309 by Candie Betts RN  Outcome: Ongoing     Problem: Airway Clearance - Ineffective:  Goal: Clear lung sounds  Description: Clear lung sounds  7/23/2021 2309 by Candie Betts RN  Outcome: Ongoing  Goal: Ability to maintain a clear airway will improve  Description: Ability to maintain a clear airway will improve  7/23/2021 2309 by Candie Betts RN  Outcome: Ongoing

## 2021-07-24 NOTE — PROGRESS NOTES
BP (!) 143/69   Pulse 85   Temp 98.4 °F (36.9 °C) (Oral)   Resp 22   Ht 5' 6.5\" (1.689 m)   Wt 180 lb (81.6 kg)   SpO2 95%   BMI 28.62 kg/m²      Assessment complete. Meds passed. Pt denies needs at this time  PT states she feels worse today than yesterday. RT increased the patient oxygen from 1 to 3L. Pt appears SOB. However, was 95% on 3L and CO2 lab this am at 0512 was 23. RN notified PA. PT has increasing SOB with movement. Bedside Mobility Assessment Tool (BMAT):     Assessment Level 1- Sit and Shake    1. From a semi-reclined position, ask patient to sit up and rotate to a seated position at the side of the bed. Can use the bedrail. 2. Ask patient to reach out and grab your hand and shake making sure patient reaches across his/her midline. Pass- Patient is able to come to a seated position, maintain core strength. Maintains seated balance while reaching across midline. Move on to Assessment Level 2. Assessment Level 2- Stretch and Point   1. With patient in seated position at the side of the bed, have patient place both feet on the floor (or stool) with knees no higher than hips. 2. Ask patient to stretch one leg and straighten the knee, then bend the ankle/flex and point the toes. If appropriate, repeat with the other leg. Pass- Patient is able to demonstrate appropriate quad strength on intended weight bearing limb(s). Move onto Assessment Level 3. Assessment Level 3- Stand   1. Ask patient to elevate off the bed or chair (seated to standing) using an assistive device (cane, bedrail). 2. Patient should be able to raise buttocks off be and hold for a count of five. May repeat once. Pass- Patient maintains standing stability for at least 5 seconds, proceed to assessment level 4. Assessment Level 4- Walk   1. Ask patient to march in place at bedside. 2. Then ask patient to advance step and return each foot.  Some medical conditions may render a patient from stepping backwards, use your best clinical judgement. Pass- Patient demonstrates balance while shifting weight and ability to step, takes independent steps, does not use assistive device patient is MOBILITY LEVEL 4.       Mobility Level- 4

## 2021-07-24 NOTE — PROGRESS NOTES
PT has had 2 large urine occurences since one time dose of lasix. Lung fields are improving, but the patient is still SOB at times.

## 2021-07-24 NOTE — FLOWSHEET NOTE
07/23/21 1904   Vital Signs   Temp 98.8 °F (37.1 °C)   Temp Source Oral   Pulse 84   Heart Rate Source Monitor   Resp 20   BP (!) 166/82   BP Location Left upper arm   Patient Position Semi fowlers   Level of Consciousness Alert (0)   MEWS Score 1   Oxygen Therapy   SpO2 95 %   O2 Device Nasal cannula   O2 Flow Rate (L/min) 1 L/min   Pt A/O x 4 assessment completed. Pt assisted to [facility]. Pt continuously coughs a congested non productive cough  with any movement and becomes very SOB. Extension tubing off d/t pt only using BSC. Meds given per MAR. Pt back to bed. Will continue to monitor.  Call light within reach

## 2021-07-24 NOTE — PROGRESS NOTES
Pulmonary Progress Note  CC: Shortness of breath, pneumonia    Subjective: Coughing a little better, but much more short of breath, responded to Lasix    IV line peripheral    EXAM:   BP (!) 145/75   Pulse 83   Temp 98.3 °F (36.8 °C) (Oral)   Resp 22   Ht 5' 6.5\" (1.689 m)   Wt 180 lb (81.6 kg)   SpO2 92%   BMI 28.62 kg/m²  on 3 L   Constitutional:  No acute distress   HEENT: no scleral icterus  Neck: No tracheal deviation present. Cardiovascular: Normal heart sounds. Pulmonary/Chest: + wheezes. No rhonchi. Few rales. No decreased breath sounds. No accessory muscle usage or stridor. Abdominal: Soft. Musculoskeletal: No cyanosis. No clubbing. Skin: Skin is warm and dry.      Scheduled Meds:   benzonatate  100 mg Oral TID    atenolol  25 mg Oral Daily    atorvastatin  80 mg Oral QPM    Vitamin D  2,000 Units Oral Nightly    DULoxetine  60 mg Oral Nightly    levothyroxine  25 mcg Oral Daily    budesonide-formoterol  2 puff Inhalation BID    sodium chloride flush  5-40 mL Intravenous 2 times per day    enoxaparin  40 mg Subcutaneous Daily    ipratropium-albuterol  1 ampule Inhalation Q4H WA    [Held by provider] triamterene-hydroCHLOROthiazide  1 tablet Oral Daily    polyethylene glycol  17 g Oral Daily    cefTRIAXone (ROCEPHIN) IV  1,000 mg Intravenous Q24H    methylPREDNISolone  40 mg Intravenous Daily     Continuous Infusions:   sodium chloride      sodium chloride 75 mL/hr at 07/24/21 0630     PRN Meds:  guaiFENesin-dextromethorphan, sodium chloride flush, sodium chloride, ondansetron **OR** ondansetron, acetaminophen **OR** acetaminophen, bisacodyl, albuterol    Labs:  CBC:   Recent Labs     07/22/21  0458 07/23/21 0514 07/24/21  0512   WBC 8.0 5.9 5.3   HGB 8.8* 9.8* 9.1*   HCT 26.9* 30.6* 27.8*   MCV 93.6 95.9 93.6    249 261     BMP:   Recent Labs     07/22/21  0458 07/23/21  0514 07/24/21  0512    140 142   K 4.1 4.5 4.0    112* 112*   CO2 22 19* 23   BUN 22* 17 11   CREATININE 0.6 0.6 0.6       Cultures:  7/21/2021 sputum NRF  7/21/2021 blood NGTD  7/20/2021 SARS-CoV-2 and influenza are negative    Films:  7/21/2021 CXR multifocal infiltrate    ASSESSMENT:  · Acute hypoxemic respiratory failure   · Community acquired pneumonia in immunosuppressed patient with chronic methotrexate use  · Moderate COPD with acute exacerbation   · Asthma COPD overlap syndrome, followed by Dr. Elton River  · RA on MTX; chronic immunosuppression    PLAN:  Supplemental oxygen to maintain SaO2 >92%; wean as tolerated    IV solumedrol    Inhaled bronchodilators   Ceftriaxone day #5/8, s/p 3 days azithromycin     Agree with Lasix to keep negative  Tobacco cessation achieved more than 30 years prior  Will need Neb at discharge

## 2021-07-24 NOTE — PROGRESS NOTES
RESPIRATORY THERAPY ASSESSMENT    Name:  [facility] Record Number:  [id number]  Age: 80 y.o. Gender: female  : 1940  Today's Date:  2021  Room:  [address] [place] [address]    Assessment     Is the patient being admitted for a COPD or Asthma exacerbation? No   (If yes the patient will be seen every 4 hours for the first 24 hours and then reassessed)    Patient Admission Diagnosis      Allergies  Allergies   Allergen Reactions    Effexor [Venlafaxine Hcl] Itching and Nausea Only    Fenofibrate Diarrhea    Prednisone Other (See Comments)     Causes body to feel on fire    Codeine Nausea And Vomiting    Docosahexaenoic Acid-Epa Nausea And Vomiting     Cannot take Vascepa       Minimum Predicted Vital Capacity:               Actual Vital Capacity:                    Pulmonary History:asthma  Home Oxygen Therapy:  room air  Home Respiratory Therapy:Albuterol   Current Respiratory Therapy:  duoneb q4wa/symbicort 160 bid  Treatment Type: MDI  Medications: Budesonide/Formoterol    Respiratory Severity Index(RSI)   Patients with orders for inhalation medications, oxygen, or any therapeutic treatment modality will be placed on Respiratory Protocol. They will be assessed with the first treatment and at least every 72 hours thereafter. The following severity scale will be used to determine frequency of treatment intervention.     Smoking History: Pulmonary Disease or Smoking History, Greater than 15 pack year = 2    Social History  Social History     Tobacco Use    Smoking status: Former Smoker     Packs/day: 0.50     Years: 20.00     Pack years: 10.00     Types: Cigarettes     Start date: 1959     Quit date: 1987     Years since quittin.5    Smokeless tobacco: Never Used   Vaping Use    Vaping Use: Never used   Substance Use Topics    Alcohol use: No     Alcohol/week: 0.0 standard drinks    Drug use: No       Recent Surgical History: None = 0  Past Surgical History  Past Surgical History: Procedure Laterality Date    ABDOMINAL EXPLORATION SURGERY      APPENDECTOMY      CHOLECYSTECTOMY      COLONOSCOPY  4/18/2013    diverticulosis, polyps    COLONOSCOPY  11/10/2016    normal colon    EYE SURGERY Right 12/11/15    cataract removal    EYE SURGERY Left 1/8/16    cataract removed    HUMERUS FRACTURE SURGERY Right 2/28/2021    HUMERUS OPEN REDUCTION INTERNAL FIXATION performed by Meghan Broderick MD at [facility], PARTIAL Left     TUMOR REMOVAL      off of thyroid, was benign       Level of Consciousness: Alert, Oriented, and Cooperative = 0    Level of Activity: Walking with assistance = 1    Respiratory Pattern: Dyspnea with exertion;Irregular pattern;or RR less than 6 = 2    Breath Sounds: Absent bilaterally and/or with wheezes = 3    Sputum  Sputum Color: None,  , Sputum How Obtained: Spontaneous cough  Cough: Strong, spontaneous, non-productive = 0    Vital Signs   BP (!) 166/82   Pulse 84   Temp 98.8 °F (37.1 °C) (Oral)   Resp 20   Ht 5' 6.5\" (1.689 m)   Wt 180 lb (81.6 kg)   SpO2 95%   BMI 28.62 kg/m²   SPO2 (COPD values may differ): 90-91% on room air or greater than 92% on FiO2 24- 28% = 1    Peak Flow (asthma only): not applicable = 0    RSI: 6-20 = TID (three times daily) and Q4hr PRN for dyspnea        Plan       Goals: medication delivery    Patient/caregiver was educated on the proper method of use for Respiratory Care Devices:  Yes      Level of patient/caregiver understanding able to:   ? Verbalize understanding   ? Demonstrate understanding       ? Teach back        ? Needs reinforcement       ? No available caregiver               ? Other:     Response to education:  Good     Is patient being placed on Home Treatment Regimen? No     Does the patient have everything they need prior to discharge?   NA     Comments: chart reviewed and patient assessed    Plan of Care: keep duoneb q4wa for wheezes    Electronically signed by Fredrick Colon [facility] on 7/24/2021 at 1:34 AM    Respiratory Protocol Guidelines     1. Assessment and treatment by Respiratory Therapy will be initiated for medication and therapeutic interventions upon initiation of aerosolized medication. 2. Physician will be contacted for respiratory rate (RR) greater than 35 breaths per minute. Therapy will be held for heart rate (HR) greater than 140 beats per minute, pending direction from physician. 3. Bronchodilators will be administered via Metered Dose Inhaler (MDI) with spacer when the following criteria are met:  a. Alert and cooperative     b. HR < 140 bpm  c. RR < 30 bpm                d. Can demonstrate a 2-3 second inspiratory hold  4. Bronchodilators will be administered via Hand Held Nebulizer [facility]) to patients when ANY of the following criteria are met  a. Incognizant or uncooperative          b. Patients treated with HHN at Home        c. Unable to demonstrate proper use of MDI with spacer     d. RR > 30 bpm   5. Bronchodilators will be delivered via Metered Dose Inhaler (MDI), HHN, Aerogen to intubated patients on mechanical ventilation. 6. Inhalation medication orders will be delivered and/or substituted as outlined below. Aerosolized Medications Ordering and Administration Guidelines:    1. All Medications will be ordered by a physician, and their frequency and/or modality will be adjusted as defined by the patients Respiratory Severity Index (RSI) score. 2. If the patient does not have documented COPD, consider discontinuing anticholinergics when RSI is less than 9.  3. If the bronchospasm worsens (increased RSI), then the bronchodilator frequency can be increased to a maximum of every 4 hours. If greater than every 4 hours is required, the physician will be contacted. 4. If the bronchospasm improves, the frequency of the bronchodilator can be decreased, based on the patient's RSI, but not less than home treatment regimen frequency.   5. Bronchodilator(s) will be discontinued if patient has a RSI less than 9 and has received no scheduled or as needed treatment for 72  Hrs. Patients Ordered on a Mucolytic Agent:    1. Must always be administered with a bronchodilator. 2. Discontinue if patient experiences worsened bronchospasm, or secretions have lessened to the point that the patient is able to clear them with a cough. Anti-inflammatory and Combination Medications:    1. If the patient lacks prior history of lung disease, is not using inhaled anti-inflammatory medication at home, and lacks wheezing by examination or by history for at least 24 hours, contact physician for possible discontinuation.

## 2021-07-24 NOTE — PROGRESS NOTES
IM Progress Note    Admit Date:  7/20/2021    80 y.o. female with COPD, RA on methotrexate, HTN, thyroid disease who presented to [facility] ED with complaint of shortness of breath. Admitted for multifocal pneumonia and COPD exacerbation. Pulmonology consulted. Subjective:  Ms. Louisa Patterson seen   Patient has worsening respiratory distress and hypoxemia today. she is very dyspneic and tachypneic,  increased wheezing  Persistent cough. No fever. seen by PT OT  > recommended SNF. Objective:   Patient Vitals for the past 4 hrs:   BP Temp Temp src Pulse Resp SpO2   07/24/21 0830 (!) 143/69 98.4 °F (36.9 °C) Oral 85 -- 95 %   07/24/21 0723 -- -- -- -- 22 92 %            Intake/Output Summary (Last 24 hours) at 7/24/2021 1122  Last data filed at 7/24/2021 1108  Gross per 24 hour   Intake 3982.27 ml   Output --   Net 3982.27 ml       Physical Exam:  Gen:  Awake and well-oriented. She is in mild to moderate distress now .+ using accessory muscles with audible wheezing . looks fatigued  Eyes: PERRL. No sclera icterus. No conjunctival injection. ENT: No discharge. Pharynx clear. Neck: No JVD. Trachea midline. Resp: + accessory muscle use. diminished breath sounds. Bilateral diffuse wheezes and rhonchi present . CV: Regular rate. Regular rhythm. No murmur. No rub. GI: Non-tender. Non-distended. No masses. No organomegaly. Normal bowel sounds   Skin: Warm and dry. No nodule on exposed extremities. No rash on exposed extremities. M/S: No cyanosis. No joint deformity. No clubbing. 1+ edema   Neuro: Awake. Grossly nonfocal    Psych: Oriented x 3. No anxiety or agitation.      Data:  CBC:   Recent Labs     07/22/21 0458 07/23/21 0514 07/24/21 0512   WBC 8.0 5.9 5.3   HGB 8.8* 9.8* 9.1*   HCT 26.9* 30.6* 27.8*   MCV 93.6 95.9 93.6    249 261     BMP:   Recent Labs     07/22/21  0458 07/23/21  0514 07/24/21  0512    140 142   K 4.1 4.5 4.0    112* 112*   CO2 22 19* 23   BUN 22* 17 11 CREATININE 0.6 0.6 0.6     LIVER PROFILE:   No results for input(s): AST, ALT, LIPASE, BILIDIR, BILITOT, ALKPHOS in the last 72 hours. Invalid input(s): AMYLASE,  ALB  PT/INR: No results for input(s): PROTIME, INR in the last 72 hours. CULTURES  Blood: pending   COVID 19, PCR: Not detected  Influenza A/B: neg/neg  Strep pneumo ag: ordered  Legionella ag: ordered     RADIOLOGY  XR CHEST PORTABLE   Final Result   Multifocal airspace opacities are relatively stable from the prior exam with   interval development of a small right pleural effusion. Pattern may   represent pulmonary edema or underlying pneumonitis. XR CHEST PORTABLE   Final Result   Findings most consistent with multifocal pneumonia. Assessment/Plan:    #Pneumonia  - community acquired, suspect a gram positive organism    -Continue to treat with IV antibiotics Rocephin-D5 . Completed 3 days of zithromycin  - check cultures-negative so far  -Procalcitonin 0.33- > repeat 0.31     #COPD  AE  -Continue IV solumedrol, inhaled bronchodilators   - pulmonary consulted    # acute hypoxic respiratory failure  -Not on home oxygen .  -Hypoxia 2/2 PNA and COPD. Worsening hypoxia now with fluid overload  - she was 87% on RA on presentation. She was  requiring 2L-> 1L. O2 requirement up to 3 L today. Patient has been treated for pneumonia and COPD as above. Worsening hypoxemia today 7/24/2021 with O2 requirement up to 3 L this is secondary to fluid overload. .  Stop IV fluids and give 1 dose of IV Lasix . chest x-ray shows pulmonary edema . #Hyponatremia  - Na 130 on admit  - hold HCTZ  -S/p IV normal saline  -Hyponatremia has resolved  - repeat BMP, sodium improved to 133 > 140 > 142     #Rheumatoid arthritis  - she takes methotrexate weekly on Mondays.   Hold with acute infection     #HTN  - BP stable   - cont atenolol   - Hold maxzide while on IVF and with low Na     #HLD  - cont satin      #Hypothyroidism  - cont synthroid #Weakness  -PT OT  Needs SNF     DVT Prophylaxis: Lovenox   Diet: ADULT DIET; Regular  Code Status: Full Code    Disposition.  .  Plan will be for SNF once stable    Neville Chavarria MD  7/24/2021

## 2021-07-25 VITALS
WEIGHT: 180 LBS | OXYGEN SATURATION: 98 % | HEART RATE: 46 BPM | DIASTOLIC BLOOD PRESSURE: 84 MMHG | HEIGHT: 67 IN | TEMPERATURE: 97.8 F | RESPIRATION RATE: 18 BRPM | BODY MASS INDEX: 28.25 KG/M2 | SYSTOLIC BLOOD PRESSURE: 165 MMHG

## 2021-07-25 LAB
ANION GAP SERPL CALCULATED.3IONS-SCNC: 14 MMOL/L (ref 3–16)
BASOPHILS ABSOLUTE: 0 K/UL (ref 0–0.2)
BASOPHILS RELATIVE PERCENT: 0.5 %
BUN BLDV-MCNC: 12 MG/DL (ref 7–20)
CALCIUM SERPL-MCNC: 8.8 MG/DL (ref 8.3–10.6)
CHLORIDE BLD-SCNC: 108 MMOL/L (ref 99–110)
CO2: 22 MMOL/L (ref 21–32)
CREAT SERPL-MCNC: 0.7 MG/DL (ref 0.6–1.2)
EOSINOPHILS ABSOLUTE: 0.2 K/UL (ref 0–0.6)
EOSINOPHILS RELATIVE PERCENT: 3.2 %
GFR AFRICAN AMERICAN: >60
GFR NON-AFRICAN AMERICAN: >60
GLUCOSE BLD-MCNC: 84 MG/DL (ref 70–99)
HCT VFR BLD CALC: 29.7 % (ref 36–48)
HEMOGLOBIN: 9.5 G/DL (ref 12–16)
LYMPHOCYTES ABSOLUTE: 1.1 K/UL (ref 1–5.1)
LYMPHOCYTES RELATIVE PERCENT: 21.2 %
MCH RBC QN AUTO: 30 PG (ref 26–34)
MCHC RBC AUTO-ENTMCNC: 32.1 G/DL (ref 31–36)
MCV RBC AUTO: 93.6 FL (ref 80–100)
MONOCYTES ABSOLUTE: 0.8 K/UL (ref 0–1.3)
MONOCYTES RELATIVE PERCENT: 15 %
NEUTROPHILS ABSOLUTE: 3.1 K/UL (ref 1.7–7.7)
NEUTROPHILS RELATIVE PERCENT: 60.1 %
PDW BLD-RTO: 18.1 % (ref 12.4–15.4)
PHOSPHORUS: 3.4 MG/DL (ref 2.5–4.9)
PLATELET # BLD: 239 K/UL (ref 135–450)
PMV BLD AUTO: 8.4 FL (ref 5–10.5)
POTASSIUM REFLEX MAGNESIUM: 3.7 MMOL/L (ref 3.5–5.1)
RBC # BLD: 3.17 M/UL (ref 4–5.2)
SARS-COV-2, NAAT: NOT DETECTED
SODIUM BLD-SCNC: 144 MMOL/L (ref 136–145)
WBC # BLD: 5.1 K/UL (ref 4–11)

## 2021-07-25 PROCEDURE — 6370000000 HC RX 637 (ALT 250 FOR IP): Performed by: PHYSICIAN ASSISTANT

## 2021-07-25 PROCEDURE — 85025 COMPLETE CBC W/AUTO DIFF WBC: CPT

## 2021-07-25 PROCEDURE — 94640 AIRWAY INHALATION TREATMENT: CPT

## 2021-07-25 PROCEDURE — 6370000000 HC RX 637 (ALT 250 FOR IP): Performed by: INTERNAL MEDICINE

## 2021-07-25 PROCEDURE — 2580000003 HC RX 258: Performed by: PHYSICIAN ASSISTANT

## 2021-07-25 PROCEDURE — 94761 N-INVAS EAR/PLS OXIMETRY MLT: CPT

## 2021-07-25 PROCEDURE — 6360000002 HC RX W HCPCS: Performed by: PHYSICIAN ASSISTANT

## 2021-07-25 PROCEDURE — 84100 ASSAY OF PHOSPHORUS: CPT

## 2021-07-25 PROCEDURE — 2700000000 HC OXYGEN THERAPY PER DAY

## 2021-07-25 PROCEDURE — 87635 SARS-COV-2 COVID-19 AMP PRB: CPT

## 2021-07-25 PROCEDURE — 99232 SBSQ HOSP IP/OBS MODERATE 35: CPT | Performed by: INTERNAL MEDICINE

## 2021-07-25 PROCEDURE — 36415 COLL VENOUS BLD VENIPUNCTURE: CPT

## 2021-07-25 PROCEDURE — 80048 BASIC METABOLIC PNL TOTAL CA: CPT

## 2021-07-25 PROCEDURE — 94669 MECHANICAL CHEST WALL OSCILL: CPT

## 2021-07-25 RX ORDER — CEFDINIR 300 MG/1
300 CAPSULE ORAL 2 TIMES DAILY
DISCHARGE
Start: 2021-07-25 | End: 2021-07-30

## 2021-07-25 RX ORDER — DEXAMETHASONE 2 MG/1
TABLET ORAL
Qty: 18 TABLET | Refills: 0 | Status: ON HOLD | DISCHARGE
Start: 2021-07-25 | End: 2021-08-23

## 2021-07-25 RX ORDER — BENZONATATE 100 MG/1
100 CAPSULE ORAL 3 TIMES DAILY
DISCHARGE
Start: 2021-07-25 | End: 2021-08-01

## 2021-07-25 RX ORDER — GUAIFENESIN/DEXTROMETHORPHAN 100-10MG/5
5 SYRUP ORAL EVERY 4 HOURS PRN
DISCHARGE
Start: 2021-07-25 | End: 2021-08-11 | Stop reason: CLARIF

## 2021-07-25 RX ORDER — POLYETHYLENE GLYCOL 3350 17 G/17G
17 POWDER, FOR SOLUTION ORAL DAILY
DISCHARGE
Start: 2021-07-26 | End: 2022-04-25

## 2021-07-25 RX ORDER — IPRATROPIUM BROMIDE AND ALBUTEROL SULFATE 2.5; .5 MG/3ML; MG/3ML
3 SOLUTION RESPIRATORY (INHALATION) EVERY 4 HOURS PRN
Qty: 360 ML | DISCHARGE
Start: 2021-07-25 | End: 2022-04-25

## 2021-07-25 RX ORDER — TRAMADOL HYDROCHLORIDE 50 MG/1
50 TABLET ORAL EVERY 4 HOURS PRN
Qty: 10 TABLET | Refills: 0 | Status: SHIPPED | OUTPATIENT
Start: 2021-07-25 | End: 2021-07-30

## 2021-07-25 RX ADMIN — POLYETHYLENE GLYCOL (3350) 17 G: 17 POWDER, FOR SOLUTION ORAL at 09:26

## 2021-07-25 RX ADMIN — Medication 10 ML: at 09:27

## 2021-07-25 RX ADMIN — IPRATROPIUM BROMIDE AND ALBUTEROL SULFATE 1 AMPULE: .5; 3 SOLUTION RESPIRATORY (INHALATION) at 11:17

## 2021-07-25 RX ADMIN — ATENOLOL 25 MG: 25 TABLET ORAL at 09:26

## 2021-07-25 RX ADMIN — SODIUM CHLORIDE 25 ML: 9 INJECTION, SOLUTION INTRAVENOUS at 15:19

## 2021-07-25 RX ADMIN — METHYLPREDNISOLONE SODIUM SUCCINATE 40 MG: 40 INJECTION, POWDER, FOR SOLUTION INTRAMUSCULAR; INTRAVENOUS at 09:26

## 2021-07-25 RX ADMIN — Medication 2 PUFF: at 07:20

## 2021-07-25 RX ADMIN — CEFTRIAXONE SODIUM 1000 MG: 1 INJECTION, POWDER, FOR SOLUTION INTRAMUSCULAR; INTRAVENOUS at 15:20

## 2021-07-25 RX ADMIN — IPRATROPIUM BROMIDE AND ALBUTEROL SULFATE 1 AMPULE: .5; 3 SOLUTION RESPIRATORY (INHALATION) at 07:20

## 2021-07-25 RX ADMIN — BENZONATATE 100 MG: 100 CAPSULE ORAL at 09:26

## 2021-07-25 RX ADMIN — IPRATROPIUM BROMIDE AND ALBUTEROL SULFATE 1 AMPULE: .5; 3 SOLUTION RESPIRATORY (INHALATION) at 15:07

## 2021-07-25 RX ADMIN — ENOXAPARIN SODIUM 40 MG: 40 INJECTION SUBCUTANEOUS at 09:26

## 2021-07-25 RX ADMIN — BENZONATATE 100 MG: 100 CAPSULE ORAL at 15:15

## 2021-07-25 ASSESSMENT — PAIN SCALES - GENERAL: PAINLEVEL_OUTOF10: 0

## 2021-07-25 NOTE — FLOWSHEET NOTE
07/24/21 2032   Vital Signs   Temp 98 °F (36.7 °C)   Temp Source Oral   Pulse 85   Heart Rate Source Monitor   Resp 18   /75   BP Location Left upper arm   Patient Position Semi fowlers   Level of Consciousness Alert (0)   MEWS Score 1   Oxygen Therapy   SpO2 95 %   O2 Device Nasal cannula   O2 Flow Rate (L/min) 1 L/min   Pt A/O x 4 assessment completed. Meds given per MAR. Pt denies any needs at this time.  Call light within reach and bed alarm on

## 2021-07-25 NOTE — DISCHARGE INSTR - COC
Continuity of Care Form    Patient Name: rTina Paz   :  1940  MRN:  [id number]    Admit date:  2021  Discharge date:  2021    Code Status Order: Full Code   Advance Directives:      Admitting Physician:  Ford Palacios MD  PCP: Beau Culp MD    Discharging Nurse: [facility] Unit/Room#: 0229/0229-02  Discharging Unit Phone Number: [phone]    Emergency Contact:   Extended Emergency Contact Information  Primary Emergency Contact: Rusty Blanco [address] Phone: [phone]  Work Phone: [phone]  Mobile Phone: [phone]  Relation: Child    Past Surgical History:  Past Surgical History:   Procedure Laterality Date    ABDOMINAL EXPLORATION SURGERY      APPENDECTOMY      CHOLECYSTECTOMY      COLONOSCOPY  2013    diverticulosis, polyps    COLONOSCOPY  11/10/2016    normal colon    EYE SURGERY Right 12/11/15    cataract removal    EYE SURGERY Left 16    cataract removed    HUMERUS FRACTURE SURGERY Right 2021    HUMERUS OPEN REDUCTION INTERNAL FIXATION performed by Elizabeth Shipley MD at [facility], PARTIAL Left     TUMOR REMOVAL      off of thyroid, was benign       Immunization History:   Immunization History   Administered Date(s) Administered    COVID-19, Hipbone, PF, 30mcg/0.3mL 2021, 2021    Influenza A ([id number]) Vaccine IM 2009    Influenza Vaccine, unspecified formulation 2009, 2010, 2011, 10/11/2012, 2014, 10/01/2015, 10/01/2016    Influenza Virus Vaccine 2009, 2010, 2011, 10/11/2012, 2014, 10/01/2016, 10/01/2016, 2016    Influenza Whole 10/06/2014, 10/01/2015    Influenza, High Dose (Fluzone 65 yrs and older) 2013, 10/09/2015, 2016, 2017    Influenza, Quadv, 6 mo and older, IM, PF (Flulaval, Fluarix) 2018    Influenza, Quadv, IM, (6 mo and older Fluzone, Flulaval, Fluarix and 3 yrs and older Afluria) 12/05/2009, 09/16/2010, 11/17/2011, 10/06/2014, 10/01/2015    Pneumococcal Conjugate 13-valent ([id number]) 03/22/2018    Pneumococcal Conjugate 7-valent (Prevnar7) 12/05/2011    Pneumococcal Polysaccharide ([id number]) 10/26/2009, 03/21/2019    Tdap (Boostrix, Adacel) 08/16/2012       Active Problems:  Patient Active Problem List   Diagnosis Code    RA (rheumatoid arthritis) ([facility]) M06.9    Benign essential HTN I10    Asthma J45.909    Hyperlipidemia E78.5    Asthma exacerbation J45. 901    Pleuritic chest pain R07.81    Chest pain on breathing R07.1    Pulmonary nodules R91.8    Chronic rhinitis J31.0    COPD (chronic obstructive pulmonary disease) ([facility]) J44.9    Bronchitis J40    Hypothyroidism E03.9    COPD exacerbation ([facility]) J44.1    Elevated d-dimer R79.89    Personal history of immunosupression therapy Z92.25    Atelectasis J98.11    Chest congestion R09.89    Mucus plugging of bronchi T17.500A    History of rheumatoid arthritis Z87.39    Former smoker Z87.891    Pyuria R82.81    History of humerus fracture Z87.81    Obesity E66.9    Right supracondylar humerus fracture S42.411A    Closed supracondylar fracture of right humerus S42.411A    Multifocal pneumonia J18.9    Hypoxia R09.02    Hyponatremia E87.1    Pneumonia of both lungs due to infectious organism J18.9    Acute hypoxemic respiratory failure ([facility]) J96.01       Isolation/Infection:   Isolation            No Isolation          Patient Infection Status       Infection Onset Added Last Indicated Last Indicated By Review Planned Expiration Resolved Resolved By    None active    Resolved    COVID-19 Rule Out 07/20/21 07/20/21 07/20/21 COVID-19 & Influenza Combo (Ordered)   07/20/21 Rule-Out Test Resulted            Nurse Assessment:  Last Vital Signs: BP (!) 165/84   Pulse (!) 46   Temp 97.8 °F (36.6 °C) (Oral)   Resp 16   Ht 5' 6.5\" (1.689 m)   Wt 180 lb (81.6 kg)   SpO2 93%   BMI 28.62 kg/m² Last documented pain score (0-10 scale): Pain Level: 0  Last Weight:   Wt Readings from Last 1 Encounters:   07/20/21 180 lb (81.6 kg)     Mental Status:  oriented    IV Access:  - None    Nursing Mobility/ADLs:  Walking   Assisted  Transfer  Assisted  Bathing  Independent  Dressing  Independent  Toileting  Independent  Feeding  [address]  Independent  Med Delivery   whole    Wound Care Documentation and Therapy:        Elimination:  Continence:   · Bowel: Yes  · Bladder: Yes  Urinary Catheter: None   Colostomy/Ileostomy/Ileal Conduit: No       Date of Last BM: 7/25/2021    Intake/Output Summary (Last 24 hours) at 7/25/2021 1308  Last data filed at 7/25/2021 0900  Gross per 24 hour   Intake 300 ml   Output --   Net 300 ml     I/O last 3 completed shifts: In: 306.4 [I.V.:306.4]  Out: -     Safety Concerns:     None and History of Falls (last 30 days)    Impairments/Disabilities:      None    Nutrition Therapy:  Current Nutrition Therapy:   - Oral Diet:  General    Routes of Feeding: Oral  Liquids: Thin Liquids  Daily Fluid Restriction: no  Last Modified Barium Swallow with Video (Video Swallowing Test): not done    Treatments at the Time of Hospital Discharge:   Respiratory Treatments: ***  Oxygen Therapy:  is not on home oxygen therapy.   Ventilator:    - No ventilator support    Rehab Therapies: Physical Therapy and Occupational Therapy  Weight Bearing Status/Restrictions: No weight bearing restirctions  Other Medical Equipment (for information only, NOT a DME order):  ***  Other Treatments: ***    Patient's personal belongings (please select all that are sent with patient):  None    RN SIGNATURE:  Electronically signed by Jesus Downs RN on 7/25/21 at 7:12 PM EDT    CASE MANAGEMENT/SOCIAL WORK SECTION    Inpatient Status Date: 07/20/21    Readmission Risk Assessment Score:  Readmission Risk              Risk of Unplanned Readmission:  16           Discharging to Facility/ Agency   Name: Name: [facility]  Address: [address], [place], [address]  Phone: [phone]  Fax: [phone]    Dialysis Facility (if applicable)   · Name:  · Address:  · Dialysis Schedule:  · Phone:  · Fax:    / signature: Electronically signed by Ernesto Crisostomo RN on 7/25/21 at 3:43 PM EDT    PHYSICIAN SECTION    Prognosis: Good    Condition at Discharge: Stable    Rehab Potential (if transferring to Rehab): Good    Recommended Labs or Other Treatments After Discharge:     Physician Certification: I certify the above information and transfer of Iva Severance  is necessary for the continuing treatment of the diagnosis listed and that she requires [place] for less 30 days.      Update Admission H&P: No change in H&P    PHYSICIAN SIGNATURE:  Electronically signed by Lashonda Gonzalez MD on 7/25/21 at 1:08 PM EDT

## 2021-07-25 NOTE — PROGRESS NOTES
Pulmonary Progress Note  CC: Shortness of breath, pneumonia    Subjective: Shortness of breath better    IV line peripheral    EXAM:   BP (!) 165/84   Pulse 77   Temp 97.8 °F (36.6 °C) (Oral)   Resp 20   Ht 5' 6.5\" (1.689 m)   Wt 180 lb (81.6 kg)   SpO2 92%   BMI 28.62 kg/m²  on 3 L   Constitutional:  No acute distress   HEENT: no scleral icterus  Neck: No tracheal deviation present. Cardiovascular: Normal heart sounds. Pulmonary/Chest: No wheezes. No rhonchi. Few rales. No decreased breath sounds. No accessory muscle usage or stridor. Abdominal: Soft. Musculoskeletal: No cyanosis. No clubbing. Skin: Skin is warm and dry.      Scheduled Meds:   benzonatate  100 mg Oral TID    atenolol  25 mg Oral Daily    atorvastatin  80 mg Oral QPM    Vitamin D  2,000 Units Oral Nightly    DULoxetine  60 mg Oral Nightly    levothyroxine  25 mcg Oral Daily    budesonide-formoterol  2 puff Inhalation BID    sodium chloride flush  5-40 mL Intravenous 2 times per day    enoxaparin  40 mg Subcutaneous Daily    ipratropium-albuterol  1 ampule Inhalation Q4H WA    [Held by provider] triamterene-hydroCHLOROthiazide  1 tablet Oral Daily    polyethylene glycol  17 g Oral Daily    cefTRIAXone (ROCEPHIN) IV  1,000 mg Intravenous Q24H    methylPREDNISolone  40 mg Intravenous Daily     Continuous Infusions:   sodium chloride       PRN Meds:  guaiFENesin-dextromethorphan, sodium chloride flush, sodium chloride, ondansetron **OR** ondansetron, acetaminophen **OR** acetaminophen, bisacodyl, albuterol    Labs:  CBC:   Recent Labs     07/23/21  0514 07/24/21  0512 07/25/21  0520   WBC 5.9 5.3 5.1   HGB 9.8* 9.1* 9.5*   HCT 30.6* 27.8* 29.7*   MCV 95.9 93.6 93.6    261 239     BMP:   Recent Labs     07/23/21  0514 07/24/21  0512 07/25/21  0520    142 144   K 4.5 4.0 3.7   * 112* 108   CO2 19* 23 22   PHOS  --   --  3.4   BUN 17 11 12   CREATININE 0.6 0.6 0.7       Cultures:  7/21/2021 sputum NRF  7/21/2021 blood NGTD  7/20/2021 SARS-CoV-2 and influenza are negative    Films:  7/24/2021 multifocal infiltrates with effusion    ASSESSMENT:  · Acute hypoxemic respiratory failure   · Community acquired pneumonia in immunosuppressed patient with chronic methotrexate use  · Moderate COPD with acute exacerbation   · Asthma COPD overlap syndrome, followed by Dr. Nancy Anderson  · Pulmonary edema and pleural effusion 2/2 IV fluids in hospital   · RA on MTX; chronic immunosuppression    PLAN:  Supplemental oxygen to maintain SaO2 >92%; wean as tolerated    IV solumedrol    Inhaled bronchodilators   Ceftriaxone day #6/8, s/p 3 days azithromycin     Diuresis per internal medicine  Tobacco cessation achieved more than 30 years prior  Will need Neb at discharge

## 2021-07-25 NOTE — PROGRESS NOTES
Pt leaving via cot to SNF. Discharge instructions given. Pt voiced understanding. Copy of discharge and scripts with patient. Iv removed. CP and PE completed. No further needs at discharge. Pt leaving with all personal belonging. Rn called report to receiving facility.

## 2021-07-25 NOTE — PLAN OF CARE
Problem: Pain:  Goal: Control of acute pain  Description: Control of acute pain  7/24/2021 1012 by Bart Hernandez RN  Outcome: Ongoing     Problem: Infection:  Goal: Will remain free from infection  Description: Will remain free from infection  Outcome: Ongoing     Problem: Daily Care:  Goal: Daily care needs are met  Description: Daily care needs are met  7/24/2021 2322 by Arturo Gutierrez RN  Outcome: Ongoing  7/24/2021 1012 by Bart Hernandez RN  Outcome: Ongoing     Problem: Skin Integrity:  Goal: Skin integrity will stabilize  Description: Skin integrity will stabilize  7/24/2021 1012 by Bart Hernandez RN  Outcome: Ongoing     Problem: Airway Clearance - Ineffective:  Goal: Clear lung sounds  Description: Clear lung sounds  Outcome: Ongoing

## 2021-07-25 NOTE — PROGRESS NOTES
BP (!) 165/84   Pulse 77   Temp 97.8 °F (36.6 °C) (Oral)   Resp 18   Ht 5' 6.5\" (1.689 m)   Wt 180 lb (81.6 kg)   SpO2 93%   BMI 28.62 kg/m²      Assessment complete. Meds passed. Pt denies needs at this time        Bedside Mobility Assessment Tool (BMAT):     Assessment Level 1- Sit and Shake    1. From a semi-reclined position, ask patient to sit up and rotate to a seated position at the side of the bed. Can use the bedrail. 2. Ask patient to reach out and grab your hand and shake making sure patient reaches across his/her midline. Pass- Patient is able to come to a seated position, maintain core strength. Maintains seated balance while reaching across midline. Move on to Assessment Level 2. Assessment Level 2- Stretch and Point   1. With patient in seated position at the side of the bed, have patient place both feet on the floor (or stool) with knees no higher than hips. 2. Ask patient to stretch one leg and straighten the knee, then bend the ankle/flex and point the toes. If appropriate, repeat with the other leg. Pass- Patient is able to demonstrate appropriate quad strength on intended weight bearing limb(s). Move onto Assessment Level 3. Assessment Level 3- Stand   1. Ask patient to elevate off the bed or chair (seated to standing) using an assistive device (cane, bedrail). 2. Patient should be able to raise buttocks off be and hold for a count of five. May repeat once. Pass- Patient maintains standing stability for at least 5 seconds, proceed to assessment level 4. Assessment Level 4- Walk   1. Ask patient to march in place at bedside. 2. Then ask patient to advance step and return each foot. Some medical conditions may render a patient from stepping backwards, use your best clinical judgement. Pass- Patient demonstrates balance while shifting weight and ability to step, takes independent steps, does not use assistive device patient is MOBILITY LEVEL 4.       Mobility Level- 4   bsc assit x1 due to lines

## 2021-07-25 NOTE — CARE COORDINATION
Writer left vm for [employer] with [facility] r/t referral called on Friday and bed availability as pt has d/c orders. Awaiting call back from [employer]. Following    1508 writer unable to reach [employer] at [facility] after three attempts. Writer spoke with charge nurse at [facility] and she stated she would attempt to reach [employer] and call CM back r/t referral and bed availability. Still awaiting response from [facility] admissions. Writer spoke with pt and second choice is [facility]. Referral called to [facility] as Mary Carmen Shields is out of town until tomorrow. Awaiting call back from [facility]. Following.

## 2021-07-25 NOTE — PROGRESS NOTES
Bedside report given to [facility] RN  pt in stable condition no needs at this time.  Call light within reach

## 2021-07-27 ENCOUNTER — TELEPHONE (OUTPATIENT)
Dept: PULMONOLOGY | Age: 81
End: 2021-07-27

## 2021-07-28 NOTE — PROGRESS NOTES
Physician Progress Note      Eliezer Sebastian  CSN #:                  [id number]  :                       1940  ADMIT DATE:       2021 12:58 PM  [address] [place] DATE:        2021 5:14 PM  RESPONDING  PROVIDER #:        Vega Hong MD          QUERY TEXT:    Pt admitted with Pneumonia. Pt noted to have pulmonary edema on . If   possible, please document in the progress notes and discharge summary if you   are evaluating and/or treating any of the following: The medical record reflects the following:  Risk Factors: advanced age, pneumonia, acute respiratory failure, copd  Clinical Indicators: per pulmonary consult note on : Pulmonary edema and   pleural effusion 2/2 IV fluids in hospital, per IM note on ; Worsening   hypoxemia today 2021 with O2 requirement up to 3 L this is secondary to   fluid overload. .  Stop IV fluids and give 1 dose of IV Lasix . chest x-ray shows pulmonary edema   . Treatment: Stop IV fluids and give 1 dose of IV Lasix, chest x-ray    Thank you for your assistance,  Courtney Montes RN,BSN,CCDS,CRCR  Options provided:  -- Noncardiogenic acute pulmonary edema due to fluid overload 2/2 IVF  -- Noncardiogenic acute pulmonary edema due to, please specify cause. -- Other - I will add my own diagnosis  -- Disagree - Not applicable / Not valid  -- Disagree - Clinically unable to determine / Unknown  -- Refer to Clinical Documentation Reviewer    PROVIDER RESPONSE TEXT:    This patient has noncardiogenic acute pulmonary edema due to fluid overload   2/2 IVF.     Query created by: Lena Reaves on 2021 8:21 AM      Electronically signed by:  Vega Hong MD 2021 9:20 AM

## 2021-07-30 NOTE — DISCHARGE SUMMARY
Physician Discharge Summary     Patient ID:  Lorenzo Edouard  [id number]  24 y.o.  1940    Admit date: 7/20/2021    Discharge date and time: 7/25/2021  5:14 PM     Admitting Physician: Karley Jacob MD     Discharge Physician: Karley Jacob MD    Admission Diagnoses: CAP (community acquired pneumonia) due to Chlamydia species [J16.0]  Multifocal pneumonia [J18.9]    Discharge Diagnoses: Principal Problem:    Multifocal pneumonia  Active Problems:    RA (rheumatoid arthritis) ([facility] [address]    Benign essential HTN    Hypothyroidism    COPD exacerbation ([facility] [address]    Hypoxia    Hyponatremia    Pneumonia of both lungs due to infectious organism    Acute hypoxemic respiratory failure ([facility]  Resolved Problems:    * No resolved hospital problems. *      Admission Condition: fair    Discharged Condition: stable    Indication for Admission:   Per HPI  The patient is a 80 y.o. female with COPD, RA on methotrexate, HTN, thyroid disease who presented to [facility] ED with complaint of shortness of breath. She is accompanied by her son who helps provide the history. Son states that the patient has been short of breath for the past several months, and has slowly been worsening. Patient states that for the past 1 week she has felt generally unwell, with fatigue, malaise, poor appetite, worsening shortness of breath and cough. She states her cough has been productive of white sputum that is sometimes also yellow. She denies any fever or chills. She denies any chest pain. She has felt nauseated and has had poor oral intake but she is not vomited. Denies any diarrhea, in fact she has been constipated. Over the past 48 hours her shortness of breath significantly worsened. She was using her rescue inhaler at home which helped briefly, but symptoms quickly returned. She came to the ER today for evaluation where she was found to be hypoxic, chest x-ray revealed a multifocal pneumonia.   She will be admitted for further care and evaluation     She has received her COVID-19 vaccine. COVID-19 PCR test in the emergency room was negative.       Hospital Course:      #Pneumonia  - community acquired, suspect a gram positive organism    -Continue to treat with IV antibiotics Rocephin-D # 6 . Completed 3 days of zithromycin  - check cultures-negative so far  -Procalcitonin 0.33- > repeat 0.31  Improved today . DC on Po abx      #COPD  AE  -Continue IV solumedrol, inhaled bronchodilators   - pulmonary consulted  DC on Prednisone taper, IBD    # acute hypoxic respiratory failure  -Not on home oxygen .  -Hypoxia 2/2 PNA and COPD.  subsequently developed Worsening hypoxia due  fluid overload  - she was 87% on RA on presentation.   She was  requiring 2L-> weaned to 1L. Worsening hypoxemia on 7/24/2021 with O2 requirement up to 3 L, this is secondary to fluid overload. .  Stopped IV fluids and given 1 dose of IV Lasix . - improved today . o2 at 2 L .   cont abx for PNA and IBD and steroids for COPD. cont supplemental o2 in NH . Wean as tolerated     #Hyponatremia  - Na 130 on admit  - held HCTZ  -S/p IV normal saline  -Hyponatremia has resolved  - repeat BMP, sodium improved to 133 > 140 > 142     #Rheumatoid arthritis  - she takes methotrexate weekly on Mondays.  Held with acute infection. can resume on discharge     #HTN  - BP stable   - cont atenolol   - Hold maxzide with low Na     #HLD  - cont satin      #Hypothyroidism  - cont synthroid      #Weakness  -seen by PT OT  Needs SNF     DVT Prophylaxis: Lovenox   Diet: ADULT DIET; Regular  Code Status: Full Code     Disposition.  .  DC to [facility]     Consults: pulmonary      Significant Diagnostic Studies:   Data:  CBC:         Recent Labs     07/23/21  0514 07/24/21  0512 07/25/21  0520   WBC 5.9 5.3 5.1   HGB 9.8* 9.1* 9.5*   HCT 30.6* 27.8* 29.7*   MCV 95.9 93.6 93.6    261 239      BMP:         Recent Labs     07/23/21  0514 07/24/21  0512 07/25/21  0520    142 144   K 4.5 4.0 3.7   * 112* 108   CO2 19* 23 22   PHOS  --   --  3.4   BUN 17 11 12   CREATININE 0.6 0.6 0.7           CULTURES  Blood: pending   COVID 19, PCR: Not detected  Influenza A/B: neg/neg  Strep pneumo ag: ordered  Legionella ag: ordered      RADIOLOGY  XR CHEST PORTABLE   Final Result   Multifocal airspace opacities are relatively stable from the prior exam with   interval development of a small right pleural effusion. Pattern may   represent pulmonary edema or underlying pneumonitis.           XR CHEST PORTABLE   Final Result   Findings most consistent with multifocal pneumonia.                     FASTING LIPID PANEL:  Lab Results   Component Value Date    CHOL 139 09/19/2020    HDL 46 09/19/2020    TRIG 70 09/19/2020       Treatments: as above    Discharge Exam:    Objective:   Patient Vitals for the past 4 hrs:    Pulse Resp SpO2   07/25/21 1205 (!) 46 16 93 %   07/25/21 1200 86 -- (!) 87 %   07/25/21 1119 -- 20 92 %               Intake/Output Summary (Last 24 hours) at 7/25/2021 1301  Last data filed at 7/25/2021 0900      Gross per 24 hour   Intake 300 ml   Output --   Net 300 ml         Physical Exam:  Gen:  Awake and well-oriented. no distress. Looks fatigued   Eyes: PERRL. No sclera icterus. No conjunctival injection. ENT: No discharge. Pharynx clear. Neck: No JVD. Trachea midline. Resp: no accessory muscle use. diminished breath sounds. Mild wheezes . No  rhonchi    CV: Regular rate. Regular rhythm. No murmur.  No rub. GI: Non-tender. Non-distended. No masses. No organomegaly. Normal bowel sounds   Skin: Warm and dry. No nodule on exposed extremities. No rash on exposed extremities. M/S: No cyanosis. No joint deformity. No clubbing. 1+ edema   Neuro: Awake. Grossly nonfocal    Psych: Oriented x 3.  No anxiety or agitation.        Disposition: [facility]    Patient Instructions:      Medication List      START taking these medications    benzonatate 100 MG capsule  Commonly known as: TESSALON  Take 1 capsule by mouth 3 times daily for 7 days     cefdinir 300 MG capsule  Commonly known as: OMNICEF  Take 1 capsule by mouth 2 times daily for 5 days     enoxaparin 40 MG/0.4ML injection  Commonly known as: LOVENOX  Inject 0.4 mLs into the skin daily for 7 days     guaiFENesin-dextromethorphan 100-10 MG/5ML syrup  Commonly known as: ROBITUSSIN DM  Take 5 mLs by mouth every 4 hours as needed for Cough     ipratropium-albuterol 0.5-2.5 (3) MG/3ML Soln nebulizer solution  Commonly known as: DUONEB  Inhale 3 mLs into the lungs every 4 hours as needed for Shortness of Breath     polyethylene glycol 17 g packet  Commonly known as: GLYCOLAX  Take 17 g by mouth daily        CHANGE how you take these medications    dexamethasone 2 MG tablet  Commonly known as: DECADRON  Take 3 tabs a day for 3 days,  Then 2 tabs a day for 3 days ,  Then 1 tab a day for 3 days. What changed:   · medication strength  · how much to take  · how to take this  · when to take this  · additional instructions        CONTINUE taking these medications    albuterol sulfate  (90 Base) MCG/ACT inhaler  Inhale 2 puffs into the lungs every 6 hours as needed for Wheezing or Shortness of Breath     ATENOLOL PO     atorvastatin 80 MG tablet  Commonly known as: LIPITOR     DULoxetine 60 MG extended release capsule  Commonly known as: CYMBALTA     levothyroxine 25 MCG tablet  Commonly known as: SYNTHROID     methotrexate 2.5 MG chemo tablet  Commonly known as: RHEUMATREX     PROBIOTIC COLIC PO     Restasis 2.45 % ophthalmic emulsion  Generic drug: cycloSPORINE     traMADol 50 MG tablet  Commonly known as: ULTRAM  Take 1 tablet by mouth every 4 hours as needed for Pain for up to 5 days.      triamterene-hydroCHLOROthiazide 37.5-25 MG per tablet  Commonly known as: MAXZIDE-25     vitamin D 50 MCG (2000 UT) Caps capsule     Wixela Inhub 250-50 MCG/DOSE Aepb  Generic drug: fluticasone-salmeterol  USE 1 INHALATION BY MOUTH  EVERY 12 HOURS        STOP taking these medications    naproxen 250 MG tablet  Commonly known as: NAPROSYN           Where to Get Your Medications      You can get these medications from any pharmacy    Bring a paper prescription for each of these medications  · traMADol 50 MG tablet     Information about where to get these medications is not yet available    Ask your nurse or doctor about these medications  · benzonatate 100 MG capsule  · cefdinir 300 MG capsule  · dexamethasone 2 MG tablet  · enoxaparin 40 MG/0.4ML injection  · guaiFENesin-dextromethorphan 100-10 MG/5ML syrup  · ipratropium-albuterol 0.5-2.5 (3) MG/3ML Soln nebulizer solution  · polyethylene glycol 17 g packet       Activity: activity as tolerated  Diet: regular diet       D/C to SNF  Total time 40 minutes. > 50%  dominated by counseling and coordination of care.       Follow-up with [facility]      Signed:  Trinity Cantu MD  7/25/21

## 2021-08-05 ENCOUNTER — OFFICE VISIT (OUTPATIENT)
Dept: ORTHOPEDIC SURGERY | Age: 81
End: 2021-08-05
Payer: MEDICARE

## 2021-08-05 VITALS — WEIGHT: 174 LBS | HEIGHT: 66 IN | BODY MASS INDEX: 27.97 KG/M2

## 2021-08-05 DIAGNOSIS — M48.062 LUMBAR STENOSIS WITH NEUROGENIC CLAUDICATION: Primary | ICD-10-CM

## 2021-08-05 DIAGNOSIS — M48.04 MYELOPATHY CONCURRENT WITH AND DUE TO SPINAL STENOSIS OF THORACIC REGION (HCC): ICD-10-CM

## 2021-08-05 DIAGNOSIS — G99.2 MYELOPATHY CONCURRENT WITH AND DUE TO SPINAL STENOSIS OF THORACIC REGION (HCC): ICD-10-CM

## 2021-08-05 PROCEDURE — 1111F DSCHRG MED/CURRENT MED MERGE: CPT | Performed by: ORTHOPAEDIC SURGERY

## 2021-08-05 PROCEDURE — 1123F ACP DISCUSS/DSCN MKR DOCD: CPT | Performed by: ORTHOPAEDIC SURGERY

## 2021-08-05 PROCEDURE — 4040F PNEUMOC VAC/ADMIN/RCVD: CPT | Performed by: ORTHOPAEDIC SURGERY

## 2021-08-05 PROCEDURE — G8417 CALC BMI ABV UP PARAM F/U: HCPCS | Performed by: ORTHOPAEDIC SURGERY

## 2021-08-05 PROCEDURE — G8427 DOCREV CUR MEDS BY ELIG CLIN: HCPCS | Performed by: ORTHOPAEDIC SURGERY

## 2021-08-05 PROCEDURE — 99214 OFFICE O/P EST MOD 30 MIN: CPT | Performed by: ORTHOPAEDIC SURGERY

## 2021-08-05 PROCEDURE — G8400 PT W/DXA NO RESULTS DOC: HCPCS | Performed by: ORTHOPAEDIC SURGERY

## 2021-08-05 PROCEDURE — 1090F PRES/ABSN URINE INCON ASSESS: CPT | Performed by: ORTHOPAEDIC SURGERY

## 2021-08-05 PROCEDURE — 1036F TOBACCO NON-USER: CPT | Performed by: ORTHOPAEDIC SURGERY

## 2021-08-05 NOTE — PROGRESS NOTES
New Patient: LUMBAR SPINE    Referring Provider:  Alfred Wen    CHIEF COMPLAINT:    Chief Complaint   Patient presents with    Back Pain     LUMBAR PAIN - REFERRED BY [facility]. DIAGNOSED WITH RHEUMATOID ARTHRITIS 14 YEARS AGO. HAS OSTEOARTHRITIS IN HER SPINE. WAS RECENTLY IN THE HOSPITAL FOR DOUBLE PNEUMONIA AND WENT THROUGH MULTIPLE COURSES OF STEROIDS, ETC. IS HAVING NO BACK PAIN CURRENTLY AND NO LEG PAIN. HISTORY OF PRESENT ILLNESS:     Ms. Cecil Best  is a pleasant 80 y.o. female here for consultation regarding her LBP. She states her pain began insidiously about 14 years ago. Her pain has steadily increased since then, worst over the past few months. However, she was recently hospitalized for pneumonia and was treated with steroids, which significantly helped her back pain. She rates her back pain 1/10 today and leg pain 0/10. She describes the pain as aching, stabbing at times. Pain is localized to her lumbar spine and does not radiate. Pain is worst with prolonged standing, transitioning or walking, relieved with sitting. She denies lower extremity pain, numbness or weakness. She does however report frequent falling and progressive gait instability over the last 6 months. She has started using a walker over the past two weeks which helps. She is currently at [facility] following recent hospital admission and receiving OT daily. She is being discharged home tomorrow and will continue OT at home. She denies saddle numbness or bowel or bladder dysfunction. The pain occasionally disrupts her sleep.      Current/Past Treatment:   · Physical Therapy: Accupressure in the past with relief, OT currently   · Chiropractic:  No   · Injection:  No   · Medications:  Aleve, Ultram, Significant relief with recent steroids      Past Medical History:   Past Medical History:   Diagnosis Date    Asthma     Hyperlipidemia     Hypertension     RA [facility] ([facility] [address]     Thyroid disease     has half of her thyroid        Past Surgical History:     Past Surgical History:   Procedure Laterality Date    ABDOMINAL EXPLORATION SURGERY      APPENDECTOMY      CHOLECYSTECTOMY      COLONOSCOPY  4/18/2013    diverticulosis, polyps    COLONOSCOPY  11/10/2016    normal colon    EYE SURGERY Right 12/11/15    cataract removal    EYE SURGERY Left 1/8/16    cataract removed    HUMERUS FRACTURE SURGERY Right 2/28/2021    HUMERUS OPEN REDUCTION INTERNAL FIXATION performed by Zoe Oneil MD at [address] [place] [address], PARTIAL Left     TUMOR REMOVAL      off of thyroid, was benign       Current Medications:     Current Outpatient Medications:     ipratropium-albuterol (DUONEB) 0.5-2.5 (3) MG/3ML SOLN nebulizer solution, Inhale 3 mLs into the lungs every 4 hours as needed for Shortness of Breath, Disp: 360 mL, Rfl:     dexamethasone (DECADRON) 2 MG tablet, Take 3 tabs a day for 3 days, Then 2 tabs a day for 3 days , Then 1 tab a day for 3 days. , Disp: 18 tablet, Rfl: 0    guaiFENesin-dextromethorphan (ROBITUSSIN DM) 100-10 MG/5ML syrup, Take 5 mLs by mouth every 4 hours as needed for Cough, Disp: , Rfl:     polyethylene glycol (GLYCOLAX) 17 g packet, Take 17 g by mouth daily, Disp: , Rfl:     methotrexate (RHEUMATREX) 2.5 MG chemo tablet, Take 10 mg by mouth once a week Monday nights, Disp: , Rfl:     WIXELA INHUB 250-50 MCG/DOSE AEPB, USE 1 INHALATION BY MOUTH  EVERY 12 HOURS, Disp: 180 each, Rfl: 0    DULoxetine (CYMBALTA) 60 MG extended release capsule, Take 60 mg by mouth nightly Before bed , Disp: , Rfl:     RESTASIS 0.05 % ophthalmic emulsion, , Disp: , Rfl:     atorvastatin (LIPITOR) 80 MG tablet, Take 80 mg by mouth every evening , Disp: , Rfl:     albuterol sulfate  (90 Base) MCG/ACT inhaler, Inhale 2 puffs into the lungs every 6 hours as needed for Wheezing or Shortness of Breath, Disp: 3 Inhaler, Rfl: 3    triamterene-hydrochlorothiazide (MAXZIDE-25) 37.5-25 MG per tablet, Take 1 tablet by mouth daily, Disp: , Rfl:     Cholecalciferol (VITAMIN D) 2000 UNITS CAPS capsule, Take 1 capsule by mouth nightly , Disp: , Rfl:     Lactobacillus Rhamnosus, GG, (PROBIOTIC COLIC PO), Take by mouth daily, Disp: , Rfl:     levothyroxine (SYNTHROID) 25 MCG tablet, Take 25 mcg by mouth Daily. , Disp: , Rfl:     ATENOLOL PO, Take 25 mg by mouth daily. , Disp: , Rfl:     Allergies:  Effexor [venlafaxine hcl], Fenofibrate, Prednisone, Codeine, and Docosahexaenoic acid-epa    Social History:    reports that she quit smoking about 34 years ago. Her smoking use included cigarettes. She started smoking about 62 years ago. She has a 10.00 pack-year smoking history. She has never used smokeless tobacco. She reports that she does not drink alcohol and does not use drugs. Family History:   Family History   Problem Relation Age of Onset    High Blood Pressure Mother        REVIEW OF SYSTEMS: Full ROS noted & scanned   CONSTITUTIONAL: Denies unexplained weight loss, fevers, chills or fatigue  NEUROLOGICAL: Denies unsteady gait or progressive weakness  MUSCULOSKELETAL: Denies joint swelling or redness  PSYCHOLOGICAL: Denies anxiety, depression   SKIN: Denies skin changes, delayed healing, rash, itching   HEMATOLOGIC: Denies easy bleeding or bruising  ENDOCRINE: Denies excessive thirst, urination, heat/cold  RESPIRATORY: Denies current dyspnea, cough  GI: Denies nausea, vomiting, diarrhea   : Denies bowel or bladder issues      PHYSICAL EXAM:    Vitals: Height 5' 6\" (1.676 m), weight 174 lb (78.9 kg), not currently breastfeeding. GENERAL EXAM:  · General Apparence: Patient is adequately groomed with no evidence of malnutrition. · Orientation: The patient is oriented to time, place and person. · Mood & Affect:The patient's mood and affect are appropriate. · Vascular: Examination reveals no swelling tenderness in upper or lower extremities.  Good capillary refill. · Lymphatic: The lymphatic examination bilaterally reveals all areas to be without enlargement or induration  · Sensation: Sensation is intact without deficit  · Coordination/Balance: Good coordination. Unable to tandem walk    LUMBAR/SACRAL EXAMINATION:  · Inspection: Local inspection shows no step-off or bruising. Lumbar alignment is normal.  Sagittal and Coronal balance is neutral.      · Palpation:   No evidence of tenderness at the midline. No tenderness bilaterally at the paraspinal or trochanters. There is no step-off or paraspinal spasm. · Range of Motion: Lumbar flexion, extension and rotation are mildly limited due to pain. · Strength:   Strength testing is 5/5 in all muscle groups tested. · Special Tests:   Straight leg raise and crossed SLR negative. Leg length and pelvis level. · Skin: There are no rashes, ulcerations or lesions. · Reflexes: Reflexes are symmetrically 2+ at the patellar and  1+ at ankle tendons. Clonus absent bilaterally at the feet. · Gait & station: Overall stable, ambulates with walker    · Additional Examinations:   · RIGHT LOWER EXTREMITY: Inspection/examination of the right lower extremity does not show any tenderness, deformity or injury. Range of motion is unremarkable. There is no gross instability. There are no rashes, ulcerations or lesions. Strength and tone are normal.  · LEFT LOWER EXTREMITY:  Inspection/examination of the left lower extremity does not show any tenderness, deformity or injury. Range of motion is unremarkable. There is no gross instability. There are no rashes, ulcerations or lesions. Strength and tone are normal.    Diagnostic Testing:    I reviewed MRI images of her lumbar spine from 6/21/21. They show degenerative scoliosis with multilevel stenosis, severe at L2-3 and moderate to severe L3-4. Central disc protrusion and facet arthropathy  Noted with severe stenosis and moderate cord compression T10-T11.     Impression: Degenerative scoliosis with moderate to severe stenosis L2-3, L3-4  Severe stenosis T10-T11 with myelopathy    Plan:    We discussed treatment options including observation, additional oral steroids, physical therapy, epidural injections and spinal surgery. She does not wish to proceed with surgical intervention at this time. She will continue with OT. She will return PRN if she develops any new neurologic symptoms or worsening gait instability.

## 2021-08-11 ENCOUNTER — OFFICE VISIT (OUTPATIENT)
Dept: PULMONOLOGY | Age: 81
End: 2021-08-11
Payer: MEDICARE

## 2021-08-11 VITALS
SYSTOLIC BLOOD PRESSURE: 112 MMHG | WEIGHT: 174.4 LBS | BODY MASS INDEX: 28.03 KG/M2 | RESPIRATION RATE: 20 BRPM | HEART RATE: 78 BPM | OXYGEN SATURATION: 95 % | DIASTOLIC BLOOD PRESSURE: 70 MMHG | HEIGHT: 66 IN

## 2021-08-11 DIAGNOSIS — M05.719 RHEUMATOID ARTHRITIS INVOLVING SHOULDER WITH POSITIVE RHEUMATOID FACTOR, UNSPECIFIED LATERALITY (HCC): ICD-10-CM

## 2021-08-11 DIAGNOSIS — J44.9 ASTHMA-COPD OVERLAP SYNDROME (HCC): Primary | ICD-10-CM

## 2021-08-11 PROCEDURE — 3023F SPIROM DOC REV: CPT | Performed by: INTERNAL MEDICINE

## 2021-08-11 PROCEDURE — G8400 PT W/DXA NO RESULTS DOC: HCPCS | Performed by: INTERNAL MEDICINE

## 2021-08-11 PROCEDURE — 1123F ACP DISCUSS/DSCN MKR DOCD: CPT | Performed by: INTERNAL MEDICINE

## 2021-08-11 PROCEDURE — 99214 OFFICE O/P EST MOD 30 MIN: CPT | Performed by: INTERNAL MEDICINE

## 2021-08-11 PROCEDURE — G8417 CALC BMI ABV UP PARAM F/U: HCPCS | Performed by: INTERNAL MEDICINE

## 2021-08-11 PROCEDURE — G8427 DOCREV CUR MEDS BY ELIG CLIN: HCPCS | Performed by: INTERNAL MEDICINE

## 2021-08-11 PROCEDURE — 1090F PRES/ABSN URINE INCON ASSESS: CPT | Performed by: INTERNAL MEDICINE

## 2021-08-11 PROCEDURE — G8926 SPIRO NO PERF OR DOC: HCPCS | Performed by: INTERNAL MEDICINE

## 2021-08-11 PROCEDURE — 1111F DSCHRG MED/CURRENT MED MERGE: CPT | Performed by: INTERNAL MEDICINE

## 2021-08-11 PROCEDURE — 1036F TOBACCO NON-USER: CPT | Performed by: INTERNAL MEDICINE

## 2021-08-11 PROCEDURE — 4040F PNEUMOC VAC/ADMIN/RCVD: CPT | Performed by: INTERNAL MEDICINE

## 2021-08-11 NOTE — PROGRESS NOTES
[facility]    Outpatient Follow Up Note    CC: Asthma   Consulting provider: No ref. provider found    Interval History: 80 y.o. female  The pt was admitted to the hospital last month for asthma and pneumonia. Her respiratory culture was negative. Her symptoms have improved. In rehab for 2 weeks. Denies wheezing. Cough improved. Initial HPI: Previously smoked x 30 years, 1/3 pack of cigarettes daily. Quit about 30 years ago. Told she had asthma; was in ICU x 3 days with asthma exacerbation in 1970's. Has an albuterol- uses it twice very 3-4 weeks. Has rheumatoid arthritis- sees Dr Nelda Haji. Uses alleve for RA flares too. Uses leflunamide and sulfasalazine too. Had been on methotrexate previously- had side effects. On this current regimen x 3 years. Seems to keep symptoms under good control. May occasionally have ankle or wrist swelling. Current Medications:    Current Outpatient Medications:     dexamethasone (DECADRON) 2 MG tablet, Take 3 tabs a day for 3 days, Then 2 tabs a day for 3 days , Then 1 tab a day for 3 days. , Disp: 18 tablet, Rfl: 0    polyethylene glycol (GLYCOLAX) 17 g packet, Take 17 g by mouth daily, Disp: , Rfl:     methotrexate (RHEUMATREX) 2.5 MG chemo tablet, Take 10 mg by mouth once a week Monday nights, Disp: , Rfl:     WIXELA INHUB 250-50 MCG/DOSE AEPB, USE 1 INHALATION BY MOUTH  EVERY 12 HOURS, Disp: 180 each, Rfl: 0    DULoxetine (CYMBALTA) 60 MG extended release capsule, Take 60 mg by mouth nightly Before bed , Disp: , Rfl:     RESTASIS 0.05 % ophthalmic emulsion, , Disp: , Rfl:     atorvastatin (LIPITOR) 80 MG tablet, Take 80 mg by mouth every evening , Disp: , Rfl:     albuterol sulfate  (90 Base) MCG/ACT inhaler, Inhale 2 puffs into the lungs every 6 hours as needed for Wheezing or Shortness of Breath, Disp: 3 Inhaler, Rfl: 3    triamterene-hydrochlorothiazide (MAXZIDE-25) 37.5-25 MG per tablet, Take 1 tablet by mouth daily, Disp: , Rfl:     Cholecalciferol (VITAMIN D) 2000 UNITS CAPS capsule, Take 1 capsule by mouth nightly , Disp: , Rfl:     Lactobacillus Rhamnosus, GG, (PROBIOTIC COLIC PO), Take by mouth daily, Disp: , Rfl:     levothyroxine (SYNTHROID) 25 MCG tablet, Take 25 mcg by mouth Daily. , Disp: , Rfl:     ATENOLOL PO, Take 25 mg by mouth daily. , Disp: , Rfl:     ipratropium-albuterol (DUONEB) 0.5-2.5 (3) MG/3ML SOLN nebulizer solution, Inhale 3 mLs into the lungs every 4 hours as needed for Shortness of Breath (Patient not taking: Reported on 2021), Disp: 360 mL, Rfl:     Objective:   PHYSICAL EXAM:    /70   Pulse 78   Resp 20   Ht 5' 6\" (1.676 m)   Wt 174 lb 6.4 oz (79.1 kg)   SpO2 95% Comment: on RA  BMI 28.15 kg/m²   Constitutional: In no acute distress. Appears stated age. Well developed and nourished  Eyes: PERRL. No sclera icterus. No conjunctival injection. ENT: Oropharynx clear  Neck: Trachea midline. No thyroid tenderness. Lymph: No cervical LAD. No supraclavicular LAD. Resp: No accessory muscle use. No crackles. No wheezes. No rhonchi. CV: Regular rate. Regular rhythm. No murmur or rub. No lower extremity edema. Skin: Warm and dry. No nodules on exposed extremities. No rash on exposed extremities. Psych: Oriented x 3. Mood and affect normal. Intact judgment and insight. LABS:  Reviewed any pertinent new labs that are available.     PFT    1/29/15             9/22/17       FEV/FVC: 63%          69%  FEV1: 1.27L 55%      1.53, 69%     FVC: 2.01L 66%           No response to bd     no    T.33L 103%  RV: 3.36L 157%         143%  DLCO: 16.40 72%      67%                                6MWT: 900ft, 93%    Assessment:    · Asthma with COPD overlap   · Rheumatoid arthritis- on MTX  · Chronic Rhinitis  · Cough - suspect post-nasal gtt      Plan:    · The current inhaler is effective and will continue Wixela 250/50 BID   · Continue proair prn mdi  · Pt declined rx for PRN nebs  · F/u in 6 mo

## 2021-08-17 ENCOUNTER — TELEPHONE (OUTPATIENT)
Dept: FAMILY MEDICINE CLINIC | Age: 81
End: 2021-08-17

## 2021-08-17 NOTE — TELEPHONE ENCOUNTER
----- Message from Davin Jenkins sent at 8/17/2021  3:48 PM EDT -----  Subject: Message to Provider    QUESTIONS  Information for Provider? Patient's son and daughter-in-law both see Dr. Cindy Tompkins and patient would like to know if Dr. Cindy Tompkins would take her as   a new patient.   ---------------------------------------------------------------------------  --------------  [address]  What is the best way for the office to contact you? OK to leave message on   voicemail  Preferred Call Back Phone Number? [phone]  ---------------------------------------------------------------------------  --------------  SCRIPT ANSWERS  Relationship to Patient? Third Party  Representative Name?  Lev Brown (daughter not on HIPPA)

## 2021-08-17 NOTE — TELEPHONE ENCOUNTER
That should not be a problem. She does have several issues on her problem list.  It would take some time to evaluate her regarding those multiple issues.

## 2021-08-18 NOTE — TELEPHONE ENCOUNTER
Left message for patient daughter to return my call. I scheduled patient in first available new patient slot.  [phone] at 10:40am

## 2021-08-18 NOTE — TELEPHONE ENCOUNTER
PTs Daughter Clarissa Andino back to confirmed.  explained encounter and let Carole Newby know pt is scheduled for appt on 12/30. Carole Newby agreed to appt of pt and asked for a call if earlier appt spots open up.

## 2021-08-23 ENCOUNTER — HOSPITAL ENCOUNTER (OUTPATIENT)
Age: 81
Setting detail: OBSERVATION
Discharge: HOME | End: 2021-08-25
Attending: EMERGENCY MEDICINE | Admitting: INTERNAL MEDICINE
Payer: MEDICARE

## 2021-08-23 ENCOUNTER — APPOINTMENT (OUTPATIENT)
Dept: GENERAL RADIOLOGY | Age: 81
End: 2021-08-23
Payer: MEDICARE

## 2021-08-23 DIAGNOSIS — R00.1 BRADYCARDIA: ICD-10-CM

## 2021-08-23 DIAGNOSIS — R53.83 FATIGUE, UNSPECIFIED TYPE: Primary | ICD-10-CM

## 2021-08-23 LAB
A/G RATIO: 1.3 (ref 1.1–2.2)
ALBUMIN SERPL-MCNC: 3.7 G/DL (ref 3.4–5)
ALP BLD-CCNC: 95 U/L (ref 40–129)
ALT SERPL-CCNC: 26 U/L (ref 10–40)
ANION GAP SERPL CALCULATED.3IONS-SCNC: 10 MMOL/L (ref 3–16)
ANISOCYTOSIS: ABNORMAL
AST SERPL-CCNC: 28 U/L (ref 15–37)
BASOPHILS ABSOLUTE: 0 K/UL (ref 0–0.2)
BASOPHILS RELATIVE PERCENT: 1 %
BILIRUB SERPL-MCNC: 0.4 MG/DL (ref 0–1)
BUN BLDV-MCNC: 14 MG/DL (ref 7–20)
CALCIUM SERPL-MCNC: 9.2 MG/DL (ref 8.3–10.6)
CHLORIDE BLD-SCNC: 111 MMOL/L (ref 99–110)
CO2: 25 MMOL/L (ref 21–32)
CREAT SERPL-MCNC: 1 MG/DL (ref 0.6–1.2)
EOSINOPHILS ABSOLUTE: 0.6 K/UL (ref 0–0.6)
EOSINOPHILS RELATIVE PERCENT: 13.8 %
GFR AFRICAN AMERICAN: >60
GFR NON-AFRICAN AMERICAN: 53
GLOBULIN: 2.8 G/DL
GLUCOSE BLD-MCNC: 104 MG/DL (ref 70–99)
HCT VFR BLD CALC: 33 % (ref 36–48)
HEMOGLOBIN: 10.7 G/DL (ref 12–16)
HYPOCHROMIA: ABNORMAL
LYMPHOCYTES ABSOLUTE: 1.6 K/UL (ref 1–5.1)
LYMPHOCYTES RELATIVE PERCENT: 34.7 %
MCH RBC QN AUTO: 30.8 PG (ref 26–34)
MCHC RBC AUTO-ENTMCNC: 32.4 G/DL (ref 31–36)
MCV RBC AUTO: 95 FL (ref 80–100)
MONOCYTES ABSOLUTE: 0.4 K/UL (ref 0–1.3)
MONOCYTES RELATIVE PERCENT: 9.4 %
NEUTROPHILS ABSOLUTE: 1.9 K/UL (ref 1.7–7.7)
NEUTROPHILS RELATIVE PERCENT: 41.1 %
PDW BLD-RTO: 19.6 % (ref 12.4–15.4)
PLATELET # BLD: 93 K/UL (ref 135–450)
PLATELET SLIDE REVIEW: ABNORMAL
PMV BLD AUTO: 8.8 FL (ref 5–10.5)
POIKILOCYTES: ABNORMAL
POTASSIUM REFLEX MAGNESIUM: 4.2 MMOL/L (ref 3.5–5.1)
PRO-BNP: 608 PG/ML (ref 0–449)
RBC # BLD: 3.47 M/UL (ref 4–5.2)
SARS-COV-2, NAAT: NOT DETECTED
SLIDE REVIEW: ABNORMAL
SODIUM BLD-SCNC: 146 MMOL/L (ref 136–145)
TOTAL PROTEIN: 6.5 G/DL (ref 6.4–8.2)
TROPONIN: <0.01 NG/ML
TROPONIN: <0.01 NG/ML
TSH REFLEX: 1.81 UIU/ML (ref 0.27–4.2)
WBC # BLD: 4.7 K/UL (ref 4–11)

## 2021-08-23 PROCEDURE — 87635 SARS-COV-2 COVID-19 AMP PRB: CPT

## 2021-08-23 PROCEDURE — 96361 HYDRATE IV INFUSION ADD-ON: CPT

## 2021-08-23 PROCEDURE — 84443 ASSAY THYROID STIM HORMONE: CPT

## 2021-08-23 PROCEDURE — 6370000000 HC RX 637 (ALT 250 FOR IP): Performed by: PHYSICIAN ASSISTANT

## 2021-08-23 PROCEDURE — G0378 HOSPITAL OBSERVATION PER HR: HCPCS

## 2021-08-23 PROCEDURE — 99219 PR INITIAL OBSERVATION CARE/DAY 50 MINUTES: CPT | Performed by: PHYSICIAN ASSISTANT

## 2021-08-23 PROCEDURE — 99284 EMERGENCY DEPT VISIT MOD MDM: CPT

## 2021-08-23 PROCEDURE — 71045 X-RAY EXAM CHEST 1 VIEW: CPT

## 2021-08-23 PROCEDURE — 93005 ELECTROCARDIOGRAM TRACING: CPT | Performed by: PHYSICIAN ASSISTANT

## 2021-08-23 PROCEDURE — 83880 ASSAY OF NATRIURETIC PEPTIDE: CPT

## 2021-08-23 PROCEDURE — 85025 COMPLETE CBC W/AUTO DIFF WBC: CPT

## 2021-08-23 PROCEDURE — 2580000003 HC RX 258: Performed by: PHYSICIAN ASSISTANT

## 2021-08-23 PROCEDURE — 36415 COLL VENOUS BLD VENIPUNCTURE: CPT

## 2021-08-23 PROCEDURE — 96360 HYDRATION IV INFUSION INIT: CPT

## 2021-08-23 PROCEDURE — 84484 ASSAY OF TROPONIN QUANT: CPT

## 2021-08-23 PROCEDURE — 80053 COMPREHEN METABOLIC PANEL: CPT

## 2021-08-23 PROCEDURE — 6370000000 HC RX 637 (ALT 250 FOR IP): Performed by: INTERNAL MEDICINE

## 2021-08-23 RX ORDER — ACETAMINOPHEN 325 MG/1
650 TABLET ORAL EVERY 6 HOURS PRN
Status: DISCONTINUED | OUTPATIENT
Start: 2021-08-23 | End: 2021-08-25 | Stop reason: HOSPADM

## 2021-08-23 RX ORDER — LEVOTHYROXINE SODIUM 0.03 MG/1
25 TABLET ORAL DAILY
Status: DISCONTINUED | OUTPATIENT
Start: 2021-08-23 | End: 2021-08-25 | Stop reason: HOSPADM

## 2021-08-23 RX ORDER — SODIUM CHLORIDE 9 MG/ML
25 INJECTION, SOLUTION INTRAVENOUS PRN
Status: DISCONTINUED | OUTPATIENT
Start: 2021-08-23 | End: 2021-08-25 | Stop reason: HOSPADM

## 2021-08-23 RX ORDER — SODIUM CHLORIDE 450 MG/100ML
INJECTION, SOLUTION INTRAVENOUS CONTINUOUS
Status: DISCONTINUED | OUTPATIENT
Start: 2021-08-23 | End: 2021-08-24

## 2021-08-23 RX ORDER — ATORVASTATIN CALCIUM 40 MG/1
80 TABLET, FILM COATED ORAL NIGHTLY
Status: DISCONTINUED | OUTPATIENT
Start: 2021-08-23 | End: 2021-08-25 | Stop reason: HOSPADM

## 2021-08-23 RX ORDER — ONDANSETRON 2 MG/ML
4 INJECTION INTRAMUSCULAR; INTRAVENOUS EVERY 6 HOURS PRN
Status: DISCONTINUED | OUTPATIENT
Start: 2021-08-23 | End: 2021-08-25 | Stop reason: HOSPADM

## 2021-08-23 RX ORDER — DULOXETIN HYDROCHLORIDE 60 MG/1
60 CAPSULE, DELAYED RELEASE ORAL NIGHTLY
Status: DISCONTINUED | OUTPATIENT
Start: 2021-08-23 | End: 2021-08-25 | Stop reason: HOSPADM

## 2021-08-23 RX ORDER — ATENOLOL 25 MG/1
25 TABLET ORAL DAILY
Status: DISCONTINUED | OUTPATIENT
Start: 2021-08-23 | End: 2021-08-23

## 2021-08-23 RX ORDER — SODIUM CHLORIDE 0.9 % (FLUSH) 0.9 %
10 SYRINGE (ML) INJECTION PRN
Status: DISCONTINUED | OUTPATIENT
Start: 2021-08-23 | End: 2021-08-25 | Stop reason: HOSPADM

## 2021-08-23 RX ORDER — ONDANSETRON 4 MG/1
4 TABLET, ORALLY DISINTEGRATING ORAL EVERY 8 HOURS PRN
Status: DISCONTINUED | OUTPATIENT
Start: 2021-08-23 | End: 2021-08-25 | Stop reason: HOSPADM

## 2021-08-23 RX ORDER — ACETAMINOPHEN 650 MG/1
650 SUPPOSITORY RECTAL EVERY 6 HOURS PRN
Status: DISCONTINUED | OUTPATIENT
Start: 2021-08-23 | End: 2021-08-25 | Stop reason: HOSPADM

## 2021-08-23 RX ORDER — POLYETHYLENE GLYCOL 3350 17 G/17G
17 POWDER, FOR SOLUTION ORAL DAILY PRN
Status: DISCONTINUED | OUTPATIENT
Start: 2021-08-23 | End: 2021-08-25 | Stop reason: HOSPADM

## 2021-08-23 RX ORDER — ATENOLOL 25 MG/1
12.5 TABLET ORAL DAILY
Status: DISCONTINUED | OUTPATIENT
Start: 2021-08-23 | End: 2021-08-24

## 2021-08-23 RX ORDER — ALBUTEROL SULFATE 90 UG/1
2 AEROSOL, METERED RESPIRATORY (INHALATION) EVERY 6 HOURS PRN
Status: DISCONTINUED | OUTPATIENT
Start: 2021-08-23 | End: 2021-08-23

## 2021-08-23 RX ORDER — ALBUTEROL SULFATE 90 UG/1
2 AEROSOL, METERED RESPIRATORY (INHALATION) EVERY 4 HOURS PRN
Status: DISCONTINUED | OUTPATIENT
Start: 2021-08-23 | End: 2021-08-25 | Stop reason: HOSPADM

## 2021-08-23 RX ORDER — SODIUM CHLORIDE 0.9 % (FLUSH) 0.9 %
5-40 SYRINGE (ML) INJECTION EVERY 12 HOURS SCHEDULED
Status: DISCONTINUED | OUTPATIENT
Start: 2021-08-23 | End: 2021-08-25 | Stop reason: HOSPADM

## 2021-08-23 RX ADMIN — DULOXETINE HYDROCHLORIDE 60 MG: 60 CAPSULE, DELAYED RELEASE ORAL at 19:56

## 2021-08-23 RX ADMIN — ATORVASTATIN CALCIUM 80 MG: 40 TABLET, FILM COATED ORAL at 19:56

## 2021-08-23 RX ADMIN — SODIUM CHLORIDE, PRESERVATIVE FREE 10 ML: 5 INJECTION INTRAVENOUS at 22:13

## 2021-08-23 RX ADMIN — SODIUM CHLORIDE: 4.5 INJECTION, SOLUTION INTRAVENOUS at 19:56

## 2021-08-23 ASSESSMENT — ENCOUNTER SYMPTOMS
GASTROINTESTINAL NEGATIVE: 1
SHORTNESS OF BREATH: 1

## 2021-08-23 ASSESSMENT — PAIN SCALES - GENERAL
PAINLEVEL_OUTOF10: 0

## 2021-08-23 NOTE — ED NOTES
Pt reports being in nursing home x 2 wks for pneumonia, been home x 2 wks. Pt reports increased fatigue lately. Pt reports BP normally runs between 119-124/70-90. Reports while at nursing home BP was reading 110's/60. Pt denies any pain at this time. Home health nurse took pt's BP at home, was told HR was in 30'2 w/ a low BP (pt unable to recall). Nurse called pt's Dr and was told to come to ED for an evaluation.       Saad Estes RN  08/23/21 7190

## 2021-08-23 NOTE — ED PROVIDER NOTES
[address] ED  EMERGENCY DEPARTMENT ENCOUNTER        Pt Name: Higinio Edwards  MRN: [id number]  [place] 1940  Date of evaluation: 8/23/2021  Provider: Cheli Osorio PA-C  PCP: Shanice Hannah MD  Note Started: 2:38 PM EDT        I have seen and evaluated this patient with my supervising physician Kory Jackson MD.    [address] [place] [address]       Chief Complaint   Patient presents with    Bradycardia     Pt sent to ED at advice of PCP, home health stated that pt's HR and BP were low at home today. Pt HR in 40s-50s during triage.  Hypotension     BP not registering on monitor during triage. HISTORY OF PRESENT ILLNESS   (Location, Timing/Onset, Context/Setting, Quality, Duration, Modifying Factors, Severity, Associated Signs and Symptoms)  Note limiting factors. Chief Complaint: Bradycardia; Hypotension    Higinio Edwards is a 80 y.o. female with a past medical history of asthma, rheumatoid arthritis, hypertension, hyperlipidemia, COPD, former smoker, thyroid disease brought in today at the request of her PCP. Per home health care they reported that patient's heart rate and blood pressure were low. Patient states that she feels \"fine\". States she has had increased fatigue at home. States she does feel short of breath however she reports she always feels short of breath. Denies chest pain. Denies lightheadedness or feeling like she is going to pass out. Denies dizziness or feeling as though the room is spinning. Denies any nausea or vomiting diarrhea or abdominal pain. States that she took her normal blood pressure medications today as well as her thyroid medications. Denies any change in medication. Onset occurred today. No duration of symptoms. Context includes concern for bradycardia and hypotensive at home. No aggravating symptoms. No alleviating symptoms. Otherwise denies any other complaints. Nothing seems to make symptoms better or worse.     Nursing Notes INHALATION BY MOUTH  EVERY 12 HOURS    IPRATROPIUM-ALBUTEROL (DUONEB) 0.5-2.5 (3) MG/3ML SOLN NEBULIZER SOLUTION    Inhale 3 mLs into the lungs every 4 hours as needed for Shortness of Breath    LACTOBACILLUS RHAMNOSUS, GG, (PROBIOTIC COLIC PO)    Take by mouth daily    LEVOTHYROXINE (SYNTHROID) 25 MCG TABLET    Take 25 mcg by mouth Daily.     METHOTREXATE (RHEUMATREX) 2.5 MG CHEMO TABLET    Take 10 mg by mouth once a week     POLYETHYLENE GLYCOL (GLYCOLAX) 17 G PACKET    Take 17 g by mouth daily    PROAIR  (90 BASE) MCG/ACT INHALER    INHALE 2 PUFFS BY MOUTH  INTO THE LUNGS EVERY 6  HOURS AS NEEDED FOR  WHEEZING OR SHORTNESS OF  BREATH    RESTASIS 0.05 % OPHTHALMIC EMULSION        TRIAMTERENE-HYDROCHLOROTHIAZIDE (MAXZIDE-25) 37.5-25 MG PER TABLET    Take 1 tablet by mouth daily         ALLERGIES     Effexor [venlafaxine hcl], Fenofibrate, Prednisone, Codeine, and Docosahexaenoic acid-epa    FAMILYHISTORY       Family History   Problem Relation Age of Onset    High Blood Pressure Mother           SOCIAL HISTORY       Social History     Tobacco Use    Smoking status: Former Smoker     Packs/day: 0.50     Years: 20.00     Pack years: 10.00     Types: Cigarettes     Start date: 1959     Quit date: 1987     Years since quittin.6    Smokeless tobacco: Never Used   Vaping Use    Vaping Use: Never used   Substance Use Topics    Alcohol use: No     Alcohol/week: 0.0 standard drinks    Drug use: No       SCREENINGS    Hillary Coma Scale  Eye Opening: Spontaneous  Best Verbal Response: Oriented  Best Motor Response: Obeys commands  Saline Coma Scale Score: 15        PHYSICAL EXAM    (up to 7 for level 4, 8 or more for level 5)     ED Triage Vitals   BP Temp Temp Source Pulse Resp SpO2 Height Weight   21 1429 21 1429 21 1429 21 1414 21 1414 21 1414 21 1414 21 1414   (!) 163/83 98.1 °F (36.7 °C) Oral 53 18 97 % 5' 6\" (1.676 m) 178 lb (80.7 kg)       Physical Exam  Vitals and nursing note reviewed. Constitutional:       General: She is awake. She is not in acute distress. Appearance: Normal appearance. She is well-developed. She is not ill-appearing, toxic-appearing or diaphoretic. HENT:      Head: Normocephalic and atraumatic. Nose: Nose normal.   Eyes:      General:         Right eye: No discharge. Left eye: No discharge. Cardiovascular:      Rate and Rhythm: Normal rate and regular rhythm. Pulses:           Radial pulses are 2+ on the right side and 2+ on the left side. Heart sounds: Normal heart sounds. No murmur heard. No gallop. Pulmonary:      Effort: Pulmonary effort is normal. No respiratory distress. Breath sounds: Normal breath sounds. No decreased breath sounds, wheezing, rhonchi or rales. Chest:      Chest wall: No tenderness. Musculoskeletal:         General: No deformity. Normal range of motion. Cervical back: Normal range of motion and neck supple. Right lower leg: No edema. Left lower leg: No edema. Skin:     General: Skin is warm and dry. Neurological:      General: No focal deficit present. Mental Status: She is alert and oriented to person, place, and time. GCS: GCS eye subscore is 4. GCS verbal subscore is 5. GCS motor subscore is 6. Cranial Nerves: Cranial nerves are intact. Psychiatric:         Behavior: Behavior normal. Behavior is cooperative.          DIAGNOSTIC RESULTS   LABS:    Labs Reviewed   CBC WITH AUTO DIFFERENTIAL - Abnormal; Notable for the following components:       Result Value    RBC 3.47 (*)     Hemoglobin 10.7 (*)     Hematocrit 33.0 (*)     RDW 19.6 (*)     Platelets 93 (*)     Anisocytosis 2+ (*)     Hypochromia Occasional (*)     Poikilocytes Occasional (*)     All other components within normal limits    Narrative:     Performed at:  [facility]) - [facility] [address],  [place], [facility]   Phone [phone]   COMPREHENSIVE METABOLIC PANEL W/ REFLEX TO MG FOR LOW K - Abnormal; Notable for the following components:    Sodium 146 (*)     Chloride 111 (*)     Glucose 104 (*)     GFR Non- 53 (*)     All other components within normal limits    Narrative:     Performed at:  [facility] ([facility]) [facility] [address],  [place], [facility]   Phone [phone]   BRAIN NATRIURETIC PEPTIDE - Abnormal; Notable for the following components:    Pro- (*)     All other components within normal limits    Narrative:     Performed at:  [facility] [address],  [place], [facility]   Phone [phone], RAPID   TROPONIN    Narrative:     Performed at:  [facility] [address],  [place], [facility]   Phone [phone]       When ordered only abnormal lab results are displayed. All other labs were within normal range or not returned as of this dictation. EKG: When ordered, EKG's are interpreted by the Emergency Department Physician in the absence of a cardiologist.  Please see their note for interpretation of EKG. RADIOLOGY:   Non-plain film images such as CT, Ultrasound and MRI are read by the radiologist. Plain radiographic images are visualized and preliminarily interpreted by the ED Provider with the below findings:        Interpretation per the Radiologist below, if available at the time of this note:    XR CHEST PORTABLE   Final Result   Interval improvement of the multifocal airspace disease with persistent right   basilar airspace disease. Follow-up to resolution is recommended. No results found.         PROCEDURES   Unless otherwise noted below, none     Procedures    CRITICAL CARE TIME   N/A    CONSULTS:  IP CONSULT TO HOSPITALIST      EMERGENCY DEPARTMENT COURSE and DIFFERENTIAL DIAGNOSIS/MDM:   Vitals:    Vitals:    08/23/21 1435 08/23/21 1437 08/23/21 1438 08/23/21 1502   BP: (!) 154/72 136/63 (!) 135/94 (!) 136/57   Pulse: 72 72 78 67   Resp: 28 19 22 19   Temp:       TempSrc:       SpO2: 97% 95% 93%    Weight:       Height:           Patient was given the following medications:  Medications - No data to display        Patient brought in today by private vehicle for complaints of bradycardia and hypotension. On exam patient alert oriented afebrile breathing on room air satting at 97%. Nontoxic. No acute respiratory distress. Old labs and records reviewed at this time. Blood pressure of 163/83. Heart rate of 68. Patient seen and evaluated by myself as well as my attending Dr. Jake Gxaiola. EKG reviewed by my attending see note for dictation. CBC shows no acute leukocytosis. Hemoglobin of 10.7. Sodium of 146. Kidney function unremarkable. Liver enzymes unremarkable. Troponin less than 0.01. BNP of 608. Chest x-ray shows interval improvement of multifocal airspace disease with persistent right bibasilar airspace disease follow-up to resolution is recommended. Will admit to the hospitalist at this time. I spoke to [employer] with the hospitalist team who did accept this admission. Patient was stable at time of admission. FINAL IMPRESSION      1. Fatigue, unspecified type    2. Bradycardia          DISPOSITION/PLAN   DISPOSITION Decision To Admit 08/23/2021 04:22:25 PM      PATIENT REFERRED TO:  No follow-up provider specified.     DISCHARGE MEDICATIONS:  New Prescriptions    No medications on file       DISCONTINUED MEDICATIONS:  Discontinued Medications    No medications on file              (Please note that portions of this note were completed with a voice recognition program.  Efforts were made to edit the dictations but occasionally words are mis-transcribed.)    Ruchi Stroud PA-C (electronically signed)            Ruchi Stroud PA-C  08/23/21 1230

## 2021-08-23 NOTE — ED PROVIDER NOTES
I independently performed a history and physical on [employer]. All diagnostic, treatment, and disposition decisions were made by myself in conjunction with the advanced practice provider. For further details of Jaycee Sanchez emergency department encounter, please see Yates Lesch, PA's documentation. Patient states she has felt weak recently but is rehabilitating from recent hospitalization. She states that her visiting nurse told her today that her heart rate was in the 40s and sent her to the ER for evaluation. Patient denies any chest pain or shortness of breath. On exam the patient's pulse is in the 40s but on the monitor her heart rate is in the 60s with a pattern of trigeminy. I advised the patient that these frequent PVCs are likely the cause of her general malaise and weakness as well as the reason she was sent to the ER. She has no significant cardiac history. I am admitting her to the hospital for further cardiac evaluation and consideration of pacemaker.     EKG  The Ekg interpreted by me shows  normal sinus rhythm with a rate of 64  Axis is   Normal  QTc is  normal  Frequent unifocal PVCs in a pattern of trigeminy  ST Segments: no acute change  No significant change from prior EKG dated 7/23/21    Results for orders placed or performed during the hospital encounter of 08/23/21   COVID-19, Rapid    Specimen: Nasopharyngeal Swab   Result Value Ref Range    SARS-CoV-2, NAAT Not Detected Not Detected   CBC Auto Differential   Result Value Ref Range    WBC 4.7 4.0 - 11.0 K/uL    RBC 3.47 (L) 4.00 - 5.20 M/uL    Hemoglobin 10.7 (L) 12.0 - 16.0 g/dL    Hematocrit 33.0 (L) 36.0 - 48.0 %    MCV 95.0 80.0 - 100.0 fL    MCH 30.8 26.0 - 34.0 pg    MCHC 32.4 31.0 - 36.0 g/dL    RDW 19.6 (H) 12.4 - 15.4 %    Platelets 93 (L) 685 - 450 K/uL    MPV 8.8 5.0 - 10.5 fL    PLATELET SLIDE REVIEW Decreased     SLIDE REVIEW see below     Neutrophils % 41.1 %    Lymphocytes % 34.7 %    Monocytes % 9.4 %    Eosinophils % 13.8 %    Basophils % 1.0 %    Neutrophils Absolute 1.9 1.7 - 7.7 K/uL    Lymphocytes Absolute 1.6 1.0 - 5.1 K/uL    Monocytes Absolute 0.4 0.0 - 1.3 K/uL    Eosinophils Absolute 0.6 0.0 - 0.6 K/uL    Basophils Absolute 0.0 0.0 - 0.2 K/uL    Anisocytosis 2+ (A)     Hypochromia Occasional (A)     Poikilocytes Occasional (A)    Comprehensive Metabolic Panel w/ Reflex to MG   Result Value Ref Range    Sodium 146 (H) 136 - 145 mmol/L    Potassium reflex Magnesium 4.2 3.5 - 5.1 mmol/L    Chloride 111 (H) 99 - 110 mmol/L    CO2 25 21 - 32 mmol/L    Anion Gap 10 3 - 16    Glucose 104 (H) 70 - 99 mg/dL    BUN 14 7 - 20 mg/dL    CREATININE 1.0 0.6 - 1.2 mg/dL    GFR Non- 53 (A) >60    GFR African American >60 >60    Calcium 9.2 8.3 - 10.6 mg/dL    Total Protein 6.5 6.4 - 8.2 g/dL    Albumin 3.7 3.4 - 5.0 g/dL    Albumin/Globulin Ratio 1.3 1.1 - 2.2    Total Bilirubin 0.4 0.0 - 1.0 mg/dL    Alkaline Phosphatase 95 40 - 129 U/L    ALT 26 10 - 40 U/L    AST 28 15 - 37 U/L    Globulin 2.8 g/dL   Troponin   Result Value Ref Range    Troponin <0.01 <0.01 ng/mL   Brain Natriuretic Peptide   Result Value Ref Range    Pro- (H) 0 - 449 pg/mL   TSH with Reflex   Result Value Ref Range    TSH 1.81 0.27 - 4.20 uIU/mL   Troponin   Result Value Ref Range    Troponin <0.01 <0.01 ng/mL   EKG 12 Lead   Result Value Ref Range    Ventricular Rate 64 BPM    Atrial Rate 64 BPM    P-R Interval 132 ms    QRS Duration 64 ms    Q-T Interval 428 ms    QTc Calculation (Bazett) 441 ms    P Axis 31 degrees    R Axis -8 degrees    T Axis 29 degrees    Diagnosis       Sinus rhythm with frequent Premature ventricular complexesLow voltage QRSBorderline ECGWhen compared with ECG of 22-MAR-2021 22:11, (unconfirmed)Sinus rhythm has replaced Atrial fibrillationVent.  rate has decreased BY  61 BPMQuestionable change in QRS duration     XR CHEST PORTABLE    Result Date: 8/23/2021  EXAMINATION: ONE XRAY VIEW OF THE CHEST 8/23/2021 3:22 pm COMPARISON: Chest x-ray dated 07/24/2021. HISTORY: ORDERING SYSTEM PROVIDED HISTORY: ck; hypotension TECHNOLOGIST PROVIDED HISTORY: Reason for exam:->ck; hypotension Reason for Exam: ck; hypotension Acuity: Acute Type of Exam: Initial FINDINGS: HEART/MEDIASTINUM: The cardiomediastinal silhouette is within normal limits. PLEURA/LUNGS: Interval improvement of the multifocal airspace disease with residual opacities in the right lower lung. There are no pleural effusions. There is no appreciable pneumothorax. BONES/SOFT TISSUE: No acute abnormality. Interval improvement of the multifocal airspace disease with persistent right basilar airspace disease. Follow-up to resolution is recommended.           Juan Jose Travis MD  08/24/21 0126

## 2021-08-23 NOTE — H&P
Hospital Medicine History & Physical      PCP: Zara Cortez MD    Date of Admission: 8/23/2021    Date of Service: Pt seen/examined on 8/23/2021    Chief Complaint:    Chief Complaint   Patient presents with    Bradycardia     Pt sent to ED at advice of PCP, home health stated that pt's HR and BP were low at home today. Pt HR in 40s-50s during triage.  Hypotension     BP not registering on monitor during triage. History Of Present Illness: The patient is a 80 y.o. female with a PMH of COPD, Rheumatoid Arthritis, HTN, HLD, Hypothyroidism who presented to [facility] ED with complaint of low blood pressure and HR. She was recently admitted to [facility] from 7/20-7/25 for PNA and COPD AE. Pt was then discharged to a SNF. She has been home from the SNF for abnout 3 weeks and has been taken care of at home by her home health nurse. The home health nurse came in this morning and noted her HR was in the 30s and BP was running low. She was unable to provider the systolic number but notes her diastolic number was in the 60s. Pt denied any chest pain, SOB, lightheadedness, dizziness, N/V/D. She states she has been eating and drinking well. She has been feeling well but reports over the last couple of days she has felt \"more wobbly\" than usual. She does take Atenolol daily and reports having taken this med this morning. Work up in the ED showed an initial HR of 53. BP was 163/82. Hgb 10.7, Plt 93. Troponin: negative. CXR showed interval improvement of the multifocal airspace disease with persistent right basilar airspace disease. Pt was admitted to PCU for observation.      Past Medical History:        Diagnosis Date    Asthma     Hyperlipidemia     Hypertension     RA [facility]oid arthritis) ([facility] [address]     Thyroid disease     has half of her thyroid       Past Surgical History:        Procedure Laterality Date    ABDOMINAL EXPLORATION SURGERY      APPENDECTOMY      CHOLECYSTECTOMY      COLONOSCOPY  4/18/2013 diverticulosis, polyps    COLONOSCOPY  11/10/2016    normal colon    EYE SURGERY Right 12/11/15    cataract removal    EYE SURGERY Left 1/8/16    cataract removed    HUMERUS FRACTURE SURGERY Right 2/28/2021    HUMERUS OPEN REDUCTION INTERNAL FIXATION performed by Alicia Winters MD at [address] [place] [address], PARTIAL Left     TUMOR REMOVAL      off of thyroid, was benign       Medications Prior to Admission:    Prior to Admission medications    Medication Sig Start Date End Date Taking? Authorizing Provider   fluticasone-salmeterol [facility] INHUB) 250-50 MCG/DOSE AEPB USE 1 INHALATION BY MOUTH  EVERY 12 HOURS 8/18/21   Sky Archibald MD   PROAIR  (08 Base) MCG/ACT inhaler INHALE 2 PUFFS BY MOUTH  INTO THE LUNGS EVERY 6  HOURS AS NEEDED FOR  WHEEZING OR SHORTNESS OF  BREATH 8/16/21   Diya Chirinos MD   ipratropium-albuterol (DUONEB) 0.5-2.5 (3) MG/3ML SOLN nebulizer solution Inhale 3 mLs into the lungs every 4 hours as needed for Shortness of Breath  Patient not taking: Reported on 8/11/2021 7/25/21   Megan Bella MD   dexamethasone (DECADRON) 2 MG tablet Take 3 tabs a day for 3 days,  Then 2 tabs a day for 3 days ,  Then 1 tab a day for 3 days.  7/25/21   Megan Bella MD   polyethylene glycol (GLYCOLAX) 17 g packet Take 17 g by mouth daily 7/26/21   Megan Bella MD   methotrexate (RHEUMATREX) 2.5 MG chemo tablet Take 10 mg by mouth once a week Monday nights 1/28/21   Historical Provider, MD   DULoxetine (CYMBALTA) 60 MG extended release capsule Take 60 mg by mouth nightly Before bed     Historical Provider, MD   RESTASIS 0.05 % ophthalmic emulsion  2/27/20   Historical Provider, MD   atorvastatin (LIPITOR) 80 MG tablet Take 80 mg by mouth every evening  8/18/19   Historical Provider, MD   triamterene-hydrochlorothiazide (MAXZIDE-25) 37.5-25 MG per tablet Take 1 tablet by mouth daily 10/19/18   Historical Provider, MD   Cholecalciferol (VITAMIN D) 2000 UNITS CAPS capsule Take 1 capsule by mouth nightly     Historical Provider, MD   Lactobacillus Rhamnosus, GG, (PROBIOTIC COLIC PO) Take by mouth daily    Historical Provider, MD   levothyroxine (SYNTHROID) 25 MCG tablet Take 25 mcg by mouth Daily. Historical Provider, MD   ATENOLOL PO Take 25 mg by mouth daily. Historical Provider, MD       Allergies:  Effexor [venlafaxine hcl], Fenofibrate, Prednisone, Codeine, and Docosahexaenoic acid-epa    Social History:  The patient currently lives at home alone. TOBACCO:   reports that she quit smoking about 34 years ago. Her smoking use included cigarettes. She started smoking about 62 years ago. She has a 10.00 pack-year smoking history. She has never used smokeless tobacco.  ETOH:   reports no history of alcohol use. Family History:   Positive as follows:        Problem Relation Age of Onset    High Blood Pressure Mother        REVIEW OF SYSTEMS:     Constitutional: Negative for fever   HENT: Negative for sore throat   Eyes: Negative for redness   Respiratory: Negative  for dyspnea, cough   Cardiovascular: Negative for chest pain   Gastrointestinal: Negative for vomiting, diarrhea   Genitourinary: Negative for hematuria   Musculoskeletal: Negative for arthralgias   Skin: Negative for rash   Neurological: Negative for syncope   Hematological: Negative for adenopathy   Psychiatric/Behavorial: Negative for anxiety    PHYSICAL EXAM:    BP (!) 156/68   Pulse 76   Temp 98.1 °F (36.7 °C) (Oral)   Resp 14   Ht 5' 6\" (1.676 m)   Wt 178 lb (80.7 kg)   SpO2 95%   BMI 28.73 kg/m²   Gen: No distress. Alert. Pleasant elderly  female, resting comfortably in bed  Eyes: No sclera icterus. No conjunctival injection. Neck: Trachea midline. Resp: No accessory muscle use. No crackles. No wheezes. No rhonchi. On RA  CV: Regular rate. Regular rhythm. No murmur. No rub. No edema.    Capillary Refill: Brisk,< 3 seconds   Peripheral Pulses: +2 palpable, equal bilaterally   GI: Soft, Non-tender. Non-distended. Skin: Warm and dry. Decreased skin turgor. No rash on exposed extremities. Neuro: Awake. Grossly non-focal, moves all extremities, follows commands, no dysarthria   Psych: Oriented x 4. No anxiety or agitation. CBC:   Recent Labs     08/23/21  1431   WBC 4.7   HGB 10.7*   HCT 33.0*   MCV 95.0   PLT 93*     BMP:   Recent Labs     08/23/21  1431   *   K 4.2   *   CO2 25   BUN 14   CREATININE 1.0     LIVER PROFILE:   Recent Labs     08/23/21  1431   AST 28   ALT 26   BILITOT 0.4   ALKPHOS 95     CARDIAC ENZYMES  Recent Labs     08/23/21  1431   TROPONINI <0.01     U/A:    Lab Results   Component Value Date    COLORU Yellow 09/20/2020    WBCUA 0-2 09/20/2020    RBCUA 0-2 09/20/2020    MUCUS 2+ 06/18/2019    BACTERIA 3+ 09/20/2020    CLARITYU Clear 09/20/2020    SPECGRAV 1.020 09/20/2020    LEUKOCYTESUR Negative 09/20/2020    BLOODU Negative 09/20/2020    GLUCOSEU Negative 09/20/2020    AMORPHOUS 2+ 09/20/2020     ABG    Lab Results   Component Value Date    GNW9AOI 25.8 02/21/2012    BEART -0.5 02/21/2012    G1WMMNLB 93.9 02/21/2012    PHART 7.341 02/21/2012    THGBART 14.5 02/21/2012    LNX9UBD 48.9 02/21/2012    PO2ART 73.9 02/21/2012    TQE0KCG 27.4 02/21/2012       CULTURES    SARS-COV-2 - Rapid: Not detected     EKG: EKG waiting to be scanned into patient's chart from ED, unable to review at this time, see ED physician's note with EKG interpretation  - spoke with Allen Redman MD who reported sinus rhythm with PVCs in trigeminy  - see plan of care as below    RADIOLOGY    XR CHEST PORTABLE   Final Result   Interval improvement of the multifocal airspace disease with persistent right   basilar airspace disease. Follow-up to resolution is recommended. Pertinent previous results reviewed     Nuclear Stress Test 9/2020  Summary   Ann Contes is uniform isotope uptake at stress and rest. There is no evidence of    myocardial ischemia or scar. Normal LVEF of >75%. EKG changes are of    uncertain significance in setting of normal perfusion. Overall findings    represent a low risk scan.           ASSESSMENT/PLAN:    Sinus Bradycardia - since improved  Frequent PVCs - in Trigeminy  - HR 53 on arrival, with reports of BP in the low 30s prior to arrival  - EKG per ED physician: sinus with PVCs in trigeminy  - Admitted to PCU, telemetry  - Halve Atenolol dose, trend troponins, check TSH  - hold off on cardiology consult  - pt was made NPO after midnight as a precaution should she need to be seen by cardiology in the morning for any abnormal rhythms/HR overnight  - if pt's BP and HR remain stable, she can likely be discharged home in AM  - NOTE: EKG was unavailable for review at this time, will have RN call radiology to have this scanned into patient's chart    Hypotension - reported prior to admission  - no low BPs here  - IVF, hold Maxzide for now, halved Atenolol dose d/t frequent PVCs  - these can likely be resumed at d/c    Hypernatremia  - start IVF 0.45 NS  - repeat BMP tomorrow    Thrombocytopenia  - Plt 93, previously in 200s from recent admission  - etiology unclear  - will monitor CBC while on Lovenox    Recent PNA  - CXR shows interval improvement of multifocal airspace disease  - has completed abx, see recent admission notes from 7/2021 for further details    COPD  - no AE  - cont PRN Albuterol    Rheumatoid Arthritis  - no acute flares  - takes Methotrexate weekly on Mondays - hold for now    HTN  - stable  - halved atenolol dose, hold other BP meds for now d/t reported low BPs  - BPs have remained stable here    Hypothyroidism  - check TSH  - cont home dose of Synthroid 25 mcg      HLD  - cont Lipitor    Normocytic Anemia  - 10.7  - this is chronic, no concerns for bleeding  - monitor CBC    DVT Prophylaxis: Lovenox  Diet: Diet NPO  ADULT DIET;  Regular  Code Status: Full Code   PT/OT status: ordered    Discussed plan of care with Farshad Hernandez MD.     Kannan Yu Stan Munoz PA-C 6:49 PM 8/23/2021

## 2021-08-24 LAB
ANION GAP SERPL CALCULATED.3IONS-SCNC: 10 MMOL/L (ref 3–16)
BASOPHILS ABSOLUTE: 0.1 K/UL (ref 0–0.2)
BASOPHILS RELATIVE PERCENT: 2.2 %
BUN BLDV-MCNC: 13 MG/DL (ref 7–20)
CALCIUM SERPL-MCNC: 8.8 MG/DL (ref 8.3–10.6)
CHLORIDE BLD-SCNC: 113 MMOL/L (ref 99–110)
CO2: 23 MMOL/L (ref 21–32)
CREAT SERPL-MCNC: 0.9 MG/DL (ref 0.6–1.2)
EKG ATRIAL RATE: 64 BPM
EKG ATRIAL RATE: 74 BPM
EKG DIAGNOSIS: NORMAL
EKG DIAGNOSIS: NORMAL
EKG P AXIS: 31 DEGREES
EKG P AXIS: 61 DEGREES
EKG P-R INTERVAL: 132 MS
EKG P-R INTERVAL: 140 MS
EKG Q-T INTERVAL: 414 MS
EKG Q-T INTERVAL: 428 MS
EKG QRS DURATION: 64 MS
EKG QRS DURATION: 64 MS
EKG QTC CALCULATION (BAZETT): 441 MS
EKG QTC CALCULATION (BAZETT): 459 MS
EKG R AXIS: -8 DEGREES
EKG R AXIS: 4 DEGREES
EKG T AXIS: 29 DEGREES
EKG T AXIS: 49 DEGREES
EKG VENTRICULAR RATE: 64 BPM
EKG VENTRICULAR RATE: 74 BPM
EOSINOPHILS ABSOLUTE: 0.5 K/UL (ref 0–0.6)
EOSINOPHILS RELATIVE PERCENT: 11.1 %
GFR AFRICAN AMERICAN: >60
GFR NON-AFRICAN AMERICAN: >60
GLUCOSE BLD-MCNC: 102 MG/DL (ref 70–99)
HCT VFR BLD CALC: 30.6 % (ref 36–48)
HEMOGLOBIN: 9.7 G/DL (ref 12–16)
LV EF: 58 %
LVEF MODALITY: NORMAL
LYMPHOCYTES ABSOLUTE: 1.2 K/UL (ref 1–5.1)
LYMPHOCYTES RELATIVE PERCENT: 28.9 %
MCH RBC QN AUTO: 30.3 PG (ref 26–34)
MCHC RBC AUTO-ENTMCNC: 31.7 G/DL (ref 31–36)
MCV RBC AUTO: 95.4 FL (ref 80–100)
MONOCYTES ABSOLUTE: 0.5 K/UL (ref 0–1.3)
MONOCYTES RELATIVE PERCENT: 11.9 %
NEUTROPHILS ABSOLUTE: 2 K/UL (ref 1.7–7.7)
NEUTROPHILS RELATIVE PERCENT: 45.9 %
PDW BLD-RTO: 20 % (ref 12.4–15.4)
PLATELET # BLD: 84 K/UL (ref 135–450)
PMV BLD AUTO: 10.1 FL (ref 5–10.5)
POTASSIUM REFLEX MAGNESIUM: 3.9 MMOL/L (ref 3.5–5.1)
RBC # BLD: 3.21 M/UL (ref 4–5.2)
SODIUM BLD-SCNC: 146 MMOL/L (ref 136–145)
WBC # BLD: 4.3 K/UL (ref 4–11)

## 2021-08-24 PROCEDURE — 96372 THER/PROPH/DIAG INJ SC/IM: CPT

## 2021-08-24 PROCEDURE — 93306 TTE W/DOPPLER COMPLETE: CPT

## 2021-08-24 PROCEDURE — 93010 ELECTROCARDIOGRAM REPORT: CPT | Performed by: INTERNAL MEDICINE

## 2021-08-24 PROCEDURE — 97165 OT EVAL LOW COMPLEX 30 MIN: CPT

## 2021-08-24 PROCEDURE — G0378 HOSPITAL OBSERVATION PER HR: HCPCS

## 2021-08-24 PROCEDURE — 80048 BASIC METABOLIC PNL TOTAL CA: CPT

## 2021-08-24 PROCEDURE — 6370000000 HC RX 637 (ALT 250 FOR IP): Performed by: PHYSICIAN ASSISTANT

## 2021-08-24 PROCEDURE — 97535 SELF CARE MNGMENT TRAINING: CPT

## 2021-08-24 PROCEDURE — 97530 THERAPEUTIC ACTIVITIES: CPT

## 2021-08-24 PROCEDURE — 96361 HYDRATE IV INFUSION ADD-ON: CPT

## 2021-08-24 PROCEDURE — 99225 PR SBSQ OBSERVATION CARE/DAY 25 MINUTES: CPT | Performed by: INTERNAL MEDICINE

## 2021-08-24 PROCEDURE — 36415 COLL VENOUS BLD VENIPUNCTURE: CPT

## 2021-08-24 PROCEDURE — 93005 ELECTROCARDIOGRAM TRACING: CPT | Performed by: PHYSICIAN ASSISTANT

## 2021-08-24 PROCEDURE — 97161 PT EVAL LOW COMPLEX 20 MIN: CPT

## 2021-08-24 PROCEDURE — 2580000003 HC RX 258: Performed by: PHYSICIAN ASSISTANT

## 2021-08-24 PROCEDURE — 6370000000 HC RX 637 (ALT 250 FOR IP): Performed by: INTERNAL MEDICINE

## 2021-08-24 PROCEDURE — 6360000002 HC RX W HCPCS: Performed by: PHYSICIAN ASSISTANT

## 2021-08-24 PROCEDURE — 85025 COMPLETE CBC W/AUTO DIFF WBC: CPT

## 2021-08-24 RX ORDER — ATENOLOL 25 MG/1
12.5 TABLET ORAL DAILY
Status: DISCONTINUED | OUTPATIENT
Start: 2021-08-24 | End: 2021-08-25 | Stop reason: HOSPADM

## 2021-08-24 RX ADMIN — SODIUM CHLORIDE: 4.5 INJECTION, SOLUTION INTRAVENOUS at 08:49

## 2021-08-24 RX ADMIN — ATORVASTATIN CALCIUM 80 MG: 40 TABLET, FILM COATED ORAL at 20:59

## 2021-08-24 RX ADMIN — ENOXAPARIN SODIUM 40 MG: 40 INJECTION SUBCUTANEOUS at 10:47

## 2021-08-24 RX ADMIN — LEVOTHYROXINE SODIUM 25 MCG: 25 TABLET ORAL at 10:42

## 2021-08-24 RX ADMIN — ATENOLOL 12.5 MG: 25 TABLET ORAL at 10:42

## 2021-08-24 RX ADMIN — SODIUM CHLORIDE, PRESERVATIVE FREE 10 ML: 5 INJECTION INTRAVENOUS at 20:59

## 2021-08-24 RX ADMIN — DULOXETINE HYDROCHLORIDE 60 MG: 60 CAPSULE, DELAYED RELEASE ORAL at 20:59

## 2021-08-24 ASSESSMENT — PAIN SCALES - GENERAL
PAINLEVEL_OUTOF10: 0

## 2021-08-24 NOTE — DISCHARGE SUMMARY
Name:  Kam Geller  Room:  /2246-13  MRN:    [id number]    IM Discharge Summary    Discharging Physician:  Dustin Miller MD    Admit: 8/23/2021    Discharge:      PCP      Colin Knowles MD    Diagnoses and hospital course  this Admission      1. ***      HPI     Physical Exam at Discharge:          Radiology (Please Review Full Report for Details)       Cultures        Consults:    1.   2.                  Recent Labs     08/23/21  1431 08/24/21  0424   WBC 4.7 4.3   HGB 10.7* 9.7*   HCT 33.0* 30.6*   MCV 95.0 95.4   PLT 93* 84*       Recent Labs     08/23/21  1431 08/24/21  0424   * 146*   K 4.2 3.9   * 113*   CO2 25 23   BUN 14 13   CREATININE 1.0 0.9       CBC:  Lab Results   Component Value Date    WBC 4.3 08/24/2021    HGB 9.7 08/24/2021    HCT 30.6 08/24/2021    MCV 95.4 08/24/2021    PLT 84 08/24/2021    NEUTOPHILPCT 45.9 08/24/2021    LYMPHOPCT 28.9 08/24/2021    MONOPCT 11.9 08/24/2021    EOSPCT 0.2 02/21/2012    BASOPCT 2.2 08/24/2021    NEUTROABS 2.0 08/24/2021    LYMPHSABS 1.2 08/24/2021    MONOSABS 0.5 08/24/2021    EOSABS 0.5 08/24/2021    BASOSABS 0.1 08/24/2021     BNP:   Lab Results   Component Value Date     08/24/2021    K 3.9 08/24/2021    CO2 23 08/24/2021    BUN 13 08/24/2021    CREATININE 0.9 08/24/2021    CALCIUM 8.8 08/24/2021                Medication List      ASK your doctor about these medications    ATENOLOL PO     atorvastatin 80 MG tablet  Commonly known as: LIPITOR     DULoxetine 60 MG extended release capsule  Commonly known as: CYMBALTA     fluticasone-salmeterol 250-50 MCG/DOSE Aepb  Commonly known as: Wixela Inhub  USE 1 INHALATION BY MOUTH  EVERY 12 HOURS     ipratropium-albuterol 0.5-2.5 (3) MG/3ML Soln nebulizer solution  Commonly known as: DUONEB  Inhale 3 mLs into the lungs every 4 hours as needed for Shortness of Breath     levothyroxine 25 MCG tablet  Commonly known as: SYNTHROID     methotrexate 2.5 MG chemo tablet  Commonly known as: RHEUMATREX     polyethylene glycol 17 g packet  Commonly known as: GLYCOLAX  Take 17 g by mouth daily     ProAir  (90 Base) MCG/ACT inhaler  Generic drug: albuterol sulfate HFA  INHALE 2 PUFFS BY MOUTH  INTO THE LUNGS EVERY 6  HOURS AS NEEDED FOR  WHEEZING OR SHORTNESS OF  BREATH     Restasis 0.05 % ophthalmic emulsion  Generic drug: cycloSPORINE     triamterene-hydroCHLOROthiazide 37.5-25 MG per tablet  Commonly known as: MAXZIDE-25     vitamin D 50 MCG (2000 UT) Caps capsule              Discharge Condition/Location: stable/***    Follow Up:    PCP in 1 weeks      Time Spent on discharge is more than *** minutes in the examination, evaluation, counseling and review of medications and discharge plan.       Ramy Roper MD, 8/24/2021 9:41 AM

## 2021-08-24 NOTE — PROGRESS NOTES
Inpatient Occupational Therapy  Evaluation and Treatment    Unit: PCU  Date:  8/24/2021  Patient Name:    Karyn Wei  Admitting diagnosis:  Bradycardia [R00.1]  Fatigue, unspecified type [R53.83]  Admit Date:  8/23/2021  Precautions/Restrictions/WB Status/ Lines/ Wounds/ Oxygen: Fall risk, Bed/chair alarm, Lines -IV and Telemetry    Treatment Time:  940 - 1020  Treatment Number: 1   Timed code treatment minutes 30 minutes   Total Treatment minutes:   40   minutes    Patient Goals for Therapy:  \" to go home \"      Discharge Recommendations: Home PRN assist  and Home OT  DME needs for discharge: Needs Met       Therapy recommendations for staff:   Supervision with use of rolling walker (RW) for all transfers and ambulation to/from [facility]  to/from bathroom    History of Present Illness: Per H&P Yonathan Harris 8/23  Karyn Wei is a 80 y.o. female with a past medical history of asthma, rheumatoid arthritis, hypertension, hyperlipidemia, COPD, former smoker, thyroid disease brought in today at the request of her PCP. Per home health care they reported that patient's heart rate and blood pressure were low. Patient states that she feels \"fine\". States she has had increased fatigue at home. States she does feel short of breath however she reports she always feels short of breath. Denies chest pain. Denies lightheadedness or feeling like she is going to pass out. Denies dizziness or feeling as though the room is spinning. Denies any nausea or vomiting diarrhea or abdominal pain. States that she took her normal blood pressure medications today as well as her thyroid medications. Denies any change in medication. Onset occurred today. No duration of symptoms. Context includes concern for bradycardia and hypotensive at home. No aggravating symptoms. No alleviating symptoms. Otherwise denies any other complaints.   Nothing seems to make symptoms better or worse    Home Health S4 Level Recommendation: NA  AM-PAC Score:  24    Preadmission Environment    Pt. [address] 37 live in town, check in a few times a week. They are able to assist PRN but pt states \"I don't want anyone in my face when I'm sick\" when asked about 24/7 assist.   Home environment:    apartment (one-story apartment, [employer] senior living)  Steps to enter first floor: No steps  Steps to second floor: N/A  Bathroom: walk in shower, comfort height toilet, grab bars and hand held shower head  Equipment owned: RW, shower chair/bench, SPC and hurry-cane     Preadmission Status:  Pt. Able to drive: Yes  Pt Fully independent with ADLs: Yes  Pt. Required assistance from family for: Independent PTA - except help from family/neighbor for laundry and grocery shopping (granddaughter drives to/from store)  Pt. independent for transfers and gait and walked with [employer] [address] [employer] apartment.  Used walker or SPC at grocery store,friend would give her a ride. History of falls Yes  Fall in February 2021 resulting in wrist fracture and reports couple more falls since then     Pain   No  Location: NA  Rating: NA /10  Pain Medicine Status: No request made     Cognition    A&O x4   Able to follow 2 step commands    Subjective  Patient sitting up in chair with physician present. Pt agreeable to this OT eval & tx.      Upper Extremity ROM:    WFL    Upper Extremity Strength:    BUE strength WFL, but not formally assessed w/ MMT         Upper Extremity Sensation    WFL    Upper Extremity Proprioception:  WFL    Coordination and Tone  WFL and Impaired RU d/t previous fracture     Balance  Functional Sitting Balance:  WFL  Functional Standing Balance:WFL    Bed mobility:    Supine to sit:   Not Tested  Sit to supine:   Not Tested  Rolling:    Not Tested  Scooting in sitting:  Not Tested  Scooting to head of bed:   Not Tested    Bridging:   Not Tested    Transfers:    Sit to stand:  Supervision w/ RW  Stand to sit:  Supervision w/ RW  Bed to chair: Supervision w/ RW ~ 5 feet   Standard toilet: Not Tested  Bed to [facility]:  Not Tested    Dressing:      UE:   Not Tested  LE:    Supervision - to laisha pants, doff and laisha slip on style sneakers     Grooming:   Stance at sink to laisha dentures and brush hair w/ supervision. Bathing:    UE:  Not Tested  LE:  Not Tested    Eating:   Not Tested    Toileting:  Not Tested    Activity Tolerance   Pt completed therapy session with No adverse symptoms noted w/activity   SpO2: 95%  HR: 77    Positioning Needs:   Pt up in chair, alarm set, positioned in proper neutral alignment and pressure relief provided. Call light and needs in reach. Exercise / Activities Initiated: Claus deferred secondary to treatment focus on functional mobility  N/A    Patient/Family Education:   Role of OT  Recommendations for DC    Assessment of Deficits: Pt seen for Occupational therapy evaluation in acute care setting. Pt demonstrated appropriate skills, safety and activity tolerance for need ADL taks, transfers and ambulation. Pt reports she has been doing well at home PTA and was independent with ADL tasks and community activities. Today performed at supervision level w/ RW, consistent with baseline performance. No further OT warranted at this time. Would continue to benefit from home OT to promote full functional return per existing home OT plan of care. Goal(s) :   1 time eval and treat only - no further acute OT warranted.       Lucy Nichols, OTR/L           If patient discharges from this facility prior to next visit, this note will serve as the Discharge Summary

## 2021-08-24 NOTE — ACP (ADVANCE CARE PLANNING)
Advance Care Planning   Healthcare Decision Maker:    Primary Decision Maker: Marian Beckman - [phone]    Click here to complete Healthcare Decision Makers including selection of the Healthcare Decision Maker Relationship (ie \"Primary\").

## 2021-08-24 NOTE — CARE COORDINATION
Attempted meeting with pt at bedside but she was working with PT/OT    Addendum at 1:27pm:      Case Management Assessment  Initial Evaluation      Patient Name: Briseida Enriquez  YOB: 1940  Diagnosis: Bradycardia [R00.1]  Fatigue, unspecified type [R53.83]  Date / Time: 8/23/2021  2:22 PM    Admission status/Date: 08/23/2021 Observation   Chart Reviewed: Yes      Patient Interviewed: Yes   Family Interviewed:  No      Hospitalization in the last 30 days:  Yes from 07/20/2021-07/25/2021 with [facility].  Pt discharged to [facility] on 07/25/2021      Health Care Decision Maker :   Primary Decision Maker: Marian Taylor  Child - [phone]    (CM - must 1st enter selection under Navigator - emergency contact- Health Care Decision Maker Relationship and pick relationship)   Who do you trust or have selected to make healthcare decisions for you      Met with: pt   Interview conducted  (bedside/phone): bedside    Current PCP: Erinn Bell MD    Financial  Medicare and Medicaid  Precert required for SNF : N          3 night stay required -  N-WAIVED    ADLS  Support Systems/Care Needs: Children  Transportation: self    Meal Preparation: self    Housing  Living Arrangements: Pt lives at home alone  Steps: 0  Intent for return to present living arrangements: Yes  Identified Issues: [address] [facility] with [address] : Yes - [address] [employer]:(Services)  Type of Home Care Services: None  Passport/Waiver : No  :                      Phone Number:    Passport/Waiver Services: n/a          Durable Medical Equiptment   DME Provider: n/a  Equipment:   Walker__x_Cane_x__RTS___ BSC___Shower Chair___Hospital Bed___W/C____Other________  02 at ____Liter(s)---wears(frequency)_______ HHN ___ CPAP___ BiPap___   N/A____      Home O2 Use :  No    If No for home O2---if presently on O2 during hospitalization:  No  if yes CM to follow for potential DC O2 need  Informed of need for care provider to bring portable home O2 tank on day of discharge for nursing to connect prior to leaving:   Not Indicated  Verbalized agreement/Understanding:   Not Indicated    Community Service Affiliation  Dialysis:  No    · Agency:  · Location:  · Dialysis Schedule:  · Phone:   · Fax: Other Community Services: n/a    DISCHARGE PLAN: Explained Case Management role/services. Chart review completed. Met with pt at bedside. Pt stated she is independent at home and plans on returning home. [facility]. She denied needs or questions for CM. RN CM received voicemail from Oskar Lucia at [facility] stating pt is active them and can be reached at [phone]    CM will follow. Please notify CM if needs or concerns arise.

## 2021-08-24 NOTE — PROGRESS NOTES
<0.01 <0.01       Coagulation:   Lab Results   Component Value Date    INR 1.02 02/28/2021    APTT 30.4 06/18/2019     Cardiac markers:   Lab Results   Component Value Date    TROPONINI <0.01 08/23/2021         Lab Results   Component Value Date    ALT 26 08/23/2021    AST 28 08/23/2021    ALKPHOS 95 08/23/2021    BILITOT 0.4 08/23/2021       Lab Results   Component Value Date    INR 1.02 02/28/2021    INR 0.99 06/18/2019    INR 1.00 12/14/2014    PROTIME 11.8 02/28/2021    PROTIME 11.3 06/18/2019    PROTIME 10.8 12/14/2014       Radiology    CULTURES     SARS-COV-2 - Rapid: Not detected      EKG: EKG waiting to be scanned into patient's chart from ED, unable to review at this time, see ED physician's note with EKG interpretation  - spoke with Tim Gee MD who reported sinus rhythm with PVCs in trigeminy  - see plan of care as below     RADIOLOGY     XR CHEST PORTABLE   Final Result   Interval improvement of the multifocal airspace disease with persistent right   basilar airspace disease. Follow-up to resolution is recommended.              Pertinent previous results reviewed      Nuclear Stress Test 9/2020  Summary   Claire Collar is uniform isotope uptake at stress and rest. There is no evidence of    myocardial ischemia or scar. Normal LVEF of >75%. EKG changes are of    uncertain significance in setting of normal perfusion. Overall findings    represent a low risk scan.             ASSESSMENT/PLAN:     Sinus Bradycardia - Frequent PVCs - in bigeminy on EKG    - HR read abnormally as 37 on pulse ox while TELE shows HR as 75  - pt with no symptoms or dizziness today       - Admitted to PCU, telemetry  - Half Atenolol dose, TSH wnl   - check ECHO , dc planning in  Am      Hypotension - reported prior to admission  - no low BPs here  - IVF, hold Maxzide for now, halved Atenolol dose d/t frequent PVCs  - these can likely be resumed at d/c     Hypernatremia  - given IVF.  Can stop today      Thrombocytopenia- new   - Plt 93, previously in 200s from recent admission  - etiology unclear  - will monitor CBC while on Lovenox     Recent PNA  - CXR shows interval improvement of multifocal airspace disease  - has completed abx, see recent admission notes from 7/2021 for further details     COPD  - no AE  - cont PRN Albuterol     Rheumatoid Arthritis  - no acute flares  - takes Methotrexate weekly on Mondays - hold for now     HTN  - stable  - halved atenolol dose, hold other BP meds for now d/t reported low BPs  - BPs have remained stable here     Hypothyroidism  - check TSH  - cont home dose of Synthroid 25 mcg       HLD  - cont Lipitor     Normocytic Anemia  - 10.7  - this is chronic, no concerns for bleeding  - monitor CBC     DVT Prophylaxis: Lovenox  ADULT DIET;  Regular  Code Status: Full Code   PT/OT status: ordered    Fitz Guzman MD, 8/24/2021 7:08 PM

## 2021-08-24 NOTE — PROGRESS NOTES
RESPIRATORY THERAPY ASSESSMENT    Name:  [facility] Record Number:  [id number]  Age: 80 y.o. Gender: female  : 1940  Today's Date:  2021  Room:  /0306-01    Assessment     Is the patient being admitted for a COPD or Asthma exacerbation? No   (If yes the patient will be seen every 4 hours for the first 24 hours and then reassessed)    Patient Admission Diagnosis      Allergies  Allergies   Allergen Reactions    Effexor [Venlafaxine Hcl] Itching and Nausea Only    Fenofibrate Diarrhea    Prednisone Other (See Comments)     Causes body to feel on fire    Codeine Nausea And Vomiting    Docosahexaenoic Acid-Epa Nausea And Vomiting     Cannot take Vascepa       Minimum Predicted Vital Capacity:               Actual Vital Capacity:                    Pulmonary History: COPD, Asthma  Home Oxygen Therapy:   Room air  Home Respiratory Therapy: Albuterol prn, Wixela BID   Current Respiratory Therapy:  Albuterol prn           Respiratory Severity Index(RSI)   Patients with orders for inhalation medications, oxygen, or any therapeutic treatment modality will be placed on Respiratory Protocol. They will be assessed with the first treatment and at least every 72 hours thereafter. The following severity scale will be used to determine frequency of treatment intervention.     Smoking History: Pulmonary Disease or Smoking History, Greater than 15 pack year = 2    Social History  Social History     Tobacco Use    Smoking status: Former Smoker     Packs/day: 0.50     Years: 20.00     Pack years: 10.00     Types: Cigarettes     Start date: 1959     Quit date: 1987     Years since quittin.6    Smokeless tobacco: Never Used   Vaping Use    Vaping Use: Never used   Substance Use Topics    Alcohol use: No     Alcohol/week: 0.0 standard drinks    Drug use: No       Recent Surgical History: None = 0  Past Surgical History  Past Surgical History:   Procedure Laterality Date    ABDOMINAL EXPLORATION SURGERY      APPENDECTOMY      CHOLECYSTECTOMY      COLONOSCOPY  4/18/2013    diverticulosis, polyps    COLONOSCOPY  11/10/2016    normal colon    EYE SURGERY Right 12/11/15    cataract removal    EYE SURGERY Left 1/8/16    cataract removed    FRACTURE SURGERY      HUMERUS FRACTURE SURGERY Right 2/28/2021    HUMERUS OPEN REDUCTION INTERNAL FIXATION performed by Hailey Rivas MD at [address], PARTIAL Left     TUMOR REMOVAL      off of thyroid, was benign       Level of Consciousness: Alert, Oriented, and Cooperative = 0    Level of Activity: Walking with assistance = 1    Respiratory Pattern: Regular Pattern; RR 8-20 = 0    Breath Sounds: Clear = 0    Sputum   ,  ,    Cough: Strong, spontaneous, non-productive = 0    Vital Signs   BP (!) 144/67   Pulse 67   Temp 98 °F (36.7 °C) (Oral)   Resp 16   Ht 5' 6\" (1.676 m)   Wt 178 lb (80.7 kg)   SpO2 93%   BMI 28.73 kg/m²   SPO2 (COPD values may differ): Greater than or equal to 92% on room air = 0    Peak Flow (asthma only): not applicable = 0    RSI: 0-4 = See once and convert to home regimen or discontinue        Plan       Goals: medication delivery     Patient/caregiver was educated on the proper method of use for Respiratory Care Devices:  Yes      Level of patient/caregiver understanding able to:   ? Verbalize understanding   ? Demonstrate understanding       ? Teach back        ? Needs reinforcement       ? No available caregiver               ? Other:     Response to education:  Good     Is patient being placed on Home Treatment Regimen? Yes     Does the patient have everything they need prior to discharge? NA     Comments:  Chart reviewed, patient assessed    Plan of Care: Albuterol Q4prn     Electronically signed by Marcie Sandoval RCP on 8/23/2021 at 10:05 PM    Respiratory Protocol Guidelines     1.  Assessment and treatment by Respiratory Therapy will be initiated for medication and therapeutic interventions upon initiation of aerosolized medication. 2. Physician will be contacted for respiratory rate (RR) greater than 35 breaths per minute. Therapy will be held for heart rate (HR) greater than 140 beats per minute, pending direction from physician. 3. Bronchodilators will be administered via Metered Dose Inhaler (MDI) with spacer when the following criteria are met:  a. Alert and cooperative     b. HR < 140 bpm  c. RR < 30 bpm                d. Can demonstrate a 2-3 second inspiratory hold  4. Bronchodilators will be administered via Hand Held Nebulizer [facility]) to patients when ANY of the following criteria are met  a. Incognizant or uncooperative          b. Patients treated with HHN at Home        c. Unable to demonstrate proper use of MDI with spacer     d. RR > 30 bpm   5. Bronchodilators will be delivered via Metered Dose Inhaler (MDI), HHN, Aerogen to intubated patients on mechanical ventilation. 6. Inhalation medication orders will be delivered and/or substituted as outlined below. Aerosolized Medications Ordering and Administration Guidelines:    1. All Medications will be ordered by a physician, and their frequency and/or modality will be adjusted as defined by the patients Respiratory Severity Index (RSI) score. 2. If the patient does not have documented COPD, consider discontinuing anticholinergics when RSI is less than 9.  3. If the bronchospasm worsens (increased RSI), then the bronchodilator frequency can be increased to a maximum of every 4 hours. If greater than every 4 hours is required, the physician will be contacted. 4. If the bronchospasm improves, the frequency of the bronchodilator can be decreased, based on the patient's RSI, but not less than home treatment regimen frequency. 5. Bronchodilator(s) will be discontinued if patient has a RSI less than 9 and has received no scheduled or as needed treatment for 72  Hrs.     Patients Ordered on a Mucolytic Agent: 1. Must always be administered with a bronchodilator. 2. Discontinue if patient experiences worsened bronchospasm, or secretions have lessened to the point that the patient is able to clear them with a cough. Anti-inflammatory and Combination Medications:    1. If the patient lacks prior history of lung disease, is not using inhaled anti-inflammatory medication at home, and lacks wheezing by examination or by history for at least 24 hours, contact physician for possible discontinuation.

## 2021-08-24 NOTE — PROGRESS NOTES
Inpatient Physical Therapy Evaluation and Treatment    Unit: PCU  Date:  8/24/2021  Patient Name:    Veronica Paz  Admitting diagnosis:  Bradycardia [R00.1]  Fatigue, unspecified type [R53.83]  Admit Date:  8/23/2021  Precautions/Restrictions/WB Status/ Lines/ Wounds/ Oxygen: Fall risk, Bed/chair alarm, Lines -IV and Telemetry    Treatment Time:  09:22-09:48  Treatment Number:  1   Timed Code Treatment Minutes: 8 minutes (split treatment time with OT due to observation status)  Total Treatment Minutes:  26 minutes    Patient Goals for Therapy: \" to go back home and resume therapy \"          Discharge Recommendations: Home PRN assist  and Home PT  DME needs for discharge: Needs Met       Therapy recommendation for EMS Transport: can transport by wheelchair    Therapy recommendations for staff:   Supervision with use of rolling walker (RW) for all transfers and ambulation within room    History of Present Illness: Per H&P Marian Augustni 8/23: \"The patient is a 80 y.o. female with a PMH of COPD, Rheumatoid Arthritis, HTN, HLD, Hypothyroidism who presented to [facility] ED with complaint of low blood pressure and HR. She was recently admitted to [facility] from 7/20-7/25 for PNA and COPD AE. Pt was then discharged to a SNF. She has been home from the SNF for abnout 3 weeks and has been taken care of at home by her home health nurse. The home health nurse came in this morning and noted her HR was in the 30s and BP was running low. She was unable to provider the systolic number but notes her diastolic number was in the 60s. Pt denied any chest pain, SOB, lightheadedness, dizziness, N/V/D. She states she has been eating and drinking well. She has been feeling well but reports over the last couple of days she has felt \"more wobbly\" than usual. She does take Atenolol daily and reports having taken this med this morning. Work up in the ED showed an initial HR of 53. BP was 163/82. Hgb 10.7, Plt 93. Troponin: negative.  CXR showed interval improvement of the multifocal airspace disease with persistent right basilar airspace disease. Pt was admitted to PCU for observation. \"    Home Health S4 Level Recommendation:  Level 1 Standard  AM-PAC Mobility Score       AM-PAC Inpatient Mobility without Stair Climbing Raw Score : 20    Preadmission Environment    Pt. Lives Alone  Children live in town, check in a few times a week. They are able to assist PRN but pt states \"I don't want anyone in my face when I'm sick\" when asked about 24/7 assist.   Home environment:    apartment (one-story apartment, [employer] senior living)  Steps to enter first floor: No steps  Steps to second floor: N/A  Bathroom: walk in shower, comfort height toilet, grab bars and hand held shower head  Equipment owned: RW, shower chair/bench, SPC and hurry-cane     Preadmission Status:  Pt. Able to drive: Yes  Pt Fully independent with ADLs: Yes  Pt. Required assistance from family for: Independent PTA - except help from family/neighbor for laundry and grocery shopping (granddaughter drives to/from store)  Pt. independent for transfers and gait and walked with Leah Valadez and Pepe Chilel in apartment. Used walker or SPC at grocery store,friend would give her a ride. History of falls Yes  Fall in February 2021 resulting in wrist fracture and reports couple more falls since then    Pain   No  Location: NA  Rating: NA /10  Pain Medicine Status: No request made    Cognition    A&O x4   Able to follow 2 step commands    Subjective  Patient lying supine in bed with no family present. Pt agreeable to this PT eval & tx. Upper Extremity ROM/Strength  Please see OT evaluation. Lower Extremity ROM / Strength   AROM WFL: Yes    BLE strength WFL, but not formally assessed with MMT.      Lower Extremity Sensation    WFL    Lower Extremity Proprioception:   WFL    Coordination and Tone  WFL    Balance  Sitting:  Normal; Independent  Comments: at EOB    Standing: Good - ; Supervision  Comments: Pt stands statically with good balance without UE support. Dynamic activity completed with walker. Able to rotate trunk and head for functional navigation in room as needed. Bed Mobility   Supine to Sit:    Modified Independent  Sit to Supine:   Not Tested  Rolling:   Not Tested  Scooting in sitting: Independent  Scooting in supine:  Not Tested    Transfer Training     Sit to stand:   Supervision  Stand to sit:   Supervision  Bed to Chair:   Not Tested with use of N/A    Gait gait completed as indicated below  Distance:      80 ft  Deviations (firm surface/linoleum):  decreased swati and decreased step length bilaterally  Assistive Device Used:    rolling walker (RW)  Level of Assist:    SBA progressing to Supervision  Comment: good balance with the walker, able to turn both directions and navigate obstacles in room safely. Able to side step with walker. Stair Training deferred, pt does not have stairs in home environment    Activity Tolerance   Pt completed therapy session with No adverse symptoms noted w/activity. Seated HR=39-40 per pulse oximeter. Dr. Kody Hawley in room to see patient at that time; he checked heart rhythm and confirmed this reading is inaccurate. Per monitor, pt is in normal HR range. Positioning Needs   Pt up in chair, OT in room to continue treatment, positioned in proper neutral alignment and pressure relief provided. Exercises Initiated  Claus deferred secondary to treatment focus on functional mobility  NA    Other  None. Patient/Family Education   Pt educated on role of inpatient PT, POC, importance of continued activity, DC recommendations and calling for assist with mobility. Assessment  Pt seen for Physical Therapy evaluation in acute care setting. Pt recently discharged from [facility] to home a few weeks ago; she reports she has been functioning well at home, indep with ADLs and household/community ambulation. Today she completes all mobility at Supervision level.   No further acute PT warranted at this time. Pt will benefit from continuation of home PT to promote full functional return as per existing home PT plan of care. Recommending Home PRN assist upon discharge as patient functioning close to baseline level and would benefit from continued therapy services. Goals / Plan    1 time eval & treat only - no further acute PT warranted. Signature: Annabelle Devries, PT, DPT    If patient discharges from this facility prior to next visit, this note will serve as the Discharge Summary.

## 2021-08-25 VITALS
HEART RATE: 79 BPM | WEIGHT: 176.25 LBS | HEIGHT: 66 IN | BODY MASS INDEX: 28.33 KG/M2 | TEMPERATURE: 97.3 F | RESPIRATION RATE: 18 BRPM | DIASTOLIC BLOOD PRESSURE: 77 MMHG | OXYGEN SATURATION: 94 % | SYSTOLIC BLOOD PRESSURE: 155 MMHG

## 2021-08-25 LAB
BASOPHILS ABSOLUTE: 0 K/UL (ref 0–0.2)
BASOPHILS RELATIVE PERCENT: 0.8 %
EOSINOPHILS ABSOLUTE: 0.4 K/UL (ref 0–0.6)
EOSINOPHILS RELATIVE PERCENT: 7.3 %
HCT VFR BLD CALC: 31.9 % (ref 36–48)
HEMOGLOBIN: 10.3 G/DL (ref 12–16)
LYMPHOCYTES ABSOLUTE: 1.4 K/UL (ref 1–5.1)
LYMPHOCYTES RELATIVE PERCENT: 27.7 %
MCH RBC QN AUTO: 30.4 PG (ref 26–34)
MCHC RBC AUTO-ENTMCNC: 32.5 G/DL (ref 31–36)
MCV RBC AUTO: 93.7 FL (ref 80–100)
MONOCYTES ABSOLUTE: 0.7 K/UL (ref 0–1.3)
MONOCYTES RELATIVE PERCENT: 14.4 %
NEUTROPHILS ABSOLUTE: 2.5 K/UL (ref 1.7–7.7)
NEUTROPHILS RELATIVE PERCENT: 49.8 %
PDW BLD-RTO: 19.6 % (ref 12.4–15.4)
PLATELET # BLD: 87 K/UL (ref 135–450)
PMV BLD AUTO: 9.5 FL (ref 5–10.5)
RBC # BLD: 3.4 M/UL (ref 4–5.2)
WBC # BLD: 5 K/UL (ref 4–11)

## 2021-08-25 PROCEDURE — 85025 COMPLETE CBC W/AUTO DIFF WBC: CPT

## 2021-08-25 PROCEDURE — 99217 PR OBSERVATION CARE DISCHARGE MANAGEMENT: CPT | Performed by: INTERNAL MEDICINE

## 2021-08-25 PROCEDURE — 6370000000 HC RX 637 (ALT 250 FOR IP): Performed by: INTERNAL MEDICINE

## 2021-08-25 PROCEDURE — 6370000000 HC RX 637 (ALT 250 FOR IP): Performed by: PHYSICIAN ASSISTANT

## 2021-08-25 PROCEDURE — G0378 HOSPITAL OBSERVATION PER HR: HCPCS

## 2021-08-25 PROCEDURE — 2580000003 HC RX 258: Performed by: PHYSICIAN ASSISTANT

## 2021-08-25 PROCEDURE — 36415 COLL VENOUS BLD VENIPUNCTURE: CPT

## 2021-08-25 RX ORDER — TRIAMTERENE AND HYDROCHLOROTHIAZIDE 37.5; 25 MG/1; MG/1
1 TABLET ORAL ONCE
Status: COMPLETED | OUTPATIENT
Start: 2021-08-25 | End: 2021-08-25

## 2021-08-25 RX ORDER — ATENOLOL 25 MG/1
12.5 TABLET ORAL DAILY
Qty: 30 TABLET | Refills: 3
Start: 2021-08-25

## 2021-08-25 RX ADMIN — TRIAMTERENE AND HYDROCHLOROTHIAZIDE 1 TABLET: 37.5; 25 TABLET ORAL at 09:46

## 2021-08-25 RX ADMIN — SODIUM CHLORIDE, PRESERVATIVE FREE 10 ML: 5 INJECTION INTRAVENOUS at 09:43

## 2021-08-25 RX ADMIN — ATENOLOL 12.5 MG: 25 TABLET ORAL at 09:43

## 2021-08-25 RX ADMIN — LEVOTHYROXINE SODIUM 25 MCG: 25 TABLET ORAL at 05:42

## 2021-08-25 NOTE — PROGRESS NOTES
Pt a/o. VSS. Assessment complete as charted. Repositioned for comfort. Call light in reach. Denies needs. Will continue to monitor.

## 2021-08-25 NOTE — PROGRESS NOTES
0900 Received pt in bed alert and oriented, no distress noted. Pt denies any pain or discomfort at this time. Medication administered as ordered. Pt awaiting transportation for discharge. No other changes with pt at this time.  Will continue to implement safety and comfort measures

## 2021-08-25 NOTE — DISCHARGE INSTR - COC
Continuity of Care Form    Patient Name: Dora Castellanos   :    MRN:  [id number]    Admit date:  2021  Discharge date:  2021    Code Status Order: Full Code   Advance Directives:     Admitting Physician:  Zach Thompson MD  PCP: Rajni Rea MD    Discharging Nurse: [facility] Unit/Room#: /2882-64  Discharging Unit Phone Number: ***    Emergency Contact:   Extended Emergency Contact Information  Primary Emergency Contact: Sierra Baeza [address] Phone: [phone]  Work Phone: [phone]  Mobile Phone: [phone]  Relation: Child    Past Surgical History:  Past Surgical History:   Procedure Laterality Date    ABDOMINAL EXPLORATION SURGERY      APPENDECTOMY      CHOLECYSTECTOMY      COLONOSCOPY  2013    diverticulosis, polyps    COLONOSCOPY  11/10/2016    normal colon    EYE SURGERY Right 12/11/15    cataract removal    EYE SURGERY Left 16    cataract removed    FRACTURE SURGERY      HUMERUS FRACTURE SURGERY Right 2021    HUMERUS OPEN REDUCTION INTERNAL FIXATION performed by Donte Valentine MD at [address], PARTIAL Left     TUMOR REMOVAL      off of thyroid, was benign       Immunization History:   Immunization History   Administered Date(s) Administered    COVID-19, Abimael Nunez, PF, 30mcg/0.3mL 2021, 2021    Influenza A ([id number]) Vaccine IM 2009    Influenza Vaccine, unspecified formulation 2009, 2010, 2011, 10/11/2012, 2014, 10/01/2015, 10/01/2016    Influenza Virus Vaccine 2009, 2010, 2011, 10/11/2012, 2014, 10/01/2016, 10/01/2016, 2016    Influenza Whole 10/06/2014, 10/01/2015    Influenza, High Dose (Fluzone 65 yrs and older) 2013, 10/09/2015, 2016, 2017    Influenza, Quadv, 6 mo and older, IM, PF (Flulaval, Fluarix) 2018    Influenza, Quadv, IM, (6 mo and older Fluzone, Flulaval, Fluarix and 3 yrs and older Afluria) 12/05/2009, 09/16/2010, 11/17/2011, 10/06/2014, 10/01/2015    Pneumococcal Conjugate 13-valent (Umzwskl45) 03/22/2018    Pneumococcal Conjugate 7-valent (Prevnar7) 12/05/2011    Pneumococcal Polysaccharide ([id number]) 10/26/2009, 03/21/2019    Tdap (Boostrix, Adacel) 08/16/2012       Active Problems:  Patient Active Problem List   Diagnosis Code    RA (rheumatoid arthritis) ([facility]) M06.9    Benign essential HTN I10    Asthma J45.909    Hyperlipidemia E78.5    Asthma exacerbation J45. 901    Pleuritic chest pain R07.81    Chest pain on breathing R07.1    Pulmonary nodules R91.8    Chronic rhinitis J31.0    COPD (chronic obstructive pulmonary disease) ([facility]) J44.9    Bronchitis J40    Hypothyroidism E03.9    COPD exacerbation ([facility]) J44.1    Elevated d-dimer R79.89    Personal history of immunosupression therapy Z92.25    Atelectasis J98.11    Chest congestion R09.89    Mucus plugging of bronchi T17.500A    History of rheumatoid arthritis Z87.39    Former smoker Z87.891    Pyuria R82.81    History of humerus fracture Z87.81    Obesity E66.9    Right supracondylar humerus fracture S42.411A    Closed supracondylar fracture of right humerus S42.411A    Multifocal pneumonia J18.9    Hypoxia R09.02    Hyponatremia E87.1    Pneumonia of both lungs due to infectious organism J18.9    Acute hypoxemic respiratory failure ([facility]) J96.01    Bradycardia R00.1    Fatigue R53.83    Frequent PVCs I49.3    Hypernatremia E87.0    Thrombocytopenia ([facility]) D69.6       Isolation/Infection:   Isolation          No Isolation        Patient Infection Status     Infection Onset Added Last Indicated Last Indicated By Review Planned Expiration Resolved Resolved By    None active    Resolved    COVID-19 Rule Out 08/23/21 08/23/21 08/23/21 COVID-19, Rapid (Ordered)   08/23/21 Rule-Out Test Resulted    COVID-19 Rule Out 07/25/21 07/25/21 07/25/21 COVID-19, Rapid (Ordered) 07/25/21 Rule-Out Test Resulted    COVID-19 Rule Out 07/20/21 07/20/21 07/20/21 COVID-19 & Influenza Combo (Ordered)   07/20/21 Rule-Out Test Resulted          Nurse Assessment:  Last Vital Signs: BP (!) 155/77   Pulse 79   Temp 97.3 °F (36.3 °C) (Oral)   Resp 18   Ht 5' 6\" (1.676 m)   Wt 176 lb 4 oz (79.9 kg)   SpO2 94%   BMI 28.45 kg/m²     Last documented pain score (0-10 scale): Pain Level: 0  Last Weight:   Wt Readings from Last 1 Encounters:   08/25/21 176 lb 4 oz (79.9 kg)     Mental Status:  {IP PT MENTAL STATUS:20030}    IV Access:  { KEITH IV ACCESS:145428320}    Nursing Mobility/ADLs:  Walking   {P DME NFOI:635357259}  Transfer  {P DME PGNB:294381075}  Bathing  {P DME HNOR:208462903}  Dressing  {P DME QAWB:300305045}  Toileting  {CHP DME RJFX:866540700}  Feeding  {P DME WLIF:765674961}  Med Admin  {P DME OPQD:904336930}  Med Delivery   { KEITH MED Delivery:302830709}    Wound Care Documentation and Therapy:        Elimination:  Continence:   · Bowel: {YES / TX:49215}  · Bladder: {YES / HW:92971}  Urinary Catheter: {Urinary Catheter:726276368}   Colostomy/Ileostomy/Ileal Conduit: {YES / DB:15737}       Date of Last BM: ***    Intake/Output Summary (Last 24 hours) at 8/25/2021 1116  Last data filed at 8/25/2021 0907  Gross per 24 hour   Intake 360 ml   Output 375 ml   Net -15 ml     I/O last 3 completed shifts:   In: 180 [P.O.:180]  Out: 375 [Urine:375]    Safety Concerns:     [employer] Safety Concerns:144338701}    Impairments/Disabilities:      [employer] Impairments/Disabilities:670999384}    Nutrition Therapy:  Current Nutrition Therapy:   [employer] Diet List:486040820}    Routes of Feeding: {CHP DME Other Feedings:018654758}  Liquids: {Slp liquid thickness:40213}  Daily Fluid Restriction: {CHP DME Yes amt example:903241767}  Last Modified Barium Swallow with Video (Video Swallowing Test): {Done Not Done WEQZ:979846214}    Treatments at the Time of Hospital Discharge:   Respiratory Treatments: ***  Oxygen Therapy:  {Therapy; copd oxygen:90842}  Ventilator:    {MH CC Vent GHYO:544985163}    Rehab Therapies: Physical Therapy, Occupational Therapy and SN  Weight Bearing Status/Restrictions: 50Maddy SINGLETON Weight Bearin}  Other Medical Equipment (for information only, NOT a DME order):  {EQUIPMENT:893668122}  Other Treatments: ***    Patient's personal belongings (please select all that are sent with patient):  {CHP DME Belongings:718549433}    RN SIGNATURE:  {Esignature:660679061}    CASE MANAGEMENT/SOCIAL WORK SECTION    Inpatient Status Date: 2021    Readmission Risk Assessment Score:  Readmission Risk              Risk of Unplanned Readmission:  0           Discharging to Facility/ Agency   Name: [facility]  Address:  Phone: [phone]  Fax: [phone] or [phone]  ·       / signature: Electronically signed by Edil Rodriguez RN on 21 at 11:18 AM EDT    PHYSICIAN SECTION    Prognosis: {Prognosis:7198191177}    Condition at Discharge: [address] Patient Condition:871903113}    Rehab Potential (if transferring to Rehab): {Prognosis:7897841050}    Recommended Labs or Other Treatments After Discharge: ***    Physician Certification: I certify the above information and transfer of Letha Lopez  is necessary for the continuing treatment of the diagnosis listed and that she requires Home Care for less 30 days.      Update Admission H&P: {CHP DME Changes in RVU:639161238}    PHYSICIAN SIGNATURE:  Electronically signed by Guillermo Coffey MD/ Edil Rodriguez RN on 21 at 11:19 AM EDT

## 2021-08-25 NOTE — CARE COORDINATION
DISCHARGE ORDER  Date/Time 2021 11:07 AM  Completed by: Bradford Castillo RN, Case Management    Patient Name: Marsha Romo      : 1940  Admitting Diagnosis: Bradycardia [R00.1]  Fatigue, unspecified type [R53.83]      Admit order Date and Status:2021 OBS  (verify MD's last order for status of admission)      Noted discharge order. If applicable PT/OT recommendation at Discharge: home with prn assist  DME recommendation by PT/OT:n/a  Confirmed discharge plan  (pt): Yes  with whom____________pt___  If pt confirmed DC plan does family need to be contacted by CM No if yes who___n/a___  Discharge Plan: Reviewed chart. Role of discharge planner explained and patient verbalized understanding. Discharge order is noted. Pt is being d/c'd to home today. Pt's O2 sats are 93% on RA. Pt is aware that PT/OT recommends home with PRN assist. Pt states that she has her son and friends that will help with the PRN assist. Pt is active with [facility] and would like [facility] with PT/OT/SN. CM Called [facility] ([phone]) and spoke with [facility] for PT/OT/SN. They can accept her back and CM faxed KEITH/AVS and HC orders to [phone]. No further discharge needs needed or noted. Reviewed chart. Role of discharge planner explained and patient verbalized understanding. Discharge order is noted. Has Home O2 in place on admit:  No  Informed of need to bring portable home O2 tank on day of discharge for nursing to connect prior to leaving:   Not Indicated  Verbalized agreement/Understanding:   Not Indicated  Pt is being d/c'd to home today. Pt's O2 sats are 93% on RA. Discharge timeout done with ROZINA Cline. All discharge needs and concerns addressed.

## 2021-08-25 NOTE — DISCHARGE SUMMARY
Name:  Kristan Gutierrez  Room:  /1627-75  MRN:    [id number]    IM Discharge Summary    Discharging Physician:  Jess Romero MD    Admit: 8/23/2021    Discharge:      PCP      Shelia Bianchi MD    Diagnoses and hospital course  this Admission      Sinus Bradycardia - Frequent PVCs - pt in bigeminy with HR of 70-80's on EKG     - HR read abnormally as 37 on pulse ox while TELE shows HR as 75  - pt with no symptoms or dizziness . Similar confusion about low HR by home nurse as well         - Admitted to PCU, telemetry  - Half Atenolol dose , resume maxzide, TSH wnl   - checked ECHO and appears age appropriate , recent stress test neg. No further workup for now         Hypotension - reported prior to admission  - no low BPs here  - , halved Atenolol dose d/t frequent PVCs, held home maxzide   - these can likely be resumed at d/c     Hypernatremia  - given IVF.      Thrombocytopenia- new   - Plt 93, previously in 200s from recent admission  - etiology unclear- could be related to recent abx given for pna last month  - should have repeat cbc in 2 weeks      Recent PNA  - CXR shows interval improvement of multifocal airspace disease  - has completed abx, see recent admission notes from 7/2021 for further details     COPD  - no AE  - cont PRN Albuterol     Rheumatoid Arthritis  - no acute flares  - takes Methotrexate weekly on Mondays - hold for now     HTN  - stable  - halved atenolol dose, hold other BP meds for now d/t reported low BPs  - BPs have remained stable here     Hypothyroidism  - check TSH  - cont home dose of Synthroid 25 mcg       HLD  - cont Lipitor     Normocytic Anemia  - 10.7  - this is chronic, no concerns for bleeding        HPI    80 y.o. female with a PMH of COPD, Rheumatoid Arthritis, HTN, HLD, Hypothyroidism who presented to [facility] ED with complaint of low blood pressure and HR. She was recently admitted to [facility] from 7/20-7/25 for PNA and COPD AE. Pt was then discharged to a SNF.  She has been home from the SNF for abnout 3 weeks and has been taken care of at home by her home health nurse. The home health nurse came in this morning and noted her HR was in the 30s and BP was running low. She was unable to provider the systolic number but notes her diastolic number was in the 60s. Pt denied any chest pain, SOB, lightheadedness, dizziness, N/V/D. She states she has been eating and drinking well. She has been feeling well but reports over the last couple of days she has felt \"more wobbly\" than usual. She does take Atenolol daily and reports having taken this med this morning. Work up in the ED showed an initial HR of 53. BP was 163/82. Hgb 10.7, Plt 93. Troponin: negative. CXR showed interval improvement of the multifocal airspace disease with persistent right basilar airspace disease. Pt was admitted to PCU for observation    Physical Exam at Discharge:      General: elderly female up in chair   Awake, alert and oriented. Appears to be not in any distress  Mucous Membranes:  Pink , anicteric  Neck: No JVD, no carotid bruit, no thyromegaly  Chest:  Clear to auscultation bilaterally, no added sounds  Cardiovascular:  RRR S1S2 heard, no murmurs or gallops  Abdomen:  Soft, undistended, non tender, no organomegaly, BS present  Extremities: No edema or cyanosis.  Distal pulses well felt  Neurological : grossly normal       Radiology (Please Review Full Report for Details)        CULTURES     SARS-COV-2 - Rapid: Not detected      EKG: EKG waiting to be scanned into patient's chart from ED, unable to review at this time, see ED physician's note with EKG interpretation  - spoke with Sera Velasquez MD who reported sinus rhythm with PVCs in trigeminy  - see plan of care as below     RADIOLOGY     XR CHEST PORTABLE   Final Result   Interval improvement of the multifocal airspace disease with persistent right   basilar airspace disease.  Follow-up to resolution is recommended.              Pertinent previous results as: Wixela Inhub  USE 1 INHALATION BY MOUTH  EVERY 12 HOURS     ipratropium-albuterol 0.5-2.5 (3) MG/3ML Soln nebulizer solution  Commonly known as: DUONEB  Inhale 3 mLs into the lungs every 4 hours as needed for Shortness of Breath     levothyroxine 25 MCG tablet  Commonly known as: SYNTHROID     methotrexate 2.5 MG chemo tablet  Commonly known as: RHEUMATREX     polyethylene glycol 17 g packet  Commonly known as: GLYCOLAX  Take 17 g by mouth daily     ProAir  (90 Base) MCG/ACT inhaler  Generic drug: albuterol sulfate HFA  INHALE 2 PUFFS BY MOUTH  INTO THE LUNGS EVERY 6  HOURS AS NEEDED FOR  WHEEZING OR SHORTNESS OF  BREATH     Restasis 0.05 % ophthalmic emulsion  Generic drug: cycloSPORINE     triamterene-hydroCHLOROthiazide 37.5-25 MG per tablet  Commonly known as: MAXZIDE-25     vitamin D 50 MCG (2000 UT) Caps capsule              Discharge Condition/Location: stable/home     Follow Up:    PCP in 1 weeks      Time Spent on discharge is more than 28 minutes in the examination, evaluation, counseling and review of medications and discharge plan.       Ramsey Bauman MD, 8/25/2021 7:46 AM

## 2021-08-26 ENCOUNTER — OFFICE VISIT (OUTPATIENT)
Dept: ORTHOPEDIC SURGERY | Age: 81
End: 2021-08-26
Payer: MEDICARE

## 2021-08-26 VITALS — WEIGHT: 176 LBS | BODY MASS INDEX: 28.28 KG/M2 | HEIGHT: 66 IN

## 2021-08-26 DIAGNOSIS — Z98.890 STATUS POST OPEN REDUCTION AND INTERNAL FIXATION (ORIF) OF FRACTURE: ICD-10-CM

## 2021-08-26 DIAGNOSIS — Z87.81 STATUS POST OPEN REDUCTION AND INTERNAL FIXATION (ORIF) OF FRACTURE: ICD-10-CM

## 2021-08-26 DIAGNOSIS — G56.31 RADIAL NERVE PALSY, RIGHT: Primary | ICD-10-CM

## 2021-08-26 PROCEDURE — 1123F ACP DISCUSS/DSCN MKR DOCD: CPT | Performed by: ORTHOPAEDIC SURGERY

## 2021-08-26 PROCEDURE — G8427 DOCREV CUR MEDS BY ELIG CLIN: HCPCS | Performed by: ORTHOPAEDIC SURGERY

## 2021-08-26 PROCEDURE — 4040F PNEUMOC VAC/ADMIN/RCVD: CPT | Performed by: ORTHOPAEDIC SURGERY

## 2021-08-26 PROCEDURE — L3908 WHO COCK-UP NONMOLDE PRE OTS: HCPCS | Performed by: ORTHOPAEDIC SURGERY

## 2021-08-26 PROCEDURE — 1036F TOBACCO NON-USER: CPT | Performed by: ORTHOPAEDIC SURGERY

## 2021-08-26 PROCEDURE — 99213 OFFICE O/P EST LOW 20 MIN: CPT | Performed by: ORTHOPAEDIC SURGERY

## 2021-08-26 PROCEDURE — G8400 PT W/DXA NO RESULTS DOC: HCPCS | Performed by: ORTHOPAEDIC SURGERY

## 2021-08-26 PROCEDURE — 1090F PRES/ABSN URINE INCON ASSESS: CPT | Performed by: ORTHOPAEDIC SURGERY

## 2021-08-26 PROCEDURE — G8417 CALC BMI ABV UP PARAM F/U: HCPCS | Performed by: ORTHOPAEDIC SURGERY

## 2021-08-26 NOTE — PROGRESS NOTES
Chief Complaint    Follow-up (check right shoulder)      History of Present Illness:  Scottie Light is a pleasant, 80 y.o., female, here today for follow up of a right distal humerus open reduction trial fixation of bicondylar plating. She also had postop radial nerve palsy. She is now 6 months  POST. She is doing great. Accompanied by her friend . She continues use of the wrist splint. She feels as though she is recovering much of her wrist extension in radial deviation, and has no pain. But is finding finger abduction /extension still limited. However, she is able to feed herself and do most ADLs. At around 12 weeks she underwent a nerve conduction study. It showed right radial nerve slowing into the spiral groove but with preserved function of the nerve below the fracture. She has been in hand therapy    Medical History:  Patient's medications, allergies, past medical, surgical, social and family histories were reviewed and updated as appropriate. Chief Complaint    Follow-up (check right shoulder)      History of Present Illness:  Scottie Light is a pleasant, 80 y.o., female, here today for follow up of a right distal humerus open reduction trial fixation of bicondylar plating. She also had postop radial nerve palsy. She is now 6 months   She is doing great. Accompanied by her friend . She continues use of the wrist splint. She feels as though she is recovering her wrist extension in radial deviation, and has no pain. But is finding finger abduction /extension still limited. However, she is able to feed herself and do most ADLs. At around 12 weeks she underwent a nerve conduction study. It showed right radial nerve slowing into the spiral groove but with preserved function of the nerve below the fracture.     She has been in hand therapy    Medical History:  Patient's medications, allergies, past medical, surgical, social and family histories were reviewed and updated as appropriate. No notes on file    Review of Systems  A 14 point review of systems was completed by the patient  and is available in the media section of the scanned medical record and was reviewed on 8/26/2021. The review is negative with the exception of those things mentioned in the HPI and Past Medical History    Vital Signs: There were no vitals filed for this visit. General/Appearance: Alert and oriented and in no apparent distress. Skin:  There are no skin lesions, cellulitis, or extreme edema. The patient has warm and well-perfused Bilateral upper extremities with brisk capillary refill. RIGHT Shoulder Exam:  Inspection:  No gross deformities, no signs of infection. Palpation: She is nontender    Active Range of Motion: Forward Elevation 170, Abduction 170, External Rotation 30, Internal Rotation back pocket    Passive Range of Motion: SAME    Strength:  External Rotation 4/5, Internal Rotation 4/5, Supraspinatus 4/5, Champagne Toast 4/5. Special Tests:   No Parviz muscle deformity. Neurovascular: Sensation to light touch is intact, palpable radial pulses 2+. PIN is 4/5 ECRB/ECRL, 3/5 EPL, 2/5 EDC, AIN/4+/5, ulnar is 4+/5 for FDP flexion 4 and 5 but limited intrinsic. Msc 5/5      Radiology:     Plain radiographs of the right humerus comprising 2 views AP/LAT:  Were obtained and reviewed in the office: healed fracture with no screw/plate loosening or malposition. Impression:  No new xrays    Assessment :  Ms. Safia Dunham is a pleasant, 80 y.o. patient who is 6 months weeks post right distal humerus extra-articular fracture treated with open reduction and internal fixation. .  She has dong really well in  recovering her radial nerve slowly and the pattern that is consistent with the usual pattern for radial nerve recovery starting with the brachioradialis. She may have a residual ulnar nerve palsy to her instrinsics, but not to her 4th and 5th FDP.     Impression:  No diagnosis found. Office Procedures:  No orders of the defined types were placed in this encounter. Treatment Plan: We recommend She continue in hand therapy and be full weight bearing through the right arm/shoulder, and finger/wrist mobility. We gave her a new less bulky cock up wrist splint     We will see Jason back in 3 months with right humerus xrays and/or as needed. All questions were answered to patient's satisfaction and She was encouraged to call with any further questions or concerns. Petra Kay is in agreement with this plan. Sincerely,    Alicia Winters MD [facility]   [address], [address]  Email: [email or web address]  Office: [phone]    08/26/21  1:49 PM      The encounter with Nick Vann was carried out by myself, Dr Humaira Tejada, who personally examined the patient and reviewed the plan. This dictation was performed with a verbal recognition program (DRAGON) and it was checked for errors. It is possible that there are still dictated errors within this office note. If so, please bring any errors to my attention for an addendum. All efforts were made to ensure that this office note is accurate. Review of Systems  A 14 point review of systems was completed by the patient  and is available in the media section of the scanned medical record and was reviewed on 8/26/2021. The review is negative with the exception of those things mentioned in the HPI and Past Medical History    Vital Signs: There were no vitals filed for this visit. General/Appearance: Alert and oriented and in no apparent distress. Skin:  There are no skin lesions, cellulitis, or extreme edema. The patient has warm and well-perfused Bilateral upper extremities with brisk capillary refill.       RIGHT Shoulder Exam:  Inspection:  No gross deformities, no signs of infection. Palpation: She is nontender    Active Range of Motion: Forward Elevation 170, Abduction 170, External Rotation 30, Internal Rotation back pocket    Passive Range of Motion: SAME    Strength:  External Rotation 4/5, Internal Rotation 4/5, Supraspinatus 4/5, Champagne Toast 4/5. Special Tests:   No Parviz muscle deformity. Neurovascular: Sensation to light touch is intact, palpable radial pulses 2+. PIN is 4/5 ECRB/ECRL, 3/5 EPL, 2/5 EDC, AIN/4+/5, ulnar is 4+/5 for FDP flexion 4 and 5 but limited intrinsic. Msc 5/5      Radiology:     Plain radiographs of the right humerus comprising 2 views AP/LAT:  Were obtained and reviewed in the office: healed fracture with no screw/plate loosening or malposition. Impression:  No new xrays    Assessment :  Ms. Keshia Del Toro is a pleasant, 80 y.o. patient who is 6 months weeks post right distal humerus extra-articular fracture treated with open reduction and internal fixation. She has dong really well in  recovering her radial nerve slowly and the pattern that is consistent with the usual pattern for radial nerve recovery starting with the brachioradialis then ECRL and ECRB. She may have a residual ulnar nerve palsy to her instrinsics, but not to her 4th and 5th FDP. Impression:  Encounter Diagnoses   Name Primary?  Radial nerve palsy, right Yes    Status post open reduction and internal fixation (ORIF) of fracture        Office Procedures:  Orders Placed This Encounter   Procedures    Soo Bonds Gel Wrist Brace     Patient was prescribed a Soo Bonds Gel Wrist Brace. The right wrist will require stabilization / immobilization from this semi-rigid / rigid orthosis to improve their function. The orthosis will assist in protecting the affected area, provide functional support and facilitate healing.     The patient was educated and fit by a healthcare professional with expert knowledge and specialization in brace application while under the direct supervision of the treating physician. Verbal and written instructions for the use of and application of this item were provided. They were instructed to contact the office immediately should the brace result in increased pain, decreased sensation, increased swelling or worsening of the condition. Treatment Plan: We recommend She continue in hand therapy and be full weight bearing through the right arm/shoulder, and finger/wrist mobility. We gave her a new less bulky cock up wrist splint. We will see Cande Andersen back in 3 months with right humerus xrays and/or as needed. I think she can make further gains with respect to PIN function for the [facility] and finger extension in the coming months without any purposeful intervention. I did suggest a nerve specialist referral just in case, but she kindly declined this. All questions were answered to patient's satisfaction and She was encouraged to call with any further questions or concerns. Conrad Chou is in agreement with this plan. Sincerely,    Paz Parry [place] [address] [place] [address] [address] [place], [facility]  Email: [email or web address]  Office: [phone]    08/26/21  5:17 PM      The encounter with Cecil Best was carried out by myself, Dr Nelida Farah, who personally examined the patient and reviewed the plan. This dictation was performed with a verbal recognition program (DRAGON) and it was checked for errors. It is possible that there are still dictated errors within this office note. If so, please bring any errors to my attention for an addendum. All efforts were made to ensure that this office note is accurate.

## 2021-09-02 ENCOUNTER — OFFICE VISIT (OUTPATIENT)
Dept: RHEUMATOLOGY | Age: 81
End: 2021-09-02
Payer: MEDICARE

## 2021-09-02 VITALS
WEIGHT: 176 LBS | DIASTOLIC BLOOD PRESSURE: 76 MMHG | SYSTOLIC BLOOD PRESSURE: 110 MMHG | BODY MASS INDEX: 28.28 KG/M2 | HEIGHT: 66 IN

## 2021-09-02 DIAGNOSIS — M15.9 PRIMARY OSTEOARTHRITIS INVOLVING MULTIPLE JOINTS: ICD-10-CM

## 2021-09-02 DIAGNOSIS — M48.061 SPINAL STENOSIS OF LUMBAR REGION, UNSPECIFIED WHETHER NEUROGENIC CLAUDICATION PRESENT: ICD-10-CM

## 2021-09-02 DIAGNOSIS — M05.9 SEROPOSITIVE RHEUMATOID ARTHRITIS (HCC): Primary | ICD-10-CM

## 2021-09-02 DIAGNOSIS — Z79.899 HIGH RISK MEDICATION USE: ICD-10-CM

## 2021-09-02 DIAGNOSIS — R53.83 OTHER FATIGUE: ICD-10-CM

## 2021-09-02 PROCEDURE — 1123F ACP DISCUSS/DSCN MKR DOCD: CPT | Performed by: INTERNAL MEDICINE

## 2021-09-02 PROCEDURE — G8400 PT W/DXA NO RESULTS DOC: HCPCS | Performed by: INTERNAL MEDICINE

## 2021-09-02 PROCEDURE — 1036F TOBACCO NON-USER: CPT | Performed by: INTERNAL MEDICINE

## 2021-09-02 PROCEDURE — 99205 OFFICE O/P NEW HI 60 MIN: CPT | Performed by: INTERNAL MEDICINE

## 2021-09-02 PROCEDURE — G8427 DOCREV CUR MEDS BY ELIG CLIN: HCPCS | Performed by: INTERNAL MEDICINE

## 2021-09-02 PROCEDURE — G8417 CALC BMI ABV UP PARAM F/U: HCPCS | Performed by: INTERNAL MEDICINE

## 2021-09-02 PROCEDURE — 1090F PRES/ABSN URINE INCON ASSESS: CPT | Performed by: INTERNAL MEDICINE

## 2021-09-02 PROCEDURE — 4040F PNEUMOC VAC/ADMIN/RCVD: CPT | Performed by: INTERNAL MEDICINE

## 2021-09-02 NOTE — PROGRESS NOTES
[address], MD                                                           [address] [place] [address], [address] [place] [address]                                                             [phone] (P) [phone] (F)      Note is transcribed using voice recognition software. Inadvertent computerized transcription errors may be present. Patient identification: Royal Fields, sameera : [id number] y.o. REASON FOR CONSULTATION:  Patient is being seen at the request of  Deandre Abdalla MD / No ref. provider found for evaluation management of rheumatoid arthritis and osteoarthritis. HISTORY OF PRESENT ILLNESS:  80 y.o. female with known history of rheumatoid arthritis since the age of 74, generalized osteoarthritis spinal stenosis, hypothyroidism, COPD asthma-states that she is doing fairly well in terms of rheumatoid arthritis on low-dose methotrexate but what bothers her most is back pain. Pain in her lumbar area that sometimes radiates in her buttocks, thighs-constant, limits her walking and daily chores. Tramadol is providing some relief in her symptoms. She was evaluated by spine and various options were discussed including observation, physical therapy, epidural injection and spine surgery. Spine surgery note reviewed-patient opted not to pursue surgical intervention at this time. She has been tolerating methotrexate well. No swollen or inflamed joints. She is currently doing physical therapy 2 times a week because of physical deconditioning after hospitalization for dizziness, found to be bradycardic and hypotensive. She has noticed some improvement on fatigue after hospital discharge. She was seen Dr. Alana Pinto at [address] [place] [address], desires to transfer care here.     PMH, PSH,Social history , Meds reviewed. FH-sister-rheumatoid arthritis. PHYSICAL EXAM:    Vitals:    /76   Ht 5' 6\" (1.676 m)   Wt 176 lb (79.8 kg)   BMI 28.41 kg/m²   AA)x3 well nourished, and well groomed, normal judgement. She is using walker for ambulation because of generalized physical deconditioning after recent hospitalization. MKS: Osteoarthritic changes in the [facility] joints, scattered finger joints, knees ankle and feet. I do not appreciate any focally tender swollen or inflamed joints in upper or lower extremities. F ROM in all peripheral joints. Paraspinal muscle stiffness in lumbar area. Unable to perform SLR   No focal spine tenderness. Skin: No rashes, no induration or skin thickening or nodules. HEENT: Normal lids, lacrimal glands and pupils. No oral or nasal ulcers  Neck: Supple no adenopathy. Chest: Normal effort, no added sounds. Heart: S1-S2 regular no edema. Abdomen:   Lymph nodes:   Neurologic:     DATA:   Lab Results   Component Value Date    WBC 5.0 08/25/2021    HGB 10.3 (L) 08/25/2021    HCT 31.9 (L) 08/25/2021    MCV 93.7 08/25/2021    PLT 87 (L) 08/25/2021     Lab Results   Component Value Date     (H) 08/24/2021    K 3.9 08/24/2021     (H) 08/24/2021    CO2 23 08/24/2021    BUN 13 08/24/2021    CREATININE 0.9 08/24/2021    GLUCOSE 102 (H) 08/24/2021    CALCIUM 8.8 08/24/2021    PROT 6.5 08/23/2021    LABALBU 3.7 08/23/2021    BILITOT 0.4 08/23/2021    ALKPHOS 95 08/23/2021    AST 28 08/23/2021    ALT 26 08/23/2021    LABGLOM >60 08/24/2021    GFRAA >60 08/24/2021    AGRATIO 1.3 08/23/2021    GLOB 2.8 08/23/2021       No results found for: SEDRATE  No results found for: CRP        ASSESSMENT AND PLAN:  Amie Bonilla was seen today for establish care and joint pain. Diagnoses and all orders for this visit:    Seropositive rheumatoid arthritis ([facility] [address]    Other fatigue  -     Vitamin D 25 Hydroxy;  Future    Primary osteoarthritis involving multiple joints    Spinal stenosis of lumbar region, unspecified whether neurogenic claudication present    High risk medication use  -     Comprehensive Metabolic Panel; Future  -     CBC Auto Differential; Future  -     C-Reactive Protein; Future  -     Sedimentation Rate; Future  -     Cyclic Citrul Peptide Antibody, IgG; Future      Seropositive rheumatoid arthritis (rf >240)-well-controlled, continue 10 mg methotrexate weekly. Safety labs prior to next visit. Previous medications-sulfasalazine-helped with rheumatoid arthritis, methotrexate was restarted entertaining the possibility of RA induced fatigue but states that sulfasalazine and methotrexate were equally effective for joint pain and nothing helped fatigue. Generalized osteoarthritis-mostly symptomatic in lumbar area. Follow-up with spine. If she is not a surgical candidate, recommend seeing pain management for narcotic prescription. Currently, she is getting tramadol from primary care provider, states that it is not helping much with pain. Fatigue-multifactorial-arthritis, COPD chronic asthma, cardiovascular elements. Call with any questions or concerns, follow-up in 4 months. #################################################################################################  Patient indicates understanding and agrees with the management plan. Total time >60 minutes that includes the following-  Preparing to see the patient such as reviewing patients records, pre-charting, preparing the visit on the same day, performing a medically appropriate history and physical examination, counseling and educating patient about diagnosis, management plan, ordering appropriate testings, prescriptions, communicating findings to other care providers, and documenting clinical information in electronic medical record. I thank you for giving me the opportunity to be involved in [address] [place] [address] and I look forward following Sebas Lacy along with you.  If you have any questions or concerns please 1. I was told the name of the doctor(s) who took care of my child while in the hospital.    2. I have been told about any important findings on my child's plan of care.    3. The doctor clearly explained my child's diagnosis and other possible diagnoses that were considered.    4. My child's doctor explained all the tests that were done and their results (if available). I understand that some of the test results may not be ready before we go home and I was told how I can get these results. I understand that a summary of my child's hospitalization and important test results will be shared with my child's outpatient doctor.    5. My child's doctor talked to me about what I need to do when we go home.    6. I understand what signs and symptoms to watch for. I understand what symptoms I would need to call my doctor for and/or return to the hospital.    7. I have the phone number to call the hospital for results and/or questions after I leave the hospital.

## 2021-09-04 ENCOUNTER — APPOINTMENT (OUTPATIENT)
Dept: GENERAL RADIOLOGY | Age: 81
End: 2021-09-04
Payer: MEDICARE

## 2021-09-04 ENCOUNTER — HOSPITAL ENCOUNTER (OUTPATIENT)
Age: 81
Setting detail: OBSERVATION
Discharge: NURSING, REHAB OR LONG-TERM CARE | End: 2021-09-06
Attending: STUDENT IN AN ORGANIZED HEALTH CARE EDUCATION/TRAINING PROGRAM | Admitting: INTERNAL MEDICINE
Payer: MEDICARE

## 2021-09-04 DIAGNOSIS — S82.409A FRACTURE OF FIBULA ALONE: ICD-10-CM
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
## 2021-09-04 PROBLEM — S82.832A CLOSED AVULSION FRACTURE OF DISTAL FIBULA, LEFT, INITIAL ENCOUNTER: Status: ACTIVE | Noted: 2021-09-04

## 2021-09-04 LAB
A/G RATIO: 1.3 (ref 1.1–2.2)
ALBUMIN SERPL-MCNC: 3.9 G/DL (ref 3.4–5)
ALP BLD-CCNC: 112 U/L (ref 40–129)
ALT SERPL-CCNC: 33 U/L (ref 10–40)
ANION GAP SERPL CALCULATED.3IONS-SCNC: 10 MMOL/L (ref 3–16)
AST SERPL-CCNC: 37 U/L (ref 15–37)
BASOPHILS ABSOLUTE: 0.2 K/UL (ref 0–0.2)
BASOPHILS RELATIVE PERCENT: 2.3 %
BILIRUB SERPL-MCNC: 0.5 MG/DL (ref 0–1)
BUN BLDV-MCNC: 14 MG/DL (ref 7–20)
CALCIUM SERPL-MCNC: 9.7 MG/DL (ref 8.3–10.6)
CHLORIDE BLD-SCNC: 105 MMOL/L (ref 99–110)
CO2: 28 MMOL/L (ref 21–32)
CREAT SERPL-MCNC: 0.8 MG/DL (ref 0.6–1.2)
EKG ATRIAL RATE: 81 BPM
EKG DIAGNOSIS: NORMAL
EKG P AXIS: 32 DEGREES
EKG P-R INTERVAL: 132 MS
EKG Q-T INTERVAL: 430 MS
EKG QRS DURATION: 74 MS
EKG QTC CALCULATION (BAZETT): 499 MS
EKG R AXIS: -12 DEGREES
EKG T AXIS: 48 DEGREES
EKG VENTRICULAR RATE: 81 BPM
EOSINOPHILS ABSOLUTE: 0.3 K/UL (ref 0–0.6)
EOSINOPHILS RELATIVE PERCENT: 4.6 %
GFR AFRICAN AMERICAN: >60
GFR NON-AFRICAN AMERICAN: >60
GLOBULIN: 2.9 G/DL
GLUCOSE BLD-MCNC: 114 MG/DL (ref 70–99)
HCT VFR BLD CALC: 36.9 % (ref 36–48)
HEMOGLOBIN: 11.9 G/DL (ref 12–16)
INFLUENZA A: NOT DETECTED
INFLUENZA B: NOT DETECTED
LYMPHOCYTES ABSOLUTE: 1.1 K/UL (ref 1–5.1)
LYMPHOCYTES RELATIVE PERCENT: 17.6 %
MCH RBC QN AUTO: 30.3 PG (ref 26–34)
MCHC RBC AUTO-ENTMCNC: 32.2 G/DL (ref 31–36)
MCV RBC AUTO: 94 FL (ref 80–100)
MONOCYTES ABSOLUTE: 0.3 K/UL (ref 0–1.3)
MONOCYTES RELATIVE PERCENT: 4 %
NEUTROPHILS ABSOLUTE: 4.6 K/UL (ref 1.7–7.7)
NEUTROPHILS RELATIVE PERCENT: 71.5 %
PDW BLD-RTO: 19.5 % (ref 12.4–15.4)
PLATELET # BLD: 273 K/UL (ref 135–450)
PMV BLD AUTO: 9.8 FL (ref 5–10.5)
POTASSIUM REFLEX MAGNESIUM: 3.6 MMOL/L (ref 3.5–5.1)
RBC # BLD: 3.93 M/UL (ref 4–5.2)
SARS-COV-2 RNA, RT PCR: NOT DETECTED
SODIUM BLD-SCNC: 143 MMOL/L (ref 136–145)
TOTAL PROTEIN: 6.8 G/DL (ref 6.4–8.2)
WBC # BLD: 6.4 K/UL (ref 4–11)

## 2021-09-04 PROCEDURE — G0378 HOSPITAL OBSERVATION PER HR: HCPCS

## 2021-09-04 PROCEDURE — 93005 ELECTROCARDIOGRAM TRACING: CPT | Performed by: STUDENT IN AN ORGANIZED HEALTH CARE EDUCATION/TRAINING PROGRAM

## 2021-09-04 PROCEDURE — 73560 X-RAY EXAM OF KNEE 1 OR 2: CPT

## 2021-09-04 PROCEDURE — 93010 ELECTROCARDIOGRAM REPORT: CPT | Performed by: INTERNAL MEDICINE

## 2021-09-04 PROCEDURE — 6370000000 HC RX 637 (ALT 250 FOR IP): Performed by: INTERNAL MEDICINE

## 2021-09-04 PROCEDURE — 85025 COMPLETE CBC W/AUTO DIFF WBC: CPT

## 2021-09-04 PROCEDURE — 87636 SARSCOV2 & INF A&B AMP PRB: CPT

## 2021-09-04 PROCEDURE — 99285 EMERGENCY DEPT VISIT HI MDM: CPT

## 2021-09-04 PROCEDURE — 73502 X-RAY EXAM HIP UNI 2-3 VIEWS: CPT

## 2021-09-04 PROCEDURE — 73610 X-RAY EXAM OF ANKLE: CPT

## 2021-09-04 PROCEDURE — 6360000002 HC RX W HCPCS: Performed by: INTERNAL MEDICINE

## 2021-09-04 PROCEDURE — 2580000003 HC RX 258: Performed by: INTERNAL MEDICINE

## 2021-09-04 PROCEDURE — 80053 COMPREHEN METABOLIC PANEL: CPT

## 2021-09-04 PROCEDURE — 96372 THER/PROPH/DIAG INJ SC/IM: CPT

## 2021-09-04 RX ORDER — ALBUTEROL SULFATE 90 UG/1
1 AEROSOL, METERED RESPIRATORY (INHALATION) EVERY 6 HOURS PRN
Status: DISCONTINUED | OUTPATIENT
Start: 2021-09-04 | End: 2021-09-06 | Stop reason: HOSPADM

## 2021-09-04 RX ORDER — ONDANSETRON 2 MG/ML
4 INJECTION INTRAMUSCULAR; INTRAVENOUS EVERY 6 HOURS PRN
Status: DISCONTINUED | OUTPATIENT
Start: 2021-09-04 | End: 2021-09-06 | Stop reason: HOSPADM

## 2021-09-04 RX ORDER — ACETAMINOPHEN 650 MG/1
650 SUPPOSITORY RECTAL EVERY 6 HOURS PRN
Status: DISCONTINUED | OUTPATIENT
Start: 2021-09-04 | End: 2021-09-06 | Stop reason: HOSPADM

## 2021-09-04 RX ORDER — ACETAMINOPHEN 325 MG/1
650 TABLET ORAL EVERY 6 HOURS PRN
Status: DISCONTINUED | OUTPATIENT
Start: 2021-09-04 | End: 2021-09-06 | Stop reason: HOSPADM

## 2021-09-04 RX ORDER — ATORVASTATIN CALCIUM 40 MG/1
80 TABLET, FILM COATED ORAL EVERY EVENING
Status: DISCONTINUED | OUTPATIENT
Start: 2021-09-04 | End: 2021-09-06 | Stop reason: HOSPADM

## 2021-09-04 RX ORDER — POLYETHYLENE GLYCOL 3350 17 G/17G
17 POWDER, FOR SOLUTION ORAL DAILY
Status: DISCONTINUED | OUTPATIENT
Start: 2021-09-04 | End: 2021-09-06 | Stop reason: HOSPADM

## 2021-09-04 RX ORDER — BUDESONIDE AND FORMOTEROL FUMARATE DIHYDRATE 160; 4.5 UG/1; UG/1
2 AEROSOL RESPIRATORY (INHALATION) 2 TIMES DAILY
Status: DISCONTINUED | OUTPATIENT
Start: 2021-09-04 | End: 2021-09-04

## 2021-09-04 RX ORDER — BUDESONIDE AND FORMOTEROL FUMARATE DIHYDRATE 160; 4.5 UG/1; UG/1
2 AEROSOL RESPIRATORY (INHALATION) 2 TIMES DAILY
Status: DISCONTINUED | OUTPATIENT
Start: 2021-09-04 | End: 2021-09-06 | Stop reason: HOSPADM

## 2021-09-04 RX ORDER — SODIUM CHLORIDE 9 MG/ML
25 INJECTION, SOLUTION INTRAVENOUS PRN
Status: DISCONTINUED | OUTPATIENT
Start: 2021-09-04 | End: 2021-09-06 | Stop reason: HOSPADM

## 2021-09-04 RX ORDER — ALBUTEROL SULFATE 90 UG/1
1 AEROSOL, METERED RESPIRATORY (INHALATION) EVERY 6 HOURS PRN
Status: DISCONTINUED | OUTPATIENT
Start: 2021-09-04 | End: 2021-09-04

## 2021-09-04 RX ORDER — SODIUM CHLORIDE 0.9 % (FLUSH) 0.9 %
5-40 SYRINGE (ML) INJECTION EVERY 12 HOURS SCHEDULED
Status: DISCONTINUED | OUTPATIENT
Start: 2021-09-04 | End: 2021-09-06 | Stop reason: HOSPADM

## 2021-09-04 RX ORDER — DULOXETIN HYDROCHLORIDE 60 MG/1
60 CAPSULE, DELAYED RELEASE ORAL NIGHTLY
Status: DISCONTINUED | OUTPATIENT
Start: 2021-09-04 | End: 2021-09-06 | Stop reason: HOSPADM

## 2021-09-04 RX ORDER — CARBOXYMETHYLCELLULOSE SODIUM 10 MG/ML
1 GEL OPHTHALMIC PRN
Status: DISCONTINUED | OUTPATIENT
Start: 2021-09-04 | End: 2021-09-06 | Stop reason: HOSPADM

## 2021-09-04 RX ORDER — HYDROCODONE BITARTRATE AND ACETAMINOPHEN 5; 325 MG/1; MG/1
1 TABLET ORAL EVERY 6 HOURS PRN
Status: DISCONTINUED | OUTPATIENT
Start: 2021-09-04 | End: 2021-09-06 | Stop reason: HOSPADM

## 2021-09-04 RX ORDER — IPRATROPIUM BROMIDE AND ALBUTEROL SULFATE 2.5; .5 MG/3ML; MG/3ML
3 SOLUTION RESPIRATORY (INHALATION) EVERY 4 HOURS PRN
Status: DISCONTINUED | OUTPATIENT
Start: 2021-09-04 | End: 2021-09-06 | Stop reason: HOSPADM

## 2021-09-04 RX ORDER — POLYETHYLENE GLYCOL 3350 17 G/17G
17 POWDER, FOR SOLUTION ORAL DAILY PRN
Status: DISCONTINUED | OUTPATIENT
Start: 2021-09-04 | End: 2021-09-06 | Stop reason: HOSPADM

## 2021-09-04 RX ORDER — TRIAMTERENE AND HYDROCHLOROTHIAZIDE 37.5; 25 MG/1; MG/1
1 TABLET ORAL DAILY
Status: DISCONTINUED | OUTPATIENT
Start: 2021-09-04 | End: 2021-09-06 | Stop reason: HOSPADM

## 2021-09-04 RX ORDER — SODIUM CHLORIDE 0.9 % (FLUSH) 0.9 %
5-40 SYRINGE (ML) INJECTION PRN
Status: DISCONTINUED | OUTPATIENT
Start: 2021-09-04 | End: 2021-09-06 | Stop reason: HOSPADM

## 2021-09-04 RX ORDER — ATENOLOL 25 MG/1
12.5 TABLET ORAL DAILY
Status: DISCONTINUED | OUTPATIENT
Start: 2021-09-04 | End: 2021-09-06 | Stop reason: HOSPADM

## 2021-09-04 RX ORDER — VITAMIN B COMPLEX
2000 TABLET ORAL NIGHTLY
Status: DISCONTINUED | OUTPATIENT
Start: 2021-09-04 | End: 2021-09-06 | Stop reason: HOSPADM

## 2021-09-04 RX ORDER — LEVOTHYROXINE SODIUM 0.03 MG/1
25 TABLET ORAL DAILY
Status: DISCONTINUED | OUTPATIENT
Start: 2021-09-04 | End: 2021-09-06 | Stop reason: HOSPADM

## 2021-09-04 RX ORDER — ONDANSETRON 4 MG/1
4 TABLET, ORALLY DISINTEGRATING ORAL EVERY 8 HOURS PRN
Status: DISCONTINUED | OUTPATIENT
Start: 2021-09-04 | End: 2021-09-06 | Stop reason: HOSPADM

## 2021-09-04 RX ADMIN — ATORVASTATIN CALCIUM 80 MG: 40 TABLET, FILM COATED ORAL at 18:40

## 2021-09-04 RX ADMIN — DULOXETINE HYDROCHLORIDE 60 MG: 60 CAPSULE, DELAYED RELEASE ORAL at 21:45

## 2021-09-04 RX ADMIN — Medication 10 ML: at 21:46

## 2021-09-04 RX ADMIN — ENOXAPARIN SODIUM 40 MG: 40 INJECTION SUBCUTANEOUS at 18:40

## 2021-09-04 RX ADMIN — HYDROCODONE BITARTRATE AND ACETAMINOPHEN 1 TABLET: 5; 325 TABLET ORAL at 18:40

## 2021-09-04 RX ADMIN — Medication 2000 UNITS: at 21:44

## 2021-09-04 ASSESSMENT — PAIN DESCRIPTION - DESCRIPTORS: DESCRIPTORS: ACHING

## 2021-09-04 ASSESSMENT — PAIN SCALES - GENERAL
PAINLEVEL_OUTOF10: 0
PAINLEVEL_OUTOF10: 0
PAINLEVEL_OUTOF10: 7
PAINLEVEL_OUTOF10: 2

## 2021-09-04 ASSESSMENT — PAIN DESCRIPTION - PAIN TYPE: TYPE: ACUTE PAIN

## 2021-09-04 ASSESSMENT — PAIN DESCRIPTION - LOCATION: LOCATION: KNEE;ANKLE

## 2021-09-04 ASSESSMENT — PAIN DESCRIPTION - ORIENTATION: ORIENTATION: RIGHT

## 2021-09-04 NOTE — PLAN OF CARE
Mechanical fall. Right distal fibula fracture. Pt lives alone, can not walk/manage ADLs due to the fracture. Coming in for pain control and SW eval for short term placement. Non emergent Ortho consult placed.      Mehul Whitney MD  9/4/2021  6:13 PM

## 2021-09-04 NOTE — ED NOTES
Bed: 03  Expected date: 9/4/21  Expected time: 9:50 AM  Means of arrival: [place]  Comments:     William Xie  09/04/21 [id number]

## 2021-09-04 NOTE — ED PROVIDER NOTES
[id number] ED      CHIEF COMPLAINT  Fall (right ankle and knee pain on floor over night)     HISTORY OF PRESENT ILLNESS  Eliceo Keith is a 80 y.o. female  who presents to the ED complaining of fall and ankle and knee pain. Patient states that she lives alone, had a mechanical fall earlier today and was down on the floor for approximately 30 minutes after that before she was able to crawl to a phone. She states that she is having pain in her right knee and right ankle. She states that she did not hit her head or lose consciousness, just fell because she lost her balance. She denies any use of anticoagulants, head trauma, or other complaints or concerns. No other complaints, modifying factors or associated symptoms. I have reviewed the following from the nursing documentation. Past Medical History:   Diagnosis Date    Asthma     COPD [facility]ch[facility] obstructive [facility] disease) ([facility])     Hyperlipidemia     Hypertension     RA (rheumatoid arthritis) ([facility] [place] [address]     Thyroid disease     has half of her thyroid     Past Surgical History:   Procedure Laterality Date    ABDOMINAL EXPLORATION SURGERY      APPENDECTOMY      CHOLECYSTECTOMY      COLONOSCOPY  4/18/2013    diverticulosis, polyps    COLONOSCOPY  11/10/2016    normal colon    EYE SURGERY Right 12/11/15    cataract removal    EYE SURGERY Left 1/8/16    cataract removed    FRACTURE SURGERY      HUMERUS FRACTURE SURGERY Right 2/28/2021    HUMERUS OPEN REDUCTION INTERNAL FIXATION performed by Jarod Villafuerte MD at [facility], PARTIAL Left     TUMOR REMOVAL      off of thyroid, was benign     Family History   Problem Relation Age of Onset    High Blood Pressure Mother      Social History     Socioeconomic History    Marital status:       Spouse name: Not on file    Number of children: Not on file    Years of education: Not on file    Highest education level: Not on file   Occupational History    Not on file   Tobacco Use    Smoking status: Former Smoker     Packs/day: 0.50     Years: 20.00     Pack years: 10.00     Types: Cigarettes     Start date: 1959     Quit date: 1987     Years since quittin.6    Smokeless tobacco: Never Used   Vaping Use    Vaping Use: Never used   Substance and Sexual Activity    Alcohol use: No     Alcohol/week: 0.0 standard drinks    Drug use: No    Sexual activity: Not Currently     Partners: Male   Other Topics Concern    Not on file   Social History Narrative    Not on file     Social Determinants of Health     Financial Resource Strain:     Difficulty of Paying Living Expenses:    Food Insecurity:     Worried About Running Out of Food in the Last Year:     Ran Out of Food in the Last Year:    Transportation Needs:     Lack of Transportation (Medical):  Lack of Transportation (Non-Medical):    Physical Activity:     Days of Exercise per Week:     Minutes of Exercise per Session:    Stress:     Feeling of Stress :    Social Connections:     Frequency of Communication with Friends and Family:     Frequency of Social Gatherings with Friends and Family:     Attends Mosque Services:     Active Member of Clubs or Organizations:     Attends Club or Organization Meetings:     Marital Status:    Intimate Partner Violence:     Fear of Current or Ex-Partner:     Emotionally Abused:     Physically Abused:     Sexually Abused:      No current facility-administered medications for this encounter.      Current Outpatient Medications   Medication Sig Dispense Refill    atenolol (TENORMIN) 25 MG tablet Take 0.5 tablets by mouth daily 30 tablet 3    fluticasone-salmeterol (WIXELA INHUB) 250-50 MCG/DOSE AEPB USE 1 INHALATION BY MOUTH  EVERY 12 HOURS 3 Inhaler 2    PROAIR  (90 Base) MCG/ACT inhaler INHALE 2 PUFFS BY MOUTH  INTO THE LUNGS EVERY 6  HOURS AS NEEDED FOR  WHEEZING OR SHORTNESS OF  BREATH 34 g 1    ipratropium-albuterol (DUONEB) 0.5-2.5 (3) MG/3ML SOLN nebulizer solution Inhale 3 mLs into the lungs every 4 hours as needed for Shortness of Breath 360 mL     polyethylene glycol (GLYCOLAX) 17 g packet Take 17 g by mouth daily      methotrexate (RHEUMATREX) 2.5 MG chemo tablet Take 10 mg by mouth once a week Monday nights      DULoxetine (CYMBALTA) 60 MG extended release capsule Take 60 mg by mouth nightly Before bed       RESTASIS 0.05 % ophthalmic emulsion       atorvastatin (LIPITOR) 80 MG tablet Take 80 mg by mouth every evening       triamterene-hydrochlorothiazide (MAXZIDE-25) 37.5-25 MG per tablet Take 1 tablet by mouth daily      Cholecalciferol (VITAMIN D) 2000 UNITS CAPS capsule Take 1 capsule by mouth nightly       levothyroxine (SYNTHROID) 25 MCG tablet Take 25 mcg by mouth Daily. Allergies   Allergen Reactions    Effexor [Venlafaxine Hcl] Itching and Nausea Only    Fenofibrate Diarrhea    Prednisone Other (See Comments)     Causes body to feel on fire    Codeine Nausea And Vomiting    Docosahexaenoic Acid-Epa Nausea And Vomiting     Cannot take Vascepa       REVIEW OF SYSTEMS  10 systems reviewed, pertinent positives per HPI otherwise noted to be negative. PHYSICAL EXAM  BP (!) 141/69   Pulse 79   Temp 97 °F (36.1 °C) (Oral)   Resp 13   Ht 5' 6\" (1.676 m)   Wt 178 lb (80.7 kg)   SpO2 96%   BMI 28.73 kg/m²    GENERAL APPEARANCE: Awake and alert. Cooperative. No acute distress. HENT: Normocephalic. Atraumatic. NECK: Supple. EYES: PERRL. EOM's grossly intact. HEART/CHEST: RRR. No murmurs. LUNGS: Respirations unlabored. CTAB. Good air exchange. Speaking comfortably in full sentences. ABDOMEN: No tenderness. Soft. Non-distended. No masses. No organomegaly. No guarding or rebound. MUSCULOSKELETAL: No extremity edema. Compartments soft. Tenderness to palpation and soft tissue swelling over the right lateral malleolus. No tenderness with palpation of the right knee.   All extremities neurovascularly intact. SKIN: Warm and dry. No acute rashes. NEUROLOGICAL: Alert and oriented. CN's 2-12 intact. No gross facial drooping. Strength 5/5, sensation intact. PSYCHIATRIC: Normal mood and affect. LABS  I have reviewed all labs for this visit.    Results for orders placed or performed during the hospital encounter of 09/04/21   CBC Auto Differential   Result Value Ref Range    WBC 6.4 4.0 - 11.0 K/uL    RBC 3.93 (L) 4.00 - 5.20 M/uL    Hemoglobin 11.9 (L) 12.0 - 16.0 g/dL    Hematocrit 36.9 36.0 - 48.0 %    MCV 94.0 80.0 - 100.0 fL    MCH 30.3 26.0 - 34.0 pg    MCHC 32.2 31.0 - 36.0 g/dL    RDW 19.5 (H) 12.4 - 15.4 %    Platelets 633 672 - 488 K/uL    MPV 9.8 5.0 - 10.5 fL    Neutrophils % 71.5 %    Lymphocytes % 17.6 %    Monocytes % 4.0 %    Eosinophils % 4.6 %    Basophils % 2.3 %    Neutrophils Absolute 4.6 1.7 - 7.7 K/uL    Lymphocytes Absolute 1.1 1.0 - 5.1 K/uL    Monocytes Absolute 0.3 0.0 - 1.3 K/uL    Eosinophils Absolute 0.3 0.0 - 0.6 K/uL    Basophils Absolute 0.2 0.0 - 0.2 K/uL   Comprehensive Metabolic Panel w/ Reflex to MG   Result Value Ref Range    Sodium 143 136 - 145 mmol/L    Potassium reflex Magnesium 3.6 3.5 - 5.1 mmol/L    Chloride 105 99 - 110 mmol/L    CO2 28 21 - 32 mmol/L    Anion Gap 10 3 - 16    Glucose 114 (H) 70 - 99 mg/dL    BUN 14 7 - 20 mg/dL    CREATININE 0.8 0.6 - 1.2 mg/dL    GFR Non-African American >60 >60    GFR African American >60 >60    Calcium 9.7 8.3 - 10.6 mg/dL    Total Protein 6.8 6.4 - 8.2 g/dL    Albumin 3.9 3.4 - 5.0 g/dL    Albumin/Globulin Ratio 1.3 1.1 - 2.2    Total Bilirubin 0.5 0.0 - 1.0 mg/dL    Alkaline Phosphatase 112 40 - 129 U/L    ALT 33 10 - 40 U/L    AST 37 15 - 37 U/L    Globulin 2.9 g/dL   EKG 12 Lead   Result Value Ref Range    Ventricular Rate 81 BPM    Atrial Rate 81 BPM    P-R Interval 132 ms    QRS Duration 74 ms    Q-T Interval 430 ms    QTc Calculation (Bazett) 499 ms    P Axis 32 degrees    R Axis -12 degrees    T Axis 48 degrees    Diagnosis       Sinus rhythm with frequent Premature ventricular complexes in a pattern of bigeminyPossible Left atrial enlargementSeptal infarct , age undeterminedAbnormal ECGConfirmed by Natacha Kearney MD, Cecilia Pablo ([id number]) on 9/4/2021 1:34:41 PM     RADIOLOGY  XR KNEE RIGHT (1-2 VIEWS)   Final Result   Studies are limited secondary to diffuse osteopenia. Given this limitation no acute osseous abnormality of the right hip or right   knee is noted. There is a nondisplaced fracture of the distal fibula. XR HIP RIGHT (2-3 VIEWS)   Final Result   Studies are limited secondary to diffuse osteopenia. Given this limitation no acute osseous abnormality of the right hip or right   knee is noted. There is a nondisplaced fracture of the distal fibula. XR ANKLE RIGHT (MIN 3 VIEWS)   Final Result   Studies are limited secondary to diffuse osteopenia. Given this limitation no acute osseous abnormality of the right hip or right   knee is noted. There is a nondisplaced fracture of the distal fibula. ED COURSE/MDM  Patient seen and evaluated. Old records reviewed. Labs and imaging reviewed and results discussed with patient. Patient is a 74-year-old female, presenting with concerns for mechanical fall with subsequent right knee and ankle pain. Full HPI as detailed above. Upon arrival in the ED, vitals reassuring. Patient is resting comfortably is in no acute distress. She does have tenderness palpation soft tissue swelling over the right lateral malleolus. Right lower extremity is neurovascularly intact with soft compartments. X-ray of the right hip, knee, ankle are performed. There was noted a nondisplaced fracture of the distal fibula. Patient will be placed in a fracture walker boot. Basic labs are obtained and were reassuring. She does live alone, concern for her ability to care for herself as she cannot ambulate given her ankle pain at this time. Patient initially did not want hospitalization, called her son to see if she could perhaps stay with him but does intend to go to rehab and decision was made to hospitalize her for further work-up and treatment of her condition. Findings were discussed with the patient is comfortable in agreement plan of care. She will be hospitalized. During the patient's ED course, the patient was given:  Medications - No data to display     CLINICAL IMPRESSION  1. Fall, initial encounter    2. Fracture of fibula alone        Blood pressure (!) 141/69, pulse 79, temperature 97 °F (36.1 °C), temperature source Oral, resp. rate 13, height 5' 6\" (1.676 m), weight 178 lb (80.7 kg), SpO2 96 %, not currently breastfeeding. Joe Osorio was admitted in stable condition. Patient was given scripts for the following medications. I counseled patient how to take these medications. New Prescriptions    No medications on file       Follow-up with:  No follow-up provider specified. DISCLAIMER: This chart was created using Dragon dictation software. Efforts were made by me to ensure accuracy, however some errors may be present due to limitations of this technology and occasionally words are not transcribed correctly.        Eliza Wall MD  09/04/21 [id number]

## 2021-09-04 NOTE — PROGRESS NOTES
Patient is not able to demonstrated the ability to move from a reclining position to an upright position within the recliner.  however patient is alert, oriented and able to provide informed consent 6

## 2021-09-04 NOTE — PROGRESS NOTES
Bedside report given and pt care transferred to [facility]. Pt denies needs at this time. Call light within reach.

## 2021-09-04 NOTE — PROGRESS NOTES
Bedside Mobility Assessment Tool (BMAT):     Assessment Level 1- Sit and Shake    1. From a semi-reclined position, ask patient to sit up and rotate to a seated position at the side of the bed. Can use the bedrail. 2. Ask patient to reach out and grab your hand and shake making sure patient reaches across his/her midline. Fail- Patient is unable to perform tasks, patient is MOBILITY LEVEL 1. Assessment Level 2- Stretch and Point   1. With patient in seated position at the side of the bed, have patient place both feet on the floor (or stool) with knees no higher than hips. 2. Ask patient to stretch one leg and straighten the knee, then bend the ankle/flex and point the toes. If appropriate, repeat with the other leg. Fail- Patient is unable to complete task. Patient is MOBILITY LEVEL 2. Assessment Level 3- Stand   1. Ask patient to elevate off the bed or chair (seated to standing) using an assistive device (cane, bedrail). 2. Patient should be able to raise buttocks off be and hold for a count of five. May repeat once. Fail- Patient unable to demonstrate standing stability. Patient is MOBILITY LEVEL 3. Assessment Level 4- Walk   1. Ask patient to march in place at bedside. 2. Then ask patient to advance step and return each foot. Some medical conditions may render a patient from stepping backwards, use your best clinical judgement. Fail- Patient not able to complete tasks OR requires use of assistive device. Patient is MOBILITY LEVEL 3.        Mobility Level- 1

## 2021-09-04 NOTE — PROGRESS NOTES
Refused 4  Eyes r/t pain. Gave prn pain med for pain level 7/10 located RLE. Call light within reach.

## 2021-09-05 ENCOUNTER — APPOINTMENT (OUTPATIENT)
Dept: GENERAL RADIOLOGY | Age: 81
End: 2021-09-05
Payer: MEDICARE

## 2021-09-05 PROCEDURE — G0378 HOSPITAL OBSERVATION PER HR: HCPCS

## 2021-09-05 PROCEDURE — 6370000000 HC RX 637 (ALT 250 FOR IP): Performed by: INTERNAL MEDICINE

## 2021-09-05 PROCEDURE — 94761 N-INVAS EAR/PLS OXIMETRY MLT: CPT

## 2021-09-05 PROCEDURE — 6360000002 HC RX W HCPCS: Performed by: INTERNAL MEDICINE

## 2021-09-05 PROCEDURE — 2580000003 HC RX 258: Performed by: INTERNAL MEDICINE

## 2021-09-05 PROCEDURE — 6370000000 HC RX 637 (ALT 250 FOR IP): Performed by: PHYSICIAN ASSISTANT

## 2021-09-05 PROCEDURE — 99225 PR SBSQ OBSERVATION CARE/DAY 25 MINUTES: CPT | Performed by: INTERNAL MEDICINE

## 2021-09-05 PROCEDURE — 96372 THER/PROPH/DIAG INJ SC/IM: CPT

## 2021-09-05 PROCEDURE — 99218 PR INITIAL OBSERVATION CARE/DAY 30 MINUTES: CPT | Performed by: ORTHOPAEDIC SURGERY

## 2021-09-05 PROCEDURE — 73600 X-RAY EXAM OF ANKLE: CPT

## 2021-09-05 PROCEDURE — 94640 AIRWAY INHALATION TREATMENT: CPT

## 2021-09-05 RX ORDER — FOLIC ACID 1 MG/1
1 TABLET ORAL DAILY
Status: DISCONTINUED | OUTPATIENT
Start: 2021-09-05 | End: 2021-09-06 | Stop reason: HOSPADM

## 2021-09-05 RX ORDER — FOLIC ACID 1 MG/1
1 TABLET ORAL DAILY
COMMUNITY
End: 2022-09-19 | Stop reason: SDUPTHER

## 2021-09-05 RX ADMIN — Medication 2000 UNITS: at 21:01

## 2021-09-05 RX ADMIN — LEVOTHYROXINE SODIUM 25 MCG: 25 TABLET ORAL at 06:44

## 2021-09-05 RX ADMIN — HYDROCODONE BITARTRATE AND ACETAMINOPHEN 1 TABLET: 5; 325 TABLET ORAL at 21:01

## 2021-09-05 RX ADMIN — Medication 10 ML: at 21:02

## 2021-09-05 RX ADMIN — ATORVASTATIN CALCIUM 80 MG: 40 TABLET, FILM COATED ORAL at 18:02

## 2021-09-05 RX ADMIN — DULOXETINE HYDROCHLORIDE 60 MG: 60 CAPSULE, DELAYED RELEASE ORAL at 21:01

## 2021-09-05 RX ADMIN — ATENOLOL 12.5 MG: 25 TABLET ORAL at 08:59

## 2021-09-05 RX ADMIN — Medication 2 PUFF: at 08:56

## 2021-09-05 RX ADMIN — HYDROCODONE BITARTRATE AND ACETAMINOPHEN 1 TABLET: 5; 325 TABLET ORAL at 14:32

## 2021-09-05 RX ADMIN — ENOXAPARIN SODIUM 40 MG: 40 INJECTION SUBCUTANEOUS at 08:58

## 2021-09-05 RX ADMIN — Medication 2 PUFF: at 19:38

## 2021-09-05 RX ADMIN — FOLIC ACID 1 MG: 1 TABLET ORAL at 10:28

## 2021-09-05 RX ADMIN — TRIAMTERENE AND HYDROCHLOROTHIAZIDE 1 TABLET: 37.5; 25 TABLET ORAL at 08:58

## 2021-09-05 ASSESSMENT — PAIN SCALES - GENERAL
PAINLEVEL_OUTOF10: 0
PAINLEVEL_OUTOF10: 0
PAINLEVEL_OUTOF10: 6
PAINLEVEL_OUTOF10: 0
PAINLEVEL_OUTOF10: 5
PAINLEVEL_OUTOF10: 0

## 2021-09-05 ASSESSMENT — PAIN DESCRIPTION - LOCATION: LOCATION: FOOT

## 2021-09-05 ASSESSMENT — PAIN DESCRIPTION - PAIN TYPE: TYPE: ACUTE PAIN

## 2021-09-05 NOTE — CONSULTS
ORTHOPAEDIC SURGERY H&P / CONSULTATION NOTE    Chief complaint:   Chief Complaint   Patient presents with    Fall     right ankle and knee pain on floor over night        History of present illness: The patient is a 80 y.o. female with subjective symptoms of right ankle pain. The chief complaint is located at lateral aspect right ankle. Duration of symptoms has been for 2 days. The severity of symptoms is rated at 5/10 pain on intake form. Patient states that she had a fall from standing and having twisted her ankle. She states right ankle pain. She was unable to put significant weight on her ankle at that time. She was seen in the emergency room on 9/4/2021 and ultimately was admitted for right ankle pain. She had been placed in a boot having been found to have a closed distal fibula fracture. She denies previous right ankle injury. She states previous right upper extremity injury where she had seen Dr. Sarina Michael previously at [facility]. Orthopedic surgery was consulted given right closed ankle injury. The patient has tried the below listed items prior to today's consultation for above listed chief complaint.     -   Over-the-counter anti-inflammatories/prescription medication anti-inflammatory.      -   Physical therapy / guided home exercise program -     -   Previous corticosteroid injections    Past medical history:    Past Medical History:   Diagnosis Date    Asthma     COPD (chronic obstructive pulmonary disease) (HCC)     Hyperlipidemia     Hypertension     RA (rheumatoid arthritis) ([facility] [address]     Thyroid disease     has half of her thyroid        Past surgical history:    Past Surgical History:   Procedure Laterality Date    ABDOMINAL EXPLORATION SURGERY      APPENDECTOMY      CHOLECYSTECTOMY      COLONOSCOPY  4/18/2013    diverticulosis, polyps    COLONOSCOPY  11/10/2016    normal colon    EYE SURGERY Right 12/11/15    cataract removal    EYE SURGERY Left 1/8/16    cataract removed    FRACTURE SURGERY      HUMERUS FRACTURE SURGERY Right 2/28/2021    HUMERUS OPEN REDUCTION INTERNAL FIXATION performed by Reinaldo Vergara MD at [address], PARTIAL Left     TUMOR REMOVAL      off of thyroid, was benign        Allergies:     Allergies   Allergen Reactions    Effexor [Venlafaxine Hcl] Itching and Nausea Only    Fenofibrate Diarrhea    Prednisone Other (See Comments)     Causes body to feel on fire    Codeine Nausea And Vomiting    Docosahexaenoic Acid-Epa Nausea And Vomiting     Cannot take Vascepa         Medications:   Current Facility-Administered Medications:     folic acid (FOLVITE) tablet 1 mg, 1 mg, Oral, Daily, Saida Fields PA-C, 1 mg at 09/05/21 1028    atenolol (TENORMIN) tablet 12.5 mg, 12.5 mg, Oral, Daily, Leigh Urena MD, 12.5 mg at 09/05/21 0859    atorvastatin (LIPITOR) tablet 80 mg, 80 mg, Oral, QPM, Leigh Urena MD, 80 mg at 09/04/21 1840    Vitamin D (CHOLECALCIFEROL) tablet 2,000 Units, 2,000 Units, Oral, Nightly, Leigh Urena MD, 2,000 Units at 09/04/21 2144    DULoxetine (CYMBALTA) extended release capsule 60 mg, 60 mg, Oral, Nightly, Leigh Urena MD, 60 mg at 09/04/21 2145    ipratropium-albuterol (DUONEB) nebulizer solution 3 mL, 3 mL, Inhalation, Q4H PRN, Leigh Urena MD    levothyroxine (SYNTHROID) tablet 25 mcg, 25 mcg, Oral, Daily, Leigh Urena MD, 25 mcg at 09/05/21 0644    [START ON 9/6/2021] methotrexate (RHEUMATREX) chemo tablet 10 mg, 10 mg, Oral, Weekly, Leigh Urena MD    polyethylene glycol (GLYCOLAX) packet 17 g, 17 g, Oral, Daily, Leigh Urena MD    carboxymethylcellulose PF (THERATEARS) 1 % ophthalmic gel 1 drop, 1 drop, Both Eyes, PRN, Leigh Urena MD    triamterene-hydroCHLOROthiazide (MAXZIDE-25) 37.5-25 MG per tablet 1 tablet, 1 tablet, Oral, Daily, Leigh Urena MD, 1 tablet at 09/05/21 0823   HYDROcodone-acetaminophen (NORCO) 5-325 MG per tablet 1 tablet, 1 tablet, Oral, Q6H PRN, Elvia Huggins MD, 1 tablet at 21 1840    sodium chloride flush 0.9 % injection 5-40 mL, 5-40 mL, IntraVENous, 2 times per day, Elvia Huggins MD, 10 mL at 21 2146    sodium chloride flush 0.9 % injection 5-40 mL, 5-40 mL, IntraVENous, PRN, Elvia Huggins MD    0.9 % sodium chloride infusion, 25 mL, IntraVENous, PRN, Elvia Huggins MD    enoxaparin (LOVENOX) injection 40 mg, 40 mg, SubCUTAneous, Daily, Elvia Huggins MD, 40 mg at 21 0858    ondansetron (ZOFRAN-ODT) disintegrating tablet 4 mg, 4 mg, Oral, Q8H PRN **OR** ondansetron (ZOFRAN) injection 4 mg, 4 mg, IntraVENous, Q6H PRN, Elvia Huggins MD    polyethylene glycol (GLYCOLAX) packet 17 g, 17 g, Oral, Daily PRN, Elvia Huggins MD    acetaminophen (TYLENOL) tablet 650 mg, 650 mg, Oral, Q6H PRN **OR** acetaminophen (TYLENOL) suppository 650 mg, 650 mg, Rectal, Q6H PRN, Elvia Huggins MD    budesonide-formoterol (SYMBICORT) 160-4.5 MCG/ACT inhaler 2 puff, 2 puff, Inhalation, BID, Elvia Huggins MD, 2 puff at 21 0856    albuterol sulfate  (90 Base) MCG/ACT inhaler 1 puff, 1 puff, Inhalation, Q6H PRN, Elvia Huggins MD     Social history: Denies IV drug use. Social History     Socioeconomic History    Marital status:       Spouse name: Not on file    Number of children: Not on file    Years of education: Not on file    Highest education level: Not on file   Occupational History    Not on file   Tobacco Use    Smoking status: Former Smoker     Packs/day: 0.50     Years: 20.00     Pack years: 10.00     Types: Cigarettes     Start date: 1959     Quit date: 1987     Years since quittin.7    Smokeless tobacco: Never Used   Vaping Use    Vaping Use: Never used   Substance and Sexual Activity    Alcohol use: No     Alcohol/week: 0.0 standard drinks    Drug use: No    Sexual activity: Not Currently     Partners: Male   Other Topics Concern    Not on file   Social History Narrative    Not on file     Social Determinants of Health     Financial Resource Strain:     Difficulty of Paying Living Expenses:    Food Insecurity:     Worried About Running Out of Food in the Last Year:     [address] in the Last Year:    Transportation Needs:     Lack of Transportation (Medical):  Lack of Transportation (Non-Medical):    Physical Activity:     Days of Exercise per Week:     Minutes of Exercise per Session:    Stress:     Feeling of Stress :    Social Connections:     Frequency of Communication with Friends and Family:     Frequency of Social Gatherings with Friends and Family:     Attends [employer] Services:     Active Member of Clubs or Organizations:     Attends Club or Organization Meetings:     Marital Status:    Intimate Partner Violence:     Fear of Current or Ex-Partner:     Emotionally Abused:     Physically Abused:     Sexually Abused: Tobacco use. Social History     Tobacco Use   Smoking Status Former Smoker    Packs/day: 0.50    Years: 20.00    Pack years: 10.00    Types: Cigarettes    Start date: 1959   Donnel Castro Quit date: 1987    Years since quittin.7   Smokeless Tobacco Never Used     Employment: Noncontributory    Workers compensation claim: Noncontributory    Review of systems: Patient denies any fevers chills chest pain shortness of breath nausea vomiting significant weight loss any change in voiding or bowel movements. Patient denies any significant numbness or tingling at baseline as it relates to this presenting symptom/chief complaint. The patient denies any significant problems with skin or any significant allergies. Physical examination:  Body mass index is 28.73 kg/m².   AAOx3, NCAT  EOMI  MMM  RR  Unlabored breathing, no wheezing  Skin intact BUE and BLE, warm and moist  Right ankle: Positive tenderness palpation distal fibula. Nontender to palpation medial malleolus. Nontender to palpation deltoid ligament. No gross swelling along the medial ankle. Positive tenderness palpation/swelling lateral ankle over the fibula. 5/5 tibialis anterior gastrocnemius extensor houses longus  Sensation intact light touch DP/SP/LP/MP  Capillary refill time less than 2 seconds      Diagnostic imaging:  MY READ:  3 view right ankle 9/4/2021: Positive distal fibula fracture long oblique nondisplaced. No medial malleolus fracture. Trace arthrosis ankle joint    Pertinent lab work: None    1. Fall, initial encounter    2. Fracture of fibula alone      Assessment and plan: 80 y.o. female with current subjective symptoms and physical exam findings with diagnostic imaging correlating to closed right distal fibula fracture. -Time of 16 minutes was spent coordinating and discussing the clinical findings, reviewing diagnostic imaging as indicated, coordinating care with prior notes review and current clinical encounter documentation as it pertains to the patient's presenting subjective symptoms and diagnoses. -I reviewed with the patient the imaging findings as well as clinical exam and  how it correlates to subjective symptoms.  -I had a pleasant discussion with the patient today. I reviewed with her her current injuries. At this time I would like to obtain a gravity stress view. While she does not have any tenderness to palpation along the medial malleolus or along the deltoid ligament I would like to do this to rule this out as an SER 4 equivalent injury and allow her to weight-bear as tolerated in the boot.   Currently this is a nondisplaced fracture.  -Continue pain medication as prescribed by primary team  -Anticipate discharge with follow-up as an outpatient consultation with repeat radiographs to ensure no gross interval displacement  -Pending results of radiographs, potential consideration for allowing to weight-bear as tolerated using assist device. This usually is to be 50% partial weightbearing as tolerated and then to progress to weightbearing as tolerated as tolerated based upon symptoms. Should there be displacement on radiographs, then potential consideration for discussion of open reduction internal fixation right ankle fracture versus strict nonweightbearing and cast boot for 6 weeks. Patient does have comorbidities and to be discussed further at that time pending results of radiographs as needed  -Continue ice and elevation. Continue cam walker boot.  -All questions answered to the patient's satisfaction and the patient expressed understanding and agreement with the above listed treatment plan  -Follow up 5 to 7 days upon discharge for repeat radiographs 3 view right ankle nonweightbearing. The patient did express previous evaluations by Dr. Kayleigh Alfaro given he is her treating surgeon for her right humerus fracture. She may follow-up with either myself or with Dr. Kayleigh Alfaro, for continuity of care reasons, as per the above  -Thank you for the clinical consultation and allowing me to participate in the patient's care. Electronically signed by Last Light MD on 9/5/21 at 12:37 PM EDT         Last Light MD       Orthopaedic Surgery-Sports Medicine        Disclaimer: This note was dictated with voice recognition software. Though review and correction are routinely performed, please contact the office/medical records for any errors requiring correction. ADDENDUM  Of note, ordered radiograph not completed as ordered. Will await correct imaging to clear for WB status. Otherwise NWB RLE and OOB mobilization allowed at this time. OOB to chair for all meals    ADDENDUM: 9/6/21:  Homer stress view without medial clear space widening. SER II fracture- stable. Patient may advance to WBAT RLE in boot and mobilization with PT OOB.   Ice elevation and f/u per above in Dr Kayleigh Alfaro or my clinic at [facility] post discharge.   Will sign off - please call with questions  END ADDENDUM

## 2021-09-05 NOTE — CARE COORDINATION
Case Management Assessment  Initial Evaluation      Patient Name: Royal Fields  YOB: 1940  Diagnosis: Fracture of fibula alone [S82.409A]  Fall, initial encounter [W19. XXXA]  Closed avulsion fracture of distal fibula, left, initial encounter Gracia Martinez  Date / Time: 9/4/2021  9:53 AM    Admission status/Date: OBSERVATION  Chart Reviewed: Yes      Patient Interviewed: Yes   Family Interviewed:  No      Hospitalization in the last 30 days:  Yes      Health Care Decision Maker :   Primary Decision Maker: Israel Salcedo - Child - [phone]    (CM - must 1st enter selection under Navigator - emergency contact- [place] Relationship and pick relationship)   Who do you trust or have selected to make healthcare decisions for you      Met with: pt via telephone  Interview conducted  (bedside/phone):    Current PCP: Melissa required for SNF : Y, N          3 night stay required - Y, N    ADLS  Support Systems/Care Needs: Children  Transportation: self    Meal Preparation: self    Housing  Living Arrangements: home alone  Steps: n/a  Intent for return to present living arrangements: pt wants STR  Identified Issues: pt concerned for home safety due to falls/weakness    Home Care Information  Active with [address] : Yes - [facility]:(Services)  Type of Home Care Services: PT, OT, Nursing Services  Passport/Waiver : No  :                      Phone Number:    Passport/Waiver Services: -          Durable Medical Equiptment   DME Provider: n/a  Equipment: -  Walker_X__Cane_X__RTS___ BSC___Shower Chair___Hospital Bed___W/C____Other________  02 at ____Liter(s)---wears(frequency)_______ HHN ___ CPAP___ BiPap___   N/A____      Home O2 Use :  No    If No for home O2---if presently on O2 during hospitalization:  No  if yes CM to follow for potential DC O2 need  Informed of need for care provider to bring portable home O2 tank on day of discharge for nursing to connect prior to leaving:   Not Indicated  Verbalized agreement/Understanding:   Not Indicated    Community Service Affiliation  Dialysis:  No    · Agency:  · Location:  · Dialysis Schedule:  · Phone:   · Fax: Other Community Services: (ex:PT/OT,Mental Health,Wound Clinic, Cardio/Pul [address])    DISCHARGE PLAN: Explained Case Management role/services. REviewed chart and spoke with pt who states that she lives at home alone. Pt states that she is concerned about returning home at this time due to recent falls and weakness. Pt states that she was recently at [facility] for [address] [place] and would like to return there. If [facility] does not have a bed avail pt states [facility] as her 2nd choice. PT/OT eval pending once medically cleared. Referral called at this time to [facility] at [facility] who states that she does not currently have a bed avail for pt. CM then called and left [facility] for Christian Nunes with [facility] re STR referral. Await return call. CM following.

## 2021-09-05 NOTE — PROGRESS NOTES
Physical /OccupationalTherapy  Awaiting gravity stress X-ray of R foot. Communicated with ortho MD via perfect serve who recommends NWB RLE in cam boot until this is completed. Per MD, the x-ray that was most recently performed was incorrect - RN aware of this, and  WB status. Plan to see on 9/6 once WB status is updated.     Jorge George, PT #[id number]  [facility], [address] [facility], OTR/L   [id number]

## 2021-09-05 NOTE — PROGRESS NOTES
RESPIRATORY THERAPY ASSESSMENT    Name:  Roe [facility] Record Number:  [id number]  Age: 80 y.o. Gender: female  : 1940  Today's Date:  2021  Room:  [address] [place] [address]-    Assessment     Is the patient being admitted for a COPD or Asthma exacerbation? No   (If yes the patient will be seen every 4 hours for the first 24 hours and then reassessed)    Patient Admission Diagnosis      Allergies  Allergies   Allergen Reactions    Effexor [Venlafaxine Hcl] Itching and Nausea Only    Fenofibrate Diarrhea    Prednisone Other (See Comments)     Causes body to feel on fire    Codeine Nausea And Vomiting    Docosahexaenoic Acid-Epa Nausea And Vomiting     Cannot take Vascepa       Minimum Predicted Vital Capacity:               Actual Vital Capacity:                    Pulmonary History:COPD and Asthma  Home Oxygen Therapy:  room air  Home Respiratory Therapy:Albuterol, Albuterol/Ipratropium Bromide HHN and Salmeterol/Fluticasone Propionate   Current Respiratory Therapy:  Symbicort 160 BID, Albuterol 1 puff Q6 PRN, Duoneb Q4 PRN          Respiratory Severity Index(RSI)   Patients with orders for inhalation medications, oxygen, or any therapeutic treatment modality will be placed on Respiratory Protocol. They will be assessed with the first treatment and at least every 72 hours thereafter. The following severity scale will be used to determine frequency of treatment intervention.     Smoking History: Pulmonary Disease or Smoking History, Greater than 15 pack year = 2    Social History  Social History     Tobacco Use    Smoking status: Former Smoker     Packs/day: 0.50     Years: 20.00     Pack years: 10.00     Types: Cigarettes     Start date: 1959     Quit date: 1987     Years since quittin.6    Smokeless tobacco: Never Used   Vaping Use    Vaping Use: Never used   Substance Use Topics    Alcohol use: No     Alcohol/week: 0.0 standard drinks    Drug use: No       Recent Surgical History: None = 0  Past Surgical History  Past Surgical History:   Procedure Laterality Date    ABDOMINAL EXPLORATION SURGERY      APPENDECTOMY      CHOLECYSTECTOMY      COLONOSCOPY  4/18/2013    diverticulosis, polyps    COLONOSCOPY  11/10/2016    normal colon    EYE SURGERY Right 12/11/15    cataract removal    EYE SURGERY Left 1/8/16    cataract removed    FRACTURE SURGERY      HUMERUS FRACTURE SURGERY Right 2/28/2021    HUMERUS OPEN REDUCTION INTERNAL FIXATION performed by Kadie Ruth MD at [facility], PARTIAL Left     TUMOR REMOVAL      off of thyroid, was benign       Level of Consciousness: Alert, Oriented, and Cooperative = 0    Level of Activity: Walking with assistance = 1    Respiratory Pattern: Regular Pattern; RR 8-20 = 0    Breath Sounds: Clear = 0    Sputum   ,  ,    Cough: Strong, spontaneous, non-productive = 0    Vital Signs   /68   Pulse 66   Temp 97.6 °F (36.4 °C) (Oral)   Resp 16   Ht 5' 6\" (1.676 m)   Wt 178 lb (80.7 kg)   SpO2 93%   BMI 28.73 kg/m²   SPO2 (COPD values may differ): Greater than or equal to 92% on room air = 0    Peak Flow (asthma only): not applicable = 0    RSI: 0-4 = See once and convert to home regimen or discontinue        Plan       Goals: medication delivery, mobilize retained secretions, volume expansion and improve oxygenation    Patient/caregiver was educated on the proper method of use for Respiratory Care Devices:  Yes      Level of patient/caregiver understanding able to:   ? Verbalize understanding   ? Demonstrate understanding       ? Teach back        ? Needs reinforcement       ? No available caregiver               ? Other:     Response to education:  Very Good     Is patient being placed on Home Treatment Regimen? Yes     Does the patient have everything they need prior to discharge?   NA     Comments: chart reviewed & pt assessed    Plan of Care: maintain home regimen    Electronically signed by Robert Chin RCP on 9/4/2021 at 11:12 PM    Respiratory Protocol Guidelines     1. Assessment and treatment by Respiratory Therapy will be initiated for medication and therapeutic interventions upon initiation of aerosolized medication. 2. Physician will be contacted for respiratory rate (RR) greater than 35 breaths per minute. Therapy will be held for heart rate (HR) greater than 140 beats per minute, pending direction from physician. 3. Bronchodilators will be administered via Metered Dose Inhaler (MDI) with spacer when the following criteria are met:  a. Alert and cooperative     b. HR < 140 bpm  c. RR < 30 bpm                d. Can demonstrate a 2-3 second inspiratory hold  4. Bronchodilators will be administered via Hand Held Nebulizer [facility]) to patients when ANY of the following criteria are met  a. Incognizant or uncooperative          b. Patients treated with HHN at Home        c. Unable to demonstrate proper use of MDI with spacer     d. RR > 30 bpm   5. Bronchodilators will be delivered via Metered Dose Inhaler (MDI), HHN, Aerogen to intubated patients on mechanical ventilation. 6. Inhalation medication orders will be delivered and/or substituted as outlined below. Aerosolized Medications Ordering and Administration Guidelines:    1. All Medications will be ordered by a physician, and their frequency and/or modality will be adjusted as defined by the patients Respiratory Severity Index (RSI) score. 2. If the patient does not have documented COPD, consider discontinuing anticholinergics when RSI is less than 9.  3. If the bronchospasm worsens (increased RSI), then the bronchodilator frequency can be increased to a maximum of every 4 hours. If greater than every 4 hours is required, the physician will be contacted.   4. If the bronchospasm improves, the frequency of the bronchodilator can be decreased, based on the patient's RSI, but not less than home treatment regimen frequency. 5. Bronchodilator(s) will be discontinued if patient has a RSI less than 9 and has received no scheduled or as needed treatment for 72  Hrs. Patients Ordered on a Mucolytic Agent:    1. Must always be administered with a bronchodilator. 2. Discontinue if patient experiences worsened bronchospasm, or secretions have lessened to the point that the patient is able to clear them with a cough. Anti-inflammatory and Combination Medications:    1. If the patient lacks prior history of lung disease, is not using inhaled anti-inflammatory medication at home, and lacks wheezing by examination or by history for at least 24 hours, contact physician for possible discontinuation.

## 2021-09-05 NOTE — ACP (ADVANCE CARE PLANNING)
Advance Care Planning   Healthcare Decision Maker:    Primary Decision Maker: Angelica Beckman - [phone]    Click here to complete Healthcare Decision Makers including selection of the Healthcare Decision Maker Relationship (ie \"Primary\").

## 2021-09-05 NOTE — PROGRESS NOTES
Patient lying in bed, denies pain at this time, RLE not in place, PM meds given. Patient denies any concerns at this time. Call light is within reach and bed is in low position.

## 2021-09-05 NOTE — H&P
Hospital Medicine History & Physical      PCP: Silvestre Isaac MD    Date of Admission: 9/4/2021    Date of Service: Pt seen/examined on 9/5/2021     Chief Complaint:    Chief Complaint   Patient presents with    Fall     right ankle and knee pain on floor over night       History Of Present Illness: The patient is a 80 y.o. female with COPD, RA, HTN, hypothyroidism who presented to [facility] ED with complaint of fall at home with subsequent RLE pain. She reports getting ready for bed last night, she was bending over using the aide of her walker and then fell to the floor. She denies LOC, chest pain, or heart palpitations leading to the fall. She was able to crawl back to her bed and sleep after the fall, but the next morning she noticed pain in the right ankle and inability to bear weight without severe pain on the RLE. She came to ER for eval where she was found to have a distal right fibular fracture. A walking boot was placed. She lives alone, not deemed safe to discharge back to home alone with injury.   Admitted for further care     Past Medical History:        Diagnosis Date    Asthma     [facility] [address] [facility]     Hyperlipidemia     Hypertension     RA [facility]rheumatoid arthritis) ([facility] [address] [facility]     Thyroid disease     has half of her thyroid       Past Surgical History:        Procedure Laterality Date    ABDOMINAL EXPLORATION SURGERY      APPENDECTOMY      CHOLECYSTECTOMY      COLONOSCOPY  4/18/2013    diverticulosis, polyps    COLONOSCOPY  11/10/2016    normal colon    EYE SURGERY Right 12/11/15    cataract removal    EYE SURGERY Left 1/8/16    cataract removed    FRACTURE SURGERY      HUMERUS FRACTURE SURGERY Right 2/28/2021    HUMERUS OPEN REDUCTION INTERNAL FIXATION performed by Alfredo Chamberlain MD at [address] [place] [address], PARTIAL Left     TUMOR REMOVAL      off of thyroid, was benign       Medications Prior to Admission:    Prior to Admission medications    Medication Sig Start Date End Date Taking? Authorizing Provider   atenolol (TENORMIN) 25 MG tablet Take 0.5 tablets by mouth daily 8/25/21   Ramsey Bauman MD   fluticasone-salmeterol [facility]) 250-50 MCG/DOSE AEPB USE 1 INHALATION BY MOUTH  EVERY 12 HOURS 8/18/21   Jenni Wynn MD   PROAIR  (34 Base) MCG/ACT inhaler INHALE 2 PUFFS BY MOUTH  INTO THE LUNGS EVERY 6  HOURS AS NEEDED FOR  WHEEZING OR SHORTNESS OF  BREATH 8/16/21   Edward Barros MD   ipratropium-albuterol (DUONEB) 0.5-2.5 (3) MG/3ML SOLN nebulizer solution Inhale 3 mLs into the lungs every 4 hours as needed for Shortness of Breath 7/25/21   Herminia Watts MD   polyethylene glycol (GLYCOLAX) 17 g packet Take 17 g by mouth daily 7/26/21   Herminia Watts MD   methotrexate (RHEUMATREX) 2.5 MG chemo tablet Take 10 mg by mouth once a week Monday nights 1/28/21   Historical Provider, MD   DULoxetine (CYMBALTA) 60 MG extended release capsule Take 60 mg by mouth nightly Before bed     Historical Provider, MD   RESTASIS 0.05 % ophthalmic emulsion  2/27/20   Historical Provider, MD   atorvastatin (LIPITOR) 80 MG tablet Take 80 mg by mouth every evening  8/18/19   Historical Provider, MD   triamterene-hydrochlorothiazide (MAXZIDE-25) 37.5-25 MG per tablet Take 1 tablet by mouth daily 10/19/18   Historical Provider, MD   Cholecalciferol (VITAMIN D) 2000 UNITS CAPS capsule Take 1 capsule by mouth nightly     Historical Provider, MD   levothyroxine (SYNTHROID) 25 MCG tablet Take 25 mcg by mouth Daily. Historical Provider, MD       Allergies:  Effexor [venlafaxine hcl], Fenofibrate, Prednisone, Codeine, and Docosahexaenoic acid-epa    Social History:  The patient currently lives at home alone     TOBACCO:   reports that she quit smoking about 34 years ago. Her smoking use included cigarettes. She started smoking about 62 years ago. She has a 10.00 pack-year smoking history.  She has never used smokeless tobacco.  ETOH:   reports no history of alcohol use. Family History:   Positive as follows:        Problem Relation Age of Onset    High Blood Pressure Mother        REVIEW OF SYSTEMS:       Constitutional: Negative for fever   HENT: Negative for sore throat   Eyes: Negative for redness   Respiratory: Negative  for dyspnea, cough   Cardiovascular: Negative for chest pain   Gastrointestinal: Negative for vomiting, diarrhea   Genitourinary: Negative for hematuria   Musculoskeletal: +for arthralgias   Skin: Negative for rash   Neurological: Negative for syncope   Hematological: Negative for adenopathy   Psychiatric/Behavorial: Negative for anxiety    PHYSICAL EXAM:    BP (!) 153/81   Pulse 72   Temp 97.6 °F (36.4 °C) (Oral)   Resp 16   Ht 5' 6\" (1.676 m)   Wt 178 lb (80.7 kg)   SpO2 93%   BMI 28.73 kg/m²     Gen: No distress. Alert. Eyes: PERRL. No sclera icterus. No conjunctival injection. ENT: No discharge. Pharynx clear. Neck: No JVD. No Carotid Bruit. Trachea midline. Resp: No accessory muscle use. No crackles. No wheezes. No rhonchi. CV: Regular rate. Regular rhythm. No murmur. No rub. No edema. GI: Non-tender. Non-distended. No masses. No organomegaly. Normal bowel sounds. No hernia. Skin: Warm and dry. No nodule on exposed extremities. No rash on exposed extremities. M/S: No cyanosis. No joint deformity. No clubbing. Walking boot in place RLE   Right lateral ankle swelling   No bruising   Neuro: Awake. Grossly nonfocal    Psych: Oriented x 3. No anxiety or agitation. Loren Jackson MD have reviewed the chart on [facility] and personally interviewed and examined patient, reviewed the data (labs and imaging) and after discussion with my PA formulated the plan. Agree with note with the following edits. HPI:     I reviewed the patient's Past Medical History, Past Surgical History, Medications, and Allergies.        80 y.o. female with COPD, RA, HTN, hypothyroidism who presented to [facility]  ED with complaint of fall at home with subsequent RLE pain. She reports getting ready for bed last night, she was bending over using the aide of her walker and then fell to the floor. She denies LOC, chest pain, or heart palpitations leading to the fall. She was able to crawl back to her bed and sleep after the fall, but the next morning she noticed pain in the right ankle and inability to bear weight without severe pain on the RLE. She came to ER for eval where she was found to have a distal right fibular fracture. A walking boot was placed. General:  Elderly female, healthy appearing  [place]  Awake, alert and oriented. Appears to be not in any distress  Mucous Membranes:  Pink , anicteric  Neck: No JVD, no carotid bruit, no thyromegaly  Chest:  Clear to auscultation bilaterally, minimal crackles   Cardiovascular:  RRR S1S2 heard, no murmurs or gallops  Abdomen:  Soft, undistended, non tender, no organomegaly, BS present  Extremities: right lateral malleolus pain and tenderness noted  Ankle movements painful    No edema or cyanosis on left LE .  Distal pulses well felt  Neurological : grossly normal          CBC:   Recent Labs     09/04/21  1230   WBC 6.4   HGB 11.9*   HCT 36.9   MCV 94.0        BMP:   Recent Labs     09/04/21  1230      K 3.6      CO2 28   BUN 14   CREATININE 0.8     LIVER PROFILE:   Recent Labs     09/04/21  1230   AST 37   ALT 33   BILITOT 0.5   ALKPHOS 112     U/A:    Lab Results   Component Value Date    COLORU Yellow 09/20/2020    WBCUA 0-2 09/20/2020    RBCUA 0-2 09/20/2020    MUCUS 2+ 06/18/2019    BACTERIA 3+ 09/20/2020    CLARITYU Clear 09/20/2020    SPECGRAV 1.020 09/20/2020    LEUKOCYTESUR Negative 09/20/2020    BLOODU Negative 09/20/2020    GLUCOSEU Negative 09/20/2020    AMORPHOUS 2+ 09/20/2020       ABG    Lab Results   Component Value Date    BQA4TRM 25.8 02/21/2012    BEART -0.5 02/21/2012    Z7UGNKTA 93.9 02/21/2012    PHART 7.341 02/21/2012    THGBART 14.5 02/21/2012    VLU9EIQ 48.9 02/21/2012    PO2ART 73.9 02/21/2012    MFK5DSH 27.4 02/21/2012       CULTURES  None     EKG:  I have reviewed the EKG with the following interpretation:   SR with PVCs in pattern of bigeminy     RADIOLOGY  XR KNEE RIGHT (1-2 VIEWS)   Final Result   Studies are limited secondary to diffuse osteopenia. Given this limitation no acute osseous abnormality of the right hip or right   knee is noted. There is a nondisplaced fracture of the distal fibula. XR HIP RIGHT (2-3 VIEWS)   Final Result   Studies are limited secondary to diffuse osteopenia. Given this limitation no acute osseous abnormality of the right hip or right   knee is noted. There is a nondisplaced fracture of the distal fibula. XR ANKLE RIGHT (MIN 3 VIEWS)   Final Result   Studies are limited secondary to diffuse osteopenia. Given this limitation no acute osseous abnormality of the right hip or right   knee is noted. There is a nondisplaced fracture of the distal fibula. ASSESSMENT/PLAN:    #Right distal fibular fracture  - from mechanical fall  - walking boot in place currently   - ortho c/s  - pain control with oral vicodin  - apply ice     #Debility   - 2/2 advanced age, physical deconditioning, chronic illness  - PT OT eval, she will likely need SNF  - CM c/s    #RA  - cont methotrexate, folic acid     #HTN  - BP stable cont atenolol and maxzide     #Ventricular bigeminy noted on EKG. Recently decreased atenolol to half- cont at 12.5 mg daily     #COPD  - No AE, cont inhalers     #Hypothyroidism  - cont synthroid    DVT Prophylaxis: Lovenox   Diet: ADULT DIET;  Regular  Code Status: Full Code    Charan Villarreal PA-C  9/5/2021 8:46 AM      Plan for rehab   Agree with above  Changes made to note    Rahul Manning MD,9/5/2021 9:18 AM

## 2021-09-05 NOTE — PROGRESS NOTES
Pt am assessment completed, VS stable. Pt denies pain at this time. Elevated RLE for swelling and applying ice PRN. Pt denies any needs at this time.

## 2021-09-06 VITALS
HEIGHT: 66 IN | RESPIRATION RATE: 18 BRPM | HEART RATE: 89 BPM | BODY MASS INDEX: 28.61 KG/M2 | DIASTOLIC BLOOD PRESSURE: 92 MMHG | TEMPERATURE: 97.3 F | WEIGHT: 178 LBS | SYSTOLIC BLOOD PRESSURE: 149 MMHG | OXYGEN SATURATION: 96 %

## 2021-09-06 PROBLEM — S82.831D CLOSED AVULSION FRACTURE OF DISTAL END OF RIGHT FIBULA WITH ROUTINE HEALING: Status: ACTIVE | Noted: 2021-09-04

## 2021-09-06 PROCEDURE — G0378 HOSPITAL OBSERVATION PER HR: HCPCS

## 2021-09-06 PROCEDURE — 97166 OT EVAL MOD COMPLEX 45 MIN: CPT

## 2021-09-06 PROCEDURE — 6360000002 HC RX W HCPCS: Performed by: INTERNAL MEDICINE

## 2021-09-06 PROCEDURE — 97162 PT EVAL MOD COMPLEX 30 MIN: CPT

## 2021-09-06 PROCEDURE — 97530 THERAPEUTIC ACTIVITIES: CPT

## 2021-09-06 PROCEDURE — 6370000000 HC RX 637 (ALT 250 FOR IP): Performed by: INTERNAL MEDICINE

## 2021-09-06 PROCEDURE — 6370000000 HC RX 637 (ALT 250 FOR IP): Performed by: PHYSICIAN ASSISTANT

## 2021-09-06 PROCEDURE — 99217 PR OBSERVATION CARE DISCHARGE MANAGEMENT: CPT | Performed by: INTERNAL MEDICINE

## 2021-09-06 PROCEDURE — 96372 THER/PROPH/DIAG INJ SC/IM: CPT

## 2021-09-06 PROCEDURE — 94640 AIRWAY INHALATION TREATMENT: CPT

## 2021-09-06 RX ORDER — HYDROCODONE BITARTRATE AND ACETAMINOPHEN 5; 325 MG/1; MG/1
1 TABLET ORAL EVERY 6 HOURS PRN
Qty: 20 TABLET | Refills: 0 | Status: SHIPPED | OUTPATIENT
Start: 2021-09-06 | End: 2021-09-11

## 2021-09-06 RX ADMIN — HYDROCODONE BITARTRATE AND ACETAMINOPHEN 1 TABLET: 5; 325 TABLET ORAL at 08:54

## 2021-09-06 RX ADMIN — FOLIC ACID 1 MG: 1 TABLET ORAL at 08:29

## 2021-09-06 RX ADMIN — ATENOLOL 12.5 MG: 25 TABLET ORAL at 08:29

## 2021-09-06 RX ADMIN — TRIAMTERENE AND HYDROCHLOROTHIAZIDE 1 TABLET: 37.5; 25 TABLET ORAL at 08:29

## 2021-09-06 RX ADMIN — Medication 2 PUFF: at 07:14

## 2021-09-06 RX ADMIN — ENOXAPARIN SODIUM 40 MG: 40 INJECTION SUBCUTANEOUS at 08:29

## 2021-09-06 RX ADMIN — LEVOTHYROXINE SODIUM 25 MCG: 25 TABLET ORAL at 06:32

## 2021-09-06 ASSESSMENT — PAIN SCALES - GENERAL
PAINLEVEL_OUTOF10: 7
PAINLEVEL_OUTOF10: 3

## 2021-09-06 NOTE — CARE COORDINATION
DISCHARGE ORDER  Date/Time 2021 12:57 PM  Completed by: Tasneem Yo RN, Case Management    Patient Name: Rafaela Blood    : 1940      Admit order Date and Status:observation  Noted discharge order. (verify MD's last order for status of admission/Traditional Medicare 3 [place] Inpatient qualifying stay required for SNF)    Confirmed discharge plan with:              Patient:  Yes             Discharge to Facility: [facility] phone number for staff giving report: [phone] completed: [facility] Exemption Notification (HENS) completed: PASSR completed   Discharge orders and Continuity of Care faxed to facility:  yes      Transportation:               Medical Transport explained with choice list offered to pt/family. Choice:(no preference)  Agency used: [address] [place] [address] Road up time:    [phone]      Pt/family/Nursing/Facility aware of  time: yes  Yes Names: pt, Jamia with Megan Garces, bedside nurse,Dyan  Ambulance form completed:  yes:      Comments:writer spoke with Iman P[facility] from [facility] and she can accept the pt today. Pt is being d/c'd to SNF today. Pt's O2 sats are 96% on RA. Discharge timeout done with ROZINA Zaidi. All discharge needs and concerns addressed. Discharging nurse to complete KEITH, reconcile AVS, and place final copy with patient's discharge packet. Discharging RN to ensure that written prescriptions for  Level II medications are sent with patient to the facility as per protocol.

## 2021-09-06 NOTE — PLAN OF CARE
Problem: Falls - Risk of:  Goal: Will remain free from falls  Description: Will remain free from falls  9/6/2021 0858 by Christy Pike RN  Outcome: Ongoing  9/5/2021 2104 by Pierre Farley RN  Outcome: Ongoing  Goal: Absence of physical injury  Description: Absence of physical injury  9/6/2021 0858 by Christy Pike RN  Outcome: Ongoing  9/5/2021 2104 by Pierre Farley RN  Outcome: Ongoing     Problem: Infection:  Goal: Will remain free from infection  Description: Will remain free from infection  9/6/2021 0858 by Christy Pike RN  Outcome: Ongoing  9/5/2021 2104 by Pierre Farley RN  Outcome: Ongoing     Problem: Safety:  Goal: Free from accidental physical injury  Description: Free from accidental physical injury  9/6/2021 0858 by Christy Pike RN  Outcome: Ongoing  9/5/2021 2104 by Pierre Farley RN  Outcome: Ongoing  Goal: Free from intentional harm  Description: Free from intentional harm  9/6/2021 0858 by Christy Pike RN  Outcome: Ongoing  9/5/2021 2104 by Pierre Farley RN  Outcome: Ongoing     Problem: Daily Care:  Goal: Daily care needs are met  Description: Daily care needs are met  9/6/2021 0858 by Christy Pike RN  Outcome: Ongoing  9/5/2021 2104 by Pierre Farley RN  Outcome: Ongoing     Problem: Pain:  Goal: Patient's pain/discomfort is manageable  Description: Patient's pain/discomfort is manageable  9/6/2021 0858 by Christy Pike RN  Outcome: Ongoing  9/5/2021 2104 by Pierre Farley RN  Outcome: Ongoing     Problem: Skin Integrity:  Goal: Skin integrity will stabilize  Description: Skin integrity will stabilize  9/6/2021 0858 by Christy Pike RN  Outcome: Ongoing  9/5/2021 2104 by Pierre Farley RN  Outcome: Ongoing     Problem: Discharge Planning:  Goal: Patients continuum of care needs are met  Description: Patients continuum of care needs are met  9/6/2021 0858 by Christy Pike RN  Outcome: Ongoing  9/5/2021 2104 by Pierre Farley RN  Outcome: Ongoing     Problem: Skin Integrity:  Goal: Will show no infection signs and symptoms  Description: Will show no infection signs and symptoms  9/6/2021 0858 by Jaret Santillan RN  Outcome: Ongoing  9/5/2021 2104 by Kennedy Perez RN  Outcome: Ongoing  Goal: Absence of new skin breakdown  Description: Absence of new skin breakdown  9/6/2021 0858 by Jaret Santillan RN  Outcome: Ongoing  9/5/2021 2104 by Kennedy Perez RN  Outcome: Ongoing

## 2021-09-06 NOTE — DISCHARGE SUMMARY
Name:  Hollie Chavez  Room:  7525/0557-52  MRN:    [id number]    Discharge Summary      This discharge summary is in conjunction with a complete physical exam done on the day of discharge. Discharging Physician: Dr. Edvin Matthews: 9/4/2021  Discharge:   9/6/2021     HPI taken from admission H&P:    The patient is a 80 y.o. female with COPD, RA, HTN, hypothyroidism who presented to [facility] ED with complaint of fall at home with subsequent RLE pain. She reports getting ready for bed last night, she was bending over using the aide of her walker and then fell to the floor. She denies LOC, chest pain, or heart palpitations leading to the fall. She was able to crawl back to her bed and sleep after the fall, but the next morning she noticed pain in the right ankle and inability to bear weight without severe pain on the RLE. She came to ER for eval where she was found to have a distal right fibular fracture. A walking boot was placed. She lives alone, not deemed safe to discharge back to home alone with injury. Admitted for further care     Diagnoses this Admission and Hospital Course     #Right distal fibular fracture  - from mechanical fall  - ortho c/s- no surgical intervention, WBAT  - PT OT- recommended SNF  - pain control with oral vicodin- cont on discharge   - apply ice   - ortho follow up 5-7 days       #Debility   - 2/2 advanced age, physical deconditioning, chronic illness  - PT OT eval, dc to snf   - CM c/s     #RA  - cont methotrexate, folic acid      #HTN  - BP stable cont atenolol and maxzide      #Ventricular bigeminy noted on EKG.   Recently decreased atenolol to half- cont at 12.5 mg daily     #COPD  - No AE, cont inhalers      #Hypothyroidism  - cont synthroid    Procedures (Please Review Full Report for Details)  None     Consults    Ortho surgery       Physical Exam at Discharge:    BP (!) 149/92   Pulse 89   Temp 97.3 °F (36.3 °C) (Oral)   Resp 18   Ht 5' 6\" (1.676 m)   Wt 178 lb (80.7 kg)   SpO2 96%   BMI 28.73 kg/m²     Gen: No distress. Alert. Eyes: PERRL. No sclera icterus. No conjunctival injection. ENT: No discharge. Pharynx clear. Neck: No JVD. No Carotid Bruit. Trachea midline. Resp: No accessory muscle use. No crackles. No wheezes. No rhonchi. CV: Regular rate. Regular rhythm. No murmur. No rub. No edema. GI: Non-tender. Non-distended. No masses. No organomegaly. Normal bowel sounds. No hernia. Skin: Warm and dry. No nodule on exposed extremities. No rash on exposed extremities. M/S: No cyanosis. No joint deformity. No clubbing. Walking boot in place RLE   Neuro: Awake. Grossly nonfocal    Psych: Oriented x 3. No anxiety or agitation. CBC:   Recent Labs     09/04/21  1230   WBC 6.4   HGB 11.9*   HCT 36.9   MCV 94.0        BMP:   Recent Labs     09/04/21  1230      K 3.6      CO2 28   BUN 14   CREATININE 0.8     LIVER PROFILE:   Recent Labs     09/04/21  1230   AST 37   ALT 33   BILITOT 0.5   ALKPHOS 112     RADIOLOGY  XR ANKLE RIGHT (2 VIEWS)   Final Result   Addendum 1 of 1   ADDENDUM:   After the initial report was completed an additional stress view of the    right   ankle was added. After reviewing this additional image there are no    changes   to the initial report. Final   Stable alignment of the previously described distal fibular fracture in the   right ankle. XR KNEE RIGHT (1-2 VIEWS)   Final Result   Studies are limited secondary to diffuse osteopenia. Given this limitation no acute osseous abnormality of the right hip or right   knee is noted. There is a nondisplaced fracture of the distal fibula. XR HIP RIGHT (2-3 VIEWS)   Final Result   Studies are limited secondary to diffuse osteopenia. Given this limitation no acute osseous abnormality of the right hip or right   knee is noted. There is a nondisplaced fracture of the distal fibula.          XR ANKLE RIGHT (MIN 3 VIEWS)   Final Follow up with PCP in 1 week, ortho as instructed     Ce Parry PA-C  9/6/2021 11:50 AM      Pt seen and examined  Ok to dc   F/w ortho in  1 week    Ivis Falcon MD, 9/6/2021 12:08 PM

## 2021-09-06 NOTE — PROGRESS NOTES
Inpatient Occupational Therapy  Evaluation and Treatment    Unit: [address] [place]  Date:  9/6/2021  Patient Name:    Rogerio Asencio  Admitting diagnosis:  Fracture of fibula alone [S82.409A]  Fall, initial encounter [W19. XXXA]  Closed avulsion fracture of distal fibula, left, initial encounter [S82.832A]  Admit Date:  9/4/2021  Precautions/Restrictions/WB Status/ Lines/ Wounds/ Oxygen: fall risk, IV and bed/chair alarm    Treatment Time:  855-183 (OBSEVATION)  Treatment Number: 1      Billable Treatment Time: 9 minutes   Total Treatment Time:   38   minutes    Patient Goals for Therapy:  \" to go to rehab \"      Discharge Recommendations: SNF  DME needs for discharge: defer to facility       Therapy recommendations for staff:  Assist of 1-2 with use of STEDY for all transfers to/from BSC/chair    History of Present Illness: ED note, 9/4/2021, Robin Goodness:   \" 80 y.o. female  who presents to the ED complaining of fall and ankle and knee pain. Patient states that she lives alone, had a mechanical fall earlier today and was down on the floor for approximately 30 minutes after that before she was able to crawl to a phone. She states that she is having pain in her right knee and right ankle. She states that she did not hit her head or lose consciousness, just fell because she lost her balance. She denies any use of anticoagulants, head trauma, or other complaints or concerns. \"     Dr. Gardner Levels: \" Gravity stress view without medial clear space widening. SER II fracture- stable. Patient may advance to WBAT RLE in boot and mobilization with PT OOB. Ice elevation and f/u per above in Dr Lucy Macario or my clinic at [facility] post discharge. Will sign off - please call with questions \"     Home Health S4 Level Recommendation:  NA    AM-PAC Score: AM-PAC Inpatient Daily Activity Raw Score: 15  Pt scored a 15/24 on the AM-PAC ADL Inpatient form.  Current research shows that an AM-PAC score of 17 or less is typically not associated with a discharge to the patient's home setting. Preadmission Environment    Pt. Jeevan Cortes  Son lives in town, check in a few times a week. He is able to assist PRN but pt states she cannot stay at sons home-no 24/7 and house is too big  Home environment:    apartment (one-story apartment, [employer] senior living)  Steps to enter first floor: No steps  Steps to second floor: N/A  Bathroom: walk in shower, comfort height toilet, grab bars and hand held shower head  Equipment owned: RW, shower chair, SPC and hurry-cane     Preadmission Status:  Pt. Able to drive: able,but not currently driving , gets help from friends and family for transportation  Pt Fully independent with ADLs: Yes  Sleeps in flat bed  Pt. Required assistance from family for: Independent PTA - except help from family/neighbor for laundry and grocery shopping (granddaughter drives to/from store)  Pt. independent for transfers and gait and walked with [employer] and [employer] apartment. [facility] [address] at [employer],friend would give her a ride. History of falls Yes  the fall leading to this admission, and others including  February 2021 resulting in wrist fracture and reports couple more falls since then  [facility] had 2 admissions (July,august 21) seeing ortho/spine -contemplating back surgery    Pain:  Yes  Rating:  severe  Location:  With standing  Pain Medicine Status:  Received pain med prior to tx and RN notified      Cognition:    A&O Person, Place, Time and Situation   Able to follow 2 step commands, consistently. Subjective:  Pt supine in bed upon therapist arrival. Pt agreeable to work with therapy this date. Upper Extremity ROM:   WFL,  pt able to perform all bed mobility, transfers, and gait without ROM limitation.    Splint to L wrist.     Upper Extremity Strength:    BUE strength WFL, but not formally assessed w/ MMT    Upper Extremity Sensation:    WNL    Upper Extremity Proprioception:  WNL    Coordination and Tone:  WNL    Balance:  Functional Sitting Balance:  WFL   Comments: Supervision at EOB   Functional Standing Balance:Impaired   Comments: Limited by pain     Bed mobility:    Supine to sit:   supervision, HOB flat, no bed rails   Sit to supine:   Not Tested  Rolling:    Independent  Scooting in sitting:  CGA  Scooting to head of bed:   Not Tested   Bridging:   No    Transfers:    Sit to stand:  CGA and with rolling walker      CGA at STEDY  Stand to sit:  [facility]  Bed to chair:   total assistance (100%) and with STEDY   Standard toilet: Not Tested  Bed to [facility]:  Not Tested    Activities of Daily Living:   UB Dressing:   Not Tested  LB Dressing:    total assistance (100%)  UB Bathing:  Not Tested  LB Bathing:  Not Tested  Feeding:  Independent  Grooming:   Not Tested  Toileting:  Not Tested    Activity Tolerance:   Pt completed therapy session with Pain noted with standing   BP (mmHg) HR (bpm) SpO2 (%) Comments   Supine, at rest 129/81 93 95 Room air     Positioning Needs:   Up in chair, call light and needs in reach. Alarm Set    Exercise / Activities Initiated:   NT    Patient/Family Education:   Role of OT  Recommendations for DC    Assessment of Deficits: Pt seen for Occupational therapy evaluation in acute care setting. Pt demonstrated decreased ADLs, IADLs, Balance  and Transfers. Pt functioning below baseline and will likely benefit from skilled occupational therapy services to maximize safety and independence. Goal(s): To be met in 3 Visits:  1). Bed to [facility]: minimal assistance (25%) and with rolling walker     To be met in 5 Visits:  1). Supine to/from Sit:  Independent  2). Upper Body Bathing:   supervision  3). Lower Body Bathing:   minimal assistance (25%)  4). Upper Body Dressing:  supervision  5). Lower Body Dressing:  minimal assistance (25%)  6). Pt to demonstrate UE exs x 15 reps with minimal cues    Rehabilitation Potential:  Good for goals listed above.     Strengths for achieving goals include: Pt motivated, PLOF and Pt cooperative  Barriers to achieving goals include:  Pain     Plan: To be seen 3-5 x/wk while in acute care setting for transfer training and ADL/IADL retraining.       SYLVIE Higgins, OTR/L   [id number]           If patient discharges from this facility prior to next visit, this note will serve as the Discharge Summary

## 2021-09-06 NOTE — PROGRESS NOTES
Bedside Mobility Assessment Tool (BMAT):     Assessment Level 1- Sit and Shake    1. From a semi-reclined position, ask patient to sit up and rotate to a seated position at the side of the bed. Can use the bedrail. 2. Ask patient to reach out and grab your hand and shake making sure patient reaches across his/her midline. Pass- Patient is able to come to a seated position, maintain core strength. Maintains seated balance while reaching across midline. Move on to Assessment Level 2. Assessment Level 2- Stretch and Point   1. With patient in seated position at the side of the bed, have patient place both feet on the floor (or stool) with knees no higher than hips. 2. Ask patient to stretch one leg and straighten the knee, then bend the ankle/flex and point the toes. If appropriate, repeat with the other leg. Fail- Patient is unable to complete task. Patient is MOBILITY LEVEL 2. Assessment Level 3- Stand   1. Ask patient to elevate off the bed or chair (seated to standing) using an assistive device (cane, bedrail). 2. Patient should be able to raise buttocks off be and hold for a count of five. May repeat once. Fail- Patient unable to demonstrate standing stability. Patient is MOBILITY LEVEL 3. Assessment Level 4- Walk   1. Ask patient to march in place at bedside. 2. Then ask patient to advance step and return each foot. Some medical conditions may render a patient from stepping backwards, use your best clinical judgement. Fail- Patient not able to complete tasks OR requires use of assistive device. Patient is MOBILITY LEVEL 3.        Mobility Level- 1

## 2021-09-06 NOTE — PLAN OF CARE
How Severe Is Your Acne?: moderate Problem: Falls - Risk of:  Goal: Will remain free from falls  Description: Will remain free from falls  9/5/2021 2104 by Heaven Lugo RN  Outcome: Ongoing  9/5/2021 1543 by Robert Brand RN  Outcome: Ongoing  9/5/2021 1541 by Robert Brand RN  Outcome: Ongoing  Goal: Absence of physical injury  Description: Absence of physical injury  9/5/2021 2104 by Heaven Lugo RN  Outcome: Ongoing  9/5/2021 1543 by Robert Brand RN  Outcome: Ongoing  9/5/2021 1541 by Robert Brand RN  Outcome: Ongoing     Problem: Infection:  Goal: Will remain free from infection  Description: Will remain free from infection  9/5/2021 2104 by Heaven Lugo RN  Outcome: Ongoing  9/5/2021 1543 by Robert Brand RN  Outcome: Ongoing  9/5/2021 1541 by Robert Brand RN  Outcome: Ongoing     Problem: Safety:  Goal: Free from accidental physical injury  Description: Free from accidental physical injury  9/5/2021 2104 by Heaven Lugo RN  Outcome: Ongoing  9/5/2021 1543 by Robert Brand RN  Outcome: Ongoing  9/5/2021 1541 by Robert Brand RN  Outcome: Ongoing  Goal: Free from intentional harm  Description: Free from intentional harm  9/5/2021 2104 by Heaven Lugo RN  Outcome: Ongoing  9/5/2021 1543 by Robert Brand RN  Outcome: Ongoing  9/5/2021 1541 by Robert Brand RN  Outcome: Ongoing     Problem: Daily Care:  Goal: Daily care needs are met  Description: Daily care needs are met  9/5/2021 2104 by Heaven Lugo RN  Outcome: Ongoing  9/5/2021 1543 by Robert Brand RN  Outcome: Ongoing  9/5/2021 1541 by Robert Brand RN  Outcome: Ongoing     Problem: Pain:  Goal: Patient's pain/discomfort is manageable  Description: Patient's pain/discomfort is manageable  9/5/2021 2104 by Heaven Lugo RN  Outcome: Ongoing  9/5/2021 1543 by Robert Brand RN  Outcome: Ongoing  9/5/2021 1541 by Robert Brand RN  Outcome: Ongoing     Problem: Skin Integrity:  Goal: Skin integrity will stabilize  Description: Skin integrity Is This A New Presentation, Or A Follow-Up?: Follow Up Acne will stabilize  9/5/2021 2104 by Racheal Patel RN  Outcome: Ongoing  9/5/2021 1543 by Raquel Pike RN  Outcome: Ongoing  9/5/2021 1541 by Raquel Pike RN  Outcome: Ongoing     Problem: Discharge Planning:  Goal: Patients continuum of care needs are met  Description: Patients continuum of care needs are met  9/5/2021 2104 by Racheal Patel RN  Outcome: Ongoing  9/5/2021 1543 by Raquel Pike RN  Outcome: Ongoing  9/5/2021 1541 by Raquel Pike RN  Outcome: Ongoing     Problem: Skin Integrity:  Goal: Will show no infection signs and symptoms  Description: Will show no infection signs and symptoms  Outcome: Ongoing  Goal: Absence of new skin breakdown  Description: Absence of new skin breakdown  Outcome: Ongoing

## 2021-09-06 NOTE — PROGRESS NOTES
4 Eyes Skin Assessment     The patient is being assess for   Transfer to [facility] SHELL LANDA    I agree that 2 RN's have performed a thorough Head to Toe Skin Assessment on the patient. ALL assessment sites listed below have been assessed. Areas assessed for pressure by both nurses:   [x]   Head, Face, and Ears   [x]   Shoulders, Back, and Chest, Abdomen  [x]   Arms, Elbows, and Hands   [x]   Coccyx, Sacrum, and Ischium  [x]   Legs, Feet, and Heels    Scattered bruising        Skin Assessed Under all Medical Devices by both nurses:  braces              All Mepilex Borders were peeled back and area peeked at by both nurses:  No: Not applicable  Please list where Mepilex Borders are located:  None             **SHARE this note so that the co-signing nurse is able to place an eSignature**    Co-signer eSignature: Electronically signed by Sumanth Wright RN on 9/6/21 at 1:59 PM EDT    Does the Patient have Skin Breakdown related to pressure?   No            Milad Prevention initiated:  NA   Wound Care Orders initiated:  NA      [facility] nurse consulted for Pressure Injury (Stage 3,4, Unstageable, DTI, NWPT, Complex wounds)and New or Established Ostomies:  NA      Primary Nurse eSignature: Electronically signed by Donnie Mckeon RN on 9/6/21 at 1:43 PM EDT

## 2021-09-06 NOTE — DISCHARGE INSTR - COC
Continuity of Care Form    Patient Name: Dora Castellanos   :    MRN:  [id number]    Admit date:  2021  Discharge date:  2021    Code Status Order: Full Code   Advance Directives:      Admitting Physician:  Mary Ann Keith MD  PCP: Rajni Rea MD    Discharging Nurse: [facility] Unit/Room#: 0227/0227-02  Discharging Unit Phone Number: [phone]  Emergency Contact:   Extended Emergency Contact Information  Primary Emergency Contact: Sierra Baeza [address] Phone: [phone]  Work Phone: [phone]  Mobile Phone: [phone]  Relation: Child    Past Surgical History:  Past Surgical History:   Procedure Laterality Date    ABDOMINAL EXPLORATION SURGERY      APPENDECTOMY      CHOLECYSTECTOMY      COLONOSCOPY  2013    diverticulosis, polyps    COLONOSCOPY  11/10/2016    normal colon    EYE SURGERY Right 12/11/15    cataract removal    EYE SURGERY Left 16    cataract removed    FRACTURE SURGERY      HUMERUS FRACTURE SURGERY Right 2021    HUMERUS OPEN REDUCTION INTERNAL FIXATION performed by Donte Valentine MD at [address] [place] [address], PARTIAL Left     TUMOR REMOVAL      off of thyroid, was benign       Immunization History:   Immunization History   Administered Date(s) Administered    COVID-19, Varghese Peter, PF, 30mcg/0.3mL 2021, 2021    Influenza A (C6X9-06) Vaccine IM 2009    Influenza Vaccine, unspecified formulation 2009, 2010, 2011, 10/11/2012, 2014, 10/01/2015, 10/01/2016    Influenza Virus Vaccine 2009, 2010, 2011, 10/11/2012, 2014, 10/01/2016, 10/01/2016, 2016    Influenza Whole 10/06/2014, 10/01/2015    Influenza, High Dose (Fluzone 65 yrs and older) 2013, 10/09/2015, 2016, 2017    Influenza, Quadv, 6 mo and older, IM, PF (Flulaval, Fluarix) 2018    Influenza, Quadv, IM, (6 mo and older Fluzone, Flulaval, Fluarix and 3 yrs and older Afluria) 12/05/2009, 09/16/2010, 11/17/2011, 10/06/2014, 10/01/2015    Pneumococcal Conjugate 13-valent ([id number]) 03/22/2018    Pneumococcal Conjugate 7-valent (Prevnar7) 12/05/2011    Pneumococcal Polysaccharide ([id number]) 10/26/2009, 03/21/2019    Tdap (Boostrix, Adacel) 08/16/2012       Active Problems:  Patient Active Problem List   Diagnosis Code    RA (rheumatoid arthritis) (formerly [facility]) M06.9    Benign essential HTN I10    Asthma J45.909    Hyperlipidemia E78.5    Asthma exacerbation J45. 901    Pleuritic chest pain R07.81    Chest pain on breathing R07.1    Pulmonary nodules R91.8    Chronic rhinitis J31.0    COPD (chronic obstructive pulmonary disease) (formerly [facility]) J44.9    Bronchitis J40    Hypothyroidism E03.9    COPD exacerbation (formerly [facility]) J44.1    Elevated d-dimer R79.89    Personal history of immunosupression therapy Z92.25    Atelectasis J98.11    Chest congestion R09.89    Mucus plugging of bronchi T17.500A    History of rheumatoid arthritis Z87.39    Former smoker Z87.891    Pyuria R82.81    History of humerus fracture Z87.81    Obesity E66.9    Right supracondylar humerus fracture S42.411A    Closed supracondylar fracture of right humerus S42.411A    Multifocal pneumonia J18.9    Hypoxia R09.02    Hyponatremia E87.1    Pneumonia of both lungs due to infectious organism J18.9    Acute hypoxemic respiratory failure (formerly [facility]) J96.01    Bradycardia R00.1    Fatigue R53.83    Frequent PVCs I49.3    Hypernatremia E87.0    Thrombocytopenia (formerly [facility]) D69.6    Closed avulsion fracture of distal end of right fibula with routine healing S82.831D    Fall W19. Moon Blinks    Fracture of fibula alone S82.409A       Isolation/Infection:   Isolation            No Isolation          Patient Infection Status       Infection Onset Added Last Indicated Last Indicated By Review Planned Expiration Resolved Resolved By    None active    Resolved    COVID-19 Rule Out 09/04/21 09/04/21 09/04/21 COVID-19 & Influenza Combo (Ordered)   09/04/21 Rule-Out Test Resulted    COVID-19 Rule Out 08/23/21 08/23/21 08/23/21 COVID-19, Rapid (Ordered)   08/23/21 Rule-Out Test Resulted    COVID-19 Rule Out 07/25/21 07/25/21 07/25/21 COVID-19, Rapid (Ordered)   07/25/21 Rule-Out Test Resulted    COVID-19 Rule Out 07/20/21 07/20/21 07/20/21 COVID-19 & Influenza Combo (Ordered)   07/20/21 Rule-Out Test Resulted            Nurse Assessment:  Last Vital Signs: BP (!) 149/92   Pulse 89   Temp 97.3 °F (36.3 °C) (Oral)   Resp 18   Ht 5' 6\" (1.676 m)   Wt 178 lb (80.7 kg)   SpO2 96%   BMI 28.73 kg/m²     Last documented pain score (0-10 scale): Pain Level: 3  Last Weight:   Wt Readings from Last 1 Encounters:   09/04/21 178 lb (80.7 kg)     Mental Status:  oriented and alert    IV Access:  - None    Nursing Mobility/ADLs:  Walking   Assisted  Transfer  Assisted  Bathing  Assisted  Dressing  Assisted  Toileting  Assisted  Feeding  Independent  Med Admin  Independent  Med Delivery   whole    Wound Care Documentation and Therapy:        Elimination:  Continence:   · Bowel: Yes  · Bladder: Yes  Urinary Catheter: None   Colostomy/Ileostomy/Ileal Conduit: No       Date of Last BM: 9/6/2021    Intake/Output Summary (Last 24 hours) at 9/6/2021 1151  Last data filed at 9/6/2021 0900  Gross per 24 hour   Intake 820 ml   Output --   Net 820 ml     I/O last 3 completed shifts: In: 0 [P.O.:880]  Out: -     Safety Concerns:     History of Falls (last 30 days)    Impairments/Disabilities:      None    Nutrition Therapy:  Current Nutrition Therapy:   - Oral Diet:  General    Routes of Feeding: Oral  Liquids: No Restrictions  Daily Fluid Restriction: no  Last Modified Barium Swallow with Video (Video Swallowing Test): not done    Treatments at the Time of Hospital Discharge:   Respiratory Treatments: ***  Oxygen Therapy:  is not on home oxygen therapy.   Ventilator:    - No ventilator support    Rehab

## 2021-09-06 NOTE — PROGRESS NOTES
Recommendation:  NA  AM-PAC Mobility Score    AM-PAC Inpatient Mobility Raw Score : 12       Preadmission Environment    Pt. Gracy Lujan  Son lives in town, check in a few times a week. He is able to assist PRN but pt states she cannot stay at sons home-no 24/7 and house is too big  Home environment:    apartment (one-story apartment, [employer] senior living)  Steps to enter first floor: No steps  Steps to second floor: N/A  Bathroom: walk in shower, comfort height toilet, grab bars and hand held shower head  Equipment owned: RW, shower chair, SPC and hurry-cane     Preadmission Status:  Pt. Able to drive: able,but not currently driving , gets help from friends and family for transportation  Pt Fully independent with ADLs: Yes  Sleeps in flat bed  Pt. Required assistance from family for: Independent PTA - except help from family/neighbor for laundry and grocery shopping (granddaughter drives to/from store)  Pt. independent for transfers and gait and walked with [employer] and [employer] apartment.  Used walker or SPC at grocery store,friend would give her a ride. History of falls Yes  the fall leading to this admission, and others including  February 2021 resulting in wrist fracture and reports couple more falls since then  Ellen Mcintyre had 2 admissions (July,august 21) seeing Dr. Sol Up, ortho/spine -contemplating back surgery    Pain   None at rest  Location:   Rating: NA /10, mild back pain after transfer  Pain Medicine Status: Received pain med prior to tx    Cognition    A&O Person, Place, Time and Situation   Able to follow 2 step commands    Subjective  Patient lying supine in bed with no family present. Pt agreeable to this PT eval & tx. Upper Extremity ROM/Strength  Please see OT evaluation.       Lower Extremity ROM / Strength   AROM WFL: Yes  ROM limitations: RLE in high tide boot    Strength Assessment (measured on a 0-5 scale):  R LE   Quad   4   Ant Tib  Not tested   Hamstring 4   Iliopsoas 4-  L LE  Quad 4+   Ant Tib  4+   Hamstring 4+   Iliopsoas 4+      Lower Extremity Sensation    WFL    Lower Extremity Proprioception:   NT    Coordination and Tone  NT    Balance  Sitting:  Good ; SBA  Comments:     Standing: Poor; Mod A   Comments: LOB in posterior direction x2,increased back pain    Bed mobility:    Supine to sit:                           supervision, HOB flat, no bed rails   Sit to supine:                           Not Tested  Rolling:                                    Independent  Scooting in sitting:                   CGA  Scooting to head of bed:         Not Tested        Bridging: No     Transfers:    Sit to stand:                 CGA and with rolling walker                            CGA at STEDY  Stand to sit:                 [facility]  Bed to chair:                total assistance (100%) and with STEDY   Standard toilet:            Not Tested  Bed to [facility]:                Not Tested    Gait gait deferred due to difficulty with transfers; pt ambulated 0 ft. Stair Training deferred, pt unsafe/ not appropriate to complete stairs at this time    Activity Tolerance   Pt completed therapy session with No adverse symptoms noted with activity   BP HR SpO2 Patient Comments   Supine, at rest 129/81 93bpm 95% on ra       Positioning Needs   Pt reclined in chair, alarm set, positioned in proper neutral alignment and pressure relief provided. Call light provided and all needs within reach  PAtient demonstrated ability to self recline chair    Other    Patient/Family Education   Pt educated on role of inpatient PT, POC, importance of continued activity, DC recommendations, safety awareness, transfer techniques and calling for assist with mobility. Assessment  Pt seen for Physical Therapy evaluation in acute care setting. Pt demonstrated decreased Activity tolerance, Balance, Safety and Strength as well as decreased independence with Ambulation and Transfers.    Recommending continued skilled PT while in acute care . Recommending SNF upon discharge as patient functioning well below baseline, demonstrates good rehab potential and unable to return home due to limited or no family support, burden of care beyond caregiver ability and home environment not conducive to patient recovery. Goals : To be met in 3 visits:  1). Independent with LE Ex x 10 reps  2.) Bed to chair: Mod A     To be met in 6 visits:  1). Supine to/from sit: Independent  2). Sit to/from stand: CGA  3). Bed to chair: CGA  4). Gait: Ambulate 50 ft.  with  CGA and use of rolling walker (RW)  5). Tolerate B LE exercises 3 sets of 10-15 reps      Rehabilitation Potential: Good  Strengths for achieving goals include:   Pt motivated, PLOF and Pt cooperative   Barriers to achieving goals include:    Complexity of condition and Pain    Plan    To be seen 3-5 x / week  while in acute care setting for therapeutic exercises, bed mobility, transfers, progressive gait training, balance training, and family/patient education. Signature: Freda Hammans, PT #[id number]     If patient discharges from this facility prior to next visit, this note will serve as the Discharge Summary.

## 2021-09-16 ENCOUNTER — OFFICE VISIT (OUTPATIENT)
Dept: ORTHOPEDIC SURGERY | Age: 81
End: 2021-09-16
Payer: MEDICARE

## 2021-09-16 VITALS — WEIGHT: 174 LBS | BODY MASS INDEX: 27.97 KG/M2 | HEIGHT: 66 IN

## 2021-09-16 DIAGNOSIS — M25.571 ACUTE RIGHT ANKLE PAIN: ICD-10-CM

## 2021-09-16 DIAGNOSIS — S82.831A CLOSED FRACTURE OF DISTAL END OF RIGHT FIBULA, UNSPECIFIED FRACTURE MORPHOLOGY, INITIAL ENCOUNTER: Primary | ICD-10-CM

## 2021-09-16 PROCEDURE — 1123F ACP DISCUSS/DSCN MKR DOCD: CPT | Performed by: ORTHOPAEDIC SURGERY

## 2021-09-16 PROCEDURE — L4361 PNEUMA/VAC WALK BOOT PRE OTS: HCPCS | Performed by: ORTHOPAEDIC SURGERY

## 2021-09-16 PROCEDURE — G8427 DOCREV CUR MEDS BY ELIG CLIN: HCPCS | Performed by: ORTHOPAEDIC SURGERY

## 2021-09-16 PROCEDURE — 4040F PNEUMOC VAC/ADMIN/RCVD: CPT | Performed by: ORTHOPAEDIC SURGERY

## 2021-09-16 PROCEDURE — G8400 PT W/DXA NO RESULTS DOC: HCPCS | Performed by: ORTHOPAEDIC SURGERY

## 2021-09-16 PROCEDURE — 1036F TOBACCO NON-USER: CPT | Performed by: ORTHOPAEDIC SURGERY

## 2021-09-16 PROCEDURE — 1090F PRES/ABSN URINE INCON ASSESS: CPT | Performed by: ORTHOPAEDIC SURGERY

## 2021-09-16 PROCEDURE — G8417 CALC BMI ABV UP PARAM F/U: HCPCS | Performed by: ORTHOPAEDIC SURGERY

## 2021-09-16 PROCEDURE — 99213 OFFICE O/P EST LOW 20 MIN: CPT | Performed by: ORTHOPAEDIC SURGERY

## 2021-09-16 NOTE — PROGRESS NOTES
ORTHOPAEDIC SURGERY H&P / CONSULTATION NOTE    Chief complaint:   Chief Complaint   Patient presents with    Ankle Injury     right ankle injury      The patient is a 80 y.o. female with subjective symptoms of right ankle pain. The chief complaint is located at lateral aspect right ankle. Duration of symptoms has been since 9/2/21. The severity of symptoms is rated at 0/10 pain on intake form. Patient states that she had a fall from standing and having twisted her ankle. She states right ankle pain. She was unable to put significant weight on her ankle at that time. She was seen in the emergency room on 9/4/2021 and ultimately was admitted for right ankle pain. She had been placed in a boot having been found to have a closed distal fibula fracture. She denies previous right ankle injury. Patient had been seen and evaluated as an inpatient and found to have a closed stable right ankle fracture given stable gravity stress view. Patient was made WBAT using assist device as needed with mobilization as tolerated and PT for gait training. She presents today 2 weeks out from injury for follow up. She states that the boot feels slightly large and does limit her weightbearing status slightly. The patient has tried the below listed items prior to today's consultation for above listed chief complaint.     -   Over-the-counter anti-inflammatories/prescription medication anti-inflammatory.      -   Physical therapy / guided home exercise program -     -   Previous corticosteroid injections    Past medical history:    Past Medical History:   Diagnosis Date    Asthma     COPD (chronic obstructive pulmonary disease) (HCC)     Hyperlipidemia     Hypertension     [facility] ([facility] [address]     Thyroid disease     has half of her thyroid        Past surgical history:    Past Surgical History:   Procedure Laterality Date    ABDOMINAL EXPLORATION SURGERY      APPENDECTOMY      CHOLECYSTECTOMY      COLONOSCOPY 4/18/2013    diverticulosis, polyps    COLONOSCOPY  11/10/2016    normal colon    EYE SURGERY Right 12/11/15    cataract removal    EYE SURGERY Left 1/8/16    cataract removed    FRACTURE SURGERY      HUMERUS FRACTURE SURGERY Right 2/28/2021    HUMERUS OPEN REDUCTION INTERNAL FIXATION performed by Vikas Verduzco MD at [facility], PARTIAL Left     TUMOR REMOVAL      off of thyroid, was benign        Allergies:     Allergies   Allergen Reactions    Effexor [Venlafaxine Hcl] Itching and Nausea Only    Fenofibrate Diarrhea    Prednisone Other (See Comments)     Causes body to feel on fire    Codeine Nausea And Vomiting    Docosahexaenoic Acid-Epa Nausea And Vomiting     Cannot take Vascepa         Medications:   Current Outpatient Medications:     folic acid (FOLVITE) 1 MG tablet, Take 1 mg by mouth daily, Disp: , Rfl:     atenolol (TENORMIN) 25 MG tablet, Take 0.5 tablets by mouth daily, Disp: 30 tablet, Rfl: 3    fluticasone-salmeterol (WIXELA INHUB) 250-50 MCG/DOSE AEPB, USE 1 INHALATION BY MOUTH  EVERY 12 HOURS, Disp: 3 Inhaler, Rfl: 2    PROAIR  (90 Base) MCG/ACT inhaler, INHALE 2 PUFFS BY MOUTH  INTO THE LUNGS EVERY 6  HOURS AS NEEDED FOR  WHEEZING OR SHORTNESS OF  BREATH, Disp: 34 g, Rfl: 1    ipratropium-albuterol (DUONEB) 0.5-2.5 (3) MG/3ML SOLN nebulizer solution, Inhale 3 mLs into the lungs every 4 hours as needed for Shortness of Breath, Disp: 360 mL, Rfl:     polyethylene glycol (GLYCOLAX) 17 g packet, Take 17 g by mouth daily, Disp: , Rfl:     methotrexate (RHEUMATREX) 2.5 MG chemo tablet, Take 10 mg by mouth once a week Monday nights, Disp: , Rfl:     DULoxetine (CYMBALTA) 60 MG extended release capsule, Take 60 mg by mouth nightly Before bed , Disp: , Rfl:     RESTASIS 0.05 % ophthalmic emulsion, , Disp: , Rfl:     atorvastatin (LIPITOR) 80 MG tablet, Take 80 mg by mouth every evening , Disp: , Rfl:     triamterene-hydrochlorothiazide (MAXZIDE-25) 37.5-25 MG per tablet, Take 1 tablet by mouth daily, Disp: , Rfl:     Cholecalciferol (VITAMIN D) 2000 UNITS CAPS capsule, Take 1 capsule by mouth nightly , Disp: , Rfl:     levothyroxine (SYNTHROID) 25 MCG tablet, Take 25 mcg by mouth Daily. , Disp: , Rfl:      Social history: Denies IV drug use. Social History     Socioeconomic History    Marital status:      Spouse name: Not on file    Number of children: Not on file    Years of education: Not on file    Highest education level: Not on file   Occupational History    Not on file   Tobacco Use    Smoking status: Former Smoker     Packs/day: 0.50     Years: 20.00     Pack years: 10.00     Types: Cigarettes     Start date: 1959     Quit date: 1987     Years since quittin.7    Smokeless tobacco: Never Used   Vaping Use    Vaping Use: Never used   Substance and Sexual Activity    Alcohol use: No     Alcohol/week: 0.0 standard drinks    Drug use: No    Sexual activity: Not Currently     Partners: Male   Other Topics Concern    Not on file   Social History Narrative    Not on file     Social Determinants of Health     Financial Resource Strain:     Difficulty of Paying Living Expenses:    Food Insecurity:     Worried About Running Out of Food in the Last Year:     [address] in the Last Year:    Transportation Needs:     Lack of Transportation (Medical):      Lack of Transportation (Non-Medical):    Physical Activity:     Days of Exercise per Week:     Minutes of Exercise per Session:    Stress:     Feeling of Stress :    Social Connections:     Frequency of Communication with Friends and Family:     Frequency of Social Gatherings with Friends and Family:     Attends Yarsani Services:     Active Member of Clubs or Organizations:     Attends Club or Organization Meetings:     Marital Status:    Intimate Partner Violence:     Fear of Current or Ex-Partner:     Emotionally Abused:     Physically Abused:     Sexually Abused: Tobacco use. Social History     Tobacco Use   Smoking Status Former Smoker    Packs/day: 0.50    Years: 20.00    Pack years: 10.00    Types: Cigarettes    Start date: 1959   [employer] Quit date: 1987    Years since quittin.7   Smokeless Tobacco Never Used     Em[place]: [place]    [place]    Review of systems: Patient denies any fevers chills chest pain shortness of breath nausea vomiting significant weight loss any change in voiding or bowel movements. Patient denies any significant numbness or tingling at baseline as it relates to this presenting symptom/chief complaint. The patient denies any significant problems with skin or any significant allergies. Physical examination:  Body mass index is 28.08 kg/m². AAOx3, NCAT  EOMI  MMM  RR  Unlabored breathing, no wheezing  Skin intact BUE and BLE, warm and moist  R ankle: Nontender to palpation lateral malleolus/fibula. Continued nontender to palpation medial malleolus or medial deltoid. Patient has active range of motion without pain. Minimal swelling. Skin intact throughout  5/5 IP Q H TA G EHL  SILT DP SP LP MP S S  +2 DP pulse    Diagnostic imaging:  MY READ:  3V R ankle: 21: Stable oblique ankle fracture without gross interval displacement (albeit with radiograph obliquity difference). Stable gravity stress view (<4mm) and stable mortise view    Pertinent lab work:  None       Diagnosis Orders   1. Closed fracture of distal end of right fibula, unspecified fracture morphology, initial encounter  Breg [place] Walking Boot   2. Acute right ankle pain  XR ANKLE RIGHT (MIN 3 VIEWS)       Assessment and plan: 80 y.o. female with current subjective symptoms and physical exam findings with diagnostic imaging correlating to  Closed minimally displaced stable right ankle fracture.   -Time of 16 minutes was spent coordinating and discussing the clinical findings, reviewing diagnostic imaging as indicated, coordinating care with prior notes review and current clinical encounter documentation as it pertains to the patient's presenting subjective symptoms and diagnoses. -I reviewed with the patient the imaging findings as well as clinical exam and  how it correlates to subjective symptoms.  -I had a pleasant discussion with the patient and her family friend. Currently her radiographs reveal stable medial clearspace on stress view without less than 4 mm on opening. Remainder of radiographs do show satisfactory position for continuing to non op mgt. She is without pain on examination medially or laterally upon palpation and subjectively she denies pain with ambulation. This is encouraging  -We will continue to treat this is a stable ankle fracture and non op mgt. She is weightbearing as tolerated and physical therapy can be working with her with gait training. This was written and orders as well as a note to provide back to the skilled nursing facility she is at  -OTC Tylenol per bottle as needed discomfort. Massage of the right lower extremity to help with residual swelling  -New Breg boot was provided that is better fitting to provide weightbearing as tolerated  -Continue ice and elevation as needed  -All questions answered to the patient's satisfaction and the patient expressed understanding and agreement with the above listed treatment plan  -Follow up 1 week with three-view right ankle. At that time she will then follow-up 3 weeks thereafter for 6-week post injury visit and then likely at the 3-month post injury visit. Successive radiographs at each visit. -Thank you for the clinical consultation and allowing me to participate in the patient's care. Electronically signed by Afia Osorio MD on 9/16/21 at 12:53 PM EDT         Afia Osorio MD       Orthopaedic Surgery-Sports Medicine        Disclaimer: This note was dictated with voice recognition software.   Though review and correction are routinely performed, please contact the office/medical records for any errors requiring correction.

## 2021-09-23 ENCOUNTER — OFFICE VISIT (OUTPATIENT)
Dept: ORTHOPEDIC SURGERY | Age: 81
End: 2021-09-23
Payer: MEDICARE

## 2021-09-23 VITALS — WEIGHT: 174 LBS | HEIGHT: 66 IN | BODY MASS INDEX: 27.97 KG/M2

## 2021-09-23 DIAGNOSIS — S82.831S OTHER CLOSED FRACTURE OF DISTAL END OF RIGHT FIBULA, SEQUELA: Primary | ICD-10-CM

## 2021-09-23 DIAGNOSIS — M25.571 ACUTE RIGHT ANKLE PAIN: ICD-10-CM

## 2021-09-23 PROCEDURE — G8427 DOCREV CUR MEDS BY ELIG CLIN: HCPCS | Performed by: ORTHOPAEDIC SURGERY

## 2021-09-23 PROCEDURE — 99213 OFFICE O/P EST LOW 20 MIN: CPT | Performed by: ORTHOPAEDIC SURGERY

## 2021-09-23 PROCEDURE — 4040F PNEUMOC VAC/ADMIN/RCVD: CPT | Performed by: ORTHOPAEDIC SURGERY

## 2021-09-23 PROCEDURE — 1036F TOBACCO NON-USER: CPT | Performed by: ORTHOPAEDIC SURGERY

## 2021-09-23 PROCEDURE — G8417 CALC BMI ABV UP PARAM F/U: HCPCS | Performed by: ORTHOPAEDIC SURGERY

## 2021-09-23 PROCEDURE — 1123F ACP DISCUSS/DSCN MKR DOCD: CPT | Performed by: ORTHOPAEDIC SURGERY

## 2021-09-23 PROCEDURE — G8400 PT W/DXA NO RESULTS DOC: HCPCS | Performed by: ORTHOPAEDIC SURGERY

## 2021-09-23 PROCEDURE — 1090F PRES/ABSN URINE INCON ASSESS: CPT | Performed by: ORTHOPAEDIC SURGERY

## 2021-09-23 NOTE — PROGRESS NOTES
FOLLOW UP ORTHOPAEDIC NOTE    The patient follows up today for reevaluation of right ankle fracture. The patient states she has been WB in the boot. She is accompanied by family friend. At her facility physical therapy has been getting her up and walking. She denies any pain in the right ankle or foot in the boot. She is pleased with the results thus far. She has been not wearing the boot in bed. She states the right ankle really does not bother her at all. States 0/10. PE:  AAOx3  RR  Unlabored breathing  Skin warm and moist  Focused physical examination of the right ankle  No significant swelling medial/lateral ankle. Nontender to palpation throughout lateral malleolus and lateral distal fibula. Full ankle ROM equal to the left. Skin intact throughout  5/5 IP Q H TA G EHL  SILT DP SP LP MP S S  +2 DP pulse    Pertinent radiographs/imaging:  3 view nonweightbearing right ankle: No gross interval change compared to radiographs 1 week ago. Stable alignment of fracture long spiral oblique with some callus already present at bridging     Diagnosis Orders   1. Other closed fracture of distal end of right fibula, sequela     2. Acute right ankle pain  XR ANKLE RIGHT (MIN 3 VIEWS)     Assessment and plan: 80 female with known, correlating diagnosis of closed right ankle fracture-stable. -Time of 12 minutes was spent coordinating and discussing the clinical findings and diagnostic imaging results as they pertain to the patient's presenting subjective symptoms.  -I had a pleasant discussion with the patient and her friend who accompanies her. She denies pain on getting up and mobilizing at this point. This is encouraging.  -Order in note was provided back to her facility. It is okay for her to come out of the boot to sleep at night given she is effectively been doing this for roughly 2-1/2 weeks anyway. There is no significant gross and will change with regard to her radiographs.   Again stable fracture based upon previous gravity stress view and with evaluation. She is also without pain upon ambulation and this is encouraging  -Continue activity modification weightbearing as tolerated in the cam boot for an additional 3 weeks. Physical therapy to be working with her for out of bed mobilization  -OTC Tylenol per bottle as needed discomfort  -Massage and ice as needed for any symptomatic local relief.  -All questions answered to the patient's satisfaction and the patient expressed understanding and agreement with the above listed treatment plan  -Follow up 3 weeks for 6-week post injury 3 view nonweightbearing and likely progress her out of the boot at that time with guidance and working with physical therapy.  -Thank you for the clinical consultation and allowing me to participate in the patient's care. Electronically signed by Tracey Mcdowell MD on 9/23/21 at 10:27 AM EVANGELINA Mcdowell MD       Orthopaedic Surgery-Sports Medicine    Disclaimer: This note was dictated with voice recognition software. Though review and correction are routinely performed, please contact the office/medical records for any errors requiring correction.

## 2021-10-13 ENCOUNTER — OFFICE VISIT (OUTPATIENT)
Dept: ORTHOPEDIC SURGERY | Age: 81
End: 2021-10-13
Payer: MEDICARE

## 2021-10-13 VITALS — HEIGHT: 66 IN | BODY MASS INDEX: 27.97 KG/M2 | WEIGHT: 174 LBS

## 2021-10-13 DIAGNOSIS — M48.062 LUMBAR STENOSIS WITH NEUROGENIC CLAUDICATION: Primary | ICD-10-CM

## 2021-10-13 DIAGNOSIS — M48.04 THORACIC SPINAL STENOSIS: ICD-10-CM

## 2021-10-13 PROCEDURE — 1036F TOBACCO NON-USER: CPT | Performed by: ORTHOPAEDIC SURGERY

## 2021-10-13 PROCEDURE — 1123F ACP DISCUSS/DSCN MKR DOCD: CPT | Performed by: ORTHOPAEDIC SURGERY

## 2021-10-13 PROCEDURE — G8427 DOCREV CUR MEDS BY ELIG CLIN: HCPCS | Performed by: ORTHOPAEDIC SURGERY

## 2021-10-13 PROCEDURE — G8417 CALC BMI ABV UP PARAM F/U: HCPCS | Performed by: ORTHOPAEDIC SURGERY

## 2021-10-13 PROCEDURE — 99214 OFFICE O/P EST MOD 30 MIN: CPT | Performed by: ORTHOPAEDIC SURGERY

## 2021-10-13 PROCEDURE — 4040F PNEUMOC VAC/ADMIN/RCVD: CPT | Performed by: ORTHOPAEDIC SURGERY

## 2021-10-13 PROCEDURE — G8400 PT W/DXA NO RESULTS DOC: HCPCS | Performed by: ORTHOPAEDIC SURGERY

## 2021-10-13 PROCEDURE — G8484 FLU IMMUNIZE NO ADMIN: HCPCS | Performed by: ORTHOPAEDIC SURGERY

## 2021-10-13 PROCEDURE — 1090F PRES/ABSN URINE INCON ASSESS: CPT | Performed by: ORTHOPAEDIC SURGERY

## 2021-10-13 NOTE — PROGRESS NOTES
Follow up: LUMBAR SPINE    CHIEF COMPLAINT:    Chief Complaint   Patient presents with    Back Pain     lumbar       HISTORY OF PRESENT ILLNESS:     Ms. Daniele Hassan  is a pleasant 80 y.o. female here for consultation regarding her LBP. She states her pain began insidiously about 14 years ago. Her pain has steadily increased since then, worst over the past 8 months. During her previous visit, she had just been hospitalized for pneumonia and was treated with steroids, which significantly helped her back pain. She was discharged from [facility] as she was doing better. She notes she had another fall over Labor Day weekend and had right ankle fracture. She has been in [facility] since then. She notes over the past 2 weeks she has been unable to ambulate. She can stand for transitioning with assistance and with a walker. She notes left leg weakness and notes her knee will give out when trying to step. Pain is localized to her lumbar spine and does not radiate. She denies lower extremity pain or numbness. She notes requent falling and progressive gait instability over the last 10 months. She denies saddle numbness or bowel or bladder dysfunction. She denies upper extremity symptoms.     Current/Past Treatment:   · Physical Therapy: Accupressure in the past with relief, OT currently   · Chiropractic:  No   · Injection:  No   · Medications:  Aleve, Ultram, Significant relief with recent steroids      Past Medical History:   Past Medical History:   Diagnosis Date    Asthma     [facility]     Hyperlipidemia     Hypertension     RA [facility]oid arthritis) ([facility] [address]     Thyroid disease     has half of her thyroid        Past Surgical History:     Past Surgical History:   Procedure Laterality Date    ABDOMINAL EXPLORATION SURGERY      APPENDECTOMY      CHOLECYSTECTOMY      COLONOSCOPY  4/18/2013    diverticulosis, polyps    COLONOSCOPY  11/10/2016 normal colon    EYE SURGERY Right 12/11/15    cataract removal    EYE SURGERY Left 1/8/16    cataract removed    FRACTURE SURGERY      HUMERUS FRACTURE SURGERY Right 2/28/2021    HUMERUS OPEN REDUCTION INTERNAL FIXATION performed by Kathrine Napoles MD at [address] [place] [address], PARTIAL Left     TUMOR REMOVAL      off of thyroid, was benign       Current Medications:     Current Outpatient Medications:     folic acid (FOLVITE) 1 MG tablet, Take 1 mg by mouth daily, Disp: , Rfl:     atenolol (TENORMIN) 25 MG tablet, Take 0.5 tablets by mouth daily, Disp: 30 tablet, Rfl: 3    fluticasone-salmeterol (WIXELA INHUB) 250-50 MCG/DOSE AEPB, USE 1 INHALATION BY MOUTH  EVERY 12 HOURS, Disp: 3 Inhaler, Rfl: 2    PROAIR  (90 Base) MCG/ACT inhaler, INHALE 2 PUFFS BY MOUTH  INTO THE LUNGS EVERY 6  HOURS AS NEEDED FOR  WHEEZING OR SHORTNESS OF  BREATH, Disp: 34 g, Rfl: 1    ipratropium-albuterol (DUONEB) 0.5-2.5 (3) MG/3ML SOLN nebulizer solution, Inhale 3 mLs into the lungs every 4 hours as needed for Shortness of Breath, Disp: 360 mL, Rfl:     polyethylene glycol (GLYCOLAX) 17 g packet, Take 17 g by mouth daily, Disp: , Rfl:     methotrexate (RHEUMATREX) 2.5 MG chemo tablet, Take 10 mg by mouth once a week Monday nights, Disp: , Rfl:     DULoxetine (CYMBALTA) 60 MG extended release capsule, Take 60 mg by mouth nightly Before bed , Disp: , Rfl:     RESTASIS 0.05 % ophthalmic emulsion, , Disp: , Rfl:     atorvastatin (LIPITOR) 80 MG tablet, Take 80 mg by mouth every evening , Disp: , Rfl:     triamterene-hydrochlorothiazide (MAXZIDE-25) 37.5-25 MG per tablet, Take 1 tablet by mouth daily, Disp: , Rfl:     Cholecalciferol (VITAMIN D) 2000 UNITS CAPS capsule, Take 1 capsule by mouth nightly , Disp: , Rfl:     levothyroxine (SYNTHROID) 25 MCG tablet, Take 25 mcg by mouth Daily. , Disp: , Rfl:     Allergies:  Effexor [venlafaxine hcl], Fenofibrate, Prednisone, Codeine, and Docosahexaenoic acid-epa    Social History:    reports that she quit smoking about 34 years ago. Her smoking use included cigarettes. She started smoking about 62 years ago. She has a 10.00 pack-year smoking history. She has never used smokeless tobacco. She reports that she does not drink alcohol and does not use drugs. Family History:   Family History   Problem Relation Age of Onset    High Blood Pressure Mother        REVIEW OF SYSTEMS: Full ROS noted & scanned   CONSTITUTIONAL: Denies unexplained weight loss, fevers, chills or fatigue  NEUROLOGICAL: Denies unsteady gait or progressive weakness  MUSCULOSKELETAL: Denies joint swelling or redness  PSYCHOLOGICAL: Denies anxiety, depression   SKIN: Denies skin changes, delayed healing, rash, itching   HEMATOLOGIC: Denies easy bleeding or bruising  ENDOCRINE: Denies excessive thirst, urination, heat/cold  RESPIRATORY: Denies current dyspnea, cough  GI: Denies nausea, vomiting, diarrhea   : Denies bowel or bladder issues      PHYSICAL EXAM:    Vitals: Height 5' 5.98\" (1.676 m), weight 174 lb (78.9 kg), not currently breastfeeding. GENERAL EXAM:  · General Apparence: Patient is adequately groomed with no evidence of malnutrition. · Orientation: The patient is oriented to time, place and person. · Mood & Affect:The patient's mood and affect are appropriate. · Vascular: Examination reveals no swelling tenderness in upper or lower extremities. Good capillary refill. · Lymphatic: The lymphatic examination bilaterally reveals all areas to be without enlargement or induration  · Sensation: Sensation is intact without deficit  · Coordination/Balance: Good coordination. Unable to tandem walk    LUMBAR/SACRAL EXAMINATION:  · Inspection: Local inspection shows no step-off or bruising. Lumbar alignment is normal.  Sagittal and Coronal balance is neutral.      · Palpation:   No evidence of tenderness at the midline. No tenderness bilaterally at the paraspinal or trochanters.   There is no step-off or paraspinal spasm. · Range of Motion: Lumbar flexion, extension and rotation are mildly limited due to pain. · Strength:   Left quad 3/5 ,otherwise 5/5 motor strength bilateral lower extremities  · Special Tests:   Straight leg raise and crossed SLR negative. Leg length and pelvis level. · Skin: There are no rashes, ulcerations or lesions. · Reflexes: Reflexes are symmetrically 2+ at the patellar and  1+ at ankle tendons. Clonus absent bilaterally at the feet. · Gait & station: In wheelchair, inability to ambulate    · Additional Examinations:   · RIGHT LOWER EXTREMITY: Inspection/examination of the right lower extremity does not show any tenderness, deformity or injury. Range of motion is unremarkable. There is no gross instability. There are no rashes, ulcerations or lesions. Strength and tone are normal.  · LEFT LOWER EXTREMITY:  Inspection/examination of the left lower extremity does not show any tenderness, deformity or injury. Range of motion is unremarkable. There is no gross instability. There are no rashes, ulcerations or lesions. Strength and tone are normal.    Diagnostic Testing:    I reviewed MRI images of her lumbar spine from 6/21/21. They show degenerative scoliosis with multilevel stenosis, severe at L2-3 and moderate to severe L3-4. Central disc protrusion and facet arthropathy  Noted with severe stenosis and moderate cord compression T10-T11. AP and lateral cervical, thoracic, and lumbar spine xray radiology report from [address] [place] [address]  10/12/21 notes degenerative changes without evidence of acute fracture. Impression:   Degenerative scoliosis with moderate to severe stenosis L2-3, L3-4  Severe stenosis T10-T11 with myelopathy    Plan:    We discussed treatment options including observation, additional oral steroids, physical therapy, epidural injections and spinal surgery. I recommend new MRI of her thoracic spine.  She will return to discuss surgical options after her MRI is complete.

## 2021-10-21 ENCOUNTER — TELEPHONE (OUTPATIENT)
Dept: ORTHOPEDIC SURGERY | Age: 81
End: 2021-10-21

## 2021-10-21 ENCOUNTER — HOSPITAL ENCOUNTER (OUTPATIENT)
Dept: MRI IMAGING | Age: 81
Discharge: HOME | End: 2021-10-21
Payer: MEDICARE

## 2021-10-21 ENCOUNTER — OFFICE VISIT (OUTPATIENT)
Dept: ORTHOPEDIC SURGERY | Age: 81
End: 2021-10-21
Payer: MEDICARE

## 2021-10-21 VITALS — HEIGHT: 66 IN | BODY MASS INDEX: 27.97 KG/M2 | WEIGHT: 174 LBS

## 2021-10-21 DIAGNOSIS — M48.04 THORACIC SPINAL STENOSIS: ICD-10-CM

## 2021-10-21 DIAGNOSIS — M25.571 ACUTE RIGHT ANKLE PAIN: ICD-10-CM

## 2021-10-21 DIAGNOSIS — S82.831D CLOSED FRACTURE OF DISTAL END OF RIGHT FIBULA WITH ROUTINE HEALING, UNSPECIFIED FRACTURE MORPHOLOGY, SUBSEQUENT ENCOUNTER: Primary | ICD-10-CM

## 2021-10-21 PROCEDURE — G8400 PT W/DXA NO RESULTS DOC: HCPCS | Performed by: ORTHOPAEDIC SURGERY

## 2021-10-21 PROCEDURE — 1123F ACP DISCUSS/DSCN MKR DOCD: CPT | Performed by: ORTHOPAEDIC SURGERY

## 2021-10-21 PROCEDURE — G8484 FLU IMMUNIZE NO ADMIN: HCPCS | Performed by: ORTHOPAEDIC SURGERY

## 2021-10-21 PROCEDURE — G8427 DOCREV CUR MEDS BY ELIG CLIN: HCPCS | Performed by: ORTHOPAEDIC SURGERY

## 2021-10-21 PROCEDURE — G8417 CALC BMI ABV UP PARAM F/U: HCPCS | Performed by: ORTHOPAEDIC SURGERY

## 2021-10-21 PROCEDURE — 4040F PNEUMOC VAC/ADMIN/RCVD: CPT | Performed by: ORTHOPAEDIC SURGERY

## 2021-10-21 PROCEDURE — 72146 MRI CHEST SPINE W/O DYE: CPT

## 2021-10-21 PROCEDURE — 1090F PRES/ABSN URINE INCON ASSESS: CPT | Performed by: ORTHOPAEDIC SURGERY

## 2021-10-21 PROCEDURE — 1036F TOBACCO NON-USER: CPT | Performed by: ORTHOPAEDIC SURGERY

## 2021-10-21 PROCEDURE — 99213 OFFICE O/P EST LOW 20 MIN: CPT | Performed by: ORTHOPAEDIC SURGERY

## 2021-10-21 NOTE — TELEPHONE ENCOUNTER
----- Message from Gi Ramos MD sent at 10/21/2021  1:03 PM EDT -----  Thoracic MRI confirms a spinal cord compression  Should come in to discuss options

## 2021-10-21 NOTE — PROGRESS NOTES
FOLLOW UP ORTHOPAEDIC NOTE    The patient follows up today for reevaluation of right ankle injury. The patient states she has been doing quite well. She states 0/10 pain on intake form. She states that she is fallen multiple times secondary to left lower extremity weakness and is currently being evaluated by our spine team.  She comes in on a stretcher today secondary to this. .  With regard to her right ankle, she states that she is doing quite well. She denies pain as she had at the initial time of injury and has been getting out of bed working with physical therapy and they have been slowly working with her outside the boot. PE:  AAOx3  RR  Unlabored breathing  Skin warm and moist  Focused physical examination of the right ankle  40 degrees dorsiflexion 70 degrees plantarflexion. Nontender and without pain throughout range of motion. Nontender to palpation along the fibula throughout. Remainder of NV exam unchanged    Pertinent radiographs/imaging:  3V R ankle 10/21/21: Healing fracture, no gross interval displacement. Positive callus present     Diagnosis Orders   1. Closed fracture of distal end of right fibula with routine healing, unspecified fracture morphology, subsequent encounter     2. Acute right ankle pain  XR ANKLE RIGHT (MIN 3 VIEWS)     Assessment and plan: 80 female with continued subjective symptoms of right ankle pain with known, correlating diagnosis of closed right ankle fracture. -Time of 12 minutes was spent coordinating and discussing the clinical findings and diagnostic imaging results as they pertain to the patient's presenting subjective symptoms.  -I reviewed with the patient her radiographs directly and currently she is healing her fracture which is doing quite well.   There is callus present and she is clinically without symptoms on examination as well as subjectively without pain  -At this time wean out of boot and WBAT outside boot with guidance and treatment from PT. Continue pain modalities as needed  -OTC tylenol per bottle prn pain  -Activity modifications to include low impact elliptical walking swim and biking as symptoms allow and progress through healing process  -All questions answered to the patient's satisfaction and the patient expressed understanding and agreement with the above listed treatment plan  -Follow up in 6 weeks as needed  -Thank you for the clinical consultation and allowing me to participate in the patient's care. Electronically signed by Zohreh Beltran MD on 10/21/21 at 9:51 AM EDT         Zohreh Beltran MD       Orthopaedic Surgery-Sports Medicine    Disclaimer: This note was dictated with voice recognition software. Though review and correction are routinely performed, please contact the office/medical records for any errors requiring correction.

## 2021-10-25 ENCOUNTER — TELEPHONE (OUTPATIENT)
Dept: ORTHOPEDIC SURGERY | Age: 81
End: 2021-10-25

## 2021-10-27 ENCOUNTER — OFFICE VISIT (OUTPATIENT)
Dept: ORTHOPEDIC SURGERY | Age: 81
End: 2021-10-27
Payer: MEDICARE

## 2021-10-27 VITALS — WEIGHT: 174 LBS | HEIGHT: 66 IN | BODY MASS INDEX: 27.97 KG/M2

## 2021-10-27 DIAGNOSIS — M48.04 THORACIC SPINAL STENOSIS: Primary | ICD-10-CM

## 2021-10-27 DIAGNOSIS — M48.062 LUMBAR STENOSIS WITH NEUROGENIC CLAUDICATION: ICD-10-CM

## 2021-10-27 PROCEDURE — G8427 DOCREV CUR MEDS BY ELIG CLIN: HCPCS | Performed by: ORTHOPAEDIC SURGERY

## 2021-10-27 PROCEDURE — 99214 OFFICE O/P EST MOD 30 MIN: CPT | Performed by: ORTHOPAEDIC SURGERY

## 2021-10-27 PROCEDURE — G8417 CALC BMI ABV UP PARAM F/U: HCPCS | Performed by: ORTHOPAEDIC SURGERY

## 2021-10-27 PROCEDURE — 1090F PRES/ABSN URINE INCON ASSESS: CPT | Performed by: ORTHOPAEDIC SURGERY

## 2021-10-27 PROCEDURE — G8400 PT W/DXA NO RESULTS DOC: HCPCS | Performed by: ORTHOPAEDIC SURGERY

## 2021-10-27 PROCEDURE — 1036F TOBACCO NON-USER: CPT | Performed by: ORTHOPAEDIC SURGERY

## 2021-10-27 PROCEDURE — 1123F ACP DISCUSS/DSCN MKR DOCD: CPT | Performed by: ORTHOPAEDIC SURGERY

## 2021-10-27 PROCEDURE — 4040F PNEUMOC VAC/ADMIN/RCVD: CPT | Performed by: ORTHOPAEDIC SURGERY

## 2021-10-27 PROCEDURE — G8484 FLU IMMUNIZE NO ADMIN: HCPCS | Performed by: ORTHOPAEDIC SURGERY

## 2021-10-27 NOTE — PROGRESS NOTES
Follow up: LUMBAR SPINE    CHIEF COMPLAINT:    Chief Complaint   Patient presents with    Back Pain     lumbar       HISTORY OF PRESENT ILLNESS:     Ms. Jesus Aragon  is a pleasant 80 y.o. female here for consultation regarding her LBP. She states her pain began insidiously about 14 years ago. Her pain has steadily increased since then, worst over the past 8 months. During her previous visit, she had just been hospitalized for pneumonia and was treated with steroids, which significantly helped her back pain. She was discharged from [facility] as she was doing better. She notes she had another fall over Labor Day weekend and had right ankle fracture. She has been in [facility] since then. She notes over the past 2 weeks she has been unable to ambulate. She can stand for transitioning with assistance and with a walker. She notes left leg weakness and notes her knee will give out when trying to step. Pain is localized to her lumbar spine and does not radiate. She denies lower extremity pain or numbness. She notes requent falling and progressive gait instability over the last 10 months. She denies saddle numbness or bowel or bladder dysfunction. She denies upper extremity symptoms.     Current/Past Treatment:   · Physical Therapy: Accupressure in the past with relief, OT currently   · Chiropractic:  No   · Injection:  No   · Medications:  Aleve, Ultram, Significant relief with recent steroids      Past Medical History:   Past Medical History:   Diagnosis Date    Asthma     [facility] obstructive [facility] disease[facility] ([facility])     Hyperlipidemia     Hypertension     RA [facility]heumatoid [facility]) [facility]     Thyroid disease     has half of her thyroid        Past Surgical History:     Past Surgical History:   Procedure Laterality Date    ABDOMINAL EXPLORATION SURGERY      APPENDECTOMY      CHOLECYSTECTOMY      COLONOSCOPY  4/18/2013    diverticulosis, polyps    COLONOSCOPY  11/10/2016 normal colon    EYE SURGERY Right 12/11/15    cataract removal    EYE SURGERY Left 1/8/16    cataract removed    FRACTURE SURGERY      HUMERUS FRACTURE SURGERY Right 2/28/2021    HUMERUS OPEN REDUCTION INTERNAL FIXATION performed by Tarah Green MD at [address] [place] [address], PARTIAL Left     TUMOR REMOVAL      off of thyroid, was benign       Current Medications:     Current Outpatient Medications:     folic acid (FOLVITE) 1 MG tablet, Take 1 mg by mouth daily, Disp: , Rfl:     atenolol (TENORMIN) 25 MG tablet, Take 0.5 tablets by mouth daily, Disp: 30 tablet, Rfl: 3    fluticasone-salmeterol (WIXELA INHUB) 250-50 MCG/DOSE AEPB, USE 1 INHALATION BY MOUTH  EVERY 12 HOURS, Disp: 3 Inhaler, Rfl: 2    PROAIR  (90 Base) MCG/ACT inhaler, INHALE 2 PUFFS BY MOUTH  INTO THE LUNGS EVERY 6  HOURS AS NEEDED FOR  WHEEZING OR SHORTNESS OF  BREATH, Disp: 34 g, Rfl: 1    ipratropium-albuterol (DUONEB) 0.5-2.5 (3) MG/3ML SOLN nebulizer solution, Inhale 3 mLs into the lungs every 4 hours as needed for Shortness of Breath, Disp: 360 mL, Rfl:     polyethylene glycol (GLYCOLAX) 17 g packet, Take 17 g by mouth daily, Disp: , Rfl:     methotrexate (RHEUMATREX) 2.5 MG chemo tablet, Take 10 mg by mouth once a week Monday nights, Disp: , Rfl:     DULoxetine (CYMBALTA) 60 MG extended release capsule, Take 60 mg by mouth nightly Before bed , Disp: , Rfl:     RESTASIS 0.05 % ophthalmic emulsion, , Disp: , Rfl:     atorvastatin (LIPITOR) 80 MG tablet, Take 80 mg by mouth every evening , Disp: , Rfl:     triamterene-hydrochlorothiazide (MAXZIDE-25) 37.5-25 MG per tablet, Take 1 tablet by mouth daily, Disp: , Rfl:     Cholecalciferol (VITAMIN D) 2000 UNITS CAPS capsule, Take 1 capsule by mouth nightly , Disp: , Rfl:     levothyroxine (SYNTHROID) 25 MCG tablet, Take 25 mcg by mouth Daily. , Disp: , Rfl:     Allergies:  Effexor [venlafaxine hcl], Fenofibrate, Prednisone, Codeine, and Docosahexaenoic acid-epa    Social History:    reports that she quit smoking about 34 years ago. Her smoking use included cigarettes. She started smoking about 62 years ago. She has a 10.00 pack-year smoking history. She has never used smokeless tobacco. She reports that she does not drink alcohol and does not use drugs. Family History:   Family History   Problem Relation Age of Onset    High Blood Pressure Mother        REVIEW OF SYSTEMS: Full ROS noted & scanned   CONSTITUTIONAL: Denies unexplained weight loss, fevers, chills or fatigue  NEUROLOGICAL: Denies unsteady gait or progressive weakness  MUSCULOSKELETAL: Denies joint swelling or redness  PSYCHOLOGICAL: Denies anxiety, depression   SKIN: Denies skin changes, delayed healing, rash, itching   HEMATOLOGIC: Denies easy bleeding or bruising  ENDOCRINE: Denies excessive thirst, urination, heat/cold  RESPIRATORY: Denies current dyspnea, cough  GI: Denies nausea, vomiting, diarrhea   : Denies bowel or bladder issues      PHYSICAL EXAM:    Vitals: Height 5' 5.98\" (1.676 m), weight 174 lb (78.9 kg), not currently breastfeeding. GENERAL EXAM:  · General Apparence: Patient is adequately groomed with no evidence of malnutrition. · Orientation: The patient is oriented to time, place and person. · Mood & Affect:The patient's mood and affect are appropriate. · Vascular: Examination reveals no swelling tenderness in upper or lower extremities. Good capillary refill. · Lymphatic: The lymphatic examination bilaterally reveals all areas to be without enlargement or induration  · Sensation: Sensation is intact without deficit  · Coordination/Balance: Good coordination. Unable to tandem walk    LUMBAR/SACRAL EXAMINATION:  · Inspection: Local inspection shows no step-off or bruising. Lumbar alignment is normal.  Sagittal and Coronal balance is neutral.      · Palpation:   No evidence of tenderness at the midline. No tenderness bilaterally at the paraspinal or trochanters.   There is no step-off or paraspinal spasm. · Range of Motion: Lumbar flexion, extension and rotation are mildly limited due to pain. · Strength:   Left quad 3/5 ,otherwise 5/5 motor strength bilateral lower extremities  · Special Tests:   Straight leg raise and crossed SLR negative. Leg length and pelvis level. · Skin: There are no rashes, ulcerations or lesions. · Reflexes: Reflexes are symmetrically 2+ at the patellar and  1+ at ankle tendons. Clonus absent bilaterally at the feet. · Gait & station: In wheelchair, inability to ambulate    · Additional Examinations:   · RIGHT LOWER EXTREMITY: Inspection/examination of the right lower extremity does not show any tenderness, deformity or injury. Range of motion is unremarkable. There is no gross instability. There are no rashes, ulcerations or lesions. Strength and tone are normal.  · LEFT LOWER EXTREMITY:  Inspection/examination of the left lower extremity does not show any tenderness, deformity or injury. Range of motion is unremarkable. There is no gross instability. There are no rashes, ulcerations or lesions. Strength and tone are normal.    Diagnostic Testing:    I reviewed MRI images of her lumbar spine from 6/21/21. They show degenerative scoliosis with multilevel stenosis, severe at L2-3 and moderate to severe L3-4. Central disc protrusion and facet arthropathy  Noted with severe stenosis and moderate cord compression T10-T11. I reviewed MRI images of her thoracic spine from 10/20/2021 in the office today. those show a left paracentral disc herniation T10-T11 with moderate cord compression and myelomalacia. AP and lateral cervical, thoracic, and lumbar spine xray radiology report from [facility] 10/12/21 notes degenerative changes without evidence of acute fracture.      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  I referred her to Dr. Jasmyn Hernandez at [facility] for possible surgery

## 2021-12-06 ENCOUNTER — HOSPITAL ENCOUNTER (OUTPATIENT)
Dept: CT IMAGING | Age: 81
Discharge: HOME | End: 2021-12-06
Payer: MEDICARE

## 2021-12-06 DIAGNOSIS — G95.9 MYELOPATHY (HCC): ICD-10-CM

## 2021-12-06 DIAGNOSIS — M48.04 SPINAL STENOSIS OF THORACIC REGION: ICD-10-CM

## 2021-12-06 PROCEDURE — 72128 CT CHEST SPINE W/O DYE: CPT

## 2021-12-06 PROCEDURE — 72131 CT LUMBAR SPINE W/O DYE: CPT

## 2022-01-05 LAB
ALBUMIN SERPL-MCNC: 3 G/DL
ALP BLD-CCNC: 88 U/L
ALT SERPL-CCNC: 21 U/L
ANION GAP SERPL CALCULATED.3IONS-SCNC: NORMAL MMOL/L
AST SERPL-CCNC: 28 U/L
AVERAGE GLUCOSE: 123
BASOPHILS ABSOLUTE: ABNORMAL
BASOPHILS RELATIVE PERCENT: 0.6 %
BILIRUB SERPL-MCNC: 0.3 MG/DL (ref 0.1–1.4)
BUN BLDV-MCNC: 11 MG/DL
CALCIUM SERPL-MCNC: 8.2 MG/DL
CHLORIDE BLD-SCNC: 104 MMOL/L
CO2: 27 MMOL/L
CREAT SERPL-MCNC: 0.8 MG/DL
EOSINOPHILS ABSOLUTE: 0.2 /ΜL
EOSINOPHILS RELATIVE PERCENT: 5.9 %
GFR CALCULATED: NORMAL
GLUCOSE BLD-MCNC: 88 MG/DL
HBA1C MFR BLD: 5.9 %
HCT VFR BLD CALC: 35.6 % (ref 36–46)
HEMOGLOBIN: 11.9 G/DL (ref 12–16)
LYMPHOCYTES ABSOLUTE: 1.3 /ΜL
LYMPHOCYTES RELATIVE PERCENT: 36.7 %
MCH RBC QN AUTO: 30.7 PG
MCHC RBC AUTO-ENTMCNC: 33.4 G/DL
MCV RBC AUTO: 91.9 FL
MONOCYTES ABSOLUTE: 0.6 /ΜL
MONOCYTES RELATIVE PERCENT: 17.3 %
NEUTROPHILS ABSOLUTE: 1.4 /ΜL
NEUTROPHILS RELATIVE PERCENT: 39.5 %
PLATELET # BLD: 239 K/ΜL
PMV BLD AUTO: 8.1 FL
POTASSIUM SERPL-SCNC: 4 MMOL/L
RBC # BLD: 3.88 10^6/ΜL
SODIUM BLD-SCNC: 143 MMOL/L
TOTAL PROTEIN: 5.4
TSH SERPL DL<=0.05 MIU/L-ACNC: 1.33 UIU/ML
WBC # BLD: 3.5 10^3/ML

## 2022-01-17 NOTE — PROGRESS NOTES
[address]   Cardiac Consultation    Referring Provider:  Wiley Vanessa MD     Chief Complaint   Patient presents with    New Patient    Cardiac Clearance     Subjective: Ms. Nuvia Ríos is here as a new patient for cardiology clearance; No cardiac complaints today. History of Present Illness:  Raciel Alvares is a 80 y.o. female who was referred by Dr. Raheem Nieto for cardiology clearance for a laminectomy. She has a PMH of HTN, HLD, and prior bradycardia resolved. No cardiac history prior. She was admitted 8/23-8/24/21 for bradycardia. Most recent Ricka Willis 9/19/20 showed no evidence of  myocardial ischemia or scar. Normal LVEF of >75%. EKG changes are of uncertain significance in setting of normal perfusion. Overall findings represent a low risk scan. Most recent ECHO 8/24/21 showed LVEF=55-60%. Mild cLVH. Grade I DD with normal filling pressure. Mild TR. Most recent EKG 9/4/21 SR 81 bpm with frequent PVC's in a pattern of bigeminy Possible Left atrial enlargement Septal infarct, age undetermined    Today,  she states she feels good today except for chronic back pain. She uses her inhaler twice a day for COPD. She has been telling people for the past 8 years about her back pain which has been progressively getting worse. Then she had falls and found out she has a problem with her spinal cord and it is compressed. Never had LOC. She remembers falls but broke right arm in one fall and ankle in another. Patient was tearful with being the nursing home and frustrated with inability to walk right now. Most recent EKG today NSR 71 bpm,  No change from 1-6-2022 EKG. Patient with no complaints of chest pain, SOB, palpitations, dizziness, edema, or orthopnea/PND.     Patient is vaccinated against Covid 3/3 Abimael Nunez    Past Medical History:   has a past medical history of Asthma, COPD (chronic obstructive pulmonary disease) ([address] Hyperlipidemia, Hypertension, RA (rheumatoid arthritis) ([address] and Thyroid disease. Surgical History:   has a past surgical history that includes Appendectomy; Hysterectomy; Cholecystectomy; tumor removal; Thyroidectomy, partial (Left); eye surgery (Right, 12/11/15); eye surgery (Left, 1/8/16); Colonoscopy (4/18/2013); Colonoscopy (11/10/2016); Abdominal exploration surgery; Humerus fracture surgery (Right, 2/28/2021); and fracture surgery. Social History:   reports that she quit smoking about 35 years ago. Her smoking use included cigarettes. She started smoking about 63 years ago. She has a 10.00 pack-year smoking history. She has never used smokeless tobacco. She reports that she does not drink alcohol and does not use drugs. She has been in nursing home since labor day weekend because she broke her arm. Then she fell again and again and broke her ankle so she is till in the nursing home. Family History:  family history includes High Blood Pressure in her mother. Home Medications:  Prior to Admission medications    Medication Sig Start Date End Date Taking?  Authorizing Provider   traMADol (ULTRAM) 50 MG tablet  1/6/22  Yes Historical Provider, MD   melatonin 3 MG TABS tablet Take 3 mg by mouth 2 times daily as needed   Yes Historical Provider, MD   acetaminophen (TYLENOL) 325 MG tablet Take 650 mg by mouth every 4 hours as needed for Pain   Yes Historical Provider, MD   folic acid (FOLVITE) 1 MG tablet Take 1 mg by mouth daily   Yes Historical Provider, MD   atenolol (TENORMIN) 25 MG tablet Take 0.5 tablets by mouth daily 8/25/21  Yes Aurea Ramos MD   fluticasone-salmeterol [facility]) 250-50 MCG/DOSE AEPB USE 1 INHALATION BY MOUTH  EVERY 12 HOURS 8/18/21  Yes Lisa Restrepo MD   PROAIR  (29 Base) MCG/ACT inhaler INHALE 2 PUFFS BY MOUTH  INTO THE LUNGS EVERY 6  HOURS AS NEEDED FOR  WHEEZING OR SHORTNESS OF  BREATH 8/16/21  Yes Nik Vann MD   ipratropium-albuterol (DUONEB) 0.5-2.5 (3) MG/3ML SOLN nebulizer solution Inhale 3 mLs into the lungs every 4 hours as needed for Shortness of Breath 7/25/21  Yes Reilly Forbes MD   polyethylene glycol (GLYCOLAX) 17 g packet Take 17 g by mouth daily 7/26/21  Yes Reilly Forbes MD   methotrexate (RHEUMATREX) 2.5 MG chemo tablet Take 10 mg by mouth once a week Monday nights 1/28/21  Yes Historical Provider, MD   DULoxetine (CYMBALTA) 60 MG extended release capsule Take 60 mg by mouth nightly Before bed    Yes Historical Provider, MD   RESTASIS 0.05 % ophthalmic emulsion  2/27/20  Yes Historical Provider, MD   atorvastatin (LIPITOR) 80 MG tablet Take 80 mg by mouth every evening  8/18/19  Yes Historical Provider, MD   triamterene-hydrochlorothiazide (MAXZIDE-25) 37.5-25 MG per tablet Take 1 tablet by mouth daily 10/19/18  Yes Historical Provider, MD   Cholecalciferol (VITAMIN D) 2000 UNITS CAPS capsule Take 1 capsule by mouth nightly    Yes Historical Provider, MD   levothyroxine (SYNTHROID) 25 MCG tablet Take 25 mcg by mouth Daily. Yes Historical Provider, MD        Allergies:  Effexor [venlafaxine hcl], Fenofibrate, Prednisone, Codeine, and Docosahexaenoic acid-epa     Review of Systems:   · Constitutional: there has been no unanticipated weight loss. There's been no change in energy level, sleep pattern, or activity level. · Eyes: No visual changes or diplopia. No scleral icterus. · ENT: No Headaches, hearing loss or vertigo. No mouth sores or sore throat. · Cardiovascular: Reviewed in HPI  · Respiratory: No cough or wheezing, no sputum production. No hematemesis. · Gastrointestinal: No abdominal pain, appetite loss, blood in stools. No change in bowel or bladder habits. · Genitourinary: No dysuria, trouble voiding, or hematuria. · Musculoskeletal:  No gait disturbance, weakness or joint complaints. · Integumentary: No rash or pruritis. · Neurological: No headache, diplopia, change in muscle strength, numbness or tingling.  No change in gait, balance, coordination, mood, affect, memory, mentation, behavior. · Psychiatric: No anxiety, no depression. · Endocrine: No malaise, fatigue or temperature intolerance. No excessive thirst, fluid intake, or urination. No tremor. · Hematologic/Lymphatic: No abnormal bruising or bleeding, blood clots or swollen lymph nodes. · Allergic/Immunologic: No nasal congestion or hives. Physical Examination:    Vitals:    01/18/22 1254   BP: (!) 128/91   Pulse: 72   SpO2: 97%        Constitutional and General Appearance: NAD   Respiratory:  · Normal excursion and expansion without use of accessory muscles  · Resp Auscultation: soft crackles in bases bilaterally  Cardiovascular:  · The apical impulses not displaced  · Heart tones are crisp and normal  · Cervical veins are not engorged  · The carotid upstroke is normal in amplitude and contour without delay or bruit  · Normal S1S2, No S3, No Murmur  · Peripheral pulses are symmetrical and full  · There is no clubbing, cyanosis of the extremities. · No edema  · Femoral Arteries: 2+ and equal  · Pedal Pulses: 2+ and equal   Abdomen:  · No masses or tenderness  · Liver/Spleen: No Abnormalities Noted  Neurological/Psychiatric:  · Alert and oriented in all spheres  · Moves all extremities well  · Exhibits normal gait balance and coordination  · No abnormalities of mood, affect, memory, mentation, or behavior are noted  Skin:  · Skin: warm and dry.     Lab Results   Component Value Date    CHOL 139 09/19/2020     Lab Results   Component Value Date    TRIG 70 09/19/2020     Lab Results   Component Value Date    HDL 46 09/19/2020     Lab Results   Component Value Date    LDLCALC 79 09/19/2020     Lab Results   Component Value Date    LABVLDL 14 09/19/2020     No results found for: CHOLLRATIO     labs in [address] [place] [address] 1330 7/8/21  WBC=6.0, RBC=3.39, H&H=10.4/34.8, hsqi=816, BUN/Cr=18/0.87, Kv=670, K+=4.0, AST=62, ALT=45, CRP 37.56     [facility] 5/6/21  MF=279, HDL=26, LDL=74, MW=008,     I have personally reviewed all labs including lipids 7/8/21    Assessment:     1. Pre-operative cardiac evaluation: No known cardiac history and she has normal myocardial perfusion imaging on 9/20 nuc study and normal LV function EF=55-60% on ECHO in 8/21. See #2 below for risk stratification. 2. Benign essential HTN: Adequate control today with borderline diastolic BP 91OITG. She is anxious and tearful in exam room and will not make any changes to regimen. 3. Mixed hyperlipidemia: I personally reviewed most recent lipids from 5/6/21 (see above). Well controlled and will continue current medical regimen. Plan:  1. Current medications reviewed. No changes at this time. Refills given as warranted. 2. Lower cardiac risk clinically for intermediate risk type of surgery. OK to proceed with surgery as planned. Follow up with me only if you need me in the future. This note is scribed in the presence of Dr. Cleveland Carter. Nathan Del Cid by Kevin Bustos RN.    I, Dr. Ying Bowie, personally performed the services described in this documentation, as scribed by the above signed scribe in my presence. It is both accurate and complete to my knowledge. I agree with the details independently gathered by the clinical support staff, while the remaining scribed note accurately describes my personal service to the patient. Cost of prescription medications and patient compliance have been reviewed with patient. All questions answered. Thank you for allowing me to participate in the care of this individual.    Cleveland Carter.  Nathan Del Cid M.D., [facility]

## 2022-01-18 ENCOUNTER — OFFICE VISIT (OUTPATIENT)
Dept: CARDIOLOGY CLINIC | Age: 82
End: 2022-01-18
Payer: MEDICARE

## 2022-01-18 VITALS
OXYGEN SATURATION: 97 % | BODY MASS INDEX: 29.99 KG/M2 | HEIGHT: 65 IN | HEART RATE: 72 BPM | SYSTOLIC BLOOD PRESSURE: 128 MMHG | WEIGHT: 180 LBS | DIASTOLIC BLOOD PRESSURE: 91 MMHG

## 2022-01-18 DIAGNOSIS — E78.2 MIXED HYPERLIPIDEMIA: ICD-10-CM

## 2022-01-18 DIAGNOSIS — Z87.891 HISTORY OF TOBACCO ABUSE: ICD-10-CM

## 2022-01-18 DIAGNOSIS — I10 BENIGN ESSENTIAL HTN: Primary | ICD-10-CM

## 2022-01-18 PROCEDURE — G8484 FLU IMMUNIZE NO ADMIN: HCPCS | Performed by: INTERNAL MEDICINE

## 2022-01-18 PROCEDURE — 93000 ELECTROCARDIOGRAM COMPLETE: CPT | Performed by: INTERNAL MEDICINE

## 2022-01-18 PROCEDURE — G8427 DOCREV CUR MEDS BY ELIG CLIN: HCPCS | Performed by: INTERNAL MEDICINE

## 2022-01-18 PROCEDURE — G8417 CALC BMI ABV UP PARAM F/U: HCPCS | Performed by: INTERNAL MEDICINE

## 2022-01-18 PROCEDURE — 99204 OFFICE O/P NEW MOD 45 MIN: CPT | Performed by: INTERNAL MEDICINE

## 2022-01-18 PROCEDURE — 1090F PRES/ABSN URINE INCON ASSESS: CPT | Performed by: INTERNAL MEDICINE

## 2022-01-18 RX ORDER — LANOLIN ALCOHOL/MO/W.PET/CERES
3 CREAM (GRAM) TOPICAL 2 TIMES DAILY PRN
COMMUNITY
End: 2022-04-25

## 2022-01-18 RX ORDER — ACETAMINOPHEN 325 MG/1
650 TABLET ORAL EVERY 4 HOURS PRN
COMMUNITY
End: 2022-04-25

## 2022-01-18 RX ORDER — TRAMADOL HYDROCHLORIDE 50 MG/1
TABLET ORAL
Status: ON HOLD | COMMUNITY
Start: 2022-01-06 | End: 2022-01-28 | Stop reason: HOSPADM

## 2022-01-18 NOTE — LETTER
1/18/2022    To whom it may concern,  Gage Pulling 1940 is a lower cardiology risk for an intermediate risk surgery. Ok to proceed with surgery from a cardiology standpoint. Sincerely,          Jason Pryor.  Barry Fowler MD

## 2022-01-18 NOTE — PATIENT INSTRUCTIONS
Plan:  1. Current medications reviewed. No changes at this time. Refills given as warranted. 2. Low cardiac risk for intermediate risk surgery. OK to proceed with surgery as planned. Follow up with me only if you need me in the future.

## 2022-01-20 NOTE — PROGRESS NOTES
NURSING HOME / GROUP HOME SURGICAL/ PROCEDURE PATIENTS:  Facility Name: [facility]   Phone  [phone]  Fax #:  [phone]  Nurse spoke with: [facility]   Must have a PCR Covid test within 6 days of surgery/ procedure. Date to be done [facility]   Can patient sign for themselves? Yes   If no, POA name:    phone #:    Georgie Alfonso to fax Advance directive/ POA forms / Living will paperwork:  FULL CODE / Alvarezan Marciail  Is patient able to stand on own: No   Assistive devices needed: WALKER- 1 PERSON ASSIST   Incontinent: No If yes, type:  urine / stool  Skin issues? (i.e. bed sores, scabs) No if yes, describe location, type   Transportation: [address]   phone #: ?  Recent infection: No    Blood product refusal: No  Able to read / write: Yes  Behavior issues: No   AICD & / or Pacemaker: No If yes,  Blood thinners- Last day patient to take per surgeon orders: NO     Instructions:  Please fax patient's Med list, Dx list, and POA paper work ASAP  Please call for orders from surgeon or PCP re: diabetic medications / blood thinners if not already received  Meds to take day of surgery: ATENOLOL AND LEVOTHYROXINE. USE AND BRING INHALERS   Please fax H&P, labs, EKG, & Covid results as soon as completed  Instruct no food / drink 8 hours prior to arrival (except sip water with meds only)  Dress loose comfortable clothing.   No lotions, powders, nail polish, hairclips or jewelry  Send CPAP if uses  Shower am of surgery with antibacterial soap (or hibiclens, if ordered)

## 2022-01-20 NOTE — PROGRESS NOTES
[facility] for [address] at surgeon office. No recent PT?PTT- will order for DOS. No H&P at [facility], but can use cardioly visit note from 1/18 for H&P. NH also sent clearance note in media. Need orders for DOS faxed.

## 2022-01-26 ENCOUNTER — ANESTHESIA EVENT (OUTPATIENT)
Dept: OPERATING ROOM | Age: 82
DRG: 460 | End: 2022-01-26
Payer: MEDICARE

## 2022-01-27 ENCOUNTER — HOSPITAL ENCOUNTER (INPATIENT)
Age: 82
LOS: 4 days | Discharge: NURSING, REHAB OR LONG-TERM CARE | DRG: 460 | End: 2022-01-31
Attending: NEUROLOGICAL SURGERY | Admitting: NEUROLOGICAL SURGERY
Payer: MEDICARE

## 2022-01-27 ENCOUNTER — APPOINTMENT (OUTPATIENT)
Dept: GENERAL RADIOLOGY | Age: 82
DRG: 460 | End: 2022-01-27
Attending: NEUROLOGICAL SURGERY
Payer: MEDICARE

## 2022-01-27 ENCOUNTER — ANESTHESIA (OUTPATIENT)
Dept: OPERATING ROOM | Age: 82
DRG: 460 | End: 2022-01-27
Payer: MEDICARE

## 2022-01-27 VITALS
TEMPERATURE: 97.5 F | SYSTOLIC BLOOD PRESSURE: 138 MMHG | OXYGEN SATURATION: 100 % | RESPIRATION RATE: 4 BRPM | DIASTOLIC BLOOD PRESSURE: 66 MMHG

## 2022-01-27 DIAGNOSIS — Z98.1 S/P FUSION OF THORACIC SPINE: Primary | ICD-10-CM

## 2022-01-27 PROBLEM — M51.24 THORACIC DISC HERNIATION: Status: ACTIVE | Noted: 2022-01-27

## 2022-01-27 LAB
ABO/RH: NORMAL
ALBUMIN SERPL-MCNC: 3.3 G/DL (ref 3.4–5)
ANION GAP SERPL CALCULATED.3IONS-SCNC: 10 MMOL/L (ref 3–16)
ANTIBODY SCREEN: NORMAL
APTT: 30.2 SEC (ref 26.2–38.6)
BUN BLDV-MCNC: 10 MG/DL (ref 7–20)
CALCIUM SERPL-MCNC: 9.3 MG/DL (ref 8.3–10.6)
CHLORIDE BLD-SCNC: 105 MMOL/L (ref 99–110)
CO2: 22 MMOL/L (ref 21–32)
CREAT SERPL-MCNC: 0.7 MG/DL (ref 0.6–1.2)
GFR AFRICAN AMERICAN: >60
GFR NON-AFRICAN AMERICAN: >60
GLUCOSE BLD-MCNC: 123 MG/DL (ref 70–99)
INR BLD: 1.04 (ref 0.88–1.12)
PHOSPHORUS: 3.9 MG/DL (ref 2.5–4.9)
POTASSIUM SERPL-SCNC: 4.2 MMOL/L (ref 3.5–5.1)
PROTHROMBIN TIME: 11.8 SEC (ref 9.9–12.7)
SODIUM BLD-SCNC: 137 MMOL/L (ref 136–145)

## 2022-01-27 PROCEDURE — 85730 THROMBOPLASTIN TIME PARTIAL: CPT

## 2022-01-27 PROCEDURE — 6360000002 HC RX W HCPCS: Performed by: NURSE ANESTHETIST, CERTIFIED REGISTERED

## 2022-01-27 PROCEDURE — 36415 COLL VENOUS BLD VENIPUNCTURE: CPT

## 2022-01-27 PROCEDURE — 3209999900 FLUORO FOR SURGICAL PROCEDURES

## 2022-01-27 PROCEDURE — 86850 RBC ANTIBODY SCREEN: CPT

## 2022-01-27 PROCEDURE — 6360000002 HC RX W HCPCS: Performed by: ANESTHESIOLOGY

## 2022-01-27 PROCEDURE — C9290 INJ, BUPIVACAINE LIPOSOME: HCPCS | Performed by: NEUROLOGICAL SURGERY

## 2022-01-27 PROCEDURE — 00NX0ZZ RELEASE THORACIC SPINAL CORD, OPEN APPROACH: ICD-10-PCS | Performed by: NEUROLOGICAL SURGERY

## 2022-01-27 PROCEDURE — A4217 STERILE WATER/SALINE, 500 ML: HCPCS | Performed by: NEUROLOGICAL SURGERY

## 2022-01-27 PROCEDURE — 7100000000 HC PACU RECOVERY - FIRST 15 MIN: Performed by: NEUROLOGICAL SURGERY

## 2022-01-27 PROCEDURE — 2580000003 HC RX 258: Performed by: PHYSICIAN ASSISTANT

## 2022-01-27 PROCEDURE — 2720000010 HC SURG SUPPLY STERILE: Performed by: NEUROLOGICAL SURGERY

## 2022-01-27 PROCEDURE — 3600000004 HC SURGERY LEVEL 4 BASE: Performed by: NEUROLOGICAL SURGERY

## 2022-01-27 PROCEDURE — 80069 RENAL FUNCTION PANEL: CPT

## 2022-01-27 PROCEDURE — C1713 ANCHOR/SCREW BN/BN,TIS/BN: HCPCS | Performed by: NEUROLOGICAL SURGERY

## 2022-01-27 PROCEDURE — 7100000001 HC PACU RECOVERY - ADDTL 15 MIN: Performed by: NEUROLOGICAL SURGERY

## 2022-01-27 PROCEDURE — 0RG6071 FUSION OF THORACIC VERTEBRAL JOINT WITH AUTOLOGOUS TISSUE SUBSTITUTE, POSTERIOR APPROACH, POSTERIOR COLUMN, OPEN APPROACH: ICD-10-PCS | Performed by: NEUROLOGICAL SURGERY

## 2022-01-27 PROCEDURE — 86901 BLOOD TYPING SEROLOGIC RH(D): CPT

## 2022-01-27 PROCEDURE — 72100 X-RAY EXAM L-S SPINE 2/3 VWS: CPT

## 2022-01-27 PROCEDURE — 2500000003 HC RX 250 WO HCPCS: Performed by: NEUROLOGICAL SURGERY

## 2022-01-27 PROCEDURE — 6360000002 HC RX W HCPCS: Performed by: NEUROLOGICAL SURGERY

## 2022-01-27 PROCEDURE — 2500000003 HC RX 250 WO HCPCS: Performed by: NURSE ANESTHETIST, CERTIFIED REGISTERED

## 2022-01-27 PROCEDURE — 6370000000 HC RX 637 (ALT 250 FOR IP): Performed by: PHYSICIAN ASSISTANT

## 2022-01-27 PROCEDURE — 01N80ZZ RELEASE THORACIC NERVE, OPEN APPROACH: ICD-10-PCS | Performed by: NEUROLOGICAL SURGERY

## 2022-01-27 PROCEDURE — 3700000001 HC ADD 15 MINUTES (ANESTHESIA): Performed by: NEUROLOGICAL SURGERY

## 2022-01-27 PROCEDURE — 86900 BLOOD TYPING SEROLOGIC ABO: CPT

## 2022-01-27 PROCEDURE — 1200000000 HC SEMI PRIVATE

## 2022-01-27 PROCEDURE — 2580000003 HC RX 258: Performed by: ANESTHESIOLOGY

## 2022-01-27 PROCEDURE — 3700000000 HC ANESTHESIA ATTENDED CARE: Performed by: NEUROLOGICAL SURGERY

## 2022-01-27 PROCEDURE — 0RB90ZZ EXCISION OF THORACIC VERTEBRAL DISC, OPEN APPROACH: ICD-10-PCS | Performed by: NEUROLOGICAL SURGERY

## 2022-01-27 PROCEDURE — 6360000002 HC RX W HCPCS: Performed by: PHYSICIAN ASSISTANT

## 2022-01-27 PROCEDURE — 3600000014 HC SURGERY LEVEL 4 ADDTL 15MIN: Performed by: NEUROLOGICAL SURGERY

## 2022-01-27 PROCEDURE — 85610 PROTHROMBIN TIME: CPT

## 2022-01-27 PROCEDURE — 2580000003 HC RX 258: Performed by: NEUROLOGICAL SURGERY

## 2022-01-27 PROCEDURE — 2709999900 HC NON-CHARGEABLE SUPPLY: Performed by: NEUROLOGICAL SURGERY

## 2022-01-27 DEVICE — SCREW SET UNITZ 5.5 MM TI EXPEDIUM VERSE: Type: IMPLANTABLE DEVICE | Site: BACK | Status: FUNCTIONAL

## 2022-01-27 DEVICE — IMPLANTABLE DEVICE: Type: IMPLANTABLE DEVICE | Site: BACK | Status: FUNCTIONAL

## 2022-01-27 DEVICE — ROD SPNL L40MM DIA5.5MM TI PRELORDOSED MEDIALIZED POST: Type: IMPLANTABLE DEVICE | Site: BACK | Status: FUNCTIONAL

## 2022-01-27 DEVICE — GRAFT BNE MED 6.25 CC MTRX FIBERGRAFT BG: Type: IMPLANTABLE DEVICE | Site: BACK | Status: FUNCTIONAL

## 2022-01-27 RX ORDER — ARFORMOTEROL TARTRATE 15 UG/2ML
15 SOLUTION RESPIRATORY (INHALATION) 2 TIMES DAILY
Status: DISCONTINUED | OUTPATIENT
Start: 2022-01-27 | End: 2022-01-31 | Stop reason: HOSPADM

## 2022-01-27 RX ORDER — DEXAMETHASONE SODIUM PHOSPHATE 4 MG/ML
INJECTION, SOLUTION INTRA-ARTICULAR; INTRALESIONAL; INTRAMUSCULAR; INTRAVENOUS; SOFT TISSUE PRN
Status: DISCONTINUED | OUTPATIENT
Start: 2022-01-27 | End: 2022-01-27 | Stop reason: SDUPTHER

## 2022-01-27 RX ORDER — BUDESONIDE 0.25 MG/2ML
0.25 INHALANT ORAL 2 TIMES DAILY
Status: DISCONTINUED | OUTPATIENT
Start: 2022-01-27 | End: 2022-01-31 | Stop reason: HOSPADM

## 2022-01-27 RX ORDER — SODIUM CHLORIDE 0.9 % (FLUSH) 0.9 %
5-40 SYRINGE (ML) INJECTION PRN
Status: DISCONTINUED | OUTPATIENT
Start: 2022-01-27 | End: 2022-01-31 | Stop reason: HOSPADM

## 2022-01-27 RX ORDER — ONDANSETRON 2 MG/ML
INJECTION INTRAMUSCULAR; INTRAVENOUS PRN
Status: DISCONTINUED | OUTPATIENT
Start: 2022-01-27 | End: 2022-01-27 | Stop reason: SDUPTHER

## 2022-01-27 RX ORDER — BUDESONIDE AND FORMOTEROL FUMARATE DIHYDRATE 80; 4.5 UG/1; UG/1
2 AEROSOL RESPIRATORY (INHALATION) 2 TIMES DAILY
Status: DISCONTINUED | OUTPATIENT
Start: 2022-01-27 | End: 2022-01-27 | Stop reason: SDUPTHER

## 2022-01-27 RX ORDER — DIAZEPAM 5 MG/1
5 TABLET ORAL EVERY 6 HOURS PRN
Status: DISCONTINUED | OUTPATIENT
Start: 2022-01-27 | End: 2022-01-31 | Stop reason: HOSPADM

## 2022-01-27 RX ORDER — OXYCODONE HYDROCHLORIDE 5 MG/1
5 TABLET ORAL EVERY 4 HOURS PRN
Status: DISCONTINUED | OUTPATIENT
Start: 2022-01-27 | End: 2022-01-27 | Stop reason: ALTCHOICE

## 2022-01-27 RX ORDER — TRIAMTERENE AND HYDROCHLOROTHIAZIDE 37.5; 25 MG/1; MG/1
1 TABLET ORAL DAILY
Status: DISCONTINUED | OUTPATIENT
Start: 2022-01-28 | End: 2022-01-31 | Stop reason: HOSPADM

## 2022-01-27 RX ORDER — FENTANYL CITRATE 50 UG/ML
INJECTION, SOLUTION INTRAMUSCULAR; INTRAVENOUS PRN
Status: DISCONTINUED | OUTPATIENT
Start: 2022-01-27 | End: 2022-01-27 | Stop reason: SDUPTHER

## 2022-01-27 RX ORDER — DULOXETIN HYDROCHLORIDE 60 MG/1
120 CAPSULE, DELAYED RELEASE ORAL NIGHTLY
Status: DISCONTINUED | OUTPATIENT
Start: 2022-01-27 | End: 2022-01-31 | Stop reason: HOSPADM

## 2022-01-27 RX ORDER — LIDOCAINE HYDROCHLORIDE 10 MG/ML
1 INJECTION, SOLUTION EPIDURAL; INFILTRATION; INTRACAUDAL; PERINEURAL
Status: DISCONTINUED | OUTPATIENT
Start: 2022-01-27 | End: 2022-01-27 | Stop reason: HOSPADM

## 2022-01-27 RX ORDER — SODIUM CHLORIDE 9 MG/ML
25 INJECTION, SOLUTION INTRAVENOUS PRN
Status: DISCONTINUED | OUTPATIENT
Start: 2022-01-27 | End: 2022-01-27 | Stop reason: HOSPADM

## 2022-01-27 RX ORDER — FENTANYL CITRATE 50 UG/ML
50 INJECTION, SOLUTION INTRAMUSCULAR; INTRAVENOUS EVERY 5 MIN PRN
Status: DISCONTINUED | OUTPATIENT
Start: 2022-01-27 | End: 2022-01-27 | Stop reason: HOSPADM

## 2022-01-27 RX ORDER — LEVOTHYROXINE SODIUM 0.03 MG/1
25 TABLET ORAL DAILY
Status: DISCONTINUED | OUTPATIENT
Start: 2022-01-28 | End: 2022-01-31 | Stop reason: HOSPADM

## 2022-01-27 RX ORDER — IPRATROPIUM BROMIDE AND ALBUTEROL SULFATE 2.5; .5 MG/3ML; MG/3ML
3 SOLUTION RESPIRATORY (INHALATION) EVERY 4 HOURS PRN
Status: DISCONTINUED | OUTPATIENT
Start: 2022-01-27 | End: 2022-01-31 | Stop reason: HOSPADM

## 2022-01-27 RX ORDER — HYDRALAZINE HYDROCHLORIDE 20 MG/ML
5 INJECTION INTRAMUSCULAR; INTRAVENOUS EVERY 10 MIN PRN
Status: DISCONTINUED | OUTPATIENT
Start: 2022-01-27 | End: 2022-01-27 | Stop reason: HOSPADM

## 2022-01-27 RX ORDER — SODIUM CHLORIDE 0.9 % (FLUSH) 0.9 %
5-40 SYRINGE (ML) INJECTION EVERY 12 HOURS SCHEDULED
Status: DISCONTINUED | OUTPATIENT
Start: 2022-01-27 | End: 2022-01-31 | Stop reason: HOSPADM

## 2022-01-27 RX ORDER — OXYCODONE HYDROCHLORIDE 5 MG/1
5 TABLET ORAL EVERY 4 HOURS PRN
Status: DISCONTINUED | OUTPATIENT
Start: 2022-01-27 | End: 2022-01-31 | Stop reason: HOSPADM

## 2022-01-27 RX ORDER — HYDROMORPHONE HCL 110MG/55ML
PATIENT CONTROLLED ANALGESIA SYRINGE INTRAVENOUS PRN
Status: DISCONTINUED | OUTPATIENT
Start: 2022-01-27 | End: 2022-01-27 | Stop reason: SDUPTHER

## 2022-01-27 RX ORDER — OXYCODONE HYDROCHLORIDE AND ACETAMINOPHEN 5; 325 MG/1; MG/1
2 TABLET ORAL PRN
Status: DISCONTINUED | OUTPATIENT
Start: 2022-01-27 | End: 2022-01-27 | Stop reason: HOSPADM

## 2022-01-27 RX ORDER — REMIFENTANIL HYDROCHLORIDE 1 MG/ML
INJECTION, POWDER, LYOPHILIZED, FOR SOLUTION INTRAVENOUS CONTINUOUS PRN
Status: DISCONTINUED | OUTPATIENT
Start: 2022-01-27 | End: 2022-01-27 | Stop reason: SDUPTHER

## 2022-01-27 RX ORDER — ATORVASTATIN CALCIUM 80 MG/1
80 TABLET, FILM COATED ORAL EVERY EVENING
Status: DISCONTINUED | OUTPATIENT
Start: 2022-01-27 | End: 2022-01-31 | Stop reason: HOSPADM

## 2022-01-27 RX ORDER — ACETAMINOPHEN 500 MG
1000 TABLET ORAL EVERY 8 HOURS SCHEDULED
Status: DISCONTINUED | OUTPATIENT
Start: 2022-01-27 | End: 2022-01-31 | Stop reason: HOSPADM

## 2022-01-27 RX ORDER — SODIUM CHLORIDE 9 MG/ML
INJECTION, SOLUTION INTRAVENOUS CONTINUOUS
Status: DISCONTINUED | OUTPATIENT
Start: 2022-01-27 | End: 2022-01-28

## 2022-01-27 RX ORDER — ALBUTEROL SULFATE 2.5 MG/3ML
2.5 SOLUTION RESPIRATORY (INHALATION) EVERY 4 HOURS PRN
Status: DISCONTINUED | OUTPATIENT
Start: 2022-01-27 | End: 2022-01-31 | Stop reason: HOSPADM

## 2022-01-27 RX ORDER — SODIUM CHLORIDE 0.9 % (FLUSH) 0.9 %
5-40 SYRINGE (ML) INJECTION EVERY 12 HOURS SCHEDULED
Status: DISCONTINUED | OUTPATIENT
Start: 2022-01-27 | End: 2022-01-27 | Stop reason: HOSPADM

## 2022-01-27 RX ORDER — GLYCOPYRROLATE 1 MG/5 ML
SYRINGE (ML) INTRAVENOUS PRN
Status: DISCONTINUED | OUTPATIENT
Start: 2022-01-27 | End: 2022-01-27 | Stop reason: SDUPTHER

## 2022-01-27 RX ORDER — ONDANSETRON 2 MG/ML
4 INJECTION INTRAMUSCULAR; INTRAVENOUS EVERY 6 HOURS PRN
Status: DISCONTINUED | OUTPATIENT
Start: 2022-01-27 | End: 2022-01-31 | Stop reason: HOSPADM

## 2022-01-27 RX ORDER — SUCCINYLCHOLINE/SOD CL,ISO/PF 200MG/10ML
SYRINGE (ML) INTRAVENOUS PRN
Status: DISCONTINUED | OUTPATIENT
Start: 2022-01-27 | End: 2022-01-27 | Stop reason: SDUPTHER

## 2022-01-27 RX ORDER — LABETALOL HYDROCHLORIDE 5 MG/ML
5 INJECTION, SOLUTION INTRAVENOUS EVERY 10 MIN PRN
Status: DISCONTINUED | OUTPATIENT
Start: 2022-01-27 | End: 2022-01-27 | Stop reason: HOSPADM

## 2022-01-27 RX ORDER — SODIUM CHLORIDE 0.9 % (FLUSH) 0.9 %
5-40 SYRINGE (ML) INJECTION PRN
Status: DISCONTINUED | OUTPATIENT
Start: 2022-01-27 | End: 2022-01-27 | Stop reason: HOSPADM

## 2022-01-27 RX ORDER — PROPOFOL 10 MG/ML
INJECTION, EMULSION INTRAVENOUS PRN
Status: DISCONTINUED | OUTPATIENT
Start: 2022-01-27 | End: 2022-01-27 | Stop reason: SDUPTHER

## 2022-01-27 RX ORDER — ONDANSETRON 4 MG/1
4 TABLET, ORALLY DISINTEGRATING ORAL EVERY 8 HOURS PRN
Status: DISCONTINUED | OUTPATIENT
Start: 2022-01-27 | End: 2022-01-31 | Stop reason: HOSPADM

## 2022-01-27 RX ORDER — PROPOFOL 10 MG/ML
INJECTION, EMULSION INTRAVENOUS CONTINUOUS PRN
Status: DISCONTINUED | OUTPATIENT
Start: 2022-01-27 | End: 2022-01-27 | Stop reason: SDUPTHER

## 2022-01-27 RX ORDER — OXYCODONE HYDROCHLORIDE AND ACETAMINOPHEN 5; 325 MG/1; MG/1
1 TABLET ORAL PRN
Status: DISCONTINUED | OUTPATIENT
Start: 2022-01-27 | End: 2022-01-27 | Stop reason: HOSPADM

## 2022-01-27 RX ORDER — FENTANYL CITRATE 50 UG/ML
25 INJECTION, SOLUTION INTRAMUSCULAR; INTRAVENOUS EVERY 5 MIN PRN
Status: DISCONTINUED | OUTPATIENT
Start: 2022-01-27 | End: 2022-01-27 | Stop reason: HOSPADM

## 2022-01-27 RX ORDER — POLYETHYLENE GLYCOL 3350 17 G/17G
17 POWDER, FOR SOLUTION ORAL DAILY PRN
Status: DISCONTINUED | OUTPATIENT
Start: 2022-01-27 | End: 2022-01-28

## 2022-01-27 RX ORDER — ONDANSETRON 2 MG/ML
4 INJECTION INTRAMUSCULAR; INTRAVENOUS
Status: DISCONTINUED | OUTPATIENT
Start: 2022-01-27 | End: 2022-01-27 | Stop reason: HOSPADM

## 2022-01-27 RX ORDER — SODIUM CHLORIDE 9 MG/ML
25 INJECTION, SOLUTION INTRAVENOUS PRN
Status: DISCONTINUED | OUTPATIENT
Start: 2022-01-27 | End: 2022-01-31 | Stop reason: HOSPADM

## 2022-01-27 RX ORDER — SODIUM CHLORIDE, SODIUM LACTATE, POTASSIUM CHLORIDE, CALCIUM CHLORIDE 600; 310; 30; 20 MG/100ML; MG/100ML; MG/100ML; MG/100ML
INJECTION, SOLUTION INTRAVENOUS CONTINUOUS
Status: DISCONTINUED | OUTPATIENT
Start: 2022-01-27 | End: 2022-01-27

## 2022-01-27 RX ORDER — ATENOLOL 25 MG/1
12.5 TABLET ORAL DAILY
Status: DISCONTINUED | OUTPATIENT
Start: 2022-01-27 | End: 2022-01-31 | Stop reason: HOSPADM

## 2022-01-27 RX ORDER — OXYCODONE HYDROCHLORIDE 5 MG/1
10 TABLET ORAL EVERY 4 HOURS PRN
Status: DISCONTINUED | OUTPATIENT
Start: 2022-01-27 | End: 2022-01-31 | Stop reason: HOSPADM

## 2022-01-27 RX ADMIN — SODIUM CHLORIDE: 9 INJECTION, SOLUTION INTRAVENOUS at 12:38

## 2022-01-27 RX ADMIN — PHENYLEPHRINE HYDROCHLORIDE 100 MCG: 10 INJECTION, SOLUTION INTRAMUSCULAR; INTRAVENOUS; SUBCUTANEOUS at 08:51

## 2022-01-27 RX ADMIN — PROPOFOL 140 MG: 10 INJECTION, EMULSION INTRAVENOUS at 08:41

## 2022-01-27 RX ADMIN — METHOCARBAMOL 1000 MG: 100 INJECTION, SOLUTION INTRAMUSCULAR; INTRAVENOUS at 12:40

## 2022-01-27 RX ADMIN — HYDRALAZINE HYDROCHLORIDE 5 MG: 20 INJECTION, SOLUTION INTRAMUSCULAR; INTRAVENOUS at 12:14

## 2022-01-27 RX ADMIN — HYDRALAZINE HYDROCHLORIDE 5 MG: 20 INJECTION, SOLUTION INTRAMUSCULAR; INTRAVENOUS at 12:03

## 2022-01-27 RX ADMIN — HYDROMORPHONE HYDROCHLORIDE 0.25 MG: 1 INJECTION, SOLUTION INTRAMUSCULAR; INTRAVENOUS; SUBCUTANEOUS at 12:29

## 2022-01-27 RX ADMIN — ATORVASTATIN CALCIUM 80 MG: 80 TABLET, FILM COATED ORAL at 17:29

## 2022-01-27 RX ADMIN — BISACODYL 5 MG: 5 TABLET, COATED ORAL at 17:29

## 2022-01-27 RX ADMIN — FENTANYL CITRATE 25 MCG: 50 INJECTION, SOLUTION INTRAMUSCULAR; INTRAVENOUS at 08:41

## 2022-01-27 RX ADMIN — FENTANYL CITRATE 50 MCG: 50 INJECTION, SOLUTION INTRAMUSCULAR; INTRAVENOUS at 10:02

## 2022-01-27 RX ADMIN — FENTANYL CITRATE 25 MCG: 50 INJECTION, SOLUTION INTRAMUSCULAR; INTRAVENOUS at 09:20

## 2022-01-27 RX ADMIN — METHOCARBAMOL 1000 MG: 100 INJECTION, SOLUTION INTRAMUSCULAR; INTRAVENOUS at 20:14

## 2022-01-27 RX ADMIN — PROPOFOL 150 MCG/KG/MIN: 10 INJECTION, EMULSION INTRAVENOUS at 08:31

## 2022-01-27 RX ADMIN — PHENYLEPHRINE HYDROCHLORIDE 100 MCG: 10 INJECTION, SOLUTION INTRAMUSCULAR; INTRAVENOUS; SUBCUTANEOUS at 08:44

## 2022-01-27 RX ADMIN — DEXAMETHASONE SODIUM PHOSPHATE 4 MG: 4 INJECTION, SOLUTION INTRAMUSCULAR; INTRAVENOUS at 08:49

## 2022-01-27 RX ADMIN — Medication 0.2 MG: at 09:15

## 2022-01-27 RX ADMIN — REMIFENTANIL HYDROCHLORIDE 0.3 MCG/KG/MIN: 1 INJECTION, POWDER, LYOPHILIZED, FOR SOLUTION INTRAVENOUS at 08:31

## 2022-01-27 RX ADMIN — Medication 10 ML: at 14:07

## 2022-01-27 RX ADMIN — PHENYLEPHRINE HYDROCHLORIDE 100 MCG: 10 INJECTION, SOLUTION INTRAMUSCULAR; INTRAVENOUS; SUBCUTANEOUS at 08:47

## 2022-01-27 RX ADMIN — ONDANSETRON 4 MG: 2 INJECTION INTRAMUSCULAR; INTRAVENOUS at 08:49

## 2022-01-27 RX ADMIN — DULOXETINE HYDROCHLORIDE 120 MG: 60 CAPSULE, DELAYED RELEASE ORAL at 19:34

## 2022-01-27 RX ADMIN — HYDROMORPHONE HYDROCHLORIDE 0.8 MG: 2 INJECTION, SOLUTION INTRAMUSCULAR; INTRAVENOUS; SUBCUTANEOUS at 10:03

## 2022-01-27 RX ADMIN — SODIUM CHLORIDE, POTASSIUM CHLORIDE, SODIUM LACTATE AND CALCIUM CHLORIDE: 600; 310; 30; 20 INJECTION, SOLUTION INTRAVENOUS at 08:04

## 2022-01-27 RX ADMIN — LIDOCAINE HYDROCHLORIDE 50 MG: 20 INJECTION, SOLUTION INTRAVENOUS at 08:41

## 2022-01-27 RX ADMIN — REMIFENTANIL HYDROCHLORIDE 0.5 MCG/KG/MIN: 1 INJECTION, POWDER, LYOPHILIZED, FOR SOLUTION INTRAVENOUS at 08:44

## 2022-01-27 RX ADMIN — OXYCODONE HYDROCHLORIDE 5 MG: 5 TABLET ORAL at 18:45

## 2022-01-27 RX ADMIN — Medication 100 MG: at 08:41

## 2022-01-27 RX ADMIN — OXYCODONE HYDROCHLORIDE 5 MG: 5 TABLET ORAL at 14:16

## 2022-01-27 RX ADMIN — ATENOLOL 12.5 MG: 25 TABLET ORAL at 14:16

## 2022-01-27 RX ADMIN — CEFAZOLIN 2000 MG: 10 INJECTION, POWDER, FOR SOLUTION INTRAVENOUS at 17:31

## 2022-01-27 RX ADMIN — REMIFENTANIL HYDROCHLORIDE 0.25 MCG/KG/MIN: 1 INJECTION, POWDER, LYOPHILIZED, FOR SOLUTION INTRAVENOUS at 09:16

## 2022-01-27 ASSESSMENT — PULMONARY FUNCTION TESTS
PIF_VALUE: 18
PIF_VALUE: 17
PIF_VALUE: 22
PIF_VALUE: 20
PIF_VALUE: 22
PIF_VALUE: 30
PIF_VALUE: 15
PIF_VALUE: 0
PIF_VALUE: 21
PIF_VALUE: 26
PIF_VALUE: 21
PIF_VALUE: 21
PIF_VALUE: 18
PIF_VALUE: 27
PIF_VALUE: 15
PIF_VALUE: 18
PIF_VALUE: 23
PIF_VALUE: 20
PIF_VALUE: 22
PIF_VALUE: 22
PIF_VALUE: 23
PIF_VALUE: 24
PIF_VALUE: 22
PIF_VALUE: 18
PIF_VALUE: 18
PIF_VALUE: 21
PIF_VALUE: 21
PIF_VALUE: 20
PIF_VALUE: 1
PIF_VALUE: 20
PIF_VALUE: 21
PIF_VALUE: 22
PIF_VALUE: 22
PIF_VALUE: 21
PIF_VALUE: 21
PIF_VALUE: 18
PIF_VALUE: 22
PIF_VALUE: 21
PIF_VALUE: 18
PIF_VALUE: 22
PIF_VALUE: 25
PIF_VALUE: 22
PIF_VALUE: 22
PIF_VALUE: 24
PIF_VALUE: 17
PIF_VALUE: 22
PIF_VALUE: 17
PIF_VALUE: 18
PIF_VALUE: 18
PIF_VALUE: 17
PIF_VALUE: 24
PIF_VALUE: 18
PIF_VALUE: 22
PIF_VALUE: 0
PIF_VALUE: 18
PIF_VALUE: 19
PIF_VALUE: 23
PIF_VALUE: 20
PIF_VALUE: 18
PIF_VALUE: 21
PIF_VALUE: 20
PIF_VALUE: 21
PIF_VALUE: 21
PIF_VALUE: 22
PIF_VALUE: 0
PIF_VALUE: 25
PIF_VALUE: 18
PIF_VALUE: 22
PIF_VALUE: 22
PIF_VALUE: 21
PIF_VALUE: 22
PIF_VALUE: 18
PIF_VALUE: 21
PIF_VALUE: 18
PIF_VALUE: 1
PIF_VALUE: 18
PIF_VALUE: 21
PIF_VALUE: 22
PIF_VALUE: 22
PIF_VALUE: 18
PIF_VALUE: 22
PIF_VALUE: 18
PIF_VALUE: 22
PIF_VALUE: 22
PIF_VALUE: 18
PIF_VALUE: 18
PIF_VALUE: 20
PIF_VALUE: 22
PIF_VALUE: 21
PIF_VALUE: 24
PIF_VALUE: 1
PIF_VALUE: 21
PIF_VALUE: 21
PIF_VALUE: 22
PIF_VALUE: 21
PIF_VALUE: 18
PIF_VALUE: 25
PIF_VALUE: 20
PIF_VALUE: 21
PIF_VALUE: 20
PIF_VALUE: 1
PIF_VALUE: 22
PIF_VALUE: 26
PIF_VALUE: 18
PIF_VALUE: 22
PIF_VALUE: 20
PIF_VALUE: 20
PIF_VALUE: 1
PIF_VALUE: 22
PIF_VALUE: 22
PIF_VALUE: 2
PIF_VALUE: 20
PIF_VALUE: 21
PIF_VALUE: 21
PIF_VALUE: 22
PIF_VALUE: 21
PIF_VALUE: 22
PIF_VALUE: 22
PIF_VALUE: 23
PIF_VALUE: 20
PIF_VALUE: 21
PIF_VALUE: 26
PIF_VALUE: 18
PIF_VALUE: 21
PIF_VALUE: 22
PIF_VALUE: 21
PIF_VALUE: 22
PIF_VALUE: 2
PIF_VALUE: 21
PIF_VALUE: 18
PIF_VALUE: 21
PIF_VALUE: 22
PIF_VALUE: 15
PIF_VALUE: 3
PIF_VALUE: 22
PIF_VALUE: 21
PIF_VALUE: 21
PIF_VALUE: 22
PIF_VALUE: 22
PIF_VALUE: 16
PIF_VALUE: 22
PIF_VALUE: 22
PIF_VALUE: 21
PIF_VALUE: 21
PIF_VALUE: 18
PIF_VALUE: 20
PIF_VALUE: 21
PIF_VALUE: 22
PIF_VALUE: 20
PIF_VALUE: 28
PIF_VALUE: 22
PIF_VALUE: 20
PIF_VALUE: 21
PIF_VALUE: 20
PIF_VALUE: 20
PIF_VALUE: 24
PIF_VALUE: 15
PIF_VALUE: 2
PIF_VALUE: 23
PIF_VALUE: 22
PIF_VALUE: 21
PIF_VALUE: 26
PIF_VALUE: 17
PIF_VALUE: 0
PIF_VALUE: 22
PIF_VALUE: 8
PIF_VALUE: 20

## 2022-01-27 ASSESSMENT — PAIN SCALES - GENERAL
PAINLEVEL_OUTOF10: 4
PAINLEVEL_OUTOF10: 4
PAINLEVEL_OUTOF10: 0
PAINLEVEL_OUTOF10: 4
PAINLEVEL_OUTOF10: 5
PAINLEVEL_OUTOF10: 0

## 2022-01-27 ASSESSMENT — PAIN DESCRIPTION - LOCATION
LOCATION: BACK

## 2022-01-27 ASSESSMENT — PAIN DESCRIPTION - PAIN TYPE
TYPE: SURGICAL PAIN
TYPE: SURGICAL PAIN

## 2022-01-27 ASSESSMENT — LIFESTYLE VARIABLES: SMOKING_STATUS: 0

## 2022-01-27 ASSESSMENT — PAIN DESCRIPTION - FREQUENCY: FREQUENCY: CONTINUOUS

## 2022-01-27 ASSESSMENT — PAIN DESCRIPTION - ORIENTATION: ORIENTATION: MID

## 2022-01-27 ASSESSMENT — PAIN DESCRIPTION - DESCRIPTORS
DESCRIPTORS: ACHING;SHARP
DESCRIPTORS: SHARP

## 2022-01-27 ASSESSMENT — COPD QUESTIONNAIRES: CAT_SEVERITY: MODERATE

## 2022-01-27 ASSESSMENT — PAIN - FUNCTIONAL ASSESSMENT: PAIN_FUNCTIONAL_ASSESSMENT: 0-10

## 2022-01-27 NOTE — PROGRESS NOTES
The waiting room called to report family has been waiting for an hour and no one has talked to them. Dr Rashaad Jackson and NP's called. Message left for both.

## 2022-01-27 NOTE — ANESTHESIA PRE PROCEDURE
Department of Anesthesiology  Preprocedure Note       Name:  Doug Connolly   Age:  80 y.o.  :  1940                                          MRN:  [id number]         Date:  2021      Surgeon: Que Postal  Procedure:  OT10-11 THORACIC DECOMPRESSION, DISCECTOMY, FUSION / FIXATION (N/A )  PEN REDUCTION INTERNAL FIXATION         EK-SEP-2020  Sinus tachycardia 114; Nl axis; No acute ischemic changes; Otherwise normal ECG  When compared with ECG of 18-SEP-2020: No significant change was found    Stress Test:    Peak HR:136 bpm    Predicted HR: 142 bpm  % of predicted HR: 96   ECG Findings:  Sinus tachycardia with no ischemic changes. There is uniform isotope uptake at stress and rest. There is no evidence of  myocardial ischemia or scar. Normal LVEF of >75%. EKG changes are of  uncertain significance in setting of normal perfusion. Overall findings  represent a low risk scan. Medications prior to admission:    oxyCODONE-acetaminophen (PERCOCET) 5-325 MG per tablet Take 1-2 tablets by mouth every 6 hours as needed for Pain    WIXELA INHUB 250-50 MCG/DOSE AEPB USE 1 INHALATION BY MOUTH  EVERY 12 HOURS   DULoxetine (CYMBALTA) 60 MG ER Take 60 mg by mouth daily Before bed   RESTASIS 0.05 % ophthalmic emulsion    Coenzyme Q10 (CO Q 10 PO) Take by mouth   atorvastatin (LIPITOR) 80 MG tablet Take 80 mg by mouth   traMADol (ULTRAM) 50 MG tablet Take 50 mg by mouth every 6 hours as needed for Pain. albuterol sulfate  (90 Base) MCG/ACT inhaler 2 puffs every 6 hours as needed for Wheezing or Shortness of Breath   triamterene-hydrochlorothiazide (MAXZIDE-25) 37.5-25 MG Take 1 tablet by mouth daily   Cholecalciferol (VITAMIN D) 2000 UNITS CAPS capsule Take by mouth daily   Lactobacillus Rhamnosus, GG, (PROBIOTIC COLIC PO) Take by mouth daily   leflunomide (ARAVA) 20 MG tablet Take 20 mg by mouth daily. sulfaSALAzine (AZULFIDINE) 500 MG *&** Take 500 mg by mouth 4 times daily. levothyroxine (SYNTHROID) 25 MCG tablet Take 25 mcg by mouth Daily. ATENOLOL PO Take 25 mg by mouth daily.      Allergies:     Effexor [Venlafaxine Hcl] Itching and Nausea Only    Fenofibrate Diarrhea    Prednisone Other (See Comments)     Causes body to feel on fire    Codeine Nausea And Vomiting    Docosahexaenoic Acid-Epa Nausea And Vomiting     Cannot take Vascepa     Problem List:     RA (rheumatoid arthritis) (HCC)    Hypertension    Asthma    Hyperlipidemia    Asthma exacerbation    Pleuritic chest pain    Chest pain on breathing    Pulmonary nodules    Chronic rhinitis    COPD (chronic obstructive pulmonary disease) (HCC)    Bronchitis    Hypothyroidism    COPD exacerbation (HCC)    Acute respiratory failure with hypoxemia (HCC)    Elevated d-dimer    Personal history of immunosupression therapy    Atelectasis    Chest congestion    Mucus plugging of bronchi    History of rheumatoid arthritis    Former smoker    Pyuria    History of humerus fracture     Past Medical History:     Asthma     Hyperlipidemia     Hypertension     RA [facility]rheumatoid arthritis) ([facility] [address]     Thyroid disease     has half of her thyroid     Past Surgical History:     ABDOMINAL EXPLORATION SURGERY      APPENDECTOMY      CHOLECYSTECTOMY      COLONOSCOPY  2013    diverticulosis, polyps    COLONOSCOPY  11/10/2016    normal colon    EYE SURGERY Right 12/11/15    cataract removal    EYE SURGERY Left 16    cataract removed    HYSTERECTOMY      THYROIDECTOMY, PARTIAL Left     TUMOR REMOVAL      off of thyroid, was benign     Social History:     Smoking status: Former Smoker     Packs/day: 0.50     Years: 20.00     Pack years: 10.00     Types: Cigarettes     Start date: 1959     Quit date: 1987     Years since quittin.1    Smokeless tobacco: Never Used   Substance Use Topics    Alcohol use: No     Alcohol/week: 0.0 standard drinks     Vital Signs (Current):   BP: 142/77 Pulse: 79 Endo/Other:    (+) hypothyroidism, blood dyscrasia: anemia, arthritis: OA and rheumatoid. , no malignancy/cancer. (-) diabetes mellitus, no malignancy/cancer               Abdominal:             Vascular:     - DVT and PE. Other Findings:             Anesthesia Plan      general     ASA 3       Induction: intravenous. MIPS: Prophylactic antiemetics administered. Anesthetic plan and risks discussed with patient. Plan discussed with CRNA.             Yoko Lyons MD

## 2022-01-27 NOTE — H&P
Ac Rosenberg    [id number]    [facility] Same Day Surgery Update H & P  Department of General Surgery   Surgical Service   Pre-operative History and Physical  Last H & P within the last 30 days. 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 She was hospitalized in September, 2021, after sustaining a right ankle fracture after a fall. Since then, she has been living in a nursing facility, and reports frequent falls and progressive gait instability over the past several months. An MRI from October, 2021, demonstrates a left paracentral disc herniation at T10-T11 with moderate cord compression and myelomalacia. The patient presents for the above procedures. Covid 19:  Patient denies fever, chills, worsening cough, or known exposure to Covid-19.        Past Medical History:        Diagnosis Date    Asthma     COPD (chronic obstructive pulmonary disease) (HCC)     Hyperlipidemia     Hypertension     RA (rheumatoid arthritis) ([facility] [address]     Thyroid disease     has half of her thyroid     Past Surgical History:        Procedure Laterality Date    ABDOMINAL EXPLORATION SURGERY      APPENDECTOMY      CHOLECYSTECTOMY      COLONOSCOPY  4/18/2013    diverticulosis, polyps    COLONOSCOPY  11/10/2016    normal colon    EYE SURGERY Right 12/11/15    cataract removal    EYE SURGERY Left 1/8/16    cataract removed    FRACTURE SURGERY      HUMERUS FRACTURE SURGERY Right 2/28/2021    HUMERUS OPEN REDUCTION INTERNAL FIXATION performed by Seema Sibley MD at [address] [place] [address] Left     TUMOR REMOVAL      off of thyroid, was benign     Past Social History:  Social History     Socioeconomic History    Marital status:      Spouse name: None    Number of children: None    Years of education: None    Highest education level: None   Occupational History    None   Tobacco Use    Smoking status: Former Smoker     Packs/day: 0.50     Years: 20.00     Pack years: 10.00     Types: Cigarettes     Start date: 1959     Quit date: 1987     Years since quittin.0    Smokeless tobacco: Never Used   Vaping Use    Vaping Use: Never used   Substance and Sexual Activity    Alcohol use: No     Alcohol/week: 0.0 standard drinks    Drug use: No    Sexual activity: Not Currently     Partners: Male   Other Topics Concern    None   Social History Narrative    None     Social Determinants of Health     Financial Resource Strain:     Difficulty of Paying Living Expenses: Not on file   Food Insecurity:     Worried About Running Out of Food in the Last Year: Not on file    Dona of Food in the Last Year: Not on file   Transportation Needs:     Lack of Transportation (Medical): Not on file    Lack of Transportation (Non-Medical):  Not on file   Physical Activity:     Days of Exercise per Week: Not on file    Minutes of Exercise per Session: Not on file   Stress:     Feeling of Stress : Not on file   Social Connections:     Frequency of Communication with Friends and Family: Not on file    Frequency of Social Gatherings with Friends and Family: Not on file    Attends Jainism Services: Not on file    Active Member of Clubs or Organizations: Not on file    Attends Club or Organization Meetings: Not on file    Marital Status: Not on file   Intimate Partner Violence:     Fear of Current or Ex-Partner: Not on file    Emotionally Abused: Not on file    Physically Abused: Not on file    Sexually Abused: Not on file   Housing Stability:     Unable to Pay for Housing in the Last Year: Not on file    Number of Places Lived in the Last Year: Not on file    Unstable Housing in the Last Year: Not on file         Medications Prior to Admission:      Prior to Admission medications    Medication Sig Start Date End Date Taking? Authorizing Provider   bisacodyl (DULCOLAX) 5 MG EC tablet Take 5 mg by mouth daily   Yes Historical Provider, MD   traMADol (ULTRAM) 50 MG tablet  1/6/22  Yes Historical Provider, MD   melatonin 3 MG TABS tablet Take 3 mg by mouth 2 times daily as needed   Yes Historical Provider, MD   acetaminophen (TYLENOL) 325 MG tablet Take 650 mg by mouth every 4 hours as needed for Pain   Yes Historical Provider, MD   folic acid (FOLVITE) 1 MG tablet Take 1 mg by mouth daily   Yes Historical Provider, MD   atenolol (TENORMIN) 25 MG tablet Take 0.5 tablets by mouth daily 8/25/21  Yes Stephanie Mendoza MD   fluticasone-salmeterol (Ashley Frater) 250-50 MCG/DOSE AEPB USE 1 INHALATION BY MOUTH  EVERY 12 HOURS 8/18/21  Yes Hoa Duran MD   methotrexate (RHEUMATREX) 2.5 MG chemo tablet Take 10 mg by mouth once a week Monday nights 1/28/21  Yes Historical Provider, MD   DULoxetine (CYMBALTA) 60 MG extended release capsule Take 120 mg by mouth nightly Before bed    Yes Historical Provider, MD   RESTASIS 0.05 % ophthalmic emulsion  2/27/20  Yes Historical Provider, MD   atorvastatin (LIPITOR) 80 MG tablet Take 80 mg by mouth every evening  8/18/19  Yes Historical Provider, MD   triamterene-hydrochlorothiazide (MAXZIDE-25) 37.5-25 MG per tablet Take 1 tablet by mouth daily 10/19/18  Yes Historical Provider, MD   Cholecalciferol (VITAMIN D) 2000 UNITS CAPS capsule Take 1 capsule by mouth nightly    Yes Historical Provider, MD   levothyroxine (SYNTHROID) 25 MCG tablet Take 25 mcg by mouth Daily.    Yes Historical Provider, MD   PROAIR  (90 Base) MCG/ACT inhaler INHALE 2 PUFFS BY MOUTH  INTO THE LUNGS EVERY 6  HOURS AS NEEDED FOR  WHEEZING OR SHORTNESS OF  BREATH 8/16/21   Lenora Siddiqui Lenin Gonzalez MD   ipratropium-albuterol (DUONEB) 0.5-2.5 (3) MG/3ML SOLN nebulizer solution Inhale 3 mLs into the lungs every 4 hours as needed for Shortness of Breath 7/25/21   Jo Dubose MD   polyethylene glycol (GLYCOLAX) 17 g packet Take 17 g by mouth daily 7/26/21   Jo Dubose MD         Allergies:  Effexor [venlafaxine hcl], Fenofibrate, Prednisone, Codeine, and Docosahexaenoic acid-epa    PHYSICAL EXAM:      BP (!) 177/97   Pulse 78   Temp 96.7 °F (35.9 °C) (Temporal)   Resp 20   Ht 5' 5\" (1.651 m)   Wt 180 lb (81.6 kg)   SpO2 97%   BMI 29.95 kg/m²      Airway:  Airway patent with no audible stridor    Heart:  Regular rate and rhythm, No murmur noted    Lungs:  No increased work of breathing, good air exchange, clear to auscultation bilaterally, no crackles or wheezing    Abdomen:  Soft, non-distended, non-tender, no rebound tenderness or guarding, and no masses palpated    ASSESSMENT AND PLAN     Patient is a 80 y.o. female with above specified procedure planned. 1.  The patients history and physical was obtained and signed off by the pre-admission testing department. Patient seen and focused exam done today- no new changes since last physical exam on 1/18/2022.    2.  Access to ancillary services are available per request of the provider.     ANDREW Garcia - CNP     1/27/2022

## 2022-01-27 NOTE — PROGRESS NOTES
PACU Transfer Note    Vitals:    01/27/22 1321   BP: 122/70   Pulse: 95   Resp: 16   Temp: 97.1 °F (36.2 °C)   SpO2:        In: 190 [I.V.:190]  Out: 325 [Urine:325]    Pain assessment:  present - adequately treated  Pain Level: 4    Report given to Receiving unit RN.    1/27/2022 1:27 PM

## 2022-01-27 NOTE — OP NOTE
Operative Report    PATIENT NAME: Vidya Isbell  YOB: 1940  MEDICAL RECORD# [id number]  SURGERY DATE: 1/27/2022  SURGEON:  Felicia Mckeon MD, PhD  ASSISTANT:  Tracee Cantor PA-C., served as assistant on this surgery due to the complex nature   of the surgery and the lack of a resident or fellow assistant. She provided assistance with critical tissue retraction at parts of the surgery, wound closure and assistance with protecting critical neuro elements. DICTATED BY:  Felicia Mckeon MD, PhD    PREOPERATIVE DIAGNOSIS:  1. 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 Myelomalacia and myelopathy secondary to above    POSTOPERATIVE DIAGNOSIS:  1. Same    PROCEDURES PERFORMED:  1.  T10-11 laminectomy and decompression  2. Transforaminal, far lateral approach T10-11 for removal of disc  3. Placement of pedicle screws T10-11  4. Posterolateral arthrodesis and fusion T10-11  5. Intraoperative fluoroscopy and Metronic O-Arm CT stereotactic navigation  6. Neuro-monitoring for MEP, SSEP and EMG    ANESTHESIA:  General    ESTIMATED BLOOD LOSS:  100 cc    INDICATIONS FOR SURGERY:  The patient is a 80 y.o. with progressive lower extremity weakness, numbness and inability to ambulate. MRI demonstrated significant degenerative spine disease, with the most prominent finding of T10-11 disc herniation and osteophyte formation resulting in severe central stenosis and spinal cord compression. Patient had clinical signs of myelopathy, and her symptoms had progressed, requiring the use of a wheelchair. Having failed conservative management and experiencing persistent symptoms, the patient elected to proceed ahead with T10-11 laminectomy and decompression, far lateral discectomy and fusion. The patient was brought to the operating room and placed under general anesthesia and neuromonitoring initiated. She was then placed prone on a [place] frame.   A time out was carried out, antibiotics were administered by anesthesia. All bony prominences were inspected and padded prior to sterile draping. Fluoroscopy was brought into the field and real time fluoroscopy was used to localize the proper level, starting at the sacrum and counting up to the T10 vertebrae. The incision was planned appropriately and opened using a #15 blade knife, the skin was incised in the midline and monopolar cautery was used to dissect through the subcutaneous tissue to open the fascia and reflect the paraspinal muscles laterally exposing the T10-T12 interspaces. Nancy Lyell was then placed on the the spinous process of T12 and the pedicles were again counted down to the sacrum under real time fluoroscopy to verify proper level. The stereotactic array was secured to the spinous process of T12 and the O-Arm brought into the field to acquire imaging. Using stereotactic guidance and anatomic landmarks, the T10 pedicle on the left side was targeted. The Midas Gus drill was used to create a  hole and the pedicel cannulated with the navigated awl-tap. A ball tipped explorer was used to palpate the boundaries of the pedicle to ensure no breach was present and to size the length. An appropriately sized titanium Verse pedicle screw was then selected and placed into the pedicle. This procedure was repeated on the opposite side at T10 and bilaterally at the T11 levels. Excellent bony fixation was achieved. The rongeur was then used to remove the spinous process of T10 and top of T11. A T10 laminectomy was performed using the midas Gus drill to create a trough along the lamina-facet junction, allowing the lamina to be removed in a single piece. The underlying ligamentum was then carefully  from the underlying dura and removed with a 2 mm micropunch. A wide central decompression was accomplished. The top of the T11 lamina was also removed in the midline to establish excellent central decompression.  The Surya Halsted was then used to remove the facet joint, carrying the bony removal out over the foramina. This allowed for a far lateral approach to the disc space. The thecal sac was carefully dissected away from the posterior longitudinal ligament using the [employer] [facility], the large disc herniation could be palpated. The Liliane Comings was used to undermine the disc space and create a cavity within the disc underlying the herniation and osteophyte. The reversal angle Ebstein curette was then used to reduce the herniation and bony osteophyte complex into the disc space, freeing the anterior cord from compression. The pituitary rongeur was used to remove the loose disc material and a fragment presented itself for removal. The ventral surface of the thecal sac was palpate with the dental and excellent decompression was achieved. The lamina and transverse processes were decorticated with the Midas dejah drill. The wound was copiously irrigated with antibiotic solution. Autograft bone and Fibergraft allograft was packed over the lamina and transverse processes establishing a posterolateral arthrodesis and fusion at T10-T11. An appropriately sized lazara was place into the pedicle screw heads on the left and locked into place with titanium head caps. Final tightening was performed bilataterally. A subfacial Hemovac drain was placed. The fascia was then reapproximated using interrupted 0 Vicryl sutures and interrupted 3-0 Vicryl sutures were used in the deep dermis. A 4-0 Monocryl was used to reapproximate the subcuticular layer and a derma-bond dressing was then applied. The patient was extubated in the operating room and transferred to the recovery room in stable condition. There were no complications. Monitoring remained stable throughout the operation. In accordance with CMS guidelines, I attest that I was present for the entire procedure from the creation of the skin incision to the closure.      DRAINS: Medium hemovac drain in subfacial space

## 2022-01-27 NOTE — PROGRESS NOTES
4 Eyes Admission Assessment     I agree as the admission nurse that 2 RN's have performed a thorough Head to Toe Skin Assessment on the patient. ALL assessment sites listed below have been assessed on admission. Areas assessed by both nurses:   [x]   Head, Face, and Ears   [x]   Shoulders, Back, and Chest  [x]   Arms, Elbows, and Hands   [x]   Coccyx, Sacrum, and Ischium  [x]   Legs, Feet, and Heels        Does the Patient have Skin Breakdown? Scattered bruising.     Milad Prevention initiated:    No  Wound Care Orders initiated:  No        [facility] nurse consulted for Pressure Injury (Stage 3,4, Unstageable, DTI, NWPT, and Complex wounds) or Milad score 18 or lower:  n/a      Nurse 1 eSignature: Lilian Serra RN      SHARE this note so that the co-signing nurse is able to place an eSignature    Nurse 2 eSignature: Electronically signed by Nya Addison RN on 1/27/22 at 3:44 PM EST  \

## 2022-01-27 NOTE — ANESTHESIA POSTPROCEDURE EVALUATION
Department of Anesthesiology  Postprocedure Note    Patient: Sushant Carranza  MRN: [id number]  YOB: 1940  Date of evaluation: 1/27/2022  Time:  2:06 PM     Procedure Summary     Date: 01/27/22 Room / Location: [facility]    Anesthesia Start: 0830 Anesthesia Stop: 6449    Procedure: T10-11 THORACIC DECOMPRESSION, DISCECTOMY, FUSION / FIXATION (N/A Back) Diagnosis:       Herniated thoracic disc without myelopathy      Herniation of intervertebral disc of thoracic region      Other cord compression (HCC)      Myelopathy ([facility] [address]      (HERNIATED THORACIC DISC [M51.24], MYELOPATHY [G95.29])    Surgeons: Nevaeh Queen. Mica Hurtado MD Responsible Provider: Britni Waters MD    Anesthesia Type: general ASA Status: 3          Anesthesia Type: general    Jacobo Phase I: Jacobo Score: 8    Jacobo Phase II:      Last vitals: Reviewed and per EMR flowsheets.        Anesthesia Post Evaluation    Patient location during evaluation: PACU  Level of consciousness: awake and alert  Pain score: 4  Airway patency: patent  Nausea & Vomiting: no vomiting  Complications: no  Cardiovascular status: hemodynamically stable  Respiratory status: acceptable  Hydration status: euvolemic

## 2022-01-27 NOTE — PROGRESS NOTES
133 Admitted to PACU from OR. Connected to monitor. Report at bedside. Oral airway removed. Minimal movement x4 but responds quicker and slightly more on right than left. Incision dry and clean with soft swelling or just skin position on left side of incision. (Turned to right so may be position of skin.) Biopatch in place at site of hemovac. Red spots on chest. OR nurse reports was noted pre-op as well. Shakes head \"no\" when asked if in pain.

## 2022-01-28 PROBLEM — Z98.1 S/P FUSION OF THORACIC SPINE: Status: ACTIVE | Noted: 2022-01-28

## 2022-01-28 LAB
HCT VFR BLD CALC: 33.7 % (ref 36–48)
HEMOGLOBIN: 11.2 G/DL (ref 12–16)
MCH RBC QN AUTO: 31.9 PG (ref 26–34)
MCHC RBC AUTO-ENTMCNC: 33.2 G/DL (ref 31–36)
MCV RBC AUTO: 95.9 FL (ref 80–100)
PDW BLD-RTO: 16.2 % (ref 12.4–15.4)
PLATELET # BLD: 221 K/UL (ref 135–450)
PMV BLD AUTO: 8.8 FL (ref 5–10.5)
RBC # BLD: 3.52 M/UL (ref 4–5.2)
WBC # BLD: 10.5 K/UL (ref 4–11)

## 2022-01-28 PROCEDURE — 36415 COLL VENOUS BLD VENIPUNCTURE: CPT

## 2022-01-28 PROCEDURE — 1200000000 HC SEMI PRIVATE

## 2022-01-28 PROCEDURE — 2580000003 HC RX 258: Performed by: PHYSICIAN ASSISTANT

## 2022-01-28 PROCEDURE — 94664 DEMO&/EVAL PT USE INHALER: CPT

## 2022-01-28 PROCEDURE — 97166 OT EVAL MOD COMPLEX 45 MIN: CPT

## 2022-01-28 PROCEDURE — 94640 AIRWAY INHALATION TREATMENT: CPT

## 2022-01-28 PROCEDURE — 97530 THERAPEUTIC ACTIVITIES: CPT

## 2022-01-28 PROCEDURE — 97535 SELF CARE MNGMENT TRAINING: CPT

## 2022-01-28 PROCEDURE — 97162 PT EVAL MOD COMPLEX 30 MIN: CPT

## 2022-01-28 PROCEDURE — 6360000002 HC RX W HCPCS: Performed by: PHYSICIAN ASSISTANT

## 2022-01-28 PROCEDURE — 97116 GAIT TRAINING THERAPY: CPT

## 2022-01-28 PROCEDURE — 6370000000 HC RX 637 (ALT 250 FOR IP): Performed by: PHYSICIAN ASSISTANT

## 2022-01-28 PROCEDURE — 6370000000 HC RX 637 (ALT 250 FOR IP): Performed by: NEUROLOGICAL SURGERY

## 2022-01-28 PROCEDURE — 85027 COMPLETE CBC AUTOMATED: CPT

## 2022-01-28 PROCEDURE — 6370000000 HC RX 637 (ALT 250 FOR IP): Performed by: NURSE PRACTITIONER

## 2022-01-28 RX ORDER — POLYETHYLENE GLYCOL 3350 17 G/17G
17 POWDER, FOR SOLUTION ORAL DAILY
Status: DISCONTINUED | OUTPATIENT
Start: 2022-01-28 | End: 2022-01-31 | Stop reason: HOSPADM

## 2022-01-28 RX ORDER — OXYCODONE HYDROCHLORIDE 5 MG/1
5-10 TABLET ORAL EVERY 6 HOURS PRN
Qty: 42 TABLET | Refills: 0 | Status: SHIPPED | OUTPATIENT
Start: 2022-01-28 | End: 2022-02-04

## 2022-01-28 RX ORDER — METHOCARBAMOL 750 MG/1
750 TABLET, FILM COATED ORAL EVERY 6 HOURS SCHEDULED
Status: DISCONTINUED | OUTPATIENT
Start: 2022-01-28 | End: 2022-01-31 | Stop reason: HOSPADM

## 2022-01-28 RX ORDER — SENNA AND DOCUSATE SODIUM 50; 8.6 MG/1; MG/1
2 TABLET, FILM COATED ORAL 2 TIMES DAILY
Status: DISCONTINUED | OUTPATIENT
Start: 2022-01-28 | End: 2022-01-28

## 2022-01-28 RX ORDER — SENNA AND DOCUSATE SODIUM 50; 8.6 MG/1; MG/1
2 TABLET, FILM COATED ORAL 2 TIMES DAILY PRN
Status: DISCONTINUED | OUTPATIENT
Start: 2022-01-28 | End: 2022-01-31 | Stop reason: HOSPADM

## 2022-01-28 RX ORDER — METHOCARBAMOL 750 MG/1
750 TABLET, FILM COATED ORAL EVERY 6 HOURS PRN
Qty: 40 TABLET | Refills: 0 | Status: SHIPPED | OUTPATIENT
Start: 2022-01-28 | End: 2022-02-07

## 2022-01-28 RX ADMIN — OXYCODONE HYDROCHLORIDE 5 MG: 5 TABLET ORAL at 21:18

## 2022-01-28 RX ADMIN — TRIAMTERENE AND HYDROCHLOROTHIAZIDE 1 TABLET: 37.5; 25 TABLET ORAL at 08:09

## 2022-01-28 RX ADMIN — OXYCODONE HYDROCHLORIDE 5 MG: 5 TABLET ORAL at 12:09

## 2022-01-28 RX ADMIN — BUDESONIDE 250 MCG: 0.25 SUSPENSION RESPIRATORY (INHALATION) at 19:43

## 2022-01-28 RX ADMIN — ACETAMINOPHEN 1000 MG: 500 TABLET ORAL at 06:24

## 2022-01-28 RX ADMIN — ENOXAPARIN SODIUM 40 MG: 100 INJECTION SUBCUTANEOUS at 08:10

## 2022-01-28 RX ADMIN — POLYETHYLENE GLYCOL 3350 17 G: 17 POWDER, FOR SOLUTION ORAL at 12:03

## 2022-01-28 RX ADMIN — OXYCODONE HYDROCHLORIDE 5 MG: 5 TABLET ORAL at 16:12

## 2022-01-28 RX ADMIN — ACETAMINOPHEN 1000 MG: 500 TABLET ORAL at 15:03

## 2022-01-28 RX ADMIN — ATENOLOL 12.5 MG: 25 TABLET ORAL at 08:09

## 2022-01-28 RX ADMIN — OXYCODONE HYDROCHLORIDE 5 MG: 5 TABLET ORAL at 01:30

## 2022-01-28 RX ADMIN — BISACODYL 5 MG: 5 TABLET, COATED ORAL at 08:09

## 2022-01-28 RX ADMIN — CEFAZOLIN 2000 MG: 10 INJECTION, POWDER, FOR SOLUTION INTRAVENOUS at 08:18

## 2022-01-28 RX ADMIN — SODIUM CHLORIDE, PRESERVATIVE FREE 10 ML: 5 INJECTION INTRAVENOUS at 21:22

## 2022-01-28 RX ADMIN — DULOXETINE HYDROCHLORIDE 120 MG: 60 CAPSULE, DELAYED RELEASE ORAL at 21:20

## 2022-01-28 RX ADMIN — OXYCODONE HYDROCHLORIDE 5 MG: 5 TABLET ORAL at 08:09

## 2022-01-28 RX ADMIN — METHOCARBAMOL 750 MG: 750 TABLET ORAL at 18:05

## 2022-01-28 RX ADMIN — SODIUM CHLORIDE, PRESERVATIVE FREE 10 ML: 5 INJECTION INTRAVENOUS at 08:18

## 2022-01-28 RX ADMIN — METHOCARBAMOL 1000 MG: 100 INJECTION, SOLUTION INTRAMUSCULAR; INTRAVENOUS at 04:47

## 2022-01-28 RX ADMIN — ACETAMINOPHEN 1000 MG: 500 TABLET ORAL at 21:19

## 2022-01-28 RX ADMIN — LEVOTHYROXINE SODIUM 25 MCG: 0.03 TABLET ORAL at 06:23

## 2022-01-28 RX ADMIN — METHOCARBAMOL 750 MG: 750 TABLET ORAL at 12:03

## 2022-01-28 RX ADMIN — ARFORMOTEROL TARTRATE 15 MCG: 15 SOLUTION RESPIRATORY (INHALATION) at 19:42

## 2022-01-28 RX ADMIN — SODIUM CHLORIDE: 9 INJECTION, SOLUTION INTRAVENOUS at 07:49

## 2022-01-28 RX ADMIN — CEFAZOLIN 2000 MG: 10 INJECTION, POWDER, FOR SOLUTION INTRAVENOUS at 00:54

## 2022-01-28 RX ADMIN — ATORVASTATIN CALCIUM 80 MG: 80 TABLET, FILM COATED ORAL at 18:05

## 2022-01-28 ASSESSMENT — PAIN SCALES - GENERAL
PAINLEVEL_OUTOF10: 4
PAINLEVEL_OUTOF10: 3
PAINLEVEL_OUTOF10: 5
PAINLEVEL_OUTOF10: 2
PAINLEVEL_OUTOF10: 3
PAINLEVEL_OUTOF10: 4
PAINLEVEL_OUTOF10: 5
PAINLEVEL_OUTOF10: 3
PAINLEVEL_OUTOF10: 5
PAINLEVEL_OUTOF10: 5

## 2022-01-28 ASSESSMENT — PAIN DESCRIPTION - LOCATION
LOCATION: BACK

## 2022-01-28 ASSESSMENT — PAIN DESCRIPTION - PAIN TYPE
TYPE: SURGICAL PAIN

## 2022-01-28 ASSESSMENT — PAIN - FUNCTIONAL ASSESSMENT

## 2022-01-28 ASSESSMENT — PAIN DESCRIPTION - DESCRIPTORS
DESCRIPTORS: ACHING

## 2022-01-28 ASSESSMENT — PAIN DESCRIPTION - ONSET
ONSET: ON-GOING

## 2022-01-28 ASSESSMENT — PAIN DESCRIPTION - FREQUENCY
FREQUENCY: CONTINUOUS

## 2022-01-28 ASSESSMENT — PAIN DESCRIPTION - ORIENTATION
ORIENTATION: MID

## 2022-01-28 ASSESSMENT — PAIN DESCRIPTION - PROGRESSION
CLINICAL_PROGRESSION: NOT CHANGED

## 2022-01-28 NOTE — PROGRESS NOTES
Physical Therapy    Facility/Department: [facility] [address]  Initial Assessment and treatment    NAME: Vini Baird  : 1940  MRN: [id number]    Date of Service: 2022    Discharge Recommendations:Rosalie Hicks scored a 14/24 on the AM-PAC short mobility form. Current research shows that an AM-PAC score of 17 or less is typically not associated with a discharge to the patient's home setting. Based on the patient's AM-PAC score and their current functional mobility deficits, it is recommended that the patient have 3-5 sessions per week of Physical Therapy at d/c to increase the patient's independence. Please see assessment section for further patient specific details. If patient discharges prior to next session this note will serve as a discharge summary. Please see below for the latest assessment towards goals. PT Equipment Recommendations  Equipment Needed: No (defer)    Assessment   Body structures, Functions, Activity limitations: Decreased functional mobility   Assessment: The pt is a 79 y/o female who presents following thoracic surgery. She had been independent with mobility and ADLs, iADLs prior to September but was limited due to her impairments that her surgery was attempting to fix. Pt has recently not been ambulating and notes that her legs feel much improved with ambulation today though is requiring assist of 2 with ambulation (chair follow) as her legs are weak and wobbly. Pt would benefit from further IP PT to improve her independence and safety with mobility.   Treatment Diagnosis: impaired mobility 2/2 thoracic surgery  Prognosis: Good  Decision Making: Medium Complexity  PT Education: Goals;PT Role;Plan of Care;Transfer Training;Functional Mobility Training;Gait Training  Patient Education: pt verbalized understanding  REQUIRES PT FOLLOW UP: Yes  Activity Tolerance  Activity Tolerance: Patient limited by fatigue;Patient limited by endurance       Patient Diagnosis(es): There were no encounter diagnoses. has a past medical history of Asthma, COPD (chronic obstructive pulmonary disease) ([facility] [address] Hyperlipidemia, Hypertension, RA (rheumatoid arthritis) ([facility] [address] and Thyroid disease. has a past surgical history that includes Appendectomy; Hysterectomy; Cholecystectomy; tumor removal; Thyroidectomy, partial (Left); eye surgery (Right, 12/11/15); eye surgery (Left, 1/8/16); Colonoscopy (4/18/2013); Colonoscopy (11/10/2016); Abdominal exploration surgery; Humerus fracture surgery (Right, 2/28/2021); fracture surgery; and laminectomy (N/A, 1/27/2022). Restrictions  Position Activity Restriction  Spinal Precautions: No Bending,No Lifting,No Twisting  Other position/activity restrictions: Activity as Tolerated; ambulate pt  Vision/Hearing        Subjective  General  Chart Reviewed: Yes  Patient assessed for rehabilitation services?: Yes  Additional Pertinent Hx: Patient is a 81 y/o female with c/o chronic lumbar pain and progressive left leg weakness. She was hospitalized in September, 2021, after sustaining a right ankle fracture after a fall. Since then, she has been living in a nursing facility, and reports frequent falls and progressive gait instability over the past several months. An MRI from October, 2021, demonstrates a left paracentral disc herniation at T10-T11 with moderate cord compression and myelomalacia. Pt s/p T10-11 THORACIC DECOMPRESSION, DISCECTOMY, FUSION / FIXATION on 1/27. Family / Caregiver Present: No  Referring Practitioner: FRANK Sesay  Diagnosis: herniated thoracic disc without myelopathy  Follows Commands: Within Functional Limits  General Comment  Comments: The pt presents supine in bed and willing to work with therapists. Subjective  Subjective: \"I am ready to get up. \"  Pain Screening  Patient Currently in Pain: Denies  Vital Signs  Patient Currently in Pain: Denies       Orientation     Social/Functional History  Social/Functional History  Ambulation Assistance:  (w/c to bathroom - transfers w/ assistance; has not been walking since 2nd week in September (reports doctor told her not to walk until after back surgery- pt states legs were not supporting her))  Additional [facility] [place] - not recently working with therapy. Not allowed to ambulate for the past three weeks per physician. Had been living at home September prior to a fall. Cognition   Cognition  Overall Cognitive Status: WFL    Objective  Strength RLE  Strength RLE: Exception  Comment: weak with functional mobility ~3+/5  Strength LLE  Strength LLE: Exception  Comment: weak with functional mobility ~3+/5        Bed mobility  Supine to Sit: Stand by assistance (HOB elevated and use of rail  Simultaneous filing. User may not have seen previous data.)  Comment: cueing for log roll  technique  Transfers  Sit to Stand: Contact guard assistance (from bed, from toilet)  Stand to sit: Contact guard assistance  Comment: cueing for hand placement with transfers; weak and effortful  Ambulation  Ambulation?: Yes  Ambulation 1  Surface: level tile  Device: Rolling Walker  Assistance:  Moderate assistance (min A - mod A with legs weak and wobbly)  Quality of Gait: staggered steps, decreased swati, decreased foot clearance, no heel strike, decreased step length, pt with difficulty having a good distance from walker often too far back or not centered  Distance: 10'+5'+8'  Comments: pt having LOB requiring min A- mod A with mobility; legs weak  Stairs/Curb  Stairs?: No     Balance  Sitting - Static: Good  Sitting - Dynamic: Fair;+  Standing - Static: Poor;+  Standing - Dynamic: Poor  Comments: Pt supervision sitting balance and min A- mod A for dynamic mobility    Treatment:  Functional mobility training and pt education    Plan   Plan  Times per week: 5-7  Times per day: Daily  Current Treatment Recommendations: Strengthening,Functional Mobility Training,Transfer Training,Balance Training,Gait Training,Endurance Training,Patient/Caregiver Education & Training      AM-PAC Score  AM-PAC Inpatient Mobility Raw Score : 14 (01/28/22 1409)  AM-PAC Inpatient T-Scale Score : 38.1 (01/28/22 1409)  Mobility Inpatient CMS 0-100% Score: 61.29 (01/28/22 1409)  Mobility Inpatient CMS G-Code Modifier : CL (01/28/22 1409)          Goals  Short term goals  Time Frame for Short term goals: By discharge  Short term goal 1: The pt will perform bed mobility with mod I  Short term goal 2: The pt will transfer with SBA and RW  Short term goal 3: The pt will ambulate with RW x 150' and CGA  Patient Goals   Patient goals :  To go home eventually and be able to walk       Therapy Time   Individual Concurrent Group Co-treatment   Time In 1038         Time Out 1133         Minutes 55         Timed Code Treatment Minutes: 40 Minutes    Timed Code Treatment Minutes:  40 Minutes    Total Treatment Minutes:  55 minutes      Raudel Azevedo, PT

## 2022-01-28 NOTE — PROGRESS NOTES
NEUROSURGERY POST-OP PROGRESS NOTE    Patient Name: Vini Baird YOB: 1940   Sex: Female Age: 80 yrs     Medical Record Number: [id number] Vania Number: [de-identified]   Room Number: 1856/8014-11 Hospital Day: Hospital Day: 2     Interval History:  Post-operative Day# 1 s/p Procedure(s) (LRB):  T10-11 THORACIC DECOMPRESSION, DISCECTOMY, FUSION / FIXATION (N/A)    Subjective: Pt c/o of incisional pain but feels like leg pain is gone      Objective:    VITAL SIGNS   /83   Pulse 74   Temp 98.1 °F (36.7 °C) (Oral)   Resp 16   Ht 5' 5\" (1.651 m)   Wt 180 lb (81.6 kg)   SpO2 97%   BMI 29.95 kg/m²    Height Height: 5' 5\" (165.1 cm)   Weight Weight: 180 lb (81.6 kg)        Allergies Allergies   Allergen Reactions    Effexor [Venlafaxine Hcl] Itching and Nausea Only    Fenofibrate Diarrhea    Prednisone Other (See Comments)     Causes body to feel on fire    Codeine Nausea And Vomiting    Docosahexaenoic Acid-Epa Nausea And Vomiting     Cannot take Vascepa      NPO Status ADULT DIET;  Regular   Isolation No active isolations     LABS   Basic Metabolic Profile Recent Labs     01/27/22  1557         CO2 22   BUN 10   CREATININE 0.7   GLUCOSE 123*   PHOS 3.9      Complete Blood Count Recent Labs     01/28/22  0741   WBC 10.5   RBC 3.52*      Coagulation Studies Recent Labs     01/27/22  0800   INR 1.04        MEDICATIONS   Inpatient Medications     atenolol, 12.5 mg, Oral, Daily    atorvastatin, 80 mg, Oral, QPM    bisacodyl, 5 mg, Oral, Daily    DULoxetine, 120 mg, Oral, Nightly    levothyroxine, 25 mcg, Oral, Daily    triamterene-hydroCHLOROthiazide, 1 tablet, Oral, Daily    sodium chloride flush, 5-40 mL, IntraVENous, 2 times per day    enoxaparin, 40 mg, SubCUTAneous, Daily    budesonide, 0.25 mg, Nebulization, BID **AND** Arformoterol Tartrate, 15 mcg, Nebulization, BID    acetaminophen, 1,000 mg, Oral, 3 times per day   Infusions    sodium chloride 125 mL/hr at 01/28/22 0749    sodium chloride        Antibiotics   Recent Abx Admin                   ceFAZolin (ANCEF) 2000 mg in dextrose 5 % 50 mL IVPB (mg) 2,000 mg New Bag 01/28/22 0818     2,000 mg New Bag  0054     2,000 mg New Bag 01/27/22 1731                 Neurologic Exam:  Mental status: awake and alert and oriented x4        Musculoskeletal:   Gait: Not tested   Tone: normal  Sensory: intact to all extremities  Motor strength:    Right  Left    Right  Left    Deltoid  5 5  Hip Flex  5 5   Biceps  5 5  Knee Extensors  5 5   Triceps  5 5  Knee Flexors  5 5   Wrist Ext  5 5  Ankle Dorsiflex. 5 5   Wrist Flex  5 5  Ankle Plantarflex. 5 5   Handgrip  5 5  Ext Jose Alberto Longus  5 5   Thumb Ext  5 5         Incision: intact, clean and dry  Drain:160    Respiratory:  Unlabored respiratory pattern    Abdomen:   Soft, ND   Last BMpreop    Cardiovascular:  Warm, well perfused    Assessment   Patient is a 79 yo F s/p Procedure(s) (LRB):  T10-11 THORACIC DECOMPRESSION, DISCECTOMY, FUSION / FIXATION (N/A) per Dr. Alireza Ny. Plan:  1. Neurologic exam frequency:q4  2. Mobility:PT/OT eval  3. Antibiotics: keep while drain is in place  4. Drain:continue  5. DVT Prophylaxis: SCDs and lovenox  6. Bowel Regimen: senna , glycolax and dulcolax  7. Pain control:arline, robaxin, tylenol and valium  8. Incisional Care:open to air, may shower once drain out  9. Dispo Planning:inpt, may need placement    Patient was seen with Dr. Alireza Ny who agrees with above assessment and plan. Electronically signed by:  ANDREW Elizalde CNP, 1/28/2022 11:23 AM   Neurosurgery Nurse Practitioner  [phone]

## 2022-01-28 NOTE — PLAN OF CARE
Problem: Falls - Risk of:  Goal: Will remain free from falls  Description: Will remain free from falls  1/28/2022 0821 by Pedro Covarrubias RN  Outcome: Ongoing   All fall precautions in place. Bed locked and in lowest position with alarm on. Overbed table and personal belonings within reach. Call light within reach and patient instructed to use call light for assistance. Non-skid socks on. Problem: Infection - Surgical Site:  Goal: Will show no infection signs and symptoms  Description: Will show no infection signs and symptoms  1/28/2022 0821 by Pedro Covarrubias RN  Outcome: Ongoing   Surgical site assessed this shift. No s/s of infection. Sx site is CDI. No redness or drainage. Afebrile. Problem: Pain - Acute:  Goal: Pain level will decrease  Description: Pain level will decrease  1/28/2022 0821 by Pedro Covarrubias RN  Outcome: Ongoing   Pt endorsing pain to the back currently rated at a 4 out of 10. Pain increases with activity. Pain being managed with prn pain medication per  [facility] with some relief. Pt using rest and repositioning as non-pharm measures to decrease pain and promote comfort.

## 2022-01-28 NOTE — CARE COORDINATION
Case Management Assessment           Initial Evaluation                Date / Time of Evaluation: 1/28/2022 12:36 PM                 Assessment Completed by: WENDY Flores    Patient Name: Hermes Ritter     YOB: 1940  Diagnosis: Herniated thoracic disc without myelopathy [M51.24]  Herniation of intervertebral disc of thoracic region [M51.24]  Other cord compression ([address] [G95.29]  Myelopathy ([address] [G95.9]  Thoracic disc herniation [M51.24]     Date / Time: 1/27/2022  7:08 AM    Patient Admission Status: Inpatient    If patient is discharged prior to next notation, then this note serves as note for discharge by case management.      Current PCP: Thu Tello MD  Clinic Patient: Yes    Chart Reviewed: Yes  Patient/ Family Interviewed: Yes    Initial assessment completed at bedside with: Patient     Hospitalization in the last 30 days: Yes    Emergency Contacts:  Extended Emergency Contact Information  Primary Emergency Contact: Ruby Jonas [address] Phone: [phone]  Work Phone: [phone]  Mobile Phone: [phone]  Relation: Child    Advance Directives:   Code Status: Full [address]: No    Financial:  Payor: Hodan Clonts / Plan: MEDICARE PART A AND B / Product Type: *No Product type* /     Pre-cert required for SNF: No    Pharmacy:    [address] [facility] [address]  [address] [place] [address]  Phone: [phone] [facility]: [facility] [address] [phone] [facility]hon[facility] [phone]  [address] [place] [address]  Phone: [phone] Fax: [phone]      Potential assistance Purchasing Medications: Potential Assistance Purchasing Medications: No  Does Patient want to participate in local refill/ meds to beds program?:      Meds To Beds General Rules:  1. Can ONLY be done Monday- Friday between 8:30am-5pm  2. Prescription(s) must be in pharmacy by 3pm to be filled same day  3. Copy of patient's insurance/ prescription drug card and patient face sheet must be sent along with the prescription(s)  4. Cost of Rx cannot be added to hospital bill. If financial assistance is needed, please contact unit  or ;  or  CANNOT provide pharmacy voucher for patients co-pays  5. Patients can then  the prescription on their way out of the hospital at discharge, or pharmacy can deliver to the bedside if staff is available. (payment due at time of pick-up or delivery - cash, check, or card accepted)     Able to afford home medications/ co-pay costs: Yes    ADLS:  Support Systems: Family Members    PT AM-PAC:   /24  OT AM-PAC:   /24    Housing:  Home Environment: From home alone, independent   Steps: yes     Plans to RETURN to current housing: No, agreeable to skilled rehab placement   Barrier(s) to RETURNING to current housing: Referral placed. Home Care Information:  Currently ACTIVE with Home Health Care: No    Durable Medical Equipment:  DME Provider: N/A   Equipment: walker    Home Oxygen and Respiratory Equipment:  Has HOME OXYGEN prior to admission: No    DISCHARGE PLAN:  Disposition: SNF[facility] and [address] [facility] (accepted for return)     [facility]   Report- [phone]  PATY:[phone]    Transportation PLAN for discharge: EMS     Factors facilitating achievement of predicted outcomes: Motivated, Cooperative, Pleasant, Good insight into deficits and Has needed Durable Medical Equipment at home    Barriers to discharge: Placement     Additional Case Management Notes:   SW met with patient at bedside this AM. Patient from [facility].  Patient to return to [facility] once medically ready for discharge. Will need ambulance transport. [phone] Call Parveen Sellers and follow prompts to provide update on weekend.      The Plan for Transition of Care is related to the following treatment goals Herniated thoracic disc without myelopathy [M51.24]  Herniation of intervertebral disc of thoracic region [M51.24]  Other cord compression ([address] [G95.29]  Myelopathy ([address] [G95.9]  Thoracic disc herniation [M51.24]      The Patient and/or patient representative was provided with a choice of provider and agrees with the discharge plan Not Indicated      Care Transition patient: Yes    Napoleon Matos MSW  Social Work  [facility], [employer].  Ph: [phone]

## 2022-01-28 NOTE — PROGRESS NOTES
Pt is a/o x4. VSS on room air. Surgical site is CDI with hemovac drain in place. Drain putting out moderate amount of bloody drainage. Pt endorses pain to the back currently rated at a 5 out of 10 being managed with prn pain medication per the [facility] with some relief. Tolerating ambulation fairly well x2 with gb and walker. Up to the chair this shift and tolerated well. Tolerating diet and fluids well. Voiding adequately per BRP. All fall precautions in place.

## 2022-01-28 NOTE — PLAN OF CARE
Problem: Falls - Risk of:  Goal: Will remain free from falls  Description: Will remain free from falls  Outcome: Ongoing  Note: Pt will remain free of falls for the duration of the shift. Pt is A/O x4  and uses the call light appropriately for needs. Pt is SBA x2 with RW and GB. Bed alarm on, wheels locked, bed in lowest position, side rails up 2/4, nonskid socks on, call light and bedside table in reach. Will continue to monitor. Problem: Infection - Surgical Site:  Goal: Will show no infection signs and symptoms  Description: Will show no infection signs and symptoms  Outcome: Ongoing  Note: Incision CDI. No drainage     Problem: Pain - Acute:  Goal: Pain level will decrease  Description: Pain level will decrease  Outcome: Ongoing  Note: Pt c/o back pain 5/10 and taking PO pain medication. Will continue to rate pain with the 0-10 pain rating scale.        Problem: Sensory Perception - Impaired:  Goal: Sensory function intact, lower extremity  Description: Sensory function intact, lower extremity  Outcome: Ongoing  Note: Pt reports full sensation of BLE

## 2022-01-28 NOTE — PROGRESS NOTES
Garcia removed at this time. Pt refusing to get OOB. Pt encouraged to at least stand at the bedside. Still refusing,.

## 2022-01-28 NOTE — PROGRESS NOTES
Bedside report done with Jacques Navarro. Pt in bed eating dinner. VSS. Pt c/o back pain but is tolerable at this time. Incision is CDI. hemovac drain drain intact. Pt currently denies needs. Bed alarm on, wheels locked, bed in lowest position, side rails up 2/4, nonskid socks on, call light and bedside table in reach. Will continue to monitor.

## 2022-01-28 NOTE — PROGRESS NOTES
Occupational Therapy   Occupational Therapy Initial Assessment and Treatment    Date: 2022   Patient Name: Ella Handy  MRN: [id number]     : 1940    Date of Service: 2022    Discharge Recommendations:  Ella aHndy scored a 17/24 on the AM-PAC ADL Inpatient form. Current research shows that an AM-PAC score of 17 or less is typically not associated with a discharge to the patient's home setting. Based on the patient's AM-PAC score and their current ADL deficits, it is recommended that the patient have 3-5 sessions per week of Occupational Therapy at d/c to increase the patient's independence. Please see assessment section for further patient specific details. If patient discharges prior to next session this note will serve as a discharge summary. Please see below for the latest assessment towards goals. OT Equipment Recommendations  Equipment Needed: No (defer recommendations to discharge facility)    Assessment   Performance deficits / Impairments: Decreased functional mobility ; Decreased ADL status; Decreased balance  Assessment: Pt presenting to hospital for planned procedure. Has been living at nursing facility for a few months - has been non-ambulatory for a few months (as legs were not supporting her). Pt seen POD#1 s/p T10-11 THORACIC DECOMPRESSION, DISCECTOMY, FUSION / FIXATION. Pt is ambulatory today -requiring Min/Mod A to ambulate to/from bathroom as BLE weak and buckling during mobility. Pt will benefit from ongoing IP OT at discharge - to maximize functional status/independence to transition home alone safely. Pt in agreement with this plan - SW informed. Continue per POC. Treatment Diagnosis: impaired ADLs/transfers s/p T10-11 THORACIC DECOMPRESSION, DISCECTOMY, FUSION / FIXATION. Prognosis: Good  Decision Making: Medium Complexity  OT Education: OT Role;Plan of Care;Precautions; ADL Adaptive Strategies;Transfer Training  Patient Education: would benefit from continued education and reinforcement  REQUIRES OT FOLLOW UP: Yes  Activity Tolerance  Activity Tolerance: Patient Tolerated treatment well  Safety Devices  Safety Devices in place: Yes  Type of devices: Nurse notified; Left in chair;Chair alarm in place;Call light within reach           Patient Diagnosis(es): There were no encounter diagnoses. has a past medical history of Asthma, COPD (chronic obstructive pulmonary disease) ([facility] [address] Hyperlipidemia, Hypertension, RA (rheumatoid arthritis) ([facility] [address] and Thyroid disease. has a past surgical history that includes Appendectomy; Hysterectomy; Cholecystectomy; tumor removal; Thyroidectomy, partial (Left); eye surgery (Right, 12/11/15); eye surgery (Left, 1/8/16); Colonoscopy (4/18/2013); Colonoscopy (11/10/2016); Abdominal exploration surgery; Humerus fracture surgery (Right, 2/28/2021); fracture surgery; and laminectomy (N/A, 1/27/2022). Treatment Diagnosis: impaired ADLs/transfers s/p T10-11 THORACIC DECOMPRESSION, DISCECTOMY, FUSION / FIXATION. Restrictions  Position Activity Restriction  Spinal Precautions: No Bending,No Lifting,No Twisting  Other position/activity restrictions: Activity as Tolerated; ambulate pt      Subjective   General  Chart Reviewed: Yes  Patient assessed for rehabilitation services?: Yes  Additional Pertinent Hx: 80 y.o. F to OR 1/27 for T10-11 THORACIC DECOMPRESSION, DISCECTOMY, FUSION / FIXATION. PMH:  Family / Caregiver Present: No  Referring Practitioner: FRANK Sesay  Diagnosis: Herniated Thoracic Disc without Neuropathy    Subjective  Subjective: In bed on entry. Emotional after treatment session (2* she was able to walk). Agreeable to placement at discharge.     Patient Currently in Pain: Denies    Social/Functional History  Social/Functional History  Ambulation Assistance:  (w/c to bathroom - transfers w/ assistance; has not been walking since 2nd week in September (reports doctor told her not to walk until after back surgery- pt states legs were not supporting her))  Additional Comments: admitted from [facility] (in [place]) - not recently working with therapy. Not allowed to ambulate for the past three weeks per physician. Had been living at home September prior to a fall. Objective   Vision: Within Functional Limits  Hearing: Within functional limits      Orientation  Overall Orientation Status: Within Functional Limits        Balance  Sitting Balance: Stand by assistance  Standing Balance:  (Min/Mod A - dynamic (for wiping and clothing management))    Functional Mobility  Functional - Mobility Device: Rolling Walker  Activity: To/from bathroom  Assist Level: Minimal assistance (w/ 2 LOB requiring Mod A to recover)    Toilet Transfers  Toilet - Technique: Ambulating (using wh walker)  Equipment Used: Raised toilet seat with rails  Toilet Transfer: Contact guard assistance    ADL  LE Dressing: Moderate assistance (Min/Mod A; educated in modified techniques - able to bring feet to opposite knees; requiring assist for pants up/down)  Toileting: Moderate assistance (assist for balance and assist for clothing up/down)     Coordination  Movements Are Fluid And Coordinated: Yes           Transfers  Sit to stand: Contact guard assistance  Stand to sit: Contact guard assistance  Transfer Comments: Note: cues for safe hand placement during transitions        Cognition  Overall Cognitive Status: WFL                       LUE Strength  Gross LUE Strength: WFL  RUE Strength  Gross RUE Strength: WFL               Pt seen by OT for eval and treat.  Treatment included: bed mobility, functional transfer/mobility, ADL, pt education           Plan   Plan  Times per week: 5-7  Times per day: Daily  Current Treatment Recommendations: Balance Training,Functional Mobility Training,Endurance Training,Safety Education & Training,Equipment Evaluation, Education, & procurement,Self-Care / Colt Pisano

## 2022-01-28 NOTE — DISCHARGE INSTR - COC
Continuity of Care Form    Patient Name: Kaiden Daniel   :  1940  MRN:  [id number]    Admit date:  2022  Discharge date:  22     Code Status Order: Full Code   Advance Directives:   Advance Care Flowsheet Documentation       Date/Time Healthcare Directive Type of Healthcare Directive Copy in [address] Agent's Name Healthcare Agent's Phone Number    [phone] No, patient does not have an advance directive for healthcare treatment -- -- -- -- --            Admitting Physician:  Mario Resendiz. Christophe Walsh MD  PCP: Marylou Lamb MD    Discharging Nurse: [facility] Unit/Room#: 7039/1815-14  Discharging Unit Phone Number: ***    Emergency Contact:   Extended Emergency Contact Information  Primary Emergency Contact: [address] Phone: [phone]  Work Phone: [phone]  Mobile Phone: [phone]  Relation: Child    Past Surgical History:  Past Surgical History:   Procedure Laterality Date    ABDOMINAL EXPLORATION SURGERY      APPENDECTOMY      CHOLECYSTECTOMY      COLONOSCOPY  2013    diverticulosis, polyps    COLONOSCOPY  11/10/2016    normal colon    EYE SURGERY Right 12/11/15    cataract removal    EYE SURGERY Left 16    cataract removed    [address] Right 2021    HUMERUS OPEN REDUCTION INTERNAL FIXATION performed by Andrew Anderson MD at [address] [facility] 2022    T10-11 THORACIC DECOMPRESSION, DISCECTOMY, FUSION / FIXATION performed by Mario Resendiz.  Christophe Walsh MD at [facility], PARTIAL Left     TUMOR REMOVAL      off of thyroid, was benign       Immunization History:   Immunization History   Administered Date(s) Administered    COVID-19, Pfizer Purple top, DILUTE for use, 12+ yrs, 30mcg/0.3mL dose 2021, 2021, 10/19/2021    Influenza A (L5X8-26) Vaccine IM 2009    Influenza Vaccine, unspecified formulation 12/05/2009, 09/16/2010, 11/17/2011, 10/11/2012, 09/22/2014, 10/01/2015, 10/01/2016    Influenza Virus Vaccine 12/05/2009, 09/16/2010, 11/17/2011, 10/11/2012, 09/22/2014, 10/01/2016, 10/01/2016, 11/04/2016    Influenza Whole 10/06/2014, 10/01/2015    Influenza, High Dose (Fluzone 65 yrs and older) 09/30/2013, 10/09/2015, 11/04/2016, 09/13/2017    Influenza, [place], 6 mo and older, IM, PF (Flulaval, Fluarix) 09/29/2018    Influenza, Quadv, IM, (6 mo and older Fluzone, Flulaval, Fluarix and 3 yrs and older Afluria) 12/05/2009, 09/16/2010, 11/17/2011, 10/06/2014, 10/01/2015    Pneumococcal Conjugate 13-valent (Nsrllyd88) 03/22/2018    Pneumococcal Conjugate 7-valent (Prevnar7) 12/05/2011    Pneumococcal Polysaccharide (Leciquate32) 10/26/2009, 03/21/2019    Tdap (Boostrix, Adacel) 08/16/2012       Active Problems:  Patient Active Problem List   Diagnosis Code    [facility] M06.9    Benign essential HTN I10    Asthma J45.909    Hyperlipidemia E78.5    Asthma exacerbation J45.901    Pleuritic chest pain R07.81    Chest pain on breathing R07.1    Pulmonary nodules R91.8    Chronic rhinitis J31.0    COPD (chronic obstructive pulmonary disease) ([facility]) J44.9    Bronchitis J40    Hypothyroidism E03.9    COPD exacerbation ([facility]) J44.1    Elevated d-dimer R79.89    Personal history of immunosupression therapy Z92.25    Atelectasis J98.11    Chest congestion R09.89    Mucus plugging of bronchi T17.500A    History of rheumatoid arthritis Z87.39    History of tobacco abuse Z87.891    Pyuria R82.81    History of humerus fracture Z87.81    Obesity E66.9    Right supracondylar humerus fracture S42.411A    Closed supracondylar fracture of right humerus S42.411A    Multifocal pneumonia J18.9    Hypoxia R09.02    Hyponatremia E87.1    Pneumonia of both lungs due to infectious organism J18.9    Acute hypoxemic respiratory failure (HCC) J96.01    Bradycardia R00.1    Fatigue R53.83    Frequent PVCs I49.3 Hypernatremia E87.0    Thrombocytopenia (HCC) D69.6    Closed avulsion fracture of distal end of right fibula with routine healing S82.831D    Fracture of fibula alone S82.409A    Thoracic disc herniation M51.24       Isolation/Infection:   Isolation            No Isolation          Patient Infection Status       Infection Onset Added Last Indicated Last Indicated By Review Planned Expiration Resolved Resolved By    None active    Resolved    COVID-19 (Rule Out) 09/04/21 09/04/21 09/04/21 COVID-19 & Influenza Combo (Ordered)   09/04/21 Rule-Out Test Resulted    COVID-19 (Rule Out) 08/23/21 08/23/21 08/23/21 COVID-19, Rapid (Ordered)   08/23/21 Rule-Out Test Resulted    COVID-19 (Rule Out) 07/25/21 07/25/21 07/25/21 COVID-19, Rapid (Ordered)   07/25/21 Rule-Out Test Resulted    COVID-19 (Rule Out) 07/20/21 07/20/21 07/20/21 COVID-19 & Influenza Combo (Ordered)   07/20/21 Rule-Out Test Resulted            Nurse Assessment:  Last Vital Signs: /63   Pulse 64   Temp 98 °F (36.7 °C) (Oral)   Resp 17   Ht 5' 5\" (1.651 m)   Wt 180 lb (81.6 kg)   SpO2 95%   BMI 29.95 kg/m²     Last documented pain score (0-10 scale): Pain Level: 5  Last Weight:   Wt Readings from Last 1 Encounters:   01/27/22 180 lb (81.6 kg)     Mental Status:  oriented, alert, coherent, and logical    IV Access:  - None    Nursing Mobility/ADLs:  Walking   Assisted  Transfer  Assisted  Bathing  Assisted  Dressing  Assisted  Toileting  Assisted  Feeding  Independent  Med Admin  Independent  Med Delivery   whole    Wound Care Documentation and Therapy:        Elimination:  Continence: Bowel: Yes  Bladder: Yes  Urinary Catheter: None   Colostomy/Ileostomy/Ileal Conduit: No       Date of Last BM: 1/30/22    Intake/Output Summary (Last 24 hours) at 1/28/2022 1521  Last data filed at 1/28/2022 1211  Gross per 24 hour   Intake 750 ml   Output 1110 ml   Net -360 ml     I/O last 3 completed shifts: In: 340 [P.O.:150;  I.V.:190]  Out: 4264 [Urine:1225; Drains:160]    Safety Concerns:     Tere Poe KEITH Safety Concerns:[id number]}    Impairments/Disabilities:      Vision    Nutrition Therapy:  Current Nutrition Therapy:   - Oral Diet:  General    Routes of Feeding: Oral  Liquids: Thin Liquids  Daily Fluid Restriction: no  Last Modified Barium Swallow with Video (Video Swallowing Test): not done    Treatments at the Time of Hospital Discharge:   Respiratory Treatments: ***  Oxygen Therapy:  is not on home oxygen therapy. Ventilator:    - No ventilator support    Rehab Therapies: Physical Therapy and Occupational Therapy  Weight Bearing Status/Restrictions: No weight bearing restirctions  Other Medical Equipment (for information only, NOT a DME order):  walker  Other Treatments: ***    Patient's personal belongings (please select all that are sent with patient):  Glasses, Dentures    RN SIGNATURE:  Electronically signed by Dillon Haney RN on 1/31/22 at 1:51 PM EST    CASE MANAGEMENT/SOCIAL WORK SECTION    Inpatient Status Date: 01/27/2022  Readmission Risk Assessment Score:  Readmission Risk              Risk of Unplanned Readmission:  20           Discharging to Facility/ Agency   Name: [address] [place] [address] and Rehab  Address:  [facility] [phone]  PYD:[phone]      / signature: Electronically signed by Liu Brand RN on 1/28/22 at 3:22 PM EST    PHYSICIAN SECTION    Prognosis: {Prognosis:6909335793}    Condition at Discharge: Tere Poe Patient Condition:[id number]}    Rehab Potential (if transferring to Rehab): {Prognosis:2763570129}    Recommended Labs or Other Treatments After Discharge: ***    Physician Certification: I certify the above information and transfer of Brayton Blizzard  is necessary for the continuing treatment of the diagnosis listed and that she requires {Admit to Appropriate Level of Care:76662} for {GREATER/LESS:943044501} 30 days.      Update Admission H&P: {CHP DME Changes in TRKND:569004043}    PHYSICIAN SIGNATURE:  {[facility]:[id number]}

## 2022-01-29 PROCEDURE — 6370000000 HC RX 637 (ALT 250 FOR IP): Performed by: PHYSICIAN ASSISTANT

## 2022-01-29 PROCEDURE — 6370000000 HC RX 637 (ALT 250 FOR IP): Performed by: NURSE PRACTITIONER

## 2022-01-29 PROCEDURE — 2580000003 HC RX 258: Performed by: PHYSICIAN ASSISTANT

## 2022-01-29 PROCEDURE — 6370000000 HC RX 637 (ALT 250 FOR IP): Performed by: NEUROLOGICAL SURGERY

## 2022-01-29 PROCEDURE — 1200000000 HC SEMI PRIVATE

## 2022-01-29 PROCEDURE — 6360000002 HC RX W HCPCS: Performed by: PHYSICIAN ASSISTANT

## 2022-01-29 RX ADMIN — OXYCODONE HYDROCHLORIDE 10 MG: 5 TABLET ORAL at 20:38

## 2022-01-29 RX ADMIN — DULOXETINE HYDROCHLORIDE 120 MG: 60 CAPSULE, DELAYED RELEASE ORAL at 20:38

## 2022-01-29 RX ADMIN — OXYCODONE HYDROCHLORIDE 10 MG: 5 TABLET ORAL at 15:10

## 2022-01-29 RX ADMIN — ACETAMINOPHEN 1000 MG: 500 TABLET ORAL at 05:47

## 2022-01-29 RX ADMIN — ATORVASTATIN CALCIUM 80 MG: 80 TABLET, FILM COATED ORAL at 17:59

## 2022-01-29 RX ADMIN — METHOCARBAMOL 750 MG: 750 TABLET ORAL at 00:06

## 2022-01-29 RX ADMIN — DOCUSATE SODIUM 50MG AND SENNOSIDES 8.6MG 2 TABLET: 8.6; 5 TABLET, FILM COATED ORAL at 10:44

## 2022-01-29 RX ADMIN — METHOCARBAMOL 750 MG: 750 TABLET ORAL at 13:52

## 2022-01-29 RX ADMIN — LEVOTHYROXINE SODIUM 25 MCG: 0.03 TABLET ORAL at 05:47

## 2022-01-29 RX ADMIN — METHOCARBAMOL 750 MG: 750 TABLET ORAL at 05:48

## 2022-01-29 RX ADMIN — ATENOLOL 12.5 MG: 25 TABLET ORAL at 11:00

## 2022-01-29 RX ADMIN — METHOCARBAMOL 750 MG: 750 TABLET ORAL at 17:59

## 2022-01-29 RX ADMIN — TRIAMTERENE AND HYDROCHLOROTHIAZIDE 1 TABLET: 37.5; 25 TABLET ORAL at 10:44

## 2022-01-29 RX ADMIN — ACETAMINOPHEN 1000 MG: 500 TABLET ORAL at 13:52

## 2022-01-29 RX ADMIN — ACETAMINOPHEN 1000 MG: 500 TABLET ORAL at 20:38

## 2022-01-29 RX ADMIN — POLYETHYLENE GLYCOL 3350 17 G: 17 POWDER, FOR SOLUTION ORAL at 10:44

## 2022-01-29 RX ADMIN — OXYCODONE HYDROCHLORIDE 5 MG: 5 TABLET ORAL at 10:44

## 2022-01-29 RX ADMIN — SODIUM CHLORIDE, PRESERVATIVE FREE 10 ML: 5 INJECTION INTRAVENOUS at 20:39

## 2022-01-29 RX ADMIN — BISACODYL 5 MG: 5 TABLET, COATED ORAL at 10:44

## 2022-01-29 RX ADMIN — SODIUM CHLORIDE, PRESERVATIVE FREE 10 ML: 5 INJECTION INTRAVENOUS at 10:44

## 2022-01-29 RX ADMIN — ENOXAPARIN SODIUM 40 MG: 100 INJECTION SUBCUTANEOUS at 11:00

## 2022-01-29 RX ADMIN — OXYCODONE HYDROCHLORIDE 5 MG: 5 TABLET ORAL at 05:47

## 2022-01-29 RX ADMIN — METHOCARBAMOL 750 MG: 750 TABLET ORAL at 23:30

## 2022-01-29 ASSESSMENT — PAIN DESCRIPTION - PROGRESSION
CLINICAL_PROGRESSION: NOT CHANGED

## 2022-01-29 ASSESSMENT — PAIN DESCRIPTION - LOCATION
LOCATION: BACK

## 2022-01-29 ASSESSMENT — PAIN SCALES - GENERAL
PAINLEVEL_OUTOF10: 0
PAINLEVEL_OUTOF10: 7
PAINLEVEL_OUTOF10: 5
PAINLEVEL_OUTOF10: 6
PAINLEVEL_OUTOF10: 0

## 2022-01-29 ASSESSMENT — PAIN DESCRIPTION - PAIN TYPE
TYPE: SURGICAL PAIN

## 2022-01-29 ASSESSMENT — PAIN - FUNCTIONAL ASSESSMENT
PAIN_FUNCTIONAL_ASSESSMENT: ACTIVITIES ARE NOT PREVENTED

## 2022-01-29 ASSESSMENT — PAIN DESCRIPTION - ONSET
ONSET: ON-GOING

## 2022-01-29 ASSESSMENT — PAIN DESCRIPTION - DESCRIPTORS
DESCRIPTORS: ACHING;TIGHTNESS

## 2022-01-29 ASSESSMENT — PAIN DESCRIPTION - FREQUENCY
FREQUENCY: CONTINUOUS

## 2022-01-29 ASSESSMENT — PAIN DESCRIPTION - ORIENTATION
ORIENTATION: MID

## 2022-01-29 NOTE — PLAN OF CARE
Problem: Musculor/Skeletal Functional Status  Intervention: PT Evaluation/treatment  1/28/2022 1412 by Judge Solitario PT  Note: Pt to improve functional mobility by discharge       Problem: Musculor/Skeletal Functional Status  Intervention: PT Evaluation/treatment  1/28/2022 1412 by Judge Solitario PT  Note: Pt to improve functional mobility by discharge       Problem: Falls - Risk of:  Goal: Will remain free from falls  Description: Will remain free from falls  Outcome: Ongoing

## 2022-01-29 NOTE — PROGRESS NOTES
Neurosurgery Progress Note    Patient seen and examined on 01/29/22. No acute events overnight. Reports no pain at this time. Neurologically stable on exam. Drain output 75.      A/P: 79 yo woman POD 2 s/p T10-11 discectomy and fusion    -Neuro stable   -Pain control with PO meds  -Muscle relaxants prn  -Monitor drain output  -Mobilize, OOB  -PT/OT  -DVT ppx  -Dispo: Likely return to SNF on Monday      Shahzad Chanel MD, PhD  [address], [address], [address] [phone] (c), [phone] (o)

## 2022-01-30 PROCEDURE — 6370000000 HC RX 637 (ALT 250 FOR IP): Performed by: PHYSICIAN ASSISTANT

## 2022-01-30 PROCEDURE — 6370000000 HC RX 637 (ALT 250 FOR IP): Performed by: NURSE PRACTITIONER

## 2022-01-30 PROCEDURE — 97535 SELF CARE MNGMENT TRAINING: CPT

## 2022-01-30 PROCEDURE — 2580000003 HC RX 258: Performed by: PHYSICIAN ASSISTANT

## 2022-01-30 PROCEDURE — 6370000000 HC RX 637 (ALT 250 FOR IP): Performed by: NEUROLOGICAL SURGERY

## 2022-01-30 PROCEDURE — 6360000002 HC RX W HCPCS: Performed by: PHYSICIAN ASSISTANT

## 2022-01-30 PROCEDURE — 1200000000 HC SEMI PRIVATE

## 2022-01-30 RX ORDER — BISACODYL 10 MG
10 SUPPOSITORY, RECTAL RECTAL DAILY PRN
Status: DISCONTINUED | OUTPATIENT
Start: 2022-01-30 | End: 2022-01-31 | Stop reason: HOSPADM

## 2022-01-30 RX ADMIN — ATORVASTATIN CALCIUM 80 MG: 80 TABLET, FILM COATED ORAL at 17:02

## 2022-01-30 RX ADMIN — METHOCARBAMOL 750 MG: 750 TABLET ORAL at 05:34

## 2022-01-30 RX ADMIN — ATENOLOL 12.5 MG: 25 TABLET ORAL at 08:01

## 2022-01-30 RX ADMIN — BISACODYL 5 MG: 5 TABLET, COATED ORAL at 08:01

## 2022-01-30 RX ADMIN — ACETAMINOPHEN 1000 MG: 500 TABLET ORAL at 21:26

## 2022-01-30 RX ADMIN — METHOCARBAMOL 750 MG: 750 TABLET ORAL at 17:02

## 2022-01-30 RX ADMIN — SODIUM CHLORIDE, PRESERVATIVE FREE 10 ML: 5 INJECTION INTRAVENOUS at 21:28

## 2022-01-30 RX ADMIN — ACETAMINOPHEN 1000 MG: 500 TABLET ORAL at 13:20

## 2022-01-30 RX ADMIN — SODIUM CHLORIDE, PRESERVATIVE FREE 10 ML: 5 INJECTION INTRAVENOUS at 08:02

## 2022-01-30 RX ADMIN — DULOXETINE HYDROCHLORIDE 120 MG: 60 CAPSULE, DELAYED RELEASE ORAL at 21:26

## 2022-01-30 RX ADMIN — OXYCODONE HYDROCHLORIDE 5 MG: 5 TABLET ORAL at 08:01

## 2022-01-30 RX ADMIN — OXYCODONE HYDROCHLORIDE 5 MG: 5 TABLET ORAL at 12:34

## 2022-01-30 RX ADMIN — METHOCARBAMOL 750 MG: 750 TABLET ORAL at 12:34

## 2022-01-30 RX ADMIN — BISACODYL 10 MG: 10 SUPPOSITORY RECTAL at 13:20

## 2022-01-30 RX ADMIN — OXYCODONE HYDROCHLORIDE 10 MG: 5 TABLET ORAL at 21:26

## 2022-01-30 RX ADMIN — OXYCODONE HYDROCHLORIDE 5 MG: 5 TABLET ORAL at 17:02

## 2022-01-30 RX ADMIN — POLYETHYLENE GLYCOL 3350 17 G: 17 POWDER, FOR SOLUTION ORAL at 08:01

## 2022-01-30 RX ADMIN — TRIAMTERENE AND HYDROCHLOROTHIAZIDE 1 TABLET: 37.5; 25 TABLET ORAL at 08:01

## 2022-01-30 RX ADMIN — ENOXAPARIN SODIUM 40 MG: 100 INJECTION SUBCUTANEOUS at 08:01

## 2022-01-30 RX ADMIN — ACETAMINOPHEN 1000 MG: 500 TABLET ORAL at 05:34

## 2022-01-30 RX ADMIN — LEVOTHYROXINE SODIUM 25 MCG: 0.03 TABLET ORAL at 05:34

## 2022-01-30 ASSESSMENT — PAIN DESCRIPTION - FREQUENCY
FREQUENCY: CONTINUOUS

## 2022-01-30 ASSESSMENT — PAIN DESCRIPTION - LOCATION
LOCATION: BACK
LOCATION: BACK;ABDOMEN

## 2022-01-30 ASSESSMENT — PAIN SCALES - GENERAL
PAINLEVEL_OUTOF10: 4
PAINLEVEL_OUTOF10: 4
PAINLEVEL_OUTOF10: 6
PAINLEVEL_OUTOF10: 4
PAINLEVEL_OUTOF10: 3
PAINLEVEL_OUTOF10: 0
PAINLEVEL_OUTOF10: 2
PAINLEVEL_OUTOF10: 5

## 2022-01-30 ASSESSMENT — PAIN DESCRIPTION - PAIN TYPE
TYPE: SURGICAL PAIN
TYPE: SURGICAL PAIN;ACUTE PAIN
TYPE: SURGICAL PAIN

## 2022-01-30 ASSESSMENT — PAIN DESCRIPTION - ORIENTATION
ORIENTATION: MID

## 2022-01-30 ASSESSMENT — PAIN DESCRIPTION - PROGRESSION
CLINICAL_PROGRESSION: NOT CHANGED

## 2022-01-30 ASSESSMENT — PAIN - FUNCTIONAL ASSESSMENT
PAIN_FUNCTIONAL_ASSESSMENT: ACTIVITIES ARE NOT PREVENTED

## 2022-01-30 ASSESSMENT — PAIN DESCRIPTION - DESCRIPTORS
DESCRIPTORS: ACHING;PRESSURE
DESCRIPTORS: ACHING;TIGHTNESS
DESCRIPTORS: ACHING;TIGHTNESS
DESCRIPTORS: ACHING

## 2022-01-30 ASSESSMENT — PAIN DESCRIPTION - ONSET
ONSET: ON-GOING

## 2022-01-30 NOTE — PROGRESS NOTES
Patient alert and oriented x4. Neuro intact intact. Monitoring drain output. Pain controlled per MAR. Will continue to monitor.

## 2022-01-30 NOTE — PLAN OF CARE
Problem: Falls - Risk of:  Goal: Will remain free from falls  Description: Will remain free from falls  1/30/2022 0148 by Tommie Ortiz  Outcome: Ongoing  1/29/2022 1959 by Lisset Phillips RN  Outcome: Ongoing  Goal: Absence of physical injury  Description: Absence of physical injury  Outcome: Ongoing     Problem: Discharge Planning:  Goal: Discharged to appropriate level of care  Description: Discharged to appropriate level of care  1/29/2022 1959 by Lisset Phillips RN  Outcome: Ongoing     Problem: Cerebrospinal Fluid Leakage - Risk Of:  Goal: Absence of cerebrospinal fluid drainage at surgical site  Description: Absence of cerebrospinal fluid drainage at surgical site  1/30/2022 0148 by Tommie Ortiz  Outcome: Ongoing  1/29/2022 1959 by Lisset Phillips RN  Outcome: Ongoing     Problem: Infection - Surgical Site:  Goal: Will show no infection signs and symptoms  Description: Will show no infection signs and symptoms  Outcome: Ongoing     Problem: Mobility - Impaired:  Goal: Mobility will improve to maximum level  Description: Mobility will improve to maximum level  Outcome: Ongoing     Problem: Pain - Acute:  Goal: Pain level will decrease  Description: Pain level will decrease  Outcome: Ongoing     Problem: Sensory Perception - Impaired:  Goal: Sensory function intact, lower extremity  Description: Sensory function intact, lower extremity  Outcome: Ongoing

## 2022-01-30 NOTE — PROGRESS NOTES
Patient alert and oriented times 4. Pain controlled per MAR. Neuro intact. Monitoring drain output. Fall precautions in place. Call light within reach. Will continue to monitor patient.

## 2022-01-30 NOTE — PROGRESS NOTES
Pt is a/o x4. VSS on room air. Surgical site is CDI with hemovac drain in place. Drain putting out small amount of serosanguinous drainage. Pt endorses pain to the back currently rated at a 5 out of 10 being managed with prn pain medication per the [facility] with some relief. Pt c/o constipation this shift. Prn suppository given. Pt had 1 medium soft BM this shift. Tolerating ambulation fairly well x1 with gb and walker. Up to the chair this shift and tolerated well. Tolerating diet and fluids well. Voiding adequately per BRP. All fall precautions in place.

## 2022-01-30 NOTE — PLAN OF CARE
Problem: Falls - Risk of:  Goal: Will remain free from falls  Description: Will remain free from falls  1/30/2022 0815 by Johanna Benavides RN  Outcome: Ongoing   All fall precautions in place. Bed locked and in lowest position with alarm on. Overbed table and personal belonings within reach. Call light within reach and patient instructed to use call light for assistance. Non-skid socks on. Problem: Infection - Surgical Site:  Goal: Will show no infection signs and symptoms  Description: Will show no infection signs and symptoms  1/30/2022 0815 by Johanna Benavides RN  Outcome: Ongoing   Surgical site is CDI with minimal swelling near the top of the incision. Hemovac drain in place. Pt afebrile. Problem: Mobility - Impaired:  Goal: Mobility will improve to maximum level  Description: Mobility will improve to maximum level  1/30/2022 0815 by Johanna Benavides RN  Outcome: Ongoing   Pt tolerating ambulation well x1 assist with gb and walker. Problem: Pain - Acute:  Goal: Pain level will decrease  Description: Pain level will decrease  1/30/2022 0815 by Johanna Benavides RN  Outcome: Ongoing   Pt endorsing pain to the back currently rated at a 4 out of 10. Pain increases with activity. Pain being managed with prn pain medication per the [facility] with some relief. Pt using rest, repositioning and ice as non-pharm measures to decrease pain and promote comfort.

## 2022-01-30 NOTE — PROGRESS NOTES
Occupational Therapy  Facility/Department: [facility] 5T ORTHO/NEURO  Daily Treatment Note  NAME: Joselyn Schultz  : 1940  MRN: [id number]    Date of Service: 2022    Discharge Recommendations: Joselyn Schultz scored a 17/24 on the AM-PAC ADL Inpatient form. Current research shows that an AM-PAC score of 17 or less is typically not associated with a discharge to the patient's home setting. Based on the patient's AM-PAC score and their current ADL deficits, it is recommended that the patient have 3-5 sessions per week of Occupational Therapy at d/c to increase the patient's independence. Please see assessment section for further patient specific details. If patient discharges prior to next session this note will serve as a discharge summary. Please see below for the latest assessment towards goals. OT Equipment Recommendations  Other: defer    Assessment   Performance deficits / Impairments: Decreased functional mobility ; Decreased ADL status; Decreased balance  Assessment: Pt demonstrated progress in OT today as evident by pt ability to complete toilet transfer and toileting w/ CGA. Pt ambulated w/ RW w/o knee buckling noted and w/o LOB. Pt reported she is most concerned about her balance and she plans to go to a SNF at d/c to get stronger before going home. Pt continues to benefit from OT while inpt. Cont OT per POC  Treatment Diagnosis: impaired ADLs/transfers s/p T10-11 THORACIC DECOMPRESSION, DISCECTOMY, FUSION / FIXATION. OT Education: OT Role;Precautions; ADL Adaptive Strategies;Transfer Training  Patient Education: spinal precautions- pt verb understanding but continue to address as needed  REQUIRES OT FOLLOW UP: Yes  Activity Tolerance  Activity Tolerance: Patient Tolerated treatment well  Safety Devices  Safety Devices in place: Yes  Type of devices: Call light within reach; Left in chair;Chair alarm in place;Nurse notified         Patient Diagnosis(es): The encounter diagnosis was S/P fusion of thoracic spine. has a past medical history of Asthma, COPD (chronic obstructive pulmonary disease) ([facility] [address] Hyperlipidemia, Hypertension, RA (rheumatoid arthritis) ([facility] [address] and Thyroid disease. has a past surgical history that includes Appendectomy; Hysterectomy; Cholecystectomy; tumor removal; Thyroidectomy, partial (Left); eye surgery (Right, 12/11/15); eye surgery (Left, 1/8/16); Colonoscopy (4/18/2013); Colonoscopy (11/10/2016); Abdominal exploration surgery; Humerus fracture surgery (Right, 2/28/2021); fracture surgery; and laminectomy (N/A, 1/27/2022). Restrictions  Position Activity Restriction  Spinal Precautions: No Bending,No Lifting,No Twisting  Other position/activity restrictions: Activity as Tolerated; ambulate pt  Subjective   General  Chart Reviewed: Yes  Patient assessed for rehabilitation services?: Yes  Additional Pertinent Hx: 80 y.o. F to OR 1/27 for T10-11 THORACIC DECOMPRESSION, DISCECTOMY, FUSION / FIXATION. PMH:  Family / Caregiver Present: No  Referring Practitioner: FRANK Moise  Diagnosis: Herniated Thoracic Disc without Neuropathy  Subjective  Subjective: Pt was semi supine in bed upon arrival. Pt reported she really needed to use the bathroom. Pt was pleasant and agreeable to OT    Vital Signs  Patient Currently in Pain: Denies   Orientation  Orientation  Overall Orientation Status: Within Functional Limits  Objective    ADL  Grooming: Setup (Pt combed hair seated in recliner chair)  Toileting: Contact guard assistance (Pt was not wearing underwear or pants. Pt managed gown in stance w/ CGA.  Gloria care completed seated I'ly)          Balance  Sitting Balance: Modified independent   Standing Balance: Contact guard assistance  Standing Balance  Time: ~4 mins total  Activity: functional mobility to/from bathroom; stance for toileting  Functional Mobility  Functional - Mobility Device: Rolling Walker  Activity: To/from bathroom  Assist Level: Minimal assistance  Functional Mobility Comments: Pt completed functional mobility w/ RW to/from the bathroom w/ Min A progressing to CGA. No leg buckling noted today and no LOB. Toilet Transfers  Toilet - Technique: Ambulating  Equipment Used: Raised toilet seat with rails  Toilet Transfer: Contact guard assistance  Toilet Transfers Comments: Min VCs needed for safety while backing up to the toilet    Bed mobility  Supine to Sit: Stand by assistance (w/ use of bed rail. Min VCs for log roll technique)    Transfers  Sit to stand: Contact guard assistance  Stand to sit: Contact guard assistance                         Cognition  Overall Cognitive Status: [facility]  Times per week: 5-7  Times per day: Daily  Current Treatment Recommendations: Balance Training,Functional Mobility Training,Endurance Training,Safety Education & Training,Equipment Evaluation, Education, & procurement,Self-Care / ADL  G-Code     OutComes Score                                                  AM-PAC Score        AM-[facility] Inpatient Daily Activity Raw Score: 17 (01/30/22 1242)  AM-PAC Inpatient ADL T-Scale Score : 37.26 (01/30/22 1242)  ADL Inpatient CMS 0-100% Score: 50.11 (01/30/22 1242)  ADL Inpatient CMS G-Code Modifier : CK (01/30/22 1242)    Goals  Short term goals  Time Frame for Short term goals: Discharge  Short term goal 1: 3in1 transfer w/ SBA  Short term goal 2: LB dressing using AE prn, CGA  Short term goal 3: toileting hygiene w/ CGA-goal met 1/30  Patient Goals   Patient goals : \"to be able to walk around and get myself a [employer] myself\"       Therapy Time   Individual Concurrent Group Co-treatment   Time In 1102         Time Out 1126         Minutes 24         Timed Code Treatment Minutes: 24 Minutes       Pascual Judge OT   If patient discharges prior to next treatment, this note will serve as discharge summary. WiIll continue per plan of care if patient does not discharge.

## 2022-01-30 NOTE — PROGRESS NOTES
Neurosurgery Progress Note    Patient seen and examined on 01/30/22. No acute events overnight. Reports no pain in back at this time. Neurologically stable on exam. Drain output 120 cc.      A/P: 79 yo woman POD 3 s/p T10-11 discectomy and fusion    -Neuro stable   -Pain control with PO meds  -Muscle relaxants prn  -Monitor drain output  -Mobilize, OOB  -PT/OT  -DVT ppx  -Dispo: Likely return to SNF tomorrow      Clarke Medina MD, PhD  [address], [address] [place] [address], [address] [phone] (c), [phone] (o)

## 2022-01-31 VITALS
HEIGHT: 65 IN | SYSTOLIC BLOOD PRESSURE: 151 MMHG | RESPIRATION RATE: 18 BRPM | BODY MASS INDEX: 29.99 KG/M2 | DIASTOLIC BLOOD PRESSURE: 82 MMHG | TEMPERATURE: 98 F | WEIGHT: 180 LBS | OXYGEN SATURATION: 97 % | HEART RATE: 72 BPM

## 2022-01-31 LAB — SARS-COV-2, NAAT: NOT DETECTED

## 2022-01-31 PROCEDURE — 97530 THERAPEUTIC ACTIVITIES: CPT

## 2022-01-31 PROCEDURE — 6370000000 HC RX 637 (ALT 250 FOR IP): Performed by: PHYSICIAN ASSISTANT

## 2022-01-31 PROCEDURE — 6370000000 HC RX 637 (ALT 250 FOR IP): Performed by: NURSE PRACTITIONER

## 2022-01-31 PROCEDURE — 97535 SELF CARE MNGMENT TRAINING: CPT

## 2022-01-31 PROCEDURE — 6360000002 HC RX W HCPCS: Performed by: PHYSICIAN ASSISTANT

## 2022-01-31 PROCEDURE — 2580000003 HC RX 258: Performed by: PHYSICIAN ASSISTANT

## 2022-01-31 PROCEDURE — 87635 SARS-COV-2 COVID-19 AMP PRB: CPT

## 2022-01-31 PROCEDURE — 6370000000 HC RX 637 (ALT 250 FOR IP): Performed by: NEUROLOGICAL SURGERY

## 2022-01-31 RX ORDER — BISACODYL 10 MG
10 SUPPOSITORY, RECTAL RECTAL ONCE
Status: DISCONTINUED | OUTPATIENT
Start: 2022-01-31 | End: 2022-01-31 | Stop reason: HOSPADM

## 2022-01-31 RX ADMIN — ENOXAPARIN SODIUM 40 MG: 100 INJECTION SUBCUTANEOUS at 08:14

## 2022-01-31 RX ADMIN — MAGNESIUM CITRATE 296 ML: 1.75 LIQUID ORAL at 12:01

## 2022-01-31 RX ADMIN — LEVOTHYROXINE SODIUM 25 MCG: 0.03 TABLET ORAL at 06:14

## 2022-01-31 RX ADMIN — OXYCODONE HYDROCHLORIDE 10 MG: 5 TABLET ORAL at 14:31

## 2022-01-31 RX ADMIN — SODIUM CHLORIDE, PRESERVATIVE FREE 10 ML: 5 INJECTION INTRAVENOUS at 08:14

## 2022-01-31 RX ADMIN — ACETAMINOPHEN 1000 MG: 500 TABLET ORAL at 06:13

## 2022-01-31 RX ADMIN — OXYCODONE HYDROCHLORIDE 5 MG: 5 TABLET ORAL at 08:14

## 2022-01-31 RX ADMIN — METHOCARBAMOL 750 MG: 750 TABLET ORAL at 06:14

## 2022-01-31 RX ADMIN — BISACODYL 5 MG: 5 TABLET, COATED ORAL at 08:13

## 2022-01-31 RX ADMIN — OXYCODONE HYDROCHLORIDE 10 MG: 5 TABLET ORAL at 04:19

## 2022-01-31 RX ADMIN — ATENOLOL 12.5 MG: 25 TABLET ORAL at 08:14

## 2022-01-31 RX ADMIN — TRIAMTERENE AND HYDROCHLOROTHIAZIDE 1 TABLET: 37.5; 25 TABLET ORAL at 08:14

## 2022-01-31 ASSESSMENT — PAIN DESCRIPTION - ONSET
ONSET: ON-GOING
ONSET: ON-GOING

## 2022-01-31 ASSESSMENT — PAIN - FUNCTIONAL ASSESSMENT
PAIN_FUNCTIONAL_ASSESSMENT: ACTIVITIES ARE NOT PREVENTED
PAIN_FUNCTIONAL_ASSESSMENT: ACTIVITIES ARE NOT PREVENTED

## 2022-01-31 ASSESSMENT — PAIN DESCRIPTION - LOCATION
LOCATION: BACK
LOCATION: BACK;ABDOMEN
LOCATION: ABDOMEN

## 2022-01-31 ASSESSMENT — PAIN SCALES - GENERAL
PAINLEVEL_OUTOF10: 0
PAINLEVEL_OUTOF10: 4
PAINLEVEL_OUTOF10: 8
PAINLEVEL_OUTOF10: 4
PAINLEVEL_OUTOF10: 5
PAINLEVEL_OUTOF10: 7
PAINLEVEL_OUTOF10: 2

## 2022-01-31 ASSESSMENT — PAIN DESCRIPTION - FREQUENCY
FREQUENCY: CONTINUOUS
FREQUENCY: CONTINUOUS

## 2022-01-31 ASSESSMENT — PAIN DESCRIPTION - DESCRIPTORS
DESCRIPTORS: ACHING;SHARP
DESCRIPTORS: ACHING
DESCRIPTORS: SHARP

## 2022-01-31 ASSESSMENT — PAIN DESCRIPTION - PAIN TYPE
TYPE: ACUTE PAIN;SURGICAL PAIN
TYPE: ACUTE PAIN
TYPE: SURGICAL PAIN

## 2022-01-31 ASSESSMENT — PAIN DESCRIPTION - ORIENTATION
ORIENTATION: MID
ORIENTATION: MID

## 2022-01-31 ASSESSMENT — PAIN DESCRIPTION - PROGRESSION
CLINICAL_PROGRESSION: NOT CHANGED
CLINICAL_PROGRESSION: NOT CHANGED

## 2022-01-31 NOTE — PROGRESS NOTES
Pt to d/c to [facility] rehab via transport. Report called to receiving RN prior to d/c. 2 PIVs removed with no complications. All personal belongings with patient.

## 2022-01-31 NOTE — PLAN OF CARE
Problem: Falls - Risk of:  Goal: Will remain free from falls  Description: Will remain free from falls  1/31/2022 0819 by Jovanna Maldonado RN  Outcome: Ongoing   All fall precautions in place. Bed locked and in lowest position with alarm on. Overbed table and personal belonings within reach. Call light within reach and patient instructed to use call light for assistance. Non-skid socks on. Problem: Infection - Surgical Site:  Goal: Will show no infection signs and symptoms  Description: Will show no infection signs and symptoms  1/31/2022 0819 by Jovanna Maldonado RN  Outcome: Ongoing   Pt has no s/s of infection. Surgical site assessed this shift. Surgical site is CDI. Afebrile. Problem: Mobility - Impaired:  Goal: Mobility will improve to maximum level  Description: Mobility will improve to maximum level  1/31/2022 0819 by Jovanna Maldonado RN  Outcome: Ongoing   Pt tolerating ambulation well x1 with gb and walker. Up to the chair this shift and tolerated well. Problem: Pain - Acute:  Goal: Pain level will decrease  Description: Pain level will decrease  1/31/2022 0819 by Jovanna Maldonado RN  Outcome: Ongoing   Pt endorsing surgical pain to the back at a 4 out of 10. Pain is well controlled with prn pain medications per the [facility].

## 2022-01-31 NOTE — CARE COORDINATION
Case Management            Discharge Note                    Date / Time of Note: 1/31/2022 12:25 PM                  Discharge Note Completed by: WENDY Boswell    Patient Name: Drusilla Runner   YOB: 1940  Diagnosis: Herniated thoracic disc without myelopathy [M51.24]  Herniation of intervertebral disc of thoracic region [M51.24]  Other cord compression ([address] [G95.29]  Myelopathy ([address] [G95.9]  Thoracic disc herniation [M51.24]   Date / Time: 1/27/2022  7:08 AM    Current PCP: Jewel Valente MD  Clinic patient: Yes    Hospitalization in the last 30 days: Yes    Advance Directives:  Code Status: Full Code  [facility] DNR form completed and on chart: N/A     Financial:  Payor: MEDICARE / Plan: MEDICARE PART A AND B / Product Type: *No Product type* /      Pharmacy:    [address] [facility] [address] [place] [address]  Phone: [phone] Fax: Demetria Cooper [facility], [facility] [phone] [facility] [phone]  [address] [place] [address]  Phone: [phone] Fax: [phone]      Assistance purchasing medications?: Potential Assistance Purchasing Medications: No  Assistance provided by Case Management: None at this time    Does patient want to participate in local refill/ meds to beds program?:      Meds To Beds General Rules:  1. Can ONLY be done Monday- Friday between 8:30am-5pm  2. Prescription(s) must be in pharmacy by 3pm to be filled same day  3. Copy of patient's insurance/ prescription drug card and patient face sheet must be sent along with the prescription(s)  4. Cost of Rx cannot be added to hospital bill. If financial assistance is needed, please contact unit  or ;   or  CANNOT provide pharmacy voucher for patients co-pays  5. Patients can then  the prescription on their way out of the hospital at discharge, or pharmacy can deliver to the bedside if staff is available. (payment due at time of pick-up or delivery - cash, check, or card accepted)     Able to afford home medications/ co-pay costs: Yes    ADLS:  Current PT AM-PAC Score: 14 /24  Current OT AM-PAC Score: 17 /24      Discharge Disposition: [facility] ([facility]):  [address] [place] [address] and Rehab   Report- [phone]  [facility][phone]    LOC at discharge: Not Applicable  KEITH Completed: Not Indicated    Notification completed in HENS/PAS?:  Not Applicable    IMM Completed:   No    Transportation:  Transportation Plan for discharge: EMS transportation   Mode of Transport: Ambulance stretcher - BLS    Reason for medical transport: Other: Disc fusion, assist for transfers, fall risk,   Name of [address]: [employer]: [phone]  Transport Time: 5:15 pm     Transportation form completed: Yes    Additional CM Notes:   Patient medically ready for discharge on this date. Patient agreeable for return to facility today. Transport set for 5:15 pm. JOSHUA called and updated Oliver Swan in admissions [phone]. They can accept. SW to fax clinicals.      The Plan for Transition of Care is related to the following treatment goals Herniated thoracic disc without myelopathy [M51.24]  Herniation of intervertebral disc of thoracic region [M51.24]  Other cord compression ([address] [G95.29]  Myelopathy ([address] [G95.9]  Thoracic disc herniation [M51.24]      The Patient and/or patient representative  was provided with a choice of provider and agrees with the discharge plan Yes    Care Transitions patient: No    WENDY Horvath  [employer] [address] [employer]  Ph: [phone]

## 2022-01-31 NOTE — PLAN OF CARE
Problem: Falls - Risk of:  Goal: Will remain free from falls  Description: Will remain free from falls  Outcome: Ongoing  Goal: Absence of physical injury  Description: Absence of physical injury  Outcome: Ongoing     Problem: Discharge Planning:  Goal: Discharged to appropriate level of care  Description: Discharged to appropriate level of care  Outcome: Ongoing     Problem: Cerebrospinal Fluid Leakage - Risk Of:  Goal: Absence of cerebrospinal fluid drainage at surgical site  Description: Absence of cerebrospinal fluid drainage at surgical site  Outcome: Ongoing     Problem: Infection - Surgical Site:  Goal: Will show no infection signs and symptoms  Description: Will show no infection signs and symptoms  Outcome: Ongoing     Problem: Mobility - Impaired:  Goal: Mobility will improve to maximum level  Description: Mobility will improve to maximum level  Outcome: Ongoing     Problem: Pain - Acute:  Goal: Pain level will decrease  Description: Pain level will decrease  Outcome: Ongoing

## 2022-01-31 NOTE — DISCHARGE SUMMARY
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 Lang House MD  Procedures: Procedure(s) (LRB):  T10-11 THORACIC DECOMPRESSION, DISCECTOMY, FUSION / FIXATION (N/A)  Consults: IP CONSULT TO SOCIAL WORK    Reason for Admission:  Roderick Colorado is a 80 y.o. female patient who was admitted to the hospital for complaints of lE weakness. She underwent the procedure listed above on 1/27/22. Hospital Course:  After surgery, Her pre-operative weakness in LE was improved. She complained of incisional pain. The pain was well-controlled on oral medications. Her brace was in place. The incision was clean, dry and intact. There was no erythema or edema around the surgical site. Prior to discharge She was eating well, urinating and ambulating with a steady gait.     Discharge Vitals/Labs:  /73   Pulse 72   Temp 97.2 °F (36.2 °C) (Oral)   Resp 18   Ht 5' 5\" (1.651 m)   Wt 180 lb (81.6 kg)   SpO2 97%   BMI 29.95 kg/m²   CBC:   Lab Results   Component Value Date    WBC 10.5 01/28/2022    RBC 3.52 01/28/2022    HGB 11.2 01/28/2022    HCT 33.7 01/28/2022    MCV 95.9 01/28/2022    MCH 31.9 01/28/2022    MCHC 33.2 01/28/2022    RDW 16.2 01/28/2022     01/28/2022    MPV 8.8 01/28/2022     CMP:    Lab Results   Component Value Date     01/27/2022    K 4.2 01/27/2022    K 3.6 09/04/2021     01/27/2022    CO2 22 01/27/2022    BUN 10 01/27/2022    CREATININE 0.7 01/27/2022    GFRAA >60 01/27/2022    GFRAA >60 02/21/2012    AGRATIO 1.3 09/04/2021    LABGLOM >60 01/27/2022    GLUCOSE 123 01/27/2022    PROT 6.8 09/04/2021    PROT 7.2 02/21/2012    LABALBU 3.3 01/27/2022    CALCIUM 9.3 01/27/2022    BILITOT 0.3 01/05/2022    ALKPHOS 88 01/05/2022    AST 28 01/05/2022    ALT 21 01/05/2022 Discharge Medications: The patient suffers from a major neurological surgery that pain cannot be managed within an average of 30 MED per day. Severe acute postoperative pain is the reason for exceeding the 30 MED average, and the prescription reflects the same dosage patient received while inpatient, which is the lowest dose consistent with the patients medical condition. Non-opioid treatment options have been considered prior to prescribing opioids, and the patient has been advised of the benefits and risks of the opioid (including the potential for addiction). Medication List      START taking these medications    methocarbamol 750 MG tablet  Commonly known as: ROBAXIN  Take 1 tablet by mouth every 6 hours as needed (muscle spasms)     oxyCODONE 5 MG immediate release tablet  Commonly known as: ROXICODONE  Take 1-2 tablets by mouth every 6 hours as needed for Pain for up to 7 days.         CONTINUE taking these medications    acetaminophen 325 MG tablet  Commonly known as: TYLENOL     atenolol 25 MG tablet  Commonly known as: TENORMIN  Take 0.5 tablets by mouth daily     atorvastatin 80 MG tablet  Commonly known as: LIPITOR     bisacodyl 5 MG EC tablet  Commonly known as: DULCOLAX     DULoxetine 60 MG extended release capsule  Commonly known as: CYMBALTA     fluticasone-salmeterol 250-50 MCG/DOSE Aepb  Commonly known as: Wixela Inhub  USE 1 INHALATION BY MOUTH  EVERY 12 HOURS     folic acid 1 MG tablet  Commonly known as: FOLVITE     ipratropium-albuterol 0.5-2.5 (3) MG/3ML Soln nebulizer solution  Commonly known as: DUONEB  Inhale 3 mLs into the lungs every 4 hours as needed for Shortness of Breath     levothyroxine 25 MCG tablet  Commonly known as: SYNTHROID     melatonin 3 MG Tabs tablet     polyethylene glycol 17 g packet  Commonly known as: GLYCOLAX  Take 17 g by mouth daily     ProAir  (90 Base) MCG/ACT inhaler  Generic drug: albuterol sulfate HFA  INHALE 2 PUFFS BY MOUTH  INTO THE LUNGS EVERY 6  HOURS AS NEEDED FOR  WHEEZING OR SHORTNESS OF  BREATH     Restasis 0.05 % ophthalmic emulsion  Generic drug: cycloSPORINE     triamterene-hydroCHLOROthiazide 37.5-25 MG per tablet  Commonly known as: MAXZIDE-25     vitamin D 50 MCG (2000 UT) Caps capsule        STOP taking these medications    methotrexate 2.5 MG chemo tablet  Commonly known as: RHEUMATREX     traMADol 50 MG tablet  Commonly known as: ULTRAM           Where to Get Your Medications      You can get these medications from any pharmacy    Bring a paper prescription for each of these medications  · methocarbamol 750 MG tablet  · oxyCODONE 5 MG immediate release tablet         Discharge Destination:  The patient was discharged to [address] [place] [address]. Follow-up:  The patient is to follow-up with Nevaeh Queen. Karri Coyne MD in the office in 2 weeks      Discharge Instructions:   Verbal and written discharge instructions were given to the patient at the time of discharge. No NSAIDS or anticoagulation for 1 week post-operatively. No driving or riding in a motor vehicle. No lifting or bending    Electronically signed by:  ANDREW Whitten CNP, APRN-CNP, 1/31/2022 10:39 AM  [phone]

## 2022-01-31 NOTE — PROGRESS NOTES
Hemovac drain removed per orders and replaced with gauze and tegaderm dressing. Catheter tip in tact with removal. Pt tolerated well.

## 2022-01-31 NOTE — PROGRESS NOTES
Pt is a/o x4. VSS on room air. Pt endorses pain to the back currently rated at a 4 out of 10 being managed with prn pain medication per the [facility] with good relief. Tolerating ambulation well x1 with gb and walker. Bathed this shift. Up to the chair this shift and tolerated well. Tolerating diet and fluids well. Voiding adequately per BRP.  All fall precautions in place.

## 2022-01-31 NOTE — PROGRESS NOTES
NEUROSURGERY POST-OP PROGRESS NOTE    Patient Name: Susan Bowser YOB: 1940   Sex: Female Age: 80 yrs     Medical Record Number: [id number] Acct Number: [de-identified]   Room Number: 5890/4153-49 Hospital Day: Hospital Day: 5     Interval History:  Post-operative Day# 4 s/p Procedure(s) (LRB):  T10-11 THORACIC DECOMPRESSION, DISCECTOMY, FUSION / FIXATION (N/A)    Subjective: Pt feels like she is getting stronger. Objective:    VITAL SIGNS   /73   Pulse 72   Temp 97.2 °F (36.2 °C) (Oral)   Resp 18   Ht 5' 5\" (1.651 m)   Wt 180 lb (81.6 kg)   SpO2 97%   BMI 29.95 kg/m²    Height Height: 5' 5\" (165.1 cm)   Weight Weight: 180 lb (81.6 kg)        Allergies Allergies   Allergen Reactions    Effexor [Venlafaxine Hcl] Itching and Nausea Only    Fenofibrate Diarrhea    Prednisone Other (See Comments)     Causes body to feel on fire    Codeine Nausea And Vomiting    Docosahexaenoic Acid-Epa Nausea And Vomiting     Cannot take Vascepa      NPO Status ADULT DIET; Regular   Isolation No active isolations     LABS   Basic Metabolic Profile No results for input(s): NA, POTASSIUM, CL, CO2, BUN, CREATININE, GLUCOSE, CA, ALB, PHOS, MG in the last 72 hours. Complete Blood Count No results for input(s): WBC, RBC, HEMOGLOBIN in the last 72 hours. Invalid input(s): HEMATOCRIT   Coagulation Studies No results for input(s): PTT, INR in the last 72 hours.     Invalid input(s): PLATELETS, PROA, PT, PTTA     MEDICATIONS   Inpatient Medications   polyethylene glycol, 17 g, Oral, Daily    methocarbamol, 750 mg, Oral, 4 times per day    atenolol, 12.5 mg, Oral, Daily    atorvastatin, 80 mg, Oral, QPM    bisacodyl, 5 mg, Oral, Daily    DULoxetine, 120 mg, Oral, Nightly    levothyroxine, 25 mcg, Oral, Daily    triamterene-hydroCHLOROthiazide, 1 tablet, Oral, Daily    sodium chloride flush, 5-40 mL, IntraVENous, 2 times per day    enoxaparin, 40 mg, SubCUTAneous, Daily    budesonide, 0.25 mg, Nebulization, BID **AND** Arformoterol Tartrate, 15 mcg, Nebulization, BID    acetaminophen, 1,000 mg, Oral, 3 times per day   Infusions    sodium chloride        Antibiotics   Recent Abx Admin      No antibiotic orders with administrations found. Neurologic Exam:  Mental status: awake and alert and oriented x4    Musculoskeletal:   Gait: Not tested   Tone: normal  Sensory: intact to all extremities  Motor strength:    Right  Left    Right  Left    Deltoid  5 5  Hip Flex  5 4   Biceps  5 5  Knee Extensors  5 4   Triceps  5 5  Knee Flexors  5 4   Wrist Ext  5 5  Ankle Dorsiflex. 5 4   Wrist Flex  5 5  Ankle Plantarflex. 5 4   Handgrip  5 5  Ext Jose Alberto Longus  5 4   Thumb Ext  5 5         Incision: intact, clean and dry  Drain:25  Respiratory:  Unlabored respiratory pattern    Abdomen:   Soft, ND       Cardiovascular:  Warm, well perfused    Assessment   Patient is a 81 yo F s/p Procedure(s) (LRB):  T10-11 THORACIC DECOMPRESSION, DISCECTOMY, FUSION / FIXATION (N/A) per Dr. Merle Rangel. Plan:  Patient improved significantly post-operatively. Eating, voiding and ambulating well. Pain and nausea well controlled on oral medications  Surgical incision CDI without issues  Discharge home with family  All discharge instructions including dressing changes and follow up care gone over with patient and family and provided in written form  Dispo Planning:Pt can go back to snf    Patient was seen with Dr. Steven Leal who agrees with above assessment and plan. Electronically signed by:  ANDREW Paul CNP, 1/31/2022 10:30 AM   Neurosurgery Nurse Practitioner  [phone]

## 2022-01-31 NOTE — PROGRESS NOTES
Occupational Therapy  Facility/Department: [facility] 5T ORTHO/NEURO  Daily Treatment Note  NAME: Candyce Kehr  : 1940  MRN: [id number]    Date of Service: 2022    Discharge Recommendations:    Candyce Kehr scored a 17/24 on the AM-PAC ADL Inpatient form. Current research shows that an AM-PAC score of 17 or less is typically not associated with a discharge to the patient's home setting. Based on the patient's AM-PAC score and their current ADL deficits, it is recommended that the patient have 3-5 sessions per week of Occupational Therapy at d/c to increase the patient's independence. Please see assessment section for further patient specific details. If patient discharges prior to next session this note will serve as a discharge summary. Please see below for the latest assessment towards goals. OT Equipment Recommendations  Other: defer    Assessment   Performance deficits / Impairments: Decreased functional mobility ; Decreased ADL status; Decreased balance      Assessment: Pt requiring encouragment for participation. Functional mobility demonstrated with CGA to Min A with slow effortful movement. Bed mobility completed with SBA. Pt expressing concern about previous falls and balance but states that she feels like she is improving. Pt would benefit from inpt OT for maximizing functional indepdence and safety. Continue OT per POC. OT Education: OT Role;Plan of Care;Precautions;Transfer Training  Patient Education: Pt able state spine precautions with verbal cues - continue ot re enforce. Activity Tolerance  Activity Tolerance: Patient Tolerated treatment well  Activity Tolerance: Pt relucant to get up but then agreeable after encouragment  Safety Devices  Safety Devices in place: Yes  Type of devices: Left in chair;Nurse notified;Call light within reach; Chair alarm in place;Gait belt         Patient Diagnosis(es): The encounter diagnosis was S/P fusion of thoracic spine.       has a past medical history of Asthma, COPD (chronic obstructive pulmonary disease) ([facility] [address] Hyperlipidemia, Hypertension, RA (rheumatoid arthritis) ([facility] [address] and Thyroid disease. has a past surgical history that includes Appendectomy; Hysterectomy; Cholecystectomy; tumor removal; Thyroidectomy, partial (Left); eye surgery (Right, 12/11/15); eye surgery (Left, 1/8/16); Colonoscopy (4/18/2013); Colonoscopy (11/10/2016); Abdominal exploration surgery; Humerus fracture surgery (Right, 2/28/2021); fracture surgery; and laminectomy (N/A, 1/27/2022). Restrictions  Position Activity Restriction  Spinal Precautions: No Bending,No Lifting,No Twisting  Other position/activity restrictions: Activity as Tolerated; ambulate pt       Diagnosis: Herniated Thoracic Disc without Neuropathy  Treatment Diagnosis: impaired ADLs/transfers s/p T10-11 THORACIC DECOMPRESSION, DISCECTOMY, FUSION / FIXATION. Subjective:   Pt met supine in bed requiring encouragement for participation in therapy. Pt finaly agreeable to out of bed activity. Pain:   Denies    Objective:    Cognition/Orientation:  WFL      Bed mobility   Rolling: SBA with VCs for tech and remembering log roll  Supine to sit:  SBA   Scooting: SBA to EOB and back in chair    Functional Mobility   Sit to Stand: CGA to Min A from EOB   Stand to Sit: CGA to recliner chair   Bed to Chair Transfer: CGA with VCs for positioning and hand placement on transfer surface. Pt attempting to grab RW   Other: Short distance functional mobility completed in room to closet and then to recliner chair    ADLs   Grooming: Washing hands and face seated in recliner. Pt declining going to bathroom this session        Safety Devices in Place:  Pt left seated in recliner chair with alarm on and call light in reach.         Plan  If pt discharges prior to next treatment, this note will serve as discharge summary  Plan  Times per week: 5-7  Times per day: Daily  Current Treatment Recommendations: Balance

## 2022-01-31 NOTE — PROGRESS NOTES
Patient up to bathroom has complaints of gas pains. Patient passing a lot of gas no stool. Patient assisted back to bed. Cool cloth given for forehead. Call light with reach fall precautions in place. Will continue to monitor.

## 2022-01-31 NOTE — PROGRESS NOTES
Patient alert and oriented times 4. PRN pain medication given per [facility] for back discomfort. . Patient is currently rating back pain 0 out of 10.surgical site is CDI with hemovac drain in place. Drain putting out small amount of serosanguinous drainage. Fall precautions in place. Bed in the low position. Call light within reach.

## 2022-01-31 NOTE — PROGRESS NOTES
Physical Therapy  Attempt  Attempted to see pt at [phone] on 1/31/22. Pt found supine in bed upon arrival.  Pt declining session stating, \"I've already been up and done all of that. \"  PT educating pt on importance of participation in session. Pt continued to refuse stating, \"I'm going home at 5. I want to lay here and sleep until then. It's not possible to sleep at the nursing home because it's so noisy there. \"  RN notified of refusal.  Will re-attempt as schedule allows.     Nathan Barrientos, PT

## 2022-02-17 ENCOUNTER — TELEPHONE (OUTPATIENT)
Dept: PULMONOLOGY | Age: 82
End: 2022-02-17

## 2022-02-17 NOTE — TELEPHONE ENCOUNTER
Patient did not show for 6 mo fu appointment  with Dr. Alfredo Fay on 2/17/22    Same Day Cancellation: No    Patient rescheduled:  No    New appointment:     Patient was also no show on:  na    LOV      Assessment: 08/11/21   · Asthma with COPD overlap   · Rheumatoid arthritis- on MTX  · Chronic Rhinitis  · Cough - suspect post-nasal gtt      Plan:    · The current inhaler is effective and will continue Wixela 250/50 BID   · Continue proair prn mdi  · Pt declined rx for PRN nebs  · F/u in 6 mo

## 2022-04-11 ENCOUNTER — TELEPHONE (OUTPATIENT)
Dept: FAMILY MEDICINE CLINIC | Age: 82
End: 2022-04-11

## 2022-04-11 NOTE — TELEPHONE ENCOUNTER
----- Message from Skinner Holly sent at 2022  3:04 PM EDT -----  Subject: Appointment Request    Reason for Call: New Patient Request Appointment    QUESTIONS  Type of Appointment? New Patient/New to Provider  Reason for appointment request? Requested Provider unavailable - Mariano Poe. Martha Hawley  Additional Information for Provider? Patient would like to set up an appt   with Dr. Martha Hawley. Her son is Kyle(Rusty) Marylouise Severs. He referred her to him   as he is easy going and easy for her to get to.  ---------------------------------------------------------------------------  --------------  CALL BACK INFO  What is the best way for the office to contact you? OK to leave message on   voicemail  Preferred Call Back Phone Number? [phone]  ---------------------------------------------------------------------------  --------------  SCRIPT ANSWERS  Relationship to Patient? Self  Specialty Confirmation? Primary Care  Is this the first appointment to establish care for a ? No  Have you been diagnosed with, awaiting test results for, or told that you   are suspected of having COVID-19 (Coronavirus)? (If patient has tested   negative or was tested as a requirement for work, school, or travel and   not based on symptoms, answer no)? No  Within the past 10 days have you developed any of the following symptoms   (answer no if symptoms have been present longer than 10 days or began   more than 10 days ago)? Fever or Chills, Cough, Shortness of breath or   difficulty breathing, Loss of taste or smell, Sore throat, Nasal   congestion, Sneezing or runny nose, Fatigue or generalized body aches   (answer no if pain is specific to a body part e.g. back pain), Diarrhea,   Headache? No  Have you had close contact with someone with COVID-19 in the last 7 days? No  (Service Expert  click yes below to proceed with [employer]   Scheduling)?  Yes

## 2022-04-12 NOTE — TELEPHONE ENCOUNTER
Called pt and she had a new pt appt scheduled in December with you, but she had to cancel due to a fracture and surgery. She would still like to establish here if okay?

## 2022-04-22 NOTE — PROGRESS NOTES
Pt resting in bed with eyes closed. Resps e/e. No s/s of distress noted at this time. Call light within reach. None

## 2022-04-25 ENCOUNTER — OFFICE VISIT (OUTPATIENT)
Dept: FAMILY MEDICINE CLINIC | Age: 82
End: 2022-04-25
Payer: MEDICARE

## 2022-04-25 VITALS
OXYGEN SATURATION: 96 % | HEIGHT: 65 IN | BODY MASS INDEX: 28.32 KG/M2 | SYSTOLIC BLOOD PRESSURE: 114 MMHG | DIASTOLIC BLOOD PRESSURE: 76 MMHG | HEART RATE: 62 BPM | WEIGHT: 170 LBS

## 2022-04-25 DIAGNOSIS — J44.9 CHRONIC OBSTRUCTIVE PULMONARY DISEASE, UNSPECIFIED COPD TYPE (HCC): Primary | ICD-10-CM

## 2022-04-25 DIAGNOSIS — M05.719 RHEUMATOID ARTHRITIS INVOLVING SHOULDER WITH POSITIVE RHEUMATOID FACTOR, UNSPECIFIED LATERALITY (HCC): ICD-10-CM

## 2022-04-25 DIAGNOSIS — E03.9 HYPOTHYROIDISM, UNSPECIFIED TYPE: ICD-10-CM

## 2022-04-25 DIAGNOSIS — E78.2 MIXED HYPERLIPIDEMIA: ICD-10-CM

## 2022-04-25 DIAGNOSIS — R73.09 ABNORMAL GLUCOSE: ICD-10-CM

## 2022-04-25 DIAGNOSIS — R09.82 POST-NASAL DRAINAGE: ICD-10-CM

## 2022-04-25 DIAGNOSIS — I10 BENIGN ESSENTIAL HTN: ICD-10-CM

## 2022-04-25 DIAGNOSIS — R53.83 OTHER FATIGUE: ICD-10-CM

## 2022-04-25 DIAGNOSIS — Z98.1 S/P FUSION OF THORACIC SPINE: ICD-10-CM

## 2022-04-25 PROBLEM — J18.9 MULTIFOCAL PNEUMONIA: Status: RESOLVED | Noted: 2021-07-20 | Resolved: 2022-04-25

## 2022-04-25 PROBLEM — R09.02 HYPOXIA: Status: RESOLVED | Noted: 2021-07-20 | Resolved: 2022-04-25

## 2022-04-25 LAB
A/G RATIO: 1.7 (ref 1.1–2.2)
ALBUMIN SERPL-MCNC: 4.3 G/DL (ref 3.4–5)
ALP BLD-CCNC: 114 U/L (ref 40–129)
ALT SERPL-CCNC: 23 U/L (ref 10–40)
ANION GAP SERPL CALCULATED.3IONS-SCNC: 15 MMOL/L (ref 3–16)
AST SERPL-CCNC: 25 U/L (ref 15–37)
BILIRUB SERPL-MCNC: 0.5 MG/DL (ref 0–1)
BUN BLDV-MCNC: 16 MG/DL (ref 7–20)
CALCIUM SERPL-MCNC: 9.9 MG/DL (ref 8.3–10.6)
CHLORIDE BLD-SCNC: 105 MMOL/L (ref 99–110)
CHOLESTEROL, TOTAL: 153 MG/DL (ref 0–199)
CO2: 23 MMOL/L (ref 21–32)
CREAT SERPL-MCNC: 0.8 MG/DL (ref 0.6–1.2)
GFR AFRICAN AMERICAN: >60
GFR NON-AFRICAN AMERICAN: >60
GLUCOSE BLD-MCNC: 110 MG/DL (ref 70–99)
HDLC SERPL-MCNC: 41 MG/DL (ref 40–60)
LDL CHOLESTEROL CALCULATED: 82 MG/DL
POTASSIUM SERPL-SCNC: 4.3 MMOL/L (ref 3.5–5.1)
REASON FOR REJECTION: NORMAL
REJECTED TEST: NORMAL
SODIUM BLD-SCNC: 143 MMOL/L (ref 136–145)
T4 FREE: 1.6 NG/DL (ref 0.9–1.8)
TOTAL PROTEIN: 6.9 G/DL (ref 6.4–8.2)
TRIGL SERPL-MCNC: 148 MG/DL (ref 0–150)
TSH SERPL DL<=0.05 MIU/L-ACNC: 2.33 UIU/ML (ref 0.27–4.2)
VITAMIN B-12: 620 PG/ML (ref 211–911)
VLDLC SERPL CALC-MCNC: 30 MG/DL

## 2022-04-25 PROCEDURE — 1123F ACP DISCUSS/DSCN MKR DOCD: CPT | Performed by: FAMILY MEDICINE

## 2022-04-25 PROCEDURE — 1036F TOBACCO NON-USER: CPT | Performed by: FAMILY MEDICINE

## 2022-04-25 PROCEDURE — G8427 DOCREV CUR MEDS BY ELIG CLIN: HCPCS | Performed by: FAMILY MEDICINE

## 2022-04-25 PROCEDURE — 36415 COLL VENOUS BLD VENIPUNCTURE: CPT | Performed by: FAMILY MEDICINE

## 2022-04-25 PROCEDURE — G8400 PT W/DXA NO RESULTS DOC: HCPCS | Performed by: FAMILY MEDICINE

## 2022-04-25 PROCEDURE — 99204 OFFICE O/P NEW MOD 45 MIN: CPT | Performed by: FAMILY MEDICINE

## 2022-04-25 PROCEDURE — G8417 CALC BMI ABV UP PARAM F/U: HCPCS | Performed by: FAMILY MEDICINE

## 2022-04-25 PROCEDURE — 3023F SPIROM DOC REV: CPT | Performed by: FAMILY MEDICINE

## 2022-04-25 PROCEDURE — 4040F PNEUMOC VAC/ADMIN/RCVD: CPT | Performed by: FAMILY MEDICINE

## 2022-04-25 PROCEDURE — 1090F PRES/ABSN URINE INCON ASSESS: CPT | Performed by: FAMILY MEDICINE

## 2022-04-25 RX ORDER — FLUTICASONE PROPIONATE 50 MCG
2 SPRAY, SUSPENSION (ML) NASAL DAILY
Qty: 16 G | Refills: 3 | Status: SHIPPED | OUTPATIENT
Start: 2022-04-25 | End: 2022-06-29

## 2022-04-25 ASSESSMENT — PATIENT HEALTH QUESTIONNAIRE - PHQ9
SUM OF ALL RESPONSES TO PHQ QUESTIONS 1-9: 1
SUM OF ALL RESPONSES TO PHQ QUESTIONS 1-9: 1
SUM OF ALL RESPONSES TO PHQ9 QUESTIONS 1 & 2: 1
SUM OF ALL RESPONSES TO PHQ QUESTIONS 1-9: 1
1. LITTLE INTEREST OR PLEASURE IN DOING THINGS: 1
2. FEELING DOWN, DEPRESSED OR HOPELESS: 0
SUM OF ALL RESPONSES TO PHQ QUESTIONS 1-9: 1

## 2022-04-25 ASSESSMENT — ENCOUNTER SYMPTOMS
COUGH: 1
CONSTIPATION: 1
RHINORRHEA: 1
SHORTNESS OF BREATH: 1

## 2022-04-26 ENCOUNTER — TELEPHONE (OUTPATIENT)
Dept: FAMILY MEDICINE CLINIC | Age: 82
End: 2022-04-26

## 2022-04-26 LAB
ESTIMATED AVERAGE GLUCOSE: 125.5 MG/DL
HBA1C MFR BLD: 6 %

## 2022-04-26 NOTE — TELEPHONE ENCOUNTER
[facility] called and state that they are unable to run the CBC and possibly the A1c from yesterday's draw.

## 2022-04-26 NOTE — RESULT ENCOUNTER NOTE
Ha1c slightly higher. Still not in the range of dm. B12 ok. Cbc could not be done. Consider re-draw. Thyroid labs normal.  Lipids fairly well controlled.  Cmp ok except glucose

## 2022-05-03 RX ORDER — TRIAMTERENE AND HYDROCHLOROTHIAZIDE 37.5; 25 MG/1; MG/1
1 TABLET ORAL DAILY
Qty: 90 TABLET | Refills: 1 | Status: SHIPPED | OUTPATIENT
Start: 2022-05-03 | End: 2022-10-31

## 2022-05-16 ENCOUNTER — TELEMEDICINE (OUTPATIENT)
Dept: FAMILY MEDICINE CLINIC | Age: 82
End: 2022-05-16
Payer: MEDICARE

## 2022-05-16 DIAGNOSIS — Z00.00 MEDICARE ANNUAL WELLNESS VISIT, SUBSEQUENT: Primary | ICD-10-CM

## 2022-05-16 PROCEDURE — G0439 PPPS, SUBSEQ VISIT: HCPCS | Performed by: FAMILY MEDICINE

## 2022-05-16 PROCEDURE — 1123F ACP DISCUSS/DSCN MKR DOCD: CPT | Performed by: FAMILY MEDICINE

## 2022-05-16 PROCEDURE — 4040F PNEUMOC VAC/ADMIN/RCVD: CPT | Performed by: FAMILY MEDICINE

## 2022-05-16 SDOH — ECONOMIC STABILITY: FOOD INSECURITY: WITHIN THE PAST 12 MONTHS, YOU WORRIED THAT YOUR FOOD WOULD RUN OUT BEFORE YOU GOT MONEY TO BUY MORE.: NEVER TRUE

## 2022-05-16 SDOH — ECONOMIC STABILITY: FOOD INSECURITY: WITHIN THE PAST 12 MONTHS, THE FOOD YOU BOUGHT JUST DIDN'T LAST AND YOU DIDN'T HAVE MONEY TO GET MORE.: NEVER TRUE

## 2022-05-16 ASSESSMENT — PATIENT HEALTH QUESTIONNAIRE - PHQ9
SUM OF ALL RESPONSES TO PHQ QUESTIONS 1-9: 0
1. LITTLE INTEREST OR PLEASURE IN DOING THINGS: 0
2. FEELING DOWN, DEPRESSED OR HOPELESS: 0
SUM OF ALL RESPONSES TO PHQ QUESTIONS 1-9: 0
SUM OF ALL RESPONSES TO PHQ9 QUESTIONS 1 & 2: 0
SUM OF ALL RESPONSES TO PHQ QUESTIONS 1-9: 0
SUM OF ALL RESPONSES TO PHQ QUESTIONS 1-9: 0

## 2022-05-16 ASSESSMENT — SOCIAL DETERMINANTS OF HEALTH (SDOH): HOW HARD IS IT FOR YOU TO PAY FOR THE VERY BASICS LIKE FOOD, HOUSING, MEDICAL CARE, AND HEATING?: NOT VERY HARD

## 2022-05-16 ASSESSMENT — LIFESTYLE VARIABLES: HOW OFTEN DO YOU HAVE A DRINK CONTAINING ALCOHOL: NEVER

## 2022-05-16 NOTE — PATIENT INSTRUCTIONS
Personalized Preventive Plan for Abraham Medeiros - 5/16/2022  Medicare offers a range of preventive health benefits. Some of the tests and screenings are paid in full while other may be subject to a deductible, co-insurance, and/or copay. Some of these benefits include a comprehensive review of your medical history including lifestyle, illnesses that may run in your family, and various assessments and screenings as appropriate. After reviewing your medical record and screening and assessments performed today your provider may have ordered immunizations, labs, imaging, and/or referrals for you. A list of these orders (if applicable) as well as your Preventive Care list are included within your After Visit Summary for your review. Other Preventive Recommendations:    · A preventive eye exam performed by an eye specialist is recommended every 1-2 years to screen for glaucoma; cataracts, macular degeneration, and other eye disorders. · A preventive dental visit is recommended every 6 months. · Try to get at least 150 minutes of exercise per week or 10,000 steps per day on a pedometer . · Order or download the FREE \"Exercise & Physical Activity: Your Everyday Guide\" from The [employer]. Call 5-541.998.3858 or search [employer] online. · You need 2501-5037 mg of calcium and 5929-8294 IU of vitamin D per day. It is possible to meet your calcium requirement with diet alone, but a vitamin D supplement is usually necessary to meet this goal.  · When exposed to the sun, use a sunscreen that protects against both UVA and UVB radiation with an SPF of 30 or greater. Reapply every 2 to 3 hours or after sweating, drying off with a towel, or swimming. · Always wear a seat belt when traveling in a car. Always wear a helmet when riding a bicycle or motorcycle.

## 2022-05-16 NOTE — PROGRESS NOTES
Medicare Annual Wellness Visit    Trina Paz is here for Medicare AWV    Assessment & Plan   Medicare annual wellness visit, subsequent      Recommendations for Preventive Services Due: see orders and patient instructions/AVS.  Recommended screening schedule for the next 5-10 years is provided to the patient in written form: see Patient Instructions/AVS.     No follow-ups on file. Subjective       Patient's complete Health Risk Assessment and screening values have been reviewed and are found in Flowsheets. The following problems were reviewed today and where indicated follow up appointments were made and/or referrals ordered.     Positive Risk Factor Screenings with Interventions:               General Health and ACP:  General  In general, how would you say your health is?: Good  In the past 7 days, have you experienced any of the following: New or Increased Pain, New or Increased Fatigue, Loneliness, Social Isolation, Stress or Anger?: No  Do you get the social and emotional support that you need?: Yes  Do you have a Living Will?: (!) No    Advance Directives     Power of  Living Will ACP-Advance Directive ACP-Power of     Not on File Not on File Not on File Not on File      General Health Risk Interventions:  · No Living Will: Patient declines ACP discussion/assistance    Health Habits/Nutrition:     Physical Activity: Inactive    Days of Exercise per Week: 0 days    Minutes of Exercise per Session: 0 min     Have you lost any weight without trying in the past 3 months?: No     Have you seen the dentist within the past year?: N/A - wear dentures    Hearing/Vision:  Do you or your family notice any trouble with your hearing that hasn't been managed with hearing aids?: (!) Yes  Do you have difficulty driving, watching TV, or doing any of your daily activities because of your eyesight?: No  Have you had an eye exam within the past year?: (!) No  No exam data present            Objective Patient-Reported Vitals  Patient-Reported Systolic (Top): 706 mmHg  Patient-Reported Diastolic (Bottom): 76 mmHg  Patient-Reported Pulse: 62  Patient-Reported Weight: 170 lbs              Allergies   Allergen Reactions    Effexor [Venlafaxine Hcl] Itching and Nausea Only    Fenofibrate Diarrhea    Prednisone Other (See Comments)     Causes body to feel on fire    Codeine Nausea And Vomiting    Docosahexaenoic Acid-Epa Nausea And Vomiting     Cannot take Vascepa     Prior to Visit Medications    Medication Sig Taking? Authorizing Provider   triamterene-hydroCHLOROthiazide (MAXZIDE-25) 37.5-25 MG per tablet Take 1 tablet by mouth daily  Kp Reyes MD   METHOTREXATE PO Take 10 mg by mouth once a week  Historical Provider, MD   fluticasone (FLONASE) 50 MCG/ACT nasal spray 2 sprays by Nasal route daily  Kp Reyes MD   fluticasone-salmeterol (Elvera Overall) 250-50 MCG/DOSE AEPB USE 1 INHALATION BY MOUTH  EVERY 12 HOURS  Jess Xiong MD   folic acid (FOLVITE) 1 MG tablet Take 1 mg by mouth daily  Historical Provider, MD   atenolol (TENORMIN) 25 MG tablet Take 0.5 tablets by mouth daily  Jose Galdamez MD   PROAIR  (90 Base) MCG/ACT inhaler INHALE 2 PUFFS BY MOUTH  INTO THE LUNGS EVERY 6  HOURS AS NEEDED FOR  WHEEZING OR SHORTNESS OF  BREATH  Sean Obregon MD   DULoxetine (CYMBALTA) 60 MG extended release capsule Take 120 mg by mouth nightly Before bed   Historical Provider, MD   RESTASIS 0.05 % ophthalmic emulsion   Historical Provider, MD   atorvastatin (LIPITOR) 80 MG tablet Take 80 mg by mouth every evening   Historical Provider, MD   Cholecalciferol (VITAMIN D) 2000 UNITS CAPS capsule Take 1 capsule by mouth nightly   Historical Provider, MD   levothyroxine (SYNTHROID) 25 MCG tablet Take 25 mcg by mouth Daily.   Historical Provider, MD Root (Including outside providers/suppliers regularly involved in providing care):   Patient Care Team:  Kp Reyes MD as PCP - General (Family Medicine)  Go Orr MD as [facility]  Madison Spence MD as Consulting Physician (Pulmonology)  Piedad Mercado MD as Consulting Physician (Pulmonology)     Reviewed and updated this visit:  Tobacco  Allergies  Meds  Med Hx  Surg Hx  Soc Hx  Fam Hx           Trina Barnes, was evaluated through a synchronous (real-time) telephone encounter. The patient (or guardian if applicable) is aware that this is a billable service. Verbal consent to proceed has been obtained within the past 12 months. The visit was conducted pursuant to the emergency declaration under the [address] [place] [address], [address] [place] [address] authority and the [employer] General Act. Patient identification was verified, and a caregiver was present when appropriate. The patient was located in a state where the provider was credentialed to provide care. --Hanna Brown LPN on 9/10/2848 at 0:67 PM    An electronic signature was used to authenticate this note. Luis Todd LPN, 1/78/7540, performed the documented evaluation under the direct supervision of the attending physician. This encounter was performed under my, Kady Ying, direct supervision, 5/16/2022.

## 2022-05-17 ENCOUNTER — OFFICE VISIT (OUTPATIENT)
Dept: RHEUMATOLOGY | Age: 82
End: 2022-05-17
Payer: MEDICARE

## 2022-05-17 VITALS
BODY MASS INDEX: 30.99 KG/M2 | SYSTOLIC BLOOD PRESSURE: 126 MMHG | DIASTOLIC BLOOD PRESSURE: 76 MMHG | WEIGHT: 186 LBS | HEIGHT: 65 IN

## 2022-05-17 DIAGNOSIS — M05.9 SEROPOSITIVE RHEUMATOID ARTHRITIS (HCC): Primary | ICD-10-CM

## 2022-05-17 DIAGNOSIS — M15.9 PRIMARY OSTEOARTHRITIS INVOLVING MULTIPLE JOINTS: ICD-10-CM

## 2022-05-17 DIAGNOSIS — M81.0 SENILE OSTEOPOROSIS: ICD-10-CM

## 2022-05-17 DIAGNOSIS — Z79.899 HIGH RISK MEDICATION USE: ICD-10-CM

## 2022-05-17 PROCEDURE — G8417 CALC BMI ABV UP PARAM F/U: HCPCS | Performed by: INTERNAL MEDICINE

## 2022-05-17 PROCEDURE — 1123F ACP DISCUSS/DSCN MKR DOCD: CPT | Performed by: INTERNAL MEDICINE

## 2022-05-17 PROCEDURE — 1036F TOBACCO NON-USER: CPT | Performed by: INTERNAL MEDICINE

## 2022-05-17 PROCEDURE — G8400 PT W/DXA NO RESULTS DOC: HCPCS | Performed by: INTERNAL MEDICINE

## 2022-05-17 PROCEDURE — 99214 OFFICE O/P EST MOD 30 MIN: CPT | Performed by: INTERNAL MEDICINE

## 2022-05-17 PROCEDURE — 4040F PNEUMOC VAC/ADMIN/RCVD: CPT | Performed by: INTERNAL MEDICINE

## 2022-05-17 PROCEDURE — 1090F PRES/ABSN URINE INCON ASSESS: CPT | Performed by: INTERNAL MEDICINE

## 2022-05-17 PROCEDURE — G8427 DOCREV CUR MEDS BY ELIG CLIN: HCPCS | Performed by: INTERNAL MEDICINE

## 2022-05-17 NOTE — PROGRESS NOTES
[address], MD                                                           [address] [place] [address] [phone] (P) [phone] (F)      Note is transcribed using voice recognition software. Inadvertent computerized transcription errors may be present. Patient identification: Ria Lacy, female : ,3 y.o. [id number] for seropositive rheumatoid arthritis and osteoarthritis. Interval changes-she underwent lumbar fusion on 2022, methotrexate was held for couple of months, started noticing more pain, swelling, stiffness in the finger joints. She resumed methotrexate 2 weeks ago. Joints are doing somewhat better. She is doing much better in terms of back pain after lumbar fusion. No overt flares of RA. Continues to have intercurrent discomfort in her neck, lower back, knee joints. Other medical comorbidities-spinal stenosis, hypothyroidism, COPD asthma. PMH, PSH,Social history , Meds reviewed. FH-sister-rheumatoid arthritis. PHYSICAL EXAM:    Vitals:    /76   Ht 5' 5\" (1.651 m)   Wt 186 lb (84.4 kg)   BMI 30.95 kg/m²   AA)x3 well nourished, and well groomed, normal judgement. She is using walker for ambulation because of generalized physical deconditioning after recent hospitalization. MKS: No appreciable tender, swollen, inflamed joints in upper or lower extremities, F ROM in all peripheral joints. Osteoarthritic changes in the [facility] joints, scattered finger joints, knees ankle and feet. These findings are unchanged. Skin: No rashes, no induration or skin thickening or nodules.      DATA:   Lab Results   Component Value Date    WBC 10.5 2022 HGB 11.2 (L) 01/28/2022    HCT 33.7 (L) 01/28/2022    MCV 95.9 01/28/2022     01/28/2022     Lab Results   Component Value Date     04/25/2022    K 4.3 04/25/2022     04/25/2022    CO2 23 04/25/2022    BUN 16 04/25/2022    CREATININE 0.8 04/25/2022    GLUCOSE 110 (H) 04/25/2022    CALCIUM 9.9 04/25/2022    PROT 6.9 04/25/2022    LABALBU 4.3 04/25/2022    BILITOT 0.5 04/25/2022    ALKPHOS 114 04/25/2022    AST 25 04/25/2022    ALT 23 04/25/2022    LABGLOM >60 04/25/2022    GFRAA >60 04/25/2022    AGRATIO 1.7 04/25/2022    GLOB 2.9 09/04/2021       No results found for: SEDRATE  No results found for: CRP        ASSESSMENT AND PLAN:  Fredrick Ayers was seen today for follow-up. Diagnoses and all orders for this visit:    Seropositive rheumatoid arthritis ([address]    Primary osteoarthritis involving multiple joints    High risk medication use      Seropositive rheumatoid arthritis (RF>240)-worsening symptoms after holding methotrexate, resume methotrexate 4 tablets weekly. Prescription updated. Safety labs are stable. Generalized osteoarthritis-low back pain is much better, otherwise remained stable. Fatigue-multifactorial-arthritis, COPD chronic asthma, cardiovascular elements. Call with any questions or concerns, follow-up in 4 months. #################################################################################################  Patient indicates understanding and agrees with the management plan. Total time 32 minutes that includes the following-  Preparing to see the patient such as reviewing patients records, pre-charting, preparing the visit on the same day, performing a medically appropriate history and physical examination, counseling and educating patient about diagnosis, management plan, ordering appropriate testings, prescriptions, communicating findings to other care providers, and documenting clinical information in electronic medical record.   I thank you for giving me the opportunity to be involved in [facility] and I look forward following Anselmo Galdamez along with you. If you have any questions or concerns please feel free to contact me at any time.   Samuel Raza MD 05/17/22

## 2022-06-09 ENCOUNTER — OFFICE VISIT (OUTPATIENT)
Dept: PULMONOLOGY | Age: 82
End: 2022-06-09
Payer: MEDICARE

## 2022-06-09 VITALS
HEART RATE: 72 BPM | RESPIRATION RATE: 16 BRPM | DIASTOLIC BLOOD PRESSURE: 74 MMHG | HEIGHT: 65 IN | OXYGEN SATURATION: 97 % | WEIGHT: 175 LBS | SYSTOLIC BLOOD PRESSURE: 128 MMHG | BODY MASS INDEX: 29.16 KG/M2

## 2022-06-09 DIAGNOSIS — J84.9 ILD (INTERSTITIAL LUNG DISEASE) (HCC): Primary | ICD-10-CM

## 2022-06-09 PROCEDURE — G8427 DOCREV CUR MEDS BY ELIG CLIN: HCPCS | Performed by: INTERNAL MEDICINE

## 2022-06-09 PROCEDURE — 1123F ACP DISCUSS/DSCN MKR DOCD: CPT | Performed by: INTERNAL MEDICINE

## 2022-06-09 PROCEDURE — 1036F TOBACCO NON-USER: CPT | Performed by: INTERNAL MEDICINE

## 2022-06-09 PROCEDURE — 1090F PRES/ABSN URINE INCON ASSESS: CPT | Performed by: INTERNAL MEDICINE

## 2022-06-09 PROCEDURE — 99214 OFFICE O/P EST MOD 30 MIN: CPT | Performed by: INTERNAL MEDICINE

## 2022-06-09 PROCEDURE — G8400 PT W/DXA NO RESULTS DOC: HCPCS | Performed by: INTERNAL MEDICINE

## 2022-06-09 PROCEDURE — G8417 CALC BMI ABV UP PARAM F/U: HCPCS | Performed by: INTERNAL MEDICINE

## 2022-06-09 RX ORDER — FLUTICASONE PROPIONATE AND SALMETEROL 250; 50 UG/1; UG/1
POWDER RESPIRATORY (INHALATION)
Qty: 180 EACH | Refills: 3 | OUTPATIENT
Start: 2022-06-09

## 2022-06-09 NOTE — PROGRESS NOTES
P  [facility]                                               Pulmonary Clinic Consult     I had the pleasure of seeing  Eliceo Keith     Chief Complaint   Patient presents with   [facility] Doctor     Dr Gaytan pt     6 Month Follow-Up    COPD       HISTORY OF PRESENT ILLNESS:    Eliceo Keith is a [de-identified]y.o. year old  Who has about 15 PPY  She  quit smoking 40 years   Known RA   The Patient comes in with SOB that has been going on for some years  Associated with mild . ,She  states that it get worse with exercise or walking long distance and he can walk 1 block however can not do it because of spine issues and RA And go less than  flight of stairs before get short winded      Has been followed for lung nodules    She albuterol and on Wexella           ALLERGIES:    Allergies   Allergen Reactions    Prednisone Other (See Comments)     Causes body to feel on fire    Effexor [Venlafaxine Hcl] Itching and Nausea Only    Fenofibrate Diarrhea    Codeine Nausea And Vomiting    Docosahexaenoic Acid-Epa Nausea And Vomiting     Cannot take Vascepa       PAST MEDICAL HISTORY:       Diagnosis Date    Asthma     COPD (chronic obstructive pulmonary disease) ([facility] [address]     Hyperlipidemia     Hypertension     RA (rheumatoid arthritis) ([address] [address]     Thyroid disease     has half of her thyroid       MEDICATIONS:   Current Outpatient Medications   Medication Sig Dispense Refill    methotrexate (RHEUMATREX) 2.5 MG chemo tablet Take 4 tablets by mouth once a week 52 tablet 0    triamterene-hydroCHLOROthiazide (MAXZIDE-25) 37.5-25 MG per tablet Take 1 tablet by mouth daily 90 tablet 1    fluticasone (FLONASE) 50 MCG/ACT nasal spray 2 sprays by Nasal route daily 16 g 3    fluticasone-salmeterol (WIXELA INHUB) 250-50 MCG/DOSE AEPB USE 1 INHALATION BY MOUTH  EVERY 12 HOURS 507 each 0    folic acid (FOLVITE) 1 MG tablet Take 1 mg by mouth daily      atenolol (TENORMIN) 25 MG tablet Take 0.5 tablets by mouth daily 30 tablet 3    PROAIR  (90 Base) MCG/ACT inhaler INHALE 2 PUFFS BY MOUTH  INTO THE LUNGS EVERY 6  HOURS AS NEEDED FOR  WHEEZING OR SHORTNESS OF  BREATH 34 g 1    DULoxetine (CYMBALTA) 60 MG extended release capsule Take 120 mg by mouth nightly Before bed       RESTASIS 0.05 % ophthalmic emulsion       atorvastatin (LIPITOR) 80 MG tablet Take 80 mg by mouth every evening       Cholecalciferol (VITAMIN D) 2000 UNITS CAPS capsule Take 1 capsule by mouth nightly       levothyroxine (SYNTHROID) 25 MCG tablet Take 25 mcg by mouth Daily. No current facility-administered medications for this visit. 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[employer] ,some house work cleaning   Birds :no     SURGICAL HISTORY:   Past Surgical History:   Procedure Laterality Date    ABDOMINAL EXPLORATION SURGERY      APPENDECTOMY      CHOLECYSTECTOMY      COLONOSCOPY  2013    diverticulosis, polyps    COLONOSCOPY  11/10/2016    normal colon    EYE SURGERY Right 12/11/15    cataract removal    EYE SURGERY Left 16    cataract removed    FRACTURE SURGERY      HUMERUS FRACTURE SURGERY Right 2021    HUMERUS OPEN REDUCTION INTERNAL FIXATION performed by Vikas Verduzco MD at [address] N/A 2022    T10-11 THORACIC DECOMPRESSION, DISCECTOMY, FUSION / FIXATION performed by Cherie Caicedo. Karri Coyne MD at [address] [place] [address]  2022    THYROIDECTOMY, PARTIAL Left     TUMOR REMOVAL      off of thyroid, was benign       FAMILY HISTORY:   Lung cancer:no   DVT or PE no     REVIEW OF SYSTEMS:  Constitutional: General health is good . There has been no weight changes. No fevers, fatigue or weakness. Head: Patient denies any history of trauma, convulsive disorder or syncope. Skin:  Patient denies history of changes in pigmentation, eruptions or pruritus. No easy bruising or bleeding. EENT: no nasal congestion   Cardiovascular ,No chest pain ,No edema ,  Respiration:SOB: + ,JACOBS :+  Gastrointestinal:No GI bleed ,no melena  ,no hematemesis    Musculoskeletal: no joint pain ,no swelling  Neurological:no , syncope. Denies twitching, convulsions, loss of consciousness or memory. Endocrine:  . No history of goiter, exophthalmos or dryness of skin. The patient has no history of diabetes. Hematopoietic:  No history of bleeding disorders or easy bruising. Rheumatic:  No connective tissue disease history or polyarthritis/inflammatory joint disease. PHYSICAL EXAMINATION:  Vitals:    06/09/22 0951   BP: 128/74   Pulse: 72   Resp: 16   SpO2: 97%     Constitutional: This is a well developed, well nourished [de-identified]y.o. year old female who is alert, oriented, cooperative and in no apparent distress. Head was normocephalic and atraumatic. EENT: Mallampati class :  Extraocular muscles intact. External canals are patent and no discharge was appreciated. Septum was midline,   mucosa was without erythema, exudates or cobblestoning. No thrush was noted. Neck: Supple without thyromegaly. No elevated JVP. Trachea was midline. No carotid bruits were auscultated. Respiratory: rhonchi     Cardiovascular: Regular without murmur, clicks, gallops or rubs. There is no left or right ventricular heave. Pulses:  Carotid, radial and femoral pulses were equally bilaterally. Abdomen: Slightly rounded and soft without organomegaly. No rebound, rigidity or guarding was appreciated. Lymphatic: No lymphadenopathy. Musculoskeletal: no joint deformity   Extremities: arthritius  Skin:  Warm and dry. Good color, turgor and pigmentation.  No lesions or scars. Neurological/Psychiatric: The patient's general behavior, level of consciousness, thought content and emotional status is normal.  Cranial nerves II-XII are intact. DATA:         IMPRESSION:    1-Lung nodules  2-Possible ILD   3-JACOBS   4Asthma              PLAN:      -PFT no COPD   -will need repeat PFT and will get follow up CT as CT 2020 shows new WILMAR nodule and was not there ,also if we look at CT 2015 was clean ,hoevere 2019 CT start showing some septal thickening and possible effusion with some interstitial   -keep inhalers   -6 minutes   _ HRCT   _ on MTX 10 mg once weekly ,so will make sure nodule resolved ,followed by Rheumatologist       Flu and Pneumovax     Thank you for allowing me to participate in [facility]. I will keep following with you ,should you have any concerns ,please contact us at [facility] pulmonary office     Sincerely,        Jas Jackson MD  Pulmonary & Critical Care Medicine     NOTE: This report was transcribed using voice recognition software. Every effort was made to ensure accuracy; however, inadvertent computerized transcription errors may be present.

## 2022-06-20 ENCOUNTER — HOSPITAL ENCOUNTER (OUTPATIENT)
Dept: GENERAL RADIOLOGY | Age: 82
Discharge: HOME | End: 2022-06-20
Payer: COMMERCIAL

## 2022-06-20 DIAGNOSIS — M81.0 SENILE OSTEOPOROSIS: ICD-10-CM

## 2022-06-20 PROCEDURE — 77080 DXA BONE DENSITY AXIAL: CPT

## 2022-06-29 ENCOUNTER — OFFICE VISIT (OUTPATIENT)
Dept: FAMILY MEDICINE CLINIC | Age: 82
End: 2022-06-29
Payer: MEDICARE

## 2022-06-29 VITALS
SYSTOLIC BLOOD PRESSURE: 110 MMHG | HEART RATE: 66 BPM | DIASTOLIC BLOOD PRESSURE: 82 MMHG | HEIGHT: 65 IN | OXYGEN SATURATION: 94 % | BODY MASS INDEX: 29.32 KG/M2 | WEIGHT: 176 LBS

## 2022-06-29 DIAGNOSIS — R53.83 OTHER FATIGUE: ICD-10-CM

## 2022-06-29 DIAGNOSIS — R09.82 POST-NASAL DRAINAGE: Primary | ICD-10-CM

## 2022-06-29 DIAGNOSIS — I49.3 FREQUENT PVCS: ICD-10-CM

## 2022-06-29 DIAGNOSIS — I10 BENIGN ESSENTIAL HTN: ICD-10-CM

## 2022-06-29 PROCEDURE — 1090F PRES/ABSN URINE INCON ASSESS: CPT | Performed by: FAMILY MEDICINE

## 2022-06-29 PROCEDURE — 1036F TOBACCO NON-USER: CPT | Performed by: FAMILY MEDICINE

## 2022-06-29 PROCEDURE — 1123F ACP DISCUSS/DSCN MKR DOCD: CPT | Performed by: FAMILY MEDICINE

## 2022-06-29 PROCEDURE — G8427 DOCREV CUR MEDS BY ELIG CLIN: HCPCS | Performed by: FAMILY MEDICINE

## 2022-06-29 PROCEDURE — G8399 PT W/DXA RESULTS DOCUMENT: HCPCS | Performed by: FAMILY MEDICINE

## 2022-06-29 PROCEDURE — G8417 CALC BMI ABV UP PARAM F/U: HCPCS | Performed by: FAMILY MEDICINE

## 2022-06-29 PROCEDURE — 99214 OFFICE O/P EST MOD 30 MIN: CPT | Performed by: FAMILY MEDICINE

## 2022-06-29 RX ORDER — FLUTICASONE PROPIONATE 50 MCG
2 SPRAY, SUSPENSION (ML) NASAL DAILY
Qty: 3 EACH | Refills: 3 | Status: SHIPPED | OUTPATIENT
Start: 2022-06-29

## 2022-06-29 RX ORDER — CALCIUM CARBONATE 500(1250)
1200 TABLET ORAL DAILY
COMMUNITY

## 2022-06-29 ASSESSMENT — ENCOUNTER SYMPTOMS: SHORTNESS OF BREATH: 1

## 2022-06-29 NOTE — PROGRESS NOTES
Chief Complaint   Patient presents with    Nose Problem       HPI:  Marsha Romo is a [de-identified] y.o. (: 1940) here today   for follow up on nasal drainage. HPI  Ongoing issues w/ nasal drainage. Tried flonase. Did not seem to help. Still w/ clear drainage. Lori out of right nostril. Did seem to help. Would rather cont nasal spray instead of add another pill. Did not have much drainage prior. Ongoing issues w/ joint pain. Has f/u w/ rheum. Next appt in approx 2 mo . Lungs have been fair. Seen by pulm not long ago. Has CT planned. Ongoing issues w/ sig fatigue. Will nap early     Patient's medications, allergies, past medical, surgical, social and family histories were reviewed and updated as appropriate. ROS:  Review of Systems   Constitutional: Positive for fatigue. Negative for fever. HENT: Positive for postnasal drip. Respiratory: Positive for shortness of breath. Musculoskeletal: Positive for arthralgias. Prior to Visit Medications    Medication Sig Taking?  Authorizing Provider   calcium carbonate (OSCAL) 500 MG TABS tablet Take 1,200 mg by mouth daily Yes Historical Provider, MD   fluticasone (FLONASE) 50 MCG/ACT nasal spray 2 sprays by Nasal route daily Yes Mayur Jean MD   methotrexate (RHEUMATREX) 2.5 MG chemo tablet Take 4 tablets by mouth once a week Yes Brianna Estevez MD   triamterene-hydroCHLOROthiazide (MAXZIDE-25) 37.5-25 MG per tablet Take 1 tablet by mouth daily Yes Mayur Jean MD   fluticasone-salmeterol (Thorpe Dage) 250-50 MCG/DOSE AEPB USE 1 INHALATION BY MOUTH  EVERY 12 HOURS Yes Bronson Wasserman MD   folic acid (FOLVITE) 1 MG tablet Take 1 mg by mouth daily Yes Historical Provider, MD   atenolol (TENORMIN) 25 MG tablet Take 0.5 tablets by mouth daily Yes Vivek Echeverria MD   PROAIR  (30 Base) MCG/ACT inhaler INHALE 2 PUFFS BY MOUTH  INTO THE LUNGS EVERY 6  HOURS AS NEEDED FOR  WHEEZING OR SHORTNESS OF  BREATH Yes Frantz Marcum Phan Loredo MD   DULoxetine (CYMBALTA) 60 MG extended release capsule Take 120 mg by mouth nightly Before bed  Yes Historical Provider, MD   RESTASIS 0.05 % ophthalmic emulsion  Yes Historical Provider, MD   atorvastatin (LIPITOR) 80 MG tablet Take 80 mg by mouth every evening  Yes Historical Provider, MD   Cholecalciferol (VITAMIN D) 2000 UNITS CAPS capsule Take 1 capsule by mouth nightly  Yes Historical Provider, MD   levothyroxine (SYNTHROID) 25 MCG tablet Take 25 mcg by mouth Daily. Yes Historical Provider, MD       Allergies   Allergen Reactions    Prednisone Other (See Comments)     Causes body to feel on fire    Effexor [Venlafaxine Hcl] Itching and Nausea Only    Fenofibrate Diarrhea    Codeine Nausea And Vomiting    Docosahexaenoic Acid-Epa Nausea And Vomiting     Cannot take Vascepa       OBJECTIVE:    /82   Pulse 66   Ht 5' 5\" (1.651 m)   Wt 176 lb (79.8 kg)   SpO2 94%   BMI 29.29 kg/m²     BP Readings from Last 2 Encounters:   06/29/22 110/82   06/09/22 128/74       Wt Readings from Last 3 Encounters:   06/29/22 176 lb (79.8 kg)   06/09/22 175 lb (79.4 kg)   05/17/22 186 lb (84.4 kg)       Physical Exam  Constitutional:       Appearance: She is well-developed. HENT:      Head: Normocephalic and atraumatic. Right Ear: Hearing normal.      Left Ear: Hearing normal.   Eyes:      Conjunctiva/sclera: Conjunctivae normal.   Neck:      Trachea: No tracheal deviation. Cardiovascular:      Rate and Rhythm: Normal rate and regular rhythm. Pulmonary:      Effort: Pulmonary effort is normal.      Breath sounds: Decreased breath sounds present. Abdominal:      Palpations: Abdomen is soft. Tenderness: There is no abdominal tenderness. Skin:     General: Skin is warm and dry. Neurological:      Mental Status: She is alert and oriented to person, place, and time. Psychiatric:         Mood and Affect: Mood normal.         Behavior: Behavior normal.           ASSESSMENT/PLAN:     1. Post-nasal drainage  Did ok w/ flonase. Cont for now. Refill sent. Discussed atrovent or astelin as possibilities as well. - fluticasone (FLONASE) 50 MCG/ACT nasal spray; 2 sprays by Nasal route daily  Dispense: 3 each; Refill: 3    2. Other fatigue  Suspect multifactorial.  Pt desires check labs as below. May be related to atenolol as well. Will try dec atenolol to every other day. Monitor bp closely. Also monitor for pvc's/ palpitations. Call in 2-3 weeks w/ response to changes and as to whether fatigue improves. - Testosterone, free, total; Future    3. Frequent PVCs  See above. Hx of pvc/ bigemeny. Overall seems nsr. If inc pvc, resume atenolol. 4. Benign essential HTN  See above. bp controlled. Inc fatigue. Last labs ok.   Resume daily atenolol if bp goes up in interim

## 2022-07-12 ENCOUNTER — HOSPITAL ENCOUNTER (OUTPATIENT)
Dept: CT IMAGING | Age: 82
Discharge: HOME | End: 2022-07-12
Payer: COMMERCIAL

## 2022-07-12 DIAGNOSIS — J84.9 ILD (INTERSTITIAL LUNG DISEASE) (HCC): ICD-10-CM

## 2022-07-12 PROCEDURE — 71250 CT THORAX DX C-: CPT

## 2022-07-23 ENCOUNTER — OFFICE VISIT (OUTPATIENT)
Dept: ORTHOPEDIC SURGERY | Age: 82
End: 2022-07-23
Payer: MEDICARE

## 2022-07-23 VITALS — BODY MASS INDEX: 29.32 KG/M2 | WEIGHT: 176 LBS | HEIGHT: 65 IN

## 2022-07-23 DIAGNOSIS — M79.621 PAIN IN RIGHT UPPER ARM: Primary | ICD-10-CM

## 2022-07-23 PROCEDURE — G8399 PT W/DXA RESULTS DOCUMENT: HCPCS | Performed by: PHYSICIAN ASSISTANT

## 2022-07-23 PROCEDURE — 1123F ACP DISCUSS/DSCN MKR DOCD: CPT | Performed by: PHYSICIAN ASSISTANT

## 2022-07-23 PROCEDURE — MISC295 DJO ARM SLING WITH SWATHE: Performed by: PHYSICIAN ASSISTANT

## 2022-07-23 PROCEDURE — 99213 OFFICE O/P EST LOW 20 MIN: CPT | Performed by: PHYSICIAN ASSISTANT

## 2022-07-23 PROCEDURE — 1090F PRES/ABSN URINE INCON ASSESS: CPT | Performed by: PHYSICIAN ASSISTANT

## 2022-07-23 PROCEDURE — 1036F TOBACCO NON-USER: CPT | Performed by: PHYSICIAN ASSISTANT

## 2022-07-23 PROCEDURE — G8427 DOCREV CUR MEDS BY ELIG CLIN: HCPCS | Performed by: PHYSICIAN ASSISTANT

## 2022-07-23 PROCEDURE — G8417 CALC BMI ABV UP PARAM F/U: HCPCS | Performed by: PHYSICIAN ASSISTANT

## 2022-07-23 NOTE — PROGRESS NOTES
This dictation was done with Dragon dictation and may contain mechanical errors related to translation. I have today reviewed with Rip Whitfield the clinically relevant, past medical history, medications, allergies, family history, social history, and Review Of Systems form the patients most recent history form & I have documented any details relevant to today's presenting complaints in my history below. Ms. Kyle Gillette self-reported past medical history, medications, allergies, family history, social history, and Review Of Systems form has been scanned into the chart under the \"Media\" tab. Subjective:  Rip Whitfield is a [de-identified] y.o. who is here with acute injury to her right arm. This is the arm that she had fallen at her house, sustained distal humerus fracture and had a good ORIF surgery on 2/28/2021 by Dr. Leela Peacock. Postoperatively she had a radial nerve palsy that completely resolved over time and she was very functional up until about 3 days ago where she fell at her house stretching and rotating her arm to avoid hitting a coffee table and now has pain when she moves her arm in certain ways.   Neurologically though the radial nerve is intact and she has good motor and sensory function including good wrist extension strength and thumb extension strength      Patient Active Problem List   Diagnosis    RA (rheumatoid arthritis) ([facility]    Benign essential HTN    Asthma    Hyperlipidemia    Pleuritic chest pain    Chest pain on breathing    Pulmonary nodules    Chronic rhinitis    COPD (chronic obstructive pulmonary disease) (HCC)    Bronchitis    Hypothyroidism    COPD exacerbation (HCC)    Elevated d-dimer    Personal history of immunosupression therapy    Atelectasis    Chest congestion    Mucus plugging of bronchi    History of rheumatoid arthritis    History of tobacco abuse    Pyuria    History of humerus fracture    Obesity    Right supracondylar humerus fracture    Closed supracondylar fracture of right humerus    Hyponatremia    Bradycardia    Fatigue    Frequent PVCs    Hypernatremia    Closed avulsion fracture of distal end of right fibula with routine healing    Fracture of fibula alone    Thoracic disc herniation    S/P fusion of thoracic spine           Current Outpatient Medications on File Prior to Visit   Medication Sig Dispense Refill    calcium carbonate (OSCAL) 500 MG TABS tablet Take 1,200 mg by mouth daily      fluticasone (FLONASE) 50 MCG/ACT nasal spray 2 sprays by Nasal route daily 3 each 3    methotrexate (RHEUMATREX) 2.5 MG chemo tablet Take 4 tablets by mouth once a week 52 tablet 0    triamterene-hydroCHLOROthiazide (MAXZIDE-25) 37.5-25 MG per tablet Take 1 tablet by mouth daily 90 tablet 1    fluticasone-salmeterol (WIXELA INHUB) 250-50 MCG/DOSE AEPB USE 1 INHALATION BY MOUTH  EVERY 12 HOURS 439 each 0    folic acid (FOLVITE) 1 MG tablet Take 1 mg by mouth daily      atenolol (TENORMIN) 25 MG tablet Take 0.5 tablets by mouth daily 30 tablet 3    PROAIR  (90 Base) MCG/ACT inhaler INHALE 2 PUFFS BY MOUTH  INTO THE LUNGS EVERY 6  HOURS AS NEEDED FOR  WHEEZING OR SHORTNESS OF  BREATH 34 g 1    DULoxetine (CYMBALTA) 60 MG extended release capsule Take 120 mg by mouth nightly Before bed       RESTASIS 0.05 % ophthalmic emulsion       atorvastatin (LIPITOR) 80 MG tablet Take 80 mg by mouth every evening       Cholecalciferol (VITAMIN D) 2000 UNITS CAPS capsule Take 1 capsule by mouth nightly       levothyroxine (SYNTHROID) 25 MCG tablet Take 25 mcg by mouth Daily. No current facility-administered medications on file prior to visit. Objective:   Height 5' 5\" (1.651 m), weight 176 lb (79.8 kg), not currently breastfeeding. On examination is a very pleasant 53-year-old female. She is alert and orient x3 she has 0 to 150 degrees of motion with the elbow but has pain going into internal and external rotation and rotating outwardly or extending the arm.   The pain is in the distal aspect and it does not seem to radiate down. Neuro exam grossly intact both lower extremities. Intact sensation to light touch. Motor exam 4+ to 5/5 in all major motor groups. Negative Prather's sign. Skin is warm, dry and intact with out erythema or significant increased temperature around the knee joint(s). There are no cutaneous lesions or lymphadenopathy present. X-RAYS:  X-rays taken the office today show that the fixation has 2 plates and the proximal aspect has screws that have been broken and the plate is somewhat off the bone proximally. Fracture line is apparent and may be slightly larger than postop films where it looks to be well healed. Assessment:  Acute injury of a previous ORIF humerus distal fracture    Plan:  During today's visit, there was approximately 30 minutes of face-to-face discussion in regards to the patient's current condition and treatment options. More than 50 % of the time was counseling and coordination of care as indicated above. At this point I am going to place her in a sling to rest the arm I encouraged her to work on flexion and extension but not a lot of rotation and not to use the arm for any type of weightbearing. I recommend that she follow-up with [facility] sometime in the next week or 2 for reevaluation. I am reassured by the fact that she is neurologically intact and actually has decent function with flexion and extension. PROCEDURE NOTE:  No orders of the defined types were placed in this encounter. A sling was applied      Procedures    DJO Arm Sling with Swathe $25     Patient was supplied a DJO Arm Sling with Swathe. This retail item was supplied to provide functional support and assist in protecting the affected area. Verbal and written instructions for the use of and application of this item were provided.   The patient was educated and fit by a healthcare professional with expert knowledge and specialization in brace application. They were instructed to contact the office immediately should the equipment result in increased pain, decreased sensation, increased swelling or worsening of the condition.            They will schedule a follow up in 7 to 10 days

## 2022-07-27 ENCOUNTER — HOSPITAL ENCOUNTER (OUTPATIENT)
Age: 82
Discharge: HOME | End: 2022-07-27
Payer: MEDICARE

## 2022-07-27 DIAGNOSIS — R53.83 OTHER FATIGUE: ICD-10-CM

## 2022-07-27 DIAGNOSIS — Z79.899 HIGH RISK MEDICATION USE: ICD-10-CM

## 2022-07-27 LAB
ALT SERPL-CCNC: 19 U/L (ref 10–40)
AST SERPL-CCNC: 26 U/L (ref 15–37)
BASOPHILS ABSOLUTE: 0 K/UL (ref 0–0.2)
BASOPHILS RELATIVE PERCENT: 0.7 %
C-REACTIVE PROTEIN: <3 MG/L (ref 0–5.1)
CREAT SERPL-MCNC: 0.8 MG/DL (ref 0.6–1.2)
EOSINOPHILS ABSOLUTE: 0.2 K/UL (ref 0–0.6)
EOSINOPHILS RELATIVE PERCENT: 3.4 %
GFR AFRICAN AMERICAN: >60
GFR NON-AFRICAN AMERICAN: >60
HCT VFR BLD CALC: 40.9 % (ref 36–48)
HEMOGLOBIN: 13.4 G/DL (ref 12–16)
LYMPHOCYTES ABSOLUTE: 1.5 K/UL (ref 1–5.1)
LYMPHOCYTES RELATIVE PERCENT: 23.7 %
MCH RBC QN AUTO: 31.2 PG (ref 26–34)
MCHC RBC AUTO-ENTMCNC: 32.7 G/DL (ref 31–36)
MCV RBC AUTO: 95.3 FL (ref 80–100)
MONOCYTES ABSOLUTE: 0.5 K/UL (ref 0–1.3)
MONOCYTES RELATIVE PERCENT: 8.6 %
NEUTROPHILS ABSOLUTE: 3.9 K/UL (ref 1.7–7.7)
NEUTROPHILS RELATIVE PERCENT: 63.6 %
PDW BLD-RTO: 15.4 % (ref 12.4–15.4)
PLATELET # BLD: 179 K/UL (ref 135–450)
PMV BLD AUTO: 9.3 FL (ref 5–10.5)
RBC # BLD: 4.29 M/UL (ref 4–5.2)
WBC # BLD: 6.2 K/UL (ref 4–11)

## 2022-07-27 PROCEDURE — 84403 ASSAY OF TOTAL TESTOSTERONE: CPT

## 2022-07-27 PROCEDURE — 84450 TRANSFERASE (AST) (SGOT): CPT

## 2022-07-27 PROCEDURE — 84460 ALANINE AMINO (ALT) (SGPT): CPT

## 2022-07-27 PROCEDURE — 36415 COLL VENOUS BLD VENIPUNCTURE: CPT

## 2022-07-27 PROCEDURE — 85652 RBC SED RATE AUTOMATED: CPT

## 2022-07-27 PROCEDURE — 82565 ASSAY OF CREATININE: CPT

## 2022-07-27 PROCEDURE — 84270 ASSAY OF SEX HORMONE GLOBUL: CPT

## 2022-07-27 PROCEDURE — 85025 COMPLETE CBC W/AUTO DIFF WBC: CPT

## 2022-07-27 PROCEDURE — 86140 C-REACTIVE PROTEIN: CPT

## 2022-07-29 LAB
SEX HORMONE BINDING GLOBULIN: 46 NMOL/L (ref 30–135)
TESTOSTERONE FREE-NONMALE: 25.7 PG/ML (ref 0.6–3.8)
TESTOSTERONE TOTAL: 169 NG/DL (ref 20–70)

## 2022-07-30 LAB
REASON FOR REJECTION: NORMAL
REJECTED TEST: NORMAL

## 2022-08-01 NOTE — RESULT ENCOUNTER NOTE
Testosterone was somewhat elevated. While these list my name, I suspect these were ordered by Dr. Emily Newman.   Please make sure addressed

## 2022-08-11 ENCOUNTER — OFFICE VISIT (OUTPATIENT)
Dept: ORTHOPEDIC SURGERY | Age: 82
End: 2022-08-11
Payer: MEDICARE

## 2022-08-11 VITALS — WEIGHT: 176 LBS | BODY MASS INDEX: 29.32 KG/M2 | HEIGHT: 65 IN

## 2022-08-11 DIAGNOSIS — Z98.890 STATUS POST OPEN REDUCTION AND INTERNAL FIXATION (ORIF) OF FRACTURE: Primary | ICD-10-CM

## 2022-08-11 DIAGNOSIS — Z87.81 STATUS POST OPEN REDUCTION AND INTERNAL FIXATION (ORIF) OF FRACTURE: Primary | ICD-10-CM

## 2022-08-11 PROCEDURE — 99214 OFFICE O/P EST MOD 30 MIN: CPT | Performed by: ORTHOPAEDIC SURGERY

## 2022-08-11 PROCEDURE — G8399 PT W/DXA RESULTS DOCUMENT: HCPCS | Performed by: ORTHOPAEDIC SURGERY

## 2022-08-11 PROCEDURE — 1123F ACP DISCUSS/DSCN MKR DOCD: CPT | Performed by: ORTHOPAEDIC SURGERY

## 2022-08-11 PROCEDURE — G8417 CALC BMI ABV UP PARAM F/U: HCPCS | Performed by: ORTHOPAEDIC SURGERY

## 2022-08-11 PROCEDURE — 1036F TOBACCO NON-USER: CPT | Performed by: ORTHOPAEDIC SURGERY

## 2022-08-11 PROCEDURE — G8427 DOCREV CUR MEDS BY ELIG CLIN: HCPCS | Performed by: ORTHOPAEDIC SURGERY

## 2022-08-11 PROCEDURE — 1090F PRES/ABSN URINE INCON ASSESS: CPT | Performed by: ORTHOPAEDIC SURGERY

## 2022-08-11 NOTE — PROGRESS NOTES
Chief Complaint    Shoulder Pain (F/U RIGHT HUMERUS)      History of Present Illness:  Alyssa Cameron is a pleasant, [de-identified] y.o., female, here today for follow up, 18 months after right distal humerus open reduction trial fixation of bicondylar plating. She comes in for follow up today with acute onset right distal humerus pain. She began having pain 3 weeks ago with no injury. Today her pain is Sharp, 8/10 worse with movement. She was evaluated at our after hours clinic where she was given a sling which has helped improve her discomfort. Of note, she did have a postop radial nerve palsy which is essentially resolved at this point. She states she had no pain and was doing well prior to pain that began 7/23/22. Medical History:  Patient's medications, allergies, past medical, surgical, social and family histories were reviewed and updated as appropriate. No notes on file      Review of Systems  A 14 point review of systems was completed by the patient  and is available in the media section of the scanned medical record and was reviewed on 8/11/2022. The review is negative with the exception of those things mentioned in the HPI and Past Medical History    Vital Signs: There were no vitals filed for this visit. General/Appearance: Alert and oriented and in no apparent distress. Skin:  There are no skin lesions, cellulitis, or extreme edema. The patient has warm and well-perfused Bilateral upper extremities with brisk capillary refill. RIGHT Shoulder Exam:  Inspection:  No gross deformities, no signs of infection. Palpation: She is Tender at the medial and lateral epicondyle. Active Range of Motion: Forward Elevation 170, Abduction 170, External Rotation 30, Internal Rotation back pocket    Passive Range of Motion: SAME    Strength:  not tested    Special Tests:   No Parviz muscle deformity.     Neurovascular: intact fully AIN/PIN/ulnar    Radiology:     Plain radiographs of the right humerus questions or concerns. Beth Corral is in agreement with this plan. Sincerely,  [employer], PA-  On behalf of:    Denice Duke  [address] [facility]   [address], [address]  Email: [email or web address]  Office: [phone]    08/11/22  5:19 PM      The encounter with Alyssa Cameron was carried out by myself, Dr Caro Palacios, who personally examined the patient and reviewed the plan. This dictation was performed with a verbal recognition program (DRAGON) and it was checked for errors. It is possible that there are still dictated errors within this office note. If so, please bring any errors to my attention for an addendum. All efforts were made to ensure that this office note is accurate.

## 2022-08-11 NOTE — LETTER
[facility]  [address] [facility] [address]  Phone: [phone]  Fax: [phone]    Pat Joiner MD    August 12, 2022     [facility], [address] [place] [id number]    Patient: Jaylene Pringle   MR Number: [id number]   YOB: 1940   Date of Visit: 8/11/2022       Dear [facility]: Thank you for referring Keshia Garcia to me for evaluation/treatment. Below are the relevant portions of my assessment and plan of care. If you have questions, please do not hesitate to call me. I look forward to following Марина Rosenberg along with you.     Sincerely,      Pat Joiner MD

## 2022-08-16 ENCOUNTER — OFFICE VISIT (OUTPATIENT)
Dept: RHEUMATOLOGY | Age: 82
End: 2022-08-16
Payer: MEDICARE

## 2022-08-16 VITALS
BODY MASS INDEX: 29.82 KG/M2 | HEIGHT: 65 IN | DIASTOLIC BLOOD PRESSURE: 76 MMHG | WEIGHT: 179 LBS | SYSTOLIC BLOOD PRESSURE: 118 MMHG

## 2022-08-16 DIAGNOSIS — M15.9 PRIMARY OSTEOARTHRITIS INVOLVING MULTIPLE JOINTS: ICD-10-CM

## 2022-08-16 DIAGNOSIS — Z79.899 HIGH RISK MEDICATION USE: ICD-10-CM

## 2022-08-16 DIAGNOSIS — M85.80 OSTEOPENIA WITH HIGH RISK OF FRACTURE: ICD-10-CM

## 2022-08-16 DIAGNOSIS — E55.9 VITAMIN D DEFICIENCY: ICD-10-CM

## 2022-08-16 DIAGNOSIS — M05.9 SEROPOSITIVE RHEUMATOID ARTHRITIS (HCC): Primary | ICD-10-CM

## 2022-08-16 PROCEDURE — G8427 DOCREV CUR MEDS BY ELIG CLIN: HCPCS | Performed by: INTERNAL MEDICINE

## 2022-08-16 PROCEDURE — 1090F PRES/ABSN URINE INCON ASSESS: CPT | Performed by: INTERNAL MEDICINE

## 2022-08-16 PROCEDURE — 99215 OFFICE O/P EST HI 40 MIN: CPT | Performed by: INTERNAL MEDICINE

## 2022-08-16 PROCEDURE — G8417 CALC BMI ABV UP PARAM F/U: HCPCS | Performed by: INTERNAL MEDICINE

## 2022-08-16 PROCEDURE — G8399 PT W/DXA RESULTS DOCUMENT: HCPCS | Performed by: INTERNAL MEDICINE

## 2022-08-16 PROCEDURE — 1123F ACP DISCUSS/DSCN MKR DOCD: CPT | Performed by: INTERNAL MEDICINE

## 2022-08-16 PROCEDURE — 1036F TOBACCO NON-USER: CPT | Performed by: INTERNAL MEDICINE

## 2022-08-16 RX ORDER — ALENDRONATE SODIUM 70 MG/1
70 TABLET ORAL
Qty: 12 TABLET | Refills: 2 | Status: SHIPPED | OUTPATIENT
Start: 2022-08-16

## 2022-08-16 NOTE — PROGRESS NOTES
epic that was performed last week. OA findings unchanged in her upper or lower extremity joints. Skin: No rashes, no induration or skin thickening or nodules. DATA:   Lab Results   Component Value Date    WBC 6.2 07/27/2022    HGB 13.4 07/27/2022    HCT 40.9 07/27/2022    MCV 95.3 07/27/2022     07/27/2022     Lab Results   Component Value Date     04/25/2022    K 4.3 04/25/2022     04/25/2022    CO2 23 04/25/2022    BUN 16 04/25/2022    CREATININE 0.8 07/27/2022    GLUCOSE 110 (H) 04/25/2022    CALCIUM 9.9 04/25/2022    PROT 6.9 04/25/2022    LABALBU 4.3 04/25/2022    BILITOT 0.5 04/25/2022    ALKPHOS 114 04/25/2022    AST 26 07/27/2022    ALT 19 07/27/2022    LABGLOM >60 07/27/2022    GFRAA >60 07/27/2022    AGRATIO 1.7 04/25/2022    GLOB 2.9 09/04/2021       No results found for: SEDRATE  Lab Results   Component Value Date    CRP <3.0 07/27/2022           ASSESSMENT AND PLAN:  Nicolas Guy was seen today for follow-up. Diagnoses and all orders for this visit:    Seropositive rheumatoid arthritis ([address]  -     methotrexate (RHEUMATREX) 2.5 MG chemo tablet; Take 4 tablets by mouth once a week  -     Cyclic Citrul Peptide Antibody, IgG; Future    Primary osteoarthritis involving multiple joints    High risk medication use    Vitamin D deficiency  -     Vitamin D 25 Hydroxy; Future    Osteopenia with high risk of fracture    Other orders  -     alendronate (FOSAMAX) 70 MG tablet; Take 1 tablet by mouth every 7 days Take with a full glass of water upon arising for the day. Consume on an empty stomach at least 30 minutes before the first food, beverage, or medication. Stay upright (do not lie down) for at least 30 minutes. Do not crush or break. Seropositive rheumatoid arthritis (RF>240)-acceptable control, continue methotrexate 4 tablets weekly. Check safety labs in 3 months, current safety labs are stable. Call with any flares.     Generalized osteoarthritis-generalized migratory joint pain, stable, continue Cymbalta and Tylenol arthritis. Osteopenia with high FRAX score-  DEXA scan 5/2022-L-spine 0.5, left hip -1.6, left femoral neck -2. Calculated FRAX score major osteoporotic fracture 34%, hip fracture 26%. Recommend pharmacological therapy. Various options were discussed, patient would like to try oral alendronate. Prescription sent to the pharmacy, side effects, directions, monitoring explained. Continue calcium and vitamin D supplementation. Other medical comorbidities-COPD, hypertension, hyperlipidemia and hypothyroidism. Call with any questions or concerns, follow-up in 3 months. #################################################################################################  Patient indicates understanding and agrees with the management plan. Total time >40 minutes that includes the following-  Preparing to see the patient such as reviewing patients records, pre-charting, preparing the visit on the same day, performing a medically appropriate history and physical examination, counseling and educating patient about diagnosis, management plan, ordering appropriate testings, prescriptions, communicating findings to other care providers, and documenting clinical information in electronic medical record. I thank you for giving me the opportunity to be involved in [address] [place] [address] and I look forward following [facility] along with you. If you have any questions or concerns please feel free to contact me at any time.   Cherelle Amaya MD 08/16/22

## 2022-08-18 ENCOUNTER — TELEPHONE (OUTPATIENT)
Dept: ORTHOPEDIC SURGERY | Age: 82
End: 2022-08-18

## 2022-08-18 NOTE — TELEPHONE ENCOUNTER
S/w Nilesh Nelsonly  regarding CT Right Humerus approval and authorization being valid until 08/11/2023. Patient was instructed that their CT needs to be scheduled at [facility]. The patient was instructed to contact the facility to schedule  at [phone]. A follow up appointment will need to be scheduled to review the results and treatment plan. The patient has elected to contact the office at a later time to schedule a follow up appointment.

## 2022-08-25 ENCOUNTER — OFFICE VISIT (OUTPATIENT)
Dept: FAMILY MEDICINE CLINIC | Age: 82
End: 2022-08-25
Payer: MEDICARE

## 2022-08-25 VITALS
SYSTOLIC BLOOD PRESSURE: 126 MMHG | HEART RATE: 68 BPM | DIASTOLIC BLOOD PRESSURE: 84 MMHG | WEIGHT: 173 LBS | OXYGEN SATURATION: 96 % | BODY MASS INDEX: 28.82 KG/M2 | HEIGHT: 65 IN

## 2022-08-25 DIAGNOSIS — M05.719 RHEUMATOID ARTHRITIS INVOLVING SHOULDER WITH POSITIVE RHEUMATOID FACTOR, UNSPECIFIED LATERALITY (HCC): Primary | ICD-10-CM

## 2022-08-25 DIAGNOSIS — R73.09 ABNORMAL GLUCOSE: ICD-10-CM

## 2022-08-25 DIAGNOSIS — E78.2 MIXED HYPERLIPIDEMIA: ICD-10-CM

## 2022-08-25 DIAGNOSIS — Z98.1 S/P FUSION OF THORACIC SPINE: ICD-10-CM

## 2022-08-25 DIAGNOSIS — I10 BENIGN ESSENTIAL HTN: ICD-10-CM

## 2022-08-25 PROCEDURE — G8399 PT W/DXA RESULTS DOCUMENT: HCPCS | Performed by: FAMILY MEDICINE

## 2022-08-25 PROCEDURE — 1090F PRES/ABSN URINE INCON ASSESS: CPT | Performed by: FAMILY MEDICINE

## 2022-08-25 PROCEDURE — G8417 CALC BMI ABV UP PARAM F/U: HCPCS | Performed by: FAMILY MEDICINE

## 2022-08-25 PROCEDURE — 1036F TOBACCO NON-USER: CPT | Performed by: FAMILY MEDICINE

## 2022-08-25 PROCEDURE — 1123F ACP DISCUSS/DSCN MKR DOCD: CPT | Performed by: FAMILY MEDICINE

## 2022-08-25 PROCEDURE — 99214 OFFICE O/P EST MOD 30 MIN: CPT | Performed by: FAMILY MEDICINE

## 2022-08-25 PROCEDURE — G8427 DOCREV CUR MEDS BY ELIG CLIN: HCPCS | Performed by: FAMILY MEDICINE

## 2022-08-25 RX ORDER — TRAMADOL HYDROCHLORIDE 50 MG/1
50 TABLET ORAL EVERY 8 HOURS PRN
Qty: 20 TABLET | Refills: 0 | Status: SHIPPED | OUTPATIENT
Start: 2022-08-25 | End: 2022-09-01

## 2022-08-25 ASSESSMENT — ENCOUNTER SYMPTOMS: SHORTNESS OF BREATH: 0

## 2022-08-25 NOTE — PROGRESS NOTES
Chief Complaint   Patient presents with    Hyperlipidemia       HPI:  Melida Shen is a [de-identified] y.o. (: 1940) here today   for   Hyperlipidemia  This is a chronic problem. The current episode started more than 1 year ago. Pertinent negatives include no shortness of breath. Current antihyperlipidemic treatment includes statins. There are no compliance problems. Risk factors for coronary artery disease include dyslipidemia and post-menopausal.     Seen by ortho again approx 2 weeks ago. Did not have new injury. Seen at ortho urgent care approx 1 mo ago due to pain. Placed in sling. Seen by ortho approx 2 weeks ago. Prior ORIF of right humerus approx 18  mo ago. Pain improved w/ sling in place. Plan in CT to assess fracture line/ healing. Has been frustrated w/ current issue. Had recent labs by rheum. Bp has been well controlled. Pasha meds. Was placed on fosamax. Debating due to concerns re SE. Last rx for tramadol approx 4 mo ago. Pain worse recently re arm. Back pain worse due to gait changes. Prior rx per ortho. Patient's medications, allergies, past medical, surgical, social and family histories were reviewed and updated as appropriate. ROS:  Review of Systems   Constitutional:  Negative for fever. Respiratory:  Negative for shortness of breath. Musculoskeletal:  Positive for arthralgias. Hemoglobin A1C (%)   Date Value   2022 6.0     Microscopic Examination (no units)   Date Value   2020 YES     LDL Calculated (mg/dL)   Date Value   2022 82       Past Medical History:   Diagnosis Date    Asthma     COPD (chronic obstructive pulmonary disease) (HCC)     Hyperlipidemia     Hypertension     RA (rheumatoid arthritis) ([facility] [address]     Thyroid disease     has half of her thyroid       Family History   Problem Relation Age of Onset    High Blood Pressure Mother        Social History     Socioeconomic History    Marital status:       Spouse name: Not on file    Number of children: Not on file    Years of education: Not on file    Highest education level: Not on file   Occupational History    Not on file   Tobacco Use    Smoking status: Former     Packs/day: 0.50     Years: 20.00     Pack years: 10.00     Types: Cigarettes     Start date: 1959     Quit date: 1987     Years since quittin.6    Smokeless tobacco: Never   Vaping Use    Vaping Use: Never used   Substance and Sexual Activity    Alcohol use: No     Alcohol/week: 0.0 standard drinks    Drug use: No    Sexual activity: Not Currently     Partners: Male   Other Topics Concern    Not on file   Social History Narrative    Not on file     Social Determinants of Health     Financial Resource Strain: Low Risk     Difficulty of Paying Living Expenses: Not very hard   Food Insecurity: No Food Insecurity    Worried About Running Out of Food in the Last Year: Never true    Ran Out of Food in the Last Year: Never true   Transportation Needs: Not on file   Physical Activity: Inactive    Days of Exercise per Week: 0 days    Minutes of Exercise per Session: 0 min   Stress: Not on file   Social Connections: Not on file   Intimate Partner Violence: Not on file   Housing Stability: Not on file       Prior to Visit Medications    Medication Sig Taking? Authorizing Provider   traMADol (ULTRAM) 50 MG tablet Take 1 tablet by mouth every 8 hours as needed for Pain for up to 7 days. Yes Geronimo Santiago MD   methotrexate (RHEUMATREX) 2.5 MG chemo tablet Take 4 tablets by mouth once a week Yes Maida Leroy MD   alendronate (FOSAMAX) 70 MG tablet Take 1 tablet by mouth every 7 days Take with a full glass of water upon arising for the day. Consume on an empty stomach at least 30 minutes before the first food, beverage, or medication. Stay upright (do not lie down) for at least 30 minutes. Do not crush or break.  Yes Maida Leroy MD   calcium carbonate (OSCAL) 500 MG TABS tablet Take 1,200 mg by mouth daily Yes Historical Provider, MD   fluticasone (FLONASE) 50 MCG/ACT nasal spray 2 sprays by Nasal route daily Yes Juana Alvarado MD   triamterene-hydroCHLOROthiazide (MAXZIDE-25) 37.5-25 MG per tablet Take 1 tablet by mouth daily Yes Juana Alvarado MD   fluticasone-salmeterol (Navneet Franci) 250-50 MCG/DOSE AEPB USE 1 INHALATION BY MOUTH  EVERY 12 HOURS Yes Sumanth Ga MD   folic acid (FOLVITE) 1 MG tablet Take 1 mg by mouth daily Yes Historical Provider, MD   atenolol (TENORMIN) 25 MG tablet Take 0.5 tablets by mouth daily Yes Bee Flores MD   PROAIR  (33 Base) MCG/ACT inhaler INHALE 2 PUFFS BY MOUTH  INTO THE LUNGS EVERY 6  HOURS AS NEEDED FOR  WHEEZING OR Dellis Fare Yes Ricardo Wiseman MD   DULoxetine (CYMBALTA) 60 MG extended release capsule Take 120 mg by mouth nightly Before bed  Yes Historical Provider, MD   RESTASIS 0.05 % ophthalmic emulsion  Yes Historical Provider, MD   atorvastatin (LIPITOR) 80 MG tablet Take 80 mg by mouth every evening  Yes Historical Provider, MD   Cholecalciferol (VITAMIN D) 2000 UNITS CAPS capsule Take 1 capsule by mouth nightly  Yes Historical Provider, MD   levothyroxine (SYNTHROID) 25 MCG tablet Take 25 mcg by mouth Daily. Yes Historical Provider, MD       Allergies   Allergen Reactions    Prednisone Other (See Comments)     Causes body to feel on fire    Effexor [Venlafaxine Hcl] Itching and Nausea Only    Fenofibrate Diarrhea    Codeine Nausea And Vomiting    Docosahexaenoic Acid-Epa Nausea And Vomiting     Cannot take Vascepa       OBJECTIVE:    /84   Pulse 68   Ht 5' 5\" (1.651 m)   Wt 173 lb (78.5 kg)   SpO2 96%   BMI 28.79 kg/m²     BP Readings from Last 2 Encounters:   08/25/22 126/84   08/16/22 118/76       Wt Readings from Last 3 Encounters:   08/25/22 173 lb (78.5 kg)   08/16/22 179 lb (81.2 kg)   08/11/22 176 lb (79.8 kg)       Physical Exam  Constitutional:       Appearance: She is well-developed.    HENT:      Head: Normocephalic and atraumatic. Right Ear: Hearing normal.      Left Ear: Hearing normal.   Eyes:      Conjunctiva/sclera: Conjunctivae normal.   Neck:      Trachea: No tracheal deviation. Cardiovascular:      Rate and Rhythm: Normal rate and regular rhythm. Pulmonary:      Effort: Pulmonary effort is normal.      Breath sounds: Decreased breath sounds and rhonchi present. Abdominal:      Palpations: Abdomen is soft. Tenderness: There is no abdominal tenderness. Skin:     General: Skin is warm and dry. Neurological:      Mental Status: She is alert and oriented to person, place, and time. Psychiatric:         Mood and Affect: Mood normal.         Behavior: Behavior normal.         ASSESSMENT/PLAN:    1. Rheumatoid arthritis involving shoulder with positive rheumatoid factor, unspecified laterality ([facility] [address]  Sig pain issues related to back, RA, humerus issue as listed. Prn meds as listed. Reviewed oarrs. No worrisome finding or excessive use  - traMADol (ULTRAM) 50 MG tablet; Take 1 tablet by mouth every 8 hours as needed for Pain for up to 7 days. Dispense: 20 tablet; Refill: 0    2. Abnormal glucose  Rpt w/ next labs approx november  - Hemoglobin A1C; Future    3. Mixed hyperlipidemia  Rpt around november  - Lipid Panel; Future  - Comprehensive Metabolic Panel; Future    4. S/P fusion of thoracic spine  Aggravated by gait changes w/ walker usage w/ right arm issue. Prn med. - traMADol (ULTRAM) 50 MG tablet; Take 1 tablet by mouth every 8 hours as needed for Pain for up to 7 days. Dispense: 20 tablet; Refill: 0    5. Benign essential HTN  The current medical regimen is effective;  continue present plan and medications. F/u w/ issues re prior orif right humerus.

## 2022-09-01 ENCOUNTER — HOSPITAL ENCOUNTER (OUTPATIENT)
Age: 82
Discharge: HOME | End: 2022-09-01
Payer: MEDICARE

## 2022-09-01 DIAGNOSIS — M05.9 SEROPOSITIVE RHEUMATOID ARTHRITIS (HCC): ICD-10-CM

## 2022-09-01 DIAGNOSIS — E55.9 VITAMIN D DEFICIENCY: ICD-10-CM

## 2022-09-01 DIAGNOSIS — Z87.81 STATUS POST OPEN REDUCTION AND INTERNAL FIXATION (ORIF) OF FRACTURE: ICD-10-CM

## 2022-09-01 DIAGNOSIS — Z98.890 STATUS POST OPEN REDUCTION AND INTERNAL FIXATION (ORIF) OF FRACTURE: ICD-10-CM

## 2022-09-01 LAB
C-REACTIVE PROTEIN: <3 MG/L (ref 0–5.1)
SEDIMENTATION RATE, ERYTHROCYTE: 28 MM/HR (ref 0–30)
VITAMIN D 25-HYDROXY: 96.2 NG/ML

## 2022-09-01 PROCEDURE — 36415 COLL VENOUS BLD VENIPUNCTURE: CPT

## 2022-09-01 PROCEDURE — 86140 C-REACTIVE PROTEIN: CPT

## 2022-09-01 PROCEDURE — 82306 VITAMIN D 25 HYDROXY: CPT

## 2022-09-01 PROCEDURE — 85652 RBC SED RATE AUTOMATED: CPT

## 2022-09-08 ENCOUNTER — HOSPITAL ENCOUNTER (OUTPATIENT)
Dept: CT IMAGING | Age: 82
Discharge: HOME | End: 2022-09-08
Payer: MEDICARE

## 2022-09-08 DIAGNOSIS — Z87.81 STATUS POST OPEN REDUCTION AND INTERNAL FIXATION (ORIF) OF FRACTURE: ICD-10-CM

## 2022-09-08 DIAGNOSIS — Z98.890 STATUS POST OPEN REDUCTION AND INTERNAL FIXATION (ORIF) OF FRACTURE: ICD-10-CM

## 2022-09-08 PROCEDURE — 73200 CT UPPER EXTREMITY W/O DYE: CPT

## 2022-09-14 ENCOUNTER — TELEPHONE (OUTPATIENT)
Dept: ORTHOPEDIC SURGERY | Age: 82
End: 2022-09-14

## 2022-09-14 DIAGNOSIS — Z98.890 STATUS POST OPEN REDUCTION AND INTERNAL FIXATION (ORIF) OF FRACTURE: Primary | ICD-10-CM

## 2022-09-14 DIAGNOSIS — Z87.81 STATUS POST OPEN REDUCTION AND INTERNAL FIXATION (ORIF) OF FRACTURE: Primary | ICD-10-CM

## 2022-09-14 RX ORDER — FLUTICASONE PROPIONATE AND SALMETEROL 250; 50 UG/1; UG/1
POWDER RESPIRATORY (INHALATION)
Qty: 180 EACH | Refills: 1 | Status: SHIPPED | OUTPATIENT
Start: 2022-09-14

## 2022-09-14 NOTE — TELEPHONE ENCOUNTER
S/w patient. She is aware of referral to new MD at  (Dr. Edward Locke) and why. She was given number to call to schedule appointment. Referral information faxed to Dr. Rochelle Ferrera office. All questions answered. Patient's appointment from 9/22/22 with Dr. Briseida Cruz cancelled.

## 2022-09-19 RX ORDER — FOLIC ACID 1 MG/1
1 TABLET ORAL DAILY
Qty: 90 TABLET | Refills: 2 | Status: SHIPPED | OUTPATIENT
Start: 2022-09-19

## 2022-10-07 DIAGNOSIS — M05.9 SEROPOSITIVE RHEUMATOID ARTHRITIS (HCC): ICD-10-CM

## 2022-10-08 DIAGNOSIS — Z79.899 HIGH RISK MEDICATION USE: Primary | ICD-10-CM

## 2022-10-31 RX ORDER — TRIAMTERENE AND HYDROCHLOROTHIAZIDE 37.5; 25 MG/1; MG/1
TABLET ORAL
Qty: 90 TABLET | Refills: 1 | Status: SHIPPED | OUTPATIENT
Start: 2022-10-31

## 2022-11-07 ENCOUNTER — OFFICE VISIT (OUTPATIENT)
Dept: FAMILY MEDICINE CLINIC | Age: 82
End: 2022-11-07
Payer: MEDICARE

## 2022-11-07 VITALS
SYSTOLIC BLOOD PRESSURE: 110 MMHG | WEIGHT: 172 LBS | OXYGEN SATURATION: 96 % | DIASTOLIC BLOOD PRESSURE: 72 MMHG | HEART RATE: 68 BPM | BODY MASS INDEX: 28.66 KG/M2 | HEIGHT: 65 IN

## 2022-11-07 DIAGNOSIS — S42.301K CLOSED FRACTURE OF SHAFT OF RIGHT HUMERUS WITH NONUNION, UNSPECIFIED FRACTURE MORPHOLOGY, SUBSEQUENT ENCOUNTER: ICD-10-CM

## 2022-11-07 DIAGNOSIS — Z87.81 S/P ORIF (OPEN REDUCTION INTERNAL FIXATION) FRACTURE: ICD-10-CM

## 2022-11-07 DIAGNOSIS — Z98.890 S/P ORIF (OPEN REDUCTION INTERNAL FIXATION) FRACTURE: ICD-10-CM

## 2022-11-07 DIAGNOSIS — Z01.818 PRE-OP EXAMINATION: Primary | ICD-10-CM

## 2022-11-07 DIAGNOSIS — Z92.25 PERSONAL HISTORY OF IMMUNOSUPRESSION THERAPY: ICD-10-CM

## 2022-11-07 DIAGNOSIS — J44.9 CHRONIC OBSTRUCTIVE PULMONARY DISEASE, UNSPECIFIED COPD TYPE (HCC): ICD-10-CM

## 2022-11-07 DIAGNOSIS — T84.84XA PAINFUL ORTHOPAEDIC HARDWARE (HCC): ICD-10-CM

## 2022-11-07 DIAGNOSIS — M05.719 RHEUMATOID ARTHRITIS INVOLVING SHOULDER WITH POSITIVE RHEUMATOID FACTOR, UNSPECIFIED LATERALITY (HCC): ICD-10-CM

## 2022-11-07 PROCEDURE — 3074F SYST BP LT 130 MM HG: CPT | Performed by: FAMILY MEDICINE

## 2022-11-07 PROCEDURE — 99214 OFFICE O/P EST MOD 30 MIN: CPT | Performed by: FAMILY MEDICINE

## 2022-11-07 PROCEDURE — 3023F SPIROM DOC REV: CPT | Performed by: FAMILY MEDICINE

## 2022-11-07 PROCEDURE — 1123F ACP DISCUSS/DSCN MKR DOCD: CPT | Performed by: FAMILY MEDICINE

## 2022-11-07 PROCEDURE — G8427 DOCREV CUR MEDS BY ELIG CLIN: HCPCS | Performed by: FAMILY MEDICINE

## 2022-11-07 PROCEDURE — 3078F DIAST BP <80 MM HG: CPT | Performed by: FAMILY MEDICINE

## 2022-11-07 PROCEDURE — 1036F TOBACCO NON-USER: CPT | Performed by: FAMILY MEDICINE

## 2022-11-07 PROCEDURE — G8484 FLU IMMUNIZE NO ADMIN: HCPCS | Performed by: FAMILY MEDICINE

## 2022-11-07 PROCEDURE — G8399 PT W/DXA RESULTS DOCUMENT: HCPCS | Performed by: FAMILY MEDICINE

## 2022-11-07 PROCEDURE — 1090F PRES/ABSN URINE INCON ASSESS: CPT | Performed by: FAMILY MEDICINE

## 2022-11-07 PROCEDURE — 93000 ELECTROCARDIOGRAM COMPLETE: CPT | Performed by: FAMILY MEDICINE

## 2022-11-07 PROCEDURE — G8417 CALC BMI ABV UP PARAM F/U: HCPCS | Performed by: FAMILY MEDICINE

## 2022-11-07 ASSESSMENT — ENCOUNTER SYMPTOMS: SHORTNESS OF BREATH: 0

## 2022-11-07 NOTE — PROGRESS NOTES
José Manuel Germain  YOB: 1940    Date of Service:  11/7/2022      Wt Readings from Last 2 Encounters:   08/25/22 173 lb (78.5 kg)   08/16/22 179 lb (81.2 kg)       BP Readings from Last 3 Encounters:   08/25/22 126/84   08/16/22 118/76   06/29/22 110/82        Chief Complaint   Patient presents with    Pre-op Exam       Allergies   Allergen Reactions    Prednisone Other (See Comments)     Causes body to feel on fire    Effexor [Venlafaxine Hcl] Itching and Nausea Only    Fenofibrate Diarrhea    Codeine Nausea And Vomiting    Docosahexaenoic Acid-Epa Nausea And Vomiting     Cannot take Vascepa       No outpatient medications have been marked as taking for the 11/7/22 encounter (Office Visit) with Viridiana Mcdonnell MD.       This patient presents to the office today for a preoperative consultation at the request of surgeon, Dr. Colin King, who plans on performing removal of hardware on November 18 at [address] [place] [address]. The current problem began 2 years ago, and symptoms have been worsening with time. Conservative therapy: N/A.     Planned anesthesia: General   Known anesthesia problems: None   Bleeding risk: No recent or remote history of abnormal bleeding  Personal or FH of DVT/PE: No    Patient objection to receiving blood products: No    Past Medical History:   Diagnosis Date    Asthma     COPD (chronic obstructive pulmonary disease) (HCC)     Hyperlipidemia     Hypertension     RA (rheumatoid arthritis) ([facility] [address]     Thyroid disease     has half of her thyroid       Past Surgical History:   Procedure Laterality Date    ABDOMINAL EXPLORATION SURGERY      APPENDECTOMY      CHOLECYSTECTOMY      COLONOSCOPY  4/18/2013    diverticulosis, polyps    COLONOSCOPY  11/10/2016    normal colon    EYE SURGERY Right 12/11/15    cataract removal    EYE SURGERY Left 1/8/16    cataract removed    FRACTURE SURGERY      HUMERUS FRACTURE SURGERY Right 2/28/2021    HUMERUS OPEN REDUCTION INTERNAL FIXATION performed by Delmar Wright MD at [address] (CERVIX STATUS UNKNOWN)      LAMINECTOMY N/A 2022    T10-11 THORACIC DECOMPRESSION, DISCECTOMY, FUSION / FIXATION performed by Rey Taylor. Karri Coyne MD at [address] [facility]  2022    THYROIDECTOMY, PARTIAL Left     TUMOR REMOVAL      off of thyroid, was benign       Family History   Problem Relation Age of Onset    High Blood Pressure Mother        Social History     Socioeconomic History    Marital status:      Spouse name: Not on file    Number of children: Not on file    Years of education: Not on file    Highest education level: Not on file   Occupational History    Not on file   Tobacco Use    Smoking status: Former     Packs/day: 0.50     Years: 20.00     Pack years: 10.00     Types: Cigarettes     Start date: 1959     Quit date: 1987     Years since quittin.8    Smokeless tobacco: Never   Vaping Use    Vaping Use: Never used   Substance and Sexual Activity    Alcohol use: No     Alcohol/week: 0.0 standard drinks    Drug use: No    Sexual activity: Not Currently     Partners: Male   Other Topics Concern    Not on file   Social History Narrative    Not on file     Social Determinants of Health     Financial Resource Strain: Low Risk     Difficulty of Paying Living Expenses: Not very hard   Food Insecurity: No Food Insecurity    Worried About Running Out of Food in the Last Year: Never true    Ran Out of Food in the Last Year: Never true   Transportation Needs: Not on file   Physical Activity: Inactive    Days of Exercise per Week: 0 days    Minutes of Exercise per Session: 0 min   Stress: Not on file   Social Connections: Not on file   Intimate Partner Violence: Not on file   Housing Stability: Not on file       Review of Systems  Review of Systems   Constitutional:  Negative for fever. Respiratory:  Negative for shortness of breath. Cardiovascular:  Negative for chest pain and palpitations. Musculoskeletal:  Positive for arthralgias. Vitals:    11/07/22 1523   Height: 5' 5\" (1.651 m)        Physical Exam   Physical Exam  Constitutional:       Appearance: She is well-developed. HENT:      Head: Normocephalic and atraumatic. Right Ear: Hearing normal.      Left Ear: Hearing normal.   Eyes:      Conjunctiva/sclera: Conjunctivae normal.   Neck:      Trachea: No tracheal deviation. Cardiovascular:      Rate and Rhythm: Normal rate and regular rhythm. Pulmonary:      Effort: Pulmonary effort is normal.      Breath sounds: Normal breath sounds. Abdominal:      Palpations: Abdomen is soft. Tenderness: There is no abdominal tenderness. Skin:     General: Skin is warm and dry. Neurological:      Mental Status: She is alert and oriented to person, place, and time. Psychiatric:         Mood and Affect: Mood normal.         Behavior: Behavior normal.     Pain to right arm. EKG Interpretation:  normal EKG, normal sinus rhythm, short [place], mildly.     Lab Review   Hospital Outpatient Visit on 09/01/2022   Component Date Value    Sed Rate 09/01/2022 28     CRP 09/01/2022 <3.0    Hospital Outpatient Visit on 09/01/2022   Component Date Value    Vit D, 25-Hydroxy 09/01/2022 96.2    Hospital Outpatient Visit on 07/27/2022   Component Date Value    Testosterone 07/27/2022 169 (A)     Sex Hormone Binding 07/27/2022 46     Testosterone, Free 07/27/2022 25.7 (A)    Hospital Outpatient Visit on 07/27/2022   Component Date Value    Creatinine 07/27/2022 0.8     GFR Non- 07/27/2022 >60     GFR  07/27/2022 >60     CRP 07/27/2022 <3.0     WBC 07/27/2022 6.2     RBC 07/27/2022 4.29     Hemoglobin 07/27/2022 13.4     Hematocrit 07/27/2022 40.9     MCV 07/27/2022 95.3     MCH 07/27/2022 31.2     MCHC 07/27/2022 32.7     RDW 07/27/2022 15.4     Platelets 71/70/9817 179     MPV 07/27/2022 9.3     Neutrophils % 07/27/2022 63.6     Lymphocytes % 07/27/2022 23.7     Monocytes % 07/27/2022 8.6     Eosinophils % 07/27/2022 3.4     Basophils % 07/27/2022 0.7     Neutrophils Absolute 07/27/2022 3.9     Lymphocytes Absolute 07/27/2022 1.5     Monocytes Absolute 07/27/2022 0.5     Eosinophils Absolute 07/27/2022 0.2     Basophils Absolute 07/27/2022 0.0     AST 07/27/2022 26     ALT 07/27/2022 19     Rejected Test 07/30/2022 esr     Reason for Rejection 07/30/2022 see below            Assessment:       [de-identified] y.o. patient with planned surgery as above. Known risk factors for perioperative complications: COPD, RA  Current medications which may produce withdrawal symptoms if withheld perioperatively: none   1. Pre-op examination  Ekg w/o worrisome finding. Prior stress test neg. No new sxs. Labs as below. O/w ok to proceed. - EKG 12 Lead - Clinic Performed  - Comprehensive Metabolic Panel; Future  - CBC with Auto Differential; Future  - Protime-INR; Future  - APTT; Future    2. S/P ORIF (open reduction internal fixation) fracture  See above    3. Painful orthopaedic hardware [facility])  See above  - Comprehensive Metabolic Panel; Future  - CBC with Auto Differential; Future  - Protime-INR; Future  - APTT; Future    4. Chronic obstructive pulmonary disease, unspecified COPD type ([address]  Near baseline. No new issues. - Comprehensive Metabolic Panel; Future  - CBC with Auto Differential; Future  - Protime-INR; Future  - APTT; Future    5. 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 Personal history of immunosupression therapy  Hx of methotrexate. Off in prep for surgery  - Protime-INR; Future  - APTT; Future    7. Rheumatoid arthritis involving shoulder with positive rheumatoid factor, unspecified laterality ([address]    - Protime-INR; Future  - APTT; Future       Plan:     1. Preoperative workup as follows:ECG, hemoglobin, hematocrit, electrolytes, creatinine, coagulation studies  2.  Change in medication regimen before surgery: off methotrexate until after surgery. 3. Prophylaxis forcardiac events with perioperative beta-blockers: Currently taking  atenolol  ACC/AHA indications for pre-operative beta-blocker use:    Vascular surgery with history of postitive stress test  Intermediate or high risk surgery with history of CAD   Intermediate or high risk surgery with multiple clinical predictors of CAD- 2 of the following: history of compensated or prior heart failure, history of cerebrovascular disease, DM, or renal insufficiency    Routine administration of higher-dose, long-acting metoprolol in beta-blocker-naïve patients on the day of surgery, and in the absence of dose titration is with an overall increase in mortality. Beta-blockers should be started days to weeks prior to surgery and titrated to pulse < 70.  4. Deep vein thrombosis prophylaxis: regimen to be chosen by surgical team  5.  No contraindications to planned surgery

## 2022-11-09 ENCOUNTER — HOSPITAL ENCOUNTER (OUTPATIENT)
Age: 82
Discharge: HOME | End: 2022-11-09
Payer: MEDICARE

## 2022-11-09 DIAGNOSIS — T84.84XA PAINFUL ORTHOPAEDIC HARDWARE (HCC): ICD-10-CM

## 2022-11-09 DIAGNOSIS — Z01.818 PRE-OP EXAMINATION: ICD-10-CM

## 2022-11-09 DIAGNOSIS — Z92.25 PERSONAL HISTORY OF IMMUNOSUPRESSION THERAPY: ICD-10-CM

## 2022-11-09 DIAGNOSIS — J44.9 CHRONIC OBSTRUCTIVE PULMONARY DISEASE, UNSPECIFIED COPD TYPE (HCC): ICD-10-CM

## 2022-11-09 DIAGNOSIS — M05.719 RHEUMATOID ARTHRITIS INVOLVING SHOULDER WITH POSITIVE RHEUMATOID FACTOR, UNSPECIFIED LATERALITY (HCC): ICD-10-CM

## 2022-11-09 LAB
A/G RATIO: 1.9 (ref 1.1–2.2)
ALBUMIN SERPL-MCNC: 3.9 G/DL (ref 3.4–5)
ALP BLD-CCNC: 97 U/L (ref 40–129)
ALT SERPL-CCNC: 24 U/L (ref 10–40)
ANION GAP SERPL CALCULATED.3IONS-SCNC: 11 MMOL/L (ref 3–16)
APTT: 27.1 SEC (ref 23–34.3)
AST SERPL-CCNC: 25 U/L (ref 15–37)
BASOPHILS ABSOLUTE: 0.1 K/UL (ref 0–0.2)
BASOPHILS RELATIVE PERCENT: 1.1 %
BILIRUB SERPL-MCNC: 0.5 MG/DL (ref 0–1)
BUN BLDV-MCNC: 15 MG/DL (ref 7–20)
CALCIUM SERPL-MCNC: 9.4 MG/DL (ref 8.3–10.6)
CHLORIDE BLD-SCNC: 103 MMOL/L (ref 99–110)
CO2: 29 MMOL/L (ref 21–32)
CREAT SERPL-MCNC: 0.9 MG/DL (ref 0.6–1.2)
EOSINOPHILS ABSOLUTE: 0.1 K/UL (ref 0–0.6)
EOSINOPHILS RELATIVE PERCENT: 1.9 %
GFR SERPL CREATININE-BSD FRML MDRD: >60 ML/MIN/{1.73_M2}
GLUCOSE BLD-MCNC: 92 MG/DL (ref 70–99)
HCT VFR BLD CALC: 39.5 % (ref 36–48)
HEMOGLOBIN: 13 G/DL (ref 12–16)
INR BLD: 1.09 (ref 0.87–1.14)
LYMPHOCYTES ABSOLUTE: 1.4 K/UL (ref 1–5.1)
LYMPHOCYTES RELATIVE PERCENT: 21.1 %
MCH RBC QN AUTO: 31.8 PG (ref 26–34)
MCHC RBC AUTO-ENTMCNC: 32.9 G/DL (ref 31–36)
MCV RBC AUTO: 96.6 FL (ref 80–100)
MONOCYTES ABSOLUTE: 0.7 K/UL (ref 0–1.3)
MONOCYTES RELATIVE PERCENT: 9.8 %
NEUTROPHILS ABSOLUTE: 4.5 K/UL (ref 1.7–7.7)
NEUTROPHILS RELATIVE PERCENT: 66.1 %
PDW BLD-RTO: 14 % (ref 12.4–15.4)
PLATELET # BLD: 208 K/UL (ref 135–450)
PMV BLD AUTO: 9.7 FL (ref 5–10.5)
POTASSIUM SERPL-SCNC: 3.7 MMOL/L (ref 3.5–5.1)
PROTHROMBIN TIME: 13.9 SEC (ref 11.7–14.5)
RBC # BLD: 4.09 M/UL (ref 4–5.2)
SODIUM BLD-SCNC: 143 MMOL/L (ref 136–145)
TOTAL PROTEIN: 6 G/DL (ref 6.4–8.2)
WBC # BLD: 6.9 K/UL (ref 4–11)

## 2022-11-09 PROCEDURE — 36415 COLL VENOUS BLD VENIPUNCTURE: CPT

## 2022-11-09 PROCEDURE — 85025 COMPLETE CBC W/AUTO DIFF WBC: CPT

## 2022-11-09 PROCEDURE — 85610 PROTHROMBIN TIME: CPT

## 2022-11-09 PROCEDURE — 85730 THROMBOPLASTIN TIME PARTIAL: CPT

## 2022-11-09 PROCEDURE — 80053 COMPREHEN METABOLIC PANEL: CPT

## 2022-12-07 ENCOUNTER — OFFICE VISIT (OUTPATIENT)
Dept: FAMILY MEDICINE CLINIC | Age: 82
End: 2022-12-07
Payer: MEDICARE

## 2022-12-07 VITALS
SYSTOLIC BLOOD PRESSURE: 122 MMHG | WEIGHT: 174 LBS | BODY MASS INDEX: 28.99 KG/M2 | HEIGHT: 65 IN | DIASTOLIC BLOOD PRESSURE: 72 MMHG | HEART RATE: 62 BPM | OXYGEN SATURATION: 98 %

## 2022-12-07 DIAGNOSIS — Z87.81 S/P ORIF (OPEN REDUCTION INTERNAL FIXATION) FRACTURE: Primary | ICD-10-CM

## 2022-12-07 DIAGNOSIS — Z98.890 S/P ORIF (OPEN REDUCTION INTERNAL FIXATION) FRACTURE: Primary | ICD-10-CM

## 2022-12-07 DIAGNOSIS — Z23 NEED FOR IMMUNIZATION AGAINST INFLUENZA: ICD-10-CM

## 2022-12-07 DIAGNOSIS — F32.A DEPRESSION, UNSPECIFIED DEPRESSION TYPE: ICD-10-CM

## 2022-12-07 DIAGNOSIS — S42.351K CLOSED DISPLACED COMMINUTED FRACTURE OF SHAFT OF RIGHT HUMERUS WITH NONUNION, SUBSEQUENT ENCOUNTER: ICD-10-CM

## 2022-12-07 DIAGNOSIS — I10 BENIGN ESSENTIAL HTN: ICD-10-CM

## 2022-12-07 PROCEDURE — 1036F TOBACCO NON-USER: CPT | Performed by: FAMILY MEDICINE

## 2022-12-07 PROCEDURE — 99214 OFFICE O/P EST MOD 30 MIN: CPT | Performed by: FAMILY MEDICINE

## 2022-12-07 PROCEDURE — 3074F SYST BP LT 130 MM HG: CPT | Performed by: FAMILY MEDICINE

## 2022-12-07 PROCEDURE — 90694 VACC AIIV4 NO PRSRV 0.5ML IM: CPT | Performed by: FAMILY MEDICINE

## 2022-12-07 PROCEDURE — G8417 CALC BMI ABV UP PARAM F/U: HCPCS | Performed by: FAMILY MEDICINE

## 2022-12-07 PROCEDURE — G8399 PT W/DXA RESULTS DOCUMENT: HCPCS | Performed by: FAMILY MEDICINE

## 2022-12-07 PROCEDURE — G8427 DOCREV CUR MEDS BY ELIG CLIN: HCPCS | Performed by: FAMILY MEDICINE

## 2022-12-07 PROCEDURE — G8484 FLU IMMUNIZE NO ADMIN: HCPCS | Performed by: FAMILY MEDICINE

## 2022-12-07 PROCEDURE — 3078F DIAST BP <80 MM HG: CPT | Performed by: FAMILY MEDICINE

## 2022-12-07 PROCEDURE — 1090F PRES/ABSN URINE INCON ASSESS: CPT | Performed by: FAMILY MEDICINE

## 2022-12-07 PROCEDURE — G0008 ADMIN INFLUENZA VIRUS VAC: HCPCS | Performed by: FAMILY MEDICINE

## 2022-12-07 PROCEDURE — 1123F ACP DISCUSS/DSCN MKR DOCD: CPT | Performed by: FAMILY MEDICINE

## 2022-12-07 RX ORDER — TRAMADOL HYDROCHLORIDE 50 MG/1
50 TABLET ORAL EVERY 8 HOURS PRN
Qty: 45 TABLET | Refills: 0 | Status: SHIPPED | OUTPATIENT
Start: 2022-12-07 | End: 2022-12-21

## 2022-12-07 ASSESSMENT — ENCOUNTER SYMPTOMS: SHORTNESS OF BREATH: 0

## 2022-12-07 NOTE — PROGRESS NOTES
Chief Complaint   Patient presents with    Follow-Up from Hospital     Patient was in [facility] 22-22 and [facility] 22-22. HPI:  Dora Portillo is a [de-identified] y.o. (: 1940) here today   for follow up from hospital.  Patient was in [facility] 22-22 and [facility] 22-22 after having ORIF Humerus surgery. HPI  Remote hx of fracture of humerus in 2021. Subsequent surgery w/ rods and screws. [facility] for rehab after. Overall improved. Approx 3-4 weeks ago, sig elbow pain. Had appt w/ ortho. Had imaging. Appt w/ surgeon. Had CT of area. Referred to Dr Andrew Barahona at [facility]. Initially thought would be outpt procedure. Took approx 6h. Kept in hospital for 5 days. ACM at d/c x 1 week. Has had PT. Overall doing better. Sutures removed last week. Had ortho f/u. Next appt on . Overall doing better. Was placed on gabapentin for nerve pain. Seems to help w/ hip/ groin pain. Still sig soreness from prior bone graft. Had been on oxycodone, but has had headaches, others. Has gabapentin and robaxin. Ongoing issues w/ mood. Wonders about stopping cymbalta and back on lexapro. Patient's medications, allergies, past medical, surgical, social and family histories were reviewed and updated as appropriate. ROS:  Review of Systems   Constitutional:  Negative for fever. Respiratory:  Negative for shortness of breath. Musculoskeletal:  Positive for arthralgias. Psychiatric/Behavioral:  Positive for dysphoric mood.           Hemoglobin A1C (%)   Date Value   2022 6.0     Microscopic Examination (no units)   Date Value   2020 YES     LDL Calculated (mg/dL)   Date Value   2022 82       Past Medical History:   Diagnosis Date    Asthma     COPD (chronic obstructive pulmonary disease) (HCC)     Hyperlipidemia     Hypertension     RA (rheumatoid arthritis) ([address]     Thyroid disease     has half of her thyroid       Family History Problem Relation Age of Onset    High Blood Pressure Mother        Social History     Socioeconomic History    Marital status:      Spouse name: Not on file    Number of children: Not on file    Years of education: Not on file    Highest education level: Not on file   Occupational History    Not on file   Tobacco Use    Smoking status: Former     Packs/day: 0.50     Years: 20.00     Pack years: 10.00     Types: Cigarettes     Start date: 1959     Quit date: 1987     Years since quittin.9    Smokeless tobacco: Never   Vaping Use    Vaping Use: Never used   Substance and Sexual Activity    Alcohol use: No     Alcohol/week: 0.0 standard drinks    Drug use: No    Sexual activity: Not Currently     Partners: Male   Other Topics Concern    Not on file   Social History Narrative    Not on file     Social Determinants of Health     Financial Resource Strain: Low Risk     Difficulty of Paying Living Expenses: Not very hard   Food Insecurity: No Food Insecurity    Worried About Running Out of Food in the Last Year: Never true    Ran Out of Food in the Last Year: Never true   Transportation Needs: Not on file   Physical Activity: Inactive    Days of Exercise per Week: 0 days    Minutes of Exercise per Session: 0 min   Stress: Not on file   Social Connections: Not on file   Intimate Partner Violence: Not on file   Housing Stability: Not on file       Prior to Visit Medications    Medication Sig Taking? Authorizing Provider   traMADol (ULTRAM) 50 MG tablet Take 1 tablet by mouth every 8 hours as needed for Pain for up to 14 days.  Yes Author MD Phyllis   triamterene-hydroCHLOROthiazide (MAXZIDE-25) 37.5-25 MG per tablet TAKE ONE TABLET BY MOUTH DAILY Yes Author MD Phyllis   folic acid (FOLVITE) 1 MG tablet Take 1 tablet by mouth daily Yes Cecillia Claude, MD   fluticasone-salmeterol (WIXELA INHUB) 250-50 MCG/ACT AEPB diskus inhaler USE 1 INHALATION BY MOUTH  EVERY 12 HOURS Yes Fallon Acosta MD   calcium carbonate (OSCAL) 500 MG TABS tablet Take 1,200 mg by mouth daily Yes Historical Provider, MD   fluticasone (FLONASE) 50 MCG/ACT nasal spray 2 sprays by Nasal route daily Yes Ramin Main MD   atenolol (TENORMIN) 25 MG tablet Take 0.5 tablets by mouth daily  Patient taking differently: Take 12.5 mg by mouth three times a week Yes Stan Chacon MD   PROAIR  (87 Base) MCG/ACT inhaler INHALE 2 PUFFS BY MOUTH  INTO THE LUNGS EVERY 6  HOURS AS NEEDED FOR  WHEEZING OR SHORTNESS OF  BREATH Yes Joe Benavides MD   DULoxetine (CYMBALTA) 60 MG extended release capsule Take 60 mg by mouth nightly Yes Historical Provider, MD   RESTASIS 0.05 % ophthalmic emulsion  Yes Historical Provider, MD   atorvastatin (LIPITOR) 80 MG tablet Take 80 mg by mouth every evening  Yes Historical Provider, MD   levothyroxine (SYNTHROID) 25 MCG tablet Take 25 mcg by mouth Daily. Yes Historical Provider, MD       Allergies   Allergen Reactions    Prednisone Other (See Comments)     Causes body to feel on fire    Effexor [Venlafaxine Hcl] Itching and Nausea Only    Fenofibrate Diarrhea    Codeine Nausea And Vomiting    Docosahexaenoic Acid-Epa Nausea And Vomiting     Cannot take Vascepa       OBJECTIVE:    /72   Pulse 62   Ht 5' 5\" (1.651 m)   Wt 174 lb (78.9 kg)   SpO2 98%   BMI 28.96 kg/m²     BP Readings from Last 2 Encounters:   12/07/22 122/72   11/07/22 110/72       Wt Readings from Last 3 Encounters:   12/07/22 174 lb (78.9 kg)   11/07/22 172 lb (78 kg)   08/25/22 173 lb (78.5 kg)       Physical Exam  Constitutional:       Appearance: She is well-developed. HENT:      Head: Normocephalic and atraumatic. Right Ear: Hearing normal.      Left Ear: Hearing normal.   Eyes:      Conjunctiva/sclera: Conjunctivae normal.   Neck:      Trachea: No tracheal deviation. Cardiovascular:      Rate and Rhythm: Normal rate and regular rhythm.    Pulmonary:      Effort: Pulmonary effort is normal.      Breath sounds: Normal breath sounds. Abdominal:      Palpations: Abdomen is soft. Tenderness: There is no abdominal tenderness. Skin:     General: Skin is warm and dry. Neurological:      Mental Status: She is alert and oriented to person, place, and time. Psychiatric:         Mood and Affect: Mood normal.         Behavior: Behavior normal.     Ace bandage in place to the right upper extremity. Range of motion seems to be improved    ASSESSMENT/PLAN:    1. S/P ORIF (open reduction internal fixation) fracture  Overall doing fairly well from her prior fracture. She does not tolerate oxycodone. Would prefer to try medication as below to be used on an as-needed basis. Plans to minimize that medication is much as possible. Recommend she take the leftover oxycodone to the pharmacy for disposal.  Follow-up with Ortho as scheduled. She does have enough Robaxin and gabapentin to last at this time  - traMADol (ULTRAM) 50 MG tablet; Take 1 tablet by mouth every 8 hours as needed for Pain for up to 14 days. Dispense: 45 tablet; Refill: 0    2. Closed displaced comminuted fracture of shaft of right humerus with nonunion, subsequent encounter  See above. Reviewed discharge summary and recent office notes from orthopedics and recent hospitalization    3. Benign essential HTN  The current medical regimen is effective;  continue present plan and medications. 4. Depression, unspecified depression type  Patient is unsure that the Cymbalta is helping much. She is currently taking 60 mg. Ongoing issues with mood. She wonders about going back to Lexapro, which she tolerated in the past after death of a loved one. Given the other significant issues that she has regarding recent surgery as above, will stay as is for now. In the future, could consider tapering off of Cymbalta and trial of Lexapro instead. Patient to follow-up in roughly 6 weeks and we can discuss further at that time    5.  Need for immunization against influenza  Flu shot today  - Influenza, FLUAD, (age 72 y+), IM, Preservative Free, 0.5 mL    This document was prepared by a combination of typing and transcription through a voice recognition software.

## 2022-12-19 ENCOUNTER — HOSPITAL ENCOUNTER (OUTPATIENT)
Age: 82
Discharge: HOME | End: 2022-12-19
Payer: MEDICARE

## 2022-12-19 DIAGNOSIS — R73.09 ABNORMAL GLUCOSE: ICD-10-CM

## 2022-12-19 DIAGNOSIS — Z79.899 HIGH RISK MEDICATION USE: ICD-10-CM

## 2022-12-19 DIAGNOSIS — E78.2 MIXED HYPERLIPIDEMIA: ICD-10-CM

## 2022-12-19 LAB
A/G RATIO: 1.1 (ref 1.1–2.2)
ALBUMIN SERPL-MCNC: 3.6 G/DL (ref 3.4–5)
ALP BLD-CCNC: 164 U/L (ref 40–129)
ALT SERPL-CCNC: 19 U/L (ref 10–40)
ANION GAP SERPL CALCULATED.3IONS-SCNC: 10 MMOL/L (ref 3–16)
AST SERPL-CCNC: 24 U/L (ref 15–37)
BILIRUB SERPL-MCNC: 0.4 MG/DL (ref 0–1)
BUN BLDV-MCNC: 16 MG/DL (ref 7–20)
CALCIUM SERPL-MCNC: 9.4 MG/DL (ref 8.3–10.6)
CHLORIDE BLD-SCNC: 105 MMOL/L (ref 99–110)
CHOLESTEROL, TOTAL: 153 MG/DL (ref 0–199)
CO2: 26 MMOL/L (ref 21–32)
CREAT SERPL-MCNC: 0.8 MG/DL (ref 0.6–1.2)
GFR SERPL CREATININE-BSD FRML MDRD: >60 ML/MIN/{1.73_M2}
GLUCOSE BLD-MCNC: 112 MG/DL (ref 70–99)
HDLC SERPL-MCNC: 46 MG/DL (ref 40–60)
LDL CHOLESTEROL CALCULATED: 81 MG/DL
POTASSIUM SERPL-SCNC: 4.1 MMOL/L (ref 3.5–5.1)
SODIUM BLD-SCNC: 141 MMOL/L (ref 136–145)
TOTAL PROTEIN: 7 G/DL (ref 6.4–8.2)
TRIGL SERPL-MCNC: 131 MG/DL (ref 0–150)
VLDLC SERPL CALC-MCNC: 26 MG/DL

## 2022-12-19 PROCEDURE — 86200 CCP ANTIBODY: CPT

## 2022-12-19 PROCEDURE — 36415 COLL VENOUS BLD VENIPUNCTURE: CPT

## 2022-12-19 PROCEDURE — 80061 LIPID PANEL: CPT

## 2022-12-19 PROCEDURE — 83036 HEMOGLOBIN GLYCOSYLATED A1C: CPT

## 2022-12-19 PROCEDURE — 80053 COMPREHEN METABOLIC PANEL: CPT

## 2022-12-20 LAB
CYCLIC CITRULLINATED PEPTIDE ANTIBODY IGG: 1.7 U/ML (ref 0–2.9)
ESTIMATED AVERAGE GLUCOSE: 102.5 MG/DL
HBA1C MFR BLD: 5.2 %

## 2022-12-20 NOTE — RESULT ENCOUNTER NOTE
Hemoglobin A1c normal.  Improved from last check. Lipids very similar to last check. HDL is improved. Glucose 112, otherwise CMP normal except elevation in alkaline phosphatase. This had previously been normal.  Recommend repeat liver function tests in 4 weeks or so. If still elevated, may need further evaluation.

## 2022-12-20 NOTE — PROGRESS NOTES
[address] [place], [place] [address] Avenue, MD                                                           [address] [place] [address]. 15, [address]                                                              (P) [phone] (F)      Note is transcribed using voice recognition software. Inadvertent computerized transcription errors may be present. Patient identification: sameera Dyer : ,4 y.o. [id number] for seropositive rheumatoid arthritis and osteoarthritis. Interval changes-  She continues to have pain, swelling, stiffness across her MCPs, wrist, and ankle joints. Has been off of methotrexate since November ever since her right elbow surgery. Methotrexate has worked well in the past.  She also reports generalized arthralgias from osteoarthritis and secondary myofascial pain. No gout flares. Did not have any side effects from medications. ADLs and recreational activities are limited because of musculoskeletal pain. Other medical comorbidities-spinal stenosis, hypothyroidism, COPD asthma. PMH, PSH,Social history , Meds reviewed. FH-sister-rheumatoid arthritis. PHYSICAL EXAM:    Vitals:    /76   Ht 5' 5\" (1.651 m)   Wt 174 lb (78.9 kg)   BMI 28.96 kg/m²   AA)x3 well nourished, and well groomed, normal judgement. She is using walker for ambulation because of generalized physical deconditioning after recent hospitalization. MKS:   Both wrist are swollen, tender, actively inflamed, right worse than left. MCPs second third fourth PIPs second and third-especially right hand are tender, swollen and inflamed. Loose fist bilaterally, right worse than left. MTPs are tender to pressure. Skin: No rashes, no induration or skin thickening or nodules. DATA:   Lab Results   Component Value Date    WBC 6.9 11/09/2022    HGB 13.0 11/09/2022    HCT 39.5 11/09/2022    MCV 96.6 11/09/2022     11/09/2022     Lab Results   Component Value Date     12/19/2022    K 4.1 12/19/2022     12/19/2022    CO2 26 12/19/2022    BUN 16 12/19/2022    CREATININE 0.8 12/19/2022    GLUCOSE 112 (H) 12/19/2022    CALCIUM 9.4 12/19/2022    PROT 7.0 12/19/2022    LABALBU 3.6 12/19/2022    BILITOT 0.4 12/19/2022    ALKPHOS 164 (H) 12/19/2022    AST 24 12/19/2022    ALT 19 12/19/2022    LABGLOM >60 12/19/2022    GFRAA >60 07/27/2022    AGRATIO 1.1 12/19/2022    GLOB 2.9 09/04/2021       Lab Results   Component Value Date    SEDRATE 28 09/01/2022     Lab Results   Component Value Date    CRP <3.0 09/01/2022           ASSESSMENT AND PLAN:  Herlinda Guevara was seen today for follow-up. Diagnoses and all orders for this visit:    Seropositive rheumatoid arthritis ([facility]    High risk medication use    Primary osteoarthritis involving multiple joints    Other orders  -     methotrexate (RHEUMATREX) 2.5 MG chemo tablet; Take 5 Tabs po once a week, same day every week  -     dexamethasone (DECADRON) 1 MG tablet; Take 1 tab po daily      Seropositive, erosive rheumatoid arthritis (RF>240)-active disease. Recommend resuming methotrexate and 5 tablets weekly. Dexamethasone taper for the time being because of active inflammation. She is unable to tolerate prednisone. Generalized osteoarthritis-generalized migratory joint pain, stable, continue Cymbalta and Tylenol arthritis. Osteopenia with high FRAX score-  DEXA scan 5/2022-L-spine 0.5, left hip -1.6, left femoral neck -2. Calculated FRAX score major osteoporotic fracture 34%, hip fracture 26%. Recommend Fosamax weekly. Continue calcium vitamin D. Other medical comorbidities-COPD, hypertension, hyperlipidemia and hypothyroidism.     Call with any questions or concerns, follow-up in 3 months. #################################################################################################  Patient indicates understanding and agrees with the management plan. Total time 33 minutes that includes the following-  Preparing to see the patient such as reviewing patients records, pre-charting, preparing the visit on the same day, performing a medically appropriate history and physical examination, counseling and educating patient about diagnosis, management plan, ordering appropriate testings, prescriptions, communicating findings to other care providers, and documenting clinical information in electronic medical record. I thank you for giving me the opportunity to be involved in [address] [place] [address] and I look forward following Latoya Blum along with you. If you have any questions or concerns please feel free to contact me at any time.   Francisca Haile MD 12/21/22

## 2022-12-21 ENCOUNTER — OFFICE VISIT (OUTPATIENT)
Dept: RHEUMATOLOGY | Age: 82
End: 2022-12-21
Payer: MEDICARE

## 2022-12-21 VITALS
BODY MASS INDEX: 28.99 KG/M2 | SYSTOLIC BLOOD PRESSURE: 120 MMHG | WEIGHT: 174 LBS | HEIGHT: 65 IN | DIASTOLIC BLOOD PRESSURE: 76 MMHG

## 2022-12-21 DIAGNOSIS — Z79.899 HIGH RISK MEDICATION USE: ICD-10-CM

## 2022-12-21 DIAGNOSIS — M15.9 PRIMARY OSTEOARTHRITIS INVOLVING MULTIPLE JOINTS: ICD-10-CM

## 2022-12-21 DIAGNOSIS — M05.9 SEROPOSITIVE RHEUMATOID ARTHRITIS (HCC): Primary | ICD-10-CM

## 2022-12-21 PROCEDURE — G8484 FLU IMMUNIZE NO ADMIN: HCPCS | Performed by: INTERNAL MEDICINE

## 2022-12-21 PROCEDURE — 3074F SYST BP LT 130 MM HG: CPT | Performed by: INTERNAL MEDICINE

## 2022-12-21 PROCEDURE — 1123F ACP DISCUSS/DSCN MKR DOCD: CPT | Performed by: INTERNAL MEDICINE

## 2022-12-21 PROCEDURE — G8399 PT W/DXA RESULTS DOCUMENT: HCPCS | Performed by: INTERNAL MEDICINE

## 2022-12-21 PROCEDURE — G8417 CALC BMI ABV UP PARAM F/U: HCPCS | Performed by: INTERNAL MEDICINE

## 2022-12-21 PROCEDURE — 1090F PRES/ABSN URINE INCON ASSESS: CPT | Performed by: INTERNAL MEDICINE

## 2022-12-21 PROCEDURE — 1036F TOBACCO NON-USER: CPT | Performed by: INTERNAL MEDICINE

## 2022-12-21 PROCEDURE — 3078F DIAST BP <80 MM HG: CPT | Performed by: INTERNAL MEDICINE

## 2022-12-21 PROCEDURE — G8427 DOCREV CUR MEDS BY ELIG CLIN: HCPCS | Performed by: INTERNAL MEDICINE

## 2022-12-21 PROCEDURE — 99214 OFFICE O/P EST MOD 30 MIN: CPT | Performed by: INTERNAL MEDICINE

## 2022-12-21 RX ORDER — DEXAMETHASONE 1 MG
TABLET ORAL
Qty: 30 TABLET | Refills: 0 | Status: SHIPPED | OUTPATIENT
Start: 2022-12-21

## 2022-12-31 ENCOUNTER — HOSPITAL ENCOUNTER (EMERGENCY)
Age: 82
Discharge: HOME | End: 2022-12-31
Attending: STUDENT IN AN ORGANIZED HEALTH CARE EDUCATION/TRAINING PROGRAM
Payer: MEDICARE

## 2022-12-31 VITALS
RESPIRATION RATE: 18 BRPM | HEART RATE: 75 BPM | OXYGEN SATURATION: 98 % | BODY MASS INDEX: 28.99 KG/M2 | TEMPERATURE: 98.7 F | WEIGHT: 174 LBS | DIASTOLIC BLOOD PRESSURE: 78 MMHG | SYSTOLIC BLOOD PRESSURE: 171 MMHG | HEIGHT: 65 IN

## 2022-12-31 DIAGNOSIS — F32.A DEPRESSION, UNSPECIFIED DEPRESSION TYPE: Primary | ICD-10-CM

## 2022-12-31 LAB
A/G RATIO: 1.2 (ref 1.1–2.2)
ACETAMINOPHEN LEVEL: <5 UG/ML (ref 10–30)
ALBUMIN SERPL-MCNC: 3.5 G/DL (ref 3.4–5)
ALP BLD-CCNC: 137 U/L (ref 40–129)
ALT SERPL-CCNC: 19 U/L (ref 10–40)
AMPHETAMINE SCREEN, URINE: NORMAL
ANION GAP SERPL CALCULATED.3IONS-SCNC: 9 MMOL/L (ref 3–16)
AST SERPL-CCNC: 17 U/L (ref 15–37)
BARBITURATE SCREEN URINE: NORMAL
BASOPHILS ABSOLUTE: 0 K/UL (ref 0–0.2)
BASOPHILS RELATIVE PERCENT: 0.5 %
BENZODIAZEPINE SCREEN, URINE: NORMAL
BILIRUB SERPL-MCNC: 0.4 MG/DL (ref 0–1)
BUN BLDV-MCNC: 19 MG/DL (ref 7–20)
CALCIUM SERPL-MCNC: 8.8 MG/DL (ref 8.3–10.6)
CANNABINOID SCREEN URINE: NORMAL
CHLORIDE BLD-SCNC: 103 MMOL/L (ref 99–110)
CO2: 26 MMOL/L (ref 21–32)
COCAINE METABOLITE SCREEN URINE: NORMAL
CREAT SERPL-MCNC: 0.8 MG/DL (ref 0.6–1.2)
EOSINOPHILS ABSOLUTE: 0 K/UL (ref 0–0.6)
EOSINOPHILS RELATIVE PERCENT: 0.2 %
ETHANOL: NORMAL MG/DL (ref 0–0.08)
FENTANYL SCREEN, URINE: NORMAL
GFR SERPL CREATININE-BSD FRML MDRD: >60 ML/MIN/{1.73_M2}
GLUCOSE BLD-MCNC: 116 MG/DL (ref 70–99)
HCT VFR BLD CALC: 35.7 % (ref 36–48)
HEMOGLOBIN: 11.5 G/DL (ref 12–16)
LYMPHOCYTES ABSOLUTE: 1.1 K/UL (ref 1–5.1)
LYMPHOCYTES RELATIVE PERCENT: 16.3 %
Lab: NORMAL
MCH RBC QN AUTO: 29.8 PG (ref 26–34)
MCHC RBC AUTO-ENTMCNC: 32.2 G/DL (ref 31–36)
MCV RBC AUTO: 92.6 FL (ref 80–100)
METHADONE SCREEN, URINE: NORMAL
MONOCYTES ABSOLUTE: 0.4 K/UL (ref 0–1.3)
MONOCYTES RELATIVE PERCENT: 6.3 %
NEUTROPHILS ABSOLUTE: 5 K/UL (ref 1.7–7.7)
NEUTROPHILS RELATIVE PERCENT: 76.7 %
OPIATE SCREEN URINE: NORMAL
OXYCODONE URINE: NORMAL
PDW BLD-RTO: 15.1 % (ref 12.4–15.4)
PH UA: 7
PHENCYCLIDINE SCREEN URINE: NORMAL
PLATELET # BLD: 265 K/UL (ref 135–450)
PMV BLD AUTO: 8.6 FL (ref 5–10.5)
POTASSIUM REFLEX MAGNESIUM: 3.7 MMOL/L (ref 3.5–5.1)
RBC # BLD: 3.85 M/UL (ref 4–5.2)
SALICYLATE, SERUM: <0.3 MG/DL (ref 15–30)
SODIUM BLD-SCNC: 138 MMOL/L (ref 136–145)
TOTAL PROTEIN: 6.5 G/DL (ref 6.4–8.2)
WBC # BLD: 6.5 K/UL (ref 4–11)

## 2022-12-31 PROCEDURE — 82077 ASSAY SPEC XCP UR&BREATH IA: CPT

## 2022-12-31 PROCEDURE — 36415 COLL VENOUS BLD VENIPUNCTURE: CPT

## 2022-12-31 PROCEDURE — 99283 EMERGENCY DEPT VISIT LOW MDM: CPT

## 2022-12-31 PROCEDURE — 85025 COMPLETE CBC W/AUTO DIFF WBC: CPT

## 2022-12-31 PROCEDURE — 80307 DRUG TEST PRSMV CHEM ANLYZR: CPT

## 2022-12-31 PROCEDURE — 80143 DRUG ASSAY ACETAMINOPHEN: CPT

## 2022-12-31 PROCEDURE — 80053 COMPREHEN METABOLIC PANEL: CPT

## 2022-12-31 PROCEDURE — 80179 DRUG ASSAY SALICYLATE: CPT

## 2022-12-31 RX ORDER — HYDROXYZINE PAMOATE 25 MG/1
25 CAPSULE ORAL 3 TIMES DAILY PRN
Qty: 10 CAPSULE | Refills: 0 | Status: SHIPPED | OUTPATIENT
Start: 2022-12-31 | End: 2023-01-03 | Stop reason: SDUPTHER

## 2022-12-31 ASSESSMENT — PAIN - FUNCTIONAL ASSESSMENT: PAIN_FUNCTIONAL_ASSESSMENT: NONE - DENIES PAIN

## 2022-12-31 NOTE — ED PROVIDER NOTES
[address] ED      CHIEF COMPLAINT  Depression (Pt has been on cymbalta 60 mg for over a year. Has not been working and pcp wants to wean her off. That has not happened yet. Pt has been crying a lot recently and shaky and does not want to be left alone. )       HISTORY OF PRESENT ILLNESS  Daniele Hassan is a [de-identified] y.o. female  who presents to the ED complaining of worsening depression. Patient reports that over the last 3 days she has been [facility]. \"  She states that she has daily been very tearful and sobbing over minimal things. She has been taking Cymbalta for over a year and does not feel that it is helping. She states that she has been isolated recently, not able to celebrate [place] with her family due to illness, feels that this is worsening her symptoms. She denies any suicidal or homicidal ideation. Denies any audiovisual hallucinations. No other complaints, modifying factors or associated symptoms. I have reviewed the following from the nursing documentation. Past Medical History:   Diagnosis Date    Asthma     COPD (chronic obstructive pulmonary disease) ([address]     Hyperlipidemia     Hypertension     RA (rheumatoid arthritis) ([address]     Thyroid disease     has half of her thyroid     Past Surgical History:   Procedure Laterality Date    ABDOMINAL EXPLORATION SURGERY      APPENDECTOMY      CHOLECYSTECTOMY      COLONOSCOPY  04/18/2013    diverticulosis, polyps    COLONOSCOPY  11/10/2016    normal colon    EYE SURGERY Right 12/11/2015    cataract removal    EYE SURGERY Left 01/08/2016    cataract removed    FRACTURE SURGERY      HUMERUS FRACTURE SURGERY Right 02/28/2021    HUMERUS OPEN REDUCTION INTERNAL FIXATION performed by Doc Petersen MD at [address] [place] [address] ([address] [place] [address])      LAMINECTOMY N/A 01/27/2022    T10-11 THORACIC DECOMPRESSION, DISCECTOMY, FUSION / FIXATION performed by Markie Officer.  Jacinda Vleazquez MD at [facility] 9  11/18/2022 SPINE SURGERY  2022    THYROIDECTOMY, PARTIAL Left     TUMOR REMOVAL      off of thyroid, was benign     Family History   Problem Relation Age of Onset    High Blood Pressure Mother      Social History     Socioeconomic History    Marital status:      Spouse name: Not on file    Number of children: Not on file    Years of education: Not on file    Highest education level: Not on file   Occupational History    Not on file   Tobacco Use    Smoking status: Former     Packs/day: 0.50     Years: 20.00     Pack years: 10.00     Types: Cigarettes     Start date: 1959     Quit date: 1987     Years since quittin.0    Smokeless tobacco: Never   Vaping Use    Vaping Use: Never used   Substance and Sexual Activity    Alcohol use: No     Alcohol/week: 0.0 standard drinks    Drug use: No    Sexual activity: Not Currently     Partners: Male   Other Topics Concern    Not on file   Social History Narrative    Not on file     Social Determinants of Health     Financial Resource Strain: Low Risk     Difficulty of Paying Living Expenses: Not very hard   Food Insecurity: No Food Insecurity    Worried About Running Out of Food in the Last Year: Never true    Ran Out of Food in the Last Year: Never true   Transportation Needs: Not on file   Physical Activity: Inactive    Days of Exercise per Week: 0 days    Minutes of Exercise per Session: 0 min   Stress: Not on file   Social Connections: Not on file   Intimate Partner Violence: Not on file   Housing Stability: Not on file     No current facility-administered medications for this encounter.      Current Outpatient Medications   Medication Sig Dispense Refill    methotrexate (RHEUMATREX) 2.5 MG chemo tablet Take 5 Tabs po once a week, same day every week 60 tablet 0    dexamethasone (DECADRON) 1 MG tablet Take 1 tab po daily 30 tablet 0    triamterene-hydroCHLOROthiazide (MAXZIDE-25) 37.5-25 MG per tablet TAKE ONE TABLET BY MOUTH DAILY 90 tablet 1    folic acid (FOLVITE) 1 MG tablet Take 1 tablet by mouth daily 90 tablet 2    fluticasone-salmeterol (WIXELA INHUB) 250-50 MCG/ACT AEPB diskus inhaler USE 1 INHALATION BY MOUTH  EVERY 12 HOURS 180 each 1    calcium carbonate (OSCAL) 500 MG TABS tablet Take 1,200 mg by mouth daily      fluticasone (FLONASE) 50 MCG/ACT nasal spray 2 sprays by Nasal route daily 3 each 3    atenolol (TENORMIN) 25 MG tablet Take 0.5 tablets by mouth daily (Patient taking differently: Take 12.5 mg by mouth three times a week) 30 tablet 3    PROAIR  (90 Base) MCG/ACT inhaler INHALE 2 PUFFS BY MOUTH  INTO THE LUNGS EVERY 6  HOURS AS NEEDED FOR  WHEEZING OR SHORTNESS OF  BREATH 34 g 1    DULoxetine (CYMBALTA) 60 MG extended release capsule Take 60 mg by mouth nightly      RESTASIS 0.05 % ophthalmic emulsion       atorvastatin (LIPITOR) 80 MG tablet Take 80 mg by mouth every evening       levothyroxine (SYNTHROID) 25 MCG tablet Take 25 mcg by mouth Daily. Allergies   Allergen Reactions    Prednisone Other (See Comments)     Causes body to feel on fire    Effexor [Venlafaxine Hcl] Itching and Nausea Only    Fenofibrate Diarrhea    Codeine Nausea And Vomiting    Docosahexaenoic Acid-Epa Nausea And Vomiting     Cannot take Vascepa       REVIEW OF SYSTEMS  10 systems reviewed, pertinent positives per HPI otherwise noted to be negative. PHYSICAL EXAM  BP (!) 171/78   Pulse 75   Temp 98.7 °F (37.1 °C) (Oral)   Resp 18   Ht 5' 5\" (1.651 m)   Wt 174 lb (78.9 kg)   SpO2 98%   BMI 28.96 kg/m²    General: Appears well. Alert  HEENT: Head atraumatic, Eyes normal inspection, PERRL. Normal ENT inspection, Pharynx normal. No signs of dehydration  NECK: Normal inspection  RESPIRATORY: Normal breath sounds. No chest wall tenderness. No respiratory distress  CVS: Heart rate and rhythm regular. No Murmurs  ABDOMEN/GI: Soft, Non-tender, No distention  BACK: Normal inspection  EXTREMITIES: Non-Tender. Full ROM. Normal appearance.  No Pedal edema  NEURO: Alert and oriented. Sensation normal. Motor normal  PSYCH: Mood depressed. Affect tearful. SKIN: Color normal. No rash. Warm, Dry    LABS  I have reviewed all labs for this visit.    Results for orders placed or performed during the hospital encounter of 12/31/22   Acetaminophen Level   Result Value Ref Range    Acetaminophen Level <5 (L) 10 - 30 ug/mL   CBC with Auto Differential   Result Value Ref Range    WBC 6.5 4.0 - 11.0 K/uL    RBC 3.85 (L) 4.00 - 5.20 M/uL    Hemoglobin 11.5 (L) 12.0 - 16.0 g/dL    Hematocrit 35.7 (L) 36.0 - 48.0 %    MCV 92.6 80.0 - 100.0 fL    MCH 29.8 26.0 - 34.0 pg    MCHC 32.2 31.0 - 36.0 g/dL    RDW 15.1 12.4 - 15.4 %    Platelets 967 894 - 803 K/uL    MPV 8.6 5.0 - 10.5 fL    Neutrophils % 76.7 %    Lymphocytes % 16.3 %    Monocytes % 6.3 %    Eosinophils % 0.2 %    Basophils % 0.5 %    Neutrophils Absolute 5.0 1.7 - 7.7 K/uL    Lymphocytes Absolute 1.1 1.0 - 5.1 K/uL    Monocytes Absolute 0.4 0.0 - 1.3 K/uL    Eosinophils Absolute 0.0 0.0 - 0.6 K/uL    Basophils Absolute 0.0 0.0 - 0.2 K/uL   CMP w/ Reflex to MG   Result Value Ref Range    Sodium 138 136 - 145 mmol/L    Potassium reflex Magnesium 3.7 3.5 - 5.1 mmol/L    Chloride 103 99 - 110 mmol/L    CO2 26 21 - 32 mmol/L    Anion Gap 9 3 - 16    Glucose 116 (H) 70 - 99 mg/dL    BUN 19 7 - 20 mg/dL    Creatinine 0.8 0.6 - 1.2 mg/dL    Est, Glom Filt Rate >60 >60    Calcium 8.8 8.3 - 10.6 mg/dL    Total Protein 6.5 6.4 - 8.2 g/dL    Albumin 3.5 3.4 - 5.0 g/dL    Albumin/Globulin Ratio 1.2 1.1 - 2.2    Total Bilirubin 0.4 0.0 - 1.0 mg/dL    Alkaline Phosphatase 137 (H) 40 - 129 U/L    ALT 19 10 - 40 U/L    AST 17 15 - 37 U/L   Ethanol   Result Value Ref Range    Ethanol Lvl None Detected mg/dL   Salicylate   Result Value Ref Range    Salicylate, Serum <6.7 (L) 15.0 - 30.0 mg/dL   Urine Drug Screen   Result Value Ref Range    Amphetamine Screen, Urine Neg Negative <1000ng/mL    Barbiturate Screen, Ur Neg Negative <200 ng/mL    Benzodiazepine Screen, Urine Neg Negative <200 ng/mL    Cannabinoid Scrn, Ur Neg Negative <50 ng/mL    Cocaine Metabolite Screen, Urine Neg Negative <300 ng/mL    Opiate Scrn, Ur Neg Negative <300 ng/mL    PCP Screen, Urine Neg Negative <25 ng/mL    Methadone Screen, Urine Neg Negative <300 ng/mL    Oxycodone Urine Neg Negative <100 ng/ml    FENTANYL SCREEN, URINE Neg Negative <5 ng/mL    pH, UA 7.0     Drug Screen Comment: see below      ED COURSE/MDM  Patient seen and evaluated. Old records reviewed. Labs and imaging reviewed and results discussed with patient. Presenting with worsening depression. Lab work obtained and is reassuring. Patient is medically clear for psychiatric evaluation. At time of signout patient is pending psychiatric evaluation. Is this patient to be included in the SEP-1 Core Measure due to severe sepsis or septic shock? No   Exclusion criteria - the patient is NOT to be included for SEP-1 Core Measure due to: Infection is not suspected    During the patient's ED course, the patient was given:  Medications - No data to display     IIngrid DO,  am the primary clinician of record. CLINICAL IMPRESSION  1. Depression, unspecified depression type        Blood pressure (!) 171/78, pulse 75, temperature 98.7 °F (37.1 °C), temperature source Oral, resp. rate 18, height 5' 5\" (1.651 m), weight 174 lb (78.9 kg), SpO2 98 %, not currently breastfeeding. David iMller was signed out to oncoming ED physician in stable condition. Patient was given scripts for the following medications. I counseled patient how to take these medications. New Prescriptions    No medications on file       Follow-up with:  Savannah Amin MD  [id number] [facility] [address]  [phone]    Call in 2 days      DISCLAIMER: This chart was created using Dragon dictation software.   Efforts were made by me to ensure accuracy, however some errors may be present due to limitations of this technology and occasionally words are not transcribed correctly.        Mary Weiss DO  12/31/22 0230

## 2022-12-31 NOTE — DISCHARGE INSTRUCTIONS
[facility] (formerly known as the [employer]) offers 24/7 call (multiple languages), text (English only) and chat access to trained crisis counselors who can help people experiencing suicidal, substance use, and/or mental health crisis, or any other kind of emotional distress. People can also dial [phone] if they are worried about a loved one who may need crisis support. When calling [phone], callers first hear a greeting message while their call is routed to the local [facility] network crisis center (based on the callers area code). A trained crisis counselor answers the phone, listens to the caller, understands how their problem is affecting them, provides support, and shares resources if needed. If the local crisis center is unable to take the call, the caller is automatically routed to a national backup crisis center. You are being referred to the Intensive Outpatient Program here at [facility]. Please call [phone] to schedule your intake appointment. Please arrive 10 minutes early for your appointment. Enter the main entrance of the hospital and sign in with registration. After registering, proceed to the [facility] elevator and go to floor 2. Once you arrive to the outpatient lobby, please fill out the paperwork with your name on it. Someone will be with you shortly. You will not be seen by a psychiatrist until your first scheduled day of IOP. If you need to reschedule or cancel this appointment please call [phone].

## 2022-12-31 NOTE — ED NOTES
Presenting Problem:Patient presents to the ED voluntarily after crying all the time. Pt states she can not stop crying. She doesn't know why this just started a few weeks ago. She states she has had a lot going on this year. She broke her arm and needed surgery. She got rods and screws placed in her arm. Then after that she needed to have spine surgery, then she need rehab and was admitted to a nursing facility. Then in November she had to go back and have an other arm surgery. Then right after [place] she had a COPD episode. Her BFF passed with covid. She feels like she didn't even get to have a Raphael or get to enjoy anything. She feels like she has been stuck at home. She was allowed to start driving about 10 days ago, but things haven't gotten any better. She has not had any thoughts of self harm or suicide. She just feels like she always wants to cry. Her PCP Dr. Delfina Doty has her on cymbalta 60 mg. He knows this medication is not working well for there and he is going to start weaning her off the cymbalta at her next appt. She came to the hospital in hopes that we could do something be help her stop crying so much. She does not wish to stay in the hospital.       Appearance/Hygiene:  street clothes, good grooming, and good hygiene   Motor Behavior: WNL   Attitude: cooperative  Affect: normal affect   Speech: normal pitch and normal volume  Mood: anxious, constricted, and depressed   Thought Processes: [place]  Perceptions: Absent   Thought content: Clear   Orientation: A&Ox4   Memory: intact  Concentration: Good    Insight/ judgement: normal insight and judgment    Psychosocial and contextual factors: Pt lives alone.  past a few years ago. She has had multiple health issues/surgeries this year. Pt lives in senior housing. C-SSRS flowsheet is  Complete.     Psychiatric History (including current outpatient provider and past inpatient admissions): she states she has always been on an antidepressant her adult life. Her PCP has always managed her meds.       Access to Firearms: Denies    ASSESSMENT FOR IMMINENT FUTURE DANGER:    RISK FACTORS:    [x]  Age <25 or >49   []  Male gender   [x]  Depressed mood   []  Active suicidal ideation   []  Suicide plan   []  Suicide attempt   []  Access to lethal means   []  Prior suicide attempt   []  Active substance abuse    [x]  Highly impulsive behaviors   []  Not attending to self-care/ADLs    []  Recent significant loss   []  Chronic pain or medical illness   []  Social isolation   []  History of violence    []  Active psychosis   []  Cognitive impairment    [x]  No outpatient services in place   []  Medication noncompliance   []  No collateral information to support safety  [] Self- injurious/ Self-harm behavior    PROTECTIVE FACTORS:  [] Age >25 and <55  [x] Female gender   [] Denies depression  [x] Denies suicidal ideation  [x] Does not have lethal plan   [x] Does not have access to guns or weapons  [x] Patient is verbally michel for safety  [x] No prior suicide attempts  [x] No active substance abuse  [x] Patient has social or family support  [x] No active psychosis or cognitive dysfunction  [x] Physically healthy  [] Has outpatient services in place  [x] Compliant with recommended medications          Charlene Nicholson RN  12/31/22 [address], RN  12/31/22 9743

## 2022-12-31 NOTE — ED NOTES
Pt in room [facility], [address] [place] [address]  12/31/22 [id number]

## 2022-12-31 NOTE — ED NOTES
Level of Care Disposition:  Discharge    Patient was seen by ED provider and [facility] AN AFFILIATE OF [facility] staff. The case was presented to psychiatric provider on-call Dr. Valentino Estes . Based on the ED evaluation and information presented to the provider by Amaya Hammond, it is the recommendation of the on call psychiatric provider that the patient be discharged from a psychiatric standpoint with the following referrals: outpatient referrals    RATIONALE FOR NON-ADMISSION:  The patient does not meet criteria for an involuntary psychiatric admission because she does ot pose an imminent risk to her self or anyone else.           Charlene Nicholson RN  12/31/22 7294

## 2022-12-31 NOTE — ED PROVIDER NOTES
Emergency Department Attending Provider Note  Location: [place] [address] ED  12/31/2022     Patient Identification  Nydia Marrero is a [de-identified] y.o. female      Nydia Marrero was evaluated in the Emergency Department for symptoms of depression. Currently on Cymbalta. No suicidal ideation. Medically cleared and awaiting behavioral health evaluation at the time of signout to me at the beginning of my shift. . Although initial history and physical exam information was obtained by MAUREEN/NPP/MD/DO (who also dictated a record of this visit), I personally saw the patient and performed a substantive portion of the visit including all aspects of the medical decision making. Patient seen and evaluated. Relevant records reviewed. Behavioral health assessed the patient feels that she meets criteria for outpatient therapy. I reassessed the patient and she does not appear to be any acute threat to herself or anyone else. She is requesting something to reduce her anxiety until she follows up. I told her I did not want to muddy the water starting a new medication. We did discuss a few tablets of Vistaril to take as needed. Plan to discharge this time. Patient and family agreeable to plan expressed understanding of plan. I, Dr. Jacy Oliva, am the primary clinician of record. I personally saw the patient and independently provided 0 minutes of non-concurrent critical care out of the total shared critical care time provided. This chart was generated in part by using Dragon Dictation system and may contain errors related to that system including errors in grammar, punctuation, and spelling, as well as words and phrases that may be inappropriate. If there are any questions or concerns please feel free to contact the dictating provider for clarification.      MD Bert Mohr MD  12/31/22 6244

## 2022-12-31 NOTE — ED NOTES
This writer spoke with the patient's children. She are worried about her. They state that she is always crying. She tells them that she just doesn't want to be alone, but even when they are with her she still cries. They believe her antidepressant is not working and needs to be changed. Both state that they have never seen her like this before.        Eri Wayne RN  12/31/22 0499

## 2022-12-31 NOTE — ED NOTES
Paramjit Antunez in [facility] AN AFFILIATE OF [facility] advised of request for consult, will handle     Kate Em  12/31/22 6220

## 2023-01-03 ENCOUNTER — TELEPHONE (OUTPATIENT)
Dept: FAMILY MEDICINE CLINIC | Age: 83
End: 2023-01-03

## 2023-01-03 RX ORDER — HYDROXYZINE PAMOATE 25 MG/1
25 CAPSULE ORAL 2 TIMES DAILY PRN
Qty: 30 CAPSULE | Refills: 0 | Status: SHIPPED | OUTPATIENT
Start: 2023-01-03 | End: 2023-01-17

## 2023-01-03 NOTE — TELEPHONE ENCOUNTER
Patient was seen at [facility] ed, was given vistaril.  It is helping but she is only taking it bid, wants to know if you will send enough to cover her until her appt the 19th

## 2023-01-03 NOTE — TELEPHONE ENCOUNTER
----- Message from Shirley Magda sent at 1/3/2023  3:28 PM EST -----  Subject: Appointment Request    Reason for Call: Established Patient Appointment needed: Routine ED Follow   Up Visit    QUESTIONS    Reason for appointment request? No appointments available during search     Additional Information for Provider? Patient is requesting to be seen as   soon as possible for ED follow up. she has appointment on 1/11/2023 want   to know if she can keep it. She was giving 25 mg vistaril tablet and want   see if refill on if need.    ---------------------------------------------------------------------------  --------------  Vazquez Barker [facility]  [phone]; OK to leave message on voicemail  ---------------------------------------------------------------------------  --------------  SCRIPT ANSWERS  RASHAD Screen: Amando Denson

## 2023-01-11 ENCOUNTER — TELEPHONE (OUTPATIENT)
Dept: PULMONOLOGY | Age: 83
End: 2023-01-11

## 2023-01-11 NOTE — TELEPHONE ENCOUNTER
Patient cancelled appointment on 1/11/23 with Dr. Cassandra Carlton for 6 month ILD-same day PFT . Reason: pt had another test yesterday and won't be able to tolerate the breathing test today    Patient did reschedule appointment. Appointment rescheduled for 3/14/23.      Last OV 6/9/22:    IMPRESSION:    1-Lung nodules  2-Possible ILD   3-JACOBS   4Asthma               PLAN:      -PFT no COPD   -will need repeat PFT and will get follow up CT as CT 2020 shows new WILMAR nodule and was not there ,also if we look at CT 2015 was clean ,hoevere 2019 CT start showing some septal thickening and possible effusion with some interstitial   -keep inhalers   -6 minutes   _ HRCT   _ on MTX 10 mg once weekly ,so will make sure nodule resolved ,followed by Rheumatologist

## 2023-01-19 ENCOUNTER — OFFICE VISIT (OUTPATIENT)
Dept: FAMILY MEDICINE CLINIC | Age: 83
End: 2023-01-19
Payer: MEDICARE

## 2023-01-19 VITALS
DIASTOLIC BLOOD PRESSURE: 82 MMHG | HEART RATE: 72 BPM | HEIGHT: 65 IN | WEIGHT: 175.8 LBS | BODY MASS INDEX: 29.29 KG/M2 | SYSTOLIC BLOOD PRESSURE: 134 MMHG | OXYGEN SATURATION: 98 %

## 2023-01-19 DIAGNOSIS — Z87.81 S/P ORIF (OPEN REDUCTION INTERNAL FIXATION) FRACTURE: ICD-10-CM

## 2023-01-19 DIAGNOSIS — K59.00 CONSTIPATION, UNSPECIFIED CONSTIPATION TYPE: ICD-10-CM

## 2023-01-19 DIAGNOSIS — F32.A DEPRESSION, UNSPECIFIED DEPRESSION TYPE: Primary | ICD-10-CM

## 2023-01-19 DIAGNOSIS — Z98.890 S/P ORIF (OPEN REDUCTION INTERNAL FIXATION) FRACTURE: ICD-10-CM

## 2023-01-19 DIAGNOSIS — M05.9 SEROPOSITIVE RHEUMATOID ARTHRITIS (HCC): ICD-10-CM

## 2023-01-19 DIAGNOSIS — J44.9 CHRONIC OBSTRUCTIVE PULMONARY DISEASE, UNSPECIFIED COPD TYPE (HCC): ICD-10-CM

## 2023-01-19 PROCEDURE — 3023F SPIROM DOC REV: CPT | Performed by: FAMILY MEDICINE

## 2023-01-19 PROCEDURE — G8484 FLU IMMUNIZE NO ADMIN: HCPCS | Performed by: FAMILY MEDICINE

## 2023-01-19 PROCEDURE — 1036F TOBACCO NON-USER: CPT | Performed by: FAMILY MEDICINE

## 2023-01-19 PROCEDURE — G8417 CALC BMI ABV UP PARAM F/U: HCPCS | Performed by: FAMILY MEDICINE

## 2023-01-19 PROCEDURE — G8399 PT W/DXA RESULTS DOCUMENT: HCPCS | Performed by: FAMILY MEDICINE

## 2023-01-19 PROCEDURE — 1090F PRES/ABSN URINE INCON ASSESS: CPT | Performed by: FAMILY MEDICINE

## 2023-01-19 PROCEDURE — 99214 OFFICE O/P EST MOD 30 MIN: CPT | Performed by: FAMILY MEDICINE

## 2023-01-19 PROCEDURE — G8427 DOCREV CUR MEDS BY ELIG CLIN: HCPCS | Performed by: FAMILY MEDICINE

## 2023-01-19 PROCEDURE — 1123F ACP DISCUSS/DSCN MKR DOCD: CPT | Performed by: FAMILY MEDICINE

## 2023-01-19 PROCEDURE — 3079F DIAST BP 80-89 MM HG: CPT | Performed by: FAMILY MEDICINE

## 2023-01-19 PROCEDURE — 3075F SYST BP GE 130 - 139MM HG: CPT | Performed by: FAMILY MEDICINE

## 2023-01-19 RX ORDER — PLECANATIDE 3 MG/1
1 TABLET ORAL DAILY
Qty: 30 TABLET | Refills: 3 | Status: SHIPPED | OUTPATIENT
Start: 2023-01-19

## 2023-01-19 RX ORDER — HYDROXYZINE PAMOATE 25 MG/1
25 CAPSULE ORAL 2 TIMES DAILY PRN
Qty: 60 CAPSULE | Refills: 0 | Status: SHIPPED | OUTPATIENT
Start: 2023-01-19 | End: 2023-02-18

## 2023-01-19 RX ORDER — TRAMADOL HYDROCHLORIDE 50 MG/1
50 TABLET ORAL EVERY 8 HOURS PRN
Qty: 45 TABLET | Refills: 0 | Status: SHIPPED | OUTPATIENT
Start: 2023-01-19 | End: 2023-02-02

## 2023-01-19 ASSESSMENT — ENCOUNTER SYMPTOMS
DIARRHEA: 0
SINUS PRESSURE: 0
BACK PAIN: 1
SHORTNESS OF BREATH: 1
ABDOMINAL PAIN: 1
WHEEZING: 0
RHINORRHEA: 1
COUGH: 0
SINUS PAIN: 0
BLOOD IN STOOL: 0
CONSTIPATION: 1

## 2023-01-19 NOTE — PROGRESS NOTES
Chief Complaint   Patient presents with    Check-Up       HPI:  Paz Sanders is a [de-identified] y.o. (: 1940) here today   for 6 week check up. Patient was in [facility] ER on 22 for depression. HPI  Seen in ER approx 3 weeks ago. Vistaril added. Has helped sleep. Was in ER for sobbing and worsening depression. Overall feeling better. Pasha cymbalta. Overall improved. No sig SE w/ medication. Has been wearing compression stockings. Feels better when wearing. Ongoing issues w/ constipation. Has been using colace. Still w/ \"rabbit pellet\" stool. Patient's medications, allergies, past medical, surgical, social and family histories were reviewed and updated as appropriate. ROS:  Review of Systems   Constitutional:  Negative for fever. Respiratory:  Positive for shortness of breath (w/ activity). Gastrointestinal:  Positive for abdominal pain and constipation. Musculoskeletal:  Positive for back pain. Psychiatric/Behavioral:  Positive for dysphoric mood. Hemoglobin A1C (%)   Date Value   2022 5.2     Microscopic Examination (no units)   Date Value   2020 YES     LDL Calculated (mg/dL)   Date Value   2022 81       Past Medical History:   Diagnosis Date    Asthma     COPD (chronic obstructive pulmonary disease) ([facility])     Hyperlipidemia     Hypertension     RA (rheumatoid arthritis) ([facility]     Thyroid disease     has half of her thyroid       Family History   Problem Relation Age of Onset    High Blood Pressure Mother        Social History     Socioeconomic History    Marital status:       Spouse name: Not on file    Number of children: Not on file    Years of education: Not on file    Highest education level: Not on file   Occupational History    Not on file   Tobacco Use    Smoking status: Former     Packs/day: 0.50     Years: 20.00     Pack years: 10.00     Types: Cigarettes     Start date: 1959     Quit date: 1987     Years since quittin.0    Smokeless tobacco: Never   Vaping Use    Vaping Use: Never used   Substance and Sexual Activity    Alcohol use: No     Alcohol/week: 0.0 standard drinks    Drug use: No    Sexual activity: Not Currently     Partners: Male   Other Topics Concern    Not on file   Social History Narrative    Not on file     Social Determinants of Health     Financial Resource Strain: Low Risk     Difficulty of Paying Living Expenses: Not very hard   Food Insecurity: No Food Insecurity    Worried About Running Out of Food in the Last Year: Never true    Ran Out of Food in the Last Year: Never true   Transportation Needs: Not on file   Physical Activity: Inactive    Days of Exercise per Week: 0 days    Minutes of Exercise per Session: 0 min   Stress: Not on file   Social Connections: Not on file   Intimate Partner Violence: Not on file   Housing Stability: Not on file       Prior to Visit Medications    Medication Sig Taking? Authorizing Provider   Plecanatide (TRULANCE) 3 MG TABS Take 1 tablet by mouth daily Yes Cliff Johnson MD   hydrOXYzine pamoate (VISTARIL) 25 MG capsule Take 1 capsule by mouth 2 times daily as needed for Anxiety Yes Cliff Johnson MD   traMADol (ULTRAM) 50 MG tablet Take 1 tablet by mouth every 8 hours as needed for Pain for up to 14 days.  Yes Cliff Johnson MD   methotrexate (RHEUMATREX) 2.5 MG chemo tablet Take 5 Tabs po once a week, same day every week Yes Luisana Nuno MD   dexamethasone (DECADRON) 1 MG tablet Take 1 tab po daily Yes Luisana Nuno MD   triamterene-hydroCHLOROthiazide (MAXZIDE-25) 37.5-25 MG per tablet TAKE ONE TABLET BY MOUTH DAILY Yes Cliff Johnson MD   folic acid (FOLVITE) 1 MG tablet Take 1 tablet by mouth daily Yes Luisana Nuno MD   fluticasone-salmeterol (WIXELA INHUB) 250-50 MCG/ACT AEPB diskus inhaler USE 1 INHALATION BY MOUTH  EVERY 12 HOURS Yes Joanne Pretty MD   calcium carbonate (OSCAL) 500 MG TABS tablet Take 1,200 mg by mouth daily Yes Historical Provider, MD   fluticasone [facility]) 50 MCG/ACT nasal spray 2 sprays by Nasal route daily Yes Lc Oneal MD   atenolol (TENORMIN) 25 MG tablet Take 0.5 tablets by mouth daily  Patient taking differently: Take 12.5 mg by mouth three times a week Yes Rahul Manning MD   PROAIR  (40 Base) MCG/ACT inhaler INHALE 2 PUFFS BY MOUTH  INTO THE LUNGS EVERY 6  HOURS AS NEEDED FOR  WHEEZING OR SHORTNESS OF  BREATH Yes Diya Chirinos MD   DULoxetine (CYMBALTA) 60 MG extended release capsule Take 60 mg by mouth nightly Yes Historical Provider, MD   RESTASIS 0.05 % ophthalmic emulsion  Yes Historical Provider, MD   atorvastatin (LIPITOR) 80 MG tablet Take 80 mg by mouth every evening  Yes Historical Provider, MD   levothyroxine (SYNTHROID) 25 MCG tablet Take 25 mcg by mouth Daily. Yes Historical Provider, MD       Allergies   Allergen Reactions    Prednisone Other (See Comments)     Causes body to feel on fire    Effexor [Venlafaxine Hcl] Itching and Nausea Only    Fenofibrate Diarrhea    Codeine Nausea And Vomiting    Docosahexaenoic Acid-Epa Nausea And Vomiting     Cannot take Vascepa       OBJECTIVE:    /82   Pulse 72   Ht 5' 5\" (1.651 m)   Wt 175 lb 12.8 oz (79.7 kg)   SpO2 98%   BMI 29.25 kg/m²     BP Readings from Last 2 Encounters:   01/19/23 134/82   12/31/22 (!) 171/78       Wt Readings from Last 3 Encounters:   01/19/23 175 lb 12.8 oz (79.7 kg)   12/31/22 174 lb (78.9 kg)   12/21/22 174 lb (78.9 kg)       Physical Exam  Constitutional:       Appearance: She is well-developed. HENT:      Head: Normocephalic and atraumatic. Right Ear: Hearing normal.      Left Ear: Hearing normal.   Eyes:      Conjunctiva/sclera: Conjunctivae normal.   Neck:      Trachea: No tracheal deviation. Cardiovascular:      Rate and Rhythm: Normal rate and regular rhythm. Pulmonary:      Effort: Pulmonary effort is normal.      Breath sounds: Normal breath sounds.    Abdominal:      Palpations: Abdomen is soft.      Tenderness: There is no abdominal tenderness. Skin:     General: Skin is warm and dry. Neurological:      Mental Status: She is alert and oriented to person, place, and time. Psychiatric:         Mood and Affect: Mood normal.         Behavior: Behavior normal.         ASSESSMENT/PLAN:    1. Depression, unspecified depression type  Overall doing better. Discussed trial of SAD light for possible seasonal affective component. Cont cymbalta and prn vistaril for now. Consider other counseling options. - hydrOXYzine pamoate (VISTARIL) 25 MG capsule; Take 1 capsule by mouth 2 times daily as needed for Anxiety  Dispense: 60 capsule; Refill: 0    2. Chronic obstructive pulmonary disease, unspecified COPD type ([facility]  Doing fairly well. F/u w/ pulm as sched. 3. Seropositive rheumatoid arthritis ([facility]  F/u w/ rheum as sched in march    4. Constipation, unspecified constipation type  Try adding med as below. Call if too expensive or ongoing issues. Cont to stay well hydrated, approp fiber.   - Plecanatide (TRULANCE) 3 MG TABS; Take 1 tablet by mouth daily  Dispense: 30 tablet; Refill: 3    5. S/P ORIF (open reduction internal fixation) fracture  Cont prn med. - traMADol (ULTRAM) 50 MG tablet; Take 1 tablet by mouth every 8 hours as needed for Pain for up to 14 days. Dispense: 45 tablet; Refill: 0      F/u approx 4 mo. Rpt thyroid labs, others at that time.

## 2023-01-19 NOTE — PROGRESS NOTES
Family Medicine Clerkship M3 Note          Patient is a [de-identified] y.o. female with a past medical history of HTN, hypothyroidism, and COPD who presents to the office for 6-wk checkup and post-ED follow-up. HPI:     Pt was in the ED on 01/01 d/t depression on new years. Sobbing for 2 wks before ED. She was sick over the holidays, didn't get to see family. Given anti-anxiety (Vistaril) at ED. Thinks it's working well. Feels good when she wakes up. Originally discussed weaning Cymbalta bc it wasn't working, but she just refilled Cymbalta. Pt wants to know if she can take Cymbalta and Vistaril together No thoughts of hurting herself/others. Follow up alk phos labs that were high in mid-Dec. Pt ate 4 hours ago. Pt wants to know if she can keep wearing compression socks. Otherwise, her feet feel cold when she goes to bed at night, but they are warm to the touch. Pertinent review of Systems:     Review of Systems   Constitutional:  Negative for chills and fever. HENT:  Positive for rhinorrhea. Negative for sinus pressure and sinus pain. Respiratory:  Positive for shortness of breath (with activity). Negative for cough and wheezing. Cardiovascular:  Negative for chest pain and palpitations. Gastrointestinal:  Positive for constipation (> 1 year; takes stool softener (colace)). Negative for blood in stool and diarrhea. Genitourinary:  Negative for dysuria and urgency. Musculoskeletal:  Positive for back pain. Skin:  Negative for rash and wound. Neurological:  Negative for headaches.    Psychiatric/Behavioral:          Pt feels her depression is doing much better now that Vistaril was added by the ED        Past Medical History:     Past Medical History:   Diagnosis Date    Asthma     COPD (chronic obstructive pulmonary disease) (HCC)     Hyperlipidemia     Hypertension     RA (rheumatoid arthritis) ([address]     Thyroid disease     has half of her thyroid          Medications:     Outpatient Encounter Medications as of 1/19/2023   Medication Sig Dispense Refill    methotrexate (RHEUMATREX) 2.5 MG chemo tablet Take 5 Tabs po once a week, same day every week 60 tablet 0    dexamethasone (DECADRON) 1 MG tablet Take 1 tab po daily 30 tablet 0    triamterene-hydroCHLOROthiazide (MAXZIDE-25) 37.5-25 MG per tablet TAKE ONE TABLET BY MOUTH DAILY 90 tablet 1    folic acid (FOLVITE) 1 MG tablet Take 1 tablet by mouth daily 90 tablet 2    fluticasone-salmeterol (WIXELA INHUB) 250-50 MCG/ACT AEPB diskus inhaler USE 1 INHALATION BY MOUTH  EVERY 12 HOURS 180 each 1    calcium carbonate (OSCAL) 500 MG TABS tablet Take 1,200 mg by mouth daily      fluticasone (FLONASE) 50 MCG/ACT nasal spray 2 sprays by Nasal route daily 3 each 3    atenolol (TENORMIN) 25 MG tablet Take 0.5 tablets by mouth daily (Patient taking differently: Take 12.5 mg by mouth three times a week) 30 tablet 3    PROAIR  (90 Base) MCG/ACT inhaler INHALE 2 PUFFS BY MOUTH  INTO THE LUNGS EVERY 6  HOURS AS NEEDED FOR  WHEEZING OR SHORTNESS OF  BREATH 34 g 1    DULoxetine (CYMBALTA) 60 MG extended release capsule Take 60 mg by mouth nightly      RESTASIS 0.05 % ophthalmic emulsion       atorvastatin (LIPITOR) 80 MG tablet Take 80 mg by mouth every evening       levothyroxine (SYNTHROID) 25 MCG tablet Take 25 mcg by mouth Daily. No facility-administered encounter medications on file as of 1/19/2023. Allergies:      Allergies   Allergen Reactions    Prednisone Other (See Comments)     Causes body to feel on fire    Effexor [Venlafaxine Hcl] Itching and Nausea Only    Fenofibrate Diarrhea    Codeine Nausea And Vomiting    Docosahexaenoic Acid-Epa Nausea And Vomiting     Cannot take Vascepa          Physical Exam:     Vitals:    01/19/23 1216   BP: 134/82   Pulse: 72   SpO2: 98%   Weight: 175 lb 12.8 oz (79.7 kg)   Height: 5' 5\" (1.651 m)        Wt Readings from Last 3 Encounters:   01/19/23 175 lb 12.8 oz (79.7 kg)   12/31/22 174 lb (78.9 kg)   12/21/22 174 lb (78.9 kg)        Physical Exam  Constitutional:       Appearance: Normal appearance. HENT:      Head: Normocephalic and atraumatic. Mouth/Throat:      Mouth: Mucous membranes are moist.      Pharynx: Oropharynx is clear. Cardiovascular:      Rate and Rhythm: Normal rate and regular rhythm. Pulmonary:      Effort: Pulmonary effort is normal.      Breath sounds: Normal breath sounds. Abdominal:      General: Abdomen is flat. Palpations: Abdomen is soft. Skin:     General: Skin is warm and dry. Neurological:      General: No focal deficit present. Mental Status: She is alert. Psychiatric:         Mood and Affect: Mood normal.         Behavior: Behavior normal.         Thought Content:  Thought content normal.          Labs/ Data:      Admission on 12/31/2022, Discharged on 12/31/2022   Component Date Value Ref Range Status    Acetaminophen Level 12/31/2022 <5 (A)  10 - 30 ug/mL Final    WBC 12/31/2022 6.5  4.0 - 11.0 K/uL Final    RBC 12/31/2022 3.85 (A)  4.00 - 5.20 M/uL Final    Hemoglobin 12/31/2022 11.5 (A)  12.0 - 16.0 g/dL Final    Hematocrit 12/31/2022 35.7 (A)  36.0 - 48.0 % Final    MCV 12/31/2022 92.6  80.0 - 100.0 fL Final    MCH 12/31/2022 29.8  26.0 - 34.0 pg Final    MCHC 12/31/2022 32.2  31.0 - 36.0 g/dL Final    RDW 12/31/2022 15.1  12.4 - 15.4 % Final    Platelets 73/59/5861 265  135 - 450 K/uL Final    MPV 12/31/2022 8.6  5.0 - 10.5 fL Final    Neutrophils % 12/31/2022 76.7  % Final    Lymphocytes % 12/31/2022 16.3  % Final    Monocytes % 12/31/2022 6.3  % Final    Eosinophils % 12/31/2022 0.2  % Final    Basophils % 12/31/2022 0.5  % Final    Neutrophils Absolute 12/31/2022 5.0  1.7 - 7.7 K/uL Final    Lymphocytes Absolute 12/31/2022 1.1  1.0 - 5.1 K/uL Final    Monocytes Absolute 12/31/2022 0.4  0.0 - 1.3 K/uL Final    Eosinophils Absolute 12/31/2022 0.0  0.0 - 0.6 K/uL Final    Basophils Absolute 12/31/2022 0.0  0.0 - 0.2 K/uL Final    Sodium 12/31/2022 138  136 - 145 mmol/L Final    Potassium reflex Magnesium 12/31/2022 3.7  3.5 - 5.1 mmol/L Final    Chloride 12/31/2022 103  99 - 110 mmol/L Final    CO2 12/31/2022 26  21 - 32 mmol/L Final    Anion Gap 12/31/2022 9  3 - 16 Final    Glucose 12/31/2022 116 (A)  70 - 99 mg/dL Final    BUN 12/31/2022 19  7 - 20 mg/dL Final    Creatinine 12/31/2022 0.8  0.6 - 1.2 mg/dL Final    Est, Glom Filt Rate 12/31/2022 >60  >60 Final    Calcium 12/31/2022 8.8  8.3 - 10.6 mg/dL Final    Total Protein 12/31/2022 6.5  6.4 - 8.2 g/dL Final    Albumin 12/31/2022 3.5  3.4 - 5.0 g/dL Final    Albumin/Globulin Ratio 12/31/2022 1.2  1.1 - 2.2 Final    Total Bilirubin 12/31/2022 0.4  0.0 - 1.0 mg/dL Final    Alkaline Phosphatase 12/31/2022 137 (A)  40 - 129 U/L Final    ALT 12/31/2022 19  10 - 40 U/L Final    AST 12/31/2022 17  15 - 37 U/L Final    Ethanol Lvl 12/31/2022 None Detected  mg/dL Final    Salicylate, Serum 74/16/1483 <0.3 (A)  15.0 - 30.0 mg/dL Final    Amphetamine Screen, Urine 12/31/2022 Neg  Negative <1000ng/mL Final    Barbiturate Screen, Ur 12/31/2022 Neg  Negative <200 ng/mL Final    Benzodiazepine Screen, Urine 12/31/2022 Neg  Negative <200 ng/mL Final    Cannabinoid Scrn, Ur 12/31/2022 Neg  Negative <50 ng/mL Final    Cocaine Metabolite Screen, Urine 12/31/2022 Neg  Negative <300 ng/mL Final    Opiate Scrn, Ur 12/31/2022 Neg  Negative <300 ng/mL Final    PCP Screen, Urine 12/31/2022 Neg  Negative <25 ng/mL Final    Methadone Screen, Urine 12/31/2022 Neg  Negative <300 ng/mL Final    Oxycodone Urine 12/31/2022 Neg  Negative <100 ng/ml Final    FENTANYL SCREEN, URINE 12/31/2022 Neg  Negative <5 ng/mL Final    pH, UA 12/31/2022 7.0   Final    Drug Screen Comment: 12/31/2022 see below   Final   Hospital Outpatient Visit on 12/19/2022   Component Date Value Ref Range Status    CC Peptide,IgG Ab 12/19/2022 1.7  0.0 - 2.9 U/mL Final   Hospital Outpatient Visit on 12/19/2022   Component Date Value Ref Range Status Cholesterol, Total 12/19/2022 153  0 - 199 mg/dL Final    Triglycerides 12/19/2022 131  0 - 150 mg/dL Final    HDL 12/19/2022 46  40 - 60 mg/dL Final    LDL Calculated 12/19/2022 81  <100 mg/dL Final    VLDL Cholesterol Calculated 12/19/2022 26  Not Established mg/dL Final    Sodium 12/19/2022 141  136 - 145 mmol/L Final    Potassium 12/19/2022 4.1  3.5 - 5.1 mmol/L Final    Chloride 12/19/2022 105  99 - 110 mmol/L Final    CO2 12/19/2022 26  21 - 32 mmol/L Final    Anion Gap 12/19/2022 10  3 - 16 Final    Glucose 12/19/2022 112 (A)  70 - 99 mg/dL Final    BUN 12/19/2022 16  7 - 20 mg/dL Final    Creatinine 12/19/2022 0.8  0.6 - 1.2 mg/dL Final    Est, Glom Filt Rate 12/19/2022 >60  >60 Final    Calcium 12/19/2022 9.4  8.3 - 10.6 mg/dL Final    Total Protein 12/19/2022 7.0  6.4 - 8.2 g/dL Final    Albumin 12/19/2022 3.6  3.4 - 5.0 g/dL Final    Albumin/Globulin Ratio 12/19/2022 1.1  1.1 - 2.2 Final    Total Bilirubin 12/19/2022 0.4  0.0 - 1.0 mg/dL Final    Alkaline Phosphatase 12/19/2022 164 (A)  40 - 129 U/L Final    ALT 12/19/2022 19  10 - 40 U/L Final    AST 12/19/2022 24  15 - 37 U/L Final    Hemoglobin A1C 12/19/2022 5.2  See comment % Final    eAG 12/19/2022 102.5  mg/dL Final   Hospital Outpatient Visit on 11/09/2022   Component Date Value Ref Range Status    aPTT 11/09/2022 27.1  23.0 - 34.3 sec Final    Protime 11/09/2022 13.9  11.7 - 14.5 sec Final    INR 11/09/2022 1.09  0.87 - 1.14 Final    WBC 11/09/2022 6.9  4.0 - 11.0 K/uL Final    RBC 11/09/2022 4.09  4.00 - 5.20 M/uL Final    Hemoglobin 11/09/2022 13.0  12.0 - 16.0 g/dL Final    Hematocrit 11/09/2022 39.5  36.0 - 48.0 % Final    MCV 11/09/2022 96.6  80.0 - 100.0 fL Final    MCH 11/09/2022 31.8  26.0 - 34.0 pg Final    MCHC 11/09/2022 32.9  31.0 - 36.0 g/dL Final    RDW 11/09/2022 14.0  12.4 - 15.4 % Final    Platelets 11/09/2022 208  135 - 450 K/uL Final    MPV 11/09/2022 9.7  5.0 - 10.5 fL Final    Neutrophils % 11/09/2022 66.1  % Final     Lymphocytes % 11/09/2022 21.1  % Final    Monocytes % 11/09/2022 9.8  % Final    Eosinophils % 11/09/2022 1.9  % Final    Basophils % 11/09/2022 1.1  % Final    Neutrophils Absolute 11/09/2022 4.5  1.7 - 7.7 K/uL Final    Lymphocytes Absolute 11/09/2022 1.4  1.0 - 5.1 K/uL Final    Monocytes Absolute 11/09/2022 0.7  0.0 - 1.3 K/uL Final    Eosinophils Absolute 11/09/2022 0.1  0.0 - 0.6 K/uL Final    Basophils Absolute 11/09/2022 0.1  0.0 - 0.2 K/uL Final    Sodium 11/09/2022 143  136 - 145 mmol/L Final    Potassium 11/09/2022 3.7  3.5 - 5.1 mmol/L Final    Chloride 11/09/2022 103  99 - 110 mmol/L Final    CO2 11/09/2022 29  21 - 32 mmol/L Final    Anion Gap 11/09/2022 11  3 - 16 Final    Glucose 11/09/2022 92  70 - 99 mg/dL Final    BUN 11/09/2022 15  7 - 20 mg/dL Final    Creatinine 11/09/2022 0.9  0.6 - 1.2 mg/dL Final    Est, Glom Filt Rate 11/09/2022 >60  >60 Final    Calcium 11/09/2022 9.4  8.3 - 10.6 mg/dL Final    Total Protein 11/09/2022 6.0 (A)  6.4 - 8.2 g/dL Final    Albumin 11/09/2022 3.9  3.4 - 5.0 g/dL Final    Albumin/Globulin Ratio 11/09/2022 1.9  1.1 - 2.2 Final    Total Bilirubin 11/09/2022 0.5  0.0 - 1.0 mg/dL Final    Alkaline Phosphatase 11/09/2022 97  40 - 129 U/L Final    ALT 11/09/2022 24  10 - 40 U/L Final    AST 11/09/2022 25  15 - 37 U/L Final   Hospital Outpatient Visit on 09/01/2022   Component Date Value Ref Range Status    Sed Rate 09/01/2022 28  0 - 30 mm/Hr Final    CRP 09/01/2022 <3.0  0.0 - 5.1 mg/L Final   Hospital Outpatient Visit on 09/01/2022   Component Date Value Ref Range Status    Vit D, 25-Hydroxy 09/01/2022 96.2  >=30 ng/mL Final   Hospital Outpatient Visit on 07/27/2022   Component Date Value Ref Range Status    Testosterone 07/27/2022 169 (A)  20 - 70 ng/dL Final    Sex Hormone Binding 07/27/2022 46  30 - 135 nmol/L Final    Testosterone, Free 07/27/2022 25.7 (A)  0.6 - 3.8 pg/mL Final   Hospital Outpatient Visit on 07/27/2022   Component Date Value Ref Range Status Creatinine 07/27/2022 0.8  0.6 - 1.2 mg/dL Final    GFR Non- 07/27/2022 >60  >60 Final    GFR  07/27/2022 >60  >60 Final    CRP 07/27/2022 <3.0  0.0 - 5.1 mg/L Final    WBC 07/27/2022 6.2  4.0 - 11.0 K/uL Final    RBC 07/27/2022 4.29  4.00 - 5.20 M/uL Final    Hemoglobin 07/27/2022 13.4  12.0 - 16.0 g/dL Final    Hematocrit 07/27/2022 40.9  36.0 - 48.0 % Final    MCV 07/27/2022 95.3  80.0 - 100.0 fL Final    MCH 07/27/2022 31.2  26.0 - 34.0 pg Final    MCHC 07/27/2022 32.7  31.0 - 36.0 g/dL Final    RDW 07/27/2022 15.4  12.4 - 15.4 % Final    Platelets 00/11/0126 179  135 - 450 K/uL Final    MPV 07/27/2022 9.3  5.0 - 10.5 fL Final    Neutrophils % 07/27/2022 63.6  % Final    Lymphocytes % 07/27/2022 23.7  % Final    Monocytes % 07/27/2022 8.6  % Final    Eosinophils % 07/27/2022 3.4  % Final    Basophils % 07/27/2022 0.7  % Final    Neutrophils Absolute 07/27/2022 3.9  1.7 - 7.7 K/uL Final    Lymphocytes Absolute 07/27/2022 1.5  1.0 - 5.1 K/uL Final    Monocytes Absolute 07/27/2022 0.5  0.0 - 1.3 K/uL Final    Eosinophils Absolute 07/27/2022 0.2  0.0 - 0.6 K/uL Final    Basophils Absolute 07/27/2022 0.0  0.0 - 0.2 K/uL Final    AST 07/27/2022 26  15 - 37 U/L Final    ALT 07/27/2022 19  10 - 40 U/L Final    Rejected Test 07/30/2022 esr   Final    Reason for Rejection 07/30/2022 see below   Final          Assessment and Plan:   Depression: Pt feels her depression is improving since the addition of the Vistaril. She just refilled her Cymbalta, and she thinks she might need both during winter months.   - Cont Cymbalta; cont Vistaril PRN   - Might benefit from counseling    Elevated alk phos labs: Pt's labs were 164 on 12/19 and 137 on 12/31. Possible link to her Rt arm surgery. Improved levels since last visit reduce the importance of retaking labs today.    - updated labs can be postponed until her next regularly scheduled visit    Compression socks:   - Pt can wear the socks if she thinks it's helping her           [address] [facility]     Family Medicine Clerkship

## 2023-02-02 RX ORDER — FLUTICASONE PROPIONATE AND SALMETEROL 250; 50 UG/1; UG/1
POWDER RESPIRATORY (INHALATION)
Qty: 180 EACH | Refills: 1 | Status: SHIPPED | OUTPATIENT
Start: 2023-02-02

## 2023-03-16 ENCOUNTER — OFFICE VISIT (OUTPATIENT)
Dept: RHEUMATOLOGY | Age: 83
End: 2023-03-16
Payer: MEDICARE

## 2023-03-16 VITALS
WEIGHT: 175 LBS | DIASTOLIC BLOOD PRESSURE: 78 MMHG | SYSTOLIC BLOOD PRESSURE: 120 MMHG | BODY MASS INDEX: 29.16 KG/M2 | HEIGHT: 65 IN

## 2023-03-16 DIAGNOSIS — M81.0 SENILE OSTEOPOROSIS: ICD-10-CM

## 2023-03-16 DIAGNOSIS — M15.9 PRIMARY OSTEOARTHRITIS INVOLVING MULTIPLE JOINTS: ICD-10-CM

## 2023-03-16 DIAGNOSIS — Z79.899 HIGH RISK MEDICATION USE: ICD-10-CM

## 2023-03-16 DIAGNOSIS — M05.9 SEROPOSITIVE RHEUMATOID ARTHRITIS (HCC): Primary | ICD-10-CM

## 2023-03-16 PROCEDURE — 1123F ACP DISCUSS/DSCN MKR DOCD: CPT | Performed by: INTERNAL MEDICINE

## 2023-03-16 PROCEDURE — 3074F SYST BP LT 130 MM HG: CPT | Performed by: INTERNAL MEDICINE

## 2023-03-16 PROCEDURE — 99214 OFFICE O/P EST MOD 30 MIN: CPT | Performed by: INTERNAL MEDICINE

## 2023-03-16 PROCEDURE — G8484 FLU IMMUNIZE NO ADMIN: HCPCS | Performed by: INTERNAL MEDICINE

## 2023-03-16 PROCEDURE — 1036F TOBACCO NON-USER: CPT | Performed by: INTERNAL MEDICINE

## 2023-03-16 PROCEDURE — 3078F DIAST BP <80 MM HG: CPT | Performed by: INTERNAL MEDICINE

## 2023-03-16 PROCEDURE — 1090F PRES/ABSN URINE INCON ASSESS: CPT | Performed by: INTERNAL MEDICINE

## 2023-03-16 PROCEDURE — G8417 CALC BMI ABV UP PARAM F/U: HCPCS | Performed by: INTERNAL MEDICINE

## 2023-03-16 PROCEDURE — G8427 DOCREV CUR MEDS BY ELIG CLIN: HCPCS | Performed by: INTERNAL MEDICINE

## 2023-03-16 PROCEDURE — G8399 PT W/DXA RESULTS DOCUMENT: HCPCS | Performed by: INTERNAL MEDICINE

## 2023-03-16 RX ORDER — HEPARIN SODIUM (PORCINE) LOCK FLUSH IV SOLN 100 UNIT/ML 100 UNIT/ML
500 SOLUTION INTRAVENOUS PRN
OUTPATIENT
Start: 2023-03-16

## 2023-03-16 RX ORDER — EPINEPHRINE 1 MG/ML
0.3 INJECTION, SOLUTION, CONCENTRATE INTRAVENOUS PRN
OUTPATIENT
Start: 2023-03-16

## 2023-03-16 RX ORDER — ACETAMINOPHEN 325 MG/1
650 TABLET ORAL
OUTPATIENT
Start: 2023-03-16

## 2023-03-16 RX ORDER — FAMOTIDINE 10 MG/ML
20 INJECTION, SOLUTION INTRAVENOUS
OUTPATIENT
Start: 2023-03-16

## 2023-03-16 RX ORDER — ONDANSETRON 2 MG/ML
8 INJECTION INTRAMUSCULAR; INTRAVENOUS
OUTPATIENT
Start: 2023-03-16

## 2023-03-16 RX ORDER — SODIUM CHLORIDE 0.9 % (FLUSH) 0.9 %
5-40 SYRINGE (ML) INJECTION PRN
OUTPATIENT
Start: 2023-03-16

## 2023-03-16 RX ORDER — ZOLEDRONIC ACID 5 MG/100ML
5 INJECTION, SOLUTION INTRAVENOUS ONCE
OUTPATIENT
Start: 2023-03-16 | End: 2023-03-16

## 2023-03-16 RX ORDER — DIPHENHYDRAMINE HYDROCHLORIDE 50 MG/ML
50 INJECTION INTRAMUSCULAR; INTRAVENOUS
OUTPATIENT
Start: 2023-03-16

## 2023-03-16 RX ORDER — SODIUM CHLORIDE 9 MG/ML
INJECTION, SOLUTION INTRAVENOUS CONTINUOUS
OUTPATIENT
Start: 2023-03-16

## 2023-03-16 RX ORDER — SODIUM CHLORIDE 9 MG/ML
5-250 INJECTION, SOLUTION INTRAVENOUS PRN
OUTPATIENT
Start: 2023-03-16

## 2023-03-16 RX ORDER — ALBUTEROL SULFATE 90 UG/1
4 AEROSOL, METERED RESPIRATORY (INHALATION) PRN
OUTPATIENT
Start: 2023-03-16

## 2023-03-16 NOTE — PROGRESS NOTES
[address], [place]                                                           [address] [place] [address]                                                             [phone] (P) [phone] (F)      Note is transcribed using voice recognition software. Inadvertent computerized transcription errors may be present. Patient identification: Zena Gabriel, female : [birth year],89 y.o. [id number] for seropositive rheumatoid arthritis and osteoarthritis. Interval changes-  She is doing well in terms of RA, no flares of symptoms. Tolerating methotrexate well. Other medical comorbidities-spinal stenosis, hypothyroidism, COPD asthma. PMH, PSH,Social history , Meds reviewed. FH-sister-rheumatoid arthritis. PHYSICAL EXAM:    Vitals:    /78   Ht 5' 5\" (1.651 m)   Wt 175 lb (79.4 kg)   BMI 29.12 kg/m²   AA)x3 well nourished, and well groomed, normal judgement. She is using walker for ambulation because of generalized physical deconditioning after recent hospitalization. MKS: No appreciable inflammatory joint findings in exam.  Skin: No rashes, no induration or skin thickening or nodules.      DATA:   Lab Results   Component Value Date    WBC 7.0 2023    HGB 13.5 2023    HCT 40.4 2023    .8 (H) 2023     2023     Lab Results   Component Value Date     2023    K 4.7 2023     2023    CO2 23 2023    BUN 19 2023    CREATININE 0.9 2023    GLUCOSE 101 (H) 2023    CALCIUM 9.5 2023    PROT 6.7 2023    LABALBU 4.4 2023    BILITOT <0.2 2023    ALKPHOS 72 2023    AST 18 2023    ALT 11 2023    LABGLOM >60 2023    GFRAA >60 07/27/2022    AGRATIO 1.9 03/16/2023    GLOB 2.9 09/04/2021       Lab Results   Component Value Date    SEDRATE 37 (H) 03/16/2023     Lab Results   Component Value Date    CRP <3.0 03/16/2023           ASSESSMENT AND PLAN:  Yehuda Stevens was seen today for follow-up. Diagnoses and all orders for this visit:    Seropositive rheumatoid arthritis ([facility] [address]    High risk medication use  -     Comprehensive Metabolic Panel; Future  -     CBC with Auto Differential; Future  -     C-Reactive Protein; Future  -     Sedimentation Rate; Future    Primary osteoarthritis involving multiple joints    Senile osteoporosis    Other orders  -     methotrexate (RHEUMATREX) 2.5 MG chemo tablet; Take  5 Tabs po once a week, same day every week  -     zoledronic acid (RECLAST) 5 mg/100 mL infusion  -     sodium chloride flush 0.9 % injection 5-40 mL  -     0.9 % sodium chloride infusion  -     heparin flush 100 UNIT/ML injection 500 Units  -     alteplase (CATHFLO) 2 mg in sterile water 2 mL injection  -     0.9 % sodium chloride infusion  -     diphenhydrAMINE (BENADRYL) injection 50 mg  -     famotidine (PEPCID) injection 20 mg  -     hydrocortisone sodium succinate PF (SOLU-CORTEF) injection 100 mg  -     acetaminophen (TYLENOL) tablet 650 mg  -     ondansetron (ZOFRAN) injection 8 mg  -     EPINEPHrine PF 1 MG/ML injection (Anaphylaxis) 0.3 mg  -     albuterol sulfate HFA (PROVENTIL;VENTOLIN;PROAIR) 108 (90 Base) MCG/ACT inhaler 4 puff        Seropositive, erosive rheumatoid arthritis (RF>240)-active disease. Doing well, continue methotrexate 5 tablets weekly. Apply meds-prednisone-intolerance. RA flares were treated with dexamethasone taper    Generalized osteoarthritis-generalized migratory joint pain, stable, continue Cymbalta and Tylenol arthritis. Osteopenia with high FRAX score-  DEXA scan 5/2022-L-spine 0.5, left hip -1.6, left femoral neck -2. Calculated FRAX score major osteoporotic fracture 34%, hip fracture 26%.     Unable to tolerate Fosamax-GI side effects. Therapy plan completed for Reclast after discussing risk benefits. Continue calcium vitamin D. Other medical comorbidities-COPD, hypertension, hyperlipidemia and hypothyroidism. Call with any questions or concerns, follow-up in 3 months. #################################################################################################  Patient indicates understanding and agrees with the management plan. Total time 35minutes that includes the following-  Preparing to see the patient such as reviewing patients records, pre-charting, preparing the visit on the same day, performing a medically appropriate history and physical examination, counseling and educating patient about diagnosis, management plan, ordering appropriate testings, prescriptions, communicating findings to other care providers, and documenting clinical information in electronic medical record. I thank you for giving me the opportunity to be involved in [address] [place] [address] and I look forward following Susie Doll along with you. If you have any questions or concerns please feel free to contact me at any time.   Braden Hernandez MD 03/17/23

## 2023-03-21 DIAGNOSIS — R09.82 POST-NASAL DRAINAGE: ICD-10-CM

## 2023-03-21 RX ORDER — FLUTICASONE PROPIONATE 50 MCG
2 SPRAY, SUSPENSION (ML) NASAL DAILY
Qty: 3 EACH | Refills: 3 | Status: SHIPPED | OUTPATIENT
Start: 2023-03-21

## 2023-03-21 RX ORDER — LEVOTHYROXINE SODIUM 0.03 MG/1
25 TABLET ORAL DAILY
Qty: 30 TABLET | Refills: 3 | Status: SHIPPED | OUTPATIENT
Start: 2023-03-21

## 2023-04-03 ENCOUNTER — TELEPHONE (OUTPATIENT)
Dept: RHEUMATOLOGY | Age: 83
End: 2023-04-03

## 2023-04-19 DIAGNOSIS — E78.2 MIXED HYPERLIPIDEMIA: Primary | ICD-10-CM

## 2023-04-19 RX ORDER — ATORVASTATIN CALCIUM 80 MG/1
80 TABLET, FILM COATED ORAL EVERY EVENING
Qty: 30 TABLET | Refills: 3 | Status: SHIPPED | OUTPATIENT
Start: 2023-04-19

## 2023-05-01 RX ORDER — DEXAMETHASONE 1 MG
TABLET ORAL
Qty: 30 TABLET | Refills: 0 | OUTPATIENT
Start: 2023-05-01

## 2023-05-01 NOTE — TELEPHONE ENCOUNTER
Pt was wondering if you could give her a partial script until she sees you later this month, says it really helps with the arthritic pain.

## 2023-05-03 RX ORDER — TRIAMTERENE AND HYDROCHLOROTHIAZIDE 37.5; 25 MG/1; MG/1
TABLET ORAL
Qty: 90 TABLET | Refills: 1 | Status: SHIPPED | OUTPATIENT
Start: 2023-05-03

## 2023-05-03 NOTE — TELEPHONE ENCOUNTER
Future appt scheduled 05/22/2023                  Last appt 01/19/2023      Last Written 10/31/2022    triamterene-hydroCHLOROthiazide (MAXZIDE-25) 37.5-25 MG per tablet  #90  1 RF

## 2023-05-04 ENCOUNTER — OFFICE VISIT (OUTPATIENT)
Dept: PULMONOLOGY | Age: 83
End: 2023-05-04
Payer: MEDICARE

## 2023-05-04 ENCOUNTER — HOSPITAL ENCOUNTER (OUTPATIENT)
Dept: PULMONOLOGY | Age: 83
Discharge: HOME | End: 2023-05-04
Payer: MEDICARE

## 2023-05-04 VITALS
WEIGHT: 177 LBS | HEIGHT: 65 IN | OXYGEN SATURATION: 99 % | DIASTOLIC BLOOD PRESSURE: 72 MMHG | SYSTOLIC BLOOD PRESSURE: 136 MMHG | BODY MASS INDEX: 29.49 KG/M2 | HEART RATE: 77 BPM

## 2023-05-04 DIAGNOSIS — J84.9 ILD (INTERSTITIAL LUNG DISEASE) (HCC): ICD-10-CM

## 2023-05-04 DIAGNOSIS — R91.1 LUNG NODULE: Primary | ICD-10-CM

## 2023-05-04 LAB
DLCO %PRED: 61 %
DLCO PRED: NORMAL
DLCO/VA %PRED: NORMAL
DLCO/VA PRED: NORMAL
DLCO/VA: NORMAL
DLCO: NORMAL
EXPIRATORY TIME-POST: NORMAL
EXPIRATORY TIME: NORMAL
FEF 25-75% %CHNG: NORMAL
FEF 25-75% %PRED-POST: NORMAL
FEF 25-75% %PRED-PRE: NORMAL
FEF 25-75% PRED: NORMAL
FEF 25-75%-POST: NORMAL
FEF 25-75%-PRE: NORMAL
FEV1 %PRED-POST: 71 %
FEV1 %PRED-PRE: 68 %
FEV1 PRED: NORMAL
FEV1-POST: NORMAL
FEV1-PRE: NORMAL
FEV1/FVC %PRED-POST: NORMAL
FEV1/FVC %PRED-PRE: NORMAL
FEV1/FVC PRED: NORMAL
FEV1/FVC-POST: 70 %
FEV1/FVC-PRE: 70 %
FVC %PRED-POST: NORMAL
FVC %PRED-PRE: NORMAL
FVC PRED: NORMAL
FVC-POST: NORMAL
FVC-PRE: NORMAL
GAW %PRED: NORMAL
GAW PRED: NORMAL
GAW: NORMAL
IC %PRED: NORMAL
IC PRED: NORMAL
IC: NORMAL
MEP: NORMAL
MIP: NORMAL
MVV %PRED-PRE: NORMAL
MVV PRED: NORMAL
MVV-PRE: NORMAL
PEF %PRED-POST: NORMAL
PEF %PRED-PRE: NORMAL
PEF PRED: NORMAL
PEF%CHNG: NORMAL
PEF-POST: NORMAL
PEF-PRE: NORMAL
RAW %PRED: NORMAL
RAW PRED: NORMAL
RAW: NORMAL
RV %PRED: NORMAL
RV PRED: NORMAL
RV: NORMAL
SVC %PRED: NORMAL
SVC PRED: NORMAL
SVC: NORMAL
TLC %PRED: 84 %
TLC PRED: NORMAL
TLC: NORMAL
VA %PRED: NORMAL
VA PRED: NORMAL
VA: NORMAL
VTG %PRED: NORMAL
VTG PRED: NORMAL
VTG: NORMAL

## 2023-05-04 PROCEDURE — 94060 EVALUATION OF WHEEZING: CPT

## 2023-05-04 PROCEDURE — 94726 PLETHYSMOGRAPHY LUNG VOLUMES: CPT

## 2023-05-04 PROCEDURE — 6370000000 HC RX 637 (ALT 250 FOR IP): Performed by: INTERNAL MEDICINE

## 2023-05-04 PROCEDURE — 94640 AIRWAY INHALATION TREATMENT: CPT

## 2023-05-04 PROCEDURE — 99214 OFFICE O/P EST MOD 30 MIN: CPT | Performed by: INTERNAL MEDICINE

## 2023-05-04 PROCEDURE — 1090F PRES/ABSN URINE INCON ASSESS: CPT | Performed by: INTERNAL MEDICINE

## 2023-05-04 PROCEDURE — 3075F SYST BP GE 130 - 139MM HG: CPT | Performed by: INTERNAL MEDICINE

## 2023-05-04 PROCEDURE — 94618 PULMONARY STRESS TESTING: CPT

## 2023-05-04 PROCEDURE — G8417 CALC BMI ABV UP PARAM F/U: HCPCS | Performed by: INTERNAL MEDICINE

## 2023-05-04 PROCEDURE — 94729 DIFFUSING CAPACITY: CPT

## 2023-05-04 PROCEDURE — G8399 PT W/DXA RESULTS DOCUMENT: HCPCS | Performed by: INTERNAL MEDICINE

## 2023-05-04 PROCEDURE — 1123F ACP DISCUSS/DSCN MKR DOCD: CPT | Performed by: INTERNAL MEDICINE

## 2023-05-04 PROCEDURE — G8427 DOCREV CUR MEDS BY ELIG CLIN: HCPCS | Performed by: INTERNAL MEDICINE

## 2023-05-04 PROCEDURE — 1036F TOBACCO NON-USER: CPT | Performed by: INTERNAL MEDICINE

## 2023-05-04 PROCEDURE — 3078F DIAST BP <80 MM HG: CPT | Performed by: INTERNAL MEDICINE

## 2023-05-04 RX ORDER — ALBUTEROL SULFATE 90 UG/1
4 AEROSOL, METERED RESPIRATORY (INHALATION) ONCE
Status: COMPLETED | OUTPATIENT
Start: 2023-05-04 | End: 2023-05-04

## 2023-05-04 RX ORDER — MONTELUKAST SODIUM 10 MG/1
10 TABLET ORAL DAILY
Qty: 30 TABLET | Refills: 3 | Status: SHIPPED | OUTPATIENT
Start: 2023-05-04

## 2023-05-04 RX ADMIN — Medication 4 PUFF: at 11:06

## 2023-05-04 ASSESSMENT — PULMONARY FUNCTION TESTS
FEV1_PERCENT_PREDICTED_POST: 71
FEV1/FVC_POST: 70
FEV1/FVC_PRE: 70
FEV1_PERCENT_PREDICTED_PRE: 68

## 2023-05-04 NOTE — PROGRESS NOTES
thyromegaly. No elevated JVP. Trachea was midline. No carotid bruits were auscultated. Respiratory: rhonchi     Cardiovascular: Regular without murmur, clicks, gallops or rubs. There is no left or right ventricular heave. Pulses:  Carotid, radial and femoral pulses were equally bilaterally. Abdomen: Slightly rounded and soft without organomegaly. No rebound, rigidity or guarding was appreciated. Lymphatic: No lymphadenopathy. Musculoskeletal: no joint deformity   Extremities: arthritius  Skin:  Warm and dry. Good color, turgor and pigmentation. No lesions or scars. Neurological/Psychiatric: The patient's general behavior, level of consciousness, thought content and emotional status is normal.  Cranial nerves II-XII are intact. DATA:                  IMPRESSION:    1-Lung nodules  2-Possible ILD   3-JACOBS   4Asthma              PLAN:      -PFT still no COPD and has mild decrease in DLCO ,concern PHTN  ILD   -repeat PFT no significant decline more than expected for age  Ct scan chest shows resolution WILMAR ,stable other nodules ,since has multiple stable CT and age 82 and more than 30 years so will hod further CT  To call with new inhaler   PFT reviewed with patient in details   -keep inhalers   -6 minutes no hypoxia  _ HRCT no ILD  _ on MTX 10 mg once weekly ,so will make sure nodule resolved ,followed by Rheumatologist   2 d echo no PHTN  Start singular    Flu and Pneumovax     Thank you for allowing me to participate in [facility]. I will keep following with you ,should you have any concerns ,please contact us at [place] pulmonary office     Sincerely,        Paulo Dillon MD  Pulmonary & Critical Care Medicine     NOTE: This report was transcribed using voice recognition software. Every effort was made to ensure accuracy; however, inadvertent computerized transcription errors may be present.

## 2023-05-05 ENCOUNTER — TELEPHONE (OUTPATIENT)
Dept: PULMONOLOGY | Age: 83
End: 2023-05-05

## 2023-05-16 RX ORDER — DULOXETIN HYDROCHLORIDE 60 MG/1
60 CAPSULE, DELAYED RELEASE ORAL NIGHTLY
Qty: 90 CAPSULE | Refills: 3 | Status: SHIPPED | OUTPATIENT
Start: 2023-05-16 | End: 2023-08-14

## 2023-05-23 ENCOUNTER — OFFICE VISIT (OUTPATIENT)
Dept: FAMILY MEDICINE CLINIC | Age: 83
End: 2023-05-23

## 2023-05-23 VITALS
HEART RATE: 68 BPM | WEIGHT: 180 LBS | DIASTOLIC BLOOD PRESSURE: 80 MMHG | HEIGHT: 65 IN | OXYGEN SATURATION: 91 % | SYSTOLIC BLOOD PRESSURE: 122 MMHG | BODY MASS INDEX: 29.99 KG/M2

## 2023-05-23 DIAGNOSIS — E78.2 MIXED HYPERLIPIDEMIA: ICD-10-CM

## 2023-05-23 DIAGNOSIS — I10 BENIGN ESSENTIAL HTN: Primary | ICD-10-CM

## 2023-05-23 DIAGNOSIS — F33.1 MAJOR DEPRESSIVE DISORDER, RECURRENT, MODERATE (HCC): ICD-10-CM

## 2023-05-23 DIAGNOSIS — M05.9 SEROPOSITIVE RHEUMATOID ARTHRITIS (HCC): ICD-10-CM

## 2023-05-23 DIAGNOSIS — E03.9 HYPOTHYROIDISM, UNSPECIFIED TYPE: ICD-10-CM

## 2023-05-23 PROBLEM — E87.1 HYPONATREMIA: Status: RESOLVED | Noted: 2021-07-20 | Resolved: 2023-05-23

## 2023-05-23 PROBLEM — G95.9 MYELOPATHY (HCC): Status: ACTIVE | Noted: 2023-05-23

## 2023-05-23 PROBLEM — J44.1 COPD EXACERBATION (HCC): Status: RESOLVED | Noted: 2019-06-18 | Resolved: 2023-05-23

## 2023-05-23 SDOH — ECONOMIC STABILITY: HOUSING INSECURITY
IN THE LAST 12 MONTHS, WAS THERE A TIME WHEN YOU DID NOT HAVE A STEADY PLACE TO SLEEP OR SLEPT IN A SHELTER (INCLUDING NOW)?: NO

## 2023-05-23 SDOH — ECONOMIC STABILITY: INCOME INSECURITY: HOW HARD IS IT FOR YOU TO PAY FOR THE VERY BASICS LIKE FOOD, HOUSING, MEDICAL CARE, AND HEATING?: NOT HARD AT ALL

## 2023-05-23 SDOH — ECONOMIC STABILITY: FOOD INSECURITY: WITHIN THE PAST 12 MONTHS, YOU WORRIED THAT YOUR FOOD WOULD RUN OUT BEFORE YOU GOT MONEY TO BUY MORE.: NEVER TRUE

## 2023-05-23 SDOH — ECONOMIC STABILITY: FOOD INSECURITY: WITHIN THE PAST 12 MONTHS, THE FOOD YOU BOUGHT JUST DIDN'T LAST AND YOU DIDN'T HAVE MONEY TO GET MORE.: NEVER TRUE

## 2023-05-23 ASSESSMENT — PATIENT HEALTH QUESTIONNAIRE - PHQ9
9. THOUGHTS THAT YOU WOULD BE BETTER OFF DEAD, OR OF HURTING YOURSELF: 0
7. TROUBLE CONCENTRATING ON THINGS, SUCH AS READING THE NEWSPAPER OR WATCHING TELEVISION: 0
4. FEELING TIRED OR HAVING LITTLE ENERGY: 0
SUM OF ALL RESPONSES TO PHQ QUESTIONS 1-9: 0
SUM OF ALL RESPONSES TO PHQ9 QUESTIONS 1 & 2: 0
10. IF YOU CHECKED OFF ANY PROBLEMS, HOW DIFFICULT HAVE THESE PROBLEMS MADE IT FOR YOU TO DO YOUR WORK, TAKE CARE OF THINGS AT HOME, OR GET ALONG WITH OTHER PEOPLE: 0
2. FEELING DOWN, DEPRESSED OR HOPELESS: 0
6. FEELING BAD ABOUT YOURSELF - OR THAT YOU ARE A FAILURE OR HAVE LET YOURSELF OR YOUR FAMILY DOWN: 0
SUM OF ALL RESPONSES TO PHQ QUESTIONS 1-9: 0
3. TROUBLE FALLING OR STAYING ASLEEP: 0
SUM OF ALL RESPONSES TO PHQ QUESTIONS 1-9: 0
SUM OF ALL RESPONSES TO PHQ QUESTIONS 1-9: 0
5. POOR APPETITE OR OVEREATING: 0
8. MOVING OR SPEAKING SO SLOWLY THAT OTHER PEOPLE COULD HAVE NOTICED. OR THE OPPOSITE, BEING SO FIGETY OR RESTLESS THAT YOU HAVE BEEN MOVING AROUND A LOT MORE THAN USUAL: 0
1. LITTLE INTEREST OR PLEASURE IN DOING THINGS: 0

## 2023-05-23 ASSESSMENT — ENCOUNTER SYMPTOMS
CONSTIPATION: 1
SHORTNESS OF BREATH: 1

## 2023-05-23 NOTE — PROGRESS NOTES
Chief Complaint   Patient presents with    Hypertension    Hyperlipidemia       HPI:  Momo Griffiths is a 82 y.o. (: 1940) here today   for   Hypertension  This is a chronic problem. The current episode started more than 1 year ago. Associated symptoms include shortness of breath. Risk factors for coronary artery disease include dyslipidemia and post-menopausal state. Past treatments include diuretics and beta blockers. There are no compliance problems. Hyperlipidemia  This is a chronic problem. The current episode started more than 1 year ago. Associated symptoms include shortness of breath. Current antihyperlipidemic treatment includes statins. There are no compliance problems. Risk factors for coronary artery disease include dyslipidemia and hypertension. Ongoing issues w/ sig joint pain and swelling noted. Hx of RA. Has been on methotrexate. Rheum had discussed reclast. Pt has been hesitant. Has been on methotrexate and cymbalta. Has not been on biologics in the past. Frustrated w/ rheum care/ issues. Plans on starting w/ acupressure soon. Had been using wixela, most recently sent generic. Has been more sob. Seen by pulm recently. 6 min walk, pft done. Per pulm note, no copd on pft. Tried stopping nasal spray and started singulair. Cough/ sneeze worse at times. Has stopped singulair. Not taking nasal spray    Has been using senekot for bowels. Working well. Most recent labs done by rheum in march. Patient's medications, allergies, past medical, surgical, social and family histories were reviewed and updated as appropriate. ROS:  Review of Systems   Constitutional:  Negative for fever. Respiratory:  Positive for shortness of breath. Gastrointestinal:  Positive for constipation. Musculoskeletal:  Positive for arthralgias, gait problem and joint swelling.          Hemoglobin A1C (%)   Date Value   2022 5.2     Microscopic Examination (no units)   Date

## 2023-05-24 LAB
ALBUMIN SERPL-MCNC: 3.8 G/DL (ref 3.4–5)
ALBUMIN/GLOB SERPL: 1.5 {RATIO} (ref 1.1–2.2)
ALP SERPL-CCNC: 109 U/L (ref 40–129)
ALT SERPL-CCNC: 22 U/L (ref 10–40)
ANION GAP SERPL CALCULATED.3IONS-SCNC: 11 MMOL/L (ref 3–16)
AST SERPL-CCNC: 34 U/L (ref 15–37)
BILIRUB SERPL-MCNC: 0.6 MG/DL (ref 0–1)
BUN SERPL-MCNC: 16 MG/DL (ref 7–20)
CALCIUM SERPL-MCNC: 9.3 MG/DL (ref 8.3–10.6)
CHLORIDE SERPL-SCNC: 104 MMOL/L (ref 99–110)
CHOLEST SERPL-MCNC: 142 MG/DL (ref 0–199)
CO2 SERPL-SCNC: 25 MMOL/L (ref 21–32)
CREAT SERPL-MCNC: 1 MG/DL (ref 0.6–1.2)
GFR SERPLBLD CREATININE-BSD FMLA CKD-EPI: 57 ML/MIN/{1.73_M2}
GLUCOSE SERPL-MCNC: 97 MG/DL (ref 70–99)
HDLC SERPL-MCNC: 37 MG/DL (ref 40–60)
LDLC SERPL CALC-MCNC: 79 MG/DL
POTASSIUM SERPL-SCNC: 4.4 MMOL/L (ref 3.5–5.1)
PROT SERPL-MCNC: 6.3 G/DL (ref 6.4–8.2)
SODIUM SERPL-SCNC: 140 MMOL/L (ref 136–145)
T4 FREE SERPL-MCNC: 1.2 NG/DL (ref 0.9–1.8)
TRIGL SERPL-MCNC: 130 MG/DL (ref 0–150)
TSH SERPL DL<=0.005 MIU/L-ACNC: 2.8 UIU/ML (ref 0.27–4.2)
VLDLC SERPL CALC-MCNC: 26 MG/DL

## 2023-06-01 ENCOUNTER — HOSPITAL ENCOUNTER (OUTPATIENT)
Dept: CT IMAGING | Age: 83
Discharge: HOME | End: 2023-06-01
Attending: INTERNAL MEDICINE
Payer: MEDICARE

## 2023-06-01 DIAGNOSIS — R91.1 LUNG NODULE: ICD-10-CM

## 2023-06-01 PROCEDURE — 71250 CT THORAX DX C-: CPT

## 2023-06-19 ENCOUNTER — TELEPHONE (OUTPATIENT)
Dept: FAMILY MEDICINE CLINIC | Age: 83
End: 2023-06-19

## 2023-06-19 RX ORDER — FOLIC ACID 1 MG/1
TABLET ORAL
Qty: 90 TABLET | Refills: 2 | OUTPATIENT
Start: 2023-06-19

## 2023-06-19 NOTE — TELEPHONE ENCOUNTER
[facility] is in a lot of pain with her arthritis and was wondering if you would call in some steroids. She cant get into see her Rheumatology doctor since she left [facility] so she looking for one.

## 2023-06-19 NOTE — TELEPHONE ENCOUNTER
Magda Chang is available at her new location to address any patient needs. Directed pharmacy to reach out to the new location for regarding this refill.

## 2023-06-20 ENCOUNTER — TELEPHONE (OUTPATIENT)
Dept: FAMILY MEDICINE CLINIC | Age: 83
End: 2023-06-20

## 2023-06-20 DIAGNOSIS — E78.2 MIXED HYPERLIPIDEMIA: ICD-10-CM

## 2023-06-20 RX ORDER — ATORVASTATIN CALCIUM 80 MG/1
TABLET, FILM COATED ORAL
Qty: 90 TABLET | Refills: 3 | Status: SHIPPED | OUTPATIENT
Start: 2023-06-20

## 2023-06-20 NOTE — TELEPHONE ENCOUNTER
Pt called back about message yesterday, in tears with arthritis pain  Emanuel Gutierrez is in a lot of pain with her arthritis and was wondering if you would call in some steroids.  She cant get into see her Rheumatology doctor since she left [address] [facility] so she looking for one  Uses [facility] in [place]

## 2023-06-20 NOTE — TELEPHONE ENCOUNTER
Form received, waiting to talk to pcp about this   It does appear that her prior rheumatologist is still in the area. She should be able to follow-up there. In the interim, I could potentially call in a steroid. It does appear that she had previously been given a dexamethasone taper.   Please confirm

## 2023-06-21 RX ORDER — DEXAMETHASONE 2 MG/1
TABLET ORAL
Qty: 18 TABLET | Refills: 0 | Status: SHIPPED | OUTPATIENT
Start: 2023-06-21

## 2023-06-21 NOTE — TELEPHONE ENCOUNTER
Patient called back and it was a taper dose that she was given before. Patient states she can't follow up with her previous rheumatologist because they are no longer in [facility] and they don't take her insurance. Patient states she is on social security and she can't afford this. She states she is not asking for a years supply. She is just asking for something to help her get over this pain. Patient asks that we let her know if something is sent to the pharmacy.

## 2023-07-05 RX ORDER — FOLIC ACID 1 MG/1
TABLET ORAL
Qty: 90 TABLET | Refills: 2 | OUTPATIENT
Start: 2023-07-05

## 2023-07-05 NOTE — TELEPHONE ENCOUNTER
Silvia Muñoz is available at her new location to address any patient needs. Directed pharmacy to reach out to the new location for regarding this refill.

## 2023-07-10 RX ORDER — FOLIC ACID 1 MG/1
TABLET ORAL
Qty: 90 TABLET | Refills: 2 | OUTPATIENT
Start: 2023-07-10

## 2023-07-10 NOTE — TELEPHONE ENCOUNTER
Karina Okeefe is available at her new location to address any patient needs. Directed pharmacy to reach out to the new location for regarding this refill.

## 2023-07-19 ENCOUNTER — OFFICE VISIT (OUTPATIENT)
Dept: PULMONOLOGY | Age: 83
End: 2023-07-19
Payer: MEDICARE

## 2023-07-19 VITALS
BODY MASS INDEX: 30.46 KG/M2 | HEIGHT: 65 IN | OXYGEN SATURATION: 98 % | DIASTOLIC BLOOD PRESSURE: 76 MMHG | WEIGHT: 182.8 LBS | SYSTOLIC BLOOD PRESSURE: 114 MMHG | HEART RATE: 95 BPM | RESPIRATION RATE: 16 BRPM

## 2023-07-19 DIAGNOSIS — J45.909 UNCOMPLICATED ASTHMA, UNSPECIFIED ASTHMA SEVERITY, UNSPECIFIED WHETHER PERSISTENT: Primary | ICD-10-CM

## 2023-07-19 PROCEDURE — 99213 OFFICE O/P EST LOW 20 MIN: CPT | Performed by: INTERNAL MEDICINE

## 2023-07-19 PROCEDURE — 1123F ACP DISCUSS/DSCN MKR DOCD: CPT | Performed by: INTERNAL MEDICINE

## 2023-07-19 PROCEDURE — 1090F PRES/ABSN URINE INCON ASSESS: CPT | Performed by: INTERNAL MEDICINE

## 2023-07-19 PROCEDURE — 1036F TOBACCO NON-USER: CPT | Performed by: INTERNAL MEDICINE

## 2023-07-19 PROCEDURE — G8399 PT W/DXA RESULTS DOCUMENT: HCPCS | Performed by: INTERNAL MEDICINE

## 2023-07-19 PROCEDURE — G8417 CALC BMI ABV UP PARAM F/U: HCPCS | Performed by: INTERNAL MEDICINE

## 2023-07-19 PROCEDURE — 3078F DIAST BP <80 MM HG: CPT | Performed by: INTERNAL MEDICINE

## 2023-07-19 PROCEDURE — G8427 DOCREV CUR MEDS BY ELIG CLIN: HCPCS | Performed by: INTERNAL MEDICINE

## 2023-07-19 PROCEDURE — 3074F SYST BP LT 130 MM HG: CPT | Performed by: INTERNAL MEDICINE

## 2023-07-19 RX ORDER — MONTELUKAST SODIUM 10 MG/1
TABLET ORAL
COMMUNITY
Start: 2023-05-31

## 2023-07-19 RX ORDER — FLUTICASONE PROPIONATE AND SALMETEROL 250; 50 UG/1; UG/1
1 POWDER RESPIRATORY (INHALATION) EVERY 12 HOURS
Qty: 60 EACH | Refills: 3 | Status: SHIPPED | OUTPATIENT
Start: 2023-07-19

## 2023-07-19 NOTE — PROGRESS NOTES
and atraumatic. EENT: Mallampati class :  Extraocular muscles intact. External canals are patent and no discharge was appreciated. Septum was midline,   mucosa was without erythema, exudates or cobblestoning. No thrush was noted. Neck: Supple without thyromegaly. No elevated JVP. Trachea was midline. No carotid bruits were auscultated. Respiratory: rhonchi     Cardiovascular: Regular without murmur, clicks, gallops or rubs. There is no left or right ventricular heave. Pulses:  Carotid, radial and femoral pulses were equally bilaterally. Abdomen: Slightly rounded and soft without organomegaly. No rebound, rigidity or guarding was appreciated. Lymphatic: No lymphadenopathy. Musculoskeletal: no joint deformity   Extremities: arthritius  Skin:  Warm and dry. Good color, turgor and pigmentation. No lesions or scars. Neurological/Psychiatric: The patient's general behavior, level of consciousness, thought content and emotional status is normal.  Cranial nerves II-XII are intact. DATA:                  IMPRESSION:    1-Lung nodules  2-Possible ILD   3-JACOBS   4Asthma       RA with rheumatoic nodules          PLAN:      -PFT still no COPD and has mild decrease in DLCO ,concern PHTN  ILD   -repeat PFT no significant decline more than expected for age  Repeat Ct scan chest shows resolution WILMAR ,stable other nodules ,since has multiple stable CT and age 82 and more than 28 years so will hod further CT  Seems generic Rajendra Medina is not helping will ask not to substitute   PFT reviewed with patient in details   -keep inhalers   -6 minutes no hypoxia  _ HRCT no ILD  _ on MTX 10 mg once weekly ,so will make sure nodule resolved ,followed by Rheumatologist   2 d echo no PHTN    Flu and Pneumovax     Thank you for allowing me to participate in [facility].  I will keep following with you ,should you have any concerns ,please contact us at [facility] pulmonary office     Sincerely,        Tanika Siemens

## 2023-07-20 ENCOUNTER — TELEPHONE (OUTPATIENT)
Dept: PULMONOLOGY | Age: 83
End: 2023-07-20

## 2023-07-20 NOTE — TELEPHONE ENCOUNTER
Patient states that Berry Valdez canceled the script for [facility] because of the generic she is taking came in even though it doesn't work for her. Please advise what she should do, still having a hard time breathing.  Thanks

## 2023-07-25 RX ORDER — FLUTICASONE PROPIONATE AND SALMETEROL 250; 50 UG/1; UG/1
POWDER RESPIRATORY (INHALATION)
Qty: 180 EACH | Refills: 1 | Status: SHIPPED | OUTPATIENT
Start: 2023-07-25

## 2023-07-25 NOTE — TELEPHONE ENCOUNTER
Winifred Carrera was sent to pharmacy. Spoke with patient states that she is not able to  RX until 7/31 per pharmacy.  Pharmacy is currently closed will CB to see if office can release an early fill

## 2023-08-01 RX ORDER — FLUTICASONE PROPIONATE AND SALMETEROL 250; 50 UG/1; UG/1
1 POWDER RESPIRATORY (INHALATION) EVERY 12 HOURS
Qty: 60 EACH | Refills: 3 | Status: SHIPPED | OUTPATIENT
Start: 2023-08-01

## 2023-08-01 NOTE — TELEPHONE ENCOUNTER
PUT IN NEW REQUEST DUE TO LAST SCRIPT BEING SENT TO [facility]. LOOKING TO HAVE SENT TO [facility] ALREADY PENDED FOR YOU. Refill Request     CONFIRM preferred pharmacy with the patient. If Mail Order Rx - Pend for 90 day refill. Last Seen: Last Seen Department: 7/19/2023  Last Seen by PCP: 5/23/2023    Last Written: 7/19/2023    If no future appointment scheduled:  Review the last OV with PCP and review information for follow-up visit,  Route STAFF MESSAGE with patient name to the [facility] for scheduling with the following information:            -  Timing of next visit           -  Visit type ie Physical, OV, etc           -  Diagnoses/Reason ie. COPD, HTN - Do not use MEDICATION, Follow-up or CHECK UP - Give reason for visit      Next Appointment:   Future Appointments   Date Time Provider [address]   9/25/2023  1:20 PM Estephania Silverman MD [facility]  [facility]   1/30/2024 11:30 AM MD Rafael Page [facility]       Message sent to [address] [place] [address] to schedule appt with patient? N/A      Requested Prescriptions     Pending Prescriptions Disp Refills    fluticasone-salmeterol (WIXELA INHUB) 250-50 MCG/ACT AEPB diskus inhaler 60 each 3     Sig: Inhale 1 puff into the lungs in the morning and 1 puff in the evening. Please do not substitute for generic.

## 2023-08-31 ENCOUNTER — TELEMEDICINE (OUTPATIENT)
Dept: FAMILY MEDICINE CLINIC | Age: 83
End: 2023-08-31

## 2023-08-31 DIAGNOSIS — Z00.00 MEDICARE ANNUAL WELLNESS VISIT, SUBSEQUENT: Primary | ICD-10-CM

## 2023-08-31 ASSESSMENT — PATIENT HEALTH QUESTIONNAIRE - PHQ9
8. MOVING OR SPEAKING SO SLOWLY THAT OTHER PEOPLE COULD HAVE NOTICED. OR THE OPPOSITE, BEING SO FIGETY OR RESTLESS THAT YOU HAVE BEEN MOVING AROUND A LOT MORE THAN USUAL: 0
9. THOUGHTS THAT YOU WOULD BE BETTER OFF DEAD, OR OF HURTING YOURSELF: 0
SUM OF ALL RESPONSES TO PHQ9 QUESTIONS 1 & 2: 0
SUM OF ALL RESPONSES TO PHQ QUESTIONS 1-9: 0
10. IF YOU CHECKED OFF ANY PROBLEMS, HOW DIFFICULT HAVE THESE PROBLEMS MADE IT FOR YOU TO DO YOUR WORK, TAKE CARE OF THINGS AT HOME, OR GET ALONG WITH OTHER PEOPLE: 0
1. LITTLE INTEREST OR PLEASURE IN DOING THINGS: 0
6. FEELING BAD ABOUT YOURSELF - OR THAT YOU ARE A FAILURE OR HAVE LET YOURSELF OR YOUR FAMILY DOWN: 0
3. TROUBLE FALLING OR STAYING ASLEEP: 0
4. FEELING TIRED OR HAVING LITTLE ENERGY: 0
SUM OF ALL RESPONSES TO PHQ QUESTIONS 1-9: 0
7. TROUBLE CONCENTRATING ON THINGS, SUCH AS READING THE NEWSPAPER OR WATCHING TELEVISION: 0
5. POOR APPETITE OR OVEREATING: 0
2. FEELING DOWN, DEPRESSED OR HOPELESS: 0

## 2023-08-31 ASSESSMENT — LIFESTYLE VARIABLES
HOW OFTEN DO YOU HAVE A DRINK CONTAINING ALCOHOL: NEVER
HOW MANY STANDARD DRINKS CONTAINING ALCOHOL DO YOU HAVE ON A TYPICAL DAY: PATIENT DOES NOT DRINK

## 2023-08-31 NOTE — PATIENT INSTRUCTIONS
Personalized Preventive Plan for Alice Quiroga - 8/31/2023  Medicare offers a range of preventive health benefits. Some of the tests and screenings are paid in full while other may be subject to a deductible, co-insurance, and/or copay. Some of these benefits include a comprehensive review of your medical history including lifestyle, illnesses that may run in your family, and various assessments and screenings as appropriate. After reviewing your medical record and screening and assessments performed today your provider may have ordered immunizations, labs, imaging, and/or referrals for you. A list of these orders (if applicable) as well as your Preventive Care list are included within your After Visit Summary for your review. Other Preventive Recommendations:    A preventive eye exam performed by an eye specialist is recommended every 1-2 years to screen for glaucoma; cataracts, macular degeneration, and other eye disorders. A preventive dental visit is recommended every 6 months. Try to get at least 150 minutes of exercise per week or 10,000 steps per day on a pedometer . Order or download the FREE \"Exercise & Physical Activity: Your Everyday Guide\" from [employer]. Call 6-569.682.3122 or search [employer] online. You need 0152-8282 mg of calcium and 6899-7375 IU of vitamin D per day. It is possible to meet your calcium requirement with diet alone, but a vitamin D supplement is usually necessary to meet this goal.  When exposed to the sun, use a sunscreen that protects against both UVA and UVB radiation with an SPF of 30 or greater. Reapply every 2 to 3 hours or after sweating, drying off with a towel, or swimming. Always wear a seat belt when traveling in a car. Always wear a helmet when riding a bicycle or motorcycle.

## 2023-09-26 ENCOUNTER — OFFICE VISIT (OUTPATIENT)
Dept: FAMILY MEDICINE CLINIC | Age: 83
End: 2023-09-26

## 2023-09-26 VITALS
HEART RATE: 72 BPM | OXYGEN SATURATION: 97 % | HEIGHT: 65 IN | WEIGHT: 181 LBS | BODY MASS INDEX: 30.16 KG/M2 | SYSTOLIC BLOOD PRESSURE: 122 MMHG | DIASTOLIC BLOOD PRESSURE: 82 MMHG

## 2023-09-26 DIAGNOSIS — F33.1 MAJOR DEPRESSIVE DISORDER, RECURRENT, MODERATE (HCC): ICD-10-CM

## 2023-09-26 DIAGNOSIS — E03.9 HYPOTHYROIDISM, UNSPECIFIED TYPE: ICD-10-CM

## 2023-09-26 DIAGNOSIS — M05.719 RHEUMATOID ARTHRITIS INVOLVING SHOULDER WITH POSITIVE RHEUMATOID FACTOR, UNSPECIFIED LATERALITY (HCC): Primary | ICD-10-CM

## 2023-09-26 DIAGNOSIS — L30.9 ECZEMA, UNSPECIFIED TYPE: ICD-10-CM

## 2023-09-26 DIAGNOSIS — E78.2 MIXED HYPERLIPIDEMIA: ICD-10-CM

## 2023-09-26 DIAGNOSIS — I10 BENIGN ESSENTIAL HTN: ICD-10-CM

## 2023-09-26 RX ORDER — DEXAMETHASONE 2 MG/1
2 TABLET ORAL DAILY PRN
Qty: 30 TABLET | Refills: 0 | Status: SHIPPED | OUTPATIENT
Start: 2023-09-26

## 2023-09-26 RX ORDER — CLOBETASOL PROPIONATE 0.5 MG/G
CREAM TOPICAL
Qty: 45 G | Refills: 0 | Status: SHIPPED | OUTPATIENT
Start: 2023-09-26

## 2023-09-26 ASSESSMENT — ENCOUNTER SYMPTOMS: SHORTNESS OF BREATH: 0

## 2023-09-26 NOTE — PROGRESS NOTES
Chief Complaint   Patient presents with    Hypertension    Hyperlipidemia       HPI:  Berta Hall is a 82 y.o. (: 1940) here today   for   Hypertension  This is a chronic problem. The current episode started more than 1 year ago. Pertinent negatives include no shortness of breath. Risk factors for coronary artery disease include dyslipidemia and post-menopausal state. Past treatments include beta blockers and diuretics. There are no compliance problems. Hyperlipidemia  This is a chronic problem. The current episode started more than 1 year ago. Pertinent negatives include no shortness of breath. Current antihyperlipidemic treatment includes statins. There are no compliance problems. Risk factors for coronary artery disease include hypertension, dyslipidemia and post-menopausal.     Ongoing issues w/ sig pain. Had seen dr in [facility] for arthritis. Sig pain. Pain in hips, feet, shoulders. Has f/u appt early next mo. Methotrexate increased to 7 pills per week. Has not seen much improvement. Thyroid, b12, vit d all increased as well. Has not tried humira or any other meds. Does get some relief w/ steroid w/ low dose for steroid. Has not seen much benefit w/ cymbalta. Patient's medications, allergies, past medical, surgical, social and family histories were reviewed and updated as appropriate. ROS:  Review of Systems   Constitutional:  Negative for fever. Respiratory:  Negative for shortness of breath. Musculoskeletal:  Positive for arthralgias. Psychiatric/Behavioral:  Positive for dysphoric mood.             Hemoglobin A1C (%)   Date Value   2022 5.2     LDL Calculated (mg/dL)   Date Value   2023 79       Past Medical History:   Diagnosis Date    Asthma     COPD (chronic obstructive pulmonary disease) (HCC)     Hyperlipidemia     Hypertension     RA (rheumatoid arthritis) ([address])     Thyroid disease     has half of her thyroid       Family History   Problem

## 2023-10-13 ENCOUNTER — OFFICE VISIT (OUTPATIENT)
Dept: FAMILY MEDICINE CLINIC | Age: 83
End: 2023-10-13

## 2023-10-13 VITALS
BODY MASS INDEX: 29.95 KG/M2 | OXYGEN SATURATION: 96 % | DIASTOLIC BLOOD PRESSURE: 72 MMHG | SYSTOLIC BLOOD PRESSURE: 122 MMHG | HEART RATE: 60 BPM | WEIGHT: 180 LBS

## 2023-10-13 DIAGNOSIS — J02.9 ACUTE VIRAL PHARYNGITIS: Primary | ICD-10-CM

## 2023-10-13 RX ORDER — CEFDINIR 300 MG/1
300 CAPSULE ORAL 2 TIMES DAILY
Qty: 20 CAPSULE | Refills: 0 | Status: SHIPPED | OUTPATIENT
Start: 2023-10-13 | End: 2023-10-23

## 2023-10-13 ASSESSMENT — ENCOUNTER SYMPTOMS
TROUBLE SWALLOWING: 0
GASTROINTESTINAL NEGATIVE: 1
WHEEZING: 0
VOICE CHANGE: 1
SINUS PRESSURE: 1
RHINORRHEA: 0
SORE THROAT: 1
SHORTNESS OF BREATH: 0
COUGH: 1
SINUS PAIN: 0

## 2023-10-13 NOTE — PROGRESS NOTES
Chief Complaint   Patient presents with    Cough     Unproductive cough and laryngitis. blood when blowing nose twice. Symptoms x2 days       HPI:  Dari Davila is a 82 y.o. (: 1940) here today   for non productive cough and laryngitis, sinus congestion x2 days. H/A yesterday and felt tight across chest. Has some blood tinged sputum form sinus. Afebrile today. Has a history of COPD and is on Wixela inhaler, ProAir. Has not COVID tested but has COVID test available at home. Patient's medications, allergies, past medical, surgical, social and family histories were reviewed and updated asappropriate. ROS:  Review of Systems   Constitutional: Negative. Negative for chills and fever. HENT:  Positive for congestion, sinus pressure, sore throat and voice change. Negative for rhinorrhea, sinus pain and trouble swallowing. Respiratory:  Positive for cough. Negative for shortness of breath and wheezing. Gastrointestinal: Negative. Neurological:  Positive for headaches. Psychiatric/Behavioral: Negative. Prior to Visit Medications    Medication Sig Taking? Authorizing Provider   Cholecalciferol (VITAMIN D3 PO) Take by mouth Yes Rachell Palacios MD   cefdinir (OMNICEF) 300 MG capsule Take 1 capsule by mouth 2 times daily for 10 days Yes ANDREW Diallo - CNP   dexamethasone (DECADRON) 2 MG tablet Take 1 tablet by mouth daily as needed (RA pain) Yes Angela Patel MD   clobetasol (TEMOVATE) 0.05 % cream Apply topically 2 times daily. Yes Angela Patel MD   Cyanocobalamin (B-12 PO) Take 5,000 mg by mouth daily Yes Rachell Palacios MD   fluticasone-salmeterol (WIXELA INHUB) 250-50 MCG/ACT AEPB diskus inhaler Inhale 1 puff into the lungs in the morning and 1 puff in the evening. Please do not substitute for generic.  Yes Gregg Dick MD   Omega-3 Fatty Acids (FISH OIL PO) Take by mouth daily Yes Rachell Palacios MD   Probiotic Product (PROBIOTIC PO)

## 2023-10-26 ENCOUNTER — OFFICE VISIT (OUTPATIENT)
Dept: FAMILY MEDICINE CLINIC | Age: 83
End: 2023-10-26

## 2023-10-26 VITALS
OXYGEN SATURATION: 96 % | WEIGHT: 178.8 LBS | DIASTOLIC BLOOD PRESSURE: 76 MMHG | BODY MASS INDEX: 29.79 KG/M2 | SYSTOLIC BLOOD PRESSURE: 112 MMHG | HEART RATE: 78 BPM | HEIGHT: 65 IN

## 2023-10-26 DIAGNOSIS — R05.9 COUGH, UNSPECIFIED TYPE: ICD-10-CM

## 2023-10-26 DIAGNOSIS — R49.0 HOARSENESS: Primary | ICD-10-CM

## 2023-10-26 DIAGNOSIS — M05.719 RHEUMATOID ARTHRITIS INVOLVING SHOULDER WITH POSITIVE RHEUMATOID FACTOR, UNSPECIFIED LATERALITY (HCC): ICD-10-CM

## 2023-10-26 DIAGNOSIS — E78.2 MIXED HYPERLIPIDEMIA: ICD-10-CM

## 2023-10-26 RX ORDER — DOXYCYCLINE HYCLATE 100 MG
100 TABLET ORAL 2 TIMES DAILY
Qty: 20 TABLET | Refills: 0 | Status: SHIPPED | OUTPATIENT
Start: 2023-10-26 | End: 2023-11-05

## 2023-10-26 ASSESSMENT — ENCOUNTER SYMPTOMS
SHORTNESS OF BREATH: 1
COUGH: 1

## 2023-10-26 NOTE — PROGRESS NOTES
Chief Complaint   Patient presents with    Cough    Hoarse       HPI:  Crystal Mitchell is a 82 y.o. (: 1940) here today   for follow up on cough with production and hoarseness. HPI  Seen by pulm approx 4 mo ago. CT done. No COPD on pft.  ? ILD, other issues. Seen here approx 2 weeks ago. Was given abx. Coughing less frequently. Sputum production noted. No sig drainage noted currently. Claritin helped post nasal drainage. Breathing near baseline. No recent steroids. Has been off statin for few weeks. Pain improved. Has upped methotrexate as well. Patient's medications, allergies, past medical, surgical, social and family histories were reviewed and updated as appropriate. ROS:  Review of Systems   Constitutional:  Negative for fever. HENT:  Negative for congestion and postnasal drip. Respiratory:  Positive for cough and shortness of breath (near baseline). Hemoglobin A1C (%)   Date Value   2022 5.2     LDL Calculated (mg/dL)   Date Value   2023 79       Past Medical History:   Diagnosis Date    Asthma     COPD (chronic obstructive pulmonary disease) ([facility])     Hyperlipidemia     Hypertension     RA (rheumatoid arthritis) ([address] [facility])     Thyroid disease     has half of her thyroid       Family History   Problem Relation Age of Onset    High Blood Pressure Mother        Social History     Socioeconomic History    Marital status:      Spouse name: Not on file    Number of children: Not on file    Years of education: Not on file    Highest education level: Not on file   Occupational History    Not on file   Tobacco Use    Smoking status: Former     Packs/day: 0.50     Years: 20.00     Additional pack years: 0.00     Total pack years: 10.00     Types: Cigarettes     Start date: 1959     Quit date: 1987     Years since quittin.8    Smokeless tobacco: Never   Vaping Use    Vaping Use: Never used   Substance and Sexual Activity    Alcohol use:  No

## 2023-10-30 RX ORDER — TRIAMTERENE AND HYDROCHLOROTHIAZIDE 37.5; 25 MG/1; MG/1
TABLET ORAL
Qty: 90 TABLET | Refills: 3 | Status: SHIPPED | OUTPATIENT
Start: 2023-10-30

## 2023-11-08 ENCOUNTER — OFFICE VISIT (OUTPATIENT)
Dept: ENT CLINIC | Age: 83
End: 2023-11-08
Payer: MEDICARE

## 2023-11-08 VITALS — BODY MASS INDEX: 29.99 KG/M2 | WEIGHT: 180 LBS | HEIGHT: 65 IN | RESPIRATION RATE: 16 BRPM

## 2023-11-08 DIAGNOSIS — J04.0 LARYNGITIS: Primary | ICD-10-CM

## 2023-11-08 DIAGNOSIS — R49.0 DYSPHONIA: ICD-10-CM

## 2023-11-08 DIAGNOSIS — B37.0 THRUSH: ICD-10-CM

## 2023-11-08 PROCEDURE — 1090F PRES/ABSN URINE INCON ASSESS: CPT | Performed by: OTOLARYNGOLOGY

## 2023-11-08 PROCEDURE — 1123F ACP DISCUSS/DSCN MKR DOCD: CPT | Performed by: OTOLARYNGOLOGY

## 2023-11-08 PROCEDURE — 99204 OFFICE O/P NEW MOD 45 MIN: CPT | Performed by: OTOLARYNGOLOGY

## 2023-11-08 PROCEDURE — G8399 PT W/DXA RESULTS DOCUMENT: HCPCS | Performed by: OTOLARYNGOLOGY

## 2023-11-08 PROCEDURE — 1036F TOBACCO NON-USER: CPT | Performed by: OTOLARYNGOLOGY

## 2023-11-08 PROCEDURE — G8484 FLU IMMUNIZE NO ADMIN: HCPCS | Performed by: OTOLARYNGOLOGY

## 2023-11-08 PROCEDURE — G8427 DOCREV CUR MEDS BY ELIG CLIN: HCPCS | Performed by: OTOLARYNGOLOGY

## 2023-11-08 PROCEDURE — 31575 DIAGNOSTIC LARYNGOSCOPY: CPT | Performed by: OTOLARYNGOLOGY

## 2023-11-08 PROCEDURE — G8417 CALC BMI ABV UP PARAM F/U: HCPCS | Performed by: OTOLARYNGOLOGY

## 2023-11-08 RX ORDER — FLUCONAZOLE 100 MG/1
TABLET ORAL
Qty: 15 TABLET | Refills: 0 | Status: SHIPPED | OUTPATIENT
Start: 2023-11-08

## 2023-11-08 ASSESSMENT — ENCOUNTER SYMPTOMS
APNEA: 0
SORE THROAT: 0
EYE ITCHING: 0
FACIAL SWELLING: 0
TROUBLE SWALLOWING: 0
COUGH: 0
SINUS PRESSURE: 0
VOICE CHANGE: 1
SHORTNESS OF BREATH: 0

## 2023-11-08 NOTE — PROGRESS NOTES
[address], [address] [place] [address], [address] [place] [address],[address]  P: [phone]       Patient     Tiffanie Tamez      ChiefComplaint     Chief Complaint   Patient presents with    New Patient     Patient states she has hoarseness, patient states it started with nasal drainage and dry cough about 1 month ago, losing voice after talking. History of Present Illness     ***    Past Medical History     Past Medical History:   Diagnosis Date    Asthma     COPD (chronic obstructive pulmonary disease) (HCC)     Hyperlipidemia     Hypertension     RA (rheumatoid arthritis) ([address])     Thyroid disease     has half of her thyroid       Past Surgical History     Past Surgical History:   Procedure Laterality Date    ABDOMINAL EXPLORATION SURGERY      APPENDECTOMY      CHOLECYSTECTOMY      COLONOSCOPY  2013    diverticulosis, polyps    COLONOSCOPY  11/10/2016    normal colon    EYE SURGERY Right 2015    cataract removal    EYE SURGERY Left 2016    cataract removed    FRACTURE SURGERY      HUMERUS FRACTURE SURGERY Right 2021    HUMERUS OPEN REDUCTION INTERNAL FIXATION performed by Grisel Segura MD at [address] ( [facility])      LAMINECTOMY N/A 2022    T10-11 THORACIC DECOMPRESSION, DISCECTOMY, FUSION / FIXATION performed by Eli Batista.  Juan Miguel Abrams MD at [address]  2022    SPINE SURGERY  2022    THYROIDECTOMY, PARTIAL Left     TUMOR REMOVAL      off of thyroid, was benign       Family History     Family History   Problem Relation Age of Onset    High Blood Pressure Mother        Social History     Social History     Tobacco Use    Smoking status: Former     Packs/day: 0.50     Years: 20.00     Additional pack years: 0.00     Total pack years: 10.00     Types: Cigarettes     Start date: 1959     Quit date: 1987     Years since quittin.8    Smokeless tobacco: Never   Vaping Use    Vaping Use: Never

## 2023-11-08 NOTE — PROGRESS NOTES
[address], [address] [place] [address], [address] [place] [address],[address]  P: [phone]       Patient     Jesus Davalos  [birth year]    ChiefComplaint     Chief Complaint   Patient presents with    New Patient     Patient states she has hoarseness, patient states it started with nasal drainage and dry cough about 1 month ago, losing voice after talking. History of Present Illness     Joyce Salas is an 82-year-old female here today for evaluation of dysphonia present x1 month. Had URI prior to symptom onset. Has rheumatoid arthritis on methotrexate as well as asthma for which she uses daily steroid inhaler. Voice has been consistently poor since change. Reports she has had a tendency towards hoarseness in the past.  History of smoking quit decades ago. Past Medical History     Past Medical History:   Diagnosis Date    Asthma     COPD (chronic obstructive pulmonary disease) ([address])     Hyperlipidemia     Hypertension     RA (rheumatoid arthritis) ([address])     Thyroid disease     has half of her thyroid       Past Surgical History     Past Surgical History:   Procedure Laterality Date    ABDOMINAL EXPLORATION SURGERY      APPENDECTOMY      CHOLECYSTECTOMY      COLONOSCOPY  04/18/2013    diverticulosis, polyps    COLONOSCOPY  11/10/2016    normal colon    EYE SURGERY Right 12/11/2015    cataract removal    EYE SURGERY Left 01/08/2016    cataract removed    FRACTURE SURGERY      HUMERUS FRACTURE SURGERY Right 02/28/2021    HUMERUS OPEN REDUCTION INTERNAL FIXATION performed by Dao Broderick MD at [address] ([address])      LAMINECTOMY N/A 01/27/2022    T10-11 THORACIC DECOMPRESSION, DISCECTOMY, FUSION / FIXATION performed by Steve Proctor.  Ofelia Bowser MD at [address]  11/18/2022    SPINE SURGERY  01/27/2022    THYROIDECTOMY, PARTIAL Left     TUMOR REMOVAL      off of thyroid, was benign       Family History     Family History   Problem Relation Age of Onset    High

## 2023-11-29 ENCOUNTER — OFFICE VISIT (OUTPATIENT)
Dept: ENT CLINIC | Age: 83
End: 2023-11-29
Payer: MEDICARE

## 2023-11-29 VITALS
HEART RATE: 63 BPM | RESPIRATION RATE: 16 BRPM | HEIGHT: 65 IN | BODY MASS INDEX: 29.99 KG/M2 | WEIGHT: 180 LBS | DIASTOLIC BLOOD PRESSURE: 68 MMHG | SYSTOLIC BLOOD PRESSURE: 155 MMHG

## 2023-11-29 DIAGNOSIS — J04.0 LARYNGITIS: ICD-10-CM

## 2023-11-29 DIAGNOSIS — B37.0 THRUSH: Primary | ICD-10-CM

## 2023-11-29 DIAGNOSIS — R49.0 DYSPHONIA: ICD-10-CM

## 2023-11-29 PROCEDURE — 99213 OFFICE O/P EST LOW 20 MIN: CPT | Performed by: OTOLARYNGOLOGY

## 2023-11-29 PROCEDURE — 3074F SYST BP LT 130 MM HG: CPT | Performed by: OTOLARYNGOLOGY

## 2023-11-29 PROCEDURE — 1123F ACP DISCUSS/DSCN MKR DOCD: CPT | Performed by: OTOLARYNGOLOGY

## 2023-11-29 PROCEDURE — 3078F DIAST BP <80 MM HG: CPT | Performed by: OTOLARYNGOLOGY

## 2023-11-29 PROCEDURE — 31575 DIAGNOSTIC LARYNGOSCOPY: CPT | Performed by: OTOLARYNGOLOGY

## 2023-11-29 RX ORDER — FLUCONAZOLE 100 MG/1
100 TABLET ORAL DAILY
Qty: 14 TABLET | Refills: 0 | Status: SHIPPED | OUTPATIENT
Start: 2023-11-29 | End: 2023-12-13

## 2023-11-29 NOTE — PROGRESS NOTES
levothyroxine (SYNTHROID) 25 MCG tablet TAKE ONE TABLET BY MOUTH DAILY (Patient taking differently: 2 tablets Daily) 90 tablet 3    calcium carbonate (OSCAL) 500 MG TABS tablet Take 1,200 mg by mouth daily      atenolol (TENORMIN) 25 MG tablet Take 0.5 tablets by mouth daily (Patient taking differently: Take 0.5 tablets by mouth three times a week) 30 tablet 3    PROAIR  (90 Base) MCG/ACT inhaler INHALE 2 PUFFS BY MOUTH  INTO THE LUNGS EVERY 6  HOURS AS NEEDED FOR  WHEEZING OR SHORTNESS OF  BREATH 34 g 1    RESTASIS 0.05 % ophthalmic emulsion       DULoxetine (CYMBALTA) 60 MG extended release capsule Take 1 capsule by mouth nightly 90 capsule 3     No current facility-administered medications for this visit. Review of Systems     Review of Systems   Constitutional:  Negative for appetite change, chills, fatigue, fever and unexpected weight change. HENT:  Positive for voice change. Negative for congestion, ear discharge, ear pain, facial swelling, hearing loss, nosebleeds, postnasal drip, sinus pressure, sneezing, sore throat, tinnitus and trouble swallowing. Eyes:  Negative for itching. Respiratory:  Negative for apnea, cough and shortness of breath. Endocrine: Negative for cold intolerance and heat intolerance. Musculoskeletal:  Negative for myalgias and neck pain. Skin:  Negative for rash. Allergic/Immunologic: Negative for environmental allergies. Neurological:  Negative for dizziness and headaches. Psychiatric/Behavioral:  Negative for confusion, decreased concentration and sleep disturbance. PhysicalExam     Vitals:    11/29/23 1125   BP: (!) 155/68   Site: Right Upper Arm   Position: Sitting   Cuff Size: Large Adult   Pulse: 63   Resp: 16   Weight: 81.6 kg (180 lb)   Height: 1.651 m (5' 5\")       Constitutional:       Appearance: Normal appearance. Rough vocal quality  HENT:      Head: Normocephalic. Nose: Nose normal.      Mouth:  Thrush resolved  Eyes:

## 2023-11-30 ENCOUNTER — TELEPHONE (OUTPATIENT)
Dept: FAMILY MEDICINE CLINIC | Age: 83
End: 2023-11-30

## 2023-11-30 DIAGNOSIS — M25.512 LEFT SHOULDER PAIN, UNSPECIFIED CHRONICITY: Primary | ICD-10-CM

## 2023-11-30 NOTE — TELEPHONE ENCOUNTER
No injury, Pt was told it was just arthritis but she doesn't feel like it is.  Order placed, pt informed

## 2023-11-30 NOTE — TELEPHONE ENCOUNTER
Pt said left shoulder is hurting and can't move it, feels it click when she tries to lift a coffee cup to drink. She has good range of motion some then when she moves it a certain way it brings her to tears. Pt is wondering if you could send in an order for an xray?

## 2023-12-01 ENCOUNTER — HOSPITAL ENCOUNTER (OUTPATIENT)
Dept: GENERAL RADIOLOGY | Age: 83
Discharge: HOME | End: 2023-12-01
Payer: MEDICARE

## 2023-12-01 ENCOUNTER — HOSPITAL ENCOUNTER (OUTPATIENT)
Age: 83
Discharge: HOME | End: 2023-12-01
Payer: MEDICARE

## 2023-12-01 DIAGNOSIS — M25.512 LEFT SHOULDER PAIN, UNSPECIFIED CHRONICITY: ICD-10-CM

## 2023-12-01 PROCEDURE — 73030 X-RAY EXAM OF SHOULDER: CPT

## 2023-12-05 ENCOUNTER — TELEPHONE (OUTPATIENT)
Dept: ENT CLINIC | Age: 83
End: 2023-12-05

## 2023-12-05 NOTE — TELEPHONE ENCOUNTER
Patient Naveen Puentes was last seen on 11/29/23 for Laryngitis . Patient is calling because she has a cough so bad that she's not sleeping. The cough is extremely dry and is taking her breath away a times. Patient states the fluconazole (DIFLUCAN) 100 MG tablet doesn't seems to help. Naveen Puentes is wondering if there is something more that can be prescribed for this or what more she can do? Naveen Puentes would also like to know if she should continue to take the Claritin she is taking over the counter. Please advise.

## 2023-12-06 ASSESSMENT — ENCOUNTER SYMPTOMS
TROUBLE SWALLOWING: 0
APNEA: 0
COUGH: 0
SORE THROAT: 0
SINUS PRESSURE: 0
FACIAL SWELLING: 0
VOICE CHANGE: 1
SHORTNESS OF BREATH: 0
EYE ITCHING: 0

## 2023-12-08 ENCOUNTER — NURSE ONLY (OUTPATIENT)
Dept: FAMILY MEDICINE CLINIC | Age: 83
End: 2023-12-08

## 2023-12-08 DIAGNOSIS — M05.719 RHEUMATOID ARTHRITIS INVOLVING SHOULDER WITH POSITIVE RHEUMATOID FACTOR, UNSPECIFIED LATERALITY (HCC): ICD-10-CM

## 2023-12-08 DIAGNOSIS — E78.2 MIXED HYPERLIPIDEMIA: ICD-10-CM

## 2023-12-11 ENCOUNTER — NURSE ONLY (OUTPATIENT)
Dept: FAMILY MEDICINE CLINIC | Age: 83
End: 2023-12-11
Payer: MEDICARE

## 2023-12-11 DIAGNOSIS — E78.2 MIXED HYPERLIPIDEMIA: Primary | ICD-10-CM

## 2023-12-11 DIAGNOSIS — M05.719 RHEUMATOID ARTHRITIS INVOLVING SHOULDER WITH POSITIVE RHEUMATOID FACTOR, UNSPECIFIED LATERALITY (HCC): ICD-10-CM

## 2023-12-11 DIAGNOSIS — Z23 NEED FOR IMMUNIZATION AGAINST INFLUENZA: ICD-10-CM

## 2023-12-11 LAB
ALBUMIN SERPL-MCNC: 4.3 G/DL (ref 3.4–5)
ALBUMIN/GLOB SERPL: 2 {RATIO} (ref 1.1–2.2)
ALP SERPL-CCNC: 80 U/L (ref 40–129)
ALT SERPL-CCNC: 52 U/L (ref 10–40)
ANION GAP SERPL CALCULATED.3IONS-SCNC: 8 MMOL/L (ref 3–16)
AST SERPL-CCNC: 37 U/L (ref 15–37)
BASOPHILS # BLD: 0.1 K/UL (ref 0–0.2)
BASOPHILS NFR BLD: 1.2 %
BILIRUB SERPL-MCNC: 0.6 MG/DL (ref 0–1)
BUN SERPL-MCNC: 17 MG/DL (ref 7–20)
CALCIUM SERPL-MCNC: 9.6 MG/DL (ref 8.3–10.6)
CHLORIDE SERPL-SCNC: 101 MMOL/L (ref 99–110)
CHOLEST SERPL-MCNC: 301 MG/DL (ref 0–199)
CO2 SERPL-SCNC: 29 MMOL/L (ref 21–32)
CREAT SERPL-MCNC: 0.9 MG/DL (ref 0.6–1.2)
DEPRECATED RDW RBC AUTO: 15.6 % (ref 12.4–15.4)
EOSINOPHIL # BLD: 0.3 K/UL (ref 0–0.6)
EOSINOPHIL NFR BLD: 4.1 %
GFR SERPLBLD CREATININE-BSD FMLA CKD-EPI: >60 ML/MIN/{1.73_M2}
GLUCOSE SERPL-MCNC: 107 MG/DL (ref 70–99)
HCT VFR BLD AUTO: 39.5 % (ref 36–48)
HDLC SERPL-MCNC: 42 MG/DL (ref 40–60)
HGB BLD-MCNC: 13.5 G/DL (ref 12–16)
LDLC SERPL CALC-MCNC: 222 MG/DL
LYMPHOCYTES # BLD: 1.6 K/UL (ref 1–5.1)
LYMPHOCYTES NFR BLD: 25.7 %
MCH RBC QN AUTO: 32.9 PG (ref 26–34)
MCHC RBC AUTO-ENTMCNC: 34.1 G/DL (ref 31–36)
MCV RBC AUTO: 96.5 FL (ref 80–100)
MONOCYTES # BLD: 0.4 K/UL (ref 0–1.3)
MONOCYTES NFR BLD: 5.7 %
NEUTROPHILS # BLD: 3.9 K/UL (ref 1.7–7.7)
NEUTROPHILS NFR BLD: 63.3 %
PLATELET # BLD AUTO: 219 K/UL (ref 135–450)
PMV BLD AUTO: 10 FL (ref 5–10.5)
POTASSIUM SERPL-SCNC: 5.1 MMOL/L (ref 3.5–5.1)
PROT SERPL-MCNC: 6.5 G/DL (ref 6.4–8.2)
RBC # BLD AUTO: 4.09 M/UL (ref 4–5.2)
SODIUM SERPL-SCNC: 138 MMOL/L (ref 136–145)
TRIGL SERPL-MCNC: 185 MG/DL (ref 0–150)
VLDLC SERPL CALC-MCNC: 37 MG/DL
WBC # BLD AUTO: 6.2 K/UL (ref 4–11)

## 2023-12-11 PROCEDURE — G0008 ADMIN INFLUENZA VIRUS VAC: HCPCS | Performed by: FAMILY MEDICINE

## 2023-12-11 PROCEDURE — 36415 COLL VENOUS BLD VENIPUNCTURE: CPT | Performed by: FAMILY MEDICINE

## 2023-12-11 PROCEDURE — 90694 VACC AIIV4 NO PRSRV 0.5ML IM: CPT | Performed by: FAMILY MEDICINE

## 2023-12-12 DIAGNOSIS — I10 BENIGN ESSENTIAL HTN: ICD-10-CM

## 2023-12-12 DIAGNOSIS — E78.2 MIXED HYPERLIPIDEMIA: Primary | ICD-10-CM

## 2023-12-12 NOTE — RESULT ENCOUNTER NOTE
She could try taking 1/2 tablet daily to see if she tolerates. Call if does not tolerate. Repeat labs in 3 to 4 months.

## 2023-12-12 NOTE — RESULT ENCOUNTER NOTE
Slight inc in ALT. O/w cmp ok. Lipids sig elevated. Had previously been on atorvastatin. Appears stopped due to SE. Would she be willing to consider a different chol med or a lower dose?   Cbc ok

## 2023-12-14 ENCOUNTER — OFFICE VISIT (OUTPATIENT)
Dept: ENT CLINIC | Age: 83
End: 2023-12-14
Payer: MEDICARE

## 2023-12-14 VITALS — SYSTOLIC BLOOD PRESSURE: 150 MMHG | DIASTOLIC BLOOD PRESSURE: 91 MMHG | TEMPERATURE: 97 F | HEART RATE: 74 BPM

## 2023-12-14 DIAGNOSIS — R49.0 DYSPHONIA: ICD-10-CM

## 2023-12-14 DIAGNOSIS — J04.0 LARYNGITIS: Primary | ICD-10-CM

## 2023-12-14 DIAGNOSIS — R05.8 PRODUCTIVE COUGH: ICD-10-CM

## 2023-12-14 PROCEDURE — 3077F SYST BP >= 140 MM HG: CPT | Performed by: OTOLARYNGOLOGY

## 2023-12-14 PROCEDURE — 3080F DIAST BP >= 90 MM HG: CPT | Performed by: OTOLARYNGOLOGY

## 2023-12-14 PROCEDURE — 1123F ACP DISCUSS/DSCN MKR DOCD: CPT | Performed by: OTOLARYNGOLOGY

## 2023-12-14 PROCEDURE — 31575 DIAGNOSTIC LARYNGOSCOPY: CPT | Performed by: OTOLARYNGOLOGY

## 2023-12-14 PROCEDURE — 99213 OFFICE O/P EST LOW 20 MIN: CPT | Performed by: OTOLARYNGOLOGY

## 2023-12-14 NOTE — PROGRESS NOTES
[address], [address] [facility], [address] [place] [address],[address]  P: [phone]       Patient     Isaac Hernandez  [birth year]    ChiefComplaint     Chief Complaint   Patient presents with    Follow-up     2  wk ck -  states she is a little better       History of Present Illness     Justin Castañeda is an 82-year-old female here today for 2-week recheck of fungal laryngitis. States since last seen her voice is improved but not fully back to baseline. Persistent daily productive cough with white stringy secretions or thick yellow globs. Has not seen Dr. Natali Antonio recently not due to see him until end of January. Past Medical History     Past Medical History:   Diagnosis Date    Asthma     COPD (chronic obstructive pulmonary disease) ([address])     Hyperlipidemia     Hypertension     RA (rheumatoid arthritis) ([address])     Thyroid disease     has half of her thyroid       Past Surgical History     Past Surgical History:   Procedure Laterality Date    ABDOMINAL EXPLORATION SURGERY      APPENDECTOMY      CHOLECYSTECTOMY      COLONOSCOPY  04/18/2013    diverticulosis, polyps    COLONOSCOPY  11/10/2016    normal colon    EYE SURGERY Right 12/11/2015    cataract removal    EYE SURGERY Left 01/08/2016    cataract removed    FRACTURE SURGERY      HUMERUS FRACTURE SURGERY Right 02/28/2021    HUMERUS OPEN REDUCTION INTERNAL FIXATION performed by Sabiha Mcduffie MD at [address] ([address] [facility])      LAMINECTOMY N/A 01/27/2022    T10-11 THORACIC DECOMPRESSION, DISCECTOMY, FUSION / FIXATION performed by Mckenzie Kennedy.  Arlen Cho MD at [address]  11/18/2022    SPINE SURGERY  01/27/2022    THYROIDECTOMY, PARTIAL Left     TUMOR REMOVAL      off of thyroid, was benign       Family History     Family History   Problem Relation Age of Onset    High Blood Pressure Mother        Social History     Social History     Tobacco Use    Smoking status: Former     Packs/day: 0.50     Years: 20.00

## 2023-12-27 RX ORDER — AZITHROMYCIN 250 MG/1
250 TABLET, FILM COATED ORAL SEE ADMIN INSTRUCTIONS
Qty: 6 TABLET | Refills: 0 | Status: SHIPPED | OUTPATIENT
Start: 2023-12-27 | End: 2024-01-01

## 2023-12-27 RX ORDER — BENZONATATE 200 MG/1
200 CAPSULE ORAL 3 TIMES DAILY PRN
Qty: 30 CAPSULE | Refills: 0 | Status: SHIPPED | OUTPATIENT
Start: 2023-12-27 | End: 2024-01-03

## 2023-12-31 RX ORDER — FLUTICASONE PROPIONATE AND SALMETEROL 250; 50 UG/1; UG/1
1 POWDER RESPIRATORY (INHALATION) 2 TIMES DAILY
Qty: 60 EACH | Refills: 11 | Status: SHIPPED | OUTPATIENT
Start: 2023-12-31

## 2024-01-03 ENCOUNTER — TELEPHONE (OUTPATIENT)
Dept: FAMILY MEDICINE CLINIC | Age: 84
End: 2024-01-03

## 2024-01-03 NOTE — TELEPHONE ENCOUNTER
Monitor symptoms closely.  Keep well hydrated.  Gargle mouth wash.  Call and do home covid test if symptoms develop

## 2024-01-03 NOTE — TELEPHONE ENCOUNTER
Was around grandson on Saturday. He tested positive for COVID 1-2 days ago. She is not necessarily having symptoms other than usual respiratory issues. Wanted to know if there is anything she needs to do since she was exposed?

## 2024-01-10 ENCOUNTER — TELEPHONE (OUTPATIENT)
Dept: FAMILY MEDICINE CLINIC | Age: 84
End: 2024-01-10

## 2024-01-10 RX ORDER — DEXAMETHASONE 6 MG/1
6 TABLET ORAL
Qty: 5 TABLET | Refills: 0 | Status: SHIPPED | OUTPATIENT
Start: 2024-01-10 | End: 2024-01-15

## 2024-01-10 RX ORDER — CEFDINIR 300 MG/1
300 CAPSULE ORAL 2 TIMES DAILY
Qty: 20 CAPSULE | Refills: 0 | Status: SHIPPED | OUTPATIENT
Start: 2024-01-10 | End: 2024-01-20

## 2024-01-10 NOTE — TELEPHONE ENCOUNTER
It appears she had been on a Z-Dimitris.  If no recent other antibiotics, could add Omnicef 300 mg twice daily x 10 days.  Prednisone 40 mg daily x 5 days.  Follow-up with pulmonology as planned.  Recent chest x-ray did not show pneumonia.  If worsening symptoms otherwise, will need an appointment

## 2024-01-10 NOTE — TELEPHONE ENCOUNTER
I had forgotten that she cannot take prednisone.  If she is able to take dexamethasone, okay to call in a prescription 6 mg daily x 5 days

## 2024-01-10 NOTE — TELEPHONE ENCOUNTER
Pt is still coughing, spitting up yellowish green/sometimes white but always really mucusy. She has had antibiotics but still feels miserable, she is wondering if she needs to be seen or if there is something else you can give her?  Pt uses [facility] in [place]

## 2024-01-11 NOTE — TELEPHONE ENCOUNTER
Pt calling back states her coughing is so bad she can't hardly breath. She wonders if she should go the ER. She is taking the meds you prescribed but not getting any better. Advised pt to check o2 and if drops below 90% may need evaluated in ER.

## 2024-01-23 ENCOUNTER — OFFICE VISIT (OUTPATIENT)
Dept: FAMILY MEDICINE CLINIC | Age: 84
End: 2024-01-23
Payer: MEDICARE

## 2024-01-23 VITALS
HEART RATE: 82 BPM | DIASTOLIC BLOOD PRESSURE: 86 MMHG | TEMPERATURE: 98.2 F | WEIGHT: 174 LBS | OXYGEN SATURATION: 96 % | BODY MASS INDEX: 28.99 KG/M2 | SYSTOLIC BLOOD PRESSURE: 124 MMHG | HEIGHT: 65 IN

## 2024-01-23 DIAGNOSIS — R05.2 SUBACUTE COUGH: Primary | ICD-10-CM

## 2024-01-23 DIAGNOSIS — M05.719 RHEUMATOID ARTHRITIS INVOLVING SHOULDER WITH POSITIVE RHEUMATOID FACTOR, UNSPECIFIED LATERALITY (HCC): ICD-10-CM

## 2024-01-23 PROCEDURE — 3079F DIAST BP 80-89 MM HG: CPT | Performed by: FAMILY MEDICINE

## 2024-01-23 PROCEDURE — 99213 OFFICE O/P EST LOW 20 MIN: CPT | Performed by: FAMILY MEDICINE

## 2024-01-23 PROCEDURE — 1123F ACP DISCUSS/DSCN MKR DOCD: CPT | Performed by: FAMILY MEDICINE

## 2024-01-23 PROCEDURE — 3074F SYST BP LT 130 MM HG: CPT | Performed by: FAMILY MEDICINE

## 2024-01-23 RX ORDER — BUDESONIDE, GLYCOPYRROLATE, AND FORMOTEROL FUMARATE 160; 9; 4.8 UG/1; UG/1; UG/1
2 AEROSOL, METERED RESPIRATORY (INHALATION) 2 TIMES DAILY
Qty: 1 EACH | Refills: 0 | Status: SHIPPED | COMMUNITY
Start: 2024-01-23

## 2024-01-23 RX ORDER — TRAMADOL HYDROCHLORIDE 50 MG/1
50 TABLET ORAL EVERY 8 HOURS PRN
Qty: 45 TABLET | Refills: 0 | Status: SHIPPED | OUTPATIENT
Start: 2024-01-23 | End: 2024-02-06

## 2024-01-23 RX ORDER — DEXTROMETHORPHAN HYDROBROMIDE AND PROMETHAZINE HYDROCHLORIDE 15; 6.25 MG/5ML; MG/5ML
5 SYRUP ORAL 4 TIMES DAILY PRN
Qty: 120 ML | Refills: 0 | Status: SHIPPED | OUTPATIENT
Start: 2024-01-23 | End: 2024-01-30

## 2024-01-23 RX ORDER — AZELASTINE 1 MG/ML
2 SPRAY, METERED NASAL 2 TIMES DAILY
Qty: 120 ML | Refills: 1 | Status: SHIPPED | OUTPATIENT
Start: 2024-01-23

## 2024-01-23 ASSESSMENT — PATIENT HEALTH QUESTIONNAIRE - PHQ9
8. MOVING OR SPEAKING SO SLOWLY THAT OTHER PEOPLE COULD HAVE NOTICED. OR THE OPPOSITE, BEING SO FIGETY OR RESTLESS THAT YOU HAVE BEEN MOVING AROUND A LOT MORE THAN USUAL: 0
SUM OF ALL RESPONSES TO PHQ QUESTIONS 1-9: 2
SUM OF ALL RESPONSES TO PHQ QUESTIONS 1-9: 2
5. POOR APPETITE OR OVEREATING: 0
3. TROUBLE FALLING OR STAYING ASLEEP: 0
SUM OF ALL RESPONSES TO PHQ9 QUESTIONS 1 & 2: 2
9. THOUGHTS THAT YOU WOULD BE BETTER OFF DEAD, OR OF HURTING YOURSELF: 0
10. IF YOU CHECKED OFF ANY PROBLEMS, HOW DIFFICULT HAVE THESE PROBLEMS MADE IT FOR YOU TO DO YOUR WORK, TAKE CARE OF THINGS AT HOME, OR GET ALONG WITH OTHER PEOPLE: 0
4. FEELING TIRED OR HAVING LITTLE ENERGY: 0
SUM OF ALL RESPONSES TO PHQ QUESTIONS 1-9: 2
7. TROUBLE CONCENTRATING ON THINGS, SUCH AS READING THE NEWSPAPER OR WATCHING TELEVISION: 0
2. FEELING DOWN, DEPRESSED OR HOPELESS: 1
1. LITTLE INTEREST OR PLEASURE IN DOING THINGS: 1
SUM OF ALL RESPONSES TO PHQ QUESTIONS 1-9: 2
6. FEELING BAD ABOUT YOURSELF - OR THAT YOU ARE A FAILURE OR HAVE LET YOURSELF OR YOUR FAMILY DOWN: 0

## 2024-01-23 ASSESSMENT — ENCOUNTER SYMPTOMS
SHORTNESS OF BREATH: 1
COUGH: 1

## 2024-01-23 NOTE — PROGRESS NOTES
Chief Complaint   Patient presents with    Follow-up     Patient here for follow up for coughing.       HPI:  Rosalie Hicks is a 83 y.o. (: 1940) here today   for follow up for coughing.  HPI  Ongoing issue w/ cough.  Had been dry cough for some time.  Prior post nasal drip.  Had been seen by ent x 3. Prior laryngoscopy.  Was given diflucan.  Thought vocal cords inflammed due to cough.  Last pulm appt preceded current cough.  Sxs worsened in December.  Initially given zpack.  Pt called back earlier this mo.  Given omnicef.  Has had inc amt of dexamethasone recently.  Had tried coricidin.  Had been on tessalon as well.  Initially more of dry cough, now more sputum production.  Has been using sinus rinse at times as well.  Yellow sputum.  Prior cxr did not show pneumonia.      Patient's medications, allergies, past medical, surgical, social and family histories were reviewed and updated as appropriate.    ROS:  Review of Systems   Constitutional:  Negative for fever.   HENT:  Positive for congestion and sinus pressure.    Respiratory:  Positive for cough and shortness of breath.            Hemoglobin A1C (%)   Date Value   2022 5.2     LDL Calculated (mg/dL)   Date Value   2023 222 (H)       Past Medical History:   Diagnosis Date    Asthma     COPD (chronic obstructive pulmonary disease) (HCC)     Hyperlipidemia     Hypertension     RA (rheumatoid arthritis) (HCC)     Thyroid disease     has half of her thyroid       Family History   Problem Relation Age of Onset    High Blood Pressure Mother        Social History     Socioeconomic History    Marital status:      Spouse name: Not on file    Number of children: Not on file    Years of education: Not on file    Highest education level: Not on file   Occupational History    Not on file   Tobacco Use    Smoking status: Former     Current packs/day: 0.00     Average packs/day: 0.5 packs/day for 28.0 years (14.0 ttl pk-yrs)     Types: Cigarettes

## 2024-01-26 ENCOUNTER — TELEPHONE (OUTPATIENT)
Dept: ADMINISTRATIVE | Age: 84
End: 2024-01-26

## 2024-01-26 NOTE — TELEPHONE ENCOUNTER
Submitted PA for ProAir RespiClick 108 (90 Base)MCG/ACT aerosol powder  Via CMM Key: [id number] STATUS: NOT SENT     The insurance requires use of GENERIC when available; NO PA REQUIRED for GENERIC.  Please notify pharmacy they have to use a GENERIC PRODUCT since LUCINA is not on the RX.  If the plan required use of BRAND the pharmacy must submit with a LUCINA 9.     ALBUTEROL HFA (PROAIR) OR ALBUTEROL HFA (PROVENTIL) OR VENTOLIN HFA, LEVALBUTEROL TARTRATE HFA    If this requires a response please respond to the pool ( P MHCX PSC MEDICATION PRE-AUTH).      Thank you please advise patient.

## 2024-01-30 ENCOUNTER — OFFICE VISIT (OUTPATIENT)
Dept: PULMONOLOGY | Age: 84
End: 2024-01-30
Payer: MEDICARE

## 2024-01-30 VITALS
HEIGHT: 65 IN | DIASTOLIC BLOOD PRESSURE: 82 MMHG | RESPIRATION RATE: 18 BRPM | BODY MASS INDEX: 28.99 KG/M2 | WEIGHT: 174 LBS | HEART RATE: 106 BPM | OXYGEN SATURATION: 97 % | SYSTOLIC BLOOD PRESSURE: 126 MMHG

## 2024-01-30 DIAGNOSIS — J45.909 UNCOMPLICATED ASTHMA, UNSPECIFIED ASTHMA SEVERITY, UNSPECIFIED WHETHER PERSISTENT: Primary | ICD-10-CM

## 2024-01-30 PROCEDURE — G8428 CUR MEDS NOT DOCUMENT: HCPCS | Performed by: INTERNAL MEDICINE

## 2024-01-30 PROCEDURE — G8399 PT W/DXA RESULTS DOCUMENT: HCPCS | Performed by: INTERNAL MEDICINE

## 2024-01-30 PROCEDURE — G8484 FLU IMMUNIZE NO ADMIN: HCPCS | Performed by: INTERNAL MEDICINE

## 2024-01-30 PROCEDURE — 1123F ACP DISCUSS/DSCN MKR DOCD: CPT | Performed by: INTERNAL MEDICINE

## 2024-01-30 PROCEDURE — 3074F SYST BP LT 130 MM HG: CPT | Performed by: INTERNAL MEDICINE

## 2024-01-30 PROCEDURE — G8417 CALC BMI ABV UP PARAM F/U: HCPCS | Performed by: INTERNAL MEDICINE

## 2024-01-30 PROCEDURE — 99214 OFFICE O/P EST MOD 30 MIN: CPT | Performed by: INTERNAL MEDICINE

## 2024-01-30 PROCEDURE — 1090F PRES/ABSN URINE INCON ASSESS: CPT | Performed by: INTERNAL MEDICINE

## 2024-01-30 PROCEDURE — 1036F TOBACCO NON-USER: CPT | Performed by: INTERNAL MEDICINE

## 2024-01-30 PROCEDURE — 3079F DIAST BP 80-89 MM HG: CPT | Performed by: INTERNAL MEDICINE

## 2024-01-30 RX ORDER — CETIRIZINE HYDROCHLORIDE 10 MG/1
10 TABLET, CHEWABLE ORAL DAILY
Qty: 30 TABLET | Refills: 0 | Status: SHIPPED | OUTPATIENT
Start: 2024-01-30 | End: 2024-02-29

## 2024-01-30 RX ORDER — ALBUTEROL SULFATE 90 UG/1
2 AEROSOL, METERED RESPIRATORY (INHALATION) EVERY 4 HOURS PRN
Qty: 18 G | Refills: 6 | Status: SHIPPED | OUTPATIENT
Start: 2024-01-30

## 2024-01-30 RX ORDER — MONTELUKAST SODIUM 10 MG/1
10 TABLET ORAL DAILY
Qty: 30 TABLET | Refills: 3 | Status: SHIPPED | OUTPATIENT
Start: 2024-01-30

## 2024-01-30 NOTE — PROGRESS NOTES
P  [facility]                                               Pulmonary Clinic Consult     I had the pleasure of seeing  Rosalie Hicks     Chief Complaint   Patient presents with    Follow-up     6 mo ILD       HISTORY OF PRESENT ILLNESS:    Rosalie Hicks is a 83 y.o. year old  Who has about 15 PPY  She  quit smoking 40 years   Known RA   The Patient comes in with SOB that has been going on for some years  Associated with mild .,She  states that it get worse with exercise or walking long distance and he can walk 1 block however can not do it because of spine issues and RA And go less than  flight of stairs before get short winded      Has been followed for lung nodules    She albuterol and on Wexella   Today visit  She has been having cough  She has fungal  infection/on vocal cord   Still with white sputum   She states she has been more active  Denies any fever or chills  No chest pain  On MTX   Stopped wixella  Seen by dr ro            ALLERGIES:    Allergies   Allergen Reactions    Alendronate      Severe hearburn    Prednisone Other (See Comments)     Causes body to feel on fire  Other reaction(s): Excessive sweating, Flushing, Other (See Comments)  Causes body to feel on fire    Fenofibrate Diarrhea    Tizanidine      Other reaction(s): Unknown  Refuses to take muscle relaxants    Venlafaxine Hcl Itching and Nausea Only     Other reaction(s): Chest Pain    Codeine Nausea And Vomiting    Docosahexaenoic Acid-Epa Nausea And Vomiting     Cannot take Vascepa       PAST MEDICAL HISTORY:       Diagnosis Date    Asthma     COPD (chronic obstructive pulmonary disease) ([facility])     Hyperlipidemia     Hypertension     RA (rheumatoid arthritis) ([facility])     Thyroid disease     has half of her thyroid       MEDICATIONS:   Current Outpatient Medications   Medication Sig Dispense Refill    promethazine-dextromethorphan (PROMETHAZINE-DM) 6.25-15 MG/5ML syrup Take 5 mLs by mouth 4

## 2024-02-07 ENCOUNTER — OFFICE VISIT (OUTPATIENT)
Dept: ORTHOPEDIC SURGERY | Age: 84
End: 2024-02-07
Payer: MEDICARE

## 2024-02-07 VITALS — WEIGHT: 174 LBS | HEIGHT: 65 IN | BODY MASS INDEX: 28.99 KG/M2

## 2024-02-07 DIAGNOSIS — M25.512 LEFT SHOULDER PAIN, UNSPECIFIED CHRONICITY: Primary | ICD-10-CM

## 2024-02-07 DIAGNOSIS — M54.50 LOW BACK PAIN, UNSPECIFIED BACK PAIN LATERALITY, UNSPECIFIED CHRONICITY, UNSPECIFIED WHETHER SCIATICA PRESENT: ICD-10-CM

## 2024-02-07 DIAGNOSIS — M19.012 PRIMARY OSTEOARTHRITIS OF LEFT SHOULDER: ICD-10-CM

## 2024-02-07 PROCEDURE — G8417 CALC BMI ABV UP PARAM F/U: HCPCS | Performed by: STUDENT IN AN ORGANIZED HEALTH CARE EDUCATION/TRAINING PROGRAM

## 2024-02-07 PROCEDURE — G8484 FLU IMMUNIZE NO ADMIN: HCPCS | Performed by: STUDENT IN AN ORGANIZED HEALTH CARE EDUCATION/TRAINING PROGRAM

## 2024-02-07 PROCEDURE — 1123F ACP DISCUSS/DSCN MKR DOCD: CPT | Performed by: STUDENT IN AN ORGANIZED HEALTH CARE EDUCATION/TRAINING PROGRAM

## 2024-02-07 PROCEDURE — 1090F PRES/ABSN URINE INCON ASSESS: CPT | Performed by: STUDENT IN AN ORGANIZED HEALTH CARE EDUCATION/TRAINING PROGRAM

## 2024-02-07 PROCEDURE — 99213 OFFICE O/P EST LOW 20 MIN: CPT | Performed by: STUDENT IN AN ORGANIZED HEALTH CARE EDUCATION/TRAINING PROGRAM

## 2024-02-07 PROCEDURE — 1036F TOBACCO NON-USER: CPT | Performed by: STUDENT IN AN ORGANIZED HEALTH CARE EDUCATION/TRAINING PROGRAM

## 2024-02-07 PROCEDURE — G8399 PT W/DXA RESULTS DOCUMENT: HCPCS | Performed by: STUDENT IN AN ORGANIZED HEALTH CARE EDUCATION/TRAINING PROGRAM

## 2024-02-07 PROCEDURE — 20610 DRAIN/INJ JOINT/BURSA W/O US: CPT | Performed by: STUDENT IN AN ORGANIZED HEALTH CARE EDUCATION/TRAINING PROGRAM

## 2024-02-07 PROCEDURE — G8427 DOCREV CUR MEDS BY ELIG CLIN: HCPCS | Performed by: STUDENT IN AN ORGANIZED HEALTH CARE EDUCATION/TRAINING PROGRAM

## 2024-02-07 RX ORDER — LIDOCAINE HYDROCHLORIDE 10 MG/ML
4 INJECTION, SOLUTION INFILTRATION; PERINEURAL ONCE
Status: COMPLETED | OUTPATIENT
Start: 2024-02-07 | End: 2024-02-07

## 2024-02-07 RX ORDER — METHYLPREDNISOLONE ACETATE 40 MG/ML
80 INJECTION, SUSPENSION INTRA-ARTICULAR; INTRALESIONAL; INTRAMUSCULAR; SOFT TISSUE ONCE
Status: COMPLETED | OUTPATIENT
Start: 2024-02-07 | End: 2024-02-07

## 2024-02-07 RX ADMIN — METHYLPREDNISOLONE ACETATE 80 MG: 40 INJECTION, SUSPENSION INTRA-ARTICULAR; INTRALESIONAL; INTRAMUSCULAR; SOFT TISSUE at 14:15

## 2024-02-07 RX ADMIN — LIDOCAINE HYDROCHLORIDE 4 ML: 10 INJECTION, SOLUTION INFILTRATION; PERINEURAL at 14:14

## 2024-02-07 NOTE — PROGRESS NOTES
reaction(s): Excessive sweating, Flushing, Other (See Comments)  Causes body to feel on fire    Fenofibrate Diarrhea    Tizanidine      Other reaction(s): Unknown  Refuses to take muscle relaxants    Venlafaxine Hcl Itching and Nausea Only     Other reaction(s): Chest Pain    Codeine Nausea And Vomiting    Docosahexaenoic Acid-Epa Nausea And Vomiting     Cannot take Vascepa       Vital signs:  Ht 1.651 m (5' 5\")   Wt 78.9 kg (174 lb)   BMI 28.96 kg/m²      Left shoulder exam    Tender to the anterior and posterior aspect of the glenohumeral joint.  Subacromial crepitation noted on passive and active range of motion.  Active forward flexion up to 150 degrees but a good amount of pain is noted.  External rotation to 15 degrees actively and 25 degrees passively.  4+ out of 5 infraspinatus strength testing.  Limited internal rotation.            Diagnostics:  Radiology:       No new x-rays today.  3 views of her left shoulder taken previously reviewed by myself in the office today demonstrate severe glenohumeral osteoarthritis with a large inferior osteophyte.  An appropriate acromiohumeral interval is noted.      Assessment: 83 y.o. female with severe left shoulder glenohumeral osteoarthritis    Plan: Pertinent imaging was reviewed. The etiology, natural history, and treatment options for the disorder were discussed.  The roles of activity medication, antiinflammatories, injections, bracing, physical therapy, and surgical interventions were all described to the patient and questions were answered.    The patient is suffering from severe left shoulder glenohumeral osteoarthritis.  She ambulates with a walker and does a lot of weightbearing through the arm.  Given her age and her medical history I think it is prudent to start with a nonoperative protocol.  We will start with a steroid injection.  I can do this every 3 months at minimum but longer if she has sustained relief.  This should not interfere with the fungal

## 2024-02-12 ENCOUNTER — TELEPHONE (OUTPATIENT)
Dept: FAMILY MEDICINE CLINIC | Age: 84
End: 2024-02-12

## 2024-02-12 NOTE — TELEPHONE ENCOUNTER
Pt is still coughing, somewhat productive. Ran out of cough medicine. Wants to know if you will refill or send in an antibiotic? She's off of the methotrexate for another 2 weeks.    Says she is also due for bloodwork so didn't know if you just wanted her to come in for an appt to get everything taken care of.

## 2024-02-13 ENCOUNTER — TELEPHONE (OUTPATIENT)
Dept: PULMONOLOGY | Age: 84
End: 2024-02-13

## 2024-02-13 RX ORDER — DOXYCYCLINE HYCLATE 100 MG
100 TABLET ORAL 2 TIMES DAILY
Qty: 10 TABLET | Refills: 0 | Status: SHIPPED | OUTPATIENT
Start: 2024-02-13 | End: 2024-02-18

## 2024-02-13 RX ORDER — DEXAMETHASONE 0.5 MG/1
2 TABLET ORAL EVERY 6 HOURS
Qty: 64 TABLET | Refills: 0 | Status: SHIPPED | OUTPATIENT
Start: 2024-02-13 | End: 2024-02-17

## 2024-02-13 NOTE — TELEPHONE ENCOUNTER
I called her  Orders in   If not better ED or call  She states had decadron with no issues  Will give decadron and doxy,if not better ,to call or ED     
Pt called stating that [facility] still doesn't have the scripts that Dr. Rehman was going to send in.  Scripts were sent to mail order pharmacy as the local pharmacy wasn't selected. Verbally called in scripts to [facility].    
Rheumatologist ,Advise to hodl MTX for 2 weeks  and to call me   2 d echo no PHTN  Bactroban and saline

## 2024-03-07 ENCOUNTER — TELEPHONE (OUTPATIENT)
Dept: PULMONOLOGY | Age: 84
End: 2024-03-07

## 2024-03-07 RX ORDER — DEXAMETHASONE 2 MG/1
2 TABLET ORAL 2 TIMES DAILY WITH MEALS
Qty: 8 TABLET | Refills: 0 | Status: SHIPPED | OUTPATIENT
Start: 2024-03-07 | End: 2024-03-11

## 2024-03-07 RX ORDER — DOXYCYCLINE HYCLATE 100 MG
100 TABLET ORAL 2 TIMES DAILY
Qty: 10 TABLET | Refills: 0 | Status: SHIPPED | OUTPATIENT
Start: 2024-03-07 | End: 2024-03-08

## 2024-03-08 RX ORDER — DOXYCYCLINE HYCLATE 100 MG
100 TABLET ORAL 2 TIMES DAILY
Qty: 10 TABLET | Refills: 0 | Status: SHIPPED | OUTPATIENT
Start: 2024-03-08 | End: 2024-03-13

## 2024-03-08 RX ORDER — PREDNISONE 20 MG/1
20 TABLET ORAL DAILY
Qty: 5 TABLET | Refills: 0 | Status: SHIPPED | OUTPATIENT
Start: 2024-03-08 | End: 2024-03-13

## 2024-03-08 NOTE — TELEPHONE ENCOUNTER
Ask about predniosne   If allergic to it stop and she must not take it  
reviewed with patient in details   -keep inhalers   -6 minutes no hypoxia     _ HRCT no ILD  _ on MTX 10 mg once weekly ,so will make sure nodule resolved ,followed by Rheumatologist ,Advise to hodl MTX for 2 weeks  and to call me   2 d echo no PHTN  Bactroban and saline

## 2024-03-21 ENCOUNTER — OFFICE VISIT (OUTPATIENT)
Dept: FAMILY MEDICINE CLINIC | Age: 84
End: 2024-03-21
Payer: MEDICARE

## 2024-03-21 VITALS
HEIGHT: 65 IN | WEIGHT: 180.6 LBS | SYSTOLIC BLOOD PRESSURE: 122 MMHG | BODY MASS INDEX: 30.09 KG/M2 | OXYGEN SATURATION: 96 % | HEART RATE: 84 BPM | DIASTOLIC BLOOD PRESSURE: 82 MMHG

## 2024-03-21 DIAGNOSIS — G89.29 CHRONIC LEFT SHOULDER PAIN: ICD-10-CM

## 2024-03-21 DIAGNOSIS — G89.29 CHRONIC BACK PAIN, UNSPECIFIED BACK LOCATION, UNSPECIFIED BACK PAIN LATERALITY: ICD-10-CM

## 2024-03-21 DIAGNOSIS — J44.9 CHRONIC OBSTRUCTIVE PULMONARY DISEASE, UNSPECIFIED COPD TYPE (HCC): ICD-10-CM

## 2024-03-21 DIAGNOSIS — M25.512 CHRONIC LEFT SHOULDER PAIN: ICD-10-CM

## 2024-03-21 DIAGNOSIS — M54.9 CHRONIC BACK PAIN, UNSPECIFIED BACK LOCATION, UNSPECIFIED BACK PAIN LATERALITY: ICD-10-CM

## 2024-03-21 DIAGNOSIS — E78.2 MIXED HYPERLIPIDEMIA: Primary | ICD-10-CM

## 2024-03-21 DIAGNOSIS — M05.719 RHEUMATOID ARTHRITIS INVOLVING SHOULDER WITH POSITIVE RHEUMATOID FACTOR, UNSPECIFIED LATERALITY (HCC): ICD-10-CM

## 2024-03-21 PROCEDURE — 1123F ACP DISCUSS/DSCN MKR DOCD: CPT | Performed by: FAMILY MEDICINE

## 2024-03-21 PROCEDURE — 3023F SPIROM DOC REV: CPT | Performed by: FAMILY MEDICINE

## 2024-03-21 PROCEDURE — 3079F DIAST BP 80-89 MM HG: CPT | Performed by: FAMILY MEDICINE

## 2024-03-21 PROCEDURE — G8427 DOCREV CUR MEDS BY ELIG CLIN: HCPCS | Performed by: FAMILY MEDICINE

## 2024-03-21 PROCEDURE — 1036F TOBACCO NON-USER: CPT | Performed by: FAMILY MEDICINE

## 2024-03-21 PROCEDURE — 3074F SYST BP LT 130 MM HG: CPT | Performed by: FAMILY MEDICINE

## 2024-03-21 PROCEDURE — G8417 CALC BMI ABV UP PARAM F/U: HCPCS | Performed by: FAMILY MEDICINE

## 2024-03-21 PROCEDURE — G8399 PT W/DXA RESULTS DOCUMENT: HCPCS | Performed by: FAMILY MEDICINE

## 2024-03-21 PROCEDURE — G2211 COMPLEX E/M VISIT ADD ON: HCPCS | Performed by: FAMILY MEDICINE

## 2024-03-21 PROCEDURE — 1090F PRES/ABSN URINE INCON ASSESS: CPT | Performed by: FAMILY MEDICINE

## 2024-03-21 PROCEDURE — G8484 FLU IMMUNIZE NO ADMIN: HCPCS | Performed by: FAMILY MEDICINE

## 2024-03-21 PROCEDURE — 99214 OFFICE O/P EST MOD 30 MIN: CPT | Performed by: FAMILY MEDICINE

## 2024-03-21 ASSESSMENT — ENCOUNTER SYMPTOMS
COUGH: 0
SHORTNESS OF BREATH: 1

## 2024-03-21 NOTE — PROGRESS NOTES
Yes Provider, Historical, MD       Allergies   Allergen Reactions    Alendronate      Severe hearburn    Prednisone Other (See Comments)     Causes body to feel on fire  Other reaction(s): Excessive sweating, Flushing, Other (See Comments)  Causes body to feel on fire    Fenofibrate Diarrhea    Tizanidine      Other reaction(s): Unknown  Refuses to take muscle relaxants    Venlafaxine Hcl Itching and Nausea Only     Other reaction(s): Chest Pain    Codeine Nausea And Vomiting    Docosahexaenoic Acid-Epa Nausea And Vomiting     Cannot take Vascepa       OBJECTIVE:    /82   Pulse 84   Ht 1.651 m (5' 5\")   Wt 81.9 kg (180 lb 9.6 oz)   SpO2 96%   BMI 30.05 kg/m²     BP Readings from Last 2 Encounters:   03/21/24 122/82   01/30/24 126/82       Wt Readings from Last 3 Encounters:   03/21/24 81.9 kg (180 lb 9.6 oz)   02/07/24 78.9 kg (174 lb)   01/30/24 78.9 kg (174 lb)       Physical Exam  Constitutional:       Appearance: She is well-developed.   HENT:      Head: Normocephalic and atraumatic.      Comments: Hoarseness noted.      Right Ear: Hearing normal.      Left Ear: Hearing normal.   Eyes:      Conjunctiva/sclera: Conjunctivae normal.   Neck:      Trachea: No tracheal deviation.   Cardiovascular:      Rate and Rhythm: Normal rate and regular rhythm.   Pulmonary:      Effort: Pulmonary effort is normal.      Breath sounds: Normal breath sounds.   Abdominal:      Palpations: Abdomen is soft.      Tenderness: There is no abdominal tenderness.   Skin:     General: Skin is warm and dry.   Neurological:      Mental Status: She is alert and oriented to person, place, and time.   Psychiatric:         Mood and Affect: Mood normal.         Behavior: Behavior normal.     Ambulating slowly w/ walker.       ASSESSMENT/PLAN:    1. Mixed hyperlipidemia  Lab order provided.  Patient does have upcoming labs from a rheumatology perspective.  These labs can be done at the same time  - Lipid Panel; Future  - Comprehensive

## 2024-03-25 ENCOUNTER — HOSPITAL ENCOUNTER (OUTPATIENT)
Dept: GENERAL RADIOLOGY | Age: 84
Discharge: HOME | End: 2024-03-25
Payer: MEDICARE

## 2024-03-25 ENCOUNTER — HOSPITAL ENCOUNTER (OUTPATIENT)
Age: 84
Discharge: HOME | End: 2024-03-25
Payer: MEDICARE

## 2024-03-25 DIAGNOSIS — E78.2 MIXED HYPERLIPIDEMIA: ICD-10-CM

## 2024-03-25 DIAGNOSIS — M19.041 OSTEOARTHRITIS OF BOTH HANDS, UNSPECIFIED OSTEOARTHRITIS TYPE: ICD-10-CM

## 2024-03-25 DIAGNOSIS — M19.042 OSTEOARTHRITIS OF BOTH HANDS, UNSPECIFIED OSTEOARTHRITIS TYPE: ICD-10-CM

## 2024-03-25 LAB
ALBUMIN SERPL-MCNC: 3.7 G/DL (ref 3.4–5)
ALBUMIN/GLOB SERPL: 1.4 {RATIO} (ref 1.1–2.2)
ALP SERPL-CCNC: 69 U/L (ref 40–129)
ALT SERPL-CCNC: 16 U/L (ref 10–40)
ANION GAP SERPL CALCULATED.3IONS-SCNC: 11 MMOL/L (ref 3–16)
AST SERPL-CCNC: 25 U/L (ref 15–37)
BILIRUB SERPL-MCNC: 0.5 MG/DL (ref 0–1)
BUN SERPL-MCNC: 12 MG/DL (ref 7–20)
CALCIUM SERPL-MCNC: 10 MG/DL (ref 8.3–10.6)
CHLORIDE SERPL-SCNC: 102 MMOL/L (ref 99–110)
CHOLEST SERPL-MCNC: 164 MG/DL (ref 0–199)
CO2 SERPL-SCNC: 28 MMOL/L (ref 21–32)
CREAT SERPL-MCNC: 0.9 MG/DL (ref 0.6–1.2)
CRP SERPL-MCNC: 4.1 MG/L (ref 0–5.1)
ERYTHROCYTE [SEDIMENTATION RATE] IN BLOOD BY WESTERGREN METHOD: 33 MM/HR (ref 0–30)
GFR SERPLBLD CREATININE-BSD FMLA CKD-EPI: 64 ML/MIN/{1.73_M2}
GLUCOSE SERPL-MCNC: 100 MG/DL (ref 70–99)
HDLC SERPL-MCNC: 46 MG/DL (ref 40–60)
LDLC SERPL CALC-MCNC: 92 MG/DL
POTASSIUM SERPL-SCNC: 4.2 MMOL/L (ref 3.5–5.1)
PROT SERPL-MCNC: 6.3 G/DL (ref 6.4–8.2)
RHEUMATOID FACT SER IA-ACNC: >650 IU/ML
SODIUM SERPL-SCNC: 141 MMOL/L (ref 136–145)
TRIGL SERPL-MCNC: 131 MG/DL (ref 0–150)
URATE SERPL-MCNC: 6.9 MG/DL (ref 2.6–6)
VLDLC SERPL CALC-MCNC: 26 MG/DL

## 2024-03-25 PROCEDURE — 36415 COLL VENOUS BLD VENIPUNCTURE: CPT

## 2024-03-25 PROCEDURE — 84550 ASSAY OF BLOOD/URIC ACID: CPT

## 2024-03-25 PROCEDURE — 86431 RHEUMATOID FACTOR QUANT: CPT

## 2024-03-25 PROCEDURE — 86200 CCP ANTIBODY: CPT

## 2024-03-25 PROCEDURE — 80053 COMPREHEN METABOLIC PANEL: CPT

## 2024-03-25 PROCEDURE — 80061 LIPID PANEL: CPT

## 2024-03-25 PROCEDURE — 73130 X-RAY EXAM OF HAND: CPT

## 2024-03-25 PROCEDURE — 85652 RBC SED RATE AUTOMATED: CPT

## 2024-03-25 PROCEDURE — 86140 C-REACTIVE PROTEIN: CPT

## 2024-03-26 LAB — CCP IGG SERPL-ACNC: 1.8 U/ML (ref 0–2.9)

## 2024-03-27 ENCOUNTER — TELEPHONE (OUTPATIENT)
Dept: PULMONOLOGY | Age: 84
End: 2024-03-27

## 2024-03-27 NOTE — TELEPHONE ENCOUNTER
Pt is wanting to make a follow up appt with lab results for Gregg Dickerson but first available is not until July. Pt states that Gregg Dickerson would not want her to wait until July and wants a sooner appt.  Please advise

## 2024-03-28 NOTE — PROGRESS NOTES
H/p dictation id [id number]. Date of service 09/28/2018. COPD exacerbation. RA.  HTN. Hypothyroidism. Dyslipidemia. Detail Level: Simple Prescription Strength Graduated Compression Stockings Recommendations: The patient was counseled that prescription strength graduated compression stockings should be worn for all waking hours. They will follow up with a venous specialist to monitor graduated compression stocking usage and their symptoms. Detail Level: Zone

## 2024-04-02 ENCOUNTER — TELEPHONE (OUTPATIENT)
Dept: FAMILY MEDICINE CLINIC | Age: 84
End: 2024-04-02

## 2024-04-02 NOTE — TELEPHONE ENCOUNTER
Pt calling asking for handicap placard due to difficulty walking long distances. If okay call pt back

## 2024-04-10 ENCOUNTER — TELEPHONE (OUTPATIENT)
Dept: PULMONOLOGY | Age: 84
End: 2024-04-10

## 2024-04-10 RX ORDER — PREDNISONE 10 MG/1
TABLET ORAL
Qty: 30 TABLET | Refills: 0 | Status: CANCELLED | OUTPATIENT
Start: 2024-04-10 | End: 2024-04-22

## 2024-04-10 NOTE — TELEPHONE ENCOUNTER
As pulmonary specialist, it is not in our scope of practice to be able to treat RA flare symptoms, I would encourage patient to contact her RA specialist to address this.

## 2024-04-10 NOTE — TELEPHONE ENCOUNTER
Pt called in stating that she is in a lot of pain and would like to have another round of steroids called in due to her RA arthritis is flared up and the steroids helped her. She has appt with Gregg Dickerson on  4/16/24 and she would like to have enough to make it to that appt. Informed pt that we are not pain or arthritis specialty so she may not get this filled.     Do you have the following symptoms?  Shortness of Breath  no  Wheezing  no  Cough  no  Cough Characteristics:  Productive    n/a  Sputum Color    n/a  Hemoptysis   n/a  Consistency of sputum   n/a     Fever:    n/a    Temp:n/a  Chills/Sweats:  n/a  What other symptoms are you having?:  pain    How long have you had these symptoms?   Last few days     Pharmacy: [facility] in [place]          Review medications and allergies:        Allergies?  yes        Currently on Antibiotics?  (Drug/Dose/Frequency and how long on?) no        Systemic Steroids?  (Drug/Dose/Frequency and how long on?) yes      Last sick call taken on 3/7/24.  Meds prescribed were doxy and prednisone, by Gregg Dickerson.    Last OV 01/30/24 with Dr. Rehman   (pull in last visit note assessment/plan)     IMPRESSION:    1-Lung nodules  2-Possible ILD   3-JACOBS   4Asthma       RA with rheumatoic nodules           PLAN:      -PFT still no COPD and has mild decrease in DLCO ,concern PHTN  ILD   -repeat PFT no significant decline more than expected for age  Repeat Ct scan chest shows resolution WILMAR ,stable other nodules ,since has multiple stable CT and age 83 and more than 35 years so will hod further CT  Steroids seems did fungal infection on vocal cord   PFT reviewed with patient in details   -keep inhalers   -6 minutes no hypoxia     _ HRCT no ILD  _ on MTX 10 mg once weekly ,so will make sure nodule resolved ,followed by Rheumatologist ,Advise to hodl MTX for 2 weeks  and to call me   2 d echo no PHTN  Bactroban and saline      Flu and Pneumovax

## 2024-04-16 ENCOUNTER — OFFICE VISIT (OUTPATIENT)
Dept: PULMONOLOGY | Age: 84
End: 2024-04-16
Payer: MEDICARE

## 2024-04-16 VITALS
BODY MASS INDEX: 29.99 KG/M2 | RESPIRATION RATE: 18 BRPM | OXYGEN SATURATION: 97 % | HEIGHT: 65 IN | WEIGHT: 180 LBS | DIASTOLIC BLOOD PRESSURE: 74 MMHG | HEART RATE: 73 BPM | SYSTOLIC BLOOD PRESSURE: 142 MMHG

## 2024-04-16 DIAGNOSIS — R91.1 LUNG NODULE: ICD-10-CM

## 2024-04-16 DIAGNOSIS — R06.02 SOB (SHORTNESS OF BREATH): Primary | ICD-10-CM

## 2024-04-16 PROCEDURE — 1123F ACP DISCUSS/DSCN MKR DOCD: CPT | Performed by: INTERNAL MEDICINE

## 2024-04-16 PROCEDURE — 3077F SYST BP >= 140 MM HG: CPT | Performed by: INTERNAL MEDICINE

## 2024-04-16 PROCEDURE — G8427 DOCREV CUR MEDS BY ELIG CLIN: HCPCS | Performed by: INTERNAL MEDICINE

## 2024-04-16 PROCEDURE — 3078F DIAST BP <80 MM HG: CPT | Performed by: INTERNAL MEDICINE

## 2024-04-16 PROCEDURE — G8417 CALC BMI ABV UP PARAM F/U: HCPCS | Performed by: INTERNAL MEDICINE

## 2024-04-16 PROCEDURE — 1090F PRES/ABSN URINE INCON ASSESS: CPT | Performed by: INTERNAL MEDICINE

## 2024-04-16 PROCEDURE — 1036F TOBACCO NON-USER: CPT | Performed by: INTERNAL MEDICINE

## 2024-04-16 PROCEDURE — G8399 PT W/DXA RESULTS DOCUMENT: HCPCS | Performed by: INTERNAL MEDICINE

## 2024-04-16 PROCEDURE — 99214 OFFICE O/P EST MOD 30 MIN: CPT | Performed by: INTERNAL MEDICINE

## 2024-04-16 NOTE — PROGRESS NOTES
age  Repeat Ct scan chest shows resolution WILMAR ,stable other nodules ,since has multiple stable CT and age 83 and more than 35 years so will hod further CT  Steroids seems did fungal infection on vocal cord   Cough resolved   On Tramadol has MS pain and seems helping ,keep as needed  PFT reviewed with patient in details   -keep inhalers   -6 minutes no hypoxia    _ HRCT no ILD  -off MTX and on prednisone 5 mg daily ,stopped by rheumatology     2 d echo no PHTN    Flu and Pneumovax     Thank you for allowing me to participate in [facility]. I will keep following with you ,should you have any concerns ,please contact us at [place] pulmonary office     Sincerely,        Chase Rehman MD  Pulmonary & Critical Care Medicine     NOTE: This report was transcribed using voice recognition software. Every effort was made to ensure accuracy; however, inadvertent computerized transcription errors may be present.

## 2024-04-30 DIAGNOSIS — M05.719 RHEUMATOID ARTHRITIS INVOLVING SHOULDER WITH POSITIVE RHEUMATOID FACTOR, UNSPECIFIED LATERALITY (HCC): ICD-10-CM

## 2024-04-30 NOTE — TELEPHONE ENCOUNTER
Date of last refill of this med was 2/6/2024, # of pills given 45 and # of refills given 0.  Their next appointment is 0, the last date patient was seen was 321/2024.  Does patient have medication agreement on file? No  Has drug screen been done in last 12 months if needed? no

## 2024-05-02 RX ORDER — TRAMADOL HYDROCHLORIDE 50 MG/1
50 TABLET ORAL EVERY 8 HOURS PRN
Qty: 45 TABLET | Refills: 0 | Status: SHIPPED | OUTPATIENT
Start: 2024-05-02 | End: 2024-06-01

## 2024-05-21 DIAGNOSIS — J45.909 UNCOMPLICATED ASTHMA, UNSPECIFIED ASTHMA SEVERITY, UNSPECIFIED WHETHER PERSISTENT: Primary | ICD-10-CM

## 2024-05-23 RX ORDER — MONTELUKAST SODIUM 10 MG/1
10 TABLET ORAL DAILY
Qty: 90 TABLET | Refills: 3 | Status: SHIPPED | OUTPATIENT
Start: 2024-05-23

## 2024-05-26 ENCOUNTER — APPOINTMENT (OUTPATIENT)
Dept: CT IMAGING | Age: 84
End: 2024-05-26
Payer: MEDICARE

## 2024-05-26 ENCOUNTER — HOSPITAL ENCOUNTER (EMERGENCY)
Age: 84
Discharge: ANOTHER HOSPITAL | End: 2024-05-27
Attending: EMERGENCY MEDICINE
Payer: MEDICARE

## 2024-05-26 DIAGNOSIS — J38.4 LARYNGEAL EDEMA: ICD-10-CM

## 2024-05-26 DIAGNOSIS — K11.20 PAROTID SIALOADENITIS: Primary | ICD-10-CM

## 2024-05-26 LAB
ALBUMIN SERPL-MCNC: 3.8 G/DL (ref 3.4–5)
ALBUMIN/GLOB SERPL: 1.3 {RATIO} (ref 1.1–2.2)
ALP SERPL-CCNC: 71 U/L (ref 40–129)
ALT SERPL-CCNC: 20 U/L (ref 10–40)
ANION GAP SERPL CALCULATED.3IONS-SCNC: 8 MMOL/L (ref 3–16)
AST SERPL-CCNC: 24 U/L (ref 15–37)
BASOPHILS # BLD: 0 K/UL (ref 0–0.2)
BASOPHILS NFR BLD: 0.6 %
BILIRUB SERPL-MCNC: 0.3 MG/DL (ref 0–1)
BUN SERPL-MCNC: 14 MG/DL (ref 7–20)
CALCIUM SERPL-MCNC: 9.9 MG/DL (ref 8.3–10.6)
CHLORIDE SERPL-SCNC: 102 MMOL/L (ref 99–110)
CO2 SERPL-SCNC: 31 MMOL/L (ref 21–32)
CREAT SERPL-MCNC: 0.9 MG/DL (ref 0.6–1.2)
DEPRECATED RDW RBC AUTO: 13.9 % (ref 12.4–15.4)
EOSINOPHIL # BLD: 0 K/UL (ref 0–0.6)
EOSINOPHIL NFR BLD: 0.5 %
GFR SERPLBLD CREATININE-BSD FMLA CKD-EPI: 64 ML/MIN/{1.73_M2}
GLUCOSE SERPL-MCNC: 114 MG/DL (ref 70–99)
HCT VFR BLD AUTO: 44 % (ref 36–48)
HGB BLD-MCNC: 14.7 G/DL (ref 12–16)
LACTATE BLDV-SCNC: 1.2 MMOL/L (ref 0.4–1.9)
LYMPHOCYTES # BLD: 1.5 K/UL (ref 1–5.1)
LYMPHOCYTES NFR BLD: 21.6 %
MCH RBC QN AUTO: 31.4 PG (ref 26–34)
MCHC RBC AUTO-ENTMCNC: 33.3 G/DL (ref 31–36)
MCV RBC AUTO: 94.4 FL (ref 80–100)
MONOCYTES # BLD: 0.6 K/UL (ref 0–1.3)
MONOCYTES NFR BLD: 8.1 %
NEUTROPHILS # BLD: 4.7 K/UL (ref 1.7–7.7)
NEUTROPHILS NFR BLD: 69.2 %
PLATELET # BLD AUTO: 190 K/UL (ref 135–450)
PMV BLD AUTO: 9.3 FL (ref 5–10.5)
POTASSIUM SERPL-SCNC: 3.9 MMOL/L (ref 3.5–5.1)
PROT SERPL-MCNC: 6.8 G/DL (ref 6.4–8.2)
RBC # BLD AUTO: 4.67 M/UL (ref 4–5.2)
SODIUM SERPL-SCNC: 141 MMOL/L (ref 136–145)
WBC # BLD AUTO: 6.8 K/UL (ref 4–11)

## 2024-05-26 PROCEDURE — 70491 CT SOFT TISSUE NECK W/DYE: CPT

## 2024-05-26 PROCEDURE — 87040 BLOOD CULTURE FOR BACTERIA: CPT

## 2024-05-26 PROCEDURE — 2580000003 HC RX 258: Performed by: REGISTERED NURSE

## 2024-05-26 PROCEDURE — 96365 THER/PROPH/DIAG IV INF INIT: CPT

## 2024-05-26 PROCEDURE — 99285 EMERGENCY DEPT VISIT HI MDM: CPT

## 2024-05-26 PROCEDURE — 85025 COMPLETE CBC W/AUTO DIFF WBC: CPT

## 2024-05-26 PROCEDURE — 6360000004 HC RX CONTRAST MEDICATION: Performed by: REGISTERED NURSE

## 2024-05-26 PROCEDURE — 80053 COMPREHEN METABOLIC PANEL: CPT

## 2024-05-26 PROCEDURE — 6360000002 HC RX W HCPCS: Performed by: REGISTERED NURSE

## 2024-05-26 PROCEDURE — 96366 THER/PROPH/DIAG IV INF ADDON: CPT

## 2024-05-26 PROCEDURE — 87150 DNA/RNA AMPLIFIED PROBE: CPT

## 2024-05-26 PROCEDURE — 96375 TX/PRO/DX INJ NEW DRUG ADDON: CPT

## 2024-05-26 PROCEDURE — 36415 COLL VENOUS BLD VENIPUNCTURE: CPT

## 2024-05-26 PROCEDURE — 83605 ASSAY OF LACTIC ACID: CPT

## 2024-05-26 RX ORDER — SODIUM CHLORIDE 9 MG/ML
1000 INJECTION, SOLUTION INTRAVENOUS CONTINUOUS
Status: DISCONTINUED | OUTPATIENT
Start: 2024-05-26 | End: 2024-05-27 | Stop reason: HOSPADM

## 2024-05-26 RX ORDER — DEXAMETHASONE SODIUM PHOSPHATE 10 MG/ML
10 INJECTION, SOLUTION INTRAMUSCULAR; INTRAVENOUS ONCE
Status: COMPLETED | OUTPATIENT
Start: 2024-05-26 | End: 2024-05-26

## 2024-05-26 RX ADMIN — AMPICILLIN AND SULBACTAM 3000 MG: 2; 1 INJECTION, POWDER, FOR SOLUTION INTRAVENOUS at 23:18

## 2024-05-26 RX ADMIN — DEXAMETHASONE SODIUM PHOSPHATE 10 MG: 10 INJECTION INTRAMUSCULAR; INTRAVENOUS at 23:08

## 2024-05-26 RX ADMIN — IOPAMIDOL 75 ML: 755 INJECTION, SOLUTION INTRAVENOUS at 20:08

## 2024-05-26 RX ADMIN — SODIUM CHLORIDE 1000 ML: 9 INJECTION, SOLUTION INTRAVENOUS at 23:11

## 2024-05-26 ASSESSMENT — PAIN - FUNCTIONAL ASSESSMENT: PAIN_FUNCTIONAL_ASSESSMENT: 0-10

## 2024-05-26 ASSESSMENT — PAIN SCALES - GENERAL: PAINLEVEL_OUTOF10: 7

## 2024-05-27 ENCOUNTER — HOSPITAL ENCOUNTER (INPATIENT)
Age: 84
LOS: 1 days | Discharge: HOME | DRG: 156 | End: 2024-05-27
Attending: INTERNAL MEDICINE | Admitting: INTERNAL MEDICINE
Payer: MEDICARE

## 2024-05-27 VITALS
BODY MASS INDEX: 27.32 KG/M2 | WEIGHT: 170 LBS | RESPIRATION RATE: 16 BRPM | OXYGEN SATURATION: 94 % | TEMPERATURE: 98 F | DIASTOLIC BLOOD PRESSURE: 82 MMHG | HEART RATE: 69 BPM | SYSTOLIC BLOOD PRESSURE: 121 MMHG | HEIGHT: 66 IN

## 2024-05-27 VITALS
HEART RATE: 72 BPM | TEMPERATURE: 98.3 F | RESPIRATION RATE: 17 BRPM | SYSTOLIC BLOOD PRESSURE: 176 MMHG | DIASTOLIC BLOOD PRESSURE: 82 MMHG | OXYGEN SATURATION: 94 %

## 2024-05-27 PROBLEM — K11.20: Status: ACTIVE | Noted: 2024-05-27

## 2024-05-27 LAB
ANION GAP SERPL CALCULATED.3IONS-SCNC: 12 MMOL/L (ref 3–16)
BASOPHILS # BLD: 0 K/UL (ref 0–0.2)
BASOPHILS NFR BLD: 0.7 %
BUN SERPL-MCNC: 13 MG/DL (ref 7–20)
CALCIUM SERPL-MCNC: 9.4 MG/DL (ref 8.3–10.6)
CHLORIDE SERPL-SCNC: 103 MMOL/L (ref 99–110)
CO2 SERPL-SCNC: 26 MMOL/L (ref 21–32)
CREAT SERPL-MCNC: 0.8 MG/DL (ref 0.6–1.2)
DEPRECATED RDW RBC AUTO: 13.8 % (ref 12.4–15.4)
EOSINOPHIL # BLD: 0 K/UL (ref 0–0.6)
EOSINOPHIL NFR BLD: 0.1 %
GFR SERPLBLD CREATININE-BSD FMLA CKD-EPI: 73 ML/MIN/{1.73_M2}
GLUCOSE SERPL-MCNC: 166 MG/DL (ref 70–99)
HCT VFR BLD AUTO: 44.2 % (ref 36–48)
HGB BLD-MCNC: 14.8 G/DL (ref 12–16)
LYMPHOCYTES # BLD: 0.9 K/UL (ref 1–5.1)
LYMPHOCYTES NFR BLD: 16.3 %
MCH RBC QN AUTO: 32 PG (ref 26–34)
MCHC RBC AUTO-ENTMCNC: 33.6 G/DL (ref 31–36)
MCV RBC AUTO: 95.3 FL (ref 80–100)
MONOCYTES # BLD: 0 K/UL (ref 0–1.3)
MONOCYTES NFR BLD: 0.8 %
NEUTROPHILS # BLD: 4.6 K/UL (ref 1.7–7.7)
NEUTROPHILS NFR BLD: 82.1 %
PLATELET # BLD AUTO: 201 K/UL (ref 135–450)
PMV BLD AUTO: 9.8 FL (ref 5–10.5)
POTASSIUM SERPL-SCNC: 4 MMOL/L (ref 3.5–5.1)
RBC # BLD AUTO: 4.63 M/UL (ref 4–5.2)
SODIUM SERPL-SCNC: 141 MMOL/L (ref 136–145)
WBC # BLD AUTO: 5.6 K/UL (ref 4–11)

## 2024-05-27 PROCEDURE — 6360000002 HC RX W HCPCS: Performed by: NURSE PRACTITIONER

## 2024-05-27 PROCEDURE — 85025 COMPLETE CBC W/AUTO DIFF WBC: CPT

## 2024-05-27 PROCEDURE — 2580000003 HC RX 258: Performed by: NURSE PRACTITIONER

## 2024-05-27 PROCEDURE — 6370000000 HC RX 637 (ALT 250 FOR IP): Performed by: INTERNAL MEDICINE

## 2024-05-27 PROCEDURE — 36415 COLL VENOUS BLD VENIPUNCTURE: CPT

## 2024-05-27 PROCEDURE — 6370000000 HC RX 637 (ALT 250 FOR IP): Performed by: NURSE PRACTITIONER

## 2024-05-27 PROCEDURE — 80048 BASIC METABOLIC PNL TOTAL CA: CPT

## 2024-05-27 PROCEDURE — 1200000000 HC SEMI PRIVATE

## 2024-05-27 PROCEDURE — 99231 SBSQ HOSP IP/OBS SF/LOW 25: CPT | Performed by: OTOLARYNGOLOGY

## 2024-05-27 RX ORDER — SODIUM CHLORIDE 0.9 % (FLUSH) 0.9 %
5-40 SYRINGE (ML) INJECTION EVERY 12 HOURS SCHEDULED
Status: DISCONTINUED | OUTPATIENT
Start: 2024-05-27 | End: 2024-05-27 | Stop reason: HOSPADM

## 2024-05-27 RX ORDER — SODIUM CHLORIDE 9 MG/ML
INJECTION, SOLUTION INTRAVENOUS PRN
Status: DISCONTINUED | OUTPATIENT
Start: 2024-05-27 | End: 2024-05-27 | Stop reason: HOSPADM

## 2024-05-27 RX ORDER — SODIUM CHLORIDE 9 MG/ML
1000 INJECTION, SOLUTION INTRAVENOUS CONTINUOUS
Status: DISCONTINUED | OUTPATIENT
Start: 2024-05-27 | End: 2024-05-27 | Stop reason: HOSPADM

## 2024-05-27 RX ORDER — CALCIUM CARBONATE 500(1250)
500 TABLET ORAL DAILY
Status: DISCONTINUED | OUTPATIENT
Start: 2024-05-27 | End: 2024-05-27 | Stop reason: HOSPADM

## 2024-05-27 RX ORDER — ALBUTEROL SULFATE 90 UG/1
2 AEROSOL, METERED RESPIRATORY (INHALATION) EVERY 4 HOURS PRN
Status: DISCONTINUED | OUTPATIENT
Start: 2024-05-27 | End: 2024-05-27 | Stop reason: HOSPADM

## 2024-05-27 RX ORDER — AMOXICILLIN AND CLAVULANATE POTASSIUM 875; 125 MG/1; MG/1
1 TABLET, FILM COATED ORAL 2 TIMES DAILY
Qty: 20 TABLET | Refills: 0 | Status: SHIPPED | OUTPATIENT
Start: 2024-05-27 | End: 2024-06-06

## 2024-05-27 RX ORDER — CETIRIZINE HYDROCHLORIDE 10 MG/1
10 TABLET ORAL DAILY
Status: DISCONTINUED | OUTPATIENT
Start: 2024-05-27 | End: 2024-05-27 | Stop reason: HOSPADM

## 2024-05-27 RX ORDER — AZELASTINE 1 MG/ML
2 SPRAY, METERED NASAL 2 TIMES DAILY
Status: DISCONTINUED | OUTPATIENT
Start: 2024-05-27 | End: 2024-05-27 | Stop reason: HOSPADM

## 2024-05-27 RX ORDER — ACETAMINOPHEN 650 MG/1
650 SUPPOSITORY RECTAL EVERY 6 HOURS PRN
Status: DISCONTINUED | OUTPATIENT
Start: 2024-05-27 | End: 2024-05-27 | Stop reason: HOSPADM

## 2024-05-27 RX ORDER — ATENOLOL 25 MG/1
12.5 TABLET ORAL DAILY
Status: DISCONTINUED | OUTPATIENT
Start: 2024-05-28 | End: 2024-05-27 | Stop reason: HOSPADM

## 2024-05-27 RX ORDER — LEVOTHYROXINE SODIUM 0.05 MG/1
50 TABLET ORAL DAILY
Status: DISCONTINUED | OUTPATIENT
Start: 2024-05-27 | End: 2024-05-27 | Stop reason: HOSPADM

## 2024-05-27 RX ORDER — DEXAMETHASONE SODIUM PHOSPHATE 4 MG/ML
4 INJECTION, SOLUTION INTRA-ARTICULAR; INTRALESIONAL; INTRAMUSCULAR; INTRAVENOUS; SOFT TISSUE DAILY
Status: DISCONTINUED | OUTPATIENT
Start: 2024-05-27 | End: 2024-05-27 | Stop reason: HOSPADM

## 2024-05-27 RX ORDER — DULOXETIN HYDROCHLORIDE 30 MG/1
30 CAPSULE, DELAYED RELEASE ORAL DAILY
Status: DISCONTINUED | OUTPATIENT
Start: 2024-05-27 | End: 2024-05-27 | Stop reason: HOSPADM

## 2024-05-27 RX ORDER — ATENOLOL 25 MG/1
12.5 TABLET ORAL
Status: DISCONTINUED | OUTPATIENT
Start: 2024-05-27 | End: 2024-05-27

## 2024-05-27 RX ORDER — CARBOXYMETHYLCELLULOSE SODIUM 10 MG/ML
1 GEL OPHTHALMIC PRN
Status: DISCONTINUED | OUTPATIENT
Start: 2024-05-27 | End: 2024-05-27 | Stop reason: HOSPADM

## 2024-05-27 RX ORDER — SODIUM CHLORIDE 0.9 % (FLUSH) 0.9 %
5-40 SYRINGE (ML) INJECTION PRN
Status: DISCONTINUED | OUTPATIENT
Start: 2024-05-27 | End: 2024-05-27 | Stop reason: HOSPADM

## 2024-05-27 RX ORDER — TRIAMTERENE AND HYDROCHLOROTHIAZIDE 37.5; 25 MG/1; MG/1
1 TABLET ORAL DAILY
Status: DISCONTINUED | OUTPATIENT
Start: 2024-05-27 | End: 2024-05-27 | Stop reason: HOSPADM

## 2024-05-27 RX ORDER — LABETALOL HYDROCHLORIDE 5 MG/ML
10 INJECTION, SOLUTION INTRAVENOUS EVERY 4 HOURS PRN
Status: DISCONTINUED | OUTPATIENT
Start: 2024-05-27 | End: 2024-05-27 | Stop reason: HOSPADM

## 2024-05-27 RX ORDER — MORPHINE SULFATE 4 MG/ML
4 INJECTION, SOLUTION INTRAMUSCULAR; INTRAVENOUS EVERY 4 HOURS PRN
Status: DISCONTINUED | OUTPATIENT
Start: 2024-05-27 | End: 2024-05-27 | Stop reason: HOSPADM

## 2024-05-27 RX ORDER — MONTELUKAST SODIUM 10 MG/1
10 TABLET ORAL NIGHTLY
Status: DISCONTINUED | OUTPATIENT
Start: 2024-05-27 | End: 2024-05-27 | Stop reason: HOSPADM

## 2024-05-27 RX ORDER — ACETAMINOPHEN 325 MG/1
650 TABLET ORAL EVERY 6 HOURS PRN
Status: DISCONTINUED | OUTPATIENT
Start: 2024-05-27 | End: 2024-05-27 | Stop reason: HOSPADM

## 2024-05-27 RX ORDER — POLYETHYLENE GLYCOL 3350 17 G/17G
17 POWDER, FOR SOLUTION ORAL DAILY PRN
Status: DISCONTINUED | OUTPATIENT
Start: 2024-05-27 | End: 2024-05-27 | Stop reason: HOSPADM

## 2024-05-27 RX ORDER — SODIUM CHLORIDE 9 MG/ML
INJECTION, SOLUTION INTRAVENOUS CONTINUOUS
Status: DISCONTINUED | OUTPATIENT
Start: 2024-05-27 | End: 2024-05-27 | Stop reason: HOSPADM

## 2024-05-27 RX ORDER — PROCHLORPERAZINE EDISYLATE 5 MG/ML
10 INJECTION INTRAMUSCULAR; INTRAVENOUS EVERY 6 HOURS PRN
Status: DISCONTINUED | OUTPATIENT
Start: 2024-05-27 | End: 2024-05-27 | Stop reason: HOSPADM

## 2024-05-27 RX ORDER — TRAMADOL HYDROCHLORIDE 50 MG/1
50 TABLET ORAL EVERY 8 HOURS PRN
Status: DISCONTINUED | OUTPATIENT
Start: 2024-05-27 | End: 2024-05-27 | Stop reason: HOSPADM

## 2024-05-27 RX ADMIN — DEXAMETHASONE SODIUM PHOSPHATE 4 MG: 4 INJECTION INTRA-ARTICULAR; INTRALESIONAL; INTRAMUSCULAR; INTRAVENOUS; SOFT TISSUE at 09:20

## 2024-05-27 RX ADMIN — LEVOTHYROXINE SODIUM 50 MCG: 0.05 TABLET ORAL at 05:29

## 2024-05-27 RX ADMIN — AMPICILLIN SODIUM AND SULBACTAM SODIUM 3000 MG: 2; 1 INJECTION, POWDER, FOR SOLUTION INTRAMUSCULAR; INTRAVENOUS at 10:41

## 2024-05-27 RX ADMIN — ATENOLOL 12.5 MG: 25 TABLET ORAL at 09:20

## 2024-05-27 RX ADMIN — Medication 10 ML: at 09:22

## 2024-05-27 RX ADMIN — SODIUM CHLORIDE 25 ML/HR: 9 INJECTION, SOLUTION INTRAVENOUS at 10:40

## 2024-05-27 RX ADMIN — TRIAMTERENE AND HYDROCHLOROTHIAZIDE 1 TABLET: 25; 37.5 TABLET ORAL at 09:20

## 2024-05-27 RX ADMIN — SODIUM CHLORIDE: 9 INJECTION, SOLUTION INTRAVENOUS at 03:10

## 2024-05-27 RX ADMIN — AMPICILLIN SODIUM AND SULBACTAM SODIUM 3000 MG: 2; 1 INJECTION, POWDER, FOR SOLUTION INTRAMUSCULAR; INTRAVENOUS at 05:29

## 2024-05-27 RX ADMIN — LABETALOL HYDROCHLORIDE 10 MG: 5 INJECTION INTRAVENOUS at 05:38

## 2024-05-27 RX ADMIN — CETIRIZINE HYDROCHLORIDE 10 MG: 10 TABLET, FILM COATED ORAL at 09:33

## 2024-05-27 ASSESSMENT — ENCOUNTER SYMPTOMS
SHORTNESS OF BREATH: 0
SINUS PAIN: 0
BLOOD IN STOOL: 0
VOMITING: 0
BACK PAIN: 0
FACIAL SWELLING: 1
EYE DISCHARGE: 0
COUGH: 0
DIARRHEA: 0
VOICE CHANGE: 0
SINUS PRESSURE: 0
PHOTOPHOBIA: 0
RHINORRHEA: 0
EYE ITCHING: 0
TROUBLE SWALLOWING: 1
NAUSEA: 0
CONSTIPATION: 0
WHEEZING: 0
COLOR CHANGE: 0
SORE THROAT: 0
CHOKING: 0
STRIDOR: 0

## 2024-05-27 ASSESSMENT — PAIN SCALES - GENERAL
PAINLEVEL_OUTOF10: 0
PAINLEVEL_OUTOF10: 0

## 2024-05-27 NOTE — PLAN OF CARE
Bed in lowest position, wheels locked, 2/4 side rails up, nonskid footwear on. Bed/ chair check alarm in place, call light within reach. Pt instructed to call out when needing assistance. Pt stated understanding. Nurse will continue to monitor.

## 2024-05-27 NOTE — RT PROTOCOL NOTE
RT Inhaler-Nebulizer Bronchodilator Protocol Note    There is a bronchodilator order in the chart from a provider indicating to follow the RT Bronchodilator Protocol and there is an “Initiate RT Inhaler-Nebulizer Bronchodilator Protocol” order as well (see protocol at bottom of note).    CXR Findings:  No results found.    The findings from the last RT Protocol Assessment were as follows:   History Pulmonary Disease: Chronic pulmonary disease  Respiratory Pattern: Regular pattern and RR 12-20 bpm  Breath Sounds: Clear breath sounds  Cough: Strong, spontaneous, non-productive  Indication for Bronchodilator Therapy: On home bronchodilators, Decreased or absent breath sounds, Wheezing associated with pulm disorder  Bronchodilator Assessment Score: 2    Aerosolized bronchodilator medication orders have been revised according to the RT Inhaler-Nebulizer Bronchodilator Protocol below.    Respiratory Therapist to perform RT Therapy Protocol Assessment initially then follow the protocol.  Repeat RT Therapy Protocol Assessment PRN for score 0-3 or on second treatment, BID, and PRN for scores above 3.    No Indications - adjust the frequency to every 6 hours PRN wheezing or bronchospasm, if no treatments needed after 48 hours then discontinue using Per Protocol order mode.     If indication present, adjust the RT bronchodilator orders based on the Bronchodilator Assessment Score as indicated below.  Use Inhaler orders unless patient has one or more of the following: on home nebulizer, not able to hold breath for 10 seconds, is not alert and oriented, cannot activate and use MDI correctly, or respiratory rate 25 breaths per minute or more, then use the equivalent nebulizer order(s) with same Frequency and PRN reasons based on the score.  If a patient is on this medication at home then do not decrease Frequency below that used at home.    0-3 - enter or revise RT bronchodilator order(s) to equivalent RT Bronchodilator order

## 2024-05-27 NOTE — PROGRESS NOTES
Patient given discharge instructions. All questions and concerns were addressed. Patient wheeled to car with all patient belongings.

## 2024-05-27 NOTE — CARE COORDINATION
D/c order noted. Spoke with Divya HANDY. Reported patient ambulatory with rollator. Family is currently here. Will hopefully assist with transport home. D/c pending ENT consult, not completed yet. Unclear of med needs, rec not completed. Trever Mendoza RN    ENT ok with d/c on atbx and office follow up. Further recs in note. Trever Mendoza RN

## 2024-05-27 NOTE — ED PROVIDER NOTES
[facility]     Nursing notes, vital signs reviewed.     I independently interpreted the following studies   CT SOFT TISSUE NECK W CONTRAST   Final Result   1.  Acute left parotid sialoadenitis.  No sialolithiasis or abscess.      2.  Concentric pharyngeal and laryngeal soft tissue swelling may relate to an   infectious/inflammatory process.  No associated abscess.           Labs Reviewed   COMPREHENSIVE METABOLIC PANEL W/ REFLEX TO MG FOR LOW K - Abnormal; Notable for the following components:       Result Value    Glucose 114 (*)     All other components within normal limits   CULTURE, BLOOD 1   CULTURE, BLOOD 2   CBC WITH AUTO DIFFERENTIAL   LACTATE, SEPSIS           For further details of the patient's emergency department visit, please see the advanced practice provider's documentation.    Crista Graham MD     This report has been produced using speech recognition software and may contain errors related to that system including errors in grammar, punctuation, and spelling, as well as words and phrases that may be inappropriate. If there are any questions or concerns please feel free to contact the dictating provider for clarification.       Crista Graham MD  05/27/24 0413    
Ultrasound and MRI are read by the radiologist. Plain radiographic images are visualized andpreliminarily interpreted by the  ED Provider with the below findings:    Interpretation perthe Radiologist below, if available at the time of this note:    CT SOFT TISSUE NECK W CONTRAST   Final Result   1.  Acute left parotid sialoadenitis.  No sialolithiasis or abscess.      2.  Concentric pharyngeal and laryngeal soft tissue swelling may relate to an   infectious/inflammatory process.  No associated abscess.           No results found.       PROCEDURES   Unless otherwise noted below, none     Procedures    CRITICAL CARE TIME   N/A    CONSULTS:  IP CONSULT TO OTOLARYNGOLOGY      EMERGENCY DEPARTMENT COURSE and DIFFERENTIALDIAGNOSIS/MDM:   Vitals:    Vitals:    05/26/24 1828 05/26/24 2317 05/27/24 0054   BP: (!) 188/84 (!) 192/83 (!) 176/82   Pulse: 86 76 72   Resp: 16  17   Temp: 98.5 °F (36.9 °C)  98.3 °F (36.8 °C)   TempSrc: Oral     SpO2: 95% 95% 94%       Patient was given thefollowing medications:  Medications   iopamidol (ISOVUE-370) 76 % injection 75 mL (75 mLs IntraVENous Given 5/26/24 2008)   dexAMETHasone (PF) (DECADRON) injection 10 mg (10 mg IntraVENous Given 5/26/24 2308)       PDMP Monitoring:    Last PDMP Phi as Reviewed ([place]):  Review User Review Instant Review Result   BERRY MITCHELL 5/2/2024  8:57 AM Reviewed PDMP [1]     Last Controlled Substance Monitoring Documentation      Flowsheet Row Refill from 4/30/2024 in [facility]   Periodic Controlled Substance Monitoring No signs of potential drug abuse or diversion identified. filed at 05/02/2024 0857          Urine Drug Screenings (1 yr)       Urine Drug Screen  Collected: 12/31/2022  2:00 PM (Final result)                  Medication Contract and Consent for Opioid Use Documents Filed        No documents found                    MDM:   This patient was seen and evaluated by myself and my attending physician  Records

## 2024-05-27 NOTE — PROGRESS NOTES
Hospital Medicine Progress Note      Date of Admission: 5/27/2024  Hospital Day: 1    Chief Admission Complaint:  \"Left facial pain and swelling\"     Subjective:  no new c/o    Presenting Admission History:         \"Rosalie Hicks is a/an 84 y.o. female with a significant past medical history of hypertension, hyperlipidemia, and rheumatoid arthritis who presents to [facility] as a direct admit from [facility] emergency department after she presented there earlier yesterday with complaint of left-sided facial swelling and pain.  She notes she has had a history of sialoadenitis with sialolithiasis before about 20 years ago.  She states the pain was similar which prompted her to report to the emergency department.  Onset of pain was 5/25, left TMJ region with swelling, no reported trismus, no difficulty swallowing, no stridor or dyspnea.  She also denies any fever at home.  Her evaluation in emergency department included laboratory studies and CT soft tissue neck with contrast.  CT showed acute left parotid sialoadenitis without lithiasis or abscess; concentric pharyngeal and laryngeal soft tissue swelling may be related to infectious/inflammatory process.  Laboratory studies were reviewed and negative for any acute findings.  Patient was started on Unasyn and was given dexamethasone IV push.  She was transferred here for ENT evaluation.  Hospital team was consulted to admit\".     Assessment/Plan:      Current Principal Problem:  Parotid sialoadenitis      L Parotid Sialoadenitis - Continue Unasyn.  Continue pain control. Continue daily dexamethasone. ENT consult placed     HTN - w/out known CAD and no evidence of active signs/sxs of ischemia/failure. Currently uncontrolled on home meds w/ vitals documented and reviewed. Home BBlocker changed from TIW to QD.     COPD - w/out chronic hypoxic respiratory failure on no baseline home O2.  Controlled on home medication regimen - continued.

## 2024-05-27 NOTE — CONSULTS
posterior auricular or occipital adenopathy.      Cervical: No cervical adenopathy.      Right cervical: No superficial, deep or posterior cervical adenopathy.     Left cervical: No superficial, deep or posterior cervical adenopathy.   Skin:     General: Skin is warm and dry.      Findings: No bruising, erythema, laceration, lesion or rash.   Neurological:      Mental Status: She is alert and oriented to person, place, and time.      Cranial Nerves: No cranial nerve deficit.   Psychiatric:         Speech: Speech normal.         Behavior: Behavior normal.         Labs    CBC:  Recent Labs     05/26/24 1920 05/27/24  0641   WBC 6.8 5.6   RBC 4.67 4.63   HGB 14.7 14.8   HCT 44.0 44.2   MCV 94.4 95.3   RDW 13.9 13.8    201     CHEMISTRIES:  Recent Labs     05/26/24 1920 05/27/24  0641    141   K 3.9 4.0    103   CO2 31 26   BUN 14 13   CREATININE 0.9 0.8   GLUCOSE 114* 166*     PT/INR:No results for input(s): \"PROTIME\", \"INR\" in the last 72 hours.  APTT:No results for input(s): \"APTT\" in the last 72 hours.  LIVER PROFILE:  Recent Labs     05/26/24 1920   AST 24   ALT 20   BILITOT 0.3   ALKPHOS 71       Imaging/Diagnostics   CT SOFT TISSUE NECK W CONTRAST    Result Date: 5/26/2024  1.  Acute left parotid sialoadenitis.  No sialolithiasis or abscess. 2.  Concentric pharyngeal and laryngeal soft tissue swelling may relate to an infectious/inflammatory process.  No associated abscess.       Assessment      Hospital Problems             Last Modified POA    * (Principal) Parotid sialoadenitis 5/27/2024 Yes       Plan   1.  Aggressive hydration  2.  Sialagogues  3.  Antibiotics for 10 days  4.  Massage parotid gland back to front  5.  Warm compresses  6.  Okay for discharge from ENT standpoint.  Follow-up with Dr. Ashleigh Flores next week.    Electronically signed by Brock Pena DO on 5/27/24 at 1:55 PM EDT

## 2024-05-27 NOTE — H&P
[]Other  DVT Prophylaxis: []PPx LMWH  []Heparin SQ/infusion  [x]IPC/SCDs  []Apixaban  []Rivaroxaban  []Warfarin     Code Status: [x]Full  []DNR/CCA  []Limited (DNR/CCA with Do Not Intubate)  []DNRCC  PT/OT Evaluation Status:   [x]NOT yet ordered  []Ordered and Pending   []Seen with Recommendations for:  []Home independently  []Home w/ assist  []HHC  []SNF  []Acute Rehab    Anticipated Discharge Day/Date:  05/28/2024    Anticipated Discharge Location: [x]Home  []HHC  []SNF  []Acute Rehab  []ECF  []LTAC  []Hospice    Consults:      IP CONSULT TO OTOLARYNGOLOGY      This patient has a high likelihood of being discharged tomorrow and is appropriate for A1 Discharge Unit in AM pending clinical course overnight: []Yes  [x]No    --------------------------------------------------------------------------------------------------------------------------------------------------------------------    Imaging:     CT SOFT TISSUE NECK W CONTRAST    Result Date: 5/26/2024  EXAMINATION: CT OF THE NECK SOFT TISSUE WITH CONTRAST  5/26/2024 TECHNIQUE: CT of the neck was performed with the administration of intravenous contrast. Multiplanar reformatted images are provided for review. Automated exposure control, iterative reconstruction, and/or weight based adjustment of the mA/kV was utilized to reduce the radiation dose to as low as reasonably achievable. COMPARISON: None. HISTORY: ORDERING SYSTEM PROVIDED HISTORY: L mandibular swelling extending to neck TECHNOLOGIST PROVIDED HISTORY: Reason for exam:->L mandibular swelling extending to neck Additional Contrast?->1 Reason for Exam: left side facial swelling FINDINGS: PHARYNX/LARYNX:  Concentric pharyngeal and laryngeal soft tissue swelling. Thickening of the epiglottis and aryepiglottic folds.  The tongue is normal in appearance.  The valleculae, epiglottis, aryepiglottic folds and pyriform sinuses appear unremarkable.  The true and false vocal cords are normal in appearance.  No focal

## 2024-05-28 ENCOUNTER — TELEPHONE (OUTPATIENT)
Dept: FAMILY MEDICINE CLINIC | Age: 84
End: 2024-05-28

## 2024-05-28 LAB — REPORT: NORMAL

## 2024-05-28 NOTE — TELEPHONE ENCOUNTER
Care Transitions Initial Follow Up Call    Outreach made within 2 business days of discharge: Yes    Patient: Rosalie Hicks Patient : 1940   MRN: [id number]  Reason for Admission: There are no discharge diagnoses documented for the most recent discharge.  Discharge Date: 24       Spoke with: Rosalie     Discharge department/facility: [facility] Interactive Patient Contact:  Was patient able to fill all prescriptions: Yes  Was patient instructed to bring all medications to the follow-up visit: Yes  Is patient taking all medications as directed in the discharge summary? Yes  Does patient understand their discharge instructions: Yes  Does patient have questions or concerns that need addressed prior to 7-14 day follow up office visit: no    Scheduled appointment with PCP within 7-14 days    Follow Up  Future Appointments   Date Time Provider Department Center   2024  2:00 PM Jhonny Guillermo DO [facility]   10/16/2024  2:00 PM Chase Rehman MD [facility] [place]

## 2024-05-28 NOTE — DISCHARGE SUMMARY
Reason for Stopping:         dexamethasone (DECADRON) 2 MG tablet Comments:   Reason for Stopping:               Significant Test Results    CT SOFT TISSUE NECK W CONTRAST    Result Date: 5/26/2024  EXAMINATION: CT OF THE NECK SOFT TISSUE WITH CONTRAST  5/26/2024 TECHNIQUE: CT of the neck was performed with the administration of intravenous contrast. Multiplanar reformatted images are provided for review. Automated exposure control, iterative reconstruction, and/or weight based adjustment of the mA/kV was utilized to reduce the radiation dose to as low as reasonably achievable. COMPARISON: None. HISTORY: ORDERING SYSTEM PROVIDED HISTORY: L mandibular swelling extending to neck TECHNOLOGIST PROVIDED HISTORY: Reason for exam:->L mandibular swelling extending to neck Additional Contrast?->1 Reason for Exam: left side facial swelling FINDINGS: PHARYNX/LARYNX:  Concentric pharyngeal and laryngeal soft tissue swelling. Thickening of the epiglottis and aryepiglottic folds.  The tongue is normal in appearance.  The valleculae, epiglottis, aryepiglottic folds and pyriform sinuses appear unremarkable.  The true and false vocal cords are normal in appearance.  No focal mass or abscess is seen. SALIVARY GLANDS/THYROID:  Asymmetrically enlarged and heterogeneous left parotid gland with surrounding soft tissue swelling and edema which extends inferiorly into the left submandibular region.  No sialolithiasis or abscess. Normal right parotid and bilateral submandibular glands.  The left thyroid lobe is absent.  The right thyroid lobe is unremarkable. LYMPH NODES:  No cervical or supraclavicular lymphadenopathy is seen. SOFT TISSUES:  No abnormal enhancing mass or fluid collection.  Asymmetric thickening of the left parotid gland.  Surgical clip in the left anterior neck.  Atherosclerosis. BRAIN/ORBITS/SINUSES:  The visualized portion of the intracranial contents appear unremarkable.  The visualized portion of the orbits,  good minus

## 2024-05-30 LAB
BACTERIA BLD CULT ORG #2: ABNORMAL
BACTERIA BLD CULT ORG #2: ABNORMAL
BACTERIA BLD CULT: NORMAL
ORGANISM: ABNORMAL
ORGANISM: ABNORMAL

## 2024-05-31 ENCOUNTER — OFFICE VISIT (OUTPATIENT)
Dept: ORTHOPEDIC SURGERY | Age: 84
End: 2024-05-31

## 2024-05-31 DIAGNOSIS — M19.012 PRIMARY OSTEOARTHRITIS OF LEFT SHOULDER: Primary | ICD-10-CM

## 2024-05-31 RX ORDER — TRIAMCINOLONE ACETONIDE 40 MG/ML
80 INJECTION, SUSPENSION INTRA-ARTICULAR; INTRAMUSCULAR ONCE
Status: COMPLETED | OUTPATIENT
Start: 2024-05-31 | End: 2024-05-31

## 2024-05-31 RX ORDER — LIDOCAINE HYDROCHLORIDE 10 MG/ML
4 INJECTION, SOLUTION INFILTRATION; PERINEURAL ONCE
Status: COMPLETED | OUTPATIENT
Start: 2024-05-31 | End: 2024-05-31

## 2024-05-31 RX ADMIN — TRIAMCINOLONE ACETONIDE 80 MG: 40 INJECTION, SUSPENSION INTRA-ARTICULAR; INTRAMUSCULAR at 15:20

## 2024-05-31 RX ADMIN — LIDOCAINE HYDROCHLORIDE 4 ML: 10 INJECTION, SOLUTION INFILTRATION; PERINEURAL at 15:20

## 2024-05-31 NOTE — PROGRESS NOTES
Chief Complaint  Shoulder Pain (FU LT SHOULDER)      History of Present Illness:  Rosalie Hicks is a pleasant 84 y.o. female here today for repeat evaluation regarding her left shoulder.  The patient reports that the shoulder injection greatly helped for a few weeks but then she had to lift her walker to get over the entryway into her home at [facility] when she felt her shoulder shift.  Since then she has had some difficulty with her shoulder.  She is currently on oral prednisone for some swelling she had in the salivary glands.  She would like to avoid surgery.    Prior HPI 2/7/24:  Rosalie Hicks is a 83 y.o. female here today for new patient evaluation regarding her left shoulder.  She was sent to me by Dr. Bridges.  The patient reports worsening left shoulder pain over the past few years.  The patient is reporting some clicking and grinding in the shoulder.  The patient is in pain management for her back and is currently taking tramadol and Tylenol which helped a little bit.  She does report a recent history of fungal findings in her upper respiratory tract and was told to avoid inhaled steroids by her pulmonologist.  She was treated for a right distal humerus fracture with ORIF 4 years ago.          Medical History:  Patient's medications, allergies, past medical, surgical, social and family histories were reviewed and updated as appropriate.    Pertinent items are noted in HPI  Review of systems reviewed from Patient History Form available in the patient's chart under the Media tab.       Vital Signs:  There were no vitals filed for this visit.      Constitutional: In no apparent distress. Normal affect. Alert and oriented X3 and is cooperative.       Left shoulder exam     Tender to the anterior and posterior aspect of the glenohumeral joint.  Subacromial crepitation noted on passive and active range of motion.  Active forward flexion up to 150 degrees but a good amount of pain is

## 2024-06-06 ENCOUNTER — OFFICE VISIT (OUTPATIENT)
Dept: ENT CLINIC | Age: 84
End: 2024-06-06
Payer: MEDICARE

## 2024-06-06 VITALS
DIASTOLIC BLOOD PRESSURE: 72 MMHG | HEIGHT: 66 IN | WEIGHT: 170 LBS | HEART RATE: 64 BPM | BODY MASS INDEX: 27.32 KG/M2 | SYSTOLIC BLOOD PRESSURE: 110 MMHG

## 2024-06-06 DIAGNOSIS — K11.20 PAROTITIS: ICD-10-CM

## 2024-06-06 DIAGNOSIS — R05.3 CHRONIC COUGH: Primary | ICD-10-CM

## 2024-06-06 PROCEDURE — 1036F TOBACCO NON-USER: CPT | Performed by: OTOLARYNGOLOGY

## 2024-06-06 PROCEDURE — 99213 OFFICE O/P EST LOW 20 MIN: CPT | Performed by: OTOLARYNGOLOGY

## 2024-06-06 PROCEDURE — 1123F ACP DISCUSS/DSCN MKR DOCD: CPT | Performed by: OTOLARYNGOLOGY

## 2024-06-06 PROCEDURE — 1111F DSCHRG MED/CURRENT MED MERGE: CPT | Performed by: OTOLARYNGOLOGY

## 2024-06-06 PROCEDURE — 3078F DIAST BP <80 MM HG: CPT | Performed by: OTOLARYNGOLOGY

## 2024-06-06 PROCEDURE — 1090F PRES/ABSN URINE INCON ASSESS: CPT | Performed by: OTOLARYNGOLOGY

## 2024-06-06 PROCEDURE — G8399 PT W/DXA RESULTS DOCUMENT: HCPCS | Performed by: OTOLARYNGOLOGY

## 2024-06-06 PROCEDURE — G8417 CALC BMI ABV UP PARAM F/U: HCPCS | Performed by: OTOLARYNGOLOGY

## 2024-06-06 PROCEDURE — 3074F SYST BP LT 130 MM HG: CPT | Performed by: OTOLARYNGOLOGY

## 2024-06-06 PROCEDURE — G8427 DOCREV CUR MEDS BY ELIG CLIN: HCPCS | Performed by: OTOLARYNGOLOGY

## 2024-06-06 ASSESSMENT — ENCOUNTER SYMPTOMS
EYE ITCHING: 0
APNEA: 0
SHORTNESS OF BREATH: 0
FACIAL SWELLING: 0
TROUBLE SWALLOWING: 0
COUGH: 1
SORE THROAT: 0
SINUS PRESSURE: 0
VOICE CHANGE: 0

## 2024-06-06 NOTE — PROGRESS NOTES
[facility]  [address], [address]  [place] [address]  P: [phone]       Patient     Rosalie Hicks  1940    ChiefComplaint     Chief Complaint   Patient presents with    Follow-up     Facial swelling was in inpt.         History of Present Illness     Rosalie is an 84-year-old female here today for hospital follow-up of left parotid tightness.  States swelling and pain has resolved completed antibiotics without difficulty.  Reports ongoing significant coughing jags occur several times per days can go days without episodes.  Reports cough is triggered by a tickle in the throat.    Past Medical History     Past Medical History:   Diagnosis Date    Asthma     COPD (chronic obstructive pulmonary disease) (HCC)     Hyperlipidemia     Hypertension     RA (rheumatoid arthritis) (HCC)     Thyroid disease     has half of her thyroid       Past Surgical History     Past Surgical History:   Procedure Laterality Date    ABDOMINAL EXPLORATION SURGERY      APPENDECTOMY      CHOLECYSTECTOMY      COLONOSCOPY  04/18/2013    diverticulosis, polyps    COLONOSCOPY  11/10/2016    normal colon    EYE SURGERY Right 12/11/2015    cataract removal    EYE SURGERY Left 01/08/2016    cataract removed    FRACTURE SURGERY      HUMERUS FRACTURE SURGERY Right 02/28/2021    HUMERUS OPEN REDUCTION INTERNAL FIXATION performed by Kate Levi MD at [facility] OR    HYSTERECTOMY (CERVIX STATUS UNKNOWN)      LAMINECTOMY N/A 01/27/2022    T10-11 THORACIC DECOMPRESSION, DISCECTOMY, FUSION / FIXATION performed by Brian Lema MD at [facility] OR    ORIF HUMERUS FRACTURE  11/18/2022    SPINE SURGERY  01/27/2022    THYROIDECTOMY, PARTIAL Left     TUMOR REMOVAL      off of thyroid, was benign       Family History     Family History   Problem Relation Age of Onset    High Blood Pressure Mother        Social History     Social History     Tobacco Use    Smoking status: Former     Current packs/day: 0.00     Average packs/day:

## 2024-06-07 ENCOUNTER — OFFICE VISIT (OUTPATIENT)
Dept: FAMILY MEDICINE CLINIC | Age: 84
End: 2024-06-07

## 2024-06-07 VITALS
SYSTOLIC BLOOD PRESSURE: 124 MMHG | HEART RATE: 64 BPM | BODY MASS INDEX: 28.93 KG/M2 | HEIGHT: 66 IN | OXYGEN SATURATION: 95 % | WEIGHT: 180 LBS | DIASTOLIC BLOOD PRESSURE: 82 MMHG

## 2024-06-07 DIAGNOSIS — M05.719 RHEUMATOID ARTHRITIS INVOLVING SHOULDER WITH POSITIVE RHEUMATOID FACTOR, UNSPECIFIED LATERALITY (HCC): ICD-10-CM

## 2024-06-07 DIAGNOSIS — E03.9 HYPOTHYROIDISM, UNSPECIFIED TYPE: ICD-10-CM

## 2024-06-07 DIAGNOSIS — K11.20 PAROTID SIALADENITIS: ICD-10-CM

## 2024-06-07 DIAGNOSIS — Z09 HOSPITAL DISCHARGE FOLLOW-UP: Primary | ICD-10-CM

## 2024-06-07 RX ORDER — LEVOTHYROXINE SODIUM 0.03 MG/1
25 TABLET ORAL DAILY
Qty: 90 TABLET | Refills: 3 | Status: SHIPPED | OUTPATIENT
Start: 2024-06-07

## 2024-06-07 RX ORDER — DEXAMETHASONE 1 MG
1 TABLET ORAL DAILY PRN
Qty: 30 TABLET | Refills: 3 | Status: SHIPPED | OUTPATIENT
Start: 2024-06-07

## 2024-06-07 ASSESSMENT — ENCOUNTER SYMPTOMS
VOICE CHANGE: 1
COUGH: 1

## 2024-06-07 NOTE — PROGRESS NOTES
Post-Discharge Transitional Care Follow Up      Rosalie Hicks   YOB: 1940    Date of Office Visit:  6/7/2024  Date of Hospital Admission: 5/27/24  Date of Hospital Discharge: 5/27/24  Readmission Risk Score (high >=14%. Medium >=10%):Readmission Risk Score: 9.3      Care management risk score Rising risk (score 2-5) and Complex Care (Scores >=6): No Risk Score On File     Non face to face  following discharge, date last encounter closed (first attempt may have been earlier): 05/28/2024     Call initiated 2 business days of discharge: Yes     Hospital discharge follow-up  -     SD DISCHARGE MEDS RECONCILED W/ CURRENT OUTPATIENT MED LIST  See below.  Overall doing better.  Has finished antibiotics at this time.    Parotid sialadenitis  Symptoms overall significantly better.  Swelling is improved.  Reviewed CT.  Reviewed recent ENT note as well.  Due to her cough, patient was actually referred to SLP as well.  Patient does not plan on keeping that appointment, stating her symptoms seem to be aggravated by prolonged talking, others and that the symptoms are not new.    Rheumatoid arthritis involving shoulder with positive rheumatoid factor, unspecified laterality (HCC)  -     dexAMETHasone (DECADRON) 1 MG tablet; Take 1 tablet by mouth daily as needed (joint pain), Disp-30 tablet, R-3Normal  Patient has been using dexamethasone in the past on an as-needed basis when she has significant flareup from an arthritis standpoint.  Follow-up with rheumatology as scheduled.  She did not tolerate prednisone    Hypothyroidism, unspecified type  Patient states that someone had increased her levothyroxine to 50 mcg in the past.  I do not see record of that higher dose on her chart.  Continue/resume 25 mcg daily.  Repeat thyroid labs around next office visit with other routine labs.    Medical Decision Making: moderate complexity  No follow-ups on file.           Subjective:   HPI    Inpatient course: Discharge

## 2024-06-12 DIAGNOSIS — M05.719 RHEUMATOID ARTHRITIS INVOLVING SHOULDER WITH POSITIVE RHEUMATOID FACTOR, UNSPECIFIED LATERALITY (HCC): ICD-10-CM

## 2024-06-12 RX ORDER — DULOXETIN HYDROCHLORIDE 30 MG/1
30 CAPSULE, DELAYED RELEASE ORAL DAILY
Qty: 90 CAPSULE | Refills: 3 | Status: SHIPPED | OUTPATIENT
Start: 2024-06-12

## 2024-07-11 ENCOUNTER — APPOINTMENT (OUTPATIENT)
Dept: CT IMAGING | Age: 84
End: 2024-07-11
Payer: MEDICARE

## 2024-07-11 ENCOUNTER — APPOINTMENT (OUTPATIENT)
Dept: MRI IMAGING | Age: 84
End: 2024-07-11
Payer: MEDICARE

## 2024-07-11 ENCOUNTER — APPOINTMENT (OUTPATIENT)
Dept: GENERAL RADIOLOGY | Age: 84
End: 2024-07-11
Payer: MEDICARE

## 2024-07-11 ENCOUNTER — HOSPITAL ENCOUNTER (OUTPATIENT)
Age: 84
Setting detail: OBSERVATION
Discharge: HOME | End: 2024-07-12
Attending: EMERGENCY MEDICINE | Admitting: INTERNAL MEDICINE
Payer: MEDICARE

## 2024-07-11 DIAGNOSIS — R79.89 ELEVATED TROPONIN: ICD-10-CM

## 2024-07-11 DIAGNOSIS — R42 DISEQUILIBRIUM: Primary | ICD-10-CM

## 2024-07-11 LAB
ALBUMIN SERPL-MCNC: 3.4 G/DL (ref 3.4–5)
ALBUMIN/GLOB SERPL: 1.2 {RATIO} (ref 1.1–2.2)
ALP SERPL-CCNC: 52 U/L (ref 40–129)
ALT SERPL-CCNC: 25 U/L (ref 10–40)
ANION GAP SERPL CALCULATED.3IONS-SCNC: 3 MMOL/L (ref 3–16)
AST SERPL-CCNC: 28 U/L (ref 15–37)
BACTERIA URNS QL MICRO: ABNORMAL /HPF
BASOPHILS # BLD: 0.1 K/UL (ref 0–0.2)
BASOPHILS NFR BLD: 0.7 %
BILIRUB SERPL-MCNC: 0.4 MG/DL (ref 0–1)
BILIRUB UR QL STRIP.AUTO: NEGATIVE
BUN SERPL-MCNC: 19 MG/DL (ref 7–20)
CALCIUM SERPL-MCNC: 9 MG/DL (ref 8.3–10.6)
CHLORIDE SERPL-SCNC: 100 MMOL/L (ref 99–110)
CLARITY UR: CLEAR
CO2 SERPL-SCNC: 30 MMOL/L (ref 21–32)
COLOR UR: YELLOW
CREAT SERPL-MCNC: 0.7 MG/DL (ref 0.6–1.2)
DEPRECATED RDW RBC AUTO: 14.6 % (ref 12.4–15.4)
EKG ATRIAL RATE: 64 BPM
EKG DIAGNOSIS: NORMAL
EKG P AXIS: 68 DEGREES
EKG P-R INTERVAL: 138 MS
EKG Q-T INTERVAL: 434 MS
EKG QRS DURATION: 70 MS
EKG QTC CALCULATION (BAZETT): 447 MS
EKG R AXIS: 10 DEGREES
EKG T AXIS: 77 DEGREES
EKG VENTRICULAR RATE: 64 BPM
EOSINOPHIL # BLD: 0.2 K/UL (ref 0–0.6)
EOSINOPHIL NFR BLD: 2.1 %
EPI CELLS #/AREA URNS HPF: ABNORMAL /HPF (ref 0–5)
GFR SERPLBLD CREATININE-BSD FMLA CKD-EPI: 86 ML/MIN/{1.73_M2}
GLUCOSE BLD-MCNC: 115 MG/DL (ref 70–99)
GLUCOSE SERPL-MCNC: 115 MG/DL (ref 70–99)
GLUCOSE UR STRIP.AUTO-MCNC: NEGATIVE MG/DL
HCT VFR BLD AUTO: 42 % (ref 36–48)
HGB BLD-MCNC: 14.2 G/DL (ref 12–16)
HGB UR QL STRIP.AUTO: NEGATIVE
INR PPP: 0.93 (ref 0.85–1.15)
KETONES UR STRIP.AUTO-MCNC: NEGATIVE MG/DL
LEUKOCYTE ESTERASE UR QL STRIP.AUTO: ABNORMAL
LYMPHOCYTES # BLD: 2 K/UL (ref 1–5.1)
LYMPHOCYTES NFR BLD: 25.2 %
MCH RBC QN AUTO: 32.5 PG (ref 26–34)
MCHC RBC AUTO-ENTMCNC: 33.8 G/DL (ref 31–36)
MCV RBC AUTO: 96.3 FL (ref 80–100)
MONOCYTES # BLD: 0.9 K/UL (ref 0–1.3)
MONOCYTES NFR BLD: 10.9 %
NEUTROPHILS # BLD: 4.9 K/UL (ref 1.7–7.7)
NEUTROPHILS NFR BLD: 61.1 %
NITRITE UR QL STRIP.AUTO: NEGATIVE
PERFORMED ON: ABNORMAL
PH UR STRIP.AUTO: 7.5 [PH] (ref 5–8)
PLATELET # BLD AUTO: 163 K/UL (ref 135–450)
PMV BLD AUTO: 9.1 FL (ref 5–10.5)
POTASSIUM SERPL-SCNC: 4.3 MMOL/L (ref 3.5–5.1)
PROT SERPL-MCNC: 6.3 G/DL (ref 6.4–8.2)
PROT UR STRIP.AUTO-MCNC: NEGATIVE MG/DL
PROTHROMBIN TIME: 12.7 SEC (ref 11.9–14.9)
RBC # BLD AUTO: 4.36 M/UL (ref 4–5.2)
RBC #/AREA URNS HPF: ABNORMAL /HPF (ref 0–4)
REASON FOR REJECTION: NORMAL
REJECTED TEST: NORMAL
SODIUM SERPL-SCNC: 133 MMOL/L (ref 136–145)
SP GR UR STRIP.AUTO: 1.01 (ref 1–1.03)
TROPONIN, HIGH SENSITIVITY: 20 NG/L (ref 0–14)
TROPONIN, HIGH SENSITIVITY: 21 NG/L (ref 0–14)
UA COMPLETE W REFLEX CULTURE PNL UR: ABNORMAL
UA DIPSTICK W REFLEX MICRO PNL UR: YES
URN SPEC COLLECT METH UR: ABNORMAL
UROBILINOGEN UR STRIP-ACNC: 0.2 E.U./DL
WBC # BLD AUTO: 8 K/UL (ref 4–11)
WBC #/AREA URNS HPF: ABNORMAL /HPF (ref 0–5)

## 2024-07-11 PROCEDURE — 84460 ALANINE AMINO (ALT) (SGPT): CPT

## 2024-07-11 PROCEDURE — 81001 URINALYSIS AUTO W/SCOPE: CPT

## 2024-07-11 PROCEDURE — 70498 CT ANGIOGRAPHY NECK: CPT

## 2024-07-11 PROCEDURE — 96372 THER/PROPH/DIAG INJ SC/IM: CPT

## 2024-07-11 PROCEDURE — G0378 HOSPITAL OBSERVATION PER HR: HCPCS

## 2024-07-11 PROCEDURE — 84132 ASSAY OF SERUM POTASSIUM: CPT

## 2024-07-11 PROCEDURE — 85610 PROTHROMBIN TIME: CPT

## 2024-07-11 PROCEDURE — 6370000000 HC RX 637 (ALT 250 FOR IP): Performed by: EMERGENCY MEDICINE

## 2024-07-11 PROCEDURE — 71045 X-RAY EXAM CHEST 1 VIEW: CPT

## 2024-07-11 PROCEDURE — 6370000000 HC RX 637 (ALT 250 FOR IP)

## 2024-07-11 PROCEDURE — 85025 COMPLETE CBC W/AUTO DIFF WBC: CPT

## 2024-07-11 PROCEDURE — 6360000002 HC RX W HCPCS

## 2024-07-11 PROCEDURE — 84450 TRANSFERASE (AST) (SGOT): CPT

## 2024-07-11 PROCEDURE — 93010 ELECTROCARDIOGRAM REPORT: CPT | Performed by: INTERNAL MEDICINE

## 2024-07-11 PROCEDURE — 6360000004 HC RX CONTRAST MEDICATION: Performed by: EMERGENCY MEDICINE

## 2024-07-11 PROCEDURE — 84484 ASSAY OF TROPONIN QUANT: CPT

## 2024-07-11 PROCEDURE — 84075 ASSAY ALKALINE PHOSPHATASE: CPT

## 2024-07-11 PROCEDURE — 70450 CT HEAD/BRAIN W/O DYE: CPT

## 2024-07-11 PROCEDURE — 99285 EMERGENCY DEPT VISIT HI MDM: CPT

## 2024-07-11 PROCEDURE — 70551 MRI BRAIN STEM W/O DYE: CPT

## 2024-07-11 PROCEDURE — 80053 COMPREHEN METABOLIC PANEL: CPT

## 2024-07-11 PROCEDURE — 2580000003 HC RX 258

## 2024-07-11 PROCEDURE — 93005 ELECTROCARDIOGRAM TRACING: CPT | Performed by: EMERGENCY MEDICINE

## 2024-07-11 PROCEDURE — 99222 1ST HOSP IP/OBS MODERATE 55: CPT

## 2024-07-11 RX ORDER — MONTELUKAST SODIUM 10 MG/1
10 TABLET ORAL NIGHTLY
Status: DISCONTINUED | OUTPATIENT
Start: 2024-07-11 | End: 2024-07-12 | Stop reason: HOSPADM

## 2024-07-11 RX ORDER — SODIUM CHLORIDE 0.9 % (FLUSH) 0.9 %
5-40 SYRINGE (ML) INJECTION EVERY 12 HOURS SCHEDULED
Status: DISCONTINUED | OUTPATIENT
Start: 2024-07-11 | End: 2024-07-12 | Stop reason: HOSPADM

## 2024-07-11 RX ORDER — ONDANSETRON 4 MG/1
4 TABLET, ORALLY DISINTEGRATING ORAL EVERY 8 HOURS PRN
Status: DISCONTINUED | OUTPATIENT
Start: 2024-07-11 | End: 2024-07-12 | Stop reason: HOSPADM

## 2024-07-11 RX ORDER — HYDRALAZINE HYDROCHLORIDE 25 MG/1
25 TABLET, FILM COATED ORAL ONCE
Status: COMPLETED | OUTPATIENT
Start: 2024-07-11 | End: 2024-07-11

## 2024-07-11 RX ORDER — SODIUM CHLORIDE 9 MG/ML
INJECTION, SOLUTION INTRAVENOUS PRN
Status: DISCONTINUED | OUTPATIENT
Start: 2024-07-11 | End: 2024-07-12 | Stop reason: HOSPADM

## 2024-07-11 RX ORDER — ATORVASTATIN CALCIUM 40 MG/1
80 TABLET, FILM COATED ORAL NIGHTLY
Status: DISCONTINUED | OUTPATIENT
Start: 2024-07-11 | End: 2024-07-12 | Stop reason: HOSPADM

## 2024-07-11 RX ORDER — DULOXETIN HYDROCHLORIDE 30 MG/1
30 CAPSULE, DELAYED RELEASE ORAL NIGHTLY
Status: DISCONTINUED | OUTPATIENT
Start: 2024-07-11 | End: 2024-07-12 | Stop reason: HOSPADM

## 2024-07-11 RX ORDER — MECLIZINE HYDROCHLORIDE 25 MG/1
25 TABLET ORAL ONCE
Status: COMPLETED | OUTPATIENT
Start: 2024-07-11 | End: 2024-07-11

## 2024-07-11 RX ORDER — TRIAMTERENE AND HYDROCHLOROTHIAZIDE 37.5; 25 MG/1; MG/1
1 TABLET ORAL DAILY
Status: DISCONTINUED | OUTPATIENT
Start: 2024-07-11 | End: 2024-07-12 | Stop reason: HOSPADM

## 2024-07-11 RX ORDER — ONDANSETRON 2 MG/ML
4 INJECTION INTRAMUSCULAR; INTRAVENOUS EVERY 6 HOURS PRN
Status: DISCONTINUED | OUTPATIENT
Start: 2024-07-11 | End: 2024-07-12 | Stop reason: HOSPADM

## 2024-07-11 RX ORDER — ENOXAPARIN SODIUM 100 MG/ML
40 INJECTION SUBCUTANEOUS DAILY
Status: DISCONTINUED | OUTPATIENT
Start: 2024-07-11 | End: 2024-07-12 | Stop reason: HOSPADM

## 2024-07-11 RX ORDER — POLYETHYLENE GLYCOL 3350 17 G/17G
17 POWDER, FOR SOLUTION ORAL DAILY PRN
Status: DISCONTINUED | OUTPATIENT
Start: 2024-07-11 | End: 2024-07-12 | Stop reason: HOSPADM

## 2024-07-11 RX ORDER — ALBUTEROL SULFATE 90 UG/1
2 AEROSOL, METERED RESPIRATORY (INHALATION) EVERY 4 HOURS PRN
Status: DISCONTINUED | OUTPATIENT
Start: 2024-07-11 | End: 2024-07-12 | Stop reason: HOSPADM

## 2024-07-11 RX ORDER — LABETALOL HYDROCHLORIDE 5 MG/ML
10 INJECTION, SOLUTION INTRAVENOUS EVERY 10 MIN PRN
Status: DISCONTINUED | OUTPATIENT
Start: 2024-07-11 | End: 2024-07-12 | Stop reason: HOSPADM

## 2024-07-11 RX ORDER — LEVOTHYROXINE SODIUM 0.03 MG/1
25 TABLET ORAL DAILY
Status: DISCONTINUED | OUTPATIENT
Start: 2024-07-12 | End: 2024-07-12 | Stop reason: HOSPADM

## 2024-07-11 RX ORDER — CARBOXYMETHYLCELLULOSE SODIUM 10 MG/ML
1 GEL OPHTHALMIC 2 TIMES DAILY
Status: DISCONTINUED | OUTPATIENT
Start: 2024-07-11 | End: 2024-07-12 | Stop reason: HOSPADM

## 2024-07-11 RX ORDER — ASPIRIN 81 MG/1
81 TABLET, CHEWABLE ORAL ONCE
Status: COMPLETED | OUTPATIENT
Start: 2024-07-11 | End: 2024-07-11

## 2024-07-11 RX ORDER — ASPIRIN 300 MG/1
300 SUPPOSITORY RECTAL DAILY
Status: DISCONTINUED | OUTPATIENT
Start: 2024-07-11 | End: 2024-07-12 | Stop reason: HOSPADM

## 2024-07-11 RX ORDER — SODIUM CHLORIDE 0.9 % (FLUSH) 0.9 %
5-40 SYRINGE (ML) INJECTION PRN
Status: DISCONTINUED | OUTPATIENT
Start: 2024-07-11 | End: 2024-07-12 | Stop reason: HOSPADM

## 2024-07-11 RX ORDER — ATENOLOL 25 MG/1
12.5 TABLET ORAL DAILY
Status: DISCONTINUED | OUTPATIENT
Start: 2024-07-11 | End: 2024-07-12 | Stop reason: HOSPADM

## 2024-07-11 RX ORDER — ASPIRIN 81 MG/1
81 TABLET, CHEWABLE ORAL DAILY
Status: DISCONTINUED | OUTPATIENT
Start: 2024-07-11 | End: 2024-07-12 | Stop reason: HOSPADM

## 2024-07-11 RX ADMIN — MONTELUKAST SODIUM 10 MG: 10 TABLET, COATED ORAL at 21:55

## 2024-07-11 RX ADMIN — ASPIRIN 81 MG: 81 TABLET, CHEWABLE ORAL at 13:07

## 2024-07-11 RX ADMIN — ENOXAPARIN SODIUM 40 MG: 100 INJECTION SUBCUTANEOUS at 16:40

## 2024-07-11 RX ADMIN — IOPAMIDOL 75 ML: 755 INJECTION, SOLUTION INTRAVENOUS at 12:34

## 2024-07-11 RX ADMIN — Medication 10 ML: at 21:55

## 2024-07-11 RX ADMIN — DULOXETINE HYDROCHLORIDE 30 MG: 30 CAPSULE, DELAYED RELEASE ORAL at 21:55

## 2024-07-11 RX ADMIN — HYDRALAZINE HYDROCHLORIDE 25 MG: 25 TABLET ORAL at 16:18

## 2024-07-11 RX ADMIN — CARBOXYMETHYLCELLULOSE SODIUM 1 DROP: 10 GEL OPHTHALMIC at 22:30

## 2024-07-11 RX ADMIN — MECLIZINE HYDROCHLORIDE 25 MG: 25 TABLET ORAL at 13:07

## 2024-07-11 ASSESSMENT — ENCOUNTER SYMPTOMS
NAUSEA: 0
VOMITING: 0
DIARRHEA: 0
SHORTNESS OF BREATH: 0
VOICE CHANGE: 0
TROUBLE SWALLOWING: 0

## 2024-07-11 ASSESSMENT — PAIN SCALES - GENERAL
PAINLEVEL_OUTOF10: 0
PAINLEVEL_OUTOF10: 0

## 2024-07-11 ASSESSMENT — PAIN - FUNCTIONAL ASSESSMENT: PAIN_FUNCTIONAL_ASSESSMENT: NONE - DENIES PAIN

## 2024-07-11 NOTE — ED NOTES
1217-Code stroke paged overhead.   1229-Code stroke confirmed by Dr. Eli.  1229-CT called.  1229-Dr. Eli on the line speaking with  Stroke team Dr. Sharpe regarding this patient.   1231-Call back received from Huma in lab, stated chemistry tubes were hemolyzed. Need new Green and red top specimens. RN made aware.

## 2024-07-11 NOTE — ED PROVIDER NOTES
bilaterally.  Strength and sensation are equal in all 4 extremities.  Patient is able to ambulate using a walker for 50 feet in the emergency department.       NIH Stroke scale = 0.       DIAGNOSTIC RESULTS     EKG:All EKG's are interpreted by the Emergency Department Physician who either signs or Co-signs this chart in the absence of a cardiologist.    The Ekg interpreted by me shows  normal sinus rhythm with a rate of 64  Axis is   Normal  QTc is   447ms  Intervals and Durations are unremarkable.      ST Segments: nonspecific changes  Nonspecific changes without any signs of ST elevation from previous EKG on 9/4/2021      RADIOLOGY:     Interpretation per the Radiologist below, if available at the time of this note:    CTA HEAD NECK W CONTRAST   Final Result   1. No flow limiting stenosis of the cervical carotid/vertebral arteries.   2. Atherosclerosis contributes to less than 50% stenosis of the proximal left   cervical ICA by NASCET criteria.   3. No significant stenosis or large vessel occlusion of the [id number].         CT HEAD WO CONTRAST   Final Result   Addendum (preliminary) 1 of 1   ADDENDUM:   Findings were discussed with NAOMY GEORGES at 12:52 p.m. on 7/11/2024.         Final   1. No acute intracranial abnormality.   2. Age-related atrophy and chronic small vessel ischemic change.  No change   from prior.         XR CHEST PORTABLE   Final Result   No acute cardiopulmonary process               ED BEDSIDE ULTRASOUND:   No results found.    LABS:  Labs Reviewed   COMPREHENSIVE METABOLIC PANEL W/ REFLEX TO MG FOR LOW K - Abnormal; Notable for the following components:       Result Value    Sodium 133 (*)     Glucose 115 (*)     Total Protein 6.3 (*)     All other components within normal limits   TROPONIN - Abnormal; Notable for the following components:    Troponin, High Sensitivity 21 (*)     All other components within normal limits   URINALYSIS WITH REFLEX TO CULTURE - Abnormal; Notable for  patient and she does want to be admitted for MRI and neurology evaluation.  Patient was given aspirin.  Patient was also given Antivert and does report some mild improvement in symptoms.    Critical Care    Performed by: Herb Eli MD  Authorized by: Herb Eli MD    Critical care provider statement:     Critical care time (minutes):  32    Critical care time was exclusive of:  Separately billable procedures and treating other patients and teaching time    Critical care was necessary to treat or prevent imminent or life-threatening deterioration of the following conditions:  CNS failure or compromise    Critical care was time spent personally by me on the following activities:  Ordering and performing treatments and interventions, development of treatment plan with patient or surrogate, ordering and review of laboratory studies, discussions with consultants, ordering and review of radiographic studies, evaluation of patient's response to treatment, re-evaluation of patient's condition, examination of patient and obtaining history from patient or surrogate          FINAL IMPRESSION      1. Disequilibrium    2. Elevated troponin          DISPOSITION/PLAN     DISPOSITION Decision To Admit 07/11/2024 03:43:10 PM        (Please note that portions of this note were completed with a voice recognition program.  Efforts were made to edit the dictations but occasionally words are mis-transcribed.)    Herb Eli MD (electronically signed)  Attending Emergency Physician           Herb Eli MD  07/11/24 3888

## 2024-07-11 NOTE — H&P
Hospital Medicine History & Physical      PCP: Tang Bridges MD    Date of Admission: 7/11/2024    Date of Service: Pt seen/examined on 07/11/24     Chief Complaint:    Chief Complaint   Patient presents with    Dizziness     Pt states dizzy which is worse while walking. NIHSS 0. LKW 10-10:30am.         History Of Present Illness:      The patient is a 84 y.o. female with pmhx as below who presents to [facility] with disequilibrium. She lives home, alone, uses a walker at baseline. Today around 10:30 am, she was attempting to leave to go to the grocery store when she began to feel \"dizzy\". She has a hard time describing the sensation but overall felt off balance and that she was about to fall. The room was not spinning, no vision changes. Her symptoms improved when she sat on her reclining chair. She denies any chest pain, shortness of breath, weakness or numbness. She did not fall or lose consciousness.         Past Medical History:        Diagnosis Date    Asthma     COPD (chronic obstructive pulmonary disease) ([facility])     Hyperlipidemia     Hypertension     RA (rheumatoid arthritis) ([facility])     Thyroid disease     has half of her thyroid       Past Surgical History:        Procedure Laterality Date    ABDOMINAL EXPLORATION SURGERY      APPENDECTOMY      CHOLECYSTECTOMY      COLONOSCOPY  04/18/2013    diverticulosis, polyps    COLONOSCOPY  11/10/2016    normal colon    EYE SURGERY Right 12/11/2015    cataract removal    EYE SURGERY Left 01/08/2016    cataract removed    FRACTURE SURGERY      HUMERUS FRACTURE SURGERY Right 02/28/2021    HUMERUS OPEN REDUCTION INTERNAL FIXATION performed by Kate Levi MD at [facility] OR    HYSTERECTOMY (CERVIX STATUS UNKNOWN)      LAMINECTOMY N/A 01/27/2022    T10-11 THORACIC DECOMPRESSION, DISCECTOMY, FUSION / FIXATION performed by Brian Lema MD at [facility] OR    ORIF HUMERUS FRACTURE  11/18/2022    SPINE SURGERY  01/27/2022    THYROIDECTOMY, PARTIAL Left     TUMOR  drift.  Finger to nose and heel to shin test show no deficit.   Bilateral 5/5 strength in upper and lower extremities.  Did not ambulate patient.   Psych: Oriented x 3. No anxiety or agitation.       CBC:   Recent Labs     07/11/24  1222   WBC 8.0   HGB 14.2   HCT 42.0   MCV 96.3        BMP:   Recent Labs     07/11/24  1221 07/11/24  1252   *  --    K  --  4.3     --    CO2 30  --    BUN 19  --    CREATININE 0.7  --      LIVER PROFILE:   Recent Labs     07/11/24  1221 07/11/24  1252   AST  --  28   ALT  --  25   BILITOT 0.4  --    ALKPHOS  --  52     PT/INR:   Recent Labs     07/11/24  1222   PROTIME 12.7   INR 0.93     APTT: No results for input(s): \"APTT\" in the last 72 hours.  UA:  Recent Labs     07/11/24  1308   COLORU Yellow   PHUR 7.5   WBCUA 0-2   RBCUA None seen   BACTERIA Rare*   CLARITYU Clear   LEUKOCYTESUR TRACE*   UROBILINOGEN 0.2   BILIRUBINUR Negative   BLOODU Negative   GLUCOSEU Negative         CARDIAC ENZYMES  Lab Results   Component Value Date/Time    TROPHS 21 07/11/2024 02:02 PM    TROPHS 20 07/11/2024 12:52 PM         CULTURES  Results       ** No results found for the last 336 hours. **              EKG:   Encounter Date: 07/11/24   EKG 12 Lead   Result Value    Ventricular Rate 64    Atrial Rate 64    P-R Interval 138    QRS Duration 70    Q-T Interval 434    QTc Calculation (Bazett) 447    P Axis 68    R Axis 10    T Axis 77    Diagnosis      Normal sinus rhythmpossible old anteroseptal infarct When compared with ECG of 04-SEP-2021 10:12,Premature ventricular complexes are no longer PresentQT has shortenedConfirmed by ALVIN HOOD, CHARLENE ([id number]) on 7/11/2024 3:48:42 PM         RADIOLOGY  CTA HEAD NECK W CONTRAST   Final Result   1. No flow limiting stenosis of the cervical carotid/vertebral arteries.   2. Atherosclerosis contributes to less than 50% stenosis of the proximal left   cervical ICA by NASCET criteria.   3. No significant stenosis or large vessel occlusion of the

## 2024-07-11 NOTE — ED NOTES
Pt arrived via EMS c/o sudden onset dizziness that started around . NIHSS 0. Pt does state worse when trying to walk. Dr. Eli to bedside during triage. Code Stroke initiated, but delayed d/t getting pt up and out of bed to walk the hallway with walker. Also, another Code Stroke pt on CT table when trying to take pt over to CT. Pt ambulated with steady gait noted with walker. While ambulating, pt states that her head feels \"huge\" and when she turns too fast she has increased dizziness.

## 2024-07-11 NOTE — ED NOTES
Pt is an inpatient hold in the ED at this time. All inpatient orders released. Changed pt to Q4 VS per inpatient VS policy. Pt remains on bedside telemetry. Pt eating lunch/dinner tray. Pt denies further needs at this time.

## 2024-07-11 NOTE — PROGRESS NOTES
Pt just arrived to room 228 from ER in stable condition. Bedside report given to Laney HANDY. Pt denies needs at this time. No s/s of distress. Respirations easy and unlabored. Pt stable. Bed in low position call light within reach. No further needs at this time.

## 2024-07-11 NOTE — ED NOTES
"  Assessment & Plan     Benign essential hypertension    - BASIC METABOLIC PANEL  - lisinopril (ZESTRIL) 20 MG tablet; Take 1 tablet (20 mg) by mouth daily    Chronic combined systolic and diastolic congestive heart failure (H)    - Echocardiogram Complete    Coronary artery disease involving native coronary artery of native heart without angina pectoris  Denies any concern or complained .  - recommend to continue aspirin and statin .      Screening for condition  - TSH with free T4 reflex         BMI:   Estimated body mass index is 28.14 kg/m  as calculated from the following:    Height as of this encounter: 1.702 m (5' 7\").    Weight as of this encounter: 81.5 kg (179 lb 11.2 oz).   Weight management plan: Discussed healthy diet and exercise guidelines      Return in about 6 months (around 11/30/2022) for Follow up.    Louisa Hurley MD  [facility] ALESSANDRO Grimm is a 61 year old who presents for the following health issues    History of Present Illness       Reason for visit:  Check up    He eats 2-3 servings of fruits and vegetables daily.He consumes 2 sweetened beverage(s) daily.He exercises with enough effort to increase his heart rate 10 to 19 minutes per day.  He exercises with enough effort to increase his heart rate 4 days per week. He is missing 1 dose(s) of medications per week.         Review of Systems   Constitutional: Negative for fatigue and fever.   Respiratory: Negative for cough and shortness of breath.    Cardiovascular: Negative for chest pain, palpitations and peripheral edema.   Musculoskeletal: Negative for arthralgias and back pain.   Neurological: Negative for headaches.   Psychiatric/Behavioral: Negative for agitation and behavioral problems.            Objective    /88   Pulse 85   Temp 98  F (36.7  C) (Oral)   Resp 16   Ht 1.702 m (5' 7\")   Wt 81.5 kg (179 lb 11.2 oz)   SpO2 97%   BMI 28.14 kg/m    Body mass index is 28.14 kg/m .  Physical " MRI checklist completed with information provided by pt. All information thought to be true and accurate. Pt states she does have two rods in her spine and a plate in her right arm.   Exam  Constitutional:       Appearance: Normal appearance.   HENT:      Head: Normocephalic.   Cardiovascular:      Rate and Rhythm: Normal rate.      Pulses: Normal pulses.   Pulmonary:      Effort: Pulmonary effort is normal.   Abdominal:      General: Abdomen is flat.   Musculoskeletal:      Cervical back: Normal range of motion.   Skin:     General: Skin is warm.   Neurological:      General: No focal deficit present.      Mental Status: He is alert.   Psychiatric:         Mood and Affect: Mood normal.

## 2024-07-12 VITALS
DIASTOLIC BLOOD PRESSURE: 100 MMHG | RESPIRATION RATE: 17 BRPM | SYSTOLIC BLOOD PRESSURE: 170 MMHG | OXYGEN SATURATION: 92 % | TEMPERATURE: 97.9 F | WEIGHT: 181 LBS | HEIGHT: 66 IN | BODY MASS INDEX: 29.09 KG/M2 | HEART RATE: 67 BPM

## 2024-07-12 LAB
ANION GAP SERPL CALCULATED.3IONS-SCNC: 8 MMOL/L (ref 3–16)
BASOPHILS # BLD: 0.1 K/UL (ref 0–0.2)
BASOPHILS NFR BLD: 0.9 %
BUN SERPL-MCNC: 14 MG/DL (ref 7–20)
CALCIUM SERPL-MCNC: 9.4 MG/DL (ref 8.3–10.6)
CHLORIDE SERPL-SCNC: 106 MMOL/L (ref 99–110)
CO2 SERPL-SCNC: 28 MMOL/L (ref 21–32)
CREAT SERPL-MCNC: 0.6 MG/DL (ref 0.6–1.2)
DEPRECATED RDW RBC AUTO: 15 % (ref 12.4–15.4)
EOSINOPHIL # BLD: 0.2 K/UL (ref 0–0.6)
EOSINOPHIL NFR BLD: 2.9 %
GFR SERPLBLD CREATININE-BSD FMLA CKD-EPI: 89 ML/MIN/{1.73_M2}
GLUCOSE SERPL-MCNC: 94 MG/DL (ref 70–99)
HCT VFR BLD AUTO: 43.1 % (ref 36–48)
HGB BLD-MCNC: 14.6 G/DL (ref 12–16)
LYMPHOCYTES # BLD: 2.3 K/UL (ref 1–5.1)
LYMPHOCYTES NFR BLD: 33.8 %
MCH RBC QN AUTO: 32.7 PG (ref 26–34)
MCHC RBC AUTO-ENTMCNC: 33.9 G/DL (ref 31–36)
MCV RBC AUTO: 96.4 FL (ref 80–100)
MONOCYTES # BLD: 0.9 K/UL (ref 0–1.3)
MONOCYTES NFR BLD: 13.4 %
NEUTROPHILS # BLD: 3.3 K/UL (ref 1.7–7.7)
NEUTROPHILS NFR BLD: 49 %
PLATELET # BLD AUTO: 151 K/UL (ref 135–450)
PLATELET BLD QL SMEAR: ADEQUATE
PMV BLD AUTO: 9.6 FL (ref 5–10.5)
POTASSIUM SERPL-SCNC: 4 MMOL/L (ref 3.5–5.1)
RBC # BLD AUTO: 4.47 M/UL (ref 4–5.2)
SLIDE REVIEW: NORMAL
SODIUM SERPL-SCNC: 142 MMOL/L (ref 136–145)
WBC # BLD AUTO: 6.7 K/UL (ref 4–11)

## 2024-07-12 PROCEDURE — G0378 HOSPITAL OBSERVATION PER HR: HCPCS

## 2024-07-12 PROCEDURE — 6370000000 HC RX 637 (ALT 250 FOR IP)

## 2024-07-12 PROCEDURE — 2580000003 HC RX 258

## 2024-07-12 PROCEDURE — 96372 THER/PROPH/DIAG INJ SC/IM: CPT

## 2024-07-12 PROCEDURE — 97530 THERAPEUTIC ACTIVITIES: CPT

## 2024-07-12 PROCEDURE — 85025 COMPLETE CBC W/AUTO DIFF WBC: CPT

## 2024-07-12 PROCEDURE — 36415 COLL VENOUS BLD VENIPUNCTURE: CPT

## 2024-07-12 PROCEDURE — 80061 LIPID PANEL: CPT

## 2024-07-12 PROCEDURE — 80048 BASIC METABOLIC PNL TOTAL CA: CPT

## 2024-07-12 PROCEDURE — 99223 1ST HOSP IP/OBS HIGH 75: CPT | Performed by: PSYCHIATRY & NEUROLOGY

## 2024-07-12 PROCEDURE — 99238 HOSP IP/OBS DSCHRG MGMT 30/<: CPT | Performed by: INTERNAL MEDICINE

## 2024-07-12 PROCEDURE — 6370000000 HC RX 637 (ALT 250 FOR IP): Performed by: INTERNAL MEDICINE

## 2024-07-12 PROCEDURE — 97165 OT EVAL LOW COMPLEX 30 MIN: CPT

## 2024-07-12 PROCEDURE — 6360000002 HC RX W HCPCS

## 2024-07-12 PROCEDURE — 83036 HEMOGLOBIN GLYCOSYLATED A1C: CPT

## 2024-07-12 RX ORDER — ASPIRIN 81 MG/1
81 TABLET, CHEWABLE ORAL DAILY
Qty: 30 TABLET | Refills: 0 | Status: SHIPPED | OUTPATIENT
Start: 2024-07-13

## 2024-07-12 RX ORDER — LOSARTAN POTASSIUM 25 MG/1
25 TABLET ORAL DAILY
Status: DISCONTINUED | OUTPATIENT
Start: 2024-07-12 | End: 2024-07-12 | Stop reason: HOSPADM

## 2024-07-12 RX ADMIN — CARBOXYMETHYLCELLULOSE SODIUM 1 DROP: 10 GEL OPHTHALMIC at 09:33

## 2024-07-12 RX ADMIN — ENOXAPARIN SODIUM 40 MG: 100 INJECTION SUBCUTANEOUS at 09:31

## 2024-07-12 RX ADMIN — LOSARTAN POTASSIUM 25 MG: 25 TABLET, FILM COATED ORAL at 11:12

## 2024-07-12 RX ADMIN — ASPIRIN 81 MG: 81 TABLET, CHEWABLE ORAL at 09:31

## 2024-07-12 RX ADMIN — TRIAMTERENE AND HYDROCHLOROTHIAZIDE 1 TABLET: 37.5; 25 TABLET ORAL at 11:13

## 2024-07-12 RX ADMIN — LEVOTHYROXINE SODIUM 25 MCG: 25 TABLET ORAL at 05:48

## 2024-07-12 RX ADMIN — Medication 10 ML: at 09:33

## 2024-07-12 NOTE — FLOWSHEET NOTE
07/12/24 0737   Vital Signs   Temp 97.9 °F (36.6 °C)   Temp Source Oral   Pulse 67   Heart Rate Source Monitor   Respirations 17   BP (!) 170/100   MAP (Calculated) 123   BP Location Left upper arm   BP Method Automatic   Patient Position Semi fowlers   Pain Assessment   Pain Assessment None - Denies Pain   Opioid-Induced Sedation   POSS Score 1   Oxygen Therapy   SpO2 92 %   O2 Device None (Room air)     Morning assessment complete. Patient is alert and oriented x4.  Morning medications administered.     Bedside Mobility Assessment Tool (BMAT):     Assessment Level 1- Sit and Shake    1. From a semi-reclined position, ask patient to sit up and rotate to a seated position at the side of the bed. Can use the bedrail.    2. Ask patient to reach out and grab your hand and shake making sure patient reaches across his/her midline.   Pass- Patient is able to come to a seated position, maintain core strength. Maintains seated balance while reaching across midline. Move on to Assessment Level 2.     Assessment Level 2- Stretch and Point   1. With patient in seated position at the side of the bed, have patient place both feet on the floor (or stool) with knees no higher than hips.    2. Ask patient to stretch one leg and straighten the knee, then bend the ankle/flex and point the toes. If appropriate, repeat with the other leg.   Pass- Patient is able to demonstrate appropriate quad strength on intended weight bearing limb(s). Move onto Assessment Level 3.     Assessment Level 3- Stand   1. Ask patient to elevate off the bed or chair (seated to standing) using an assistive device (cane, bedrail).    2. Patient should be able to raise buttocks off be and hold for a count of five. May repeat once.   Pass- Patient maintains standing stability for at least 5 seconds, proceed to assessment level 4.    Assessment Level 4- Walk   1. Ask patient to march in place at bedside.    2. Then ask patient to advance step and return each  foot. Some medical conditions may render a patient from stepping backwards, use your best clinical judgement.   Pass- Patient demonstrates balance while shifting weight and ability to step, takes independent steps, does not use assistive device patient is MOBILITY LEVEL 4.      Mobility Level- 4

## 2024-07-12 NOTE — CONSULTS
Inpatient Neurology consult        [facility]            Rosalie Hicks  1940    Date of Service: 7/12/2024    Referring Physician: Irene Sinha*      Reason for the consult and CC: Disequilibrium and difficulty ambulating    HPI:   The patient is a 84 y.o. female with a past medical history of COPD, hypertension, hyperlipidemia, and rheumatoid arthritis originally presenting to the hospital for concerns of dizziness onset 10:30 AM on 7/11/2024.  Patient describes dizzy episodes as a \"offkilter\" sensation that worsens with ambulation.  Neurology has been consulted for further evaluation and management of disequilibrium and difficulty ambulating.       Constitutional:   Vitals:    07/11/24 2152 07/12/24 0147 07/12/24 0425 07/12/24 0737   BP: (!) 169/83 (!) 171/93 (!) 191/83 (!) 170/100   Pulse: 73 69 73 67   Resp:  18 18 17   Temp:  98.9 °F (37.2 °C) 98.7 °F (37.1 °C) 97.9 °F (36.6 °C)   TempSrc:  Oral Oral Oral   SpO2:  93% 92% 92%   Weight:       Height:             I personally reviewed and updated social history, past medical history, medications, allergy, surgical history, and family history as documented in the patient's electronic health records.       ROS: 10-14 ROS reviewed with the patient/nurse/family which were unremarkable except mentioned in H&P.    General appearance:  Normal development and appear in no acute distress.   Mental Status:   Oriented to person, place, problem, and time.    Memory: Good immediate recall.  Intact remote memory  Normal attention span and concentration.  Language: intact naming, repeating and fluency   Good fund of Knowledge. Aware of current events and vocabulary   Cranial Nerves:   II: Visual fields: Full. Pupils: equal, round, reactive to light, bilaterally  III,IV,VI: Extra Ocular Movements are intact. No nystagmus  V: Facial sensation is intact  VII: Facial strength and movements: intact and symmetric  IX: Normal palatal elevation and  07/27/2022 10:22 AM    GFRAA >60 02/21/2012 11:10 PM    LABGLOM 89 07/12/2024 07:48 AM    LABGLOM 64 03/25/2024 11:25 AM    GLUCOSE 94 07/12/2024 07:48 AM    PHOS 3.9 01/27/2022 03:57 PM    CALCIUM 9.4 07/12/2024 07:48 AM     Lab Results   Component Value Date/Time    WBC 6.7 07/12/2024 05:47 AM    RBC 4.47 07/12/2024 05:47 AM    HGB 14.6 07/12/2024 05:47 AM    HCT 43.1 07/12/2024 05:47 AM    MCV 96.4 07/12/2024 05:47 AM    RDW 15.0 07/12/2024 05:47 AM     07/12/2024 05:47 AM     Lab Results   Component Value Date    INR 0.93 07/11/2024    PROTIME 12.7 07/11/2024         Impression:  Disequilibrium  Difficulties ambulating  CT head without revealed: No acute intracranial abnormality.  Age-related atrophy and chronic small vessel ischemic change.  No change from prior.  CTA head and neck revealed: No flow-limiting stenosis of the cervical carotid/vertebral arteries.  Arthrosclerosis contributes to less than 50% stenosis of the proximal left cervical ICA.  No significant stenosis or large vessel occlusion of the [place] of [place].  MRI brain without revealed: No acute intracranial abnormality.  Mild diffuse parenchymal atrophy with chronic microvascular ischemic changes.      Recommendation:  Continue aspirin and statin therapy  PT/OT/ST      Thank you for referring such patient. If you have any questions regarding my consult note, please don't hesitate to call me.     ANDREW Salazar - NP    This dictation was generated by voice recognition computer software. Although all attempts are made to edit the dictation for accuracy, there may be errors in the  transcription that are not intended    Addendum:  Based on the history, this is likely blood pressure related.  The patient will continue aspirin and statin without adjustment.  The patient can be discharged once blood pressure is stabilized.    Neurology will sign off.

## 2024-07-12 NOTE — PROGRESS NOTES
Reviewed discharge instructions with patient. States understanding. Meds to beds have been delivered

## 2024-07-12 NOTE — DISCHARGE SUMMARY
Name:  Rosalie Hicks  Room:  0228/0228-01  MRN:    [id number]    Discharge Summary      This discharge summary is in conjunction with a complete physical exam done on the day of discharge.      Discharging Physician: MOHAN SUTHERLAND MD      Admit: 7/11/2024  Discharge:  7/12/2024     Diagnoses this Admission    Principal Problem:    Disequilibrium  Active Problems:    RA (rheumatoid arthritis) (HCC)    Benign essential HTN    Hyperlipidemia    COPD (chronic obstructive pulmonary disease) (HCC)    Hypothyroidism    History of tobacco abuse    Elevated troponin  Resolved Problems:    * No resolved hospital problems. *          Procedures (Please Review Full Report for Details)      Consults    IP CONSULT TO NEUROLOGY      HPI:    The patient is a 84 y.o. female with pmhx as below who presents to [facility] with disequilibrium. She lives home, alone, uses a walker at baseline. Today around 10:30 am, she was attempting to leave to go to the grocery store when she began to feel \"dizzy\". She has a hard time describing the sensation but overall felt off balance and that she was about to fall. The room was not spinning, no vision changes. Her symptoms improved when she sat on her reclining chair. She denies any chest pain, shortness of breath, weakness or numbness. She did not fall or lose consciousness.       Physical Exam at Discharge:  BP (!) 170/100   Pulse 67   Temp 97.9 °F (36.6 °C) (Oral)   Resp 17   Ht 1.676 m (5' 6\")   Wt 82.1 kg (181 lb)   SpO2 92%   BMI 29.21 kg/m²       Gen: No distress. Alert. Pleasant elderly female.   Eyes: PERRL. No sclera icterus. No conjunctival injection. No nystagmus.   ENT: No discharge. Pharynx clear.   Neck: No JVD.  Trachea midline.  Resp: No accessory muscle use. No crackles. No wheezes. No rhonchi.   CV: Regular rate. Regular rhythm. No murmur.  No rub. No edema.   Capillary Refill: Brisk,< 3 seconds   Peripheral Pulses: +2 palpable, equal bilaterally   GI:

## 2024-07-12 NOTE — CARE COORDINATION
Case Management Assessment  Initial Evaluation    Date/Time of Evaluation: 7/12/2024 2:02 PM  Assessment Completed by: Misty Flores RN    If patient is discharged prior to next notation, then this note serves as note for discharge by case management.    Patient Name: Rosalie Hicks                   YOB: 1940  Diagnosis: Disequilibrium [R42]  Elevated troponin [R79.89]                   Date / Time: 7/11/2024 12:09 PM    Patient Admission Status: Observation   Readmission Risk (Low < 19, Mod (19-27), High > 27): Readmission Risk Score: 9.3    Current PCP: Tang Bridges MD  PCP verified by CM? Yes ([facility])    Chart Reviewed: Yes      History Provided by: Patient  Patient Orientation: Alert and Oriented    Patient Cognition: Alert    Hospitalization in the last 30 days (Readmission):  No    If yes, Readmission Assessment in CM Navigator will be completed.    Advance Directives:      Code Status: Full Code   Patient's Primary Decision Maker is: Legal Next of Kin    Primary Decision Maker: Johan Zamora - [phone]    Discharge Planning:    Patient lives with: Alone Type of Home: Apartment  Primary Care Giver: Self  Patient Support Systems include: Children   Current Financial resources: Other (Comment) ([employer] Medicare and [facility])  Current community resources:    Current services prior to admission: None            Current DME:              Type of Home Care services:  None    ADLS  Prior functional level: Independent in ADLs/IADLs  Current functional level: Independent in ADLs/IADLs    PT AM-PAC:   /24  OT AM-PAC: 19 /24    Family can provide assistance at DC: No  Would you like Case Management to discuss the discharge plan with any other family members/significant others, and if so, who? No  Plans to Return to Present Housing: Yes  Other Identified Issues/Barriers to RETURNING to current housing: None  Potential Assistance needed at discharge: N/A            Potential

## 2024-07-12 NOTE — PROGRESS NOTES
Progress Note    Admit Date:  7/11/2024    84 y.o. female with pmhx as below who presents to [facility] with disequilibrium.      Subjective:  Ms. Hicks feels improved today.  She has not had any more episodes like yesterday.     Objective:   Patient Vitals for the past 4 hrs:   BP Temp Temp src Pulse Resp SpO2   07/12/24 0737 (!) 170/100 97.9 °F (36.6 °C) Oral 67 17 92 %          Intake/Output Summary (Last 24 hours) at 7/12/2024 1015  Last data filed at 7/11/2024 2155  Gross per 24 hour   Intake 5 ml   Output --   Net 5 ml       Physical Exam:    Gen: No distress. Alert. Pleasant elderly female.   Eyes: PERRL. No sclera icterus. No conjunctival injection. No nystagmus.   ENT: No discharge. Pharynx clear.   Neck: No JVD.  Trachea midline.  Resp: No accessory muscle use. No crackles. No wheezes. No rhonchi.   CV: Regular rate. Regular rhythm. No murmur.  No rub. No edema.   Capillary Refill: Brisk,< 3 seconds   Peripheral Pulses: +2 palpable, equal bilaterally   GI: Non-tender. Non-distended.  Normal bowel sounds.  Skin: Warm and dry. No nodule on exposed extremities. No rash on exposed extremities.   M/S: No cyanosis. No joint deformity. No clubbing.   Neuro: Awake. No focal neurologic deficit on exam.  Cranial nerves II through XII intact.  No pronator drift.  Finger to nose and heel to shin test show no deficit.   Bilateral 5/5 strength in upper and lower extremities.  Did not ambulate patient.   Psych: Oriented x 3. No anxiety or agitation.     Data:  CBC:   Recent Labs     07/11/24  1222 07/12/24  0547   WBC 8.0 6.7   HGB 14.2 14.6   HCT 42.0 43.1   MCV 96.3 96.4    151     BMP:   Recent Labs     07/11/24  1221 07/11/24  1252 07/12/24  0748   *  --  142   K  --  4.3 4.0     --  106   CO2 30  --  28   BUN 19  --  14   CREATININE 0.7  --  0.6     LIVER PROFILE:   Recent Labs     07/11/24  1221 07/11/24  1252   AST  --  28   ALT  --  25   BILITOT 0.4  --    ALKPHOS  --  52     PT/INR:

## 2024-07-12 NOTE — FLOWSHEET NOTE
07/11/24 2005   Vital Signs   Temp 98.2 °F (36.8 °C)   Temp Source Oral   Pulse 72   Heart Rate Source Monitor   Respirations 19   BP (!) 170/79   MAP (Calculated) 109   BP Location Left upper arm   BP Method Automatic   Patient Position Semi fowlers   Oxygen Therapy   SpO2 93 %   O2 Device None (Room air)     Patient arrived to floor from ED. Patient A+Ox4, vitals and assessments done at this time. Bed in lowest position, call light within reach.

## 2024-07-12 NOTE — PROGRESS NOTES
PRN for wheezing or increased work of breathing using Per Protocol order mode.        4-6 - enter or revise RT Bronchodilator order(s) to two equivalent RT bronchodilator orders with one order with BID Frequency and one order with Frequency of every 4 hours PRN wheezing or increased work of breathing using Per Protocol order mode.        7-10 - enter or revise RT Bronchodilator order(s) to two equivalent RT bronchodilator orders with one order with TID Frequency and one order with Frequency of every 4 hours PRN wheezing or increased work of breathing using Per Protocol order mode.       11-13 - enter or revise RT Bronchodilator order(s) to one equivalent RT bronchodilator order with QID Frequency and an Albuterol order with Frequency of every 4 hours PRN wheezing or increased work of breathing using Per Protocol order mode.      Greater than 13 - enter or revise RT Bronchodilator order(s) to one equivalent RT bronchodilator order with every 4 hours Frequency and an Albuterol order with Frequency of every 2 hours PRN wheezing or increased work of breathing using Per Protocol order mode.     Electronically signed by Eliu Arvizu RCP on 7/11/2024 at 10:58 PM

## 2024-07-12 NOTE — PROGRESS NOTES
Inpatient Occupational Therapy Evaluation and Treatment    Unit: [facility]  Date:  7/12/2024  Patient Name:    Rosalie Hicks  Admitting diagnosis:  Disequilibrium [R42]  Elevated troponin [R79.89]  Admit Date:  7/11/2024  Precautions/Restrictions/WB Status/ Lines/ Wounds/ Oxygen: Fall risk, Bed/chair alarm, Lines (IV and external catheter), and Telemetry    Treatment Time:  09:46-10:19  Treatment Number:  1  Timed Code Treatment Minutes: 23 minutes  Total Treatment Minutes: 33 minutes    Patient Goals for Therapy: \"to go home\"          Discharge Recommendations: Home with initial 24/7 supervision  DME needs for discharge: Needs Met       Therapy recommendations for staff:   Stand by assist for ambulation with use of rolling walker (RW) and gait belt to/from chair  within room    History of Present Illness: Per H&P  \"Patient is a 84 y.o. female with pmhx as below who presents to [facility] with disequilibrium. She lives home, alone, uses a walker at baseline. Today around 10:30 am, she was attempting to leave to go to the grocery store when she began to feel \"dizzy\". She has a hard time describing the sensation but overall felt off balance and that she was about to fall. The room was not spinning, no vision changes. Her symptoms improved when she sat on her reclining chair. She denies any chest pain, shortness of breath, weakness or numbness. She did not fall or lose consciousness.\"    AM-PAC Score: AM-PAC Inpatient Daily Activity Raw Score: 19     Subjective:  Patient lying reclined in bed with no family present.   Pt agreeable to this OT session.     Cognition:    A&O x4   Able to follow 2 step commands    Pain:   No  Location: n/a  Rating: NA /10  Pain Medicine Status: Denies need    Activity Tolerance:   Pt completed therapy session with no adverse symptoms     BP (mmHg) HR (bpm) SpO2 (%) on RA Comments   Supine at rest 164/68 72 96%    Seated at /84      Standing 133/84      End of session      beverage management    Toileting:  Not Tested    Grooming/hygiene: Not Tested      Positioning Needs:   Pt in bed, no alarm needed with patient demonstrating appropriate safety awareness, call light provided and all needs within reach .     Ther Ex / Activities Initiated:   N/A    Patient/Family Education:   Pt educated on role of inpatient OT, plan of care, importance of continued activity, DC recommendations and Calling for assist with mobility.    CHF Education  N/A    Assessment:  Pt seen for Occupational therapy evaluation in acute care setting.  Pt demonstrated decreased Activity tolerance, ADLs, and Balance . Pt functioning below baseline and will likely benefit from skilled occupational therapy services to maximize safety and independence.     Recommending Home with initial 24/7 supervision upon discharge as patient functioning close to baseline level    Goal(s) :   To be met in 3 Visits:  Bed to toilet/BSC:       Supervision  Pt will complete 3/3 CHF goals     N/A    To be met in 5 Visits:  Supine to/from Sit in preparation for ADL task:   Independent  Toileting        Independent  Grooming       Independent  Upper Body Dressing:      Independent  Lower Body Dressing:      Independent  Pt to demonstrate UE therapeutic exs x 15 reps with minimal cues    Rehabilitation Potential: Good  Strengths for achieving goals include: PLOF and Pt cooperative   Barriers to achieving goals include:  Complexity of condition    Plan:  To be seen 2-3 x/wk  while in acute care setting for therapeutic exercises, bed mobility, transfers, family/patient education, ADL/IADL retraining, and energy conservation training.    Electronically signed by Krzysztof Kerr OT on 7/12/2024 at 11:18 AM      If patient discharges from this facility prior to next visit, this note will serve as the Discharge Summary

## 2024-07-12 NOTE — PROGRESS NOTES
Speech-Language Pathology  Swallowing Disorders and Dysphagia     SLP Attempt Note           Name: Rosalie Hicks  : 1940  Medical Diagnosis: Disequilibrium [R42]  Elevated troponin [R79.89]    Attempting to see pt for dysphagia evaluation. Pt currently tolerating regular diet with no reported concerns. Actively discharging at this time. No charges filed. Please obtain OP SLP should additional concerns arise. Thank you,     Renata Marshall M.A., CCC-SLP   Speech-Language Pathologist #[id number]  Speech Pathology and Swallowing Disorders  P: (inpatient): [id number] (speech desk): [phone]  E: [email or web address]  Certified to provide:  FEES, MBS, Vital Stim, and [place] Dysphagia Therapy Program (MDTP)

## 2024-07-12 NOTE — PROGRESS NOTES
4 Eyes Skin Assessment     NAME:  Rosalie Hicks  YOB: 1940  MEDICAL RECORD NUMBER:  [id number]    The patient is being assessed for  Admission    I agree that at least one RN has performed a thorough Head to Toe Skin Assessment on the patient. ALL assessment sites listed below have been assessed.      Areas assessed by both nurses:    Head, Face, Ears, Shoulders, Back, Chest, Arms, Elbows, Hands, Sacrum. Buttock, Coccyx, Ischium, and Legs. Feet and Heels    Patient has scattered bruising, no non healing wounds at this time.        Does the Patient have a Wound? No noted wound(s)       Milad Prevention initiated by RN: No  Wound Care Orders initiated by RN: No    Pressure Injury (Stage 3,4, Unstageable, DTI, NWPT, and Complex wounds) if present, place Wound referral order by RN under : No    New Ostomies, if present place, Ostomy referral order under : No     Nurse 1 eSignature: Electronically signed by Laney Ceja RN on 7/12/24 at 6:33 AM EDT    **SHARE this note so that the co-signing nurse can place an eSignature**    Nurse 2 eSignature: {Esignature:[id number]}

## 2024-07-13 LAB
EST. AVERAGE GLUCOSE BLD GHB EST-MCNC: 116.9 MG/DL
HBA1C MFR BLD: 5.7 %

## 2024-07-15 ENCOUNTER — TELEPHONE (OUTPATIENT)
Dept: FAMILY MEDICINE CLINIC | Age: 84
End: 2024-07-15

## 2024-07-15 LAB
REASON FOR REJECTION: NORMAL
REJECTED TEST: NORMAL

## 2024-07-15 NOTE — TELEPHONE ENCOUNTER
Care Transitions Initial Follow Up Call    Outreach made within 2 business days of discharge: Yes    Patient: Rosalie Hicks Patient : 1940   MRN: [id number]  Reason for Admission: There are no discharge diagnoses documented for the most recent discharge.  Discharge Date: 24       Spoke with: Rosalie     Discharge department/facility: Pending [facility] to [facility] Patient Contact:  Was patient able to fill all prescriptions: Yes  Was patient instructed to bring all medications to the follow-up visit: Yes  Is patient taking all medications as directed in the discharge summary? Yes  Does patient understand their discharge instructions: Yes  Does patient have questions or concerns that need addressed prior to 7-14 day follow up office visit: no    Scheduled appointment with PCP within 7-14 days    Follow Up  Future Appointments   Date Time Provider Department Center   2024 10:20 AM Chris Jefferson APRN - CNP [facility]   2024 11:00 AM Tang Bridges MD [facility]   2024 10:45 AM Jhonny Guillermo DO [facility]   10/16/2024  2:00 PM Chase Rehman MD [facility] [place]       Gayathri Read MA

## 2024-07-16 ENCOUNTER — OFFICE VISIT (OUTPATIENT)
Dept: FAMILY MEDICINE CLINIC | Age: 84
End: 2024-07-16

## 2024-07-16 VITALS
HEART RATE: 60 BPM | WEIGHT: 180 LBS | OXYGEN SATURATION: 94 % | SYSTOLIC BLOOD PRESSURE: 126 MMHG | HEIGHT: 66 IN | DIASTOLIC BLOOD PRESSURE: 74 MMHG | BODY MASS INDEX: 28.93 KG/M2

## 2024-07-16 DIAGNOSIS — R42 DISEQUILIBRIUM: ICD-10-CM

## 2024-07-16 DIAGNOSIS — Z09 HOSPITAL DISCHARGE FOLLOW-UP: Primary | ICD-10-CM

## 2024-07-16 DIAGNOSIS — I10 BENIGN ESSENTIAL HTN: ICD-10-CM

## 2024-07-16 NOTE — PROGRESS NOTES
into the lungs every 4 hours as needed for Wheezing or Shortness of Breath     aspirin 81 MG chewable tablet  Take 1 tablet by mouth daily     atenolol 25 MG tablet  Commonly known as: TENORMIN  Take 0.5 tablets by mouth daily     B-12 PO     calcium carbonate 500 MG Tabs tablet  Commonly known as: OSCAL     clobetasol 0.05 % cream  Commonly known as: TEMOVATE  Apply topically 2 times daily.     dexAMETHasone 1 MG tablet  Commonly known as: DECADRON  Take 1 tablet by mouth daily as needed (joint pain)     FISH OIL PO     levothyroxine 25 MCG tablet  Commonly known as: SYNTHROID  Take 1 tablet by mouth daily     Restasis 0.05 % ophthalmic emulsion  Generic drug: cycloSPORINE     rosuvastatin 10 MG tablet  Commonly known as: Crestor  Take 1 tablet by mouth every other day     triamterene-hydroCHLOROthiazide 37.5-25 MG per tablet  Commonly known as: MAXZIDE-25  TAKE ONE TABLET BY MOUTH DAILY     VITAMIN D3 PO                Medications marked \"taking\" at this time  Outpatient Medications Marked as Taking for the 7/16/24 encounter (Office Visit) with Chris Jefferson APRN - CNP   Medication Sig Dispense Refill    aspirin 81 MG chewable tablet Take 1 tablet by mouth daily 30 tablet 0    DULoxetine (CYMBALTA) 30 MG extended release capsule TAKE 1 CAPSULE BY MOUTH DAILY (Patient taking differently: Take 1 capsule by mouth at bedtime) 90 capsule 3    dexAMETHasone (DECADRON) 1 MG tablet Take 1 tablet by mouth daily as needed (joint pain) 30 tablet 3    levothyroxine (SYNTHROID) 25 MCG tablet Take 1 tablet by mouth daily 90 tablet 3    montelukast (SINGULAIR) 10 MG tablet TAKE 1 TABLET BY MOUTH DAILY (Patient taking differently: Take 1 tablet by mouth nightly) 90 tablet 3    albuterol sulfate HFA (PROVENTIL HFA) 108 (90 Base) MCG/ACT inhaler Inhale 2 puffs into the lungs every 4 hours as needed for Wheezing or Shortness of Breath 18 g 6    rosuvastatin (CRESTOR) 10 MG tablet Take 1 tablet by mouth every other day 45 tablet

## 2024-07-18 LAB
REASON FOR REJECTION: NORMAL
REJECTED TEST: NORMAL

## 2024-07-30 ENCOUNTER — TELEPHONE (OUTPATIENT)
Dept: FAMILY MEDICINE CLINIC | Age: 84
End: 2024-07-30

## 2024-07-30 NOTE — TELEPHONE ENCOUNTER
Pt called to say that her neighbor has a sign on her door that states she has covid.     Pt asked to see if she is due for her covid booster. Pts chart does show she is due for booster. Pt informed stating she will call [facility], and [facility] to see if the have the covid booster.     Pt states she is very scared that she will get covid.     Pt to follow up prn w/ pcp

## 2024-08-08 DIAGNOSIS — M05.719 RHEUMATOID ARTHRITIS INVOLVING SHOULDER WITH POSITIVE RHEUMATOID FACTOR, UNSPECIFIED LATERALITY (HCC): ICD-10-CM

## 2024-08-08 NOTE — TELEPHONE ENCOUNTER
Date of last refill of this med was 5/2/24, # of pills given 45 and # of refills given 0.  Their next appointment is 8/26/24, the last date patient was seen was 7/16/24.  Does patient have medication agreement on file? No

## 2024-08-09 RX ORDER — TRAMADOL HYDROCHLORIDE 50 MG/1
50 TABLET ORAL EVERY 8 HOURS PRN
Qty: 45 TABLET | Refills: 0 | Status: SHIPPED | OUTPATIENT
Start: 2024-08-09 | End: 2024-09-08

## 2024-08-10 PROBLEM — R79.89 ELEVATED TROPONIN: Status: RESOLVED | Noted: 2024-07-11 | Resolved: 2024-08-10

## 2024-08-16 ENCOUNTER — TELEMEDICINE (OUTPATIENT)
Dept: FAMILY MEDICINE CLINIC | Age: 84
End: 2024-08-16
Payer: MEDICARE

## 2024-08-16 DIAGNOSIS — Z00.00 MEDICARE ANNUAL WELLNESS VISIT, SUBSEQUENT: Primary | ICD-10-CM

## 2024-08-16 PROCEDURE — G0439 PPPS, SUBSEQ VISIT: HCPCS | Performed by: FAMILY MEDICINE

## 2024-08-16 PROCEDURE — 1123F ACP DISCUSS/DSCN MKR DOCD: CPT | Performed by: FAMILY MEDICINE

## 2024-08-16 SDOH — ECONOMIC STABILITY: FOOD INSECURITY: WITHIN THE PAST 12 MONTHS, YOU WORRIED THAT YOUR FOOD WOULD RUN OUT BEFORE YOU GOT MONEY TO BUY MORE.: NEVER TRUE

## 2024-08-16 SDOH — ECONOMIC STABILITY: FOOD INSECURITY: WITHIN THE PAST 12 MONTHS, THE FOOD YOU BOUGHT JUST DIDN'T LAST AND YOU DIDN'T HAVE MONEY TO GET MORE.: NEVER TRUE

## 2024-08-16 SDOH — ECONOMIC STABILITY: INCOME INSECURITY: HOW HARD IS IT FOR YOU TO PAY FOR THE VERY BASICS LIKE FOOD, HOUSING, MEDICAL CARE, AND HEATING?: NOT HARD AT ALL

## 2024-08-16 ASSESSMENT — PATIENT HEALTH QUESTIONNAIRE - PHQ9
5. POOR APPETITE OR OVEREATING: NOT AT ALL
9. THOUGHTS THAT YOU WOULD BE BETTER OFF DEAD, OR OF HURTING YOURSELF: NOT AT ALL
SUM OF ALL RESPONSES TO PHQ QUESTIONS 1-9: 2
6. FEELING BAD ABOUT YOURSELF - OR THAT YOU ARE A FAILURE OR HAVE LET YOURSELF OR YOUR FAMILY DOWN: NOT AT ALL
SUM OF ALL RESPONSES TO PHQ9 QUESTIONS 1 & 2: 2
3. TROUBLE FALLING OR STAYING ASLEEP: NOT AT ALL
SUM OF ALL RESPONSES TO PHQ QUESTIONS 1-9: 2
SUM OF ALL RESPONSES TO PHQ QUESTIONS 1-9: 2
8. MOVING OR SPEAKING SO SLOWLY THAT OTHER PEOPLE COULD HAVE NOTICED. OR THE OPPOSITE, BEING SO FIGETY OR RESTLESS THAT YOU HAVE BEEN MOVING AROUND A LOT MORE THAN USUAL: NOT AT ALL
1. LITTLE INTEREST OR PLEASURE IN DOING THINGS: SEVERAL DAYS
SUM OF ALL RESPONSES TO PHQ QUESTIONS 1-9: 2
2. FEELING DOWN, DEPRESSED OR HOPELESS: SEVERAL DAYS
7. TROUBLE CONCENTRATING ON THINGS, SUCH AS READING THE NEWSPAPER OR WATCHING TELEVISION: NOT AT ALL
4. FEELING TIRED OR HAVING LITTLE ENERGY: NOT AT ALL
10. IF YOU CHECKED OFF ANY PROBLEMS, HOW DIFFICULT HAVE THESE PROBLEMS MADE IT FOR YOU TO DO YOUR WORK, TAKE CARE OF THINGS AT HOME, OR GET ALONG WITH OTHER PEOPLE: NOT DIFFICULT AT ALL

## 2024-08-16 ASSESSMENT — LIFESTYLE VARIABLES
HOW MANY STANDARD DRINKS CONTAINING ALCOHOL DO YOU HAVE ON A TYPICAL DAY: PATIENT DOES NOT DRINK
HOW OFTEN DO YOU HAVE A DRINK CONTAINING ALCOHOL: NEVER

## 2024-08-16 NOTE — PROGRESS NOTES
Medicare Annual Wellness Visit    Rosalie Hicks is here for Medicare AWV    Assessment & Plan   Medicare annual wellness visit, subsequent  Recommendations for Preventive Services Due: see orders and patient instructions/AVS.  Recommended screening schedule for the next 5-10 years is provided to the patient in written form: see Patient Instructions/AVS.     Return in 1 year (on 8/16/2025) for Medicare AWV.     Subjective       Patient's complete Health Risk Assessment and screening values have been reviewed and are found in Flowsheets. The following problems were reviewed today and where indicated follow up appointments were made and/or referrals ordered.    Positive Risk Factor Screenings with Interventions:    Fall Risk:  Do you feel unsteady or are you worried about falling? : (!) yes (patient tries to be very careful)  2 or more falls in past year?: no  Fall with injury in past year?: no     Interventions:    Reviewed medications, home hazards, visual acuity, and co-morbidities that can increase risk for falls  See AVS for additional education material         Controlled Medication Review:    Today's Pain Level: No data recorded   Opioid Risk: (Low risk score <55) Opioid risk score: 24    Patient is low risk for opioid use disorder or overdose.    Last PDMP Phi as Reviewed:  Review User Review Instant Review Result   LINDA TAMAYO 8/9/2024  9:21 AM     Reviewed PDMP [1]     Last Controlled Substance Monitoring Documentation      Flowsheet Row [place] from [place]8/2024 in [facility] [place]   Periodic Controlled Substance Monitoring No signs of potential drug abuse or diversion identified., Obtaining appropriate analgesic effect of treatment. filed at 08/09/2024 0921               Inactivity:  On average, how many days per week do you engage in moderate to strenuous exercise (like a brisk walk)?: 0 days (patient tries to keep moving) (!) Abnormal  On average, how many minutes do you

## 2024-08-16 NOTE — PATIENT INSTRUCTIONS
attack. These may include:    Chest pain or pressure, or a strange feeling in the chest.     Sweating.     Shortness of breath.     Pain, pressure, or a strange feeling in the back, neck, jaw, or upper belly or in one or both shoulders or arms.     Lightheadedness or sudden weakness.     A fast or irregular heartbeat.   After you call 911, the  may tell you to chew 1 adult-strength or 2 to 4 low-dose aspirin. Wait for an ambulance. Do not try to drive yourself.  Watch closely for changes in your health, and be sure to contact your doctor if you have any problems.  Where can you learn more?  Go to https://www.[employer].net/patientEd and enter F075 to learn more about \"A Healthy Heart: Care Instructions.\"  Current as of: June 24, 2023  Content Version: 14.1  © 2208-0719 [employer].   Care instructions adapted under license by [employer]. If you have questions about a medical condition or this instruction, always ask your healthcare professional. [employer] disclaims any warranty or liability for your use of this information.      Personalized Preventive Plan for Rosalie Hicks - 8/16/2024  Medicare offers a range of preventive health benefits. Some of the tests and screenings are paid in full while other may be subject to a deductible, co-insurance, and/or copay.    Some of these benefits include a comprehensive review of your medical history including lifestyle, illnesses that may run in your family, and various assessments and screenings as appropriate.    After reviewing your medical record and screening and assessments performed today your provider may have ordered immunizations, labs, imaging, and/or referrals for you.  A list of these orders (if applicable) as well as your Preventive Care list are included within your After Visit Summary for your review.    Other Preventive Recommendations:    A preventive eye exam performed by an eye specialist is recommended every 1-2

## 2024-08-18 ENCOUNTER — APPOINTMENT (OUTPATIENT)
Dept: GENERAL RADIOLOGY | Age: 84
DRG: 203 | End: 2024-08-18
Payer: MEDICARE

## 2024-08-18 ENCOUNTER — HOSPITAL ENCOUNTER (INPATIENT)
Age: 84
LOS: 2 days | Discharge: HOME | DRG: 203 | End: 2024-08-20
Attending: EMERGENCY MEDICINE | Admitting: INTERNAL MEDICINE
Payer: MEDICARE

## 2024-08-18 ENCOUNTER — APPOINTMENT (OUTPATIENT)
Dept: CT IMAGING | Age: 84
DRG: 203 | End: 2024-08-18
Payer: MEDICARE

## 2024-08-18 DIAGNOSIS — J18.9 MULTIFOCAL PNEUMONIA: ICD-10-CM

## 2024-08-18 DIAGNOSIS — J96.01 ACUTE RESPIRATORY FAILURE WITH HYPOXIA (HCC): Primary | ICD-10-CM

## 2024-08-18 DIAGNOSIS — R07.9 LEFT-SIDED CHEST PAIN: ICD-10-CM

## 2024-08-18 DIAGNOSIS — R07.81 PLEURITIC CHEST PAIN: ICD-10-CM

## 2024-08-18 PROBLEM — J21.9 BRONCHIOLITIS: Status: ACTIVE | Noted: 2024-08-18

## 2024-08-18 PROBLEM — I10 PRIMARY HYPERTENSION: Status: ACTIVE | Noted: 2024-08-18

## 2024-08-18 PROBLEM — J98.4 PNEUMONITIS: Status: ACTIVE | Noted: 2024-08-18

## 2024-08-18 PROBLEM — R09.02 HYPOXIA: Status: ACTIVE | Noted: 2024-08-18

## 2024-08-18 LAB
ALBUMIN SERPL-MCNC: 3.5 G/DL (ref 3.4–5)
ALBUMIN/GLOB SERPL: 1.1 {RATIO} (ref 1.1–2.2)
ALP SERPL-CCNC: 75 U/L (ref 40–129)
ALT SERPL-CCNC: 17 U/L (ref 10–40)
ANION GAP SERPL CALCULATED.3IONS-SCNC: 10 MMOL/L (ref 3–16)
AST SERPL-CCNC: 27 U/L (ref 15–37)
BASOPHILS # BLD: 0.1 K/UL (ref 0–0.2)
BASOPHILS NFR BLD: 0.8 %
BILIRUB SERPL-MCNC: 0.5 MG/DL (ref 0–1)
BUN SERPL-MCNC: 18 MG/DL (ref 7–20)
CALCIUM SERPL-MCNC: 9.6 MG/DL (ref 8.3–10.6)
CHLORIDE SERPL-SCNC: 103 MMOL/L (ref 99–110)
CO2 SERPL-SCNC: 28 MMOL/L (ref 21–32)
CREAT SERPL-MCNC: 0.6 MG/DL (ref 0.6–1.2)
DEPRECATED RDW RBC AUTO: 13.8 % (ref 12.4–15.4)
EKG ATRIAL RATE: 94 BPM
EKG DIAGNOSIS: NORMAL
EKG P AXIS: 9 DEGREES
EKG P-R INTERVAL: 142 MS
EKG Q-T INTERVAL: 340 MS
EKG QRS DURATION: 62 MS
EKG QTC CALCULATION (BAZETT): 425 MS
EKG R AXIS: -12 DEGREES
EKG T AXIS: 29 DEGREES
EKG VENTRICULAR RATE: 94 BPM
EOSINOPHIL # BLD: 0.1 K/UL (ref 0–0.6)
EOSINOPHIL NFR BLD: 1.3 %
FLUAV RNA RESP QL NAA+PROBE: NOT DETECTED
FLUBV RNA RESP QL NAA+PROBE: NOT DETECTED
GFR SERPLBLD CREATININE-BSD FMLA CKD-EPI: 89 ML/MIN/{1.73_M2}
GLUCOSE SERPL-MCNC: 132 MG/DL (ref 70–99)
HCT VFR BLD AUTO: 41.8 % (ref 36–48)
HGB BLD-MCNC: 14.3 G/DL (ref 12–16)
LYMPHOCYTES # BLD: 1.6 K/UL (ref 1–5.1)
LYMPHOCYTES NFR BLD: 15 %
MAGNESIUM SERPL-MCNC: 2.2 MG/DL (ref 1.8–2.4)
MCH RBC QN AUTO: 32.8 PG (ref 26–34)
MCHC RBC AUTO-ENTMCNC: 34.2 G/DL (ref 31–36)
MCV RBC AUTO: 96 FL (ref 80–100)
MONOCYTES # BLD: 1 K/UL (ref 0–1.3)
MONOCYTES NFR BLD: 9.8 %
NEUTROPHILS # BLD: 7.6 K/UL (ref 1.7–7.7)
NEUTROPHILS NFR BLD: 73.1 %
NT-PROBNP SERPL-MCNC: 103 PG/ML (ref 0–449)
PLATELET # BLD AUTO: 198 K/UL (ref 135–450)
PMV BLD AUTO: 9.2 FL (ref 5–10.5)
POTASSIUM SERPL-SCNC: 4.2 MMOL/L (ref 3.5–5.1)
PROT SERPL-MCNC: 6.6 G/DL (ref 6.4–8.2)
RBC # BLD AUTO: 4.36 M/UL (ref 4–5.2)
SARS-COV-2 RNA RESP QL NAA+PROBE: NOT DETECTED
SODIUM SERPL-SCNC: 141 MMOL/L (ref 136–145)
TROPONIN, HIGH SENSITIVITY: 16 NG/L (ref 0–14)
TROPONIN, HIGH SENSITIVITY: 17 NG/L (ref 0–14)
WBC # BLD AUTO: 10.5 K/UL (ref 4–11)

## 2024-08-18 PROCEDURE — 36415 COLL VENOUS BLD VENIPUNCTURE: CPT

## 2024-08-18 PROCEDURE — 99285 EMERGENCY DEPT VISIT HI MDM: CPT

## 2024-08-18 PROCEDURE — 83735 ASSAY OF MAGNESIUM: CPT

## 2024-08-18 PROCEDURE — 87205 SMEAR GRAM STAIN: CPT

## 2024-08-18 PROCEDURE — 2580000003 HC RX 258: Performed by: INTERNAL MEDICINE

## 2024-08-18 PROCEDURE — 2580000003 HC RX 258: Performed by: EMERGENCY MEDICINE

## 2024-08-18 PROCEDURE — 94640 AIRWAY INHALATION TREATMENT: CPT

## 2024-08-18 PROCEDURE — 71045 X-RAY EXAM CHEST 1 VIEW: CPT

## 2024-08-18 PROCEDURE — 6360000002 HC RX W HCPCS: Performed by: EMERGENCY MEDICINE

## 2024-08-18 PROCEDURE — 96374 THER/PROPH/DIAG INJ IV PUSH: CPT

## 2024-08-18 PROCEDURE — 87636 SARSCOV2 & INF A&B AMP PRB: CPT

## 2024-08-18 PROCEDURE — 94761 N-INVAS EAR/PLS OXIMETRY MLT: CPT

## 2024-08-18 PROCEDURE — 87070 CULTURE OTHR SPECIMN AEROBIC: CPT

## 2024-08-18 PROCEDURE — 6370000000 HC RX 637 (ALT 250 FOR IP): Performed by: INTERNAL MEDICINE

## 2024-08-18 PROCEDURE — 71260 CT THORAX DX C+: CPT

## 2024-08-18 PROCEDURE — 6360000002 HC RX W HCPCS: Performed by: INTERNAL MEDICINE

## 2024-08-18 PROCEDURE — 93005 ELECTROCARDIOGRAM TRACING: CPT | Performed by: EMERGENCY MEDICINE

## 2024-08-18 PROCEDURE — 83880 ASSAY OF NATRIURETIC PEPTIDE: CPT

## 2024-08-18 PROCEDURE — 84484 ASSAY OF TROPONIN QUANT: CPT

## 2024-08-18 PROCEDURE — 96375 TX/PRO/DX INJ NEW DRUG ADDON: CPT

## 2024-08-18 PROCEDURE — 6360000004 HC RX CONTRAST MEDICATION: Performed by: EMERGENCY MEDICINE

## 2024-08-18 PROCEDURE — 85025 COMPLETE CBC W/AUTO DIFF WBC: CPT

## 2024-08-18 PROCEDURE — 80053 COMPREHEN METABOLIC PANEL: CPT

## 2024-08-18 PROCEDURE — 93010 ELECTROCARDIOGRAM REPORT: CPT | Performed by: INTERNAL MEDICINE

## 2024-08-18 PROCEDURE — 1200000000 HC SEMI PRIVATE

## 2024-08-18 PROCEDURE — 99223 1ST HOSP IP/OBS HIGH 75: CPT | Performed by: INTERNAL MEDICINE

## 2024-08-18 RX ORDER — ATENOLOL 25 MG/1
12.5 TABLET ORAL DAILY
Status: DISCONTINUED | OUTPATIENT
Start: 2024-08-18 | End: 2024-08-20 | Stop reason: HOSPADM

## 2024-08-18 RX ORDER — ONDANSETRON 2 MG/ML
4 INJECTION INTRAMUSCULAR; INTRAVENOUS ONCE
Status: COMPLETED | OUTPATIENT
Start: 2024-08-18 | End: 2024-08-18

## 2024-08-18 RX ORDER — IPRATROPIUM BROMIDE AND ALBUTEROL SULFATE 2.5; .5 MG/3ML; MG/3ML
1 SOLUTION RESPIRATORY (INHALATION)
Status: DISCONTINUED | OUTPATIENT
Start: 2024-08-18 | End: 2024-08-20 | Stop reason: HOSPADM

## 2024-08-18 RX ORDER — ONDANSETRON 4 MG/1
4 TABLET, ORALLY DISINTEGRATING ORAL EVERY 8 HOURS PRN
Status: DISCONTINUED | OUTPATIENT
Start: 2024-08-18 | End: 2024-08-20 | Stop reason: HOSPADM

## 2024-08-18 RX ORDER — TRAMADOL HYDROCHLORIDE 50 MG/1
50 TABLET ORAL EVERY 8 HOURS PRN
Status: DISCONTINUED | OUTPATIENT
Start: 2024-08-18 | End: 2024-08-20 | Stop reason: HOSPADM

## 2024-08-18 RX ORDER — SODIUM CHLORIDE 0.9 % (FLUSH) 0.9 %
5-40 SYRINGE (ML) INJECTION PRN
Status: DISCONTINUED | OUTPATIENT
Start: 2024-08-18 | End: 2024-08-20 | Stop reason: HOSPADM

## 2024-08-18 RX ORDER — SODIUM CHLORIDE 9 MG/ML
INJECTION, SOLUTION INTRAVENOUS CONTINUOUS
Status: ACTIVE | OUTPATIENT
Start: 2024-08-18 | End: 2024-08-18

## 2024-08-18 RX ORDER — AZITHROMYCIN 250 MG/1
250 TABLET, FILM COATED ORAL DAILY
Status: DISCONTINUED | OUTPATIENT
Start: 2024-08-19 | End: 2024-08-20 | Stop reason: HOSPADM

## 2024-08-18 RX ORDER — DEXAMETHASONE 4 MG/1
4 TABLET ORAL EVERY 12 HOURS SCHEDULED
Status: DISCONTINUED | OUTPATIENT
Start: 2024-08-18 | End: 2024-08-20 | Stop reason: HOSPADM

## 2024-08-18 RX ORDER — ROSUVASTATIN CALCIUM 10 MG/1
10 TABLET, COATED ORAL EVERY OTHER DAY
Status: DISCONTINUED | OUTPATIENT
Start: 2024-08-18 | End: 2024-08-20 | Stop reason: HOSPADM

## 2024-08-18 RX ORDER — PREDNISONE 2.5 MG/1
2.5 TABLET ORAL DAILY
Status: ON HOLD | COMMUNITY
End: 2024-08-19 | Stop reason: HOSPADM

## 2024-08-18 RX ORDER — SODIUM CHLORIDE 0.9 % (FLUSH) 0.9 %
5-40 SYRINGE (ML) INJECTION EVERY 12 HOURS SCHEDULED
Status: DISCONTINUED | OUTPATIENT
Start: 2024-08-18 | End: 2024-08-20 | Stop reason: HOSPADM

## 2024-08-18 RX ORDER — IPRATROPIUM BROMIDE AND ALBUTEROL SULFATE 2.5; .5 MG/3ML; MG/3ML
1 SOLUTION RESPIRATORY (INHALATION)
Status: DISCONTINUED | OUTPATIENT
Start: 2024-08-18 | End: 2024-08-18

## 2024-08-18 RX ORDER — DULOXETIN HYDROCHLORIDE 30 MG/1
30 CAPSULE, DELAYED RELEASE ORAL NIGHTLY
Status: DISCONTINUED | OUTPATIENT
Start: 2024-08-18 | End: 2024-08-20 | Stop reason: HOSPADM

## 2024-08-18 RX ORDER — ASPIRIN 81 MG/1
81 TABLET, CHEWABLE ORAL DAILY
Status: DISCONTINUED | OUTPATIENT
Start: 2024-08-18 | End: 2024-08-20 | Stop reason: HOSPADM

## 2024-08-18 RX ORDER — ACETAMINOPHEN 650 MG/1
650 SUPPOSITORY RECTAL EVERY 6 HOURS PRN
Status: DISCONTINUED | OUTPATIENT
Start: 2024-08-18 | End: 2024-08-20 | Stop reason: HOSPADM

## 2024-08-18 RX ORDER — ACETAMINOPHEN 325 MG/1
650 TABLET ORAL EVERY 6 HOURS PRN
Status: DISCONTINUED | OUTPATIENT
Start: 2024-08-18 | End: 2024-08-20 | Stop reason: HOSPADM

## 2024-08-18 RX ORDER — LEVOTHYROXINE SODIUM 0.03 MG/1
25 TABLET ORAL DAILY
Status: DISCONTINUED | OUTPATIENT
Start: 2024-08-18 | End: 2024-08-20 | Stop reason: HOSPADM

## 2024-08-18 RX ORDER — CARBOXYMETHYLCELLULOSE SODIUM 10 MG/ML
1 GEL OPHTHALMIC EVERY 4 HOURS PRN
Status: DISCONTINUED | OUTPATIENT
Start: 2024-08-18 | End: 2024-08-20 | Stop reason: HOSPADM

## 2024-08-18 RX ORDER — ONDANSETRON 2 MG/ML
4 INJECTION INTRAMUSCULAR; INTRAVENOUS EVERY 6 HOURS PRN
Status: DISCONTINUED | OUTPATIENT
Start: 2024-08-18 | End: 2024-08-20 | Stop reason: HOSPADM

## 2024-08-18 RX ORDER — POLYETHYLENE GLYCOL 3350 17 G/17G
17 POWDER, FOR SOLUTION ORAL DAILY PRN
Status: DISCONTINUED | OUTPATIENT
Start: 2024-08-18 | End: 2024-08-20 | Stop reason: HOSPADM

## 2024-08-18 RX ORDER — MAGNESIUM SULFATE IN WATER 40 MG/ML
2000 INJECTION, SOLUTION INTRAVENOUS PRN
Status: DISCONTINUED | OUTPATIENT
Start: 2024-08-18 | End: 2024-08-20 | Stop reason: HOSPADM

## 2024-08-18 RX ORDER — POTASSIUM CHLORIDE 20 MEQ/1
40 TABLET, EXTENDED RELEASE ORAL PRN
Status: DISCONTINUED | OUTPATIENT
Start: 2024-08-18 | End: 2024-08-20 | Stop reason: HOSPADM

## 2024-08-18 RX ORDER — MONTELUKAST SODIUM 10 MG/1
10 TABLET ORAL NIGHTLY
Status: DISCONTINUED | OUTPATIENT
Start: 2024-08-18 | End: 2024-08-20 | Stop reason: HOSPADM

## 2024-08-18 RX ORDER — ROSUVASTATIN CALCIUM 10 MG/1
10 TABLET, COATED ORAL EVERY OTHER DAY
Status: DISCONTINUED | OUTPATIENT
Start: 2024-08-18 | End: 2024-08-18 | Stop reason: SDUPTHER

## 2024-08-18 RX ORDER — ALBUTEROL SULFATE 2.5 MG/3ML
2.5 SOLUTION RESPIRATORY (INHALATION) EVERY 4 HOURS PRN
Status: DISCONTINUED | OUTPATIENT
Start: 2024-08-18 | End: 2024-08-20 | Stop reason: HOSPADM

## 2024-08-18 RX ORDER — ENOXAPARIN SODIUM 100 MG/ML
40 INJECTION SUBCUTANEOUS DAILY
Status: DISCONTINUED | OUTPATIENT
Start: 2024-08-18 | End: 2024-08-20 | Stop reason: HOSPADM

## 2024-08-18 RX ORDER — MORPHINE SULFATE 2 MG/ML
2 INJECTION, SOLUTION INTRAMUSCULAR; INTRAVENOUS ONCE
Status: COMPLETED | OUTPATIENT
Start: 2024-08-18 | End: 2024-08-18

## 2024-08-18 RX ORDER — POTASSIUM CHLORIDE 7.45 MG/ML
10 INJECTION INTRAVENOUS PRN
Status: DISCONTINUED | OUTPATIENT
Start: 2024-08-18 | End: 2024-08-20 | Stop reason: HOSPADM

## 2024-08-18 RX ORDER — TRIAMTERENE AND HYDROCHLOROTHIAZIDE 37.5; 25 MG/1; MG/1
1 TABLET ORAL DAILY
Status: DISCONTINUED | OUTPATIENT
Start: 2024-08-18 | End: 2024-08-20 | Stop reason: HOSPADM

## 2024-08-18 RX ORDER — DEXAMETHASONE SODIUM PHOSPHATE 10 MG/ML
10 INJECTION, SOLUTION INTRAMUSCULAR; INTRAVENOUS ONCE
Status: COMPLETED | OUTPATIENT
Start: 2024-08-18 | End: 2024-08-18

## 2024-08-18 RX ORDER — SODIUM CHLORIDE 9 MG/ML
INJECTION, SOLUTION INTRAVENOUS PRN
Status: DISCONTINUED | OUTPATIENT
Start: 2024-08-18 | End: 2024-08-20 | Stop reason: HOSPADM

## 2024-08-18 RX ADMIN — LEVOTHYROXINE SODIUM 25 MCG: 25 TABLET ORAL at 12:43

## 2024-08-18 RX ADMIN — ATENOLOL 12.5 MG: 25 TABLET ORAL at 12:43

## 2024-08-18 RX ADMIN — MONTELUKAST SODIUM 10 MG: 10 TABLET, COATED ORAL at 22:35

## 2024-08-18 RX ADMIN — IOPAMIDOL 75 ML: 755 INJECTION, SOLUTION INTRAVENOUS at 07:03

## 2024-08-18 RX ADMIN — ONDANSETRON 4 MG: 2 INJECTION INTRAMUSCULAR; INTRAVENOUS at 06:17

## 2024-08-18 RX ADMIN — DEXAMETHASONE 4 MG: 4 TABLET ORAL at 22:34

## 2024-08-18 RX ADMIN — ENOXAPARIN SODIUM 40 MG: 100 INJECTION SUBCUTANEOUS at 12:43

## 2024-08-18 RX ADMIN — SODIUM CHLORIDE, PRESERVATIVE FREE 10 ML: 5 INJECTION INTRAVENOUS at 13:04

## 2024-08-18 RX ADMIN — TRIAMTERENE AND HYDROCHLOROTHIAZIDE 1 TABLET: 37.5; 25 TABLET ORAL at 12:43

## 2024-08-18 RX ADMIN — TRAMADOL HYDROCHLORIDE 50 MG: 50 TABLET ORAL at 12:43

## 2024-08-18 RX ADMIN — IPRATROPIUM BROMIDE AND ALBUTEROL SULFATE 1 DOSE: 2.5; .5 SOLUTION RESPIRATORY (INHALATION) at 21:18

## 2024-08-18 RX ADMIN — IPRATROPIUM BROMIDE AND ALBUTEROL SULFATE 1 DOSE: 2.5; .5 SOLUTION RESPIRATORY (INHALATION) at 11:36

## 2024-08-18 RX ADMIN — MORPHINE SULFATE 2 MG: 2 INJECTION, SOLUTION INTRAMUSCULAR; INTRAVENOUS at 06:19

## 2024-08-18 RX ADMIN — ROSUVASTATIN CALCIUM 10 MG: 10 TABLET, FILM COATED ORAL at 22:34

## 2024-08-18 RX ADMIN — DEXAMETHASONE SODIUM PHOSPHATE 10 MG: 10 INJECTION, SOLUTION INTRAMUSCULAR; INTRAVENOUS at 09:26

## 2024-08-18 RX ADMIN — SODIUM CHLORIDE: 9 INJECTION, SOLUTION INTRAVENOUS at 12:58

## 2024-08-18 RX ADMIN — AZITHROMYCIN DIHYDRATE 500 MG: 500 INJECTION, POWDER, LYOPHILIZED, FOR SOLUTION INTRAVENOUS at 13:03

## 2024-08-18 RX ADMIN — ASPIRIN 81 MG: 81 TABLET, CHEWABLE ORAL at 12:43

## 2024-08-18 RX ADMIN — DULOXETINE HYDROCHLORIDE 30 MG: 30 CAPSULE, DELAYED RELEASE ORAL at 22:34

## 2024-08-18 RX ADMIN — SODIUM CHLORIDE 1000 MG: 900 INJECTION INTRAVENOUS at 09:28

## 2024-08-18 ASSESSMENT — PAIN - FUNCTIONAL ASSESSMENT
PAIN_FUNCTIONAL_ASSESSMENT: ACTIVITIES ARE NOT PREVENTED
PAIN_FUNCTIONAL_ASSESSMENT: 0-10

## 2024-08-18 ASSESSMENT — PAIN DESCRIPTION - PAIN TYPE
TYPE: ACUTE PAIN
TYPE: ACUTE PAIN

## 2024-08-18 ASSESSMENT — PAIN DESCRIPTION - DESCRIPTORS: DESCRIPTORS: SHARP

## 2024-08-18 ASSESSMENT — PAIN DESCRIPTION - FREQUENCY: FREQUENCY: INTERMITTENT

## 2024-08-18 ASSESSMENT — PAIN SCALES - GENERAL
PAINLEVEL_OUTOF10: 0
PAINLEVEL_OUTOF10: 7
PAINLEVEL_OUTOF10: 8

## 2024-08-18 ASSESSMENT — PAIN DESCRIPTION - ONSET: ONSET: ON-GOING

## 2024-08-18 ASSESSMENT — PAIN DESCRIPTION - LOCATION: LOCATION: CHEST;ABDOMEN

## 2024-08-18 ASSESSMENT — PAIN DESCRIPTION - ORIENTATION: ORIENTATION: MID

## 2024-08-18 NOTE — ED PROVIDER NOTES
EMERGENCY DEPARTMENT ENCOUNTER        Pt Name: Rosalie Hicks  MRN: [id number]  Birthdate 1940  Date of evaluation: 8/18/2024  Provider: aSlvador Chi MD  PCP: Tang Bridges MD      CHIEF COMPLAINT       Chief Complaint   Patient presents with    Shortness of Breath     Patient reports pain in chest/ABD after coughing and hurts to breath.        HISTORY OFPRESENT ILLNESS   (Location/Symptom, Timing/Onset, Context/Setting, Quality, Duration, Modifying Factors,Severity)  Note limiting factors.     Rosalie Hicks is a 84 y.o. female presenting today due to concern for developing a cough over the last couple of days although since around 9 PM she started getting a lot of chest pain with coughing and breathing along with some discomfort in her abdomen.  She denies any vomiting.  She denies any hemoptysis although has been coughing up some yellow sputum.  She denies any fever.  She denies any leg swelling or history of blood clots.  No reported falls or trauma.  She initially was around upper 80s on room air and does not normally wear oxygen.  She denies any lower abdominal discomfort.  Due to overall not feeling well with the cough but now having chest pain with breathing, she was brought to the emergency department for further evaluation.  She did not want anything for pain when I initially saw her.        REVIEW OF SYSTEMS    (2-9 systems for level 4, 10 or more for level 5)     Review of Systems   Constitutional:  Positive for fatigue. Negative for fever.   Respiratory:  Positive for cough and shortness of breath. Negative for chest tightness.    Cardiovascular:  Positive for chest pain.   Gastrointestinal:  Positive for abdominal pain. Negative for vomiting.   Genitourinary:  Negative for difficulty urinating and pelvic pain.   Musculoskeletal:  Negative for gait problem and neck pain.   Skin:  Negative for wound (no reported falls or trauma).   Neurological:  Positive for headaches (mild).

## 2024-08-18 NOTE — PLAN OF CARE
Plan of care discussed with the ER MD-Dr. Chi and Dr. Aggarwal.     84-year-old female presenting with complaints of cough for a few days, associated with increased shortness of breath.  Started having pleuritic chest pain last night.    Hypoxic in the ED with sats in the 80s requiring 2 L of oxygen  CT chest no PE does show some bronchiolitis.  Troponin 16 BNP not elevated.      Empiric antibiotics and possibly steroids in the ED.    Admission for further management of bronchiolitis, shortness of breath, hypoxia.

## 2024-08-18 NOTE — PROGRESS NOTES
4 Eyes Skin Assessment     NAME:  Rosalie Hicks  YOB: 1940  MEDICAL RECORD NUMBER:  [id number]    The patient is being assessed for  Admission    I agree that at least one RN has performed a thorough Head to Toe Skin Assessment on the patient. ALL assessment sites listed below have been assessed.      Areas assessed by both nurses:    Head, Face, Ears, Shoulders, Back, Chest, Arms, Elbows, Hands, Sacrum. Buttock, Coccyx, Ischium, Legs. Feet and Heels, and Under Medical Devices         Does the Patient have a Wound? No noted wound(s)       Milad Prevention initiated by RN: No  Wound Care Orders initiated by RN: No    Pressure Injury (Stage 3,4, Unstageable, DTI, NWPT, and Complex wounds) if present, place Wound referral order by RN under : No    New Ostomies, if present place, Ostomy referral order under : No     Nurse 1 eSignature: Electronically signed by Barb Sutherland RN on 8/18/24 at 6:27 PM EDT    **SHARE this note so that the co-signing nurse can place an eSignature**    Nurse 2 eSignature: Electronically signed by Angel Bey RN on 8/18/24 at 6:28 PM EDT

## 2024-08-18 NOTE — PLAN OF CARE
Problem: Discharge Planning  Goal: Discharge to home or other facility with appropriate resources  Outcome: Progressing  Flowsheets (Taken 8/18/2024 1022)  Discharge to home or other facility with appropriate resources: Identify barriers to discharge with patient and caregiver     Problem: Pain  Goal: Verbalizes/displays adequate comfort level or baseline comfort level  Outcome: Progressing     Problem: Safety - Adult  Goal: Free from fall injury  Outcome: Progressing

## 2024-08-18 NOTE — PROGRESS NOTES
RT Inhaler-Nebulizer Bronchodilator Protocol Note    There is a bronchodilator order in the chart from a provider indicating to follow the RT Bronchodilator Protocol and there is an “Initiate RT Inhaler-Nebulizer Bronchodilator Protocol” order as well (see protocol at bottom of note).    CXR Findings:  XR CHEST PORTABLE    Result Date: 8/18/2024  Low lung volumes with underaeration of the lung bases.       The findings from the last RT Protocol Assessment were as follows:   History Pulmonary Disease: (P) Chronic pulmonary disease  Respiratory Pattern: (P) Regular pattern and RR 12-20 bpm  Breath Sounds: (P) Slightly diminished and/or crackles  Cough: (P) Strong, spontaneous, non-productive  Indication for Bronchodilator Therapy: (P) Decreased or absent breath sounds  Bronchodilator Assessment Score: (P) 4    Aerosolized bronchodilator medication orders have been revised according to the RT Inhaler-Nebulizer Bronchodilator Protocol below.    Respiratory Therapist to perform RT Therapy Protocol Assessment initially then follow the protocol.  Repeat RT Therapy Protocol Assessment PRN for score 0-3 or on second treatment, BID, and PRN for scores above 3.    No Indications - adjust the frequency to every 6 hours PRN wheezing or bronchospasm, if no treatments needed after 48 hours then discontinue using Per Protocol order mode.     If indication present, adjust the RT bronchodilator orders based on the Bronchodilator Assessment Score as indicated below.  Use Inhaler orders unless patient has one or more of the following: on home nebulizer, not able to hold breath for 10 seconds, is not alert and oriented, cannot activate and use MDI correctly, or respiratory rate 25 breaths per minute or more, then use the equivalent nebulizer order(s) with same Frequency and PRN reasons based on the score.  If a patient is on this medication at home then do not decrease Frequency below that used at home.    0-3 - enter or revise RT

## 2024-08-18 NOTE — PROGRESS NOTES
Pt admitted from ED. Aox4, VSS, RA.  Denies SOB, does have an occasional dry cough. No tele.  All orders reviewed with pt, pt agreeable to POC Up to bathroom with SBA and walker, pt tolerated well. . Pt educated on use of call light and fall prevention. Resting in bed, denies needs at this time, call light in reach, bed alarm on for safety.

## 2024-08-18 NOTE — H&P
[facility] Medicine History & Physical      PCP: Tang Bridges MD    Date of Admission: 8/18/2024    Date of Service: Pt seen/examined on 8/18/2024     Chief Complaint:    Chief Complaint   Patient presents with    Shortness of Breath     Patient reports pain in chest/ABD after coughing and hurts to breath.          History Of Present Illness:      The patient is a 84 y.o. female with pmhx of COPD, HTN, HLD, hypothyroidism who presented to [facility] ED with complaint of shortness of breath , cough and chest pain.  Patient stated that she had been ill with some shortness of breath for a couple of days, started having cough yesterday had a few episodes of cough and coughing up a mild amount of yellow sputum.  She developed significant chest pain yesterday night and hence came into the emergency department.   She describes the chest pain as a very severe constant chest pain across her chest, on a scale of 1-10 it was a 10, pain increased with deep breathing and coughing.  Patient noted to be hypoxic in the ED with sats dropping in the high 80s requiring supplemental oxygen 2 L.  CT chest done which was negative for PE , did show some bronchiolitis.  Patient given empiric antibiotics and steroids in the ED and currently admitted to the floor.   Chart states she is allergic to prednisone but patient states that she is not really allergic to prednisone she has sensitive to it, she takes chronically prednisone 2.5 mg daily for rheumatoid arthritis.  Currently given Decadron.     Patient currently seen on the floor ; she looks fatigued but in no distress, awake alert and well-oriented.  Her chest pain is down to a 1 or 2 now on a scale of 1-10.  She has no chest pain as long as she lays still but does have some chest discomfort when she takes a deep breath or turn around.  She does not have any cough or shortness of breath now.  Her oxygen saturations are currently stable on room air.     Past Medical History:         Diagnosis Date    Asthma     COPD (chronic obstructive pulmonary disease) (HCC)     Hyperlipidemia     Hypertension     RA (rheumatoid arthritis) (HCC)     Thyroid disease     has half of her thyroid       Past Surgical History:        Procedure Laterality Date    ABDOMINAL EXPLORATION SURGERY      APPENDECTOMY      CHOLECYSTECTOMY      COLONOSCOPY  04/18/2013    diverticulosis, polyps    COLONOSCOPY  11/10/2016    normal colon    EYE SURGERY Right 12/11/2015    cataract removal    EYE SURGERY Left 01/08/2016    cataract removed    FRACTURE SURGERY      HUMERUS FRACTURE SURGERY Right 02/28/2021    HUMERUS OPEN REDUCTION INTERNAL FIXATION performed by Kate Levi MD at [facility] OR    HYSTERECTOMY (CERVIX STATUS UNKNOWN)      LAMINECTOMY N/A 01/27/2022    T10-11 THORACIC DECOMPRESSION, DISCECTOMY, FUSION / FIXATION performed by Brian Lema MD at [facility] OR    ORIF HUMERUS FRACTURE  11/18/2022    SPINE SURGERY  01/27/2022    THYROIDECTOMY, PARTIAL Left     TUMOR REMOVAL      off of thyroid, was benign       Medications Prior to Admission:    Prior to Admission medications    Medication Sig Start Date End Date Taking? Authorizing Provider   predniSONE (DELTASONE) 2.5 MG tablet Take 1 tablet by mouth daily   Yes Provider, MD Rachell   traMADol (ULTRAM) 50 MG tablet Take 1 tablet by mouth every 8 hours as needed for Pain for up to 30 days. Max Daily Amount: 150 mg 8/9/24 9/8/24 Yes Tang Bridges MD   DULoxetine (CYMBALTA) 30 MG extended release capsule TAKE 1 CAPSULE BY MOUTH DAILY  Patient taking differently: Take 1 capsule by mouth at bedtime 6/12/24  Yes Tang Bridges MD   levothyroxine (SYNTHROID) 25 MCG tablet Take 1 tablet by mouth daily 6/7/24  Yes Tang Bridges MD   montelukast (SINGULAIR) 10 MG tablet TAKE 1 TABLET BY MOUTH DAILY  Patient taking differently: Take 1 tablet by mouth nightly 5/23/24  Yes Chase Rehman MD   albuterol sulfate HFA (PROVENTIL HFA) 108 (90 Base) MCG/ACT inhaler

## 2024-08-19 LAB
ANION GAP SERPL CALCULATED.3IONS-SCNC: 6 MMOL/L (ref 3–16)
BASOPHILS # BLD: 0 K/UL (ref 0–0.2)
BASOPHILS NFR BLD: 0.2 %
BUN SERPL-MCNC: 14 MG/DL (ref 7–20)
CALCIUM SERPL-MCNC: 9.3 MG/DL (ref 8.3–10.6)
CHLORIDE SERPL-SCNC: 103 MMOL/L (ref 99–110)
CO2 SERPL-SCNC: 26 MMOL/L (ref 21–32)
CREAT SERPL-MCNC: <0.5 MG/DL (ref 0.6–1.2)
DEPRECATED RDW RBC AUTO: 13.7 % (ref 12.4–15.4)
EOSINOPHIL # BLD: 0 K/UL (ref 0–0.6)
EOSINOPHIL NFR BLD: 0.1 %
GFR SERPLBLD CREATININE-BSD FMLA CKD-EPI: >90 ML/MIN/{1.73_M2}
GLUCOSE SERPL-MCNC: 143 MG/DL (ref 70–99)
HCT VFR BLD AUTO: 37.6 % (ref 36–48)
HGB BLD-MCNC: 12.8 G/DL (ref 12–16)
LYMPHOCYTES # BLD: 1 K/UL (ref 1–5.1)
LYMPHOCYTES NFR BLD: 10.8 %
MCH RBC QN AUTO: 32.6 PG (ref 26–34)
MCHC RBC AUTO-ENTMCNC: 33.9 G/DL (ref 31–36)
MCV RBC AUTO: 96.1 FL (ref 80–100)
MONOCYTES # BLD: 0.4 K/UL (ref 0–1.3)
MONOCYTES NFR BLD: 4.2 %
NEUTROPHILS # BLD: 7.9 K/UL (ref 1.7–7.7)
NEUTROPHILS NFR BLD: 84.7 %
PLATELET # BLD AUTO: 187 K/UL (ref 135–450)
PMV BLD AUTO: 9.7 FL (ref 5–10.5)
POTASSIUM SERPL-SCNC: 4.1 MMOL/L (ref 3.5–5.1)
RBC # BLD AUTO: 3.92 M/UL (ref 4–5.2)
SODIUM SERPL-SCNC: 135 MMOL/L (ref 136–145)
WBC # BLD AUTO: 9.3 K/UL (ref 4–11)

## 2024-08-19 PROCEDURE — 97530 THERAPEUTIC ACTIVITIES: CPT

## 2024-08-19 PROCEDURE — 6360000002 HC RX W HCPCS: Performed by: INTERNAL MEDICINE

## 2024-08-19 PROCEDURE — 6370000000 HC RX 637 (ALT 250 FOR IP): Performed by: INTERNAL MEDICINE

## 2024-08-19 PROCEDURE — 97161 PT EVAL LOW COMPLEX 20 MIN: CPT

## 2024-08-19 PROCEDURE — 94010 BREATHING CAPACITY TEST: CPT

## 2024-08-19 PROCEDURE — 97535 SELF CARE MNGMENT TRAINING: CPT

## 2024-08-19 PROCEDURE — 94640 AIRWAY INHALATION TREATMENT: CPT

## 2024-08-19 PROCEDURE — 97166 OT EVAL MOD COMPLEX 45 MIN: CPT

## 2024-08-19 PROCEDURE — 94761 N-INVAS EAR/PLS OXIMETRY MLT: CPT

## 2024-08-19 PROCEDURE — 85025 COMPLETE CBC W/AUTO DIFF WBC: CPT

## 2024-08-19 PROCEDURE — 36415 COLL VENOUS BLD VENIPUNCTURE: CPT

## 2024-08-19 PROCEDURE — 1200000000 HC SEMI PRIVATE

## 2024-08-19 PROCEDURE — 80048 BASIC METABOLIC PNL TOTAL CA: CPT

## 2024-08-19 PROCEDURE — 2580000003 HC RX 258: Performed by: INTERNAL MEDICINE

## 2024-08-19 PROCEDURE — 99232 SBSQ HOSP IP/OBS MODERATE 35: CPT | Performed by: INTERNAL MEDICINE

## 2024-08-19 RX ORDER — PREDNISONE 10 MG/1
TABLET ORAL
Qty: 9 TABLET | Refills: 0 | Status: SHIPPED | OUTPATIENT
Start: 2024-08-19

## 2024-08-19 RX ORDER — CEFDINIR 300 MG/1
300 CAPSULE ORAL 2 TIMES DAILY
Qty: 8 CAPSULE | Refills: 0 | Status: SHIPPED | OUTPATIENT
Start: 2024-08-19 | End: 2024-08-23

## 2024-08-19 RX ORDER — AZITHROMYCIN 250 MG/1
250 TABLET, FILM COATED ORAL DAILY
Qty: 3 TABLET | Refills: 0 | Status: SHIPPED | OUTPATIENT
Start: 2024-08-20 | End: 2024-08-23

## 2024-08-19 RX ADMIN — TRIAMTERENE AND HYDROCHLOROTHIAZIDE 1 TABLET: 37.5; 25 TABLET ORAL at 08:30

## 2024-08-19 RX ADMIN — LEVOTHYROXINE SODIUM 25 MCG: 25 TABLET ORAL at 05:43

## 2024-08-19 RX ADMIN — ASPIRIN 81 MG: 81 TABLET, CHEWABLE ORAL at 08:30

## 2024-08-19 RX ADMIN — SODIUM CHLORIDE, PRESERVATIVE FREE 10 ML: 5 INJECTION INTRAVENOUS at 21:50

## 2024-08-19 RX ADMIN — DULOXETINE HYDROCHLORIDE 30 MG: 30 CAPSULE, DELAYED RELEASE ORAL at 21:58

## 2024-08-19 RX ADMIN — IPRATROPIUM BROMIDE AND ALBUTEROL SULFATE 1 DOSE: 2.5; .5 SOLUTION RESPIRATORY (INHALATION) at 08:34

## 2024-08-19 RX ADMIN — MONTELUKAST SODIUM 10 MG: 10 TABLET, COATED ORAL at 21:58

## 2024-08-19 RX ADMIN — IPRATROPIUM BROMIDE AND ALBUTEROL SULFATE 1 DOSE: 2.5; .5 SOLUTION RESPIRATORY (INHALATION) at 19:10

## 2024-08-19 RX ADMIN — SODIUM CHLORIDE, PRESERVATIVE FREE 10 ML: 5 INJECTION INTRAVENOUS at 08:32

## 2024-08-19 RX ADMIN — AZITHROMYCIN 250 MG: 250 TABLET, FILM COATED ORAL at 08:30

## 2024-08-19 RX ADMIN — DEXAMETHASONE 4 MG: 4 TABLET ORAL at 21:58

## 2024-08-19 RX ADMIN — CEFTRIAXONE SODIUM 1000 MG: 1 INJECTION, POWDER, FOR SOLUTION INTRAMUSCULAR; INTRAVENOUS at 08:34

## 2024-08-19 RX ADMIN — DEXAMETHASONE 4 MG: 4 TABLET ORAL at 08:30

## 2024-08-19 RX ADMIN — ENOXAPARIN SODIUM 40 MG: 100 INJECTION SUBCUTANEOUS at 08:30

## 2024-08-19 RX ADMIN — ATENOLOL 12.5 MG: 25 TABLET ORAL at 08:30

## 2024-08-19 ASSESSMENT — ENCOUNTER SYMPTOMS
COUGH: 1
CHEST TIGHTNESS: 0
VOMITING: 0
SHORTNESS OF BREATH: 1
ABDOMINAL PAIN: 1

## 2024-08-19 ASSESSMENT — COPD QUESTIONNAIRES
QUESTION8_ENERGYLEVEL: 5
QUESTION3_CHESTTIGHTNESS: 0
QUESTION5_HOMEACTIVITIES: 3
CAT_TOTALSCORE: 10
QUESTION4_WALKINCLINE: 1
QUESTION1_COUGHFREQUENCY: 1
QUESTION2_CHESTPHLEGM: 0
QUESTION6_LEAVINGHOUSE: 0
QUESTION7_SLEEPQUALITY: 0

## 2024-08-19 NOTE — PROGRESS NOTES
Patient was walking the halls with therapy. Noted hitting RFA in the doorway. Patient has skin tear, steri stips and gauze in place. No pain and or discomfort noted. Son at bedside. All safety precautions in place

## 2024-08-19 NOTE — PLAN OF CARE
Problem: Discharge Planning  Goal: Discharge to home or other facility with appropriate resources  8/19/2024 0122 by Raad Paz, RN  Outcome: Progressing  Flowsheets (Taken 8/18/2024 2232)  Discharge to home or other facility with appropriate resources: Identify barriers to discharge with patient and caregiver     Problem: Pain  Goal: Verbalizes/displays adequate comfort level or baseline comfort level  8/19/2024 0122 by Raad Paz RN  Outcome: Progressing  Flowsheets (Taken 8/18/2024 2230)  Verbalizes/displays adequate comfort level or baseline comfort level: Encourage patient to monitor pain and request assistance     Problem: Safety - Adult  Goal: Free from fall injury  8/19/2024 0122 by Raad Paz RN  Outcome: Progressing     Problem: Respiratory - Adult  Goal: Achieves optimal ventilation and oxygenation  Outcome: Progressing  Flowsheets (Taken 8/18/2024 2232)  Achieves optimal ventilation and oxygenation: Assess for changes in respiratory status

## 2024-08-19 NOTE — DISCHARGE SUMMARY
Name:  Rosalie Hicks  Room:  0217/0217-01  MRN:    [id number]    Discharge Summary      This discharge summary is in conjunction with a complete physical exam done on the day of discharge.      Discharging Physician: MOHAN SUTHERLAND MD      Admit: 8/18/2024  Discharge:  8/20/2024     Diagnoses this Admission    Principal Problem:    Pneumonitis  Active Problems:    Primary hypertension    Hypoxia    Bronchiolitis  Resolved Problems:    * No resolved hospital problems. *          Procedures (Please Review Full Report for Details)      Consults    IP CONSULT TO SOCIAL WORK      HPI:    The patient is a 84 y.o. female with pmhx of COPD, HTN, HLD, hypothyroidism who presented to [facility] ED with complaint of shortness of breath , cough and chest pain.  Patient stated that she had been ill with some shortness of breath for a couple of days, started having cough yesterday had a few episodes of cough and coughing up a mild amount of yellow sputum.  She developed significant chest pain yesterday night and hence came into the emergency department.   She describes the chest pain as a very severe constant chest pain across her chest, on a scale of 1-10 it was a 10, pain increased with deep breathing and coughing.  Patient noted to be hypoxic in the ED with sats dropping in the high 80s requiring supplemental oxygen 2 L.  CT chest done which was negative for PE , did show some bronchiolitis.  Patient given empiric antibiotics and steroids in the ED and currently admitted to the floor.   Chart states she is allergic to prednisone but patient states that she is not really allergic to prednisone she has sensitive to it, she takes chronically prednisone 2.5 mg daily for rheumatoid arthritis.  Currently given Decadron.          Physical Exam at Discharge:  /87   Pulse 74   Temp 97.9 °F (36.6 °C) (Oral)   Resp 18   Ht 1.702 m (5' 7\")   Wt 83.8 kg (184 lb 11.9 oz)   SpO2 93%   BMI 28.94 kg/m²   Gen:  no

## 2024-08-19 NOTE — PROGRESS NOTES
RT Inhaler-Nebulizer Bronchodilator Protocol Note    There is a bronchodilator order in the chart from a provider indicating to follow the RT Bronchodilator Protocol and there is an “Initiate RT Inhaler-Nebulizer Bronchodilator Protocol” order as well (see protocol at bottom of note).    CXR Findings:  XR CHEST PORTABLE    Result Date: 8/18/2024  Low lung volumes with underaeration of the lung bases.       The findings from the last RT Protocol Assessment were as follows:   History Pulmonary Disease: (P) Chronic pulmonary disease  Respiratory Pattern: (P) Regular pattern and RR 12-20 bpm  Breath Sounds: (P) Slightly diminished and/or crackles  Cough: (P) Strong, spontaneous, non-productive  Indication for Bronchodilator Therapy: (P) Decreased or absent breath sounds  Bronchodilator Assessment Score: (P) 4    Aerosolized bronchodilator medication orders have been revised according to the RT Inhaler-Nebulizer Bronchodilator Protocol below.    Respiratory Therapist to perform RT Therapy Protocol Assessment initially then follow the protocol.  Repeat RT Therapy Protocol Assessment PRN for score 0-3 or on second treatment, BID, and PRN for scores above 3.    No Indications - adjust the frequency to every 6 hours PRN wheezing or bronchospasm, if no treatments needed after 48 hours then discontinue using Per Protocol order mode.     If indication present, adjust the RT bronchodilator orders based on the Bronchodilator Assessment Score as indicated below.  Use Inhaler orders unless patient has one or more of the following: on home nebulizer, not able to hold breath for 10 seconds, is not alert and oriented, cannot activate and use MDI correctly, or respiratory rate 25 breaths per minute or more, then use the equivalent nebulizer order(s) with same Frequency and PRN reasons based on the score.  If a patient is on this medication at home then do not decrease Frequency below that used at home.    0-3 - enter or revise RT  bronchodilator order(s) to equivalent RT Bronchodilator order with Frequency of every 4 hours PRN for wheezing or increased work of breathing using Per Protocol order mode.        4-6 - enter or revise RT Bronchodilator order(s) to two equivalent RT bronchodilator orders with one order with BID Frequency and one order with Frequency of every 4 hours PRN wheezing or increased work of breathing using Per Protocol order mode.        7-10 - enter or revise RT Bronchodilator order(s) to two equivalent RT bronchodilator orders with one order with TID Frequency and one order with Frequency of every 4 hours PRN wheezing or increased work of breathing using Per Protocol order mode.       11-13 - enter or revise RT Bronchodilator order(s) to one equivalent RT bronchodilator order with QID Frequency and an Albuterol order with Frequency of every 4 hours PRN wheezing or increased work of breathing using Per Protocol order mode.      Greater than 13 - enter or revise RT Bronchodilator order(s) to one equivalent RT bronchodilator order with every 4 hours Frequency and an Albuterol order with Frequency of every 2 hours PRN wheezing or increased work of breathing using Per Protocol order mode.       Electronically signed by Josefina Coronado RCP on 8/19/2024 at 7:12 PM

## 2024-08-19 NOTE — PROGRESS NOTES
Patient provided a COPD Educational Folder that includes the following materials:     [x]  [employer] Booklet: Managing your COPD  [x]  ALA: Getting the Most Out of Medication Delivery Devices  [x]  ALA: My COPD Action Plan  [x]  [facility] [facility]: [facility]   [x]  Smoking Cessation Classes  [x]  Outpatient Spiritual Care Services  [x]  Magnet: Signs of COPD    PATIENT/CAREGIVER TEACHING:   Level of patient/caregiver understanding able to:   [x] Verbalize understanding   [] Demonstrate understanding       [] Teach back        [] Needs reinforcement     []  Other:     Electronically signed by Angel Bey RN on 8/19/2024 at 2:13 PM

## 2024-08-19 NOTE — PROGRESS NOTES
Inpatient Occupational Therapy Evaluation and Treatment    Unit: 2 [place]  Date:  8/19/2024  Patient Name:    Rosalie Hicks  Admitting diagnosis:  Pleuritic chest pain [R07.81]  Pneumonitis [J98.4]  Acute respiratory failure with hypoxia (HCC) [J96.01]  Left-sided chest pain [R07.9]  Admit Date:  8/18/2024  Precautions/Restrictions/WB Status/ Lines/ Wounds/ Oxygen: Fall risk, Bed/chair alarm, and Lines (IV)    External catheter discontinued during session per patient request.    Pt seen for cotreatment this date due to limited functional status information    Treatment Time:  6728-7721  Treatment Number:  1  Timed Code Treatment Minutes: 45 minutes  Total Treatment Minutes:  55  minutes    Patient Goals for Therapy: \"go home by tomorrow, get back to walking on my trail\"          Discharge Recommendations: Home with PRN assist and Home OT  DME needs for discharge:  pulse ox       Therapy recommendations for staff:   Stand by assist for ambulation with use of rolling walker (RW) and gait belt to/from chair  to/from bathroom  within halls    History of Present Illness: Per Dr. Chi 8/18/24:  \"84 y.o. female presenting today due to concern for developing a cough over the last couple of days although since around 9 PM she started getting a lot of chest pain with coughing and breathing along with some discomfort in her abdomen.  She denies any vomiting.  She denies any hemoptysis although has been coughing up some yellow sputum.  She denies any fever.  She denies any leg swelling or history of blood clots.  No reported falls or trauma.  She initially was around upper 80s on room air and does not normally wear oxygen.  She denies any lower abdominal discomfort.  Due to overall not feeling well with the cough but now having chest pain with breathing, she was brought to the emergency department for further evaluation.  She did not want anything for pain when I initially saw her.\"    AM-PAC Score: AM-PAC Inpatient Daily  diagnosis of CHF? No    Upper Extremity ROM:    Appears WFL    Upper Extremity Strength:    BUE strength WFL, but not formally assessed w/ MMT    Upper Extremity Sensation:    WFL    Upper Extremity Proprioception:  WFL    Coordination:  WFL    Tone:  WFL    Balance:  Sitting:    Independent  Standing:    Supervision and With RW and gait belt    Bed Mobility:   Supine to Sit:    SBA with use of bedrail, education provided re: portable half bedrails available for home setting-printout from online product provided  Sit to Supine:   Not Tested  Rolling:   Not Tested  Scooting in sitting: Independent  Scooting in supine:  Not Tested    Transfers:    Sit to stand:    SBA and With cues for hand placement  Stand to sit:    SBA and With cues for hand placement  Bed to chair:     SBA and With RW and gait belt  Bed/ chair to standard toilet:  SBA and With cues for hand placement, use of grab bar  Bed/chair to BSC:   Not Tested  Functional Mobility:   SBA and With RW and gait belt in hallway, large loop    ADLs:  Dressing:      UE:   Not Tested  LE:    Not Tested    Bathing:    UE:  Not Tested  LE:  Not Tested    Eating:   Not Tested    Toileting:  SBA doffed purewick, managed undergarment down/up, completed seated/partial stand pericare after urinating in toilet    Grooming/hygiene: SBA wash hands at sink in standing      Positioning Needs:   Pt reclined in chair, alarm set, call light provided and all needs within reach .     Ther Ex / Activities Initiated:   N/A    Patient/Family Education:   Pt educated on role of inpatient OT, plan of care, importance of continued activity, DC recommendations, Functional transfer/mobility safety, Energy conservation, Pacing activity, and Calling for assist with mobility.    CHF Education  N/A    Assessment:  Pt seen for Occupational therapy evaluation in acute care setting.  Pt demonstrated decreased Activity tolerance, IADLs, and Transfers. Pt functioning below baseline and will likely

## 2024-08-19 NOTE — PROGRESS NOTES
08/18/24 2100   RT Protocol   History Pulmonary Disease 2   Respiratory pattern 0   Breath sounds 2   Cough 0   Indications for Bronchodilator Therapy Decreased or absent breath sounds   Bronchodilator Assessment Score 4     RT Inhaler-Nebulizer Bronchodilator Protocol Note    There is a bronchodilator order in the chart from a provider indicating to follow the RT Bronchodilator Protocol and there is an “Initiate RT Inhaler-Nebulizer Bronchodilator Protocol” order as well (see protocol at bottom of note).    CXR Findings:  XR CHEST PORTABLE    Result Date: 8/18/2024  Low lung volumes with underaeration of the lung bases.       The findings from the last RT Protocol Assessment were as follows:   History Pulmonary Disease: Chronic pulmonary disease  Respiratory Pattern: Regular pattern and RR 12-20 bpm  Breath Sounds: Slightly diminished and/or crackles  Cough: Strong, spontaneous, non-productive  Indication for Bronchodilator Therapy: Decreased or absent breath sounds  Bronchodilator Assessment Score: 4    Aerosolized bronchodilator medication orders have been revised according to the RT Inhaler-Nebulizer Bronchodilator Protocol below.    Respiratory Therapist to perform RT Therapy Protocol Assessment initially then follow the protocol.  Repeat RT Therapy Protocol Assessment PRN for score 0-3 or on second treatment, BID, and PRN for scores above 3.    No Indications - adjust the frequency to every 6 hours PRN wheezing or bronchospasm, if no treatments needed after 48 hours then discontinue using Per Protocol order mode.     If indication present, adjust the RT bronchodilator orders based on the Bronchodilator Assessment Score as indicated below.  Use Inhaler orders unless patient has one or more of the following: on home nebulizer, not able to hold breath for 10 seconds, is not alert and oriented, cannot activate and use MDI correctly, or respiratory rate 25 breaths per minute or more, then use the equivalent

## 2024-08-19 NOTE — PLAN OF CARE
Problem: Discharge Planning  Goal: Discharge to home or other facility with appropriate resources  8/19/2024 0913 by Angel Bey RN  Outcome: Progressing  8/19/2024 0122 by Raad Paz RN  Outcome: Progressing  Flowsheets (Taken 8/18/2024 2232)  Discharge to home or other facility with appropriate resources: Identify barriers to discharge with patient and caregiver     Problem: Pain  Goal: Verbalizes/displays adequate comfort level or baseline comfort level  8/19/2024 0913 by Angel Bey RN  Outcome: Progressing  Flowsheets (Taken 8/19/2024 0244 by Raad Paz RN)  Verbalizes/displays adequate comfort level or baseline comfort level:   Encourage patient to monitor pain and request assistance   Assess pain using appropriate pain scale  8/19/2024 0122 by Raad Paz RN  Outcome: Progressing  Flowsheets (Taken 8/18/2024 2230)  Verbalizes/displays adequate comfort level or baseline comfort level: Encourage patient to monitor pain and request assistance     Problem: Safety - Adult  Goal: Free from fall injury  8/19/2024 0913 by Angel Bey RN  Outcome: Progressing  8/19/2024 0122 by Raad Paz RN  Outcome: Progressing     Problem: Respiratory - Adult  Goal: Achieves optimal ventilation and oxygenation  8/19/2024 0913 by Angel Bey RN  Outcome: Progressing  8/19/2024 0122 by Raad Paz RN  Outcome: Progressing  Flowsheets (Taken 8/18/2024 2232)  Achieves optimal ventilation and oxygenation: Assess for changes in respiratory status

## 2024-08-19 NOTE — CARE COORDINATION
Case Management Assessment  Initial Evaluation    Date/Time of Evaluation: 8/19/2024 11:54 AM  Assessment Completed by: Misty Flores RN    If patient is discharged prior to next notation, then this note serves as note for discharge by case management.    Patient Name: Rosalie Hicks                   YOB: 1940  Diagnosis: Pleuritic chest pain [R07.81]  Pneumonitis [J98.4]  Acute respiratory failure with hypoxia (HCC) [J96.01]  Left-sided chest pain [R07.9]                   Date / Time: 8/18/2024  3:45 AM    Patient Admission Status: Inpatient   Readmission Risk (Low < 19, Mod (19-27), High > 27): Readmission Risk Score: 14.7    Current PCP: Tang Bridges MD  PCP verified by CM? Yes ([facility])    Chart Reviewed: Yes      History Provided by: Patient  Patient Orientation: Alert and Oriented    Patient Cognition: Alert    Hospitalization in the last 30 days (Readmission):  No    If yes, Readmission Assessment in CM Navigator will be completed.    Advance Directives:      Code Status: Full Code   Patient's Primary Decision Maker is: Legal Next of Kin    Primary Decision Maker: Johan Zamora - [place] - [phone]    Discharge Planning:    Patient lives with: Alone Type of Home: Apartment  Primary Care Giver: Self  Patient Support Systems include: Children   Current Financial resources: Other (Comment) ([employer] Medicare and [facility])  Current community resources: None  Current services prior to admission: None            Current DME:              Type of Home Care services:  None    ADLS  Prior functional level: Independent in ADLs/IADLs  Current functional level: Independent in ADLs/IADLs    PT AM-PAC:   /24  OT AM-PAC:   /24    Family can provide assistance at DC: Yes  Would you like Case Management to discuss the discharge plan with any other family members/significant others, and if so, who? No  Plans to Return to Present Housing: Yes  Other Identified Issues/Barriers to RETURNING to  and/or patient representative Rosalie and her family were provided with a choice of provider and agrees with the discharge plan. Freedom of choice list with basic dialogue that supports the patient's individualized plan of care/goals and shares the quality data associated with the providers was provided to:     Patient Representative Name:       The Patient and/or Patient Representative Agree with the Discharge Plan?      Misty Flores RN  Case Management Department  Ph: [phone] Fax: [phone]

## 2024-08-19 NOTE — PROGRESS NOTES
Inpatient Physical Therapy Evaluation & Treatment    Unit: 2 [place]  Date:  8/19/2024  Patient Name:    Rosalie Hicks  Admitting diagnosis:  Pleuritic chest pain [R07.81]  Pneumonitis [J98.4]  Acute respiratory failure with hypoxia (HCC) [J96.01]  Left-sided chest pain [R07.9]  Admit Date:  8/18/2024  Precautions/Restrictions/WB Status/ Lines/ Wounds/ Oxygen: Fall risk, Bed/chair alarm, and Lines (IV and external catheter)      Pt seen for cotreatment this date due to patient safety, patient endurance, and limited functional status information    Treatment Time:  0758 - 3257  Treatment Number:  1   Timed Code Treatment Minutes: 45 minutes  Total Treatment Minutes:  55  minutes    Patient Stated Goals for Therapy: \"go home by tomorrow, get back to walking on my trail\"       Discharge Recommendations: Home with PRN assistance and Home PT  DME needs for discharge:  pulse ox       Therapy recommendation for EMS Transport: can transport by wheelchair    Therapy recommendations for staff:   Stand by assist for ambulation with use of rolling walker (RW) to/from chair  to/from bathroom  within room  within halls    History of Present Illness:   Per Dr. Chi 8/18/24:  \"84 y.o. female presenting today due to concern for developing a cough over the last couple of days although since around 9 PM she started getting a lot of chest pain with coughing and breathing along with some discomfort in her abdomen.  She denies any vomiting.  She denies any hemoptysis although has been coughing up some yellow sputum.  She denies any fever.  She denies any leg swelling or history of blood clots.  No reported falls or trauma.  She initially was around upper 80s on room air and does not normally wear oxygen.  She denies any lower abdominal discomfort.  Due to overall not feeling well with the cough but now having chest pain with breathing, she was brought to the emergency department for further evaluation.  She did not want anything for pain

## 2024-08-19 NOTE — FLOWSHEET NOTE
08/19/24 0705   Handoff   Communication Given Shift Handoff   Handoff Given To Brianna Love RN   Handoff Received From Amber HANDY   Handoff Communication Face to Face;At bedside   Time Handoff Given 0705   End of Shift Check Performed Yes     Pt in bed with eyes closed.  No signs of distress noted.  Call light within reach.     [FreeTextEntry1] : had a lengthy discussion with the patient regarding the diagnosis etiology and differential diagnosis as well as treatment options for the presenting problem. Risks alternatives and benefits of treatment ranging from conservative to surgical explained in great detail. I also explained the progression of treatment from conservative to possible surgical treatment options as well as the benefits of each. I do stress conservative treatment if in fact conservative treatment is an option until it no longer provides relief. Over-the-counter products medications padding, and splinting were reviewed as well. All questions asked and answered appropriately to the patient's satisfaction.\par right lateral GTN:\par After risks and alternatives were explained to the patient and verbal consent obtained in front of my MA, the surgical site was cleansed with alcohol painted with Betadine and injected with 3 cc's of 1 percent lidocaine plain, Next using sterile instrumentation a partial non-permanent nail plate avulsion was done to the affected border, The surgical site was then irrigated with normal sterile saline and a dry sterile bandage was applied using Silvadene, The patient received written and oral discharge instructions as well as postoperative aftercare orders, Prescription sent as necessary. A follow up appointment was given with instructions to notify the office if there are any questions or if any redness or pain persists.\par follow up appt 2 weeks\par   Patient also states that he had a phenol procedure done on the left great toe sometime ago and the contralateral border is now painful and would like to schedule a follow-up appointment to have permanent removal of that same contralateral border.\par I had a lengthy discussion with the patient from a medical assistant regarding treatment options for ingrown nails. They ranged from conservative to surgical. Conservative measures included simple nail care on a periodic basis with removal of all offending spicule and periodic care every time when the problem recurs. I also explained a nonpermanent removal in which Novocain would be used a portion of the old offending portion of nail would be removed and allowed to grow back in and monitor the situation. I also explained permanent removal in the form of cold steel procedures as well as the phenolization. I explained risks alternatives and benefits to all types of conservative as well as surgical approaches. Surgical approaches can be done under local anesthesia and the risks and benefits of those were stressed in a lengthier discussion. Some of the risks included but were not limited to infection recurrence of deformity chronic pain to the area recurrence of nail growth either partially or totally and the condition could actually become worse. I explained to the patient that they would need oral as well as topical antibiotics and should they get a postop infection that is not handled by oral antibiotics and quite possibly hospitalization with intravenous antibiotics would be necessary. I also explained in some rare and unusual cases and infection could become so advanced that it involves the bone. At that point surgery to remove the bone and possibly part or all of the toe could occur.\par \par \par  no

## 2024-08-19 NOTE — PROGRESS NOTES
Admit: 2024    Name:  Rosalie Hicks  Room:  [address] [place] [address]  MRN:    [id number]    Daily Progress Note for 2024   Hypoxia   pneumonitis  Interval History:   Doing better   Sats dropped wit hambulation  Scheduled Meds:   aspirin  81 mg Oral Daily    atenolol  12.5 mg Oral Daily    DULoxetine  30 mg Oral Nightly    levothyroxine  25 mcg Oral Daily    montelukast  10 mg Oral Nightly    triamterene-hydroCHLOROthiazide  1 tablet Oral Daily    sodium chloride flush  5-40 mL IntraVENous 2 times per day    enoxaparin  40 mg SubCUTAneous Daily    dexAMETHasone  4 mg Oral 2 times per day    cefTRIAXone (ROCEPHIN) IV  1,000 mg IntraVENous Q24H    azithromycin  250 mg Oral Daily    ipratropium 0.5 mg-albuterol 2.5 mg  1 Dose Inhalation BID RT    rosuvastatin  10 mg Oral Every Other Day       Continuous Infusions:   sodium chloride         PRN Meds:  carboxymethylcellulose PF, traMADol, sodium chloride flush, sodium chloride, potassium chloride **OR** potassium alternative oral replacement **OR** potassium chloride, magnesium sulfate, ondansetron **OR** ondansetron, polyethylene glycol, acetaminophen **OR** acetaminophen, albuterol                  Objective:     Temp  Av.8 °F (36.6 °C)  Min: 97.8 °F (36.6 °C)  Max: 97.9 °F (36.6 °C)  Pulse  Av  Min: 67  Max: 93  BP  Min: 122/72  Max: 152/79  SpO2  Av.2 %  Min: 92 %  Max: 95 %  Patient Vitals for the past 4 hrs:   BP Temp Temp src Pulse Resp SpO2   24 0834 -- -- -- 74 18 93 %   24 0815 138/87 97.9 °F (36.6 °C) Oral 72 16 93 %         Intake/Output Summary (Last 24 hours) at 2024 1118  Last data filed at 2024 0959  Gross per 24 hour   Intake 1660.79 ml   Output --   Net 1660.79 ml       Physical Exam:  Gen: Patient looks ill and fatigued but in no distress .   Awake and alert and well-oriented   Eyes: PERRL. No sclera icterus. No conjunctival injection.   ENT: No discharge. Pharynx clear.   Neck: No JVD.  No Carotid Bruit. Trachea

## 2024-08-19 NOTE — FLOWSHEET NOTE
08/18/24 2230   Vital Signs   Temp 97.8 °F (36.6 °C)   Temp Source Oral   Pulse 67   Heart Rate Source Monitor   Respirations 17   /75   MAP (Calculated) 96   BP Location Left upper arm   BP Method Automatic   Patient Position Semi juan antoniowlers   Pain Assessment   Pain Assessment None - Denies Pain   Care Plan - Pain Goals   Verbalizes/displays adequate comfort level or baseline comfort level Encourage patient to monitor pain and request assistance   Opioid-Induced Sedation   POSS Score 1   RASS   Merino Agitation Sedation Scale (RASS) 0   Oxygen Therapy   SpO2 92 %   O2 Device None (Room air)     PM assessment completed. Alert and oriented. No complaints of pain or discomfort voiced. No signs or symptoms of distress noted. Patient tolerated PM medications well. Respirations easy and even. Bed in lowest position, bed alarm in place and functioning properly, bed rails x2 up,  Call light within reach.

## 2024-08-20 VITALS
DIASTOLIC BLOOD PRESSURE: 77 MMHG | OXYGEN SATURATION: 96 % | TEMPERATURE: 98.2 F | RESPIRATION RATE: 16 BRPM | WEIGHT: 180.6 LBS | HEIGHT: 67 IN | SYSTOLIC BLOOD PRESSURE: 172 MMHG | HEART RATE: 69 BPM | BODY MASS INDEX: 28.35 KG/M2

## 2024-08-20 PROCEDURE — 99238 HOSP IP/OBS DSCHRG MGMT 30/<: CPT | Performed by: INTERNAL MEDICINE

## 2024-08-20 PROCEDURE — 2580000003 HC RX 258: Performed by: INTERNAL MEDICINE

## 2024-08-20 PROCEDURE — 6370000000 HC RX 637 (ALT 250 FOR IP): Performed by: INTERNAL MEDICINE

## 2024-08-20 PROCEDURE — 6360000002 HC RX W HCPCS: Performed by: INTERNAL MEDICINE

## 2024-08-20 PROCEDURE — 94761 N-INVAS EAR/PLS OXIMETRY MLT: CPT

## 2024-08-20 PROCEDURE — 94640 AIRWAY INHALATION TREATMENT: CPT

## 2024-08-20 RX ADMIN — AZITHROMYCIN 250 MG: 250 TABLET, FILM COATED ORAL at 09:29

## 2024-08-20 RX ADMIN — CEFTRIAXONE SODIUM 1000 MG: 1 INJECTION, POWDER, FOR SOLUTION INTRAMUSCULAR; INTRAVENOUS at 09:34

## 2024-08-20 RX ADMIN — SODIUM CHLORIDE, PRESERVATIVE FREE 10 ML: 5 INJECTION INTRAVENOUS at 09:34

## 2024-08-20 RX ADMIN — IPRATROPIUM BROMIDE AND ALBUTEROL SULFATE 1 DOSE: 2.5; .5 SOLUTION RESPIRATORY (INHALATION) at 07:47

## 2024-08-20 RX ADMIN — ATENOLOL 12.5 MG: 25 TABLET ORAL at 09:28

## 2024-08-20 RX ADMIN — ENOXAPARIN SODIUM 40 MG: 100 INJECTION SUBCUTANEOUS at 09:29

## 2024-08-20 RX ADMIN — TRIAMTERENE AND HYDROCHLOROTHIAZIDE 1 TABLET: 37.5; 25 TABLET ORAL at 09:29

## 2024-08-20 RX ADMIN — ASPIRIN 81 MG: 81 TABLET, CHEWABLE ORAL at 09:29

## 2024-08-20 RX ADMIN — DEXAMETHASONE 4 MG: 4 TABLET ORAL at 09:29

## 2024-08-20 RX ADMIN — LEVOTHYROXINE SODIUM 25 MCG: 25 TABLET ORAL at 05:51

## 2024-08-20 NOTE — PLAN OF CARE
Patient provided a COPD Educational Folder that includes the following materials:     [x]  [employer] Booklet: Managing your COPD  [x]  [employer]: Getting the Most Out of Medication Delivery Devices  [x]  AL[employer]: My COPD Action Plan  [x]  [employer] [facility]: [facility]   [x]  Smoking Cessation Classes  [x]  Outpatient Spiritual Care Services  [x]  [employer]: Signs of COPD    PATIENT/CAREGIVER TEACHING:   Level of patient/caregiver understanding able to:   [x] Verbalize understanding   [] Demonstrate understanding       [] Teach back        [] Needs reinforcement     []  Other:     Electronically signed by Raad Paz RN on 8/20/2024 at 3:40 AM

## 2024-08-20 NOTE — FLOWSHEET NOTE
08/20/24 0715   Handoff   Communication Given Shift Handoff   Handoff Given To Erica HANDY   Handoff Received From Amber HANDY   Handoff Communication Face to Face;At bedside   Time Handoff Given 0715   End of Shift Check Performed Yes     Pt in bed with eyes closed.  No signs of distress noted.  Call light within reach.

## 2024-08-20 NOTE — CARE COORDINATION
DISCHARGE ORDER  Date/Time 2024 12:36 PM  Completed by: Misty Flores RN, Case Management    Patient Name: Rosalie Hicks      : 1940  Admitting Diagnosis: Pleuritic chest pain [R07.81]  Pneumonitis [J98.4]  Acute respiratory failure with hypoxia (HCC) [J96.01]  Left-sided chest pain [R07.9]      Admit order Date and Status:24 inpt  (verify MD's last order for status of admission)      Noted discharge order.   If applicable PT/OT recommendation at Discharge:  Home with PRN assistance and Home PT   DME recommendation by PT/OT:pulse ox  Confirmed discharge plan  : Yes  with whom patient  If pt confirmed DC plan does family need to be contacted by CM No   Discharge Plan: Order for dc noted. Spoke with pt who will return home at dc. Discussed HHC and pt is agreeable and chooses [facility] Spoke with Leslie at [facility] who states was had pt in the past. [facility] accept for SOC for Wed. She bear pull info from [employer].  Pt states her Son will transport her home. CHart reviewed and no other dc need identified.   Date of Last IMM Given: 24    Reviewed chart.  Role of discharge planner explained and patient verbalized understanding. Discharge order is noted.    Has Home O2 in place on admit:  No  Informed of need to bring portable home O2 tank on day of discharge for nursing to connect prior to leaving:   Not Indicated  Verbalized agreement/Understanding:   Not Indicated  Pt is being d/c'd to home today. Pt's O2 sats are 96% on RA with ambulation.    Discharge timeout done with nsg, CM and pt. All discharge needs and concerns addressed.

## 2024-08-20 NOTE — FLOWSHEET NOTE
08/19/24 2145   Vital Signs   Temp 98.1 °F (36.7 °C)   Temp Source Oral   Pulse 82   Heart Rate Source Monitor   Respirations 17   BP (!) 150/77   MAP (Calculated) 101   BP Location Left upper arm   BP Method Automatic   Patient Position Semi fowlers   Pain Assessment   Pain Assessment None - Denies Pain   Care Plan - Pain Goals   Verbalizes/displays adequate comfort level or baseline comfort level Encourage patient to monitor pain and request assistance   Oxygen Therapy   SpO2 93 %   O2 Device None (Room air)     PM assessment completed. Alert and oriented x4. Saturating well in room air when SPO2 checked.No complaints of pain or discomfort voiced. No signs or symptoms of distress noted. Patient tolerated PM medications well. Respirations easy and even. Bed in lowest position, bed alarm in place and functioning properly, bed rails x2 up,  Call light within reach.     Bedside Mobility Assessment Tool (BMAT):     Assessment Level 1- Sit and Shake    1. From a semi-reclined position, ask patient to sit up and rotate to a seated position at the side of the bed. Can use the bedrail.    2. Ask patient to reach out and grab your hand and shake making sure patient reaches across his/her midline.   Pass- Patient is able to come to a seated position, maintain core strength. Maintains seated balance while reaching across midline. Move on to Assessment Level 2.     Assessment Level 2- Stretch and Point   1. With patient in seated position at the side of the bed, have patient place both feet on the floor (or stool) with knees no higher than hips.    2. Ask patient to stretch one leg and straighten the knee, then bend the ankle/flex and point the toes. If appropriate, repeat with the other leg.   Pass- Patient is able to demonstrate appropriate quad strength on intended weight bearing limb(s). Move onto Assessment Level 3.     Assessment Level 3- Stand   1. Ask patient to elevate off the bed or chair (seated to standing)

## 2024-08-20 NOTE — PROGRESS NOTES
Prescriptions and discharge instructions given. Pt verbalized understanding/ denies questions/ needs at this time. Transport called to transport pt to vehicle for discharge home.

## 2024-08-20 NOTE — PLAN OF CARE
Problem: Discharge Planning  Goal: Discharge to home or other facility with appropriate resources  Outcome: Progressing     Problem: Pain  Goal: Verbalizes/displays adequate comfort level or baseline comfort level  Outcome: Progressing  Flowsheets  Taken 8/20/2024 0212  Verbalizes/displays adequate comfort level or baseline comfort level: Encourage patient to monitor pain and request assistance  Taken 8/19/2024 2145  Verbalizes/displays adequate comfort level or baseline comfort level: Encourage patient to monitor pain and request assistance     Problem: Safety - Adult  Goal: Free from fall injury  Outcome: Progressing     Problem: Respiratory - Adult  Goal: Achieves optimal ventilation and oxygenation  Outcome: Progressing

## 2024-08-20 NOTE — PROGRESS NOTES
Pulse oximetry assessment   95% at rest on room air (if 88% or less, skip next steps)  96% while ambulating on room air  Pt a/o. Am assessment completed see flow sheet. Pt denies any pain/ needs at this time. Call light within reach.   Vitals:    08/20/24 0747   BP: (!) 149/77   Pulse: 77   Resp: 18   Temp: 98.2 °F (36.8 °C)   SpO2: 95%    Medications given as ordered.

## 2024-08-20 NOTE — DISCHARGE INSTR - COC
Continuity of Care Form      CASE MANAGEMENT/SOCIAL WORK SECTION    Inpatient Status Date: 8/18/24    Readmission Risk Assessment Score:  Readmission Risk              Risk of Unplanned Readmission:  18           Discharging to Facility/ Agency   Name: [facility]  Phone: [phone]      / signature: Electronically signed by Misty Flores RN on 8/20/24 at 11:51 AM EDT    PHYSICIAN SECTION    Prognosis: Good    Condition at Discharge: Stable    Rehab Potential (if transferring to Rehab): {Prognosis:[id number]}    Recommended Labs or Other Treatments After Discharge: ***    Physician Certification: I certify the above information and transfer of Rosalie Hicks  is necessary for the continuing treatment of the diagnosis listed and that she requires Home Care for less 30 days.     Update Admission H&P: No change in H&P    PHYSICIAN SIGNATURE:  Electronically signed by GLORIA Sinha/Misty Flores RN on 8/20/24 at 11:51 AM EDT

## 2024-08-20 NOTE — PROGRESS NOTES
RT Inhaler-Nebulizer Bronchodilator Protocol Note    There is a bronchodilator order in the chart from a provider indicating to follow the RT Bronchodilator Protocol and there is an “Initiate RT Inhaler-Nebulizer Bronchodilator Protocol” order as well (see protocol at bottom of note).    CXR Findings:  No results found.    The findings from the last RT Protocol Assessment were as follows:   History Pulmonary Disease: Chronic pulmonary disease  Respiratory Pattern: Regular pattern and RR 12-20 bpm  Breath Sounds: Slightly diminished and/or crackles  Cough: Strong, spontaneous, non-productive  Indication for Bronchodilator Therapy: Decreased or absent breath sounds  Bronchodilator Assessment Score: 4    Aerosolized bronchodilator medication orders have been revised according to the RT Inhaler-Nebulizer Bronchodilator Protocol below.    Respiratory Therapist to perform RT Therapy Protocol Assessment initially then follow the protocol.  Repeat RT Therapy Protocol Assessment PRN for score 0-3 or on second treatment, BID, and PRN for scores above 3.    No Indications - adjust the frequency to every 6 hours PRN wheezing or bronchospasm, if no treatments needed after 48 hours then discontinue using Per Protocol order mode.     If indication present, adjust the RT bronchodilator orders based on the Bronchodilator Assessment Score as indicated below.  Use Inhaler orders unless patient has one or more of the following: on home nebulizer, not able to hold breath for 10 seconds, is not alert and oriented, cannot activate and use MDI correctly, or respiratory rate 25 breaths per minute or more, then use the equivalent nebulizer order(s) with same Frequency and PRN reasons based on the score.  If a patient is on this medication at home then do not decrease Frequency below that used at home.    0-3 - enter or revise RT bronchodilator order(s) to equivalent RT Bronchodilator order with Frequency of every 4 hours PRN for wheezing or  increased work of breathing using Per Protocol order mode.        4-6 - enter or revise RT Bronchodilator order(s) to two equivalent RT bronchodilator orders with one order with BID Frequency and one order with Frequency of every 4 hours PRN wheezing or increased work of breathing using Per Protocol order mode.        7-10 - enter or revise RT Bronchodilator order(s) to two equivalent RT bronchodilator orders with one order with TID Frequency and one order with Frequency of every 4 hours PRN wheezing or increased work of breathing using Per Protocol order mode.       11-13 - enter or revise RT Bronchodilator order(s) to one equivalent RT bronchodilator order with QID Frequency and an Albuterol order with Frequency of every 4 hours PRN wheezing or increased work of breathing using Per Protocol order mode.      Greater than 13 - enter or revise RT Bronchodilator order(s) to one equivalent RT bronchodilator order with every 4 hours Frequency and an Albuterol order with Frequency of every 2 hours PRN wheezing or increased work of breathing using Per Protocol order mode.     RT to enter RT Home Evaluation for COPD & MDI Assessment order using Per Protocol order mode.    Electronically signed by Raysa Sanchez on 8/20/2024 at 7:55 AM

## 2024-08-21 ENCOUNTER — TELEPHONE (OUTPATIENT)
Dept: FAMILY MEDICINE CLINIC | Age: 84
End: 2024-08-21

## 2024-08-21 LAB
BACTERIA SPEC RESP CULT: NORMAL
GRAM STN SPEC: NORMAL

## 2024-08-21 NOTE — TELEPHONE ENCOUNTER
Care Transitions Initial Follow Up Call    Outreach made within 2 business days of discharge: Yes    Patient: Rosalie Hicks Patient : 1940   MRN: [id number]  Reason for Admission: Pneumonitis  Discharge Date: 24       Spoke with: Rosalie    Discharge department/facility: [facility] Interactive Patient Contact:  Was patient able to fill all prescriptions: Yes Hospital F/U all ready scheduled  Was patient instructed to bring all medications to the follow-up visit: Yes Hospital F/U all ready scheduled  Is patient taking all medications as directed in the discharge summary? Yes Hospital F/U all ready scheduled  Does patient understand their discharge instructions: Yes Hospital F/U all ready scheduled  Does patient have questions or concerns that need addressed prior to 7-14 day follow up office visit: no Hospital F/U all ready scheduled    Additional needs identified to be addressed with provider  No needs identified Hospital F/U all ready scheduled             Scheduled appointment with PCP within 7-14 days    Follow Up  Future Appointments   Date Time Provider Department Center   2024 11:00 AM Tang Bridges MD [facility]   2024 10:45 AM Jhonny Guillermo DO [facility]   10/16/2024  2:00 PM Chase Rehman MD [facility]

## 2024-08-26 ENCOUNTER — OFFICE VISIT (OUTPATIENT)
Dept: FAMILY MEDICINE CLINIC | Age: 84
End: 2024-08-26

## 2024-08-26 VITALS
HEIGHT: 67 IN | OXYGEN SATURATION: 94 % | WEIGHT: 180 LBS | DIASTOLIC BLOOD PRESSURE: 84 MMHG | BODY MASS INDEX: 28.25 KG/M2 | HEART RATE: 60 BPM | SYSTOLIC BLOOD PRESSURE: 136 MMHG

## 2024-08-26 DIAGNOSIS — J44.9 CHRONIC OBSTRUCTIVE PULMONARY DISEASE, UNSPECIFIED COPD TYPE (HCC): ICD-10-CM

## 2024-08-26 DIAGNOSIS — J21.9 ACUTE BRONCHIOLITIS DUE TO UNSPECIFIED ORGANISM: ICD-10-CM

## 2024-08-26 DIAGNOSIS — E78.2 MIXED HYPERLIPIDEMIA: ICD-10-CM

## 2024-08-26 DIAGNOSIS — Z09 HOSPITAL DISCHARGE FOLLOW-UP: Primary | ICD-10-CM

## 2024-08-26 DIAGNOSIS — M05.719 RHEUMATOID ARTHRITIS INVOLVING SHOULDER WITH POSITIVE RHEUMATOID FACTOR, UNSPECIFIED LATERALITY (HCC): ICD-10-CM

## 2024-08-26 DIAGNOSIS — E03.9 HYPOTHYROIDISM, UNSPECIFIED TYPE: ICD-10-CM

## 2024-08-26 ASSESSMENT — ENCOUNTER SYMPTOMS
SHORTNESS OF BREATH: 1
DIARRHEA: 0
CONSTIPATION: 0

## 2024-08-26 NOTE — PROGRESS NOTES
Post-Discharge Transitional Care Follow Up      Rosalie Hicks   YOB: 1940    Date of Office Visit:  8/26/2024  Date of Hospital Admission: 8/18/24  Date of Hospital Discharge: 8/20/24  Readmission Risk Score (high >=14%. Medium >=10%):Readmission Risk Score: 14.6      Care management risk score Rising risk (score 2-5) and Complex Care (Scores >=6): No Risk Score On File     Non face to face  following discharge, date last encounter closed (first attempt may have been earlier): 08/21/2024     Call initiated 2 business days of discharge: Yes     Hospital discharge follow-up  -     LA DISCHARGE MEDS RECONCILED W/ CURRENT OUTPATIENT MED LIST  Reviewed hospital discharge summary and imaging.  Reviewed labs as well.  Overall feeling better.  See below.  Finish medications as prescribed    Acute bronchiolitis due to unspecified organism  Overall feeling better.  Continue to finish steroid taper as prescribed.  She is finished with Zithromax and Omnicef.  Follow-up with pulmonology as planned    Chronic obstructive pulmonary disease, unspecified COPD type (HCC)  See above    Hypothyroidism, unspecified type  -     Lipid Panel  -     Comprehensive Metabolic Panel  -     TSH  -     T4, Free  Repeat labs today.  No new thyroid issues noted.    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  Considering injection options.  Did not tolerate oral medication that was prescribed.    Mixed hyperlipidemia  -     Lipid Panel  -     Comprehensive Metabolic Panel  Repeat labs today.  Have been taking Crestor every other day.  States her pain has not been worse since starting Crestor    Medical Decision Making: moderate complexity  No follow-ups on file.           Subjective:   HPI    Inpatient course: Discharge summary reviewed- see chart.    Interval history/Current status: presented to ER w/ sob, cough, cp.  Sxs began few days  Right Ear: Hearing normal.      Left Ear: Hearing normal.   Eyes:      Conjunctiva/sclera: Conjunctivae normal.   Neck:      Trachea: No tracheal deviation.   Cardiovascular:      Rate and Rhythm: Normal rate and regular rhythm.   Pulmonary:      Effort: Pulmonary effort is normal.      Breath sounds: Decreased breath sounds present.   Skin:     General: Skin is warm and dry.   Neurological:      Mental Status: She is alert and oriented to person, place, and time.   Psychiatric:         Mood and Affect: Mood normal.         Behavior: Behavior normal.       This document was prepared by a combination of typing and transcription through a voice recognition software.    An electronic signature was used to authenticate this note.  --Tang Bridges MD

## 2024-08-30 ENCOUNTER — OFFICE VISIT (OUTPATIENT)
Dept: ORTHOPEDIC SURGERY | Age: 84
End: 2024-08-30

## 2024-08-30 VITALS — HEIGHT: 67 IN | WEIGHT: 180 LBS | BODY MASS INDEX: 28.25 KG/M2

## 2024-08-30 DIAGNOSIS — M19.012 PRIMARY OSTEOARTHRITIS OF LEFT SHOULDER: Primary | ICD-10-CM

## 2024-08-30 NOTE — PROGRESS NOTES
Chief Complaint  Shoulder Pain (CK LT. SHOULDER)      History of Present Illness:  The patient is here for repeat evaluation regarding her left shoulder the patient underwent a steroid injection back in May about 3 months ago.  She is requesting another injection today.  The last injection helped a lot.  She is losing a little bit of motion in the shoulder.    Prior HPI 5/31/2024:  Rosalie Hicks is a pleasant 84 y.o. female here today for repeat evaluation regarding her left shoulder.  The patient reports that the shoulder injection greatly helped for a few weeks but then she had to lift her walker to get over the entryway into her home at [facility] when she felt her shoulder shift.  Since then she has had some difficulty with her shoulder.  She is currently on oral prednisone for some swelling she had in the salivary glands.  She would like to avoid surgery.    Prior HPI 2/7/24:  Rosalie Hicks is a 83 y.o. female here today for new patient evaluation regarding her left shoulder.  She was sent to me by Dr. Bridges.  The patient reports worsening left shoulder pain over the past few years.  The patient is reporting some clicking and grinding in the shoulder.  The patient is in pain management for her back and is currently taking tramadol and Tylenol which helped a little bit.  She does report a recent history of fungal findings in her upper respiratory tract and was told to avoid inhaled steroids by her pulmonologist.  She was treated for a right distal humerus fracture with ORIF 4 years ago.          Medical History:  Patient's medications, allergies, past medical, surgical, social and family histories were reviewed and updated as appropriate.    Pertinent items are noted in HPI  Review of systems reviewed from Patient History Form available in the patient's chart under the Media tab.       Vital Signs:  There were no vitals filed for this visit.      Constitutional: In no apparent distress.

## 2024-09-03 ENCOUNTER — HOSPITAL ENCOUNTER (OUTPATIENT)
Age: 84
Discharge: HOME | End: 2024-09-03
Payer: MEDICARE

## 2024-09-03 DIAGNOSIS — E03.9 HYPOTHYROIDISM, UNSPECIFIED TYPE: ICD-10-CM

## 2024-09-03 DIAGNOSIS — E78.2 MIXED HYPERLIPIDEMIA: Primary | ICD-10-CM

## 2024-09-03 DIAGNOSIS — E78.2 MIXED HYPERLIPIDEMIA: ICD-10-CM

## 2024-09-03 LAB
ALBUMIN SERPL-MCNC: 3.3 G/DL (ref 3.4–5)
ALBUMIN/GLOB SERPL: 1 {RATIO} (ref 1.1–2.2)
ALP SERPL-CCNC: 68 U/L (ref 40–129)
ALT SERPL-CCNC: 22 U/L (ref 10–40)
ANION GAP SERPL CALCULATED.3IONS-SCNC: 13 MMOL/L (ref 3–16)
AST SERPL-CCNC: 31 U/L (ref 15–37)
BILIRUB SERPL-MCNC: 0.8 MG/DL (ref 0–1)
BUN SERPL-MCNC: 15 MG/DL (ref 7–20)
CALCIUM SERPL-MCNC: 9.4 MG/DL (ref 8.3–10.6)
CHLORIDE SERPL-SCNC: 101 MMOL/L (ref 99–110)
CO2 SERPL-SCNC: 19 MMOL/L (ref 21–32)
CREAT SERPL-MCNC: 0.7 MG/DL (ref 0.6–1.2)
GFR SERPLBLD CREATININE-BSD FMLA CKD-EPI: 86 ML/MIN/{1.73_M2}
GLUCOSE SERPL-MCNC: 98 MG/DL (ref 70–99)
POTASSIUM SERPL-SCNC: 4.7 MMOL/L (ref 3.5–5.1)
PROT SERPL-MCNC: 6.5 G/DL (ref 6.4–8.2)
REASON FOR REJECTION: NORMAL
REJECTED TEST: NORMAL
SODIUM SERPL-SCNC: 133 MMOL/L (ref 136–145)
TSH SERPL DL<=0.005 MIU/L-ACNC: 1.09 UIU/ML (ref 0.27–4.2)

## 2024-09-03 PROCEDURE — 80053 COMPREHEN METABOLIC PANEL: CPT

## 2024-09-03 PROCEDURE — 36415 COLL VENOUS BLD VENIPUNCTURE: CPT

## 2024-09-03 PROCEDURE — 84443 ASSAY THYROID STIM HORMONE: CPT

## 2024-09-03 NOTE — RESULT ENCOUNTER NOTE
It appears lipids and free T4 could not be done due to insufficient quantity of specimen.  We will need to follow-up on those at some point in the future.  Not urgent.

## 2024-09-05 ENCOUNTER — HOSPITAL ENCOUNTER (OUTPATIENT)
Age: 84
Discharge: HOME | End: 2024-09-05
Payer: MEDICARE

## 2024-09-05 LAB
CHOLEST SERPL-MCNC: 254 MG/DL (ref 0–199)
HDLC SERPL-MCNC: 62 MG/DL (ref 40–60)
LDLC SERPL CALC-MCNC: 166 MG/DL
T4 FREE SERPL-MCNC: 1.4 NG/DL (ref 0.9–1.8)
TRIGL SERPL-MCNC: 130 MG/DL (ref 0–150)
VLDLC SERPL CALC-MCNC: 26 MG/DL

## 2024-09-05 PROCEDURE — 84439 ASSAY OF FREE THYROXINE: CPT

## 2024-09-05 PROCEDURE — 80061 LIPID PANEL: CPT

## 2024-09-05 NOTE — RESULT ENCOUNTER NOTE
Cholesterol significantly elevated.  Continue Crestor for now.  Repeat labs in 4 months or so.  See other labs separately.  Free T4 normal

## 2024-09-06 DIAGNOSIS — E03.9 HYPOTHYROIDISM, UNSPECIFIED TYPE: Primary | ICD-10-CM

## 2024-09-06 DIAGNOSIS — E78.2 MIXED HYPERLIPIDEMIA: ICD-10-CM

## 2024-09-06 DIAGNOSIS — I10 BENIGN ESSENTIAL HTN: ICD-10-CM

## 2024-09-13 ENCOUNTER — APPOINTMENT (OUTPATIENT)
Dept: GENERAL RADIOLOGY | Age: 84
End: 2024-09-13
Payer: MEDICARE

## 2024-09-13 ENCOUNTER — TELEPHONE (OUTPATIENT)
Dept: PULMONOLOGY | Age: 84
End: 2024-09-13

## 2024-09-13 ENCOUNTER — HOSPITAL ENCOUNTER (EMERGENCY)
Age: 84
Discharge: HOME | End: 2024-09-13
Attending: EMERGENCY MEDICINE
Payer: MEDICARE

## 2024-09-13 VITALS
HEART RATE: 72 BPM | SYSTOLIC BLOOD PRESSURE: 148 MMHG | OXYGEN SATURATION: 99 % | DIASTOLIC BLOOD PRESSURE: 76 MMHG | TEMPERATURE: 97.7 F | HEIGHT: 67 IN | RESPIRATION RATE: 18 BRPM | WEIGHT: 181.8 LBS | BODY MASS INDEX: 28.53 KG/M2

## 2024-09-13 DIAGNOSIS — R05.2 SUBACUTE COUGH: Primary | ICD-10-CM

## 2024-09-13 PROCEDURE — 99283 EMERGENCY DEPT VISIT LOW MDM: CPT

## 2024-09-13 PROCEDURE — 71046 X-RAY EXAM CHEST 2 VIEWS: CPT

## 2024-09-13 ASSESSMENT — PAIN - FUNCTIONAL ASSESSMENT
PAIN_FUNCTIONAL_ASSESSMENT: 0-10
PAIN_FUNCTIONAL_ASSESSMENT: NONE - DENIES PAIN

## 2024-09-13 ASSESSMENT — PAIN SCALES - GENERAL: PAINLEVEL_OUTOF10: 0

## 2024-09-17 ENCOUNTER — OFFICE VISIT (OUTPATIENT)
Dept: PULMONOLOGY | Age: 84
End: 2024-09-17
Payer: MEDICARE

## 2024-09-17 VITALS
OXYGEN SATURATION: 97 % | WEIGHT: 180.8 LBS | RESPIRATION RATE: 17 BRPM | BODY MASS INDEX: 28.38 KG/M2 | DIASTOLIC BLOOD PRESSURE: 80 MMHG | HEIGHT: 67 IN | SYSTOLIC BLOOD PRESSURE: 122 MMHG | HEART RATE: 66 BPM

## 2024-09-17 DIAGNOSIS — J84.9 ILD (INTERSTITIAL LUNG DISEASE) (HCC): Primary | ICD-10-CM

## 2024-09-17 PROCEDURE — 3079F DIAST BP 80-89 MM HG: CPT | Performed by: INTERNAL MEDICINE

## 2024-09-17 PROCEDURE — 1090F PRES/ABSN URINE INCON ASSESS: CPT | Performed by: INTERNAL MEDICINE

## 2024-09-17 PROCEDURE — G8399 PT W/DXA RESULTS DOCUMENT: HCPCS | Performed by: INTERNAL MEDICINE

## 2024-09-17 PROCEDURE — 1123F ACP DISCUSS/DSCN MKR DOCD: CPT | Performed by: INTERNAL MEDICINE

## 2024-09-17 PROCEDURE — 99214 OFFICE O/P EST MOD 30 MIN: CPT | Performed by: INTERNAL MEDICINE

## 2024-09-17 PROCEDURE — 3074F SYST BP LT 130 MM HG: CPT | Performed by: INTERNAL MEDICINE

## 2024-09-17 PROCEDURE — G8417 CALC BMI ABV UP PARAM F/U: HCPCS | Performed by: INTERNAL MEDICINE

## 2024-09-17 PROCEDURE — 1111F DSCHRG MED/CURRENT MED MERGE: CPT | Performed by: INTERNAL MEDICINE

## 2024-09-17 PROCEDURE — 1036F TOBACCO NON-USER: CPT | Performed by: INTERNAL MEDICINE

## 2024-09-17 PROCEDURE — G8428 CUR MEDS NOT DOCUMENT: HCPCS | Performed by: INTERNAL MEDICINE

## 2024-09-17 RX ORDER — MYCOPHENOLATE MOFETIL 500 MG/1
TABLET ORAL
COMMUNITY
Start: 2024-09-03

## 2024-09-17 RX ORDER — PREDNISONE 20 MG/1
10 TABLET ORAL DAILY
Qty: 5 TABLET | Refills: 0 | Status: SHIPPED | OUTPATIENT
Start: 2024-09-17 | End: 2024-09-17

## 2024-09-17 RX ORDER — AZITHROMYCIN 250 MG/1
250 TABLET, FILM COATED ORAL DAILY
Qty: 7 TABLET | Refills: 0 | Status: SHIPPED | OUTPATIENT
Start: 2024-09-17 | End: 2024-09-17

## 2024-09-17 RX ORDER — AZITHROMYCIN 250 MG/1
250 TABLET, FILM COATED ORAL DAILY
Qty: 7 TABLET | Refills: 0 | Status: SHIPPED | OUTPATIENT
Start: 2024-09-17 | End: 2024-09-24

## 2024-09-17 RX ORDER — PREDNISONE 20 MG/1
10 TABLET ORAL DAILY
Qty: 5 TABLET | Refills: 0 | Status: SHIPPED | OUTPATIENT
Start: 2024-09-17 | End: 2024-09-27

## 2024-10-09 DIAGNOSIS — M05.719 RHEUMATOID ARTHRITIS INVOLVING SHOULDER WITH POSITIVE RHEUMATOID FACTOR, UNSPECIFIED LATERALITY (HCC): ICD-10-CM

## 2024-10-09 RX ORDER — TRAMADOL HYDROCHLORIDE 50 MG/1
50 TABLET ORAL EVERY 8 HOURS PRN
Qty: 45 TABLET | Refills: 0 | Status: SHIPPED | OUTPATIENT
Start: 2024-10-09 | End: 2024-11-08

## 2024-10-09 NOTE — TELEPHONE ENCOUNTER
Date of last refill of this med was 09/08/2024, # of pills given 45 and # of refills given 0.  Their next appointment is 11/29/2024, the last date patient was seen was 08/26/2024.  Does patient have medication agreement on file? No  Has drug screen been done in last 12 months if needed? no

## 2024-10-22 NOTE — FLOWSHEET NOTE
[facility] [address]    Occupational Therapy  Cancellation/No-show Note  Patient Name:  Susan Bowser   :  1940   Date:  2021  Cancelled visits to date: 1  No-shows to date: 0    For today's appointment patient:  [x]    Cancelled  []    Rescheduled appointment  []    No-show     Reason given by patient:  []    Patient ill  []    Conflicting appointment  []    No transportation    []    Conflict with work  [x]    No reason given  []    Other:     Comments:      Electronically signed by:  Severo Galvin, OT/L [id number] offered explained & declined/yes

## 2024-11-05 ENCOUNTER — HOSPITAL ENCOUNTER (OUTPATIENT)
Age: 84
Discharge: HOME | End: 2024-11-05
Payer: MEDICARE

## 2024-11-05 LAB
CK SERPL-CCNC: 69 U/L (ref 26–192)
CRP SERPL-MCNC: <3 MG/L (ref 0–5.1)
ERYTHROCYTE [SEDIMENTATION RATE] IN BLOOD BY WESTERGREN METHOD: 20 MM/HR (ref 0–30)

## 2024-11-05 PROCEDURE — 82550 ASSAY OF CK (CPK): CPT

## 2024-11-05 PROCEDURE — 85652 RBC SED RATE AUTOMATED: CPT

## 2024-11-05 PROCEDURE — 86140 C-REACTIVE PROTEIN: CPT

## 2024-11-18 NOTE — PROGRESS NOTES
Copied from CRM #[id number]. CST Phone (Incoming) NATALYA [phone]     manuel lugo            Alternative phone number: n/a    Can a detailed message be left? Yes - Voicemail   Patient has been advised the message will be addressed within 2-3 business days.                 IM Progress Note    Admit Date:  7/20/2021    80 y.o. female with COPD, RA on methotrexate, HTN, thyroid disease who presented to [facility] ED with complaint of shortness of breath. Admitted for multifocal pneumonia and COPD exacerbation. Pulmonology consulted. Subjective:  Ms. Kenneth Figueroa seen   Patient is still feeling very weak,  O2 sats stable on 1 L. She has a persistent cough. SOB better. No fever    Needs help with putting the brace on. She does not have much help at home. seen by PT OT SNF > recommended SNF. I spoke to the patient . Patient now agreeable to go to SNF for short-term rehab. I have discussed with discharge planner and updated them        Objective:   Patient Vitals for the past 4 hrs:   BP Temp Temp src Pulse Resp SpO2   07/23/21 1313 118/67 98.6 °F (37 °C) Oral 89 22 92 %   07/23/21 1147 -- -- -- -- 22 94 %            Intake/Output Summary (Last 24 hours) at 7/23/2021 1429  Last data filed at 7/23/2021 0558  Gross per 24 hour   Intake 2832.1 ml   Output --   Net 2832.1 ml       Physical Exam:  Gen: No distress. Awake and well-oriented. Looks fatigued  Eyes: PERRL. No sclera icterus. No conjunctival injection. ENT: No discharge. Pharynx clear. Neck: No JVD. Trachea midline. Resp: No accessory muscle use. diminished breath sounds. Bilateral mild rhonchi present  CV: Regular rate. Regular rhythm. No murmur. No rub. No edema. GI: Non-tender. Non-distended. No masses. No organomegaly. Normal bowel sounds   Skin: Warm and dry. No nodule on exposed extremities. No rash on exposed extremities. M/S: No cyanosis. No joint deformity. No clubbing. Neuro: Awake. Grossly nonfocal    Psych: Oriented x 3. No anxiety or agitation.      Data:  CBC:   Recent Labs     07/21/21  0615 07/22/21  0458 07/23/21  0514   WBC 6.2 8.0 5.9   HGB 9.7* 8.8* 9.8*   HCT 29.8* 26.9* 30.6*   MCV 94.5 93.6 95.9    290 249     BMP:   Recent Labs     07/21/21  0615 07/22/21  0458 07/23/21  0514   * 140 140   K 4.2 4.1 4.5    108 112*   CO2 21 22 19*   BUN 20 22* 17   CREATININE 0.6 0.6 0.6     LIVER PROFILE:   No results for input(s): AST, ALT, LIPASE, BILIDIR, BILITOT, ALKPHOS in the last 72 hours. Invalid input(s): AMYLASE,  ALB  PT/INR: No results for input(s): PROTIME, INR in the last 72 hours. CULTURES  Blood: pending   COVID 19, PCR: Not detected  Influenza A/B: neg/neg  Strep pneumo ag: ordered  Legionella ag: ordered     RADIOLOGY  XR CHEST PORTABLE   Final Result   Findings most consistent with multifocal pneumonia. Assessment/Plan:  #Pneumonia  - community acquired, suspect a gram positive organism    -Continue to treat with IV antibiotics Rocephin and azithromycin D#4  - check cultures-negative so far  -Procalcitonin 0.33- > repeat 0.31     #COPD  AE  -Continue IV solumedrol, inhaled bronchodilators   - pulmonary cs    #Hypoxia   - 2/2 PNA and COPD  - she was 87% on RA. She is now requiring 2L-> 1L. She does not use home O2. Wean as able     #Hyponatremia  - Na 130 on admit  - hold HCTZ  - IV NS today  - repeat BMP improved to 133 > 140     #Rheumatoid arthritis  - she takes methotrexate weekly on Mondays. Hold with acute infection     #HTN  - BP stable   - cont atenolol   - Hold maxzide while on IVF and with low Na     #HLD  - cont satin      #Hypothyroidism  - cont synthroid     #Weakness  -PT OT  Needs SNF     DVT Prophylaxis: Lovenox   Diet: ADULT DIET; Regular  Code Status: Full Code      Patient is slowly improving . continue current medical management with PT OT , supplemental oxygen and abx . Ania Wilsonaudelia Plan will be to discharge to SNF in a.       Murali Ovalle MD  7/23/2021

## 2024-11-20 RX ORDER — TRIAMTERENE AND HYDROCHLOROTHIAZIDE 37.5; 25 MG/1; MG/1
1 TABLET ORAL DAILY
Qty: 90 TABLET | Refills: 3 | Status: SHIPPED | OUTPATIENT
Start: 2024-11-20

## 2024-11-22 ENCOUNTER — OFFICE VISIT (OUTPATIENT)
Dept: ORTHOPEDIC SURGERY | Age: 84
End: 2024-11-22

## 2024-11-22 VITALS — BODY MASS INDEX: 28.25 KG/M2 | WEIGHT: 180 LBS | HEIGHT: 67 IN

## 2024-11-22 DIAGNOSIS — M19.011 PRIMARY OSTEOARTHRITIS OF BOTH SHOULDERS: Primary | ICD-10-CM

## 2024-11-22 DIAGNOSIS — M19.012 PRIMARY OSTEOARTHRITIS OF BOTH SHOULDERS: Primary | ICD-10-CM

## 2024-11-22 RX ORDER — LIDOCAINE HYDROCHLORIDE 10 MG/ML
8 INJECTION, SOLUTION INFILTRATION; PERINEURAL ONCE
Status: COMPLETED | OUTPATIENT
Start: 2024-11-22 | End: 2024-11-22

## 2024-11-22 RX ORDER — METHYLPREDNISOLONE ACETATE 40 MG/ML
160 INJECTION, SUSPENSION INTRA-ARTICULAR; INTRALESIONAL; INTRAMUSCULAR; SOFT TISSUE ONCE
Status: COMPLETED | OUTPATIENT
Start: 2024-11-22 | End: 2024-11-22

## 2024-11-22 RX ADMIN — LIDOCAINE HYDROCHLORIDE 8 ML: 10 INJECTION, SOLUTION INFILTRATION; PERINEURAL at 14:29

## 2024-11-22 RX ADMIN — METHYLPREDNISOLONE ACETATE 160 MG: 40 INJECTION, SUSPENSION INTRA-ARTICULAR; INTRALESIONAL; INTRAMUSCULAR; SOFT TISSUE at 14:30

## 2024-11-22 NOTE — PROGRESS NOTES
Chief Complaint  Shoulder Pain (CK LEFT SHOULDER)      History of Present Illness:  The patient is here for repeat evaluation regarding her left shoulder.  She also reports her right shoulder starting to bother her.  The patient is looking to try to get into see a new rheumatologist as the patient has pain in almost all of the joints of her body.  She did see the rheumatologist previously who recommended infusions of rheumatologic medication, however per her report her lung doctor would like her to not be on an infusion with that medication.  She also has a history of a spine fusion done by Jaime a couple of years ago but she cannot remember the physician's name.    Prior HPI 8/30/2024:  The patient is here for repeat evaluation regarding her left shoulder the patient underwent a steroid injection back in May about 3 months ago.  She is requesting another injection today.  The last injection helped a lot.  She is losing a little bit of motion in the shoulder.    Prior HPI 5/31/2024:  Rosalie Hicks is a pleasant 84 y.o. female here today for repeat evaluation regarding her left shoulder.  The patient reports that the shoulder injection greatly helped for a few weeks but then she had to lift her walker to get over the entryway into her home at [facility] when she felt her shoulder shift.  Since then she has had some difficulty with her shoulder.  She is currently on oral prednisone for some swelling she had in the salivary glands.  She would like to avoid surgery.    Prior HPI 2/7/24:  Rosalie Hicks is a 83 y.o. female here today for new patient evaluation regarding her left shoulder.  She was sent to me by Dr. Bridges.  The patient reports worsening left shoulder pain over the past few years.  The patient is reporting some clicking and grinding in the shoulder.  The patient is in pain management for her back and is currently taking tramadol and Tylenol which helped a little bit.  She does

## 2024-11-25 DIAGNOSIS — M05.719 RHEUMATOID ARTHRITIS INVOLVING SHOULDER WITH POSITIVE RHEUMATOID FACTOR, UNSPECIFIED LATERALITY (HCC): ICD-10-CM

## 2024-11-25 RX ORDER — TRAMADOL HYDROCHLORIDE 50 MG/1
50 TABLET ORAL EVERY 8 HOURS PRN
Qty: 45 TABLET | Refills: 0 | Status: SHIPPED | OUTPATIENT
Start: 2024-11-25 | End: 2024-12-25

## 2024-11-25 NOTE — TELEPHONE ENCOUNTER
Date of last refill of this med was 10/09/2024, # of pills given 45 and # of refills given 0.  Their next appointment is 0, the last date patient was seen was 008/26/2024.  Does patient have medication agreement on file? No  Has drug screen been done in last 12 months if needed? no

## 2024-12-05 DIAGNOSIS — E78.2 MIXED HYPERLIPIDEMIA: ICD-10-CM

## 2024-12-05 RX ORDER — ROSUVASTATIN CALCIUM 10 MG/1
10 TABLET, COATED ORAL EVERY OTHER DAY
Qty: 45 TABLET | Refills: 3 | Status: SHIPPED | OUTPATIENT
Start: 2024-12-05

## 2024-12-17 ENCOUNTER — HOSPITAL ENCOUNTER (OUTPATIENT)
Age: 84
Discharge: HOME | End: 2024-12-17
Payer: MEDICARE

## 2024-12-17 DIAGNOSIS — I10 BENIGN ESSENTIAL HTN: ICD-10-CM

## 2024-12-17 DIAGNOSIS — E78.2 MIXED HYPERLIPIDEMIA: ICD-10-CM

## 2024-12-17 DIAGNOSIS — E03.9 HYPOTHYROIDISM, UNSPECIFIED TYPE: ICD-10-CM

## 2024-12-17 LAB
ALBUMIN SERPL-MCNC: 3.4 G/DL (ref 3.4–5)
ALBUMIN/GLOB SERPL: 1.2 {RATIO} (ref 1.1–2.2)
ALP SERPL-CCNC: 64 U/L (ref 40–129)
ALT SERPL-CCNC: 16 U/L (ref 10–40)
ANION GAP SERPL CALCULATED.3IONS-SCNC: 10 MMOL/L (ref 3–16)
AST SERPL-CCNC: 27 U/L (ref 15–37)
BILIRUB SERPL-MCNC: 0.5 MG/DL (ref 0–1)
BUN SERPL-MCNC: 16 MG/DL (ref 7–20)
CALCIUM SERPL-MCNC: 8.7 MG/DL (ref 8.3–10.6)
CHLORIDE SERPL-SCNC: 105 MMOL/L (ref 99–110)
CHOLEST SERPL-MCNC: 187 MG/DL (ref 0–199)
CO2 SERPL-SCNC: 27 MMOL/L (ref 21–32)
CREAT SERPL-MCNC: 0.7 MG/DL (ref 0.6–1.2)
GFR SERPLBLD CREATININE-BSD FMLA CKD-EPI: 86 ML/MIN/{1.73_M2}
GLUCOSE SERPL-MCNC: 97 MG/DL (ref 70–99)
HDLC SERPL-MCNC: 48 MG/DL (ref 40–60)
LDLC SERPL CALC-MCNC: 114 MG/DL
POTASSIUM SERPL-SCNC: 4 MMOL/L (ref 3.5–5.1)
PROT SERPL-MCNC: 6.3 G/DL (ref 6.4–8.2)
SODIUM SERPL-SCNC: 142 MMOL/L (ref 136–145)
T4 FREE SERPL-MCNC: 1.2 NG/DL (ref 0.9–1.8)
TRIGL SERPL-MCNC: 124 MG/DL (ref 0–150)
TSH SERPL DL<=0.005 MIU/L-ACNC: 1.83 UIU/ML (ref 0.27–4.2)
VLDLC SERPL CALC-MCNC: 25 MG/DL

## 2024-12-17 PROCEDURE — 84443 ASSAY THYROID STIM HORMONE: CPT

## 2024-12-17 PROCEDURE — 80061 LIPID PANEL: CPT

## 2024-12-17 PROCEDURE — 80053 COMPREHEN METABOLIC PANEL: CPT

## 2024-12-17 PROCEDURE — 84439 ASSAY OF FREE THYROXINE: CPT

## 2024-12-17 PROCEDURE — 36415 COLL VENOUS BLD VENIPUNCTURE: CPT

## 2024-12-17 NOTE — RESULT ENCOUNTER NOTE
CMP essentially normal.  Lipids significantly improved from last check.  TSH normal.  Free T4 pending

## 2024-12-19 ENCOUNTER — HOSPITAL ENCOUNTER (OUTPATIENT)
Dept: CT IMAGING | Age: 84
Discharge: HOME | End: 2024-12-19
Payer: MEDICARE

## 2024-12-19 ENCOUNTER — OFFICE VISIT (OUTPATIENT)
Dept: PULMONOLOGY | Age: 84
End: 2024-12-19

## 2024-12-19 VITALS
WEIGHT: 178.8 LBS | RESPIRATION RATE: 16 BRPM | BODY MASS INDEX: 28.06 KG/M2 | DIASTOLIC BLOOD PRESSURE: 82 MMHG | HEIGHT: 67 IN | SYSTOLIC BLOOD PRESSURE: 140 MMHG | OXYGEN SATURATION: 97 % | HEART RATE: 67 BPM

## 2024-12-19 DIAGNOSIS — J84.9 ILD (INTERSTITIAL LUNG DISEASE) (HCC): Primary | ICD-10-CM

## 2024-12-19 DIAGNOSIS — J84.9 ILD (INTERSTITIAL LUNG DISEASE) (HCC): ICD-10-CM

## 2024-12-19 PROCEDURE — 71250 CT THORAX DX C-: CPT

## 2024-12-19 NOTE — PROGRESS NOTES
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[employer] ,some house work cleaning   Birds :no     SURGICAL HISTORY:   Past Surgical History:   Procedure Laterality Date    ABDOMINAL EXPLORATION SURGERY      APPENDECTOMY      CHOLECYSTECTOMY      COLONOSCOPY  04/18/2013    diverticulosis, polyps    COLONOSCOPY  11/10/2016    normal colon    EYE SURGERY Right 12/11/2015    cataract removal    EYE SURGERY Left 01/08/2016    cataract removed    FRACTURE SURGERY      HUMERUS FRACTURE SURGERY Right 02/28/2021    HUMERUS OPEN REDUCTION INTERNAL FIXATION performed by Kate Levi MD at [facility] OR    HYSTERECTOMY (CERVIX STATUS UNKNOWN)      LAMINECTOMY N/A 01/27/2022    T10-11 THORACIC DECOMPRESSION, DISCECTOMY, FUSION / FIXATION performed by Brian Lema MD at [facility] OR    ORIF HUMERUS FRACTURE  11/18/2022    SPINE SURGERY  01/27/2022    THYROIDECTOMY, PARTIAL Left     TUMOR REMOVAL      off of thyroid, was benign       FAMILY HISTORY:   Lung cancer:no   DVT or PE no     REVIEW OF SYSTEMS:  Constitutional: General health is good . There has been no weight changes. No fevers, fatigue or weakness.   Head: Patient denies any history of trauma, convulsive disorder or syncope.    Skin:  Patient denies history of changes in pigmentation, eruptions or pruritus.  No easy bruising or bleeding.  EENT: no nasal congestion   Cardiovascular ,No chest pain ,No edema ,  Respiration:SOB: + ,JACOBS :+  Gastrointestinal:No GI bleed ,no melena  ,no hematemesis    Musculoskeletal: no joint pain ,no swelling  Neurological:no , syncope.  Denies twitching, convulsions, loss of consciousness or memory.     Endocrine:  . No history of goiter, exophthalmos or dryness of skin.  The patient has no history of diabetes.    Hematopoietic:  No history of bleeding disorders or easy bruising.  Rheumatic:  No connective tissue disease history or polyarthritis/inflammatory joint disease.      PHYSICAL EXAMINATION:  Vitals:    12/19/24 1535   BP: (!) 140/82   Pulse: 67   Resp: 16

## 2024-12-30 RX ORDER — ATENOLOL 25 MG/1
12.5 TABLET ORAL DAILY
Qty: 45 TABLET | Refills: 3 | Status: SHIPPED | OUTPATIENT
Start: 2024-12-30 | End: 2025-12-25

## 2025-01-02 ENCOUNTER — TELEPHONE (OUTPATIENT)
Dept: FAMILY MEDICINE CLINIC | Age: 85
End: 2025-01-02

## 2025-01-02 RX ORDER — BENZONATATE 200 MG/1
200 CAPSULE ORAL 3 TIMES DAILY PRN
Qty: 30 CAPSULE | Refills: 0 | Status: SHIPPED | OUTPATIENT
Start: 2025-01-02 | End: 2025-01-12

## 2025-01-02 RX ORDER — AZITHROMYCIN 250 MG/1
TABLET, FILM COATED ORAL
Qty: 6 TABLET | Refills: 0 | Status: SHIPPED | OUTPATIENT
Start: 2025-01-02 | End: 2025-01-12

## 2025-01-02 NOTE — TELEPHONE ENCOUNTER
Patient calling and started yesterday morning with a cough with production.  Patient feels congestion in her chest/base of her throat.  O2 level was 94%.  Denies any SOB.  Patient uses [facility] in [place].

## 2025-01-02 NOTE — TELEPHONE ENCOUNTER
Difficult to say whether viral or bacterial.  Given her long history, okay to call in Z-Dimitris.  lists allergy to prednisone.  Symptomatic Therapy Suggested: rest, increase fluids, use mist of vaporizer prn, and call prn if symptoms persist or worsen.  Tessalon 200mg po q8h prn with a full glass of water.  #30, no rf

## 2025-01-10 ENCOUNTER — OFFICE VISIT (OUTPATIENT)
Dept: FAMILY MEDICINE CLINIC | Age: 85
End: 2025-01-10
Payer: MEDICARE

## 2025-01-10 VITALS
OXYGEN SATURATION: 96 % | SYSTOLIC BLOOD PRESSURE: 138 MMHG | WEIGHT: 173 LBS | HEART RATE: 68 BPM | BODY MASS INDEX: 27.15 KG/M2 | HEIGHT: 67 IN | DIASTOLIC BLOOD PRESSURE: 86 MMHG

## 2025-01-10 DIAGNOSIS — J40 BRONCHITIS: Primary | ICD-10-CM

## 2025-01-10 PROCEDURE — 1159F MED LIST DOCD IN RCRD: CPT | Performed by: FAMILY MEDICINE

## 2025-01-10 PROCEDURE — 3079F DIAST BP 80-89 MM HG: CPT | Performed by: FAMILY MEDICINE

## 2025-01-10 PROCEDURE — 99213 OFFICE O/P EST LOW 20 MIN: CPT | Performed by: FAMILY MEDICINE

## 2025-01-10 PROCEDURE — 1123F ACP DISCUSS/DSCN MKR DOCD: CPT | Performed by: FAMILY MEDICINE

## 2025-01-10 PROCEDURE — 3075F SYST BP GE 130 - 139MM HG: CPT | Performed by: FAMILY MEDICINE

## 2025-01-10 RX ORDER — DEXTROMETHORPHAN HYDROBROMIDE AND PROMETHAZINE HYDROCHLORIDE 15; 6.25 MG/5ML; MG/5ML
5 SYRUP ORAL 4 TIMES DAILY PRN
Qty: 120 ML | Refills: 0 | Status: SHIPPED | OUTPATIENT
Start: 2025-01-10 | End: 2025-01-17

## 2025-01-10 RX ORDER — CEFDINIR 300 MG/1
300 CAPSULE ORAL 2 TIMES DAILY
Qty: 20 CAPSULE | Refills: 0 | Status: SHIPPED | OUTPATIENT
Start: 2025-01-10 | End: 2025-01-20

## 2025-01-10 RX ORDER — PREDNISONE 10 MG/1
10 TABLET ORAL DAILY
Qty: 5 TABLET | Refills: 0 | Status: SHIPPED | OUTPATIENT
Start: 2025-01-10 | End: 2025-01-15

## 2025-01-10 ASSESSMENT — PATIENT HEALTH QUESTIONNAIRE - PHQ9
SUM OF ALL RESPONSES TO PHQ QUESTIONS 1-9: 0
5. POOR APPETITE OR OVEREATING: NOT AT ALL
3. TROUBLE FALLING OR STAYING ASLEEP: NOT AT ALL
8. MOVING OR SPEAKING SO SLOWLY THAT OTHER PEOPLE COULD HAVE NOTICED. OR THE OPPOSITE, BEING SO FIGETY OR RESTLESS THAT YOU HAVE BEEN MOVING AROUND A LOT MORE THAN USUAL: NOT AT ALL
4. FEELING TIRED OR HAVING LITTLE ENERGY: NOT AT ALL
SUM OF ALL RESPONSES TO PHQ9 QUESTIONS 1 & 2: 0
9. THOUGHTS THAT YOU WOULD BE BETTER OFF DEAD, OR OF HURTING YOURSELF: NOT AT ALL
SUM OF ALL RESPONSES TO PHQ QUESTIONS 1-9: 0
7. TROUBLE CONCENTRATING ON THINGS, SUCH AS READING THE NEWSPAPER OR WATCHING TELEVISION: NOT AT ALL
10. IF YOU CHECKED OFF ANY PROBLEMS, HOW DIFFICULT HAVE THESE PROBLEMS MADE IT FOR YOU TO DO YOUR WORK, TAKE CARE OF THINGS AT HOME, OR GET ALONG WITH OTHER PEOPLE: NOT DIFFICULT AT ALL
SUM OF ALL RESPONSES TO PHQ QUESTIONS 1-9: 0
1. LITTLE INTEREST OR PLEASURE IN DOING THINGS: NOT AT ALL
SUM OF ALL RESPONSES TO PHQ QUESTIONS 1-9: 0
6. FEELING BAD ABOUT YOURSELF - OR THAT YOU ARE A FAILURE OR HAVE LET YOURSELF OR YOUR FAMILY DOWN: NOT AT ALL
2. FEELING DOWN, DEPRESSED OR HOPELESS: NOT AT ALL

## 2025-01-10 ASSESSMENT — ENCOUNTER SYMPTOMS
COUGH: 1
SHORTNESS OF BREATH: 1

## 2025-01-10 NOTE — PROGRESS NOTES
11/22/24 81.6 kg (180 lb)       Physical Exam  Constitutional:       Appearance: She is well-developed.   HENT:      Head: Normocephalic and atraumatic.      Comments: Hoarseness noted.      Right Ear: Hearing normal.      Left Ear: Hearing normal.   Eyes:      Conjunctiva/sclera: Conjunctivae normal.   Neck:      Trachea: No tracheal deviation.   Cardiovascular:      Rate and Rhythm: Normal rate and regular rhythm.   Pulmonary:      Effort: Pulmonary effort is normal.      Breath sounds: Decreased breath sounds present.   Skin:     General: Skin is warm and dry.   Neurological:      Mental Status: She is alert and oriented to person, place, and time.   Psychiatric:         Mood and Affect: Mood normal.         Behavior: Behavior normal.           ASSESSMENT/PLAN:    1. Bronchitis  Overall improved w/ zpack.  Still w/ cough noted. Inc prednisone briefly as below.  Add cough med as below.  Cautioned that may cause drowsiness.  Fill omnicef only if sxs worsen or fails to improve.   - predniSONE (DELTASONE) 10 MG tablet; Take 1 tablet by mouth daily for 5 days  Dispense: 5 tablet; Refill: 0  - promethazine-dextromethorphan (PROMETHAZINE-DM) 6.25-15 MG/5ML syrup; Take 5 mLs by mouth 4 times daily as needed for Cough  Dispense: 120 mL; Refill: 0  - cefdinir (OMNICEF) 300 MG capsule; Take 1 capsule by mouth 2 times daily for 10 days  Dispense: 20 capsule; Refill: 0

## 2025-01-14 DIAGNOSIS — J40 BRONCHITIS: ICD-10-CM

## 2025-01-14 RX ORDER — CEFDINIR 300 MG/1
300 CAPSULE ORAL 2 TIMES DAILY
Qty: 20 CAPSULE | Refills: 0 | Status: SHIPPED | OUTPATIENT
Start: 2025-01-14 | End: 2025-01-24

## 2025-01-14 NOTE — TELEPHONE ENCOUNTER
Pt is unable to get to the pharmacy to fill the script you gave her, she is wondering if you can send it electronically to [facility] in [facility] so her son can pick it up for her?

## 2025-01-14 NOTE — TELEPHONE ENCOUNTER
Upon further review, it appears there may be a potential interaction with the Omnicef and CellCept.  New prescription sent, but fill only if symptoms fail to improve.  Amoxicillin has a similar interaction

## 2025-01-21 DIAGNOSIS — M05.719 RHEUMATOID ARTHRITIS INVOLVING SHOULDER WITH POSITIVE RHEUMATOID FACTOR, UNSPECIFIED LATERALITY (HCC): ICD-10-CM

## 2025-01-21 RX ORDER — TRAMADOL HYDROCHLORIDE 50 MG/1
50 TABLET ORAL EVERY 8 HOURS PRN
Qty: 45 TABLET | Refills: 0 | Status: SHIPPED | OUTPATIENT
Start: 2025-01-21 | End: 2025-02-20

## 2025-01-21 NOTE — TELEPHONE ENCOUNTER
Date of last refill of this med was 11/25/2024, # of pills given 45 and # of refills given 0.  Their next appointment is 2/18/2025, the last date patient was seen was 1/10/2025.  Does patient have medication agreement on file? No  Has drug screen been done in last 12 months if needed? no

## 2025-01-22 ENCOUNTER — OFFICE VISIT (OUTPATIENT)
Dept: ENT CLINIC | Age: 85
End: 2025-01-22
Payer: MEDICARE

## 2025-01-22 VITALS
WEIGHT: 173 LBS | SYSTOLIC BLOOD PRESSURE: 146 MMHG | DIASTOLIC BLOOD PRESSURE: 98 MMHG | HEART RATE: 67 BPM | HEIGHT: 67 IN | BODY MASS INDEX: 27.15 KG/M2

## 2025-01-22 DIAGNOSIS — R05.3 CHRONIC COUGH: Primary | ICD-10-CM

## 2025-01-22 DIAGNOSIS — K21.9 GASTROESOPHAGEAL REFLUX DISEASE, UNSPECIFIED WHETHER ESOPHAGITIS PRESENT: ICD-10-CM

## 2025-01-22 PROCEDURE — 1159F MED LIST DOCD IN RCRD: CPT | Performed by: OTOLARYNGOLOGY

## 2025-01-22 PROCEDURE — 3080F DIAST BP >= 90 MM HG: CPT | Performed by: OTOLARYNGOLOGY

## 2025-01-22 PROCEDURE — 3077F SYST BP >= 140 MM HG: CPT | Performed by: OTOLARYNGOLOGY

## 2025-01-22 PROCEDURE — 31575 DIAGNOSTIC LARYNGOSCOPY: CPT | Performed by: OTOLARYNGOLOGY

## 2025-01-22 PROCEDURE — 1123F ACP DISCUSS/DSCN MKR DOCD: CPT | Performed by: OTOLARYNGOLOGY

## 2025-01-22 PROCEDURE — 1160F RVW MEDS BY RX/DR IN RCRD: CPT | Performed by: OTOLARYNGOLOGY

## 2025-01-22 PROCEDURE — 99213 OFFICE O/P EST LOW 20 MIN: CPT | Performed by: OTOLARYNGOLOGY

## 2025-01-22 ASSESSMENT — ENCOUNTER SYMPTOMS
VOICE CHANGE: 0
SHORTNESS OF BREATH: 0
SORE THROAT: 0
SINUS PRESSURE: 0
COUGH: 1
EYE ITCHING: 0
APNEA: 0
TROUBLE SWALLOWING: 0
FACIAL SWELLING: 0

## 2025-01-22 NOTE — PROGRESS NOTES
[facility]  [address], [address]  [place] [address]  P: [phone]       Patient     Rosalie Hicks  1940    ChiefComplaint     Chief Complaint   Patient presents with    Follow-up     coughing issues         History of Present Illness     Rosalie is an 84-year-old female here today for evaluation of cough.  States she was doing very well from a cough perspective until late December 2024.  States she has now started having coughing jags again will occur approximately 3 times per day last for 30 minutes to an hour at a time states she will cough for several minutes have a mouthful of mucus and she has to spit and this persists.  Has been treated with antibiotics and steroids by PCP without improvement.  Notes indigestion nightly or at least every other night.  On tramadol but she attempts to minimize use.    Past Medical History     Past Medical History:   Diagnosis Date    Asthma     COPD (chronic obstructive pulmonary disease) (HCC)     Hyperlipidemia     Hypertension     RA (rheumatoid arthritis) (HCC)     Thyroid disease     has half of her thyroid       Past Surgical History     Past Surgical History:   Procedure Laterality Date    ABDOMINAL EXPLORATION SURGERY      APPENDECTOMY      CHOLECYSTECTOMY      COLONOSCOPY  04/18/2013    diverticulosis, polyps    COLONOSCOPY  11/10/2016    normal colon    EYE SURGERY Right 12/11/2015    cataract removal    EYE SURGERY Left 01/08/2016    cataract removed    FRACTURE SURGERY      HUMERUS FRACTURE SURGERY Right 02/28/2021    HUMERUS OPEN REDUCTION INTERNAL FIXATION performed by Kate Levi MD at [facility] OR    HYSTERECTOMY (CERVIX STATUS UNKNOWN)      LAMINECTOMY N/A 01/27/2022    T10-11 THORACIC DECOMPRESSION, DISCECTOMY, FUSION / FIXATION performed by Brian Lema MD at [facility] OR    ORIF HUMERUS FRACTURE  11/18/2022    SPINE SURGERY  01/27/2022    THYROIDECTOMY, PARTIAL Left     TUMOR REMOVAL      off of thyroid, was benign

## 2025-01-28 RX ORDER — PREDNISONE 2.5 MG/1
2.5 TABLET ORAL DAILY
Qty: 30 TABLET | Refills: 0 | Status: SHIPPED | OUTPATIENT
Start: 2025-01-28 | End: 2026-01-28

## 2025-01-28 RX ORDER — PREDNISONE 10 MG/1
TABLET ORAL
Qty: 9 TABLET | Refills: 0 | Status: CANCELLED | OUTPATIENT
Start: 2025-01-28

## 2025-01-28 NOTE — TELEPHONE ENCOUNTER
Rheumatologist gave it to her for ra, not taking anything else for ra sxs, this is the only thing that helps her with her pain. She is looking for a new rheumatologist

## 2025-02-20 ENCOUNTER — TELEMEDICINE (OUTPATIENT)
Dept: FAMILY MEDICINE CLINIC | Age: 85
End: 2025-02-20

## 2025-02-20 DIAGNOSIS — Z00.00 MEDICARE ANNUAL WELLNESS VISIT, SUBSEQUENT: Primary | ICD-10-CM

## 2025-02-20 ASSESSMENT — PATIENT HEALTH QUESTIONNAIRE - PHQ9
6. FEELING BAD ABOUT YOURSELF - OR THAT YOU ARE A FAILURE OR HAVE LET YOURSELF OR YOUR FAMILY DOWN: NOT AT ALL
1. LITTLE INTEREST OR PLEASURE IN DOING THINGS: NEARLY EVERY DAY
SUM OF ALL RESPONSES TO PHQ QUESTIONS 1-9: 4
3. TROUBLE FALLING OR STAYING ASLEEP: NOT AT ALL
SUM OF ALL RESPONSES TO PHQ9 QUESTIONS 1 & 2: 3
SUM OF ALL RESPONSES TO PHQ QUESTIONS 1-9: 4
8. MOVING OR SPEAKING SO SLOWLY THAT OTHER PEOPLE COULD HAVE NOTICED. OR THE OPPOSITE, BEING SO FIGETY OR RESTLESS THAT YOU HAVE BEEN MOVING AROUND A LOT MORE THAN USUAL: NOT AT ALL
SUM OF ALL RESPONSES TO PHQ QUESTIONS 1-9: 4
7. TROUBLE CONCENTRATING ON THINGS, SUCH AS READING THE NEWSPAPER OR WATCHING TELEVISION: NOT AT ALL
SUM OF ALL RESPONSES TO PHQ QUESTIONS 1-9: 4
4. FEELING TIRED OR HAVING LITTLE ENERGY: SEVERAL DAYS
2. FEELING DOWN, DEPRESSED OR HOPELESS: NOT AT ALL
9. THOUGHTS THAT YOU WOULD BE BETTER OFF DEAD, OR OF HURTING YOURSELF: NOT AT ALL
5. POOR APPETITE OR OVEREATING: NOT AT ALL
10. IF YOU CHECKED OFF ANY PROBLEMS, HOW DIFFICULT HAVE THESE PROBLEMS MADE IT FOR YOU TO DO YOUR WORK, TAKE CARE OF THINGS AT HOME, OR GET ALONG WITH OTHER PEOPLE: NOT DIFFICULT AT ALL

## 2025-02-20 NOTE — PROGRESS NOTES
Medicare Annual Wellness Visit    Rosalie Hicks is here for Medicare AWV    Assessment & Plan   Medicare annual wellness visit, subsequent     Return in 1 year (on 2/20/2026) for Medicare AWV.     Subjective   Patient stated that she feels the Cymbalta for her depression is not working and has talked with Dr Tamayo about this issue. She has an appointment with the physician on 8/26/25.    Patient's complete Health Risk Assessment and screening values have been reviewed and are found in Flowsheets. The following problems were reviewed today and where indicated follow up appointments were made and/or referrals ordered.    Positive Risk Factor Screenings with Interventions:    Fall Risk:  Do you feel unsteady or are you worried about falling? : (!) yes (sometimes- uses a 3 wheel rollator)  2 or more falls in past year?: no  Fall with injury in past year?: no     Interventions:    Reviewed medications, home hazards, visual acuity, and co-morbidities that can increase risk for falls  See AVS for additional education material         Controlled Medication Review:    Today's Pain Level: No data recorded   Opioid Risk: (Low risk score <55) Opioid risk score: 32    Patient is low risk for opioid use disorder or overdose.    Last PDMP Phi as Reviewed:  Review User Review Instant Review Result   LINDA TAMAYO 1/21/2025 12:25 PM     Reviewed PDMP [1]     Last Controlled Substance Monitoring Documentation      [place]sheet [place] [facility]   Periodic Controlled Substance Monitoring No signs of potential drug abuse or diversion identified., Obtaining appropriate analgesic effect of treatment. filed at 01/21/2025 6582               Inactivity:  On average, how many days per week do you engage in moderate to strenuous exercise (like a brisk walk)?: 0 days (walks at stores and adult ctr) (!) Abnormal  On average, how many minutes do you engage in exercise at this level?: 0

## 2025-02-26 ENCOUNTER — OFFICE VISIT (OUTPATIENT)
Dept: FAMILY MEDICINE CLINIC | Age: 85
End: 2025-02-26
Payer: MEDICARE

## 2025-02-26 VITALS
SYSTOLIC BLOOD PRESSURE: 136 MMHG | HEIGHT: 67 IN | DIASTOLIC BLOOD PRESSURE: 80 MMHG | OXYGEN SATURATION: 93 % | BODY MASS INDEX: 27.62 KG/M2 | WEIGHT: 176 LBS | HEART RATE: 60 BPM

## 2025-02-26 DIAGNOSIS — M05.719 RHEUMATOID ARTHRITIS INVOLVING SHOULDER WITH POSITIVE RHEUMATOID FACTOR, UNSPECIFIED LATERALITY (HCC): ICD-10-CM

## 2025-02-26 DIAGNOSIS — E78.2 MIXED HYPERLIPIDEMIA: ICD-10-CM

## 2025-02-26 DIAGNOSIS — F33.1 MAJOR DEPRESSIVE DISORDER, RECURRENT, MODERATE (HCC): ICD-10-CM

## 2025-02-26 DIAGNOSIS — Z23 NEED FOR IMMUNIZATION AGAINST INFLUENZA: ICD-10-CM

## 2025-02-26 DIAGNOSIS — R05.3 CHRONIC COUGH: ICD-10-CM

## 2025-02-26 DIAGNOSIS — J84.9 ILD (INTERSTITIAL LUNG DISEASE) (HCC): ICD-10-CM

## 2025-02-26 DIAGNOSIS — I10 BENIGN ESSENTIAL HTN: Primary | ICD-10-CM

## 2025-02-26 PROCEDURE — 90653 IIV ADJUVANT VACCINE IM: CPT | Performed by: FAMILY MEDICINE

## 2025-02-26 PROCEDURE — 1159F MED LIST DOCD IN RCRD: CPT | Performed by: FAMILY MEDICINE

## 2025-02-26 PROCEDURE — 3079F DIAST BP 80-89 MM HG: CPT | Performed by: FAMILY MEDICINE

## 2025-02-26 PROCEDURE — 99214 OFFICE O/P EST MOD 30 MIN: CPT | Performed by: FAMILY MEDICINE

## 2025-02-26 PROCEDURE — 3075F SYST BP GE 130 - 139MM HG: CPT | Performed by: FAMILY MEDICINE

## 2025-02-26 PROCEDURE — G0008 ADMIN INFLUENZA VIRUS VAC: HCPCS | Performed by: FAMILY MEDICINE

## 2025-02-26 PROCEDURE — 1123F ACP DISCUSS/DSCN MKR DOCD: CPT | Performed by: FAMILY MEDICINE

## 2025-02-26 RX ORDER — TRAMADOL HYDROCHLORIDE 50 MG/1
50 TABLET ORAL EVERY 8 HOURS PRN
Qty: 45 TABLET | Refills: 0 | Status: SHIPPED | OUTPATIENT
Start: 2025-02-26 | End: 2025-03-28

## 2025-02-26 RX ORDER — PREDNISONE 2.5 MG/1
2.5 TABLET ORAL DAILY
Qty: 30 TABLET | Refills: 3 | Status: SHIPPED | OUTPATIENT
Start: 2025-02-26 | End: 2026-02-26

## 2025-02-26 ASSESSMENT — ENCOUNTER SYMPTOMS: BACK PAIN: 1

## 2025-02-26 NOTE — PROGRESS NOTES
Chief Complaint   Patient presents with    Hypertension    Hyperlipidemia       HPI:  Rosalie Hicks is a 84 y.o. (: 1940) here today   for   Hypertension  This is a chronic problem. The current episode started more than 1 year ago. The problem is unchanged. The problem is controlled. Pertinent negatives include no headaches. Risk factors for coronary artery disease include dyslipidemia and post-menopausal state. Past treatments include beta blockers and diuretics. The current treatment provides moderate improvement. There are no compliance problems.    Hyperlipidemia  This is a chronic problem. The current episode started more than 1 year ago. Current antihyperlipidemic treatment includes statins. There are no compliance problems.  Risk factors for coronary artery disease include dyslipidemia and hypertension.     Still having some episodes of cough.  Comes and goes.  Not much cough today.  Had been on abx and cough med. Using mucinex cough drops.  Has helped.  Seen by ent for chronic cough approx 1 mo ago.  Gaviscon did help.  Was considering speech pathology for further eval      Ongoing issues w/ back pain and shoulder pain.  Has appt w/ ortho re shoulder.  Has had pior back surgery.  Pain does not seem to be aggravated by chol med.      Diff finding rheum.  Has f/u appt w/ rheum at [facility].  Concern w/ options.  Daughter w/ sjogren's.  Getting infusions, ? Orencia.      Patient's medications, allergies, past medical, surgical, social and family histories were reviewed and updated as appropriate.    ROS:  Review of Systems   Constitutional:  Negative for fever.   Musculoskeletal:  Positive for arthralgias and back pain.   Neurological:  Negative for headaches.           Hemoglobin A1C (%)   Date Value   2024 5.7       Past Medical History:   Diagnosis Date    Asthma     COPD (chronic obstructive pulmonary disease) ([facility])     Hyperlipidemia     Hypertension     RA (rheumatoid arthritis) ([facility])

## 2025-03-14 ENCOUNTER — OFFICE VISIT (OUTPATIENT)
Dept: ORTHOPEDIC SURGERY | Age: 85
End: 2025-03-14

## 2025-03-14 VITALS — BODY MASS INDEX: 27.62 KG/M2 | WEIGHT: 176 LBS | HEIGHT: 67 IN

## 2025-03-14 DIAGNOSIS — M19.012 PRIMARY OSTEOARTHRITIS OF LEFT SHOULDER: Primary | ICD-10-CM

## 2025-03-14 RX ORDER — LIDOCAINE HYDROCHLORIDE 10 MG/ML
8 INJECTION, SOLUTION INFILTRATION; PERINEURAL ONCE
Status: COMPLETED | OUTPATIENT
Start: 2025-03-14 | End: 2025-03-14

## 2025-03-14 RX ORDER — METHYLPREDNISOLONE ACETATE 40 MG/ML
160 INJECTION, SUSPENSION INTRA-ARTICULAR; INTRALESIONAL; INTRAMUSCULAR; SOFT TISSUE ONCE
Status: COMPLETED | OUTPATIENT
Start: 2025-03-14 | End: 2025-03-14

## 2025-03-14 RX ADMIN — LIDOCAINE HYDROCHLORIDE 8 ML: 10 INJECTION, SOLUTION INFILTRATION; PERINEURAL at 14:06

## 2025-03-14 RX ADMIN — METHYLPREDNISOLONE ACETATE 160 MG: 40 INJECTION, SUSPENSION INTRA-ARTICULAR; INTRALESIONAL; INTRAMUSCULAR; SOFT TISSUE at 14:07

## 2025-03-14 NOTE — PROGRESS NOTES
Chief Complaint  Shoulder Pain (Ck bilat shoulders)      History of Present Illness:  The patient is here for repeat evaluation regarding her bilateral shoulder pain and arthritis.  She had bilateral injections back in November.  She would like another set today as she is still against surgery.  She is getting her medications changed around by her rheumatologist but she is trying to avoid infusions.    Prior HPI 11/22/2024:   The patient is here for repeat evaluation regarding her left shoulder.  She also reports her right shoulder starting to bother her.  The patient is looking to try to get into see a new rheumatologist as the patient has pain in almost all of the joints of her body.  She did see the rheumatologist previously who recommended infusions of rheumatologic medication, however per her report her lung doctor would like her to not be on an infusion with that medication.  She also has a history of a spine fusion done by Jaime a couple of years ago but she cannot remember the physician's name.    Prior HPI 8/30/2024:  The patient is here for repeat evaluation regarding her left shoulder the patient underwent a steroid injection back in May about 3 months ago.  She is requesting another injection today.  The last injection helped a lot.  She is losing a little bit of motion in the shoulder.    Prior HPI 5/31/2024:  Rosalie Hicks is a pleasant 84 y.o. female here today for repeat evaluation regarding her left shoulder.  The patient reports that the shoulder injection greatly helped for a few weeks but then she had to lift her walker to get over the entryway into her home at [facility] when she felt her shoulder shift.  Since then she has had some difficulty with her shoulder.  She is currently on oral prednisone for some swelling she had in the salivary glands.  She would like to avoid surgery.    Prior HPI 2/7/24:  Rosalie Hicks is a 83 y.o. female here today for new patient 
6

## 2025-04-18 DIAGNOSIS — M05.719 RHEUMATOID ARTHRITIS INVOLVING SHOULDER WITH POSITIVE RHEUMATOID FACTOR, UNSPECIFIED LATERALITY (HCC): ICD-10-CM

## 2025-04-18 RX ORDER — TRAMADOL HYDROCHLORIDE 50 MG/1
50 TABLET ORAL EVERY 8 HOURS PRN
Qty: 45 TABLET | Refills: 0 | Status: SHIPPED | OUTPATIENT
Start: 2025-04-18 | End: 2025-05-18

## 2025-04-18 NOTE — TELEPHONE ENCOUNTER
Future appt scheduled 07/01/2025                    Last appt 02/26/2025      Refill Request     CONFIRM preferrred pharmacy with the patient.    If Mail Order Rx - Pend for 90 day refill.      Last Seen: Last Seen Department: 2/26/2025  Last Seen by PCP: 2/26/2025    Last Written: 02/26/2025    If no future appointment scheduled, route STAFF MESSAGE with patient name to the  Pool for scheduling.      Next Appointment:   Future Appointments   Date Time Provider Department Center   6/2/2025 10:00 AM Chase Rehman MD CLERM PULM [facility]   6/20/2025 11:15 AM Jhonny Guillermo DO SARD ORTHO [facility]   7/1/2025 10:00 AM Tang Bridges MD [facility] DEP       Message sent to  to schedule appt with patient?  NO      Requested Prescriptions     Pending Prescriptions Disp Refills    traMADol (ULTRAM) 50 MG tablet [Pharmacy Med Name: TRAMADOL HCL 50MG TABLET] 45 tablet      Sig: Take 1 tablet by mouth every 8 hours as needed for Pain. for pain for up to 30 days

## 2025-05-05 ENCOUNTER — OFFICE VISIT (OUTPATIENT)
Dept: FAMILY MEDICINE CLINIC | Age: 85
End: 2025-05-05
Payer: MEDICARE

## 2025-05-05 VITALS
HEIGHT: 67 IN | SYSTOLIC BLOOD PRESSURE: 132 MMHG | HEART RATE: 62 BPM | WEIGHT: 175 LBS | BODY MASS INDEX: 27.47 KG/M2 | OXYGEN SATURATION: 96 % | DIASTOLIC BLOOD PRESSURE: 80 MMHG

## 2025-05-05 DIAGNOSIS — F33.1 MAJOR DEPRESSIVE DISORDER, RECURRENT, MODERATE (HCC): ICD-10-CM

## 2025-05-05 DIAGNOSIS — M05.9 SEROPOSITIVE RHEUMATOID ARTHRITIS (HCC): ICD-10-CM

## 2025-05-05 DIAGNOSIS — M89.9 DISORDER OF BONE, UNSPECIFIED: ICD-10-CM

## 2025-05-05 DIAGNOSIS — K59.00 CONSTIPATION, UNSPECIFIED CONSTIPATION TYPE: ICD-10-CM

## 2025-05-05 DIAGNOSIS — L30.9 ECZEMA, UNSPECIFIED TYPE: Primary | ICD-10-CM

## 2025-05-05 DIAGNOSIS — R53.83 OTHER FATIGUE: ICD-10-CM

## 2025-05-05 DIAGNOSIS — E78.2 MIXED HYPERLIPIDEMIA: ICD-10-CM

## 2025-05-05 DIAGNOSIS — I10 BENIGN ESSENTIAL HTN: ICD-10-CM

## 2025-05-05 PROCEDURE — 1123F ACP DISCUSS/DSCN MKR DOCD: CPT | Performed by: FAMILY MEDICINE

## 2025-05-05 PROCEDURE — 99213 OFFICE O/P EST LOW 20 MIN: CPT | Performed by: FAMILY MEDICINE

## 2025-05-05 PROCEDURE — 3075F SYST BP GE 130 - 139MM HG: CPT | Performed by: FAMILY MEDICINE

## 2025-05-05 PROCEDURE — 3079F DIAST BP 80-89 MM HG: CPT | Performed by: FAMILY MEDICINE

## 2025-05-05 PROCEDURE — 1159F MED LIST DOCD IN RCRD: CPT | Performed by: FAMILY MEDICINE

## 2025-05-05 RX ORDER — HYDROXYCHLOROQUINE SULFATE 200 MG/1
200 TABLET, FILM COATED ORAL 2 TIMES DAILY
COMMUNITY
Start: 2025-04-30

## 2025-05-05 RX ORDER — CLOBETASOL PROPIONATE 0.5 MG/G
CREAM TOPICAL
Qty: 45 G | Refills: 1 | Status: SHIPPED | OUTPATIENT
Start: 2025-05-05

## 2025-05-05 ASSESSMENT — ENCOUNTER SYMPTOMS
CONSTIPATION: 1
BACK PAIN: 1

## 2025-05-05 NOTE — PROGRESS NOTES
Chief Complaint   Patient presents with    Pruritis       HPI:  Rosalie Hicks is a 85 y.o. (: 1940) here today   for bumps that itch and bruising.  HPI  Has been having sig itch.  Lori at hands, wrist.  Has had some at buttocks, chin, near ear.  Only new med is plaquenil.  Has been approx 4-6 weeks.  Has had itch prior, but more severe recently.  Bruising noted after scratching.  No other bleeding noted.      Has a lot of pain in lower abdomen.  ? Low back.  Pain improves w/ eating something. Has been taking tramadol for pain.  Has been taking more often.  Does have some constipation.  Has been taking stool softeners.  Has tried benefiber.  No vaginal bleeding noted.      Patient's medications, allergies, past medical, surgical, social and family histories were reviewed and updated as appropriate.    ROS:  Review of Systems   Constitutional:  Positive for fatigue. Negative for fever.   Gastrointestinal:  Positive for constipation.   Musculoskeletal:  Positive for arthralgias and back pain.   Skin:  Positive for rash (itch).           Hemoglobin A1C (%)   Date Value   2024 5.7       Past Medical History:   Diagnosis Date    Asthma     COPD (chronic obstructive pulmonary disease) ([facility])     Hyperlipidemia     Hypertension     RA (rheumatoid arthritis) ([facility])     Thyroid disease     has half of her thyroid       Family History   Problem Relation Age of Onset    High Blood Pressure Mother        Social History     Socioeconomic History    Marital status:      Spouse name: Not on file    Number of children: Not on file    Years of education: Not on file    Highest education level: Not on file   Occupational History    Not on file   Tobacco Use    Smoking status: Former     Current packs/day: 0.00     Average packs/day: 0.5 packs/day for 28.0 years (14.0 ttl pk-yrs)     Types: Cigarettes     Start date: 1959     Quit date: 1987     Years since quittin.3     Passive exposure: Past

## 2025-05-19 ENCOUNTER — OFFICE VISIT (OUTPATIENT)
Dept: FAMILY MEDICINE CLINIC | Age: 85
End: 2025-05-19
Payer: MEDICARE

## 2025-05-19 VITALS
DIASTOLIC BLOOD PRESSURE: 76 MMHG | WEIGHT: 176 LBS | BODY MASS INDEX: 27.62 KG/M2 | HEIGHT: 67 IN | HEART RATE: 82 BPM | OXYGEN SATURATION: 93 % | SYSTOLIC BLOOD PRESSURE: 128 MMHG

## 2025-05-19 DIAGNOSIS — M05.719 RHEUMATOID ARTHRITIS INVOLVING SHOULDER WITH POSITIVE RHEUMATOID FACTOR, UNSPECIFIED LATERALITY (HCC): ICD-10-CM

## 2025-05-19 DIAGNOSIS — L29.9 PRURITUS: Primary | ICD-10-CM

## 2025-05-19 PROCEDURE — 1159F MED LIST DOCD IN RCRD: CPT | Performed by: FAMILY MEDICINE

## 2025-05-19 PROCEDURE — 3074F SYST BP LT 130 MM HG: CPT | Performed by: FAMILY MEDICINE

## 2025-05-19 PROCEDURE — 99213 OFFICE O/P EST LOW 20 MIN: CPT | Performed by: FAMILY MEDICINE

## 2025-05-19 PROCEDURE — 3078F DIAST BP <80 MM HG: CPT | Performed by: FAMILY MEDICINE

## 2025-05-19 PROCEDURE — 1123F ACP DISCUSS/DSCN MKR DOCD: CPT | Performed by: FAMILY MEDICINE

## 2025-05-19 RX ORDER — DEXAMETHASONE 1 MG
TABLET ORAL
Qty: 18 TABLET | Refills: 0 | Status: SHIPPED | OUTPATIENT
Start: 2025-05-19 | End: 2025-06-03

## 2025-05-19 ASSESSMENT — ENCOUNTER SYMPTOMS
RHINORRHEA: 1
COUGH: 1

## 2025-05-19 NOTE — PROGRESS NOTES
Chief Complaint   Patient presents with    Pruritis       HPI:  Rosalie Hicks is a 85 y.o. (: 1940) here today   for still itching.  HPI  Still w/ itch.  Overall worse.  Seems worse since on plaquenil.  Has been scratching a lot.  Cream provides a little relief, but having to use often.  Has had steroids in the past.      Recent exposure to covid.  Some runny nose, cough, congestion, but not necessarily new.  Breathing o/w near baseline.      Patient's medications, allergies, past medical, surgical, social and family histories were reviewed and updated as appropriate.    ROS:  Review of Systems   Constitutional:  Negative for fever.   HENT:  Positive for congestion and rhinorrhea.    Respiratory:  Positive for cough.    Musculoskeletal:  Positive for arthralgias.           Prior to Visit Medications    Medication Sig Taking? Authorizing Provider   dexAMETHasone (DECADRON) 1 MG tablet Take 1 tablet by mouth 2 times daily (with meals) for 5 days, THEN 1 tablet daily (with breakfast) for 5 days, THEN 0.5 tablets daily (with breakfast) for 5 days. Yes Tang Bridges MD   hydroxychloroquine (PLAQUENIL) 200 MG tablet Take 1 tablet by mouth 2 times daily Yes ProviderRachell MD   Multiple Vitamin (MULTI VITAMIN PO) Take by mouth Yes ProviderRachell MD   clobetasol (TEMOVATE) 0.05 % cream Apply topically 2 times daily. Yes Tang Bridges MD   atenolol (TENORMIN) 25 MG tablet Take 0.5 tablets by mouth daily Yes Tang Bridges MD   rosuvastatin (CRESTOR) 10 MG tablet TAKE 1 TABLET BY MOUTH EVERY OTHER DAY Yes Tang Bridges MD   triamterene-hydroCHLOROthiazide (MAXZIDE-25) 37.5-25 MG per tablet Take 1 tablet by mouth daily Yes Tang Bridges MD   DULoxetine (CYMBALTA) 30 MG extended release capsule TAKE 1 CAPSULE BY MOUTH DAILY Yes Tang Bridges MD   levothyroxine (SYNTHROID) 25 MCG tablet Take 1 tablet by mouth daily Yes Tang Bridges MD   montelukast (SINGULAIR) 10 MG tablet TAKE 1

## 2025-05-25 DIAGNOSIS — J45.909 UNCOMPLICATED ASTHMA, UNSPECIFIED ASTHMA SEVERITY, UNSPECIFIED WHETHER PERSISTENT: ICD-10-CM

## 2025-05-27 ENCOUNTER — TELEPHONE (OUTPATIENT)
Dept: FAMILY MEDICINE CLINIC | Age: 85
End: 2025-05-27

## 2025-05-27 RX ORDER — MONTELUKAST SODIUM 10 MG/1
10 TABLET ORAL DAILY
Qty: 90 TABLET | Refills: 3 | Status: SHIPPED | OUTPATIENT
Start: 2025-05-27

## 2025-05-27 RX ORDER — HYDROXYZINE HYDROCHLORIDE 25 MG/1
25 TABLET, FILM COATED ORAL EVERY 8 HOURS PRN
Qty: 30 TABLET | Refills: 0 | Status: SHIPPED | OUTPATIENT
Start: 2025-05-27 | End: 2025-06-06

## 2025-05-27 NOTE — TELEPHONE ENCOUNTER
She did not discuss the med with rheumatology but she did start taking it again.  Does have an appt on 6/6/25.

## 2025-05-27 NOTE — TELEPHONE ENCOUNTER
Itching is bad in the evening and first thing when she gets up was wondering if she can take Benadryl or what do you suggest ? If there is something else you can give her please send to [facility] in [place]

## 2025-05-27 NOTE — TELEPHONE ENCOUNTER
Okay to: Atarax 25 mg every 8 hours as needed itching, #30, no refill.  That she talk to rheumatology regarding the Plaquenil?

## 2025-06-02 ENCOUNTER — OFFICE VISIT (OUTPATIENT)
Dept: PULMONOLOGY | Age: 85
End: 2025-06-02
Payer: MEDICARE

## 2025-06-02 VITALS
OXYGEN SATURATION: 92 % | BODY MASS INDEX: 27.65 KG/M2 | WEIGHT: 176.2 LBS | DIASTOLIC BLOOD PRESSURE: 78 MMHG | SYSTOLIC BLOOD PRESSURE: 126 MMHG | HEIGHT: 67 IN | HEART RATE: 64 BPM | RESPIRATION RATE: 14 BRPM

## 2025-06-02 DIAGNOSIS — J84.9 ILD (INTERSTITIAL LUNG DISEASE) (HCC): Primary | ICD-10-CM

## 2025-06-02 PROCEDURE — 1123F ACP DISCUSS/DSCN MKR DOCD: CPT | Performed by: INTERNAL MEDICINE

## 2025-06-02 PROCEDURE — 1159F MED LIST DOCD IN RCRD: CPT | Performed by: INTERNAL MEDICINE

## 2025-06-02 PROCEDURE — 3074F SYST BP LT 130 MM HG: CPT | Performed by: INTERNAL MEDICINE

## 2025-06-02 PROCEDURE — 99214 OFFICE O/P EST MOD 30 MIN: CPT | Performed by: INTERNAL MEDICINE

## 2025-06-02 PROCEDURE — 3078F DIAST BP <80 MM HG: CPT | Performed by: INTERNAL MEDICINE

## 2025-06-02 RX ORDER — LEVOTHYROXINE SODIUM 25 UG/1
25 TABLET ORAL DAILY
Qty: 90 TABLET | Refills: 3 | Status: SHIPPED | OUTPATIENT
Start: 2025-06-02

## 2025-06-02 NOTE — PROGRESS NOTES
P  [facility]                                               Pulmonary Clinic Consult     I had the pleasure of seeing  Rosalie Hicks     Chief Complaint   Patient presents with    Results    Follow-up       HISTORY OF PRESENT ILLNESS:    Rosalie Hicks is a 85 y.o. year old  Who has about 15 PPY  She  quit smoking 40 years   Known RA   The Patient comes in with SOB that has been going on for some years  Associated with mild .,She  states that it get worse with exercise or walking long distance and he can walk 1 block however can not do it because of spine issues and RA And go less than  flight of stairs before get short winded      Has been followed for lung nodules    She albuterol and on Wexella   Today visit  Doing better   Coughing has resolved  SOB has improved  She states she has been more active  Denies any fever or chills  No chest pain  Off MTX   Prednisone 2.5 mg PO daily   In the followed r rheumatologist  Stopped wixella  On albuterol as needed ,used 2-3 years   She is on Singulair,aware of black box warning and want to ciontinye           ALLERGIES:    Allergies   Allergen Reactions    Alendronate      Severe hearburn    Prednisone Other (See Comments)     Causes body to feel on fire  Other reaction(s): Excessive sweating, Flushing, Other (See Comments)  Causes body to feel on fire    Plaquenil [Hydroxychloroquine] Itching    Fenofibrate Diarrhea    Tizanidine      Other reaction(s): Unknown  Refuses to take muscle relaxants    Venlafaxine Hcl Itching and Nausea Only     Other reaction(s): Chest Pain    Codeine Nausea And Vomiting    Docosahexaenoic Acid-Epa Nausea And Vomiting     Cannot take Vascepa       PAST MEDICAL HISTORY:       Diagnosis Date    Asthma     COPD (chronic obstructive pulmonary disease) ([facility])     Hyperlipidemia     Hypertension     RA (rheumatoid arthritis) ([facility])     Thyroid disease     has half of her thyroid       MEDICATIONS:

## 2025-06-09 DIAGNOSIS — M05.719 RHEUMATOID ARTHRITIS INVOLVING SHOULDER WITH POSITIVE RHEUMATOID FACTOR, UNSPECIFIED LATERALITY (HCC): ICD-10-CM

## 2025-06-09 NOTE — TELEPHONE ENCOUNTER
Date of last refill of this med was 4/18/25, # of pills given 45 and # of refills given 0.  Their next appointment is 7/1/25, the last date patient was seen was 5/19/25.  Does patient have medication agreement on file? No

## 2025-06-10 RX ORDER — TRAMADOL HYDROCHLORIDE 50 MG/1
50 TABLET ORAL EVERY 8 HOURS PRN
Qty: 45 TABLET | Refills: 0 | Status: SHIPPED | OUTPATIENT
Start: 2025-06-10 | End: 2025-07-10

## 2025-06-14 DIAGNOSIS — M05.719 RHEUMATOID ARTHRITIS INVOLVING SHOULDER WITH POSITIVE RHEUMATOID FACTOR, UNSPECIFIED LATERALITY (HCC): ICD-10-CM

## 2025-06-16 RX ORDER — DULOXETIN HYDROCHLORIDE 30 MG/1
30 CAPSULE, DELAYED RELEASE ORAL DAILY
Qty: 90 CAPSULE | Refills: 3 | Status: SHIPPED | OUTPATIENT
Start: 2025-06-16

## 2025-06-26 ENCOUNTER — TELEPHONE (OUTPATIENT)
Dept: FAMILY MEDICINE CLINIC | Age: 85
End: 2025-06-26

## 2025-06-26 DIAGNOSIS — L29.9 PRURITUS: Primary | ICD-10-CM

## 2025-06-26 NOTE — TELEPHONE ENCOUNTER
Pt saw rheumatologist and was given meds but it is not working, she is still itching like crazy and doesn't know what to do next. Do you want to see her again or have any other rec? Rheumatology note is in media under [facility] note

## 2025-06-26 NOTE — TELEPHONE ENCOUNTER
Per rheumatology note, they stated that if her symptoms did not improve with discontinuing the hydroxychloroquine that the neck step would be dermatology referral.  I would agree.  Would recommend urgent referral to dermatology.  Quickest would likely be either at [place] or [place]

## 2025-07-01 ENCOUNTER — OFFICE VISIT (OUTPATIENT)
Dept: FAMILY MEDICINE CLINIC | Age: 85
End: 2025-07-01

## 2025-07-01 VITALS
SYSTOLIC BLOOD PRESSURE: 122 MMHG | HEIGHT: 67 IN | WEIGHT: 174 LBS | OXYGEN SATURATION: 94 % | DIASTOLIC BLOOD PRESSURE: 68 MMHG | HEART RATE: 66 BPM | BODY MASS INDEX: 27.31 KG/M2

## 2025-07-01 DIAGNOSIS — G95.9 MYELOPATHY (HCC): ICD-10-CM

## 2025-07-01 DIAGNOSIS — J44.9 CHRONIC OBSTRUCTIVE PULMONARY DISEASE, UNSPECIFIED COPD TYPE (HCC): ICD-10-CM

## 2025-07-01 DIAGNOSIS — R53.83 OTHER FATIGUE: ICD-10-CM

## 2025-07-01 DIAGNOSIS — I10 BENIGN ESSENTIAL HTN: Primary | ICD-10-CM

## 2025-07-01 DIAGNOSIS — M89.9 DISORDER OF BONE, UNSPECIFIED: ICD-10-CM

## 2025-07-01 DIAGNOSIS — L29.9 PRURITUS: ICD-10-CM

## 2025-07-01 DIAGNOSIS — E03.9 HYPOTHYROIDISM, UNSPECIFIED TYPE: ICD-10-CM

## 2025-07-01 DIAGNOSIS — E78.2 MIXED HYPERLIPIDEMIA: ICD-10-CM

## 2025-07-01 DIAGNOSIS — M05.9 SEROPOSITIVE RHEUMATOID ARTHRITIS (HCC): ICD-10-CM

## 2025-07-01 DIAGNOSIS — K59.00 CONSTIPATION, UNSPECIFIED CONSTIPATION TYPE: ICD-10-CM

## 2025-07-01 DIAGNOSIS — M05.719 RHEUMATOID ARTHRITIS INVOLVING SHOULDER WITH POSITIVE RHEUMATOID FACTOR, UNSPECIFIED LATERALITY (HCC): ICD-10-CM

## 2025-07-01 LAB
25(OH)D3 SERPL-MCNC: 45.7 NG/ML
ALBUMIN SERPL-MCNC: 4 G/DL (ref 3.4–5)
ALBUMIN/GLOB SERPL: 1.7 {RATIO} (ref 1.1–2.2)
ALT SERPL-CCNC: 18 U/L (ref 10–40)
ANION GAP SERPL CALCULATED.3IONS-SCNC: 14 MMOL/L (ref 3–16)
AST SERPL-CCNC: 26 U/L (ref 15–37)
BASOPHILS # BLD: 0 K/UL (ref 0–0.2)
BASOPHILS NFR BLD: 0.7 %
BILIRUB SERPL-MCNC: 0.5 MG/DL (ref 0–1)
CALCIUM SERPL-MCNC: 9.4 MG/DL (ref 8.3–10.6)
CHLORIDE SERPL-SCNC: 102 MMOL/L (ref 99–110)
CHOLEST SERPL-MCNC: 198 MG/DL (ref 0–199)
CO2 SERPL-SCNC: 24 MMOL/L (ref 21–32)
CREAT SERPL-MCNC: 1 MG/DL (ref 0.6–1.2)
DEPRECATED RDW RBC AUTO: 13.1 % (ref 12.4–15.4)
EOSINOPHIL # BLD: 0.5 K/UL (ref 0–0.6)
EOSINOPHIL NFR BLD: 6.8 %
FOLATE SERPL-MCNC: 24.5 NG/ML (ref 4.78–24.2)
GLUCOSE SERPL-MCNC: 111 MG/DL (ref 70–99)
HCT VFR BLD AUTO: 41.8 % (ref 36–48)
HGB BLD-MCNC: 14.3 G/DL (ref 12–16)
LDLC SERPL CALC-MCNC: 116 MG/DL
LYMPHOCYTES # BLD: 1.9 K/UL (ref 1–5.1)
LYMPHOCYTES NFR BLD: 27.3 %
MAGNESIUM SERPL-MCNC: 2.12 MG/DL (ref 1.8–2.4)
MCH RBC QN AUTO: 31.8 PG (ref 26–34)
MCHC RBC AUTO-ENTMCNC: 34.3 G/DL (ref 31–36)
MCV RBC AUTO: 92.8 FL (ref 80–100)
MONOCYTES # BLD: 0.9 K/UL (ref 0–1.3)
MONOCYTES NFR BLD: 13 %
NEUTROPHILS # BLD: 3.6 K/UL (ref 1.7–7.7)
NEUTROPHILS NFR BLD: 52.2 %
PLATELET # BLD AUTO: 181 K/UL (ref 135–450)
PMV BLD AUTO: 10.6 FL (ref 5–10.5)
POTASSIUM SERPL-SCNC: 4.2 MMOL/L (ref 3.5–5.1)
PROT SERPL-MCNC: 6.4 G/DL (ref 6.4–8.2)
RBC # BLD AUTO: 4.5 M/UL (ref 4–5.2)
SODIUM SERPL-SCNC: 140 MMOL/L (ref 136–145)
T4 FREE SERPL-MCNC: 1.3 NG/DL (ref 0.9–1.8)
TRIGL SERPL-MCNC: 169 MG/DL (ref 0–150)
TSH SERPL DL<=0.005 MIU/L-ACNC: 1.92 UIU/ML (ref 0.27–4.2)
VIT B12 SERPL-MCNC: 3905 PG/ML (ref 211–911)
VLDLC SERPL CALC-MCNC: 34 MG/DL
WBC # BLD AUTO: 6.9 K/UL (ref 4–11)

## 2025-07-01 ASSESSMENT — ENCOUNTER SYMPTOMS
SHORTNESS OF BREATH: 0
ROS SKIN COMMENTS: ITCH

## 2025-07-01 NOTE — PROGRESS NOTES
Chief Complaint   Patient presents with    Hypertension    Hyperlipidemia       HPI:  Rosalie Hicks is a 85 y.o. (: 1940) here today   for   Hypertension  This is a chronic problem. The current episode started more than 1 year ago. Pertinent negatives include no headaches or shortness of breath. Risk factors for coronary artery disease include dyslipidemia and post-menopausal state. Past treatments include beta blockers and diuretics. There are no compliance problems.    Hyperlipidemia  This is a chronic problem. The current episode started more than 1 year ago. Pertinent negatives include no shortness of breath. Current antihyperlipidemic treatment includes statins. There are no compliance problems.  Risk factors for coronary artery disease include dyslipidemia, hypertension and post-menopausal.     Chol was better last check.      Some bags noted under eyes.  Worse later in the day.      Still w/ itch.  Scratching at night.  Bleeding at times.  Ongoing issues w/ itch. Sxs still present after reducing dose of some meds.  Has tried changing shampoo, deodorant, soaps, others w/o relief.  Taking prescription antihistamine.  Provides temporary relief.      Patient's medications, allergies, past medical, surgical, social and family histories were reviewed and updated as appropriate.    ROS:  Review of Systems   Constitutional:  Negative for fever.   Respiratory:  Negative for shortness of breath.    Skin:         itch   Neurological:  Negative for headaches.           Hemoglobin A1C (%)   Date Value   2024 5.7       Past Medical History:   Diagnosis Date    Asthma     COPD (chronic obstructive pulmonary disease) (HCC)     Hyperlipidemia     Hypertension     RA (rheumatoid arthritis) (HCC)     Thyroid disease     has half of her thyroid       Family History   Problem Relation Age of Onset    High Blood Pressure Mother        Social History     Socioeconomic History    Marital status:      Spouse

## 2025-07-02 ENCOUNTER — RESULTS FOLLOW-UP (OUTPATIENT)
Dept: FAMILY MEDICINE CLINIC | Age: 85
End: 2025-07-02

## 2025-07-17 DIAGNOSIS — J45.909 UNCOMPLICATED ASTHMA, UNSPECIFIED ASTHMA SEVERITY, UNSPECIFIED WHETHER PERSISTENT: Primary | ICD-10-CM

## 2025-07-18 RX ORDER — ALBUTEROL SULFATE 90 UG/1
AEROSOL, METERED RESPIRATORY (INHALATION)
Qty: 18 G | Refills: 6 | Status: SHIPPED | OUTPATIENT
Start: 2025-07-18

## 2025-07-29 ENCOUNTER — OFFICE VISIT (OUTPATIENT)
Dept: ORTHOPEDIC SURGERY | Age: 85
End: 2025-07-29

## 2025-07-29 ENCOUNTER — TELEPHONE (OUTPATIENT)
Dept: FAMILY MEDICINE CLINIC | Age: 85
End: 2025-07-29

## 2025-07-29 VITALS — HEIGHT: 67 IN | WEIGHT: 174 LBS | BODY MASS INDEX: 27.31 KG/M2

## 2025-07-29 DIAGNOSIS — M19.012 PRIMARY OSTEOARTHRITIS OF BOTH SHOULDERS: Primary | ICD-10-CM

## 2025-07-29 DIAGNOSIS — M19.011 PRIMARY OSTEOARTHRITIS OF BOTH SHOULDERS: Primary | ICD-10-CM

## 2025-07-29 RX ORDER — METHYLPREDNISOLONE ACETATE 40 MG/ML
160 INJECTION, SUSPENSION INTRA-ARTICULAR; INTRALESIONAL; INTRAMUSCULAR; SOFT TISSUE ONCE
Status: COMPLETED | OUTPATIENT
Start: 2025-07-29 | End: 2025-07-29

## 2025-07-29 RX ADMIN — Medication 8 ML: at 14:17

## 2025-07-29 RX ADMIN — METHYLPREDNISOLONE ACETATE 160 MG: 40 INJECTION, SUSPENSION INTRA-ARTICULAR; INTRALESIONAL; INTRAMUSCULAR; SOFT TISSUE at 14:18

## 2025-07-29 NOTE — TELEPHONE ENCOUNTER
Had bx per derm.  Possible drug rash.  Derm still feels may be drug rash.  Has been using topical med.  Feels may need more time off plaquenil.  Plans on derm f/u.  Derm to send copy of her office notes.

## 2025-07-29 NOTE — PROGRESS NOTES
Chief Complaint  Shoulder Pain (BILAT SHOULDERS)      History of Present Illness:  The patient is here for repeat evaluation regarding her bilateral shoulder osteoarthritis.  She had injections to both shoulders back in March.  She is requesting another set today.  The patient also is reporting a rash that has developed over the last few months throughout her body.  She is currently being seen by her PCP for this and has been trying different creams.    Prior HPI 3/14/2025:  The patient is here for repeat evaluation regarding her bilateral shoulder pain and arthritis.  She had bilateral injections back in November.  She would like another set today as she is still against surgery.  She is getting her medications changed around by her rheumatologist but she is trying to avoid infusions.    Prior HPI 11/22/2024:   The patient is here for repeat evaluation regarding her left shoulder.  She also reports her right shoulder starting to bother her.  The patient is looking to try to get into see a new rheumatologist as the patient has pain in almost all of the joints of her body.  She did see the rheumatologist previously who recommended infusions of rheumatologic medication, however per her report her lung doctor would like her to not be on an infusion with that medication.  She also has a history of a spine fusion done by Jaime a couple of years ago but she cannot remember the physician's name.    Prior HPI 8/30/2024:  The patient is here for repeat evaluation regarding her left shoulder the patient underwent a steroid injection back in May about 3 months ago.  She is requesting another injection today.  The last injection helped a lot.  She is losing a little bit of motion in the shoulder.    Prior HPI 5/31/2024:  Rosalie RUIZ Fabiola is a pleasant 85 y.o. female here today for repeat evaluation regarding her left shoulder.  The patient reports that the shoulder injection greatly helped for a few weeks but then she had

## 2025-08-15 DIAGNOSIS — M05.719 RHEUMATOID ARTHRITIS INVOLVING SHOULDER WITH POSITIVE RHEUMATOID FACTOR, UNSPECIFIED LATERALITY (HCC): ICD-10-CM

## 2025-08-18 RX ORDER — TRAMADOL HYDROCHLORIDE 50 MG/1
50 TABLET ORAL EVERY 8 HOURS PRN
Qty: 45 TABLET | Refills: 0 | Status: SHIPPED | OUTPATIENT
Start: 2025-08-18 | End: 2025-09-17

## (undated) DEVICE — SUTURE VCRL SZ 3-0 L18IN ABSRB UD L26MM SH 1/2 CIR J864D

## (undated) DEVICE — DRAPE MICSCP W54XL150IN W/ 4 BINOC GLS LENS LEICA

## (undated) DEVICE — 3M™ STERI-DRAPE™ U-DRAPE, LONG 1019: Brand: STERI-DRAPE™

## (undated) DEVICE — GLOVE ORANGE PI 7 1/2   MSG9075

## (undated) DEVICE — BLANKET WRM W40.2XL55.9IN IORT LO BODY + MISTRAL AIR

## (undated) DEVICE — TOWEL,OR,DSP,ST,BLUE,STD,6/PK,12PK/CS: Brand: MEDLINE

## (undated) DEVICE — NEURO SPONGES: Brand: DEROYAL

## (undated) DEVICE — 1010 S-DRAPE TOWEL DRAPE 10/BX: Brand: STERI-DRAPE™

## (undated) DEVICE — CHLORAPREP 26ML ORANGE

## (undated) DEVICE — SUTURE PERMAHAND SZ 3-0 L30IN NONABSORBABLE BLK SILK BRAID A304H

## (undated) DEVICE — SUTURE ETHBND EXCEL SZ 5 L30IN NONABSORBABLE GRN L40MM V-37 MB66G

## (undated) DEVICE — PADDING CAST W4INXL4YD ST COT RAYON MICROPLEATED HIGHLY

## (undated) DEVICE — MARKER SURG PASS SPHR NDI

## (undated) DEVICE — 40763 BEACH CHAIR HEAD RESTRAINT: Brand: 40763 BEACH CHAIR HEAD RESTRAINT

## (undated) DEVICE — C-ARMOR C-ARM EQUIPMENT COVERS CLEAR STERILE UNIVERSAL FIT 12 PER CASE: Brand: C-ARMOR

## (undated) DEVICE — GLOVE SURG SZ 75 L12IN FNGR THK94MIL TRNSLUC YEL LTX

## (undated) DEVICE — 1016 S-DRAPE IRRIG POUCH 10/BOX: Brand: STERI-DRAPE™

## (undated) DEVICE — CORD ES L12FT BPLR FRCP

## (undated) DEVICE — SUTURE MCRYL SZ 4-0 L27IN ABSRB UD L19MM PS-2 1/2 CIR PRIM Y426H

## (undated) DEVICE — SUTURE VCRL + SZ 2-0 L36IN ABSRB UD L36MM CT-1 1/2 CIR VCP945H

## (undated) DEVICE — DRAPE C ARM W46XL120IN XLN

## (undated) DEVICE — YANKAUER,OPEN TIP,W/O VENT,STERILE: Brand: MEDLINE INDUSTRIES, INC.

## (undated) DEVICE — GLOVE SURG SZ 8 L12IN THK75MIL DK GRN LTX FREE

## (undated) DEVICE — PAD,NON-ADHERENT,3X8,STERILE,LF,1/PK: Brand: MEDLINE

## (undated) DEVICE — PROBE 8225101 5PK STD PRASS FL TIP ROHS

## (undated) DEVICE — SPLINT THMB W5XL30IN FBRGLS PD PRECUT LTWT DURABLE FAST SET

## (undated) DEVICE — SUTURE VCRL SZ 1 L27IN ABSRB VLT L36MM CT-1 1/2 CIR J341H

## (undated) DEVICE — CRADLE ARM INDIV PT CARE KT COMP FOR FOR RADLUC WILSON FRME

## (undated) DEVICE — FLUID CONTROL SHOULDER DRAPE PACK: Brand: CONVERTORS

## (undated) DEVICE — Device

## (undated) DEVICE — STOCKINETTE,IMPERVIOUS,12X48,STERILE: Brand: MEDLINE

## (undated) DEVICE — KIT EVAC 0.13IN RECT TB DIA10FR 400CC PVC 3 SPR Y CONN DRN

## (undated) DEVICE — SMARTGOWN SURGICAL GOWN, LARGE: Brand: CONVERTORS

## (undated) DEVICE — GLOVE SURG SZ 65 L12IN FNGR THK79MIL GRN LTX FREE

## (undated) DEVICE — SHEET,DRAPE,53X77,STERILE: Brand: MEDLINE

## (undated) DEVICE — NEEDLE SPNL 20GA L3.5IN YEL HUB S STL REG WALL FIT STYL W/

## (undated) DEVICE — DRESSING,GAUZE,XEROFORM,CURAD,5"X9",ST: Brand: CURAD

## (undated) DEVICE — 3M™ TEGADERM™ TRANSPARENT FILM DRESSING FRAME STYLE, 1627, 4 IN X 10 IN (10 CM X 25 CM), 20/CT 4CT/CASE: Brand: 3M™ TEGADERM™

## (undated) DEVICE — UNDERGLOVE SURG SZ 8 BLU LTX FREE SYN POLYISOPRENE POLYMER

## (undated) DEVICE — MEDI-VAC NON-CONDUCTIVE SUCTION TUBING: Brand: CARDINAL HEALTH

## (undated) DEVICE — LAMINECTOMY: Brand: MEDLINE INDUSTRIES, INC.

## (undated) DEVICE — GAUZE,SPONGE,4"X4",8PLY,STRL,LF,10/TRAY: Brand: MEDLINE

## (undated) DEVICE — 3M™ TEGADERM™ TRANSPARENT FILM DRESSING FRAME STYLE, 1626W, 4 IN X 4-3/4 IN (10 CM X 12 CM), 50/CT 4CT/CASE: Brand: 3M™ TEGADERM™

## (undated) DEVICE — CONTAINER,SPECIMEN,PNEU TUBE,3OZ,OR STRL: Brand: MEDLINE

## (undated) DEVICE — GLOVE SURG SZ 65 CRM LTX FREE POLYISOPRENE POLYMER BEAD ANTI

## (undated) DEVICE — STAPLER SKIN H3.9MM WIRE DIA0.58MM CRWN 6.9MM 35 STPL ROT

## (undated) DEVICE — TOOL 14MH30 LEGEND 14CM 3MM: Brand: MIDAS REX ™

## (undated) DEVICE — GLOVE SURG SZ 7.5 L11.2IN THK9.8MIL STRW LTX POLYMER BEAD

## (undated) DEVICE — GOLDVAC SLIM PUSH BUTTON SMOKE EVACUATION PENCIL 10 FT (3.0 M): Brand: GOLDVAC

## (undated) DEVICE — SLING ARM L L165IN D75IN WHT POLY MESH ENVELOP MTL SIDE

## (undated) DEVICE — TOWEL,STOP FLAG GOLD N-W: Brand: MEDLINE

## (undated) DEVICE — SHOULDER STABILIZATION KIT,                                    DISPOSABLE 12 PER BOX

## (undated) DEVICE — SUTURE VCRL SZ 0 L18IN ABSRB UD L36MM CT-1 1/2 CIR J840D

## (undated) DEVICE — BIT DRL L140MM DIA2MM QUIK CPL 3 FLUT CALIB DEPTH MRK W/O

## (undated) DEVICE — HANDPIECE SET WITH HIGH FLOW TIP AND SUCTION TUBE: Brand: INTERPULSE

## (undated) DEVICE — SUTURE ETHBND EXCEL SZ 2 L30IN NONABSORBABLE GRN L40MM V-37 MX69G

## (undated) DEVICE — DRESSING FOAM SELF ADH 20X10 CM ABSORBENT MEPILEX BORDER